# Patient Record
Sex: FEMALE | Race: WHITE | NOT HISPANIC OR LATINO | Employment: UNEMPLOYED | URBAN - NONMETROPOLITAN AREA
[De-identification: names, ages, dates, MRNs, and addresses within clinical notes are randomized per-mention and may not be internally consistent; named-entity substitution may affect disease eponyms.]

---

## 2019-06-03 ENCOUNTER — TRANSCRIBE ORDERS (OUTPATIENT)
Dept: ADMINISTRATIVE | Facility: HOSPITAL | Age: 74
End: 2019-06-03

## 2019-06-03 DIAGNOSIS — C51.9 VULVAR CANCER (HCC): Primary | ICD-10-CM

## 2019-06-05 ENCOUNTER — HOSPITAL ENCOUNTER (OUTPATIENT)
Dept: CT IMAGING | Facility: HOSPITAL | Age: 74
Discharge: HOME | End: 2019-06-05

## 2019-06-05 ENCOUNTER — HOSPITAL ENCOUNTER (OUTPATIENT)
Dept: CT IMAGING | Facility: HOSPITAL | Age: 74
Discharge: HOME | End: 2019-06-05
Admitting: OBSTETRICS & GYNECOLOGY

## 2019-06-05 DIAGNOSIS — C51.9 VULVAR CANCER (HCC): ICD-10-CM

## 2019-06-05 LAB — CREAT BLDA-MCNC: 1.2 MG/DL (ref 0.6–1.3)

## 2019-06-05 PROCEDURE — 74177 CT ABD & PELVIS W/CONTRAST: CPT | Performed by: RADIOLOGY

## 2019-06-05 PROCEDURE — 82565 ASSAY OF CREATININE: CPT

## 2019-06-05 PROCEDURE — 71260 CT THORAX DX C+: CPT | Performed by: RADIOLOGY

## 2019-06-05 PROCEDURE — 71260 CT THORAX DX C+: CPT

## 2019-06-05 PROCEDURE — 74177 CT ABD & PELVIS W/CONTRAST: CPT

## 2019-06-05 PROCEDURE — 0 IOVERSOL 68 % SOLUTION: Performed by: OBSTETRICS & GYNECOLOGY

## 2019-06-05 RX ADMIN — IOVERSOL 60 ML: 678 INJECTION INTRA-ARTERIAL; INTRAVENOUS at 08:19

## 2019-09-27 ENCOUNTER — TRANSCRIBE ORDERS (OUTPATIENT)
Dept: ADMINISTRATIVE | Facility: HOSPITAL | Age: 74
End: 2019-09-27

## 2019-09-27 DIAGNOSIS — Z51.89 ENCOUNTER FOR WOUND CARE: Primary | ICD-10-CM

## 2019-10-04 ENCOUNTER — HOSPITAL ENCOUNTER (OUTPATIENT)
Dept: WOUND CARE | Facility: HOSPITAL | Age: 74
Discharge: HOME | End: 2019-10-04
Admitting: NURSE PRACTITIONER

## 2019-10-04 VITALS
WEIGHT: 195 LBS | HEART RATE: 69 BPM | HEIGHT: 66 IN | BODY MASS INDEX: 31.34 KG/M2 | SYSTOLIC BLOOD PRESSURE: 185 MMHG | TEMPERATURE: 98 F | DIASTOLIC BLOOD PRESSURE: 75 MMHG | RESPIRATION RATE: 20 BRPM

## 2019-10-04 DIAGNOSIS — Z51.89 ENCOUNTER FOR WOUND CARE: ICD-10-CM

## 2019-10-04 PROCEDURE — 97602 WOUND(S) CARE NON-SELECTIVE: CPT

## 2019-10-06 NOTE — PROGRESS NOTES
Wound Clinic Initial Evaluation  Patient Identification:  Name:  Orquidea Rosenberg  Age:  74 y.o.  Sex:  female  :  1945  MRN:  [id number]   Visit Number:  [id number]  Primary Care Physician:  Lux Moreira DO     Subjective     Chief complaint:     Surgical wound dehisced      History of presenting illness:     Patient is a 74 y.o. female with past medical history significant for recent radical vulvectomy, denies any other significant medical history.  Present's today for evaluation of wound dehisced. Daughter present during visit. Patient reports having radical vulvectomy on  of this year, they did a reconstructive surgery at that same time. She reports that they started to remove every other stitch when her wound opened on the left side of her vagina/ inner thigh. She reports having moderate amount of drainage, no odor present. She reports having to have area debrided every two weeks for the past 2 weeks, with most recent being wednesday. She states they have cultured the drainage several times and has recently finished a course of Levaquin. She denies any fever, chills, nausea or vomiting. Have been packing area with wet to dry dressings.   ---------------------------------------------------------------------------------------------------------------------   Review of Systems   Constitutional: Negative for chills and fever.   Cardiovascular: Negative for leg swelling.   Gastrointestinal: Negative for diarrhea, nausea and vomiting.   Musculoskeletal: Positive for gait problem.        Decreased mobility due to location of wound, states she does continue to do ADLs but has increased pain.   Skin: Positive for color change and wound.   Neurological: Negative for numbness.   Psychiatric/Behavioral: Negative for confusion.      ---------------------------------------------------------------------------------------------------------------------   Past Medical History:   Diagnosis Date   •  Vulval ca (CMS/HCC)      Past Surgical History:   Procedure Laterality Date   • RADICAL VULVECTOMY  09/06/2019     Family History   Problem Relation Age of Onset   • Cancer Father    • Cancer Brother      Social History     Socioeconomic History   • Marital status:      Spouse name: Not on file   • Number of children: Not on file   • Years of education: Not on file   • Highest education level: Not on file   Tobacco Use   • Smoking status: Never Smoker   • Smokeless tobacco: Never Used   Substance and Sexual Activity   • Alcohol use: No     Frequency: Never   • Drug use: No     ---------------------------------------------------------------------------------------------------------------------   Allergies:  Penicillins  ---------------------------------------------------------------------------------------------------------------------    Objective     ---------------------------------------------------------------------------------------------------------------------   Vital Signs:          Body mass index is 31.47 kg/m².  Wt Readings from Last 3 Encounters:   10/04/19 88.5 kg (195 lb)     ---------------------------------------------------------------------------------------------------------------------     Physical Exam  Constitutional: calm and comfortable appears and obese  Neck: shows normal range of motion without pain, and no evidence of trauma or deformity  and trachea is midline   Respiratory: Normal respiratory effort and symmetrical chest expansion  Cardiovascular:pulses normal, and intact distal pulses dorsal-pedis  palpable and posteria-tib   palpable2+ lower bilateral  Neurologic: Mental Status orientated to person, place, time and situation  Skin: Temperature:warm to left thigh Color: Moisture: moist,Nails: pink nail beds, Hair:normal distribution .    Wound 1:present, Lesions: surgical wound dehisce, Rash: absent, Mobility Normal Gait, Location of wound: left thigh/reconstuctive vagina ,  Changes since last exam: this is initial exam, Wound bed structures/characteristics: area is open with yellow slough and black eschar, Drainage characteristics: moderate, Edges moist, Periwound characteristics: some mositure-realted inflammation, area is warm to touch, erythematous with erythremia extending down leg, Bioburden characteristics:slough, soft eschar is present  and > 75% of wound bed .  Measurements: 3.7cm X 0.5cm X 2cm    Wound  2: Small open are at incision line located right groin/. Area has yellow slough covering wound bed. Area appears moist. No erythremia.     Wound3: Wound present to left proximal greater trochantar. Patient was unaware of wound. Area is small, 2cm deep dehisced area. Area is moist with pink wound bed, with small area of slough present. No erythremia noted in periwound.   ---------------------------------------------------------------------------------------------------------------------    Assessment & Plan      Assessment     1. Wound dehisced to left vagina/thigh  2. Wound dehisced to left proximal greater trochanter  3. Obesity    Plan:     Wound dehisced to left vagina/thigh- concerns for abscess or infection as erythremia is extending down left leg. Recommended CT for evaluation, patient and daughter wants to wait until they follow up with plastic surgeon on Monday. Advised with erythremia worsens or other signs of infection are present to return to clinic or ED. Wound vac also recommended, due to location and increase in moisture to site felt patient may be at increased risk for further break down, will re-evaluate at next visit. Will pack wound with hydrogel moistened gauze and cover with gauze.     Wound dehisced to left proximal greater trochanter- will pack wound with hydrogel and cover with gauze.     Obesity- advised to consume a high protein low carb diet, which will help with weight management and wound healing.     BERTHA Diaz   [facility]    10/06/19  4:56 PM

## 2019-10-11 ENCOUNTER — HOSPITAL ENCOUNTER (OUTPATIENT)
Dept: WOUND CARE | Facility: HOSPITAL | Age: 74
Discharge: HOME | End: 2019-10-11
Admitting: NURSE PRACTITIONER

## 2019-10-11 VITALS
WEIGHT: 190 LBS | BODY MASS INDEX: 30.53 KG/M2 | RESPIRATION RATE: 20 BRPM | HEIGHT: 66 IN | HEART RATE: 80 BPM | TEMPERATURE: 98.1 F | SYSTOLIC BLOOD PRESSURE: 157 MMHG | DIASTOLIC BLOOD PRESSURE: 59 MMHG

## 2019-10-11 DIAGNOSIS — Z51.89 ENCOUNTER FOR WOUND CARE: Primary | ICD-10-CM

## 2019-10-11 PROCEDURE — 87070 CULTURE OTHR SPECIMN AEROBIC: CPT | Performed by: NURSE PRACTITIONER

## 2019-10-11 PROCEDURE — 97602 WOUND(S) CARE NON-SELECTIVE: CPT

## 2019-10-11 PROCEDURE — G0463 HOSPITAL OUTPT CLINIC VISIT: HCPCS

## 2019-10-11 PROCEDURE — 87205 SMEAR GRAM STAIN: CPT | Performed by: NURSE PRACTITIONER

## 2019-10-11 PROCEDURE — 87077 CULTURE AEROBIC IDENTIFY: CPT | Performed by: NURSE PRACTITIONER

## 2019-10-11 PROCEDURE — 87186 SC STD MICRODIL/AGAR DIL: CPT | Performed by: NURSE PRACTITIONER

## 2019-10-11 RX ORDER — LEVOFLOXACIN 750 MG/1
750 TABLET ORAL DAILY
Qty: 7 TABLET | Refills: 0 | Status: SHIPPED | OUTPATIENT
Start: 2019-10-11 | End: 2019-10-14

## 2019-10-11 RX ORDER — IBUPROFEN 600 MG/1
600 TABLET ORAL EVERY 6 HOURS PRN
COMMUNITY
End: 2020-10-07

## 2019-10-11 RX ORDER — NIFEDIPINE 30 MG/1
30 TABLET, EXTENDED RELEASE ORAL DAILY
COMMUNITY
End: 2020-10-07

## 2019-10-11 RX ORDER — OXYCODONE AND ACETAMINOPHEN 7.5; 325 MG/1; MG/1
1 TABLET ORAL EVERY 6 HOURS PRN
COMMUNITY
End: 2020-10-07

## 2019-10-11 NOTE — PROGRESS NOTES
Patient Identification:  Name:  Orquidea Rosenberg  Age:  74 y.o.  Sex:  female  :  1945  MRN:  [id number]   Visit Number:  [id number]  Primary Care Physician:  Lux Moreira DO     Subjective     Chief complaint:     Surgical wound dehisced     History of presenting illness:     Patient is a 74 y.o. female with past medical history significant for recent radical vulvectomy, denies any other significant medical history.  Present's today for evaluation of wound dehisced. Daughter present during visit. Patient reports having radical vulvectomy on  of this year, they did a reconstructive surgery at that same time. She reports that they started to remove every other stitch when her wound opened on the left side of her vagina/ inner thigh. She reports having moderate amount of drainage, no odor present. She reports having to have area debrided every two weeks for the past 2 weeks, with most recent being wednesday. She states they have cultured the drainage several times and has recently finished a course of Levaquin. She denies any fever, chills, nausea or vomiting. Have been packing area with wet to dry dressings.     Interval history: Patient seen in clinic today for evaluation of surgical wound. She reports seeing her plastic surgeon on Monday of this week and was reported no issues related to wound, and overall felt wound was stable. She denies any issues to her wounds since last visit. States she has been cleaning area with Dakins.   ---------------------------------------------------------------------------------------------------------------------   Review of Systems   Constitutional: Negative for diaphoresis and fever.   Cardiovascular: Negative for leg swelling.   Gastrointestinal: Negative for diarrhea, nausea and vomiting.   Musculoskeletal: Positive for gait problem.        Due to pain and wound location    Skin: Positive for wound.        Surgical wound          ---------------------------------------------------------------------------------------------------------------------   Past Medical History:   Diagnosis Date   • Vulval ca (CMS/HCC)      Past Surgical History:   Procedure Laterality Date   • RADICAL VULVECTOMY  09/06/2019     Family History   Problem Relation Age of Onset   • Cancer Father    • Cancer Brother      Social History     Socioeconomic History   • Marital status:      Spouse name: Not on file   • Number of children: Not on file   • Years of education: Not on file   • Highest education level: Not on file   Tobacco Use   • Smoking status: Never Smoker   • Smokeless tobacco: Never Used   Substance and Sexual Activity   • Alcohol use: No     Frequency: Never   • Drug use: No     ---------------------------------------------------------------------------------------------------------------------   Allergies:  Penicillins  ---------------------------------------------------------------------------------------------------------------------  Objective     ---------------------------------------------------------------------------------------------------------------------   Vital Signs:  Temp:  [98.1 °F (36.7 °C)] 98.1 °F (36.7 °C)  Heart Rate:  [80] 80  Resp:  [20] 20  BP: (157)/(59) 157/59    Body mass index is 30.67 kg/m².  Wt Readings from Last 3 Encounters:   10/11/19 86.2 kg (190 lb)   10/04/19 88.5 kg (195 lb)     ---------------------------------------------------------------------------------------------------------------------     Physical Exam  Skin: Temperature:normal turgor and temperatureColor: normal, no cyanosis, jaundice, pallor or bruising, Moisture: moist,Nails: pink nail beds, Hair:thinning to lower extremities .  Wound:present, Lesions: Surgical would dehiscense, Rash: absent, Mobility Normal Gait, Location of wound: left vagina into left thigh, following surgical incision, Changes since last exam: bioburden has increased  and  wound is worsening , Etiology and classification: surgical wound dehiscence (primary closure has failed in one or more areas which are now open), Wound bed structures/characteristics: full-thickness (subcutaneous tissue is exposed in at least a portion of the wound), Drainage characteristics: moderate, Edges moist, Periwound characteristics: some mositure-realted inflammation, Bioburden characteristics:slough and > 75% of wound bed green, yellow slough present in wound bed. Measurement: 5.5cm X 2cm X 3cm and is now tunneling 3cm.     Wound: to left proximal greater trochanter- appears to be showing signs of healing and has improved in size.   ---------------------------------------------------------------------------------------------------------------------    Assessment & Plan      Assessment     1. Wound dehisced to left vagina/thigh  2. Wound dehisced to left proximal greater trochanter  3. Obesity     Plan:     Wound dehisced to left vagina/thigh- wound appears worse. Case was discussed with Dr. Alfaro who recommends that patient report to ED. Made recommendations to patient and she reports that she may go to ED tomorrow. I called Patrizia STONE who works with her oncologist. I reported my findings to her and recommendations, she reported to her colleague patient may report to ED and they would evaluate her then. Would culture ordered. Will prescribe Levaquin until culture is finalized.  Wound packed with opticell and covered with gauze.      Wound dehisced to left proximal greater trochanter- Will pack with opticell.     Obesity- advised to consume a high protein low carb diet, which will help with weight management and wound healing.     BERTHA Diaz   [facility]    10/11/19  5:12 PM

## 2019-10-13 LAB
BACTERIA SPEC AEROBE CULT: ABNORMAL
BACTERIA SPEC AEROBE CULT: ABNORMAL
GRAM STN SPEC: ABNORMAL

## 2019-10-14 RX ORDER — LEVOFLOXACIN 750 MG/1
750 TABLET ORAL DAILY
Qty: 7 TABLET | Refills: 0 | Status: SHIPPED | OUTPATIENT
Start: 2019-10-14 | End: 2019-10-21

## 2019-10-14 NOTE — ADDENDUM NOTE
Encounter addended by: Jossie Loya APRN on: 10/14/2019 5:28 PM   Actions taken: Pharmacy for encounter modified, Order list changed

## 2019-10-18 ENCOUNTER — HOSPITAL ENCOUNTER (OUTPATIENT)
Dept: WOUND CARE | Facility: HOSPITAL | Age: 74
Discharge: HOME | End: 2019-10-18
Admitting: NURSE PRACTITIONER

## 2019-10-18 VITALS
SYSTOLIC BLOOD PRESSURE: 154 MMHG | DIASTOLIC BLOOD PRESSURE: 68 MMHG | TEMPERATURE: 98.3 F | RESPIRATION RATE: 18 BRPM | HEART RATE: 71 BPM

## 2019-10-18 DIAGNOSIS — T81.31XD DEHISCENCE OF OPERATIVE WOUND, SUBSEQUENT ENCOUNTER: Primary | ICD-10-CM

## 2019-10-18 PROCEDURE — G0463 HOSPITAL OUTPT CLINIC VISIT: HCPCS

## 2019-10-18 PROCEDURE — 97602 WOUND(S) CARE NON-SELECTIVE: CPT

## 2019-10-18 NOTE — PROGRESS NOTES
Wound Clinic Progress Note  Patient Identification:  Name:  Orquidea Rosenberg  Age:  74 y.o.  Sex:  female  :  1945  MRN:  [id number]   Visit Number:  [id number]  Primary Care Physician:  Lux Moreira DO     Subjective     Chief complaint:     Surgical wound dehisced    History of presenting illness:     Patient is a 74 y.o. female with past medical history significant for recent radical vulvectomy, denies any other significant medical history.  Present's today for evaluation of wound dehisced. Daughter present during visit. Patient reports having radical vulvectomy on  of this year, they did a reconstructive surgery at that same time. She reports that they started to remove every other stitch when her wound opened on the left side of her vagina/ inner thigh. She reports having moderate amount of drainage, no odor present. She reports having to have area debrided every two weeks for the past 2 weeks, with most recent being wednesday. She states they have cultured the drainage several times and has recently finished a course of Levaquin. She denies any fever, chills, nausea or vomiting. Have been packing area with wet to dry dressings.     Interval history: Patient is seen today for follow up for surgical wound dehisced. She reports going to see her oncologist this past week for evaluation. She reports they didn't have any new concerns at that time and felt her wound was doing good. She reports that pain has improved. She is having difficulty keeping dressings in place due to location. Reports continued drainage but denies odor. Denies any fever or chills.  ---------------------------------------------------------------------------------------------------------------------   Review of Systems   Constitutional: Negative for chills and fever.   Musculoskeletal: Negative for gait problem.   Skin: Positive for wound.        Moderate amount of drainage reported. No odor. States she can't keep  dressing in place due to location.      ---------------------------------------------------------------------------------------------------------------------   Past Medical History:   Diagnosis Date   • Vulval ca (CMS/HCC)      Past Surgical History:   Procedure Laterality Date   • RADICAL VULVECTOMY  09/06/2019     Family History   Problem Relation Age of Onset   • Cancer Father    • Cancer Brother      Social History     Socioeconomic History   • Marital status:      Spouse name: Not on file   • Number of children: Not on file   • Years of education: Not on file   • Highest education level: Not on file   Tobacco Use   • Smoking status: Never Smoker   • Smokeless tobacco: Never Used   Substance and Sexual Activity   • Alcohol use: No     Frequency: Never   • Drug use: No     ---------------------------------------------------------------------------------------------------------------------   Allergies:  Penicillins  ---------------------------------------------------------------------------------------------------------------------    Objective     ---------------------------------------------------------------------------------------------------------------------   Vital Signs:  Temp:  [98.3 °F (36.8 °C)] 98.3 °F (36.8 °C)  Heart Rate:  [71] 71  Resp:  [18] 18  BP: (154)/(68) 154/68  There is no height or weight on file to calculate BMI.  Wt Readings from Last 3 Encounters:   10/11/19 86.2 kg (190 lb)   10/04/19 88.5 kg (195 lb)     ---------------------------------------------------------------------------------------------------------------------     Physical Exam  Constitutional: Vital sign were reviewed (temperature, pulse, respiration, and blood pressure) and found to be within expected limits and calm and comfortable appears  Skin: Temperature:normal turgor and temperatureColor: normal, no cyanosis, jaundice, pallor or bruising, Moisture: moist to periarea,Nails: pink nail beds, Hair:thinning to  lower extremities .  Wound to left vagina/thigh- less slough is present at today's exam. No odor present. Does appear to be draining serous fluid. Some wound bed is visible and is pink, moist. Erythema has significantly improved and is now just surrounding the wound.   ---------------------------------------------------------------------------------------------------------------------     Wound Culture   Date Value Ref Range Status   10/11/2019 Rare Pseudomonas aeruginosa (A)  Final   10/11/2019 Light growth (2+) Corynebacterium species (A)  Final     Comment:       No definitive guidelines. All are susceptible to vancomycin. Resistance to penicillins & cephalosporins does occur.      I have personally reviewed the above laboratory results.     ---------------------------------------------------------------------------------------------------------------------    Assessment & Plan      Assessment     1. Wound dehisced to left vagina/thigh  2. Wound dehisced to left proximal greater trochanter  3. Obesity     Plan:     Wound dehisced to left vagina/thigh-  Will order a wound vac. Will have patient return to have placed this coming Monday.  Wound packed with opticell and covered with gauze.      Wound dehisced to left proximal greater trochanter- Will pack with opticell.     Obesity- advised to consume a high protein low carb diet, which will help with weight management and wound healing.     BERTHA Diaz   [facility]    10/18/19  5:02 PM

## 2019-10-21 ENCOUNTER — HOSPITAL ENCOUNTER (OUTPATIENT)
Dept: WOUND CARE | Facility: HOSPITAL | Age: 74
Discharge: HOME | End: 2019-10-21
Admitting: NURSE PRACTITIONER

## 2019-10-21 VITALS
HEART RATE: 96 BPM | TEMPERATURE: 98.4 F | HEIGHT: 66 IN | SYSTOLIC BLOOD PRESSURE: 180 MMHG | BODY MASS INDEX: 30.53 KG/M2 | RESPIRATION RATE: 20 BRPM | WEIGHT: 190 LBS | DIASTOLIC BLOOD PRESSURE: 85 MMHG

## 2019-10-21 DIAGNOSIS — T81.31XD DEHISCENCE OF OPERATIVE WOUND, SUBSEQUENT ENCOUNTER: Primary | ICD-10-CM

## 2019-10-21 PROCEDURE — G0463 HOSPITAL OUTPT CLINIC VISIT: HCPCS

## 2019-10-21 PROCEDURE — 63710000001 NYSTATIN 100000 UNIT/GM OINTMENT 15 G TUBE: Performed by: NURSE PRACTITIONER

## 2019-10-21 PROCEDURE — A9270 NON-COVERED ITEM OR SERVICE: HCPCS | Performed by: NURSE PRACTITIONER

## 2019-10-21 RX ORDER — NYSTATIN 100000 U/G
OINTMENT TOPICAL ONCE
Status: COMPLETED | OUTPATIENT
Start: 2019-10-21 | End: 2019-10-21

## 2019-10-21 RX ORDER — LIDOCAINE HYDROCHLORIDE 20 MG/ML
JELLY TOPICAL AS NEEDED
Status: DISCONTINUED | OUTPATIENT
Start: 2019-10-21 | End: 2019-10-22 | Stop reason: HOSPADM

## 2019-10-21 RX ADMIN — NYSTATIN: 100000 OINTMENT TOPICAL at 16:03

## 2019-10-21 RX ADMIN — LIDOCAINE HYDROCHLORIDE: 20 JELLY TOPICAL at 15:24

## 2019-10-21 NOTE — PROGRESS NOTES
Wound Clinic Progress Note  Patient Identification:  Name:  Orquidea Rosenberg  Age:  74 y.o.  Sex:  female  :  1945  MRN:  [id number]   Visit Number:  [id number]  Primary Care Physician:  Lux Moreira DO     Subjective     Chief complaint:     Surgical wound dehisce     History of presenting illness:     Patient is a 74 y.o. female with past medical history significant for recent radical vulvectomy, denies any other significant medical history.  Present's today for evaluation of wound dehisced. Daughter present during visit. Patient reports having radical vulvectomy on  of this year, they did a reconstructive surgery at that same time. She reports that they started to remove every other stitch when her wound opened on the left side of her vagina/ inner thigh. She reports having moderate amount of drainage, no odor present. She reports having to have area debrided every two weeks for the past 2 weeks, with most recent being wednesday. She states they have cultured the drainage several times and has recently finished a course of Levaquin. She denies any fever, chills, nausea or vomiting. Have been packing area with wet to dry dressings.     Interval history: Patient seen in clinic today for wound vac placement. She reports seeing her oncologist since previous exam and denies any new issues or concerns. Continues to report mild pain and difficulty with dressings remaining in place. Drainage continues without odor. Wound vac supplies ordered and were delivered to clinic today. Patient reports that she has an appointment with her oncologist tomorrow and her plastic surgeon on Friday.     ---------------------------------------------------------------------------------------------------------------------   Review of Systems   Constitutional: Negative for chills and fever.   Cardiovascular: Negative for leg swelling.   Musculoskeletal: Negative for gait problem.   Skin: Positive for wound.         Left groin, reports drainage without odor. Reports that dressing continue to fall out, states there is normally not a dressing in place.      ---------------------------------------------------------------------------------------------------------------------   Past Medical History:   Diagnosis Date   • Vulval ca (CMS/HCC)      Past Surgical History:   Procedure Laterality Date   • RADICAL VULVECTOMY  09/06/2019     Family History   Problem Relation Age of Onset   • Cancer Father    • Cancer Brother      Social History     Socioeconomic History   • Marital status:      Spouse name: Not on file   • Number of children: Not on file   • Years of education: Not on file   • Highest education level: Not on file   Tobacco Use   • Smoking status: Never Smoker   • Smokeless tobacco: Never Used   Substance and Sexual Activity   • Alcohol use: No     Frequency: Never   • Drug use: No     ---------------------------------------------------------------------------------------------------------------------   Allergies:  Penicillins  ---------------------------------------------------------------------------------------------------------------------    Objective     ---------------------------------------------------------------------------------------------------------------------   Vital Signs:  Temp:  [98.4 °F (36.9 °C)] 98.4 °F (36.9 °C)  Heart Rate:  [96] 96  Resp:  [20] 20  BP: (180)/(85) 180/85    Body mass index is 30.67 kg/m².  Wt Readings from Last 3 Encounters:   10/21/19 86.2 kg (190 lb)   10/11/19 86.2 kg (190 lb)   10/04/19 88.5 kg (195 lb)     ---------------------------------------------------------------------------------------------------------------------   Physical Exam  Constitutional: calm and comfortable appears and obese  Wound is stable from previous exam. Yellow slough present. Moderate amount of drainage. No odoor. Periwound is red, blanchable and  intact.    ---------------------------------------------------------------------------------------------------------------------    Assessment & Plan      Assessment     1. Wound dehisced to left vagina/thigh  2. Wound dehisced to left proximal greater trochanter  3. Obesity    Plan:     Wound dehisced to left vagina/thigh-  Wound vac was to be placed today, will wait as patient has follow up with oncology tomorrow. I called and spoke with Zee regarding plan for wound vac, patient is to take supplies with her to her appointment an wound vac will be placed in office.   Wound packed with opticell and covered with gauze.      Wound dehisced to left proximal greater trochanter- Will continue to pack with opticell as wound continues to heal.     Obesity- advised to consume a high protein low carb diet, which will help with weight management and wound healing.     Follow up in 1 week.    BERTHA Diaz   [facility]    10/21/19  3:51 PM

## 2019-10-21 NOTE — PATIENT INSTRUCTIONS
Negative Pressure Wound Therapy Dressing Care  Negative pressure wound therapy (NPWT) is a device that helps wounds heal. NPWT helps the wound stay clean and healthy while it heals from the inside.  NPWT uses a bandage (dressing) that is made of a sponge or gauze-like material. The dressing is placed in or inside the wound. The wound is then covered and sealed with a cover dressing that sticks to your skin (adhesive). This keeps air out. A tube connects the cover dressing to a small pump. The pump sucks fluid and germs from the wound. The pump also controls any odor coming from the wound.  What are the risks?  NPWT is usually safe to use. The most common problem is skin irritation from the dressing adhesive, but there are many ways to prevent this from happening. However, more serious problems can develop, such as:  · Bleeding.  · Infection.  · Dehydration.  · Pain.  How to change your dressing  How often you change your dressing depends on your wound. If the pump is off for more than two hours, the dressing will need to be changed. Follow your health care provider’s instructions on how often to change it. Your health care provider may change your dressing, or a family member, friend, or caregiver may be shown how to change the dressing. It is important to:  · Wear gloves and protective clothing while changing a dressing. This may include eye protection.  · Never let anyone change your dressing if he or she has an infection, skin condition, or skin wound or cut of any size.  Preparing to change your dressing  · If needed, take pain medicine 30 minutes before the dressing change as prescribed by your health care provider.  · Set up a clean station for wound care. You will need:  ? A disposable garbage bag that is open and ready to use.  ? Hand .  ? Wound cleanser or saltwater solution (saline) as told by your health care provider.  ? New dressing material or bandages. Make sure to open the dressing package  so that the dressing remains on the inside of the package.  You may also need the following in your clean station:  · A box of vinyl gloves.  · Tape.  · Skin protectant. This may be a wipe, film, or spray.  · Clean or germ-free (sterile) scissors.  · Wound liner.  · Cotton tip applicators.  Removing your old dressing  · Wash your hands with soap and water. Dry your hands with a clean towel. If soap and water are not available, use hand .  · Put on gloves.  · Turn off the pump and disconnect the tubing from the dressing.  · Carefully remove the adhesive cover dressing in the direction of your hair growth. Only touch the outside edges of the dressing.  · Remove the dressing that is inside the wound. If the dressing sticks, use a wound cleanser or saline solution to wet the dressing. This helps it come off more easily.  · Throw the old dressing supplies into the ready garbage bag.  · Remove your gloves by grabbing the cuff and turning the glove inside out. Place the gloves in the trash immediately.  · Wash your hands with soap and water. Dry your hands with a clean towel. If soap and water are not available, use hand .  Cleaning your wound  · Follow your health care provider's instructions on how to clean your wound. This may include using a saline or recommended wound cleanser.  · Do not use over-the-counter medicated or antiseptic creams, sprays, liquids, or dressings unless told to do so by your health care provider.  · Clean the area thoroughly with the recommended saline solution or wound cleanser and a clean gauze pad.  · Throw the gauze pad into the garbage bag.  · Wash your hands with soap and water. Dry your hands with a clean towel. If soap and water are not available, use hand .  Applying the dressing  · Apply a skin protectant to any skin that will be exposed to adhesive. Let the skin protectant dry.  · Put a new dressing into the wound.  · Apply a new cover dressing and  tube.  · Take off your gloves. Put them in the plastic bag with the old dressing. Tie the bag shut and throw it away.  · Wash your hands with soap and water. Dry your hands with a clean towel. If soap and water are not available, use hand .  · Attach the suction and turn the pump back on. Do not change the settings on the machine without talking to a health care provider.  · Replace the container in the pump that collects fluid if it is full. Do this at least once a week.  Contact a health care provider if:  · You have new pain.  · You develop irritation, a rash, or itching around the wound or dressing.  · You see new black or yellow tissue in your wound.  · The dressing changes are painful or cause bleeding.  · The pump has been off for more than two hours and you do not know how to change the dressing.  · The alarm for the pump goes off and you do not know what to do.  Get help right away if:  · You have a lot of bleeding.  · You see a sudden change in the color or texture of the drainage.  · The wound breaks open.  · You have severe pain.  · You have signs of infection, such as:  ? More redness, swelling, or pain.  ? More fluid or blood.  ? Warmth.  ? Pus or a bad smell.  ? Red streaks leading from wound.  ? A fever.  This information is not intended to replace advice given to you by your health care provider. Make sure you discuss any questions you have with your health care provider.  Document Released: 03/11/2013 Document Revised: 01/12/2017 Document Reviewed: 09/22/2016  Elsevier Interactive Patient Education © 2019 [employer]

## 2019-10-25 ENCOUNTER — APPOINTMENT (OUTPATIENT)
Dept: WOUND CARE | Facility: HOSPITAL | Age: 74
End: 2019-10-25

## 2019-10-28 ENCOUNTER — HOSPITAL ENCOUNTER (OUTPATIENT)
Dept: WOUND CARE | Facility: HOSPITAL | Age: 74
Discharge: HOME | End: 2019-10-28
Admitting: NURSE PRACTITIONER

## 2019-10-28 VITALS
OXYGEN SATURATION: 95 % | TEMPERATURE: 98.3 F | HEART RATE: 83 BPM | SYSTOLIC BLOOD PRESSURE: 168 MMHG | RESPIRATION RATE: 17 BRPM | DIASTOLIC BLOOD PRESSURE: 73 MMHG

## 2019-10-28 PROCEDURE — 97602 WOUND(S) CARE NON-SELECTIVE: CPT

## 2019-10-28 NOTE — PROGRESS NOTES
Wound Clinic Progress Note  Patient Identification:  Name:  Orquidea Rosenberg  Age:  74 y.o.  Sex:  female  :  1945  MRN:  [id number]   Visit Number:  [id number]  Primary Care Physician:  Lux Moreira DO     Subjective     Chief complaint:     Surgical Wound dehisced     History of presenting illness:     Patient is a 74 y.o. female with past medical history significant for recent radical vulvectomy, denies any other significant medical history.  Present's today for evaluation of wound dehisced. Daughter present during visit. Patient reports having radical vulvectomy on  of this year, they did a reconstructive surgery at that same time. She reports that they started to remove every other stitch when her wound opened on the left side of her vagina/ inner thigh. She reports having moderate amount of drainage, no odor present. She reports having to have area debrided every two weeks for the past 2 weeks, with most recent being wednesday. She states they have cultured the drainage several times and has recently finished a course of Levaquin. She denies any fever, chills, nausea or vomiting. Have been packing area with wet to dry dressings.     Interval history: Patient seen in clinic today for follow up to her surgical wound dehisced. She denies any new concerns related to her wound. She does state that she has a difficult time keeping a dressing in place. She reports drainage but has not increased from previous visit. Reports mild pain at site, usually with movement. Denies any fever or chills.   ---------------------------------------------------------------------------------------------------------------------   Review of Systems   Constitutional: Negative for chills and fever.   Cardiovascular: Negative for leg swelling.   Skin: Positive for wound.       ---------------------------------------------------------------------------------------------------------------------   Past Medical  History:   Diagnosis Date   • Vulval ca (CMS/HCC)      Past Surgical History:   Procedure Laterality Date   • RADICAL VULVECTOMY  09/06/2019     Family History   Problem Relation Age of Onset   • Cancer Father    • Cancer Brother      Social History     Socioeconomic History   • Marital status:      Spouse name: Not on file   • Number of children: Not on file   • Years of education: Not on file   • Highest education level: Not on file   Tobacco Use   • Smoking status: Never Smoker   • Smokeless tobacco: Never Used   Substance and Sexual Activity   • Alcohol use: No     Frequency: Never   • Drug use: No     ---------------------------------------------------------------------------------------------------------------------   Allergies:  Penicillins  ---------------------------------------------------------------------------------------------------------------------    Objective     ---------------------------------------------------------------------------------------------------------------------   Vital Signs:  Temp:  [98.3 °F (36.8 °C)] 98.3 °F (36.8 °C)  Heart Rate:  [83] 83  Resp:  [17] 17  BP: (168)/(73) 168/73  No data found.  SpO2 Percentage    10/28/19 1617   SpO2: 95%     SpO2:  [95 %] 95 %  on   ;        There is no height or weight on file to calculate BMI.  Wt Readings from Last 3 Encounters:   10/21/19 86.2 kg (190 lb)   10/11/19 86.2 kg (190 lb)   10/04/19 88.5 kg (195 lb)     ---------------------------------------------------------------------------------------------------------------------     Physical Exam    Skin: Temperature:normal turgor and temperatureColor: normal, no cyanosis, jaundice, pallor or bruising, Moisture: dry,Nails: pink nail beds, Hair:normal distribution .  Wound:present, , Rash: absent, Mobility Normal Gait, Location of wound: left , Changes since last exam: bioburden coverage has diminished , Etiology and classification: surgical wound dehiscence (primary closure has  failed in one or more areas which are now open), Wound bed structures/characteristics: full-thickness (subcutaneous tissue is exposed in at least a portion of the wound), Drainage characteristics: moderate and serous drainage, Edges moist, Periwound characteristics: rubor extends less than 2cm from wound edges , Bioburden characteristics:slough and 50-75% of wound bed .  Measurements: 7cm X 2.2cm X 1cm with tunneling 1.5cm  ---------------------------------------------------------------------------------------------------------------------    Assessment & Plan      Assessment     1. Wound dehisced to left vagina/thigh  2. Wound dehisced to left proximal greater trochanter  3. Obesity    Plan:     Wound dehisced to left vagina/thigh-  Wound vac not in place , patient reports she went to oncology appointment where they told her it would not work. Will pack wound  with opticell and covered with gauze and secured in place with mepilex.      Wound dehisced to left proximal greater trochanter- Will continue to pack with opticell as wound continues to heal.     Obesity- advised to consume a high protein low carb diet, which will help with weight management and wound healing.      Follow up in 1 week.    BERTHA Diaz   [facility]    10/28/19  4:51 PM

## 2019-10-31 ENCOUNTER — HOSPITAL ENCOUNTER (EMERGENCY)
Facility: HOSPITAL | Age: 74
Discharge: HOME | End: 2019-10-31
Attending: FAMILY MEDICINE | Admitting: FAMILY MEDICINE

## 2019-10-31 VITALS
HEART RATE: 66 BPM | DIASTOLIC BLOOD PRESSURE: 49 MMHG | WEIGHT: 190 LBS | BODY MASS INDEX: 30.53 KG/M2 | HEIGHT: 66 IN | OXYGEN SATURATION: 92 % | SYSTOLIC BLOOD PRESSURE: 112 MMHG | TEMPERATURE: 99.6 F | RESPIRATION RATE: 20 BRPM

## 2019-10-31 DIAGNOSIS — E87.6 HYPOKALEMIA: ICD-10-CM

## 2019-10-31 DIAGNOSIS — R11.2 NON-INTRACTABLE VOMITING WITH NAUSEA, UNSPECIFIED VOMITING TYPE: Primary | ICD-10-CM

## 2019-10-31 LAB
ALBUMIN SERPL-MCNC: 3.43 G/DL (ref 3.5–5.2)
ALBUMIN/GLOB SERPL: 1.3 G/DL
ALP SERPL-CCNC: 61 U/L (ref 39–117)
ALT SERPL W P-5'-P-CCNC: 6 U/L (ref 1–33)
AMYLASE SERPL-CCNC: 38 U/L (ref 28–100)
ANION GAP SERPL CALCULATED.3IONS-SCNC: 12.9 MMOL/L (ref 5–15)
AST SERPL-CCNC: 10 U/L (ref 1–32)
BACTERIA UR QL AUTO: NORMAL /HPF
BILIRUB SERPL-MCNC: 0.4 MG/DL (ref 0.2–1.2)
BILIRUB UR QL STRIP: NEGATIVE
BUN BLD-MCNC: 25 MG/DL (ref 8–23)
BUN/CREAT SERPL: 30.1 (ref 7–25)
CALCIUM SPEC-SCNC: 8.3 MG/DL (ref 8.6–10.5)
CHLORIDE SERPL-SCNC: 103 MMOL/L (ref 98–107)
CLARITY UR: CLEAR
CO2 SERPL-SCNC: 23.1 MMOL/L (ref 22–29)
COLOR UR: YELLOW
CREAT BLD-MCNC: 0.83 MG/DL (ref 0.57–1)
CRP SERPL-MCNC: 16.09 MG/DL (ref 0–0.5)
D-LACTATE SERPL-SCNC: 0.8 MMOL/L (ref 0.5–2)
DEPRECATED RDW RBC AUTO: 48.7 FL (ref 37–54)
EOSINOPHIL # BLD MANUAL: 0.06 10*3/MM3 (ref 0–0.4)
EOSINOPHIL NFR BLD MANUAL: 1 % (ref 0.3–6.2)
ERYTHROCYTE [DISTWIDTH] IN BLOOD BY AUTOMATED COUNT: 14.4 % (ref 12.3–15.4)
ERYTHROCYTE [SEDIMENTATION RATE] IN BLOOD: 51 MM/HR (ref 0–30)
GFR SERPL CREATININE-BSD FRML MDRD: 67 ML/MIN/1.73
GLOBULIN UR ELPH-MCNC: 2.7 GM/DL
GLUCOSE BLD-MCNC: 144 MG/DL (ref 65–99)
GLUCOSE UR STRIP-MCNC: NEGATIVE MG/DL
HCT VFR BLD AUTO: 28.8 % (ref 34–46.6)
HGB BLD-MCNC: 9.3 G/DL (ref 12–15.9)
HGB UR QL STRIP.AUTO: NEGATIVE
HYALINE CASTS UR QL AUTO: NORMAL /LPF
HYPOCHROMIA BLD QL: ABNORMAL
KETONES UR QL STRIP: NEGATIVE
LEUKOCYTE ESTERASE UR QL STRIP.AUTO: NEGATIVE
LIPASE SERPL-CCNC: 14 U/L (ref 13–60)
LYMPHOCYTES # BLD MANUAL: 0.36 10*3/MM3 (ref 0.7–3.1)
LYMPHOCYTES NFR BLD MANUAL: 6 % (ref 19.6–45.3)
MCH RBC QN AUTO: 29.9 PG (ref 26.6–33)
MCHC RBC AUTO-ENTMCNC: 32.3 G/DL (ref 31.5–35.7)
MCV RBC AUTO: 92.6 FL (ref 79–97)
NEUTROPHILS # BLD AUTO: 5.57 10*3/MM3 (ref 1.7–7)
NEUTROPHILS NFR BLD MANUAL: 91 % (ref 42.7–76)
NEUTS BAND NFR BLD MANUAL: 2 % (ref 0–5)
NITRITE UR QL STRIP: NEGATIVE
PH UR STRIP.AUTO: 6.5 [PH] (ref 5–8)
PLAT MORPH BLD: NORMAL
PLATELET # BLD AUTO: 127 10*3/MM3 (ref 140–450)
PMV BLD AUTO: 9.3 FL (ref 6–12)
POTASSIUM BLD-SCNC: 3 MMOL/L (ref 3.5–5.2)
PROT SERPL-MCNC: 6.1 G/DL (ref 6–8.5)
PROT UR QL STRIP: ABNORMAL
RBC # BLD AUTO: 3.11 10*6/MM3 (ref 3.77–5.28)
RBC # UR: NORMAL /HPF
REF LAB TEST METHOD: NORMAL
SODIUM BLD-SCNC: 139 MMOL/L (ref 136–145)
SP GR UR STRIP: 1.02 (ref 1–1.03)
SQUAMOUS #/AREA URNS HPF: NORMAL /HPF
UROBILINOGEN UR QL STRIP: ABNORMAL
WBC NRBC COR # BLD: 5.99 10*3/MM3 (ref 3.4–10.8)
WBC UR QL AUTO: NORMAL /HPF

## 2019-10-31 PROCEDURE — 83690 ASSAY OF LIPASE: CPT | Performed by: NURSE PRACTITIONER

## 2019-10-31 PROCEDURE — 83605 ASSAY OF LACTIC ACID: CPT | Performed by: NURSE PRACTITIONER

## 2019-10-31 PROCEDURE — 87076 CULTURE ANAEROBE IDENT EACH: CPT | Performed by: NURSE PRACTITIONER

## 2019-10-31 PROCEDURE — 81001 URINALYSIS AUTO W/SCOPE: CPT | Performed by: NURSE PRACTITIONER

## 2019-10-31 PROCEDURE — 87040 BLOOD CULTURE FOR BACTERIA: CPT | Performed by: NURSE PRACTITIONER

## 2019-10-31 PROCEDURE — 96374 THER/PROPH/DIAG INJ IV PUSH: CPT

## 2019-10-31 PROCEDURE — 99284 EMERGENCY DEPT VISIT MOD MDM: CPT

## 2019-10-31 PROCEDURE — 86140 C-REACTIVE PROTEIN: CPT | Performed by: NURSE PRACTITIONER

## 2019-10-31 PROCEDURE — 85652 RBC SED RATE AUTOMATED: CPT | Performed by: NURSE PRACTITIONER

## 2019-10-31 PROCEDURE — 25010000002 ONDANSETRON PER 1 MG: Performed by: NURSE PRACTITIONER

## 2019-10-31 PROCEDURE — 85007 BL SMEAR W/DIFF WBC COUNT: CPT | Performed by: NURSE PRACTITIONER

## 2019-10-31 PROCEDURE — 80053 COMPREHEN METABOLIC PANEL: CPT | Performed by: NURSE PRACTITIONER

## 2019-10-31 PROCEDURE — 85025 COMPLETE CBC W/AUTO DIFF WBC: CPT | Performed by: NURSE PRACTITIONER

## 2019-10-31 PROCEDURE — 82150 ASSAY OF AMYLASE: CPT | Performed by: NURSE PRACTITIONER

## 2019-10-31 RX ORDER — ONDANSETRON 2 MG/ML
4 INJECTION INTRAMUSCULAR; INTRAVENOUS ONCE
Status: COMPLETED | OUTPATIENT
Start: 2019-10-31 | End: 2019-10-31

## 2019-10-31 RX ORDER — SODIUM CHLORIDE 0.9 % (FLUSH) 0.9 %
10 SYRINGE (ML) INJECTION AS NEEDED
Status: DISCONTINUED | OUTPATIENT
Start: 2019-10-31 | End: 2019-10-31 | Stop reason: HOSPADM

## 2019-10-31 RX ORDER — POTASSIUM CHLORIDE 750 MG/1
40 TABLET, FILM COATED, EXTENDED RELEASE ORAL ONCE
Qty: 4 TABLET | Refills: 0 | Status: SHIPPED | OUTPATIENT
Start: 2019-10-31 | End: 2019-10-31

## 2019-10-31 RX ORDER — ONDANSETRON 4 MG/1
4 TABLET, ORALLY DISINTEGRATING ORAL EVERY 6 HOURS PRN
Qty: 15 TABLET | Refills: 0 | Status: SHIPPED | OUTPATIENT
Start: 2019-10-31 | End: 2020-10-07

## 2019-10-31 RX ADMIN — SODIUM CHLORIDE 1000 ML: 9 INJECTION, SOLUTION INTRAVENOUS at 17:48

## 2019-10-31 RX ADMIN — ONDANSETRON 4 MG: 2 INJECTION, SOLUTION INTRAMUSCULAR; INTRAVENOUS at 17:48

## 2019-11-03 LAB
BACTERIA SPEC AEROBE CULT: ABNORMAL
GRAM STN SPEC: ABNORMAL

## 2019-11-04 ENCOUNTER — HOSPITAL ENCOUNTER (OUTPATIENT)
Dept: WOUND CARE | Facility: HOSPITAL | Age: 74
Discharge: HOME | End: 2019-11-04
Admitting: NURSE PRACTITIONER

## 2019-11-04 VITALS
HEART RATE: 79 BPM | RESPIRATION RATE: 20 BRPM | DIASTOLIC BLOOD PRESSURE: 71 MMHG | TEMPERATURE: 98 F | SYSTOLIC BLOOD PRESSURE: 181 MMHG

## 2019-11-04 DIAGNOSIS — T81.30XA WOUND DEHISCENCE: Primary | ICD-10-CM

## 2019-11-04 PROCEDURE — 97602 WOUND(S) CARE NON-SELECTIVE: CPT

## 2019-11-05 LAB — BACTERIA SPEC AEROBE CULT: NORMAL

## 2019-11-05 NOTE — PROGRESS NOTES
Wound Clinic Progress Note  Patient Identification:  Name:  Orquidea Rosenberg  Age:  74 y.o.  Sex:  female  :  1945  MRN:  [id number]   Visit Number:  [id number]  Primary Care Physician:  Provider, No Known     Subjective     Chief complaint:     Surgical wound dehisced     History of presenting illness:     Patient is a 74 y.o. female with past medical history significant for recent radical vulvectomy, denies any other significant medical history.  Present's today for evaluation of wound dehisced. Daughter present during visit. Patient reports having radical vulvectomy on  of this year, they did a reconstructive surgery at that same time. She reports that they started to remove every other stitch when her wound opened on the left side of her vagina/ inner thigh. She reports having moderate amount of drainage, no odor present. She reports having to have area debrided every two weeks for the past 2 weeks, with most recent being wednesday. She states they have cultured the drainage several times and has recently finished a course of Levaquin. She denies any fever, chills, nausea or vomiting. Have been packing area with wet to dry dressings.    Interval history: Patient is here for follow up to surgical wound dehisced. She denies any new complications since previous exam. Continues to report that dressing is not staying in place. She reports having moderate amount of drainage, denies any odor. No fever or chills reported.   ---------------------------------------------------------------------------------------------------------------------   Review of Systems   Constitutional: Negative for chills and fever.   Cardiovascular: Negative for leg swelling.   Musculoskeletal: Negative for gait problem.   Skin: Positive for wound.       ---------------------------------------------------------------------------------------------------------------------   Past Medical History:   Diagnosis Date   • Vulval ca  (CMS/[facility])      Past Surgical History:   Procedure Laterality Date   • RADICAL VULVECTOMY  09/06/2019     Family History   Problem Relation Age of Onset   • Cancer Father    • Cancer Brother      Social History     Socioeconomic History   • Marital status:      Spouse name: Not on file   • Number of children: Not on file   • Years of education: Not on file   • Highest education level: Not on file   Tobacco Use   • Smoking status: Former Smoker   • Smokeless tobacco: Never Used   Substance and Sexual Activity   • Alcohol use: No     Frequency: Never   • Drug use: No   • Sexual activity: Defer     ---------------------------------------------------------------------------------------------------------------------   Allergies:  Penicillins  ---------------------------------------------------------------------------------------------------------------------    Objective     ---------------------------------------------------------------------------------------------------------------------   Vital Signs:  Temp:  [98 °F (36.7 °C)] 98 °F (36.7 °C)  Heart Rate:  [79] 79  Resp:  [20] 20  BP: (181)/(71) 181/71  No data found.  There were no vitals filed for this visit.     on   ;        There is no height or weight on file to calculate BMI.  Wt Readings from Last 3 Encounters:   10/31/19 86.2 kg (190 lb)   10/21/19 86.2 kg (190 lb)   10/11/19 86.2 kg (190 lb)     ---------------------------------------------------------------------------------------------------------------------   Physical Exam  Skin: Temperature:normal turgor and temperatureColor: normal, no cyanosis, jaundice, pallor or bruising, Moisture: dry,Nails: pink nail beds, Hair:normal distribution .  Wound:present, , Rash: absent, Mobility Normal Gait, Location of wound: left , Changes since last exam: bioburden coverage has diminished , Etiology and classification: surgical wound dehiscence (primary closure has failed in one or more areas which are now  open), Wound bed structures/characteristics: full-thickness (subcutaneous tissue is exposed in at least a portion of the wound), Drainage characteristics: moderate and serous drainage, Edges moist, Periwound characteristics: rubor extends less than 2cm from wound edges , Bioburden characteristics:slough and 50-75% of wound bed .  Measurements: 6cm X 1.6cm X 1cm with tunneling 2cm at 3o'clock to 5.   ---------------------------------------------------------------------------------------------------------------------             Results from last 7 days   Lab Units 10/31/19  1748   CRP mg/dL 16.09*   LACTATE mmol/L 0.8   WBC 10*3/mm3 5.99   HEMOGLOBIN g/dL 9.3*   HEMATOCRIT % 28.8*   MCV fL 92.6   MCHC g/dL 32.3   PLATELETS 10*3/mm3 127*     Results from last 7 days   Lab Units 10/31/19  1748   SODIUM mmol/L 139   POTASSIUM mmol/L 3.0*   CHLORIDE mmol/L 103   CO2 mmol/L 23.1   BUN mg/dL 25*   CREATININE mg/dL 0.83   EGFR IF NONAFRICN AM mL/min/1.73 67   CALCIUM mg/dL 8.3*   GLUCOSE mg/dL 144*   ALBUMIN g/dL 3.43*   BILIRUBIN mg/dL 0.4   ALK PHOS U/L 61   AST (SGOT) U/L 10   ALT (SGPT) U/L 6   Estimated Creatinine Clearance: 65.8 mL/min (by C-G formula based on SCr of 0.83 mg/dL).  No results found for: AMMONIA    No results found for: HGBA1C, POCGLU  No results found for: HGBA1C  No results found for: TSH, FREET4    Blood Culture   Date Value Ref Range Status   10/31/2019 No growth at 4 days  Preliminary   10/31/2019 Corynebacterium species (A)  Final     Comment:     Probable contaminant requires clinical correlation, susceptibility not performed unless requested by physician.    No definitive guidelines. All are susceptible to vancomycin. Resistance to penicillins & cephalosporins does occur.     Pain Management Panel     There is no flowsheet data to display.        I have personally reviewed the above laboratory results.    ---------------------------------------------------------------------------------------------------------------------    Assessment & Plan      Assessment     1. Wound dehisced to left vagina/thigh  2. Wound dehisced to left proximal greater trochanter  3. Obesity    Plan:     Wound dehisced to left vagina/thigh-  Will continue to pack with opticell and covered with gauze and secured in place with mepilex. Advised on high protein low carb diet.      Wound dehisced to left proximal greater trochanter- Will continue to pack with opticell as wound continues to heal.     Obesity- advised to consume a high protein low carb diet, which will help with weight management and wound healing.      Follow up in 1 week.  BERTHA Diaz   [facility]    11/04/19  8:02 PM

## 2019-11-13 ENCOUNTER — APPOINTMENT (OUTPATIENT)
Dept: WOUND CARE | Facility: HOSPITAL | Age: 74
End: 2019-11-13

## 2019-11-15 ENCOUNTER — APPOINTMENT (OUTPATIENT)
Dept: WOUND CARE | Facility: HOSPITAL | Age: 74
End: 2019-11-15

## 2020-03-27 ENCOUNTER — APPOINTMENT (OUTPATIENT)
Dept: MAMMOGRAPHY | Facility: HOSPITAL | Age: 75
End: 2020-03-27

## 2020-03-27 ENCOUNTER — APPOINTMENT (OUTPATIENT)
Dept: BONE DENSITY | Facility: HOSPITAL | Age: 75
End: 2020-03-27

## 2020-07-21 ENCOUNTER — TRANSCRIBE ORDERS (OUTPATIENT)
Dept: ADMINISTRATIVE | Facility: HOSPITAL | Age: 75
End: 2020-07-21

## 2020-07-21 DIAGNOSIS — I89.0 LYMPHEDEMA: Primary | ICD-10-CM

## 2020-07-27 ENCOUNTER — APPOINTMENT (OUTPATIENT)
Dept: OCCUPATIONAL THERAPY | Facility: HOSPITAL | Age: 75
End: 2020-07-27

## 2020-07-28 ENCOUNTER — HOSPITAL ENCOUNTER (OUTPATIENT)
Dept: OCCUPATIONAL THERAPY | Facility: HOSPITAL | Age: 75
Setting detail: THERAPIES SERIES
Discharge: HOME | End: 2020-07-28

## 2020-07-28 DIAGNOSIS — I89.0 LYMPHEDEMA OF BOTH LOWER EXTREMITIES: Primary | ICD-10-CM

## 2020-07-28 DIAGNOSIS — C51.9 CANCER OF VULVA (HCC): ICD-10-CM

## 2020-07-28 PROCEDURE — 97016 VASOPNEUMATIC DEVICE THERAPY: CPT

## 2020-07-28 PROCEDURE — 97167 OT EVAL HIGH COMPLEX 60 MIN: CPT

## 2020-07-28 PROCEDURE — 97140 MANUAL THERAPY 1/> REGIONS: CPT

## 2020-07-28 PROCEDURE — 97535 SELF CARE MNGMENT TRAINING: CPT

## 2020-07-28 NOTE — THERAPY EVALUATION
Outpatient Occupational Therapy Lymphedema Initial Evaluation   Jorge     Patient Name: Orquidea Rosenberg  : 1945  MRN: [id number]  Today's Date: 2020      Visit Date: 2020    There is no problem list on file for this patient.       Past Medical History:   Diagnosis Date   • Arthritis    • COPD (chronic obstructive pulmonary disease) (CMS/HCC)    • Elevated cholesterol    • History of transfusion    • Hypertension    • Vulval ca (CMS/HCC)         Past Surgical History:   Procedure Laterality Date   • RADICAL VULVECTOMY  2019   • RADICAL VULVECTOMY Bilateral 2019         Visit Dx:     ICD-10-CM ICD-9-CM   1. Lymphedema of both lower extremities I89.0 457.1   2. Cancer of vulva (CMS/HCC) C51.9 184.4       Patient History     Row Name 20 1000             History    Chief Complaint  Difficulty with daily activities;Fatigue/poor endurance;Swelling  -BC      Date Current Problem(s) Began  19  -BC      Brief Description of Current Complaint  Wearing shoes  -BC      Patient/Caregiver Goals  Know what to do to help the symptoms;Decrease swelling  -BC      Current Tobacco Use  N/A  -BC      Patient's Rating of General Health  Excellent  -BC      Hand Dominance  right-handed  -BC      Patient seeing anyone else for problem(s)?  No  -BC      How has patient tried to help current problem?  massage  -BC      Related/Recent Hospitalizations  Yes  -BC      Date of Hospitalization  19  -BC      Are you or can you be pregnant  No  -BC         Pain     Pain Frequency  Rarely  -BC      Tolerance Time- Standing  WFL  -BC      Tolerance Time- Sitting  WFL  -BC      Tolerance Time- Walking  WFL  -BC      Tolerance Time- Lying  WFL  -BC      Is your sleep disturbed?  No  -BC      Is medication used to assist with sleep?  No  -BC      Total hours of sleep per night  6  -BC      What position do you sleep in?  Supine  -BC      Difficulties with ADL's?  yes  -BC         Fall Risk Assessment    Any  falls in the past year:  No  -BC      Does patient have a fear of falling  No  -BC         Services    Prior Rehab/Home Health Experiences  Yes  -BC      When was the prior experience with Rehab/Home Health  2019  -BC      Where was the prior experience with Rehab/Home Health  [facility]  -BC      Are you currently receiving Home Health services  No  -BC      Do you plan to receive Home Health services in the near future  No  -BC         Daily Activities    Primary Language  English  -BC      Are you able to read  Yes  -BC      Are you able to write  Yes  -BC      How does patient learn best?  Demonstration;Reading  -BC      Does patient have problems with the following?  None  -BC      Functional Status  mobility issues preventing performance of daily activities  -BC      Recommended Referrals  Occupational Therapy  -BC      Pt Participated in POC and Goals  Yes  -BC         Safety    Are you being hurt, hit, or frightened by anyone at home or in your life?  No  -BC      Are you being neglected by a caregiver  No  -BC        User Key  (r) = Recorded By, (t) = Taken By, (c) = Cosigned By    Initials Name Provider Type    BC Charlotte García OT Occupational Therapist          Lymphedema     Row Name 07/28/20 1000             Subjective Pain    Able to rate subjective pain?  yes  -BC      Subjective Pain Comment  Denies pain  -BC         Subjective Comments    Subjective Comments  Pt. presents this date s/p radical vulvectomy sept/2019.  Pt. has extensive scaring with blocking of lymphatic pathways noted.  Lymph fluid apparent in BLE, vulva/pubis area.    -BC         Lymphedema Assessment    Lymphedema Classification  Other:;RLE:;LLE:;secondary;stage 2 (Spontaneously Irreversible)  -BC      Lymphedema Cancer Related Sx  bilateral Radical vulvectomy  -BC      Lymph Nodes Removed #  0  -BC      Positive Lymph Nodes #  0  -BC      Stage of Cancer  Stage IV  -BC      Chemo Received  yes  -BC      Adverse Chemo  Reactions/Complication  Numbness in B hands  -BC      Radiation Therapy Received  yes  -BC      Radiation Treatments #/Timeframe  very ill  -BC         Posture/Observations    Posture- WNL  Posture is WNL  -BC         General ROM    GENERAL ROM COMMENTS  WFL  -BC         MMT (Manual Muscle Testing)    General MMT Comments  WFL  -BC         Lymphedema Edema Assessment    Ptting Edema Category  By grade out of 4  -BC      Pitting Edema  + 4/4  -BC      Stemmer Sign  left:;positive  -BC      Edema Assessment Comment  Pt. with pitting edema through out LLE.  -BC         Skin Changes/Observations    Location/Assessment  Lower Extremity;Other Location  -BC      Lower Extremity Conditions  bilateral:;clean;dry;shiny  -BC      Lower Extremity Color/Pigment  bilateral:;blanchable;radiation fibrosis  -BC      Lower Extremity Skin Details  LLE with hardened thickened areas around ankle and lateral of shin  -BC      Skin Observations Comment  Pt. with large scars on inner thigh B'ly at inguinals from surgical removal of tumor.  Radiation in same area.  -BC         Lymphedema Sensation    Lymphedema Sensation Tests  light touch;deep touch  -BC      Lymphedema Light Touch  RLE:;mild impairment  -BC      Lymphedema Deep Touch  WNL  -BC      Lymphedema Sensation Comments  Pt. states she feels more on the R side than on the L  -BC         Lymphedema Measurements    Measurement Type(s)  Quick Girth;Circumferential  -BC      Quick Girth Areas  Lower extremities  -BC      Circumferential Areas  Trunk  -BC         LLE Quick Girth (cm)    Met-heads  24.5 cm  -BC      Mid foot  26.3 cm  -BC      Smallest ankle  29 cm  -BC      Largest calf  43.6 cm  -BC      Tib tuberosity  39.3 cm  -BC      Mid patella  47 cm  -BC      Distal thigh  48.6 cm  -BC      Proximal thigh  55 cm  -BC      Other 1  30.3 cm  -BC      Other 2  39.5 cm  -BC         RLE Quick Girth (cm)    Met-heads  24.2 cm  -BC      Mid foot  26 cm  -BC      Smallest ankle  24.5  cm  -BC      Largest calf  39.6 cm  -BC      Tib tuberosity  39 cm  -BC      Mid patella  46 cm  -BC      Distal thigh  48 cm  -BC      Proximal thigh  57 cm  -BC      Other 1  25 cm  -BC      Other 2  35 cm  -BC      RLE Quick Girth Total  364.3  -BC         Trunk Circumferential (cm)    Measurement Location 1  Navel  -BC      Trunk 1  110.5 cm  -BC      Trunk Circumferential Total  110.5 cm  -BC         Manual Lymphatic Drainage    Manual Lymphatic Drainage  extremity treatment  -BC      Extremity Treatment  MLD to full limb  -BC      MLD to Full Limb  BLE  -BC      Manual Lymphatic Drainage Comments  Mapping of BLE's to find best pathway for movement of fluid.  -BC         Compression/Skin Care    Compression/Skin Care Comments  Compression pump x Abd. only.  -BC        User Key  (r) = Recorded By, (t) = Taken By, (c) = Cosigned By    Initials Name Provider Type    Charlotte Cook OT Occupational Therapist                  Therapy Education  Given: Symptoms/condition management  Program: New  How Provided: Verbal  Provided to: Patient  Level of Understanding: Verbalized        OT Goals     Row Name 20 1300          OT Short Term Goals    STG Date to Achieve  20  -BC     STG 1  Pt. familiar w/precautions, skin  and self management of lymphdema.  -BC     STG 2  Decrease pitting edema to 3/4 for decreased risk of infection.  -BC     STG 3  HEP to assist with improved lymphatic flow.  -BC     STG 4  Self care instructions for home MLD for volume reduction.  -BC        Long Term Goals    LTG Date to Achieve  10/28/20  -BC     LTG 1  Pt. and/or caregiver independent w/short stretch compression bandaging for volume reduction.  -BC     LTG 2  Decrease pitting edema to 2/4 for decreased risk of infection.  -BC     LTG 3  Independent with donning/doffing compression garment.  -BC     LTG 4  Independent with lymphedema management and HEP.  -BC        Time Calculation    OT Goal Re-Cert Due Date  10/28/20   -BC       User Key  (r) = Recorded By, (t) = Taken By, (c) = Cosigned By    Initials Name Provider Type    BC Charlotte García OT Occupational Therapist          OT Assessment/Plan     Row Name 07/28/20 1314          OT Assessment    Functional Limitations  Performance in self-care ADL  -BC     Impairments  Impaired flexibility;Sensation;Edema;Endurance;Impaired lymphatic circulation  -BC     Assessment Comments  Pt. demo lymphedema in pelvic area, BLE, lower abdomen.  -BC     Please refer to paper survey for additional self-reported information  Yes  -BC     OT Diagnosis  Lymphedema  -BC     OT Rehab Potential  Good  -BC     Patient/caregiver participated in establishment of treatment plan and goals  Yes  -BC     Patient would benefit from skilled therapy intervention  Yes  -BC        OT Plan    OT Frequency  2x/week  -BC     Predicted Duration of Therapy Intervention (Therapy Eval)  90 days  -BC     Planned CPT's?  OT EVAL HIGH COMPLEXITY: 81687;OT THER ACT EA 15 MIN: 15775IN;OT MANUAL THERAPY EA 15 MIN: 81474;OT SELF CARE/MGMT/TRAIN 15 MIN: 87288;OT VASOPNEUMATIC DEVICE: 15385 Lymphedema management  -BC     Planned Therapy Interventions (Optional Details)  home exercise program;manual therapy techniques;patient/family education;stretching  -BC     OT Plan Comments  Pt. would benefit from skilled OT for manual lymph drainage, compression pump, skin care, multi layered bandaging, education and home exercise program.    -BC       User Key  (r) = Recorded By, (t) = Taken By, (c) = Cosigned By    Initials Name Provider Type    BC Charlotte García OT Occupational Therapist                    Time Calculation:   OT Start Time: 0955  OT Stop Time: 1205  OT Time Calculation (min): 130 min  OT Non-Billable Time (min): 45 min     Therapy Charges for Today     Code Description Service Date Service Provider Modifiers Qty    28709200104  OT MANUAL THERAPY EA 15 MIN 7/28/2020 Charlotte García OT GO 2    [id number]  OT EVAL  HIGH COMPLEXITY 3 2020 Charlotte García, OT GO 1    12779065458 HC OT VASOPNEUMAT DEVIC 1 OR MORE AREAS 2020 Charlotte García OT GO 1    39601268122 HC OT SELF CARE/MGMT/TRAIN EA 15 MIN 2020 Charlotte García OT GO 1                    Charlotte García, OT  2020 and Outpatient Occupational Therapy Lymphedema Treatment Note  JOSE ROBERTO Jorge     Patient Name: Orquidea Rosenberg  : 1945  MRN: [id number]  Today's Date: 2020      Visit Date: 2020    There is no problem list on file for this patient.       Past Medical History:   Diagnosis Date   • Arthritis    • COPD (chronic obstructive pulmonary disease) (CMS/HCC)    • Elevated cholesterol    • History of transfusion    • Hypertension    • Vulval ca (CMS/HCC)         Past Surgical History:   Procedure Laterality Date   • RADICAL VULVECTOMY  2019   • RADICAL VULVECTOMY Bilateral 2019         Visit Dx:      ICD-10-CM ICD-9-CM   1. Lymphedema of both lower extremities I89.0 457.1   2. Cancer of vulva (CMS/HCC) C51.9 184.4       Lymphedema     Row Name 20 1000             Subjective Pain    Able to rate subjective pain?  yes  -BC      Subjective Pain Comment  Denies pain  -BC         Subjective Comments    Subjective Comments  Pt. presents this date s/p radical vulvectomy .  Pt. has extensive scaring with blocking of lymphatic pathways noted.  Lymph fluid apparent in BLE, vulva/pubis area.    -BC         Lymphedema Assessment    Lymphedema Classification  Other:;RLE:;LLE:;secondary;stage 2 (Spontaneously Irreversible)  -BC      Lymphedema Cancer Related Sx  bilateral Radical vulvectomy  -BC      Lymph Nodes Removed #  0  -BC      Positive Lymph Nodes #  0  -BC      Stage of Cancer  Stage IV  -BC      Chemo Received  yes  -BC      Adverse Chemo Reactions/Complication  Numbness in B hands  -BC      Radiation Therapy Received  yes  -BC      Radiation Treatments #/Timeframe  very ill  -BC         Posture/Observations    Posture-  WNL  Posture is WNL  -BC         General ROM    GENERAL ROM COMMENTS  WFL  -BC         MMT (Manual Muscle Testing)    General MMT Comments  WFL  -BC         Lymphedema Edema Assessment    Ptting Edema Category  By grade out of 4  -BC      Pitting Edema  + 4/4  -BC      Stemmer Sign  left:;positive  -BC      Edema Assessment Comment  Pt. with pitting edema through out LLE.  -BC         Skin Changes/Observations    Location/Assessment  Lower Extremity;Other Location  -BC      Lower Extremity Conditions  bilateral:;clean;dry;shiny  -BC      Lower Extremity Color/Pigment  bilateral:;blanchable;radiation fibrosis  -BC      Lower Extremity Skin Details  LLE with hardened thickened areas around ankle and lateral of shin  -BC      Skin Observations Comment  Pt. with large scars on inner thigh B'ly at inguinals from surgical removal of tumor.  Radiation in same area.  -BC         Lymphedema Sensation    Lymphedema Sensation Tests  light touch;deep touch  -BC      Lymphedema Light Touch  RLE:;mild impairment  -BC      Lymphedema Deep Touch  WNL  -BC      Lymphedema Sensation Comments  Pt. states she feels more on the R side than on the L  -BC         Lymphedema Measurements    Measurement Type(s)  Quick Girth;Circumferential  -BC      Quick Girth Areas  Lower extremities  -BC      Circumferential Areas  Trunk  -BC         LLE Quick Girth (cm)    Met-heads  24.5 cm  -BC      Mid foot  26.3 cm  -BC      Smallest ankle  29 cm  -BC      Largest calf  43.6 cm  -BC      Tib tuberosity  39.3 cm  -BC      Mid patella  47 cm  -BC      Distal thigh  48.6 cm  -BC      Proximal thigh  55 cm  -BC      Other 1  30.3 cm  -BC      Other 2  39.5 cm  -BC         RLE Quick Girth (cm)    Met-heads  24.2 cm  -BC      Mid foot  26 cm  -BC      Smallest ankle  24.5 cm  -BC      Largest calf  39.6 cm  -BC      Tib tuberosity  39 cm  -BC      Mid patella  46 cm  -BC      Distal thigh  48 cm  -BC      Proximal thigh  57 cm  -BC      Other 1  25 cm   -BC      Other 2  35 cm  -BC      RLE Quick Girth Total  364.3  -BC         Trunk Circumferential (cm)    Measurement Location 1  Navel  -BC      Trunk 1  110.5 cm  -BC      Trunk Circumferential Total  110.5 cm  -BC         Manual Lymphatic Drainage    Manual Lymphatic Drainage  extremity treatment  -BC      Extremity Treatment  MLD to full limb  -BC      MLD to Full Limb  BLE  -BC      Manual Lymphatic Drainage Comments  Mapping of BLE's to find best pathway for movement of fluid.  -BC         Compression/Skin Care    Compression/Skin Care Comments  Compression pump x Abd. only.  -BC        User Key  (r) = Recorded By, (t) = Taken By, (c) = Cosigned By    Initials Name Provider Type    BC Charlotte García, OT Occupational Therapist                  OT Assessment/Plan     Row Name 07/28/20 1314          OT Assessment    Functional Limitations  Performance in self-care ADL  -BC     Impairments  Impaired flexibility;Sensation;Edema;Endurance;Impaired lymphatic circulation  -BC     Assessment Comments  Pt. demo lymphedema in pelvic area, BLE, lower abdomen.  -BC     Please refer to paper survey for additional self-reported information  Yes  -BC     OT Diagnosis  Lymphedema  -BC     OT Rehab Potential  Good  -BC     Patient/caregiver participated in establishment of treatment plan and goals  Yes  -BC     Patient would benefit from skilled therapy intervention  Yes  -BC        OT Plan    OT Frequency  2x/week  -BC     Predicted Duration of Therapy Intervention (Therapy Eval)  90 days  -BC     Planned CPT's?  OT EVAL HIGH COMPLEXITY: 70221;OT THER ACT EA 15 MIN: 50474KR;OT MANUAL THERAPY EA 15 MIN: 98342;OT SELF CARE/MGMT/TRAIN 15 MIN: 47696;OT VASOPNEUMATIC DEVICE: 00822 Lymphedema management  -BC     Planned Therapy Interventions (Optional Details)  home exercise program;manual therapy techniques;patient/family education;stretching  -BC     OT Plan Comments  Pt. would benefit from skilled OT for manual lymph drainage,  compression pump, skin care, multi layered bandaging, education and home exercise program.    -BC       User Key  (r) = Recorded By, (t) = Taken By, (c) = Cosigned By    Initials Name Provider Type    Charlotte Cook OT Occupational Therapist                OT Goals     Row Name 20 1300          OT Short Term Goals    STG Date to Achieve  20  -BC     STG 1  Pt. familiar w/precautions, skin  and self management of lymphdema.  -BC     STG 2  Decrease pitting edema to 3/4 for decreased risk of infection.  -BC     STG 3  HEP to assist with improved lymphatic flow.  -BC     STG 4  Self care instructions for home MLD for volume reduction.  -BC        Long Term Goals    LTG Date to Achieve  10/28/20  -BC     LTG 1  Pt. and/or caregiver independent w/short stretch compression bandaging for volume reduction.  -BC     LTG 2  Decrease pitting edema to 2/4 for decreased risk of infection.  -BC     LTG 3  Independent with donning/doffing compression garment.  -BC     LTG 4  Independent with lymphedema management and HEP.  -BC        Time Calculation    OT Goal Re-Cert Due Date  10/28/20  -BC       User Key  (r) = Recorded By, (t) = Taken By, (c) = Cosigned By    Initials Name Provider Type    Charlotte Cook OT Occupational Therapist          Therapy Education  Given: Symptoms/condition management  Program: New  How Provided: Verbal  Provided to: Patient  Level of Understanding: Verbalized                Time Calculation:   OT Start Time: 0955  OT Stop Time: 1205  OT Time Calculation (min): 130 min  OT Non-Billable Time (min): 45 min     Therapy Charges for Today     Code Description Service Date Service Provider Modifiers Qty    73914188104 HC OT MANUAL THERAPY EA 15 MIN 2020 Charlotte García OT GO 2    13399967885 HC OT EVAL HIGH COMPLEXITY 3 2020 Charlotte García OT GO 1    [id number]  OT VASOPNEUMAT DEVIC 1 OR MORE AREAS 2020 Charlotte García OT GO 1    50772334312 HC OT SELF  CARE/MGMT/TRAIN EA 15 MIN 7/28/2020 Ricky, Charlotte, OT GO 1                      Charlotte García, OT  7/28/2020

## 2020-07-30 ENCOUNTER — HOSPITAL ENCOUNTER (OUTPATIENT)
Dept: OCCUPATIONAL THERAPY | Facility: HOSPITAL | Age: 75
Setting detail: THERAPIES SERIES
Discharge: HOME | End: 2020-07-30

## 2020-07-30 DIAGNOSIS — I89.0 LYMPHEDEMA OF BOTH LOWER EXTREMITIES: Primary | ICD-10-CM

## 2020-07-30 DIAGNOSIS — C51.9 CANCER OF VULVA (HCC): ICD-10-CM

## 2020-07-30 PROCEDURE — 97139 UNLISTED THERAPEUTIC PX: CPT

## 2020-07-30 PROCEDURE — 97535 SELF CARE MNGMENT TRAINING: CPT

## 2020-07-30 PROCEDURE — 97140 MANUAL THERAPY 1/> REGIONS: CPT

## 2020-07-30 PROCEDURE — 97016 VASOPNEUMATIC DEVICE THERAPY: CPT

## 2020-07-30 NOTE — THERAPY TREATMENT NOTE
Outpatient Occupational Therapy Lymphedema Treatment Note   Jorge     Patient Name: Orquidea Rosenberg  : 1945  MRN: [id number]  Today's Date: 2020      Visit Date: 2020    There is no problem list on file for this patient.       Past Medical History:   Diagnosis Date   • Arthritis    • COPD (chronic obstructive pulmonary disease) (CMS/[facility])    • Elevated cholesterol    • History of transfusion    • Hypertension    • Vulval ca (CMS/HCC)         Past Surgical History:   Procedure Laterality Date   • RADICAL VULVECTOMY  2019   • RADICAL VULVECTOMY Bilateral 2019         Visit Dx:      ICD-10-CM ICD-9-CM   1. Lymphedema of both lower extremities I89.0 457.1   2. Cancer of vulva (CMS/[facility]) C51.9 184.4       Lymphedema     Row Name 20 1000             Subjective Pain    Able to rate subjective pain?  yes  -AB      Pre-Treatment Pain Level  0  -AB      Subjective Pain Comment  Denies pain  -AB         Subjective Comments    Subjective Comments  Pt. reports that she feels good today.   -AB         Lymphedema Assessment    Lymphedema Classification  Other:;RLE:;LLE:;secondary;stage 2 (Spontaneously Irreversible)  -AB      Lymphedema Cancer Related Sx  bilateral Radical vulvectomy  -AB      Lymph Nodes Removed #  0  -AB      Positive Lymph Nodes #  0  -AB      Chemo Received  yes  -AB      Radiation Therapy Received  yes  -AB         Posture/Observations    Posture- WNL  Posture is WNL  -AB         Lymphedema Edema Assessment    Ptting Edema Category  By grade out of 4  -AB      Pitting Edema  + 4/4  -AB      Stemmer Sign  left:;positive  -AB         Skin Changes/Observations    Location/Assessment  Lower Extremity;Other Location  -AB      Lower Extremity Conditions  bilateral:;clean;dry;shiny  -AB      Lower Extremity Color/Pigment  bilateral:;blanchable;radiation fibrosis;hyperpigmented  -AB         Lymphedema Sensation    Lymphedema Sensation Tests  light touch;deep touch  -AB      Lymphedema  Light Touch  RLE:;mild impairment  -AB      Lymphedema Deep Touch  WNL  -AB         Lymphedema Measurements    Measurement Type(s)  Quick Girth;Circumferential  -AB      Quick Girth Areas  Lower extremities  -AB      Circumferential Areas  Trunk  -AB         LLE Quick Girth (cm)    Met-heads  24.3 cm  -AB      Mid foot  25.4 cm  -AB      Smallest ankle  30.5 cm  -AB      Largest calf  45.3 cm  -AB      Tib tuberosity  41 cm  -AB      Mid patella  45.5 cm  -AB      Distal thigh  50.2 cm  -AB      Other 1  30.5 cm  -AB      Other 2  41.5 cm  -AB         RLE Quick Girth (cm)    Met-heads  24.6 cm  -AB      Mid foot  26 cm  -AB      Smallest ankle  26.9 cm  -AB      Largest calf  41 cm  -AB      Tib tuberosity  40.1 cm  -AB      Mid patella  45.7 cm  -AB      Distal thigh  49.3 cm  -AB      Other 1  25.8 cm  -AB      Other 2  36.3 cm  -AB      RLE Quick Girth Total  315.7  -AB         Trunk Circumferential (cm)    Measurement Location 1  Navel  -AB         Manual Lymphatic Drainage    Manual Lymphatic Drainage  extremity treatment;opened regional lymph nodes  -AB      Opened Regional Lymph Nodes  axillary;inguinal  -AB      Extremity Treatment  MLD to full limb  -AB      MLD to Full Limb  BLE  -AB      Manual Therapy  MLD to BLE's, abdomen, axillary, inguinals  -AB         Compression/Skin Care    Compression/Skin Care  skin care;wrapping location;bandaging  -AB      Skin Care  moisturizing lotion applied  -AB      Wrapping Location  lower extremity  -AB      Wrapping Location LE  bilateral: base of toes to mid thigh  -AB      Bandage Layers  short-stretch bandages (comment size/quantity);cotton elastic stocking- single layer (comment size);soft foam- 1/4 inch  -AB      Bandaging Comments  Rosidal soft 10cmX2.5mX0.3cm X3 per LE, Comprilan 8cmX5m X2 per LE, Comprilan 58pvP9h X1 per LE  -AB      Bandaging Technique  circumferential/spiral;light compression;moderate compression  -AB      Compression/Skin Care Comments   compression pump to BLEs and abdomen  -AB        User Key  (r) = Recorded By, (t) = Taken By, (c) = Cosigned By    Initials Name Provider Type    Anca Pond OT Occupational Therapist                                Therapy Education  Given: HEP, Symptoms/condition management, Edema management, Bandaging/dressing change  Program: New  How Provided: Verbal, Demonstration  Provided to: Patient  Level of Understanding: Verbalized                Time Calculation:   OT Start Time: 0925  OT Stop Time: 1110  OT Time Calculation (min): 105 min  OT Non-Billable Time (min): 25 min  Total Timed Code Minutes- OT: 105 minute(s)     Therapy Charges for Today     Code Description Service Date Service Provider Modifiers Qty    81545973860 HC OT VASOPNEUMAT DEVIC 1 OR MORE AREAS 7/30/2020 Anca Pickett OT GO 1    [id number]  OT MANUAL THERAPY EA 15 MIN 7/30/2020 Anca Pickett OT GO 4    57687796003 HC OT LYMPHEDEMA MANAGEMENT-15 MIN 7/30/2020 Anca Pickett OT  1    97772435588 HC OT SELF CARE/MGMT/TRAIN EA 15 MIN 7/30/2020 Anca Pickett OT GO 1                      Anca Pickett OT  7/30/2020

## 2020-08-04 ENCOUNTER — HOSPITAL ENCOUNTER (OUTPATIENT)
Dept: OCCUPATIONAL THERAPY | Facility: HOSPITAL | Age: 75
Setting detail: THERAPIES SERIES
Discharge: HOME | End: 2020-08-04

## 2020-08-04 DIAGNOSIS — I89.0 LYMPHEDEMA OF BOTH LOWER EXTREMITIES: Primary | ICD-10-CM

## 2020-08-04 DIAGNOSIS — C51.9 CANCER OF VULVA (HCC): ICD-10-CM

## 2020-08-04 PROCEDURE — 97016 VASOPNEUMATIC DEVICE THERAPY: CPT

## 2020-08-04 PROCEDURE — 97139 UNLISTED THERAPEUTIC PX: CPT

## 2020-08-04 PROCEDURE — 97140 MANUAL THERAPY 1/> REGIONS: CPT

## 2020-08-04 PROCEDURE — 97535 SELF CARE MNGMENT TRAINING: CPT

## 2020-08-04 NOTE — THERAPY TREATMENT NOTE
Outpatient Occupational Therapy Lymphedema Treatment Note   Jorge     Patient Name: Orquidea Rosenberg  : 1945  MRN: [id number]  Today's Date: 2020      Visit Date: 2020    There is no problem list on file for this patient.       Past Medical History:   Diagnosis Date   • Arthritis    • COPD (chronic obstructive pulmonary disease) (CMS/HCC)    • Elevated cholesterol    • History of transfusion    • Hypertension    • Vulval ca (CMS/HCC)         Past Surgical History:   Procedure Laterality Date   • RADICAL VULVECTOMY  2019   • RADICAL VULVECTOMY Bilateral 2019         Visit Dx:      ICD-10-CM ICD-9-CM   1. Lymphedema of both lower extremities I89.0 457.1   2. Cancer of vulva (CMS/HCC) C51.9 184.4       Lymphedema     Row Name 20 1000             Subjective Pain    Able to rate subjective pain?  yes  -AB      Pre-Treatment Pain Level  0  -AB      Subjective Pain Comment  Denies pain, but reports tenderness in L thigh with MLD  -AB         Subjective Comments    Subjective Comments  Pt. reports that she was up most of the night with her  in the ER, and is tired today. She also reports that she tolerated the wraps well from last visit, and her LLE doesn't feel as tight.   -AB         Lymphedema Assessment    Lymphedema Classification  Other:;RLE:;LLE:;secondary;stage 2 (Spontaneously Irreversible)  -AB      Lymphedema Cancer Related Sx  bilateral Radical vulvectomy  -AB      Lymph Nodes Removed #  0  -AB      Positive Lymph Nodes #  0  -AB      Chemo Received  yes  -AB      Radiation Therapy Received  yes  -AB         Posture/Observations    Posture- WNL  Posture is WNL  -AB         Lymphedema Edema Assessment    Ptting Edema Category  By grade out of 4  -AB      Pitting Edema  + 4/4  -AB      Stemmer Sign  left:;positive  -AB      Edema Assessment Comment  decreased pliability noted in both thigh areas  -AB         Skin Changes/Observations    Location/Assessment  Lower  Extremity;Other Location  -AB      Lower Extremity Conditions  bilateral:;clean;dry;shiny  -AB      Lower Extremity Color/Pigment  bilateral:;blanchable;radiation fibrosis;hyperpigmented  -AB         Lymphedema Sensation    Lymphedema Sensation Tests  light touch;deep touch  -AB      Lymphedema Light Touch  RLE:;mild impairment  -AB      Lymphedema Deep Touch  WNL  -AB         Lymphedema Measurements    Measurement Type(s)  Quick Girth;Circumferential  -AB      Quick Girth Areas  Lower extremities  -AB      Circumferential Areas  Trunk  -AB         LLE Quick Girth (cm)    Met-heads  24.6 cm  -AB      Mid foot  25.6 cm  -AB      Smallest ankle  29.3 cm  -AB      Largest calf  43.2 cm  -AB      Tib tuberosity  41.7 cm  -AB      Mid patella  46.9 cm  -AB      Distal thigh  49.5 cm  -AB      Proximal thigh  55.6 cm  -AB      Other 1  30.2 cm  -AB      Other 2  39.5 cm  -AB         RLE Quick Girth (cm)    Met-heads  23.4 cm  -AB      Mid foot  25.4 cm  -AB      Smallest ankle  25.5 cm  -AB      Largest calf  40.3 cm  -AB      Tib tuberosity  40.5 cm  -AB      Mid patella  46 cm  -AB      Distal thigh  50 cm  -AB      Proximal thigh  57 cm  -AB      Other 1  25.5 cm  -AB      Other 2  36.8 cm  -AB      RLE Quick Girth Total  370.4  -AB         Trunk Circumferential (cm)    Measurement Location 1  Navel  -AB         Manual Lymphatic Drainage    Manual Lymphatic Drainage  extremity treatment;opened regional lymph nodes  -AB      Opened Regional Lymph Nodes  axillary;inguinal  -AB      Axillary  right;left  -AB      Inguinal  right;left  -AB      Extremity Treatment  MLD to full limb  -AB      MLD to Full Limb  BLE's  -AB      Manual Lymphatic Drainage Comments  pt. tolerated vibration to scars on bilateral thigh areas to increase lymphatic flow.   -AB      Manual Therapy  MLD to BLE's, abdomen, axillary, inguinals  -AB         Compression/Skin Care    Compression/Skin Care  skin care;wrapping location;bandaging  -AB       Skin Care  moisturizing lotion applied  -AB      Wrapping Location  lower extremity  -AB      Wrapping Location LE  bilateral: base of toes to mid thigh  -AB      Wrapping Comments  Pt. left wraps at home this date; issued new.   -AB      Bandage Layers  short-stretch bandages (comment size/quantity);cotton elastic stocking- single layer (comment size);soft foam- 1/4 inch  -AB      Bandaging Comments  Comprifoam 10cmX2.5mX0.5cm X3 per LE, Comprilan 8cmX5m X2 to LLE, X1 to RLE, Comprilan 22ezM6k X2 per LE  -AB      Bandaging Technique  circumferential/spiral;moderate compression  -AB      Compression/Skin Care Comments  compression pump to abdomen and BLE's  -AB        User Key  (r) = Recorded By, (t) = Taken By, (c) = Cosigned By    Initials Name Provider Type    Anca Pond OT Occupational Therapist                                Therapy Education  Given: HEP, Symptoms/condition management, Edema management, Bandaging/dressing change  Program: Reinforced  How Provided: Verbal, Demonstration  Provided to: Patient  Level of Understanding: Verbalized                Time Calculation:   OT Start Time: 0935  OT Stop Time: 1115  OT Time Calculation (min): 100 min  OT Non-Billable Time (min): 15 min  Total Timed Code Minutes- OT: 100 minute(s)     Therapy Charges for Today     Code Description Service Date Service Provider Modifiers Qty    56508745645 HC OT VASOPNEUMAT DEVIC 1 OR MORE AREAS 8/4/2020 Anca Pickett OT GO 1    90463566798 HC OT MANUAL THERAPY EA 15 MIN 8/4/2020 Anca Pickett OT GO 4    31700447677 HC OT LYMPHEDEMA MANAGEMENT-15 MIN 8/4/2020 Anca Pickett OT  1    66941485380 HC OT SELF CARE/MGMT/TRAIN EA 15 MIN 8/4/2020 Anca Pickett OT GO 1                      Anca Pickett OT  8/4/2020

## 2020-08-06 ENCOUNTER — HOSPITAL ENCOUNTER (OUTPATIENT)
Dept: OCCUPATIONAL THERAPY | Facility: HOSPITAL | Age: 75
Setting detail: THERAPIES SERIES
Discharge: HOME | End: 2020-08-06

## 2020-08-06 DIAGNOSIS — I89.0 LYMPHEDEMA OF BOTH LOWER EXTREMITIES: Primary | ICD-10-CM

## 2020-08-06 DIAGNOSIS — C51.9 CANCER OF VULVA (HCC): ICD-10-CM

## 2020-08-06 PROCEDURE — 97016 VASOPNEUMATIC DEVICE THERAPY: CPT

## 2020-08-06 PROCEDURE — 97140 MANUAL THERAPY 1/> REGIONS: CPT

## 2020-08-06 PROCEDURE — 97139 UNLISTED THERAPEUTIC PX: CPT

## 2020-08-06 PROCEDURE — 97535 SELF CARE MNGMENT TRAINING: CPT

## 2020-08-06 NOTE — THERAPY TREATMENT NOTE
Outpatient Occupational Therapy Lymphedema Treatment Note   Jorge     Patient Name: Orquidea Rosenberg  : 1945  MRN: [id number]  Today's Date: 2020      Visit Date: 2020    There is no problem list on file for this patient.       Past Medical History:   Diagnosis Date   • Arthritis    • COPD (chronic obstructive pulmonary disease) (CMS/[facility])    • Elevated cholesterol    • History of transfusion    • Hypertension    • Vulval ca (CMS/[facility])         Past Surgical History:   Procedure Laterality Date   • RADICAL VULVECTOMY  2019   • RADICAL VULVECTOMY Bilateral 2019         Visit Dx:      ICD-10-CM ICD-9-CM   1. Lymphedema of both lower extremities I89.0 457.1   2. Cancer of vulva (CMS/[facility]) C51.9 184.4       Lymphedema     Row Name 20 1000             Subjective Pain    Able to rate subjective pain?  yes  -AB      Pre-Treatment Pain Level  0  -AB         Subjective Comments    Subjective Comments  Pt. reports that her legs feel better.   -AB         Lymphedema Assessment    Lymphedema Classification  Other:;RLE:;LLE:;secondary;stage 2 (Spontaneously Irreversible)  -AB      Lymphedema Cancer Related Sx  bilateral Radical vulvectomy  -AB      Lymph Nodes Removed #  0  -AB      Positive Lymph Nodes #  0  -AB      Chemo Received  yes  -AB      Radiation Therapy Received  yes  -AB         Posture/Observations    Posture- WNL  Posture is WNL  -AB         Lymphedema Edema Assessment    Ptting Edema Category  By grade out of 4  -AB      Pitting Edema  + 4/4  -AB      Stemmer Sign  left:;positive  -AB      Edema Assessment Comment  pliability of thighs noted to be improved this date.   -AB         Skin Changes/Observations    Location/Assessment  Lower Extremity;Other Location  -AB      Lower Extremity Conditions  bilateral:;clean;dry;shiny  -AB      Lower Extremity Color/Pigment  bilateral:;blanchable;radiation fibrosis;hyperpigmented  -AB      Skin Observations Comment  slight rash on RLE noted this  "date; small red non-raised and raised areas; potential \"lymphedema rash\"  -AB         Lymphedema Sensation    Lymphedema Sensation Tests  light touch;deep touch  -AB      Lymphedema Light Touch  RLE:;mild impairment  -AB      Lymphedema Deep Touch  WNL  -AB         Lymphedema Measurements    Measurement Type(s)  Quick Girth;Circumferential  -AB      Quick Girth Areas  Lower extremities  -AB      Circumferential Areas  Trunk  -AB         LLE Quick Girth (cm)    Met-heads  23.9 cm  -AB      Mid foot  25.6 cm  -AB      Smallest ankle  28.8 cm  -AB      Largest calf  42.1 cm  -AB      Tib tuberosity  41.1 cm  -AB      Mid patella  45.7 cm  -AB      Distal thigh  50.1 cm  -AB      Proximal thigh  57.8 cm  -AB      Other 1  28.7 cm  -AB      Other 2  36.7 cm  -AB         RLE Quick Girth (cm)    Met-heads  23.6 cm  -AB      Mid foot  23.5 cm  -AB      Smallest ankle  24.2 cm  -AB      Largest calf  40.6 cm  -AB      Tib tuberosity  40.5 cm  -AB      Mid patella  45.6 cm  -AB      Distal thigh  48.2 cm  -AB      Proximal thigh  58.3 cm  -AB      Other 1  25.1 cm  -AB      Other 2  35.9 cm  -AB      RLE Quick Girth Total  365.5  -AB         Trunk Circumferential (cm)    Measurement Location 1  Navel  -AB         Manual Lymphatic Drainage    Manual Lymphatic Drainage  extremity treatment;opened regional lymph nodes  -AB      Opened Regional Lymph Nodes  axillary;inguinal  -AB      Axillary  right;left  -AB      Inguinal  right;left  -AB      Extremity Treatment  MLD to full limb  -AB      MLD to Full Limb  BLE's  -AB      Manual Lymphatic Drainage Comments  vibration to bilateral thigh scars to increase lymphatic flow  -AB      Manual Therapy  MLD to BLE's, abdomen, axillary, inguinals  -AB         Compression/Skin Care    Compression/Skin Care  skin care;wrapping location;bandaging  -AB      Skin Care  moisturizing lotion applied  -AB      Wrapping Location  lower extremity  -AB      Wrapping Location LE  right:;left: base " of toes to mid thigh (R); toes to mid thigh (L)  -AB      Bandage Layers  short-stretch bandages (comment size/quantity);cotton elastic stocking- single layer (comment size);soft foam- 1/4 inch  -AB      Bandaging Comments  kinesiotape to toes and ankle on LLE  -AB      Bandaging Technique  circumferential/spiral;moderate compression  -AB      Compression/Skin Care Comments  compression pump to abdomen and BLE's  -AB        User Key  (r) = Recorded By, (t) = Taken By, (c) = Cosigned By    Initials Name Provider Type    Anca Pond OT Occupational Therapist                                Therapy Education  Given: HEP, Symptoms/condition management, Edema management, Bandaging/dressing change  Program: Reinforced  How Provided: Verbal, Demonstration  Provided to: Patient  Level of Understanding: Verbalized                Time Calculation:   OT Start Time: 0940  OT Stop Time: 1125  OT Time Calculation (min): 105 min  OT Non-Billable Time (min): 25 min  Total Timed Code Minutes- OT: 105 minute(s)     Therapy Charges for Today     Code Description Service Date Service Provider Modifiers Qty    70136079426 HC OT VASOPNEUMAT DEVIC 1 OR MORE AREAS 8/6/2020 Anca Pickett OT GO 1    06023861940 HC OT MANUAL THERAPY EA 15 MIN 8/6/2020 Anca Pickett OT GO 4    54939800300 HC OT LYMPHEDEMA MANAGEMENT-15 MIN 8/6/2020 Anca Pickett OT  1    08689563958 HC OT SELF CARE/MGMT/TRAIN EA 15 MIN 8/6/2020 Anca Pickett OT GO 1                      Anca Pickett OT  8/6/2020

## 2020-08-11 ENCOUNTER — HOSPITAL ENCOUNTER (OUTPATIENT)
Dept: OCCUPATIONAL THERAPY | Facility: HOSPITAL | Age: 75
Setting detail: THERAPIES SERIES
Discharge: HOME | End: 2020-08-11

## 2020-08-11 DIAGNOSIS — I89.0 LYMPHEDEMA OF BOTH LOWER EXTREMITIES: Primary | ICD-10-CM

## 2020-08-11 DIAGNOSIS — C51.9 CANCER OF VULVA (HCC): ICD-10-CM

## 2020-08-11 PROCEDURE — 97016 VASOPNEUMATIC DEVICE THERAPY: CPT

## 2020-08-11 PROCEDURE — 97139 UNLISTED THERAPEUTIC PX: CPT

## 2020-08-11 PROCEDURE — 97140 MANUAL THERAPY 1/> REGIONS: CPT

## 2020-08-11 NOTE — THERAPY TREATMENT NOTE
Outpatient Occupational Therapy Lymphedema Treatment Note   Jorge     Patient Name: Orquidea Rosenberg  : 1945  MRN: [id number]  Today's Date: 2020      Visit Date: 2020    There is no problem list on file for this patient.       Past Medical History:   Diagnosis Date   • Arthritis    • COPD (chronic obstructive pulmonary disease) (CMS/HCC)    • Elevated cholesterol    • History of transfusion    • Hypertension    • Vulval ca (CMS/HCC)         Past Surgical History:   Procedure Laterality Date   • RADICAL VULVECTOMY  2019   • RADICAL VULVECTOMY Bilateral 2019         Visit Dx:      ICD-10-CM ICD-9-CM   1. Lymphedema of both lower extremities I89.0 457.1   2. Cancer of vulva (CMS/HCC) C51.9 184.4       Lymphedema     Row Name 20 0900             Subjective Pain    Able to rate subjective pain?  yes  -AB      Pre-Treatment Pain Level  0  -AB      Subjective Pain Comment  denies pain  -AB         Subjective Comments    Subjective Comments  Pt. reports that she tolerated wraps and kinesiotape well, and was excited how her L ankle/foot looked after removing bandages.   -AB         Lymphedema Assessment    Lymphedema Classification  Other:;RLE:;LLE:;secondary;stage 2 (Spontaneously Irreversible)  -AB      Lymphedema Cancer Related Sx  bilateral Radical vulvectomy  -AB      Lymph Nodes Removed #  0  -AB      Positive Lymph Nodes #  0  -AB      Chemo Received  yes  -AB      Radiation Therapy Received  yes  -AB         Posture/Observations    Posture- WNL  Posture is WNL  -AB         Lymphedema Edema Assessment    Ptting Edema Category  By grade out of 4  -AB      Pitting Edema  + 4/4  -AB      Stemmer Sign  left:;positive  -AB      Edema Assessment Comment  increased swelling noted to BLE's; pt. reports that she has been sitting at the hospital with her  every day for 4-5 hours each day w/ her legs not elevated.   -AB         Skin Changes/Observations    Location/Assessment  Lower  Extremity;Other Location  -AB      Lower Extremity Conditions  bilateral:;clean;dry;shiny  -AB      Lower Extremity Color/Pigment  bilateral:;blanchable;radiation fibrosis;hyperpigmented  -AB      Skin Observations Comment  slight rash continues  -AB         Lymphedema Sensation    Lymphedema Sensation Tests  light touch;deep touch  -AB      Lymphedema Light Touch  RLE:;mild impairment  -AB      Lymphedema Deep Touch  WNL  -AB         Lymphedema Measurements    Measurement Type(s)  Quick Girth;Circumferential  -AB      Quick Girth Areas  Lower extremities  -AB      Circumferential Areas  Trunk  -AB         LLE Quick Girth (cm)    Met-heads  23.8 cm  -AB      Mid foot  26.5 cm  -AB      Smallest ankle  29.3 cm  -AB      Largest calf  42.7 cm  -AB      Tib tuberosity  41.9 cm  -AB      Mid patella  47.7 cm  -AB      Distal thigh  51.4 cm  -AB      Proximal thigh  57.3 cm  -AB      Other 1  30.4 cm  -AB      Other 2  37.6 cm  -AB         RLE Quick Girth (cm)    Met-heads  24 cm  -AB      Mid foot  24.5 cm  -AB      Smallest ankle  25.6 cm  -AB      Largest calf  40 cm  -AB      Tib tuberosity  41.1 cm  -AB      Mid patella  45.7 cm  -AB      Other 1  25.1 cm  -AB      Other 2  37.3 cm  -AB      RLE Quick Girth Total  263.3  -AB         Trunk Circumferential (cm)    Measurement Location 1  Navel  -AB         Manual Lymphatic Drainage    Manual Lymphatic Drainage  extremity treatment;opened regional lymph nodes;opened anastamoses  -AB      Opened Regional Lymph Nodes  axillary;inguinal  -AB      Axillary  right;left  -AB      Inguinal  right;left  -AB      Opened Anastamoses  inguino-axillary  -AB      Extremity Treatment  MLD to full limb  -AB      MLD to Full Limb  BLE's  -AB      Manual Therapy  MLD to BLE's, abdomen, axillary, inguinals  -AB         Compression/Skin Care    Compression/Skin Care  skin care;wrapping location;bandaging  -AB      Skin Care  moisturizing lotion applied  -AB      Wrapping Location  lower  extremity  -AB      Wrapping Location LE  right:;left: base of toes to mid thigh (R); toes to mid thigh (L)  -AB      Bandage Layers  short-stretch bandages (comment size/quantity);cotton elastic stocking- single layer (comment size);soft foam- 1/4 inch  -AB      Bandaging Comments  kinesiotape to L toes, foot, ankle  -AB      Bandaging Technique  circumferential/spiral;moderate compression  -AB      Compression/Skin Care Comments  compression pump to abdomen and BLE's  -AB        User Key  (r) = Recorded By, (t) = Taken By, (c) = Cosigned By    Initials Name Provider Type    Anca Pond, OT Occupational Therapist                                                 Time Calculation:   OT Start Time: 0935  OT Stop Time: 1105  OT Time Calculation (min): 90 min  OT Non-Billable Time (min): 25 min  Total Timed Code Minutes- OT: 90 minute(s)     Therapy Charges for Today     Code Description Service Date Service Provider Modifiers Qty    03687552710 HC OT VASOPNEUMAT DEVIC 1 OR MORE AREAS 8/11/2020 Anca Pickett OT GO, KX 1    07848497330 HC OT MANUAL THERAPY EA 15 MIN 8/11/2020 Anca Pickett OT GO, KX 4    64446679661 HC OT LYMPHEDEMA MANAGEMENT-15 MIN 8/11/2020 Anca Pickett OT KX 1                      Anca Pickett OT  8/11/2020

## 2020-08-13 ENCOUNTER — HOSPITAL ENCOUNTER (OUTPATIENT)
Dept: OCCUPATIONAL THERAPY | Facility: HOSPITAL | Age: 75
Setting detail: THERAPIES SERIES
Discharge: HOME | End: 2020-08-13

## 2020-08-13 DIAGNOSIS — I89.0 LYMPHEDEMA OF BOTH LOWER EXTREMITIES: Primary | ICD-10-CM

## 2020-08-13 DIAGNOSIS — C51.9 CANCER OF VULVA (HCC): ICD-10-CM

## 2020-08-13 PROCEDURE — 97016 VASOPNEUMATIC DEVICE THERAPY: CPT

## 2020-08-13 PROCEDURE — 97140 MANUAL THERAPY 1/> REGIONS: CPT

## 2020-08-13 PROCEDURE — 97139 UNLISTED THERAPEUTIC PX: CPT

## 2020-08-13 NOTE — THERAPY TREATMENT NOTE
Outpatient Occupational Therapy Lymphedema Treatment Note   Jorge     Patient Name: Orquidea Rosenberg  : 1945  MRN: [id number]  Today's Date: 2020      Visit Date: 2020    There is no problem list on file for this patient.       Past Medical History:   Diagnosis Date   • Arthritis    • COPD (chronic obstructive pulmonary disease) (CMS/HCC)    • Elevated cholesterol    • History of transfusion    • Hypertension    • Vulval ca (CMS/HCC)         Past Surgical History:   Procedure Laterality Date   • RADICAL VULVECTOMY  2019   • RADICAL VULVECTOMY Bilateral 2019         Visit Dx:      ICD-10-CM ICD-9-CM   1. Lymphedema of both lower extremities I89.0 457.1   2. Cancer of vulva (CMS/[facility]) C51.9 184.4       Lymphedema     Row Name 20 0900             Subjective Pain    Able to rate subjective pain?  yes  -AB      Pre-Treatment Pain Level  0  -AB         Subjective Comments    Subjective Comments  Pt. presents to therapy w/ kinesiotape still donned to LLE. She reports that she kept multi-layer bandages on until this AM.   -AB         Lymphedema Assessment    Lymphedema Classification  Other:;RLE:;LLE:;secondary;stage 2 (Spontaneously Irreversible)  -AB      Lymphedema Cancer Related Sx  bilateral Radical vulvectomy  -AB      Lymph Nodes Removed #  0  -AB      Positive Lymph Nodes #  0  -AB      Chemo Received  yes  -AB      Radiation Therapy Received  yes  -AB         Posture/Observations    Posture- WNL  Posture is WNL  -AB         Lymphedema Edema Assessment    Ptting Edema Category  By grade out of 4  -AB      Pitting Edema  + 4/4;Other (comment) +3-4/4 on RLE  -AB      Stemmer Sign  left:;positive  -AB      Edema Assessment Comment  decreased swelling noted to BLE's  -AB         Skin Changes/Observations    Location/Assessment  Lower Extremity;Other Location  -AB      Lower Extremity Conditions  bilateral:;clean;dry;shiny  -AB      Lower Extremity Color/Pigment   bilateral:;blanchable;radiation fibrosis;hyperpigmented  -AB      Skin Observations Comment  no rash noted this date  -AB         Lymphedema Sensation    Lymphedema Sensation Tests  light touch;deep touch  -AB      Lymphedema Light Touch  RLE:;mild impairment  -AB      Lymphedema Deep Touch  WNL  -AB         Lymphedema Measurements    Measurement Type(s)  Quick Girth;Circumferential  -AB      Quick Girth Areas  Lower extremities  -AB      Circumferential Areas  Trunk  -AB         LLE Quick Girth (cm)    Met-heads  23.1 cm  -AB      Mid foot  26.1 cm  -AB      Smallest ankle  29.2 cm  -AB      Largest calf  41.3 cm  -AB      Tib tuberosity  40.3 cm  -AB      Mid patella  45.6 cm  -AB      Distal thigh  50.1 cm  -AB      Proximal thigh  58.2 cm  -AB      Other 1  29 cm  -AB      Other 2  36.5 cm  -AB         RLE Quick Girth (cm)    Met-heads  23.8 cm  -AB      Mid foot  24.9 cm  -AB      Smallest ankle  23.1 cm  -AB      Largest calf  40.6 cm  -AB      Tib tuberosity  40.2 cm  -AB      Mid patella  45.6 cm  -AB      Distal thigh  48.9 cm  -AB      Proximal thigh  58.7 cm  -AB      Other 1  24 cm  -AB      Other 2  34.3 cm  -AB      RLE Quick Girth Total  364.1  -AB         Trunk Circumferential (cm)    Measurement Location 1  Navel  -AB         Manual Lymphatic Drainage    Manual Lymphatic Drainage  extremity treatment;opened regional lymph nodes;opened anastamoses  -AB      Opened Regional Lymph Nodes  axillary;inguinal  -AB      Axillary  right;left  -AB      Inguinal  right;left  -AB      Opened Anastamoses  inguino-axillary  -AB      Extremity Treatment  MLD to full limb  -AB      MLD to Full Limb  BLE's  -AB      Manual Lymphatic Drainage Comments  vibration to bilateral thigh scars to increase lymphatic flow  -AB      Manual Therapy  MLD to BLE's, abdomen, axillary, inguinals  -AB         Compression/Skin Care    Compression/Skin Care  skin care;wrapping location;bandaging  -AB      Skin Care  moisturizing  lotion applied  -AB      Wrapping Location  lower extremity  -AB      Wrapping Location LE  right:;left: base of toes to mid thigh (R); toes to mid thigh (L)  -AB      Bandage Layers  short-stretch bandages (comment size/quantity);cotton elastic stocking- single layer (comment size);soft foam- 1/4 inch  -AB      Bandaging Comments  kinesiotape to L toes, foot, ankle, calf  -AB      Bandaging Technique  circumferential/spiral;moderate compression  -AB      Compression/Skin Care Comments  compression pump to abdomen and BLE's  -AB        User Key  (r) = Recorded By, (t) = Taken By, (c) = Cosigned By    Initials Name Provider Type    Anca Pond OT Occupational Therapist                                Therapy Education  Given: HEP, Symptoms/condition management, Edema management, Bandaging/dressing change  Program: Reinforced  How Provided: Verbal, Demonstration  Provided to: Patient  Level of Understanding: Verbalized                Time Calculation:   OT Start Time: 0940  OT Stop Time: 1110  OT Time Calculation (min): 90 min  OT Non-Billable Time (min): 25 min  Total Timed Code Minutes- OT: 90 minute(s)     Therapy Charges for Today     Code Description Service Date Service Provider Modifiers Qty    06901150837 HC OT VASOPNEUMAT DEVIC 1 OR MORE AREAS 8/13/2020 Anca Pickett OT GO 1    68282586934 HC OT MANUAL THERAPY EA 15 MIN 8/13/2020 Anca Pickett OT GO 4    58906618411 HC OT LYMPHEDEMA MANAGEMENT-15 MIN 8/13/2020 Anca Pickett OT  1                      Anca Pickett OT  8/13/2020

## 2020-08-18 ENCOUNTER — HOSPITAL ENCOUNTER (OUTPATIENT)
Dept: OCCUPATIONAL THERAPY | Facility: HOSPITAL | Age: 75
Setting detail: THERAPIES SERIES
Discharge: HOME | End: 2020-08-18

## 2020-08-18 DIAGNOSIS — C51.9 CANCER OF VULVA (HCC): ICD-10-CM

## 2020-08-18 DIAGNOSIS — I89.0 LYMPHEDEMA OF BOTH LOWER EXTREMITIES: Primary | ICD-10-CM

## 2020-08-18 PROCEDURE — 97139 UNLISTED THERAPEUTIC PX: CPT

## 2020-08-18 PROCEDURE — 97535 SELF CARE MNGMENT TRAINING: CPT

## 2020-08-18 PROCEDURE — 97140 MANUAL THERAPY 1/> REGIONS: CPT

## 2020-08-18 PROCEDURE — 97016 VASOPNEUMATIC DEVICE THERAPY: CPT

## 2020-08-18 NOTE — THERAPY TREATMENT NOTE
Outpatient Occupational Therapy Lymphedema Treatment Note   Jorge     Patient Name: Orquidea Rosenberg  : 1945  MRN: [id number]  Today's Date: 2020      Visit Date: 2020    There is no problem list on file for this patient.       Past Medical History:   Diagnosis Date   • Arthritis    • COPD (chronic obstructive pulmonary disease) (CMS/HCC)    • Elevated cholesterol    • History of transfusion    • Hypertension    • Vulval ca (CMS/HCC)         Past Surgical History:   Procedure Laterality Date   • RADICAL VULVECTOMY  2019   • RADICAL VULVECTOMY Bilateral 2019         Visit Dx:      ICD-10-CM ICD-9-CM   1. Lymphedema of both lower extremities I89.0 457.1   2. Cancer of vulva (CMS/HCC) C51.9 184.4       Lymphedema     Row Name 20 0900             Subjective Pain    Able to rate subjective pain?  yes  -AB      Pre-Treatment Pain Level  0  -AB         Subjective Comments    Subjective Comments  Pt. reports that her  is now at home, and she is his primary caregiver because he is bedridden.   -AB         Lymphedema Assessment    Lymphedema Classification  Other:;RLE:;LLE:;secondary;stage 2 (Spontaneously Irreversible)  -AB      Lymphedema Cancer Related Sx  bilateral Radical vulvectomy  -AB      Lymph Nodes Removed #  0  -AB      Positive Lymph Nodes #  0  -AB      Chemo Received  yes  -AB      Radiation Therapy Received  yes  -AB         Posture/Observations    Posture- WNL  Posture is WNL  -AB         Lymphedema Edema Assessment    Ptting Edema Category  By grade out of 4  -AB      Pitting Edema  + 4/4;Other (comment) +3-4/4 on RLE  -AB      Stemmer Sign  left:;positive  -AB         Skin Changes/Observations    Location/Assessment  Lower Extremity;Other Location  -AB      Lower Extremity Conditions  bilateral:;clean;dry;shiny  -AB      Lower Extremity Color/Pigment  bilateral:;blanchable;radiation fibrosis;hyperpigmented  -AB         Lymphedema Sensation    Lymphedema Sensation Tests   light touch;deep touch  -AB      Lymphedema Light Touch  RLE:;mild impairment  -AB      Lymphedema Deep Touch  WNL  -AB         Lymphedema Measurements    Measurement Type(s)  Quick Girth;Circumferential  -AB      Quick Girth Areas  Lower extremities  -AB      Circumferential Areas  Trunk  -AB         LLE Quick Girth (cm)    Met-heads  24.1 cm  -AB      Mid foot  26.5 cm  -AB      Smallest ankle  28.7 cm  -AB      Largest calf  42 cm  -AB      Tib tuberosity  39.5 cm  -AB      Mid patella  44.4 cm  -AB      Distal thigh  49.3 cm  -AB      Proximal thigh  56.2 cm  -AB      Other 1  30.1 cm  -AB      Other 2  39 cm  -AB         RLE Quick Girth (cm)    Met-heads  22.8 cm  -AB      Mid foot  23.9 cm  -AB      Smallest ankle  24.3 cm  -AB      Largest calf  41.2 cm  -AB      Tib tuberosity  39.5 cm  -AB      Mid patella  45.1 cm  -AB      Distal thigh  49 cm  -AB      Proximal thigh  56.5 cm  -AB      Other 1  24.6 cm  -AB      Other 2  34.5 cm  -AB      RLE Quick Girth Total  361.4  -AB         Trunk Circumferential (cm)    Measurement Location 1  Navel  -AB         Manual Lymphatic Drainage    Manual Lymphatic Drainage  extremity treatment;opened regional lymph nodes;opened anastamoses  -AB      Opened Regional Lymph Nodes  axillary;inguinal  -AB      Axillary  right;left  -AB      Inguinal  right;left  -AB      Opened Anastamoses  inguino-axillary  -AB      Extremity Treatment  MLD to full limb  -AB      MLD to Full Limb  BLE's  -AB      Manual Lymphatic Drainage Comments  vibration to bilateral scar areas to increase lymphatic flow  -AB      Manual Therapy  MLD to BLE's, abdomen, axillary, inguinals  -AB         Compression/Skin Care    Compression/Skin Care  skin care;wrapping location;bandaging  -AB      Skin Care  moisturizing lotion applied  -AB      Wrapping Location  lower extremity  -AB      Wrapping Location LE  left: toes to mid thigh  -AB      Bandage Layers  short-stretch bandages (comment  size/quantity);cotton elastic stocking- single layer (comment size);soft foam- 1/4 inch  -AB      Bandaging Comments  kinesiotape to L toes, foot, ankle, calf, popliteal lnn's, R ankle to popliteal lnn's  -AB      Bandaging Technique  circumferential/spiral;moderate compression  -AB      Compression/Skin Care Comments  compression pump to abdomen and BLE's  -AB        User Key  (r) = Recorded By, (t) = Taken By, (c) = Cosigned By    Initials Name Provider Type    Anca Pond OT Occupational Therapist                                Therapy Education  Given: HEP, Symptoms/condition management, Edema management, Bandaging/dressing change  Program: Reinforced  How Provided: Verbal, Demonstration  Provided to: Patient  Level of Understanding: Verbalized                Time Calculation:   OT Start Time: 0930  OT Stop Time: 1115  OT Time Calculation (min): 105 min  OT Non-Billable Time (min): 25 min  Total Timed Code Minutes- OT: 105 minute(s)     Therapy Charges for Today     Code Description Service Date Service Provider Modifiers Qty    21240976020 HC OT VASOPNEUMAT DEVIC 1 OR MORE AREAS 8/18/2020 Anca Pickett OT ALON, KX 1    74266433224 HC OT MANUAL THERAPY EA 15 MIN 8/18/2020 Anca Pickett OT GO, KX 4    11159282987 HC OT LYMPHEDEMA MANAGEMENT-15 MIN 8/18/2020 Anca Pickett OT KX 1    04776696351 HC OT SELF CARE/MGMT/TRAIN EA 15 MIN 8/18/2020 Anca Pickett OT GO, KX 1                      Anca Pickett OT  8/18/2020

## 2020-08-20 ENCOUNTER — HOSPITAL ENCOUNTER (OUTPATIENT)
Dept: OCCUPATIONAL THERAPY | Facility: HOSPITAL | Age: 75
Setting detail: THERAPIES SERIES
Discharge: HOME | End: 2020-08-20

## 2020-08-20 DIAGNOSIS — I89.0 LYMPHEDEMA OF BOTH LOWER EXTREMITIES: Primary | ICD-10-CM

## 2020-08-20 DIAGNOSIS — C51.9 CANCER OF VULVA (HCC): ICD-10-CM

## 2020-08-20 PROCEDURE — 97535 SELF CARE MNGMENT TRAINING: CPT

## 2020-08-20 PROCEDURE — 97140 MANUAL THERAPY 1/> REGIONS: CPT

## 2020-08-20 PROCEDURE — 97016 VASOPNEUMATIC DEVICE THERAPY: CPT

## 2020-08-20 PROCEDURE — 97139 UNLISTED THERAPEUTIC PX: CPT

## 2020-08-20 NOTE — THERAPY TREATMENT NOTE
Outpatient Occupational Therapy Lymphedema Treatment Note   Jorge     Patient Name: Orquidea Rosenberg  : 1945  MRN: [id number]  Today's Date: 2020      Visit Date: 2020    There is no problem list on file for this patient.       Past Medical History:   Diagnosis Date   • Arthritis    • COPD (chronic obstructive pulmonary disease) (CMS/[facility])    • Elevated cholesterol    • History of transfusion    • Hypertension    • Vulval ca (CMS/HCC)         Past Surgical History:   Procedure Laterality Date   • RADICAL VULVECTOMY  2019   • RADICAL VULVECTOMY Bilateral 2019         Visit Dx:      ICD-10-CM ICD-9-CM   1. Lymphedema of both lower extremities I89.0 457.1   2. Cancer of vulva (CMS/[facility]) C51.9 184.4       Lymphedema     Row Name 20 1000             Subjective Pain    Able to rate subjective pain?  yes  -AB      Pre-Treatment Pain Level  0  -AB      Subjective Pain Comment  denies pain  -AB         Lymphedema Assessment    Lymphedema Classification  Other:;RLE:;LLE:;secondary;stage 2 (Spontaneously Irreversible)  -AB      Lymphedema Cancer Related Sx  bilateral Radical vulvectomy  -AB      Lymph Nodes Removed #  0  -AB      Positive Lymph Nodes #  0  -AB      Chemo Received  yes  -AB      Radiation Therapy Received  yes  -AB         Posture/Observations    Posture- WNL  Posture is WNL  -AB         Lymphedema Edema Assessment    Ptting Edema Category  By grade out of 4  -AB      Pitting Edema  + 4/4;Other (comment) +3-4/4 on RLE  -AB      Stemmer Sign  left:;positive  -AB         Skin Changes/Observations    Location/Assessment  Lower Extremity;Other Location  -AB      Lower Extremity Conditions  bilateral:;clean;dry;shiny  -AB      Lower Extremity Color/Pigment  bilateral:;blanchable;radiation fibrosis;hyperpigmented  -AB         Lymphedema Sensation    Lymphedema Sensation Tests  light touch;deep touch  -AB      Lymphedema Light Touch  RLE:;mild impairment  -AB      Lymphedema Deep Touch   WNL  -AB         Lymphedema Measurements    Measurement Type(s)  Quick Girth;Circumferential  -AB      Quick Girth Areas  Lower extremities  -AB      Circumferential Areas  Trunk  -AB         LLE Quick Girth (cm)    Met-heads  23.4 cm  -AB      Mid foot  26.4 cm  -AB      Smallest ankle  27.5 cm  -AB      Largest calf  41 cm  -AB      Tib tuberosity  40.5 cm  -AB      Mid patella  43.9 cm  -AB      Distal thigh  49.3 cm  -AB      Other 1  28.2 cm  -AB      Other 2  36.2 cm  -AB         RLE Quick Girth (cm)    Met-heads  23.1 cm  -AB      Mid foot  23.6 cm  -AB      Smallest ankle  26.2 cm  -AB      Largest calf  40.1 cm  -AB      Tib tuberosity  39.5 cm  -AB      Mid patella  45.6 cm  -AB      Distal thigh  47.8 cm  -AB      Other 1  24.8 cm  -AB      Other 2  35.4 cm  -AB      RLE Quick Girth Total  306.1  -AB         Trunk Circumferential (cm)    Measurement Location 1  Navel  -AB         Manual Lymphatic Drainage    Manual Lymphatic Drainage  extremity treatment;opened regional lymph nodes;opened anastamoses  -AB      Opened Regional Lymph Nodes  axillary;inguinal  -AB      Axillary  right;left  -AB      Inguinal  right;left  -AB      Opened Anastamoses  inguino-axillary  -AB      Extremity Treatment  MLD to full limb  -AB      MLD to Full Limb  BLE's  -AB      Manual Lymphatic Drainage Comments  vibration to bilateral scar areas to increase lymphatic flow  -AB      Manual Therapy  MLD to BLE's, abdomen, axillary, inguinals  -AB         Compression/Skin Care    Compression/Skin Care  skin care;wrapping location;bandaging  -AB      Skin Care  moisturizing lotion applied  -AB      Wrapping Location  lower extremity  -AB      Wrapping Location LE  left:;right: toes to mid thigh (L); base of toes to mid thigh (R)  -AB      Bandage Layers  short-stretch bandages (comment size/quantity);cotton elastic stocking- single layer (comment size);soft foam- 1/4 inch  -AB      Bandaging Comments  kinesiotape to L toes, ankle,  calf  -AB      Bandaging Technique  circumferential/spiral;moderate compression  -AB      Compression/Skin Care Comments  compression pump to abdomen and BLE's  -AB        User Key  (r) = Recorded By, (t) = Taken By, (c) = Cosigned By    Initials Name Provider Type    Anca Pond OT Occupational Therapist                                Therapy Education  Given: HEP, Symptoms/condition management, Edema management, Bandaging/dressing change  Program: Reinforced  How Provided: Verbal, Demonstration  Provided to: Patient  Level of Understanding: Verbalized                Time Calculation:   OT Start Time: 0940  OT Stop Time: 1130  OT Time Calculation (min): 110 min  OT Non-Billable Time (min): 25 min  Total Timed Code Minutes- OT: 110 minute(s)     Therapy Charges for Today     Code Description Service Date Service Provider Modifiers Qty    67478090872 HC OT VASOPNEUMAT DEVIC 1 OR MORE AREAS 8/20/2020 Anca Pickett OT OSIRIS CHIX 1    66677329407 HC OT MANUAL THERAPY EA 15 MIN 8/20/2020 Acna Pickett OT OSIRIS CHIX 4    76090443696 HC OT LYMPHEDEMA MANAGEMENT-15 MIN 8/20/2020 Anca Pickett OT KX 1    17940835625 HC OT SELF CARE/MGMT/TRAIN EA 15 MIN 8/20/2020 Anca Pickett OT GO, KX 1                      Anca Pickett OT  8/20/2020

## 2020-08-25 ENCOUNTER — HOSPITAL ENCOUNTER (OUTPATIENT)
Dept: OCCUPATIONAL THERAPY | Facility: HOSPITAL | Age: 75
Setting detail: THERAPIES SERIES
Discharge: HOME | End: 2020-08-25

## 2020-08-25 DIAGNOSIS — I89.0 LYMPHEDEMA OF BOTH LOWER EXTREMITIES: Primary | ICD-10-CM

## 2020-08-25 DIAGNOSIS — C51.9 CANCER OF VULVA (HCC): ICD-10-CM

## 2020-08-25 PROCEDURE — 97140 MANUAL THERAPY 1/> REGIONS: CPT

## 2020-08-25 PROCEDURE — 97139 UNLISTED THERAPEUTIC PX: CPT

## 2020-08-25 PROCEDURE — 97016 VASOPNEUMATIC DEVICE THERAPY: CPT

## 2020-08-25 NOTE — THERAPY TREATMENT NOTE
Outpatient Occupational Therapy Lymphedema Treatment Note   Jorge     Patient Name: Orquidea Rosenberg  : 1945  MRN: [id number]  Today's Date: 2020      Visit Date: 2020    There is no problem list on file for this patient.       Past Medical History:   Diagnosis Date   • Arthritis    • COPD (chronic obstructive pulmonary disease) (CMS/[facility])    • Elevated cholesterol    • History of transfusion    • Hypertension    • Vulval ca (CMS/HCC)         Past Surgical History:   Procedure Laterality Date   • RADICAL VULVECTOMY  2019   • RADICAL VULVECTOMY Bilateral 2019         Visit Dx:      ICD-10-CM ICD-9-CM   1. Lymphedema of both lower extremities I89.0 457.1   2. Cancer of vulva (CMS/[facility]) C51.9 184.4       Lymphedema     Row Name 20 1000             Subjective Pain    Able to rate subjective pain?  yes  -AB      Pre-Treatment Pain Level  0  -AB      Subjective Pain Comment  denies pain  -AB         Subjective Comments    Subjective Comments  Pt. reports that she kept bandages donned until yesterday.   -AB         Lymphedema Assessment    Lymphedema Classification  Other:;RLE:;LLE:;secondary;stage 2 (Spontaneously Irreversible)  -AB      Lymphedema Cancer Related Sx  bilateral Radical vulvectomy  -AB      Lymph Nodes Removed #  0  -AB      Positive Lymph Nodes #  0  -AB      Chemo Received  yes  -AB      Radiation Therapy Received  yes  -AB         Posture/Observations    Posture- WNL  Posture is WNL  -AB         Lymphedema Edema Assessment    Ptting Edema Category  By grade out of 4  -AB      Pitting Edema  + 4/4;Other (comment) +3-4/4 on RLE  -AB      Stemmer Sign  left:;positive  -AB      Edema Assessment Comment  increased swelling to BLE's  -AB         Skin Changes/Observations    Location/Assessment  Lower Extremity;Other Location  -AB      Lower Extremity Conditions  bilateral:;clean;dry;shiny  -AB      Lower Extremity Color/Pigment  bilateral:;blanchable;radiation  fibrosis;hyperpigmented  -AB         Lymphedema Sensation    Lymphedema Sensation Tests  light touch;deep touch  -AB      Lymphedema Light Touch  RLE:;mild impairment  -AB      Lymphedema Deep Touch  WNL  -AB         Lymphedema Measurements    Measurement Type(s)  Quick Girth;Circumferential  -AB      Quick Girth Areas  Lower extremities  -AB      Circumferential Areas  Trunk  -AB         LLE Quick Girth (cm)    Met-heads  24.6 cm  -AB      Mid foot  24.3 cm  -AB      Smallest ankle  28.5 cm  -AB      Largest calf  42.5 cm  -AB      Tib tuberosity  41.6 cm  -AB      Mid patella  48.4 cm  -AB      Distal thigh  51.8 cm  -AB      Proximal thigh  56.6 cm  -AB      Other 1  29.4 cm  -AB      Other 2  38.2 cm  -AB         RLE Quick Girth (cm)    Met-heads  23.9 cm  -AB      Mid foot  24.1 cm  -AB      Smallest ankle  24.8 cm  -AB      Largest calf  40.7 cm  -AB      Tib tuberosity  41.3 cm  -AB      Mid patella  45.8 cm  -AB      Distal thigh  50.7 cm  -AB      Other 1  25.1 cm  -AB      Other 2  35.7 cm  -AB      RLE Quick Girth Total  312.1  -AB         Trunk Circumferential (cm)    Measurement Location 1  Navel  -AB         Manual Lymphatic Drainage    Manual Lymphatic Drainage  extremity treatment;opened regional lymph nodes;opened anastamoses  -AB      Opened Regional Lymph Nodes  axillary;inguinal  -AB      Axillary  right;left  -AB      Inguinal  right;left  -AB      Opened Anastamoses  inguino-axillary  -AB      Extremity Treatment  MLD to full limb  -AB      MLD to Full Limb  BLE's  -AB      Manual Lymphatic Drainage Comments  vibration to bilateral scar areas  -AB      Manual Therapy  MLD to BLE's, abdomen, axillary, inguinals  -AB         Compression/Skin Care    Compression/Skin Care  skin care;wrapping location;bandaging  -AB      Skin Care  moisturizing lotion applied  -AB      Wrapping Location  lower extremity  -AB      Wrapping Location LE  bilateral: base of toes to mid thigh  -AB      Bandage Layers   short-stretch bandages (comment size/quantity);cotton elastic stocking- single layer (comment size);soft foam- 1/4 inch  -AB      Bandaging Comments  kinesiotape to LLE  -AB      Bandaging Technique  circumferential/spiral;moderate compression  -AB      Compression/Skin Care Comments  compression pump to abdomen and BLE's  -AB        User Key  (r) = Recorded By, (t) = Taken By, (c) = Cosigned By    Initials Name Provider Type    AB Anca Pickett, OT Occupational Therapist                                Therapy Education  Given: HEP, Symptoms/condition management, Edema management, Bandaging/dressing change  Program: Reinforced  How Provided: Verbal, Demonstration  Provided to: Patient  Level of Understanding: Verbalized                Time Calculation:   OT Start Time: 0940  OT Stop Time: 1115  OT Time Calculation (min): 95 min  OT Non-Billable Time (min): 25 min  Total Timed Code Minutes- OT: 95 minute(s)     Therapy Charges for Today     Code Description Service Date Service Provider Modifiers Qty    10614472782 HC OT VASOPNEUMAT DEVIC 1 OR MORE AREAS 8/25/2020 Anca Pickett OT GO, KX 1    [id number]  OT MANUAL THERAPY EA 15 MIN 8/25/2020 Anca Pickett OT GO, KX 4    [id number]  OT LYMPHEDEMA MANAGEMENT-15 MIN 8/25/2020 Anca Pickett OT KX 1                      Anca Pickett OT  8/25/2020

## 2020-08-27 ENCOUNTER — HOSPITAL ENCOUNTER (OUTPATIENT)
Dept: OCCUPATIONAL THERAPY | Facility: HOSPITAL | Age: 75
Setting detail: THERAPIES SERIES
Discharge: HOME | End: 2020-08-27

## 2020-08-27 DIAGNOSIS — I89.0 LYMPHEDEMA OF BOTH LOWER EXTREMITIES: Primary | ICD-10-CM

## 2020-08-27 DIAGNOSIS — C51.9 CANCER OF VULVA (HCC): ICD-10-CM

## 2020-08-27 PROCEDURE — 97016 VASOPNEUMATIC DEVICE THERAPY: CPT

## 2020-08-27 PROCEDURE — 97140 MANUAL THERAPY 1/> REGIONS: CPT

## 2020-08-27 PROCEDURE — 97535 SELF CARE MNGMENT TRAINING: CPT

## 2020-08-27 PROCEDURE — 97139 UNLISTED THERAPEUTIC PX: CPT

## 2020-08-27 NOTE — THERAPY PROGRESS REPORT/RE-CERT
Outpatient Occupational Therapy Lymphedema Progress Note   Jorge     Patient Name: Orquidea Rosenberg  : 1945  MRN: [id number]  Today's Date: 2020      Visit Date: 2020    There is no problem list on file for this patient.       Past Medical History:   Diagnosis Date   • Arthritis    • COPD (chronic obstructive pulmonary disease) (CMS/HCC)    • Elevated cholesterol    • History of transfusion    • Hypertension    • Vulval ca (CMS/HCC)         Past Surgical History:   Procedure Laterality Date   • RADICAL VULVECTOMY  2019   • RADICAL VULVECTOMY Bilateral 2019         Visit Dx:      ICD-10-CM ICD-9-CM   1. Lymphedema of both lower extremities I89.0 457.1   2. Cancer of vulva (CMS/HCC) C51.9 184.4       Lymphedema     Row Name 20 1000             Subjective Pain    Able to rate subjective pain?  yes  -AB      Pre-Treatment Pain Level  0  -AB         Subjective Comments    Subjective Comments  Pt. reports that she did not sleep well last night d/t being up trying to fix husbands PEG feeding.   -AB         Lymphedema Assessment    Lymphedema Classification  Other:;RLE:;LLE:;secondary;stage 2 (Spontaneously Irreversible)  -AB      Lymphedema Cancer Related Sx  bilateral Radical vulvectomy  -AB      Lymph Nodes Removed #  0  -AB      Positive Lymph Nodes #  0  -AB      Chemo Received  yes  -AB      Radiation Therapy Received  yes  -AB         Posture/Observations    Posture- WNL  Posture is WNL  -AB         Lymphedema Edema Assessment    Ptting Edema Category  By grade out of 4  -AB      Pitting Edema  + 4/4;Other (comment) +3-4/4 on RLE  -AB      Stemmer Sign  left:;positive  -AB         Skin Changes/Observations    Location/Assessment  Lower Extremity;Other Location  -AB      Lower Extremity Conditions  bilateral:;clean;dry;shiny  -AB      Lower Extremity Color/Pigment  bilateral:;blanchable;radiation fibrosis;hyperpigmented  -AB         Lymphedema Sensation    Lymphedema Sensation Tests   light touch;deep touch  -AB      Lymphedema Light Touch  RLE:;mild impairment  -AB      Lymphedema Deep Touch  WNL  -AB         Lymphedema Measurements    Measurement Type(s)  Quick Girth;Circumferential  -AB      Quick Girth Areas  Lower extremities  -AB      Circumferential Areas  Trunk  -AB         LLE Quick Girth (cm)    Met-heads  24 cm  -AB      Mid foot  24.8 cm  -AB      Smallest ankle  28 cm  -AB      Largest calf  42.2 cm  -AB      Tib tuberosity  40.6 cm  -AB      Mid patella  46.7 cm  -AB      Distal thigh  51.4 cm  -AB      Other 1  28.9 cm  -AB      Other 2  38.2 cm  -AB         RLE Quick Girth (cm)    Met-heads  23.2 cm  -AB      Mid foot  24.2 cm  -AB      Smallest ankle  24.2 cm  -AB      Largest calf  40.9 cm  -AB      Tib tuberosity  41 cm  -AB      Mid patella  44.9 cm  -AB      Distal thigh  49.5 cm  -AB      Other 1  24.1 cm  -AB      Other 2  35.9 cm  -AB      RLE Quick Girth Total  307.9  -AB         Trunk Circumferential (cm)    Measurement Location 1  Navel  -AB         Manual Lymphatic Drainage    Manual Lymphatic Drainage  extremity treatment;opened regional lymph nodes;opened anastamoses  -AB      Opened Regional Lymph Nodes  axillary;inguinal  -AB      Axillary  right;left  -AB      Inguinal  right;left  -AB      Opened Anastamoses  inguino-axillary  -AB      Extremity Treatment  MLD to full limb  -AB      MLD to Full Limb  BLE's  -AB      Manual Lymphatic Drainage Comments  vibration to bilateral scar areas to increase lymphatic flow  -AB      Manual Therapy  MLD to BLE's, abdomen, axillary, inguinals  -AB         Compression/Skin Care    Compression/Skin Care  skin care;wrapping location;bandaging  -AB      Skin Care  moisturizing lotion applied  -AB      Wrapping Location  lower extremity  -AB      Wrapping Location LE  bilateral: base of toes to mid thigh  -AB      Wrapping Comments  discussed w/ pt. about circaid reduction kit, and pt. is hesitant  -AB      Bandage Layers   short-stretch bandages (comment size/quantity);cotton elastic stocking- single layer (comment size);soft foam- 1/4 inch  -AB      Bandaging Comments  kinesiotape to LLE  -AB      Bandaging Technique  circumferential/spiral;moderate compression  -AB      Compression/Skin Care Comments  compression pump to abdomen and BLE's  -AB        User Key  (r) = Recorded By, (t) = Taken By, (c) = Cosigned By    Initials Name Provider Type    Anca Pond, OT Occupational Therapist                  OT Assessment/Plan     Row Name 20 1627          OT Assessment    OT Rehab Potential  Good  -AB     Patient/caregiver participated in establishment of treatment plan and goals  Yes  -AB     Patient would benefit from skilled therapy intervention  Yes  -AB        OT Plan    OT Frequency  2x/week  -AB     OT Plan Comments  Pt. will benefit from continued skilled OT services to address ongoing lymphedema needs.   -AB       User Key  (r) = Recorded By, (t) = Taken By, (c) = Cosigned By    Initials Name Provider Type    Anca Pond, OT Occupational Therapist                OT Goals     Row Name 20 1600          OT Short Term Goals    STG Date to Achieve  20  -AB     STG 1  Pt. familiar w/precautions, skin  and self management of lymphdema.  -AB     STG 1 Progress  Ongoing  -AB     STG 2  Decrease pitting edema to 3/4 for decreased risk of infection.  -AB     STG 2 Progress  Ongoing  -AB     STG 3  HEP to assist with improved lymphatic flow.  -AB     STG 3 Progress  Progressing  -AB     STG 4  Self care instructions for home MLD for volume reduction.  -AB     STG 4 Progress  Partially Met  -AB        Long Term Goals    LTG Date to Achieve  10/28/20  -AB     LTG 1  Pt. and/or caregiver independent w/short stretch compression bandaging for volume reduction.  -AB     LTG 1 Progress  Ongoing  -AB     LTG 2  Decrease pitting edema to 2/4 for decreased risk of infection.  -AB     LTG 2 Progress   Ongoing  -AB     LTG 3  Independent with donning/doffing compression garment.  -AB     LTG 3 Progress  Ongoing  -AB     LTG 4  Independent with lymphedema management and HEP.  -AB     LTG 4 Progress  Ongoing  -AB        Time Calculation    OT Goal Re-Cert Due Date  10/28/20  -AB       User Key  (r) = Recorded By, (t) = Taken By, (c) = Cosigned By    Initials Name Provider Type    AB Anca Pickett, OT Occupational Therapist          Therapy Education  Given: HEP, Symptoms/condition management, Edema management, Bandaging/dressing change  Program: Reinforced  How Provided: Verbal, Demonstration  Provided to: Patient  Level of Understanding: Verbalized                Time Calculation:   OT Start Time: 0940  OT Stop Time: 1120  OT Time Calculation (min): 100 min  OT Non-Billable Time (min): 35 min  Total Timed Code Minutes- OT: 100 minute(s)     Therapy Charges for Today     Code Description Service Date Service Provider Modifiers Qty    84173643256 HC OT VASOPNEUMAT DEVIC 1 OR MORE AREAS 8/27/2020 Anca Pickett OT GO, KX 1    30867083626 HC OT MANUAL THERAPY EA 15 MIN 8/27/2020 Anca Pickett OT GO, KX 4    97142337062 HC OT LYMPHEDEMA MANAGEMENT-15 MIN 8/27/2020 Anca Pickett OT KX 1    91485326780 HC OT SELF CARE/MGMT/TRAIN EA 15 MIN 8/27/2020 Anca Pickett OT GO, KX 1                      Anca Pickett OT  8/27/2020

## 2020-09-01 ENCOUNTER — HOSPITAL ENCOUNTER (OUTPATIENT)
Dept: OCCUPATIONAL THERAPY | Facility: HOSPITAL | Age: 75
Setting detail: THERAPIES SERIES
Discharge: HOME | End: 2020-09-01

## 2020-09-01 DIAGNOSIS — I89.0 LYMPHEDEMA OF BOTH LOWER EXTREMITIES: Primary | ICD-10-CM

## 2020-09-01 DIAGNOSIS — C51.9 CANCER OF VULVA (HCC): ICD-10-CM

## 2020-09-01 PROCEDURE — 97140 MANUAL THERAPY 1/> REGIONS: CPT

## 2020-09-01 PROCEDURE — 97535 SELF CARE MNGMENT TRAINING: CPT

## 2020-09-01 PROCEDURE — 97139 UNLISTED THERAPEUTIC PX: CPT

## 2020-09-01 PROCEDURE — 97016 VASOPNEUMATIC DEVICE THERAPY: CPT

## 2020-09-01 NOTE — THERAPY TREATMENT NOTE
Outpatient Occupational Therapy Lymphedema Treatment Note   Jorge     Patient Name: Orquidea Rosenberg  : 1945  MRN: [id number]  Today's Date: 2020      Visit Date: 2020    There is no problem list on file for this patient.       Past Medical History:   Diagnosis Date   • Arthritis    • COPD (chronic obstructive pulmonary disease) (CMS/[facility])    • Elevated cholesterol    • History of transfusion    • Hypertension    • Vulval ca (CMS/[facility])         Past Surgical History:   Procedure Laterality Date   • RADICAL VULVECTOMY  2019   • RADICAL VULVECTOMY Bilateral 2019         Visit Dx:      ICD-10-CM ICD-9-CM   1. Lymphedema of both lower extremities I89.0 457.1   2. Cancer of vulva (CMS/[facility]) C51.9 184.4       Lymphedema     Row Name 20 1100             Subjective Pain    Able to rate subjective pain?  yes  -AB      Pre-Treatment Pain Level  0  -AB      Subjective Pain Comment  no c/o pain this date  -AB         Subjective Comments    Subjective Comments  Pt. reports that she has a lot on her mind today. She states her  is still non-ambulatory and bed bound. We also discussed the use of bike shorts/spanx to assist w/ compression to bilateral thighs, abdomen, and genital swelling   -AB         Lymphedema Assessment    Lymphedema Classification  Other:;RLE:;LLE:;secondary;stage 2 (Spontaneously Irreversible)  -AB      Lymphedema Cancer Related Sx  bilateral Radical vulvectomy  -AB      Lymph Nodes Removed #  0  -AB      Positive Lymph Nodes #  0  -AB      Chemo Received  yes  -AB      Radiation Therapy Received  yes  -AB         Posture/Observations    Posture- WNL  Posture is WNL  -AB         Lymphedema Edema Assessment    Ptting Edema Category  By grade out of 4  -AB      Pitting Edema  + 4/4;Other (comment) +3/4 on RLE  -AB      Stemmer Sign  left:;positive  -AB         Skin Changes/Observations    Location/Assessment  Lower Extremity;Other Location  -AB      Lower Extremity Conditions   bilateral:;clean;dry;shiny  -AB      Lower Extremity Color/Pigment  bilateral:;blanchable;radiation fibrosis;hyperpigmented  -AB         Lymphedema Sensation    Lymphedema Sensation Tests  light touch;deep touch  -AB      Lymphedema Light Touch  RLE:;mild impairment  -AB      Lymphedema Deep Touch  WNL  -AB         Lymphedema Measurements    Measurement Type(s)  Quick Girth;Circumferential  -AB      Quick Girth Areas  Lower extremities  -AB      Circumferential Areas  Trunk  -AB         LLE Quick Girth (cm)    Met-heads  25 cm  -AB      Mid foot  26.1 cm  -AB      Smallest ankle  30.2 cm  -AB      Largest calf  43.3 cm  -AB      Tib tuberosity  43.2 cm  -AB      Mid patella  48.9 cm  -AB      Distal thigh  52.5 cm  -AB      Proximal thigh  57 cm  -AB      Other 1  30.5 cm  -AB      Other 2  40.6 cm  -AB         Trunk Circumferential (cm)    Measurement Location 1  Navel  -AB         Manual Lymphatic Drainage    Manual Lymphatic Drainage  extremity treatment;opened regional lymph nodes;opened anastamoses  -AB      Opened Regional Lymph Nodes  axillary;inguinal  -AB      Axillary  right;left  -AB      Inguinal  right;left  -AB      Opened Anastamoses  inguino-axillary  -AB      Extremity Treatment  MLD to full limb  -AB      MLD to Full Limb  BLEs  -AB      Manual Lymphatic Drainage Comments  vibration to bilateral thigh scars to increase lymphatic flow  -AB      Manual Therapy  MLD to BLE's, abdomen, axillary, inguinals  -AB         Compression/Skin Care    Compression/Skin Care  skin care;wrapping location;bandaging  -AB      Skin Care  -- no lotion d/t kinesiotape  -AB      Wrapping Location  lower extremity  -AB      Wrapping Location LE  bilateral: base of toes to mid thigh(R); toes to mid thigh (L)  -AB      Bandage Layers  short-stretch bandages (comment size/quantity);cotton elastic stocking- single layer (comment size);soft foam- 1/4 inch  -AB      Bandaging Comments  kinesiotape to L toes, L ankle, bilateral  thighs  -AB      Bandaging Technique  circumferential/spiral;moderate compression;figure-eight  -AB      Compression/Skin Care Comments  compression pump to abdomen and BLE's  -AB        User Key  (r) = Recorded By, (t) = Taken By, (c) = Cosigned By    Initials Name Provider Type    Anca Pond OT Occupational Therapist                                Therapy Education  Given: HEP, Symptoms/condition management, Edema management, Bandaging/dressing change  Program: Reinforced  How Provided: Verbal, Demonstration  Provided to: Patient  Level of Understanding: Verbalized                Time Calculation:   OT Start Time: 1000  OT Stop Time: 1155  OT Time Calculation (min): 115 min  OT Non-Billable Time (min): 25 min  Total Timed Code Minutes- OT: 115 minute(s)     Therapy Charges for Today     Code Description Service Date Service Provider Modifiers Qty    80673495012 HC OT VASOPNEUMAT DEVIC 1 OR MORE AREAS 9/1/2020 Anca Pickett OT ALON, KX 1    02003281496 HC OT MANUAL THERAPY EA 15 MIN 9/1/2020 Anca Pickett OT ALON, KX 4    39036245674 HC OT LYMPHEDEMA MANAGEMENT-15 MIN 9/1/2020 Anca Pickett OT KX 1    81197482695 HC OT SELF CARE/MGMT/TRAIN EA 15 MIN 9/1/2020 Anca Pickett OT OSIRIS CHIX 2                      Anca Pickett OT  9/1/2020

## 2020-09-04 ENCOUNTER — HOSPITAL ENCOUNTER (OUTPATIENT)
Dept: OCCUPATIONAL THERAPY | Facility: HOSPITAL | Age: 75
Setting detail: THERAPIES SERIES
Discharge: HOME | End: 2020-09-04

## 2020-09-04 DIAGNOSIS — I89.0 LYMPHEDEMA OF BOTH LOWER EXTREMITIES: Primary | ICD-10-CM

## 2020-09-04 DIAGNOSIS — C51.9 CANCER OF VULVA (HCC): ICD-10-CM

## 2020-09-04 PROCEDURE — 97139 UNLISTED THERAPEUTIC PX: CPT

## 2020-09-04 PROCEDURE — 97016 VASOPNEUMATIC DEVICE THERAPY: CPT

## 2020-09-04 PROCEDURE — 97140 MANUAL THERAPY 1/> REGIONS: CPT

## 2020-09-04 PROCEDURE — 97535 SELF CARE MNGMENT TRAINING: CPT

## 2020-09-04 NOTE — THERAPY TREATMENT NOTE
Outpatient Occupational Therapy Lymphedema Treatment Note   Jorge     Patient Name: Orquidea Rosenberg  : 1945  MRN: [id number]  Today's Date: 2020      Visit Date: 2020    There is no problem list on file for this patient.       Past Medical History:   Diagnosis Date   • Arthritis    • COPD (chronic obstructive pulmonary disease) (CMS/HCC)    • Elevated cholesterol    • History of transfusion    • Hypertension    • Vulval ca (CMS/HCC)         Past Surgical History:   Procedure Laterality Date   • RADICAL VULVECTOMY  2019   • RADICAL VULVECTOMY Bilateral 2019         Visit Dx:      ICD-10-CM ICD-9-CM   1. Lymphedema of both lower extremities I89.0 457.1   2. Cancer of vulva (CMS/HCC) C51.9 184.4       Lymphedema     Row Name 20 0900             Subjective Pain    Able to rate subjective pain?  yes  -BC      Pre-Treatment Pain Level  2  -BC      Subjective Pain Comment  Sensitive in groin area  -BC         Subjective Comments    Subjective Comments  Pt. requests no scar massage this date due to sensitivity in these areas, reporting she will tiarra compression shorts  when she returns home.   -BC         Lymphedema Assessment    Lymphedema Classification  Other:;RLE:;LLE:;secondary;stage 2 (Spontaneously Irreversible)  -BC      Lymphedema Cancer Related Sx  bilateral Radical vulvectomy  -BC      Lymph Nodes Removed #  0  -BC      Positive Lymph Nodes #  0  -BC      Chemo Received  yes  -BC      Radiation Therapy Received  yes  -BC         Posture/Observations    Posture- WNL  Posture is WNL  -BC         Lymphedema Edema Assessment    Ptting Edema Category  By grade out of 4  -BC      Pitting Edema  + 4/4;Other (comment) +3/4 on RLE  -BC      Stemmer Sign  left:;positive  -BC         Skin Changes/Observations    Location/Assessment  Lower Extremity;Other Location  -BC      Lower Extremity Conditions  bilateral:;clean;dry;shiny  -BC      Lower Extremity Color/Pigment   bilateral:;blanchable;radiation fibrosis;hyperpigmented  -BC         Lymphedema Sensation    Lymphedema Sensation Tests  light touch;deep touch  -BC      Lymphedema Light Touch  RLE:;mild impairment  -BC      Lymphedema Deep Touch  WNL  -BC         Lymphedema Measurements    Measurement Type(s)  Quick Girth;Circumferential  -BC      Quick Girth Areas  Lower extremities  -BC      Circumferential Areas  Trunk  -BC         LLE Quick Girth (cm)    Met-heads  24 cm  -BC      Mid foot  26.5 cm  -BC      Smallest ankle  27 cm  -BC      Largest calf  41.2 cm  -BC      Tib tuberosity  40.5 cm  -BC      Mid patella  44.7 cm  -BC      Distal thigh  52.4 cm  -BC      Proximal thigh  58 cm  -BC      Other 1  29.4 cm  -BC      Other 2  37.3 cm  -BC         RLE Quick Girth (cm)    Met-heads  23.6 cm  -BC      Mid foot  23.2 cm  -BC      Smallest ankle  23.2 cm  -BC      Largest calf  40 cm  -BC      Tib tuberosity  39.9 cm  -BC      Mid patella  45 cm  -BC      Distal thigh  49 cm  -BC      Proximal thigh  57.3 cm  -BC      Other 1  23.7 cm  -BC      Other 2  34.8 cm  -BC      RLE Quick Girth Total  359.7  -BC         Trunk Circumferential (cm)    Measurement Location 1  Navel  -BC         Manual Lymphatic Drainage    Manual Lymphatic Drainage  extremity treatment;opened regional lymph nodes;opened anastamoses  -BC      Opened Regional Lymph Nodes  axillary;inguinal  -BC      Axillary  right;left  -BC      Inguinal  right;left  -BC      Opened Anastamoses  inguino-axillary  -BC      Extremity Treatment  MLD to full limb  -BC      MLD to Full Limb  BLE's  -BC      Manual Therapy  MLD to BLE's, Abd., inguinals.  -BC         Compression/Skin Care    Compression/Skin Care  skin care;wrapping location;bandaging  -BC      Skin Care  -- no lotion d/t kinesiotape  -BC      Wrapping Location  lower extremity  -BC      Wrapping Location LE  bilateral: base of toes to mid thigh(R); toes to mid thigh (L)  -BC      Bandage Layers   short-stretch bandages (comment size/quantity);cotton elastic stocking- single layer (comment size);soft foam- 1/4 inch  -BC      Bandaging Technique  circumferential/spiral;moderate compression;figure-eight  -BC      Compression/Skin Care Comments  Compression pump x Abd., BLE's  -BC        User Key  (r) = Recorded By, (t) = Taken By, (c) = Cosigned By    Initials Name Provider Type    Charlotte Cook OT Occupational Therapist                  OT Assessment/Plan     Row Name 09/04/20 1531          OT Assessment    Assessment Comments  Pt. positioned in supported recline for manual lymph drainage, compression pump, skin care and multi layered bandaging of BLE base of toes to below knee per pt. request.   -BC       User Key  (r) = Recorded By, (t) = Taken By, (c) = Cosigned By    Initials Name Provider Type    Charlotte Cook OT Occupational Therapist                                     Time Calculation:   OT Start Time: 0930  OT Stop Time: 1115  OT Time Calculation (min): 105 min  OT Non-Billable Time (min): 25 min     Therapy Charges for Today     Code Description Service Date Service Provider Modifiers Qty    91606565391 HC OT MANUAL THERAPY EA 15 MIN 9/4/2020 Charlotet García OT GO, KX 4    56712438119 HC OT LYMPHEDEMA MANAGEMENT-15 MIN 9/4/2020 Charlotte García OT KX 1    91829614867 HC OT SELF CARE/MGMT/TRAIN EA 15 MIN 9/4/2020 Charlotte García OT GO, KX 1    17594658830 HC OT VASOPNEUMAT DEVIC 1 OR MORE AREAS 9/4/2020 Charlotte García OT GO, KX 1                      Charlotte García OT  9/4/2020

## 2020-09-08 ENCOUNTER — HOSPITAL ENCOUNTER (OUTPATIENT)
Dept: OCCUPATIONAL THERAPY | Facility: HOSPITAL | Age: 75
Setting detail: THERAPIES SERIES
Discharge: HOME | End: 2020-09-08

## 2020-09-08 DIAGNOSIS — I89.0 LYMPHEDEMA OF BOTH LOWER EXTREMITIES: Primary | ICD-10-CM

## 2020-09-08 DIAGNOSIS — C51.9 CANCER OF VULVA (HCC): ICD-10-CM

## 2020-09-08 PROCEDURE — 97139 UNLISTED THERAPEUTIC PX: CPT

## 2020-09-08 PROCEDURE — 97016 VASOPNEUMATIC DEVICE THERAPY: CPT

## 2020-09-08 PROCEDURE — 97140 MANUAL THERAPY 1/> REGIONS: CPT

## 2020-09-08 PROCEDURE — 97535 SELF CARE MNGMENT TRAINING: CPT

## 2020-09-08 NOTE — THERAPY TREATMENT NOTE
"Outpatient Occupational Therapy Lymphedema Treatment Note   Jorge     Patient Name: Orquidea Rosenberg  : 1945  MRN: [id number]  Today's Date: 2020      Visit Date: 2020    There is no problem list on file for this patient.       Past Medical History:   Diagnosis Date   • Arthritis    • COPD (chronic obstructive pulmonary disease) (CMS/HCC)    • Elevated cholesterol    • History of transfusion    • Hypertension    • Vulval ca (CMS/HCC)         Past Surgical History:   Procedure Laterality Date   • RADICAL VULVECTOMY  2019   • RADICAL VULVECTOMY Bilateral 2019         Visit Dx:      ICD-10-CM ICD-9-CM   1. Lymphedema of both lower extremities I89.0 457.1   2. Cancer of vulva (CMS/HCC) C51.9 184.4       Lymphedema     Row Name 20 0900             Subjective Pain    Able to rate subjective pain?  yes  -BC      Pre-Treatment Pain Level  0  -BC      Subjective Pain Comment  Denies pain  -BC         Subjective Comments    Subjective Comments  Pt. reports that she first started swelling back when she was doing her radiation therapy, and that she swelled in her \"private' area with pockets of fluid noted.  Pt. has incorported wearing compression shorts to address pelvic swelling, as well as deep breathing and exercise and elevation to help assist with fluid movement.  It is NOT recommended for patient to trial a basic pump due to significant swelling in perineal./pelvic region.     -BC         Lymphedema Assessment    Lymphedema Classification  Other:;RLE:;LLE:;secondary;stage 2 (Spontaneously Irreversible)  -BC      Lymphedema Cancer Related Sx  bilateral Radical vulvectomy  -BC      Lymph Nodes Removed #  0  -BC      Positive Lymph Nodes #  0  -BC      Chemo Received  yes  -BC      Radiation Therapy Received  yes  -BC         Posture/Observations    Posture- WNL  Posture is WNL  -BC         Lymphedema Edema Assessment    Ptting Edema Category  By grade out of 4  -BC      Pitting Edema  + " 4/4;Other (comment) +3/4 on RLE  -BC      Stemmer Sign  left:;positive  -BC      Edema Assessment Comment  Pt. with significant swelling on dorsum of L foot 4/4. , Pt. with continued fluid gathering in perineal areas.   -BC         Skin Changes/Observations    Location/Assessment  Lower Extremity;Other Location  -BC      Lower Extremity Conditions  bilateral:;clean;dry;shiny  -BC      Lower Extremity Color/Pigment  bilateral:;blanchable;radiation fibrosis;hyperpigmented  -BC         Lymphedema Sensation    Lymphedema Sensation Tests  light touch;deep touch  -BC      Lymphedema Light Touch  RLE:;mild impairment  -BC      Lymphedema Deep Touch  WNL  -BC         Lymphedema Measurements    Measurement Type(s)  Quick Girth;Circumferential  -BC      Quick Girth Areas  Lower extremities  -BC      Circumferential Areas  Trunk  -BC         LLE Quick Girth (cm)    Met-heads  24.5 cm  -BC      Mid foot  25.3 cm  -BC      Smallest ankle  28.3 cm  -BC      Largest calf  42.6 cm  -BC      Tib tuberosity  41.7 cm  -BC      Mid patella  47 cm  -BC      Distal thigh  51 cm  -BC      Proximal thigh  58 cm  -BC      Other 1  29 cm  -BC      Other 2  37 cm  -BC         RLE Quick Girth (cm)    Met-heads  23.5 cm  -BC      Mid foot  24 cm  -BC      Smallest ankle  23.4 cm  -BC      Largest calf  40.3 cm  -BC      Tib tuberosity  40 cm  -BC      Mid patella  46.4 cm  -BC      Distal thigh  50.2 cm  -BC      Proximal thigh  57.5 cm  -BC      Other 1  23.3 cm  -BC      Other 2  33 cm  -BC      RLE Quick Girth Total  361.6  -BC         Trunk Circumferential (cm)    Measurement Location 1  Navel  -BC         Manual Lymphatic Drainage    Manual Lymphatic Drainage  extremity treatment;opened regional lymph nodes;opened anastamoses  -BC      Opened Regional Lymph Nodes  axillary;inguinal  -BC      Axillary  right;left  -BC      Inguinal  right;left  -BC      Opened Anastamoses  inguino-axillary  -BC      Extremity Treatment  MLD to full limb   -BC      MLD to Full Limb  BLE's  -BC      Manual Therapy  MLD to BLE's, Abd., inguinals.   -BC         Compression/Skin Care    Compression/Skin Care  skin care;wrapping location;bandaging  -BC      Skin Care  -- no lotion d/t kinesiotape  -BC      Wrapping Location  lower extremity  -BC      Wrapping Location LE  bilateral: base of toes to mid thigh(R); toes to mid thigh (L)  -BC      Bandage Layers  short-stretch bandages (comment size/quantity);cotton elastic stocking- single layer (comment size);soft foam- 1/4 inch  -BC      Bandaging Technique  circumferential/spiral;moderate compression;figure-eight  -BC      Compression/Skin Care Comments  Compression pump x Abd., BLE's  -BC        User Key  (r) = Recorded By, (t) = Taken By, (c) = Cosigned By    Initials Name Provider Type    Charlotte Cook OT Occupational Therapist                  OT Assessment/Plan     Row Name 09/08/20 0429          OT Assessment    Assessment Comments  Pt. positioned in supported recline for manual lymph drainage, compression pump, skin care and multi layered bandaging of BLE base of toes to below knee on RLE, base of toes to inguinal on LLE. Pt. reports that she will tiarra compression garment when she gets home to address fluid build up in groin area and scar massage of inner thighs.    -BC       User Key  (r) = Recorded By, (t) = Taken By, (c) = Cosigned By    Initials Name Provider Type    Charlotte Cook OT Occupational Therapist                                     Time Calculation:   OT Start Time: 0940  OT Stop Time: 1115  OT Time Calculation (min): 95 min  OT Non-Billable Time (min): 30 min     Therapy Charges for Today     Code Description Service Date Service Provider Modifiers Qty    39002899863 HC OT MANUAL THERAPY EA 15 MIN 9/8/2020 Charlotte García OT GO, KX 3    34604835547 HC OT LYMPHEDEMA MANAGEMENT-15 MIN 9/8/2020 Charlotte García OT KX 1    10638544925 HC OT SELF CARE/MGMT/TRAIN EA 15 MIN 9/8/2020 Ricky  VERONICA Porter KX 1    [id number]  OT VASOPNEUMAT DEVIC 1 OR MORE AREAS 9/8/2020 Ricky, VERONICA Porter KX 1                      Charlotte García, VERONICA  9/8/2020

## 2020-09-08 NOTE — THERAPY TREATMENT NOTE
"Outpatient Occupational Therapy Lymphedema Treatment Note   Jorge     Patient Name: Orquidea Rosenberg  : 1945  MRN: [id number]  Today's Date: 2020      Visit Date: 2020    There is no problem list on file for this patient.       Past Medical History:   Diagnosis Date   • Arthritis    • COPD (chronic obstructive pulmonary disease) (CMS/HCC)    • Elevated cholesterol    • History of transfusion    • Hypertension    • Vulval ca (CMS/HCC)         Past Surgical History:   Procedure Laterality Date   • RADICAL VULVECTOMY  2019   • RADICAL VULVECTOMY Bilateral 2019         Visit Dx:      ICD-10-CM ICD-9-CM   1. Lymphedema of both lower extremities I89.0 457.1   2. Cancer of vulva (CMS/HCC) C51.9 184.4       Lymphedema     Row Name 20 0900             Subjective Pain    Able to rate subjective pain?  yes  -BC      Pre-Treatment Pain Level  0  -BC      Subjective Pain Comment  Denies pain  -BC         Subjective Comments    Subjective Comments  Pt. reports that she first started swelling back when she was doing her radiation therapy, and that she swelled in her \"private' area with pockets of fluid noted.  Pt. has incorported wearing compression shorts to address pelvic swelling, as well as deep breathing and exercise and elevation to help assist with fluid movement.  Pt. also utilizing a basic pump which seems to be pushing fluid from legs up into her pelvic region.    -BC         Lymphedema Assessment    Lymphedema Classification  Other:;RLE:;LLE:;secondary;stage 2 (Spontaneously Irreversible)  -BC      Lymphedema Cancer Related Sx  bilateral Radical vulvectomy  -BC      Lymph Nodes Removed #  0  -BC      Positive Lymph Nodes #  0  -BC      Chemo Received  yes  -BC      Radiation Therapy Received  yes  -BC         Posture/Observations    Posture- WNL  Posture is WNL  -BC         Lymphedema Edema Assessment    Ptting Edema Category  By grade out of 4  -BC      Pitting Edema  + 4/4;Other " (comment) +3/4 on RLE  -BC      Stemmer Sign  left:;positive  -BC      Edema Assessment Comment  Pt. with significant swelling on dorsum of L foot 4/4. , Pt. with continued fluid gathering in perineal areas.   -BC         Skin Changes/Observations    Location/Assessment  Lower Extremity;Other Location  -BC      Lower Extremity Conditions  bilateral:;clean;dry;shiny  -BC      Lower Extremity Color/Pigment  bilateral:;blanchable;radiation fibrosis;hyperpigmented  -BC         Lymphedema Sensation    Lymphedema Sensation Tests  light touch;deep touch  -BC      Lymphedema Light Touch  RLE:;mild impairment  -BC      Lymphedema Deep Touch  WNL  -BC         Lymphedema Measurements    Measurement Type(s)  Quick Girth;Circumferential  -BC      Quick Girth Areas  Lower extremities  -BC      Circumferential Areas  Trunk  -BC         LLE Quick Girth (cm)    Met-heads  24.5 cm  -BC      Mid foot  25.3 cm  -BC      Smallest ankle  28.3 cm  -BC      Largest calf  42.6 cm  -BC      Tib tuberosity  41.7 cm  -BC      Mid patella  47 cm  -BC      Distal thigh  51 cm  -BC      Proximal thigh  58 cm  -BC      Other 1  29 cm  -BC      Other 2  37 cm  -BC         RLE Quick Girth (cm)    Met-heads  23.5 cm  -BC      Mid foot  24 cm  -BC      Smallest ankle  23.4 cm  -BC      Largest calf  40.3 cm  -BC      Tib tuberosity  40 cm  -BC      Mid patella  46.4 cm  -BC      Distal thigh  50.2 cm  -BC      Proximal thigh  57.5 cm  -BC      Other 1  23.3 cm  -BC      Other 2  33 cm  -BC      RLE Quick Girth Total  361.6  -BC         Trunk Circumferential (cm)    Measurement Location 1  Navel  -BC         Manual Lymphatic Drainage    Manual Lymphatic Drainage  extremity treatment;opened regional lymph nodes;opened anastamoses  -BC      Opened Regional Lymph Nodes  axillary;inguinal  -BC      Axillary  right;left  -BC      Inguinal  right;left  -BC      Opened Anastamoses  inguino-axillary  -BC      Extremity Treatment  MLD to full limb  -BC      MLD  to Full Limb  BLE's  -BC      Manual Therapy  MLD to BLE's, Abd., inguinals.   -BC         Compression/Skin Care    Compression/Skin Care  skin care;wrapping location;bandaging  -BC      Skin Care  -- no lotion d/t kinesiotape  -BC      Wrapping Location  lower extremity  -BC      Wrapping Location LE  bilateral: base of toes to mid thigh(R); toes to mid thigh (L)  -BC      Bandage Layers  short-stretch bandages (comment size/quantity);cotton elastic stocking- single layer (comment size);soft foam- 1/4 inch  -BC      Bandaging Technique  circumferential/spiral;moderate compression;figure-eight  -BC      Compression/Skin Care Comments  Compression pump x Abd., BLE's  -BC        User Key  (r) = Recorded By, (t) = Taken By, (c) = Cosigned By    Initials Name Provider Type    Charlotte Cook OT Occupational Therapist                  OT Assessment/Plan     Row Name 09/08/20 1632          OT Assessment    Assessment Comments  Pt. positioned in supported recline for manual lymph drainage, compression pump, skin care and multi layered bandaging of BLE base of toes to below knee on RLE, base of toes to inguinal on LLE. Pt. reports that she will tiarra compression garment when she gets home to address fluid build up in groin area and scar massage of inner thighs.    -BC       User Key  (r) = Recorded By, (t) = Taken By, (c) = Cosigned By    Initials Name Provider Type    Charlotte Cook OT Occupational Therapist                                     Time Calculation:   OT Start Time: 0940  OT Stop Time: 1115  OT Time Calculation (min): 95 min  OT Non-Billable Time (min): 30 min     Therapy Charges for Today     Code Description Service Date Service Provider Modifiers Qty    48597158998 HC OT MANUAL THERAPY EA 15 MIN 9/8/2020 Charlotte García OT GO, KX 3    74555784505 HC OT LYMPHEDEMA MANAGEMENT-15 MIN 9/8/2020 Charlotte García OT KX 1    92147959713 HC OT SELF CARE/MGMT/TRAIN EA 15 MIN 9/8/2020 Charlotte García OT GO  KX 1    [id number]  OT VASOPNEUMAT DEVIC 1 OR MORE AREAS 9/8/2020 Ricky, VERONICA Porter KX 1                      Charlotte García, VERONICA  9/8/2020

## 2020-09-10 ENCOUNTER — HOSPITAL ENCOUNTER (OUTPATIENT)
Dept: OCCUPATIONAL THERAPY | Facility: HOSPITAL | Age: 75
Setting detail: THERAPIES SERIES
Discharge: HOME | End: 2020-09-10

## 2020-09-10 DIAGNOSIS — C51.9 CANCER OF VULVA (HCC): ICD-10-CM

## 2020-09-10 DIAGNOSIS — I89.0 LYMPHEDEMA OF BOTH LOWER EXTREMITIES: Primary | ICD-10-CM

## 2020-09-10 PROCEDURE — 97139 UNLISTED THERAPEUTIC PX: CPT

## 2020-09-10 PROCEDURE — 97140 MANUAL THERAPY 1/> REGIONS: CPT

## 2020-09-10 PROCEDURE — 97016 VASOPNEUMATIC DEVICE THERAPY: CPT

## 2020-09-10 PROCEDURE — 97535 SELF CARE MNGMENT TRAINING: CPT

## 2020-09-10 NOTE — THERAPY TREATMENT NOTE
Outpatient Occupational Therapy Lymphedema Treatment Note   Jorge     Patient Name: Orquidea Rosenberg  : 1945  MRN: [id number]  Today's Date: 9/10/2020      Visit Date: 09/10/2020    There is no problem list on file for this patient.       Past Medical History:   Diagnosis Date   • Arthritis    • COPD (chronic obstructive pulmonary disease) (CMS/HCC)    • Elevated cholesterol    • History of transfusion    • Hypertension    • Vulval ca (CMS/HCC)         Past Surgical History:   Procedure Laterality Date   • RADICAL VULVECTOMY  2019   • RADICAL VULVECTOMY Bilateral 2019         Visit Dx:      ICD-10-CM ICD-9-CM   1. Lymphedema of both lower extremities I89.0 457.1   2. Cancer of vulva (CMS/HCC) C51.9 184.4       Lymphedema     Row Name 09/10/20 1000             Subjective Pain    Able to rate subjective pain?  yes  -BC      Pre-Treatment Pain Level  0  -BC      Subjective Pain Comment  Denies pain  -BC         Subjective Comments    Subjective Comments  Pt. with significant density noted aound scaring on inside of B thigh's , with fluid retention noted on outside of L thigh. Pt. reports that these areas are always much tighter than her calf and knees.  Pt. would benefit from a flexitouch system to address fluid  in pelvic region and scaring on inside of thighs and perineal area due to vulvectomy.  -BC         Lymphedema Assessment    Lymphedema Classification  Other:;RLE:;LLE:;secondary;stage 2 (Spontaneously Irreversible)  -BC      Lymphedema Cancer Related Sx  bilateral Radical vulvectomy  -BC      Lymph Nodes Removed #  0  -BC      Positive Lymph Nodes #  0  -BC      Chemo Received  yes  -BC      Radiation Therapy Received  yes  -BC         Posture/Observations    Posture- WNL  Posture is WNL  -BC         Lymphedema Edema Assessment    Ptting Edema Category  By grade out of 4  -BC      Pitting Edema  + 4/4;Other (comment) +3/4 on RLE  -BC      Stemmer Sign  left:;positive  -BC         Skin  Changes/Observations    Location/Assessment  Lower Extremity;Other Location  -BC      Lower Extremity Conditions  bilateral:;clean;dry;shiny  -BC      Lower Extremity Color/Pigment  bilateral:;blanchable;radiation fibrosis;hyperpigmented  -BC         Lymphedema Sensation    Lymphedema Sensation Tests  light touch;deep touch  -BC      Lymphedema Light Touch  RLE:;mild impairment  -BC      Lymphedema Deep Touch  WNL  -BC         Lymphedema Measurements    Measurement Type(s)  Quick Girth;Circumferential  -BC      Quick Girth Areas  Lower extremities  -BC      Circumferential Areas  Trunk  -BC         LLE Quick Girth (cm)    Met-heads  23.9 cm  -BC      Mid foot  24.7 cm  -BC      Smallest ankle  26.8 cm  -BC      Largest calf  41 cm  -BC      Tib tuberosity  41.2 cm  -BC      Mid patella  44.8 cm  -BC      Distal thigh  49.3 cm  -BC      Proximal thigh  58 cm  -BC      Other 1  27.9 cm  -BC      Other 2  36.5 cm  -BC         RLE Quick Girth (cm)    Met-heads  23.5 cm  -BC      Mid foot  23.6 cm  -BC      Smallest ankle  22.7 cm  -BC      Largest calf  39 cm  -BC      Tib tuberosity  39.7 cm  -BC      Mid patella  46 cm  -BC      Distal thigh  48.7 cm  -BC      Proximal thigh  55.5 cm  -BC      Other 1  23.3 cm  -BC      Other 2  34 cm  -BC      RLE Quick Girth Total  356  -BC         Trunk Circumferential (cm)    Measurement Location 1  Navel  -BC         Manual Lymphatic Drainage    Manual Lymphatic Drainage  extremity treatment;opened regional lymph nodes;opened anastamoses  -BC      Opened Regional Lymph Nodes  axillary;inguinal  -BC      Axillary  right;left  -BC      Inguinal  right;left  -BC      Opened Anastamoses  inguino-axillary  -BC      Extremity Treatment  MLD to full limb  -BC      MLD to Full Limb  BLE's  -BC      Manual Therapy  MLD to BLE's, Abd.,  inguinals.  -BC         Compression/Skin Care    Compression/Skin Care  skin care;wrapping location;bandaging  -BC      Skin Care  -- no lotion d/t  kinesiotape  -BC      Wrapping Location  lower extremity  -BC      Wrapping Location LE  bilateral: base of toes to mid thigh(R); toes to mid thigh (L)  -BC      Bandage Layers  short-stretch bandages (comment size/quantity);cotton elastic stocking- single layer (comment size);soft foam- 1/4 inch  -BC      Bandaging Comments  Kinesiotape to BLE, multi layered bandaging to RLE, compression stockinette to LLE.   -BC      Bandaging Technique  circumferential/spiral;moderate compression;figure-eight  -BC      Compression/Skin Care Comments  Compression pump x Abd., BLE's  -BC        User Key  (r) = Recorded By, (t) = Taken By, (c) = Cosigned By    Initials Name Provider Type    Charlotte Cook OT Occupational Therapist                  OT Assessment/Plan     Row Name 09/10/20 1466          OT Assessment    Assessment Comments  Pt. positioned in supine w head elevated for manual lymph drainage, compression pump, skin care and multi layered bandaging of BLE, base of toes to below knee RLE, base of toes to inguinal on LLE. kinesiotape to B thighs.   -BC       User Key  (r) = Recorded By, (t) = Taken By, (c) = Cosigned By    Initials Name Provider Type    Charlotte Cook OT Occupational Therapist                                     Time Calculation:   OT Start Time: 0930  OT Stop Time: 1130  OT Time Calculation (min): 120 min  OT Non-Billable Time (min): 30 min     Therapy Charges for Today     Code Description Service Date Service Provider Modifiers Qty    06284206606 HC OT MANUAL THERAPY EA 15 MIN 9/10/2020 Charlotte García OT GO, KX 4    36064391967 HC OT LYMPHEDEMA MANAGEMENT-15 MIN 9/10/2020 Charlotte García OT KX 1    65448101139 HC OT SELF CARE/MGMT/TRAIN EA 15 MIN 9/10/2020 Charlotte García OT GO, KX 2    96909344945 HC OT VASOPNEUMAT DEVIC 1 OR MORE AREAS 9/10/2020 Charlotte García OT GO KX 1                      Charlotte García OT  9/10/2020

## 2020-09-15 ENCOUNTER — APPOINTMENT (OUTPATIENT)
Dept: OCCUPATIONAL THERAPY | Facility: HOSPITAL | Age: 75
End: 2020-09-15

## 2020-09-17 ENCOUNTER — APPOINTMENT (OUTPATIENT)
Dept: OCCUPATIONAL THERAPY | Facility: HOSPITAL | Age: 75
End: 2020-09-17

## 2020-09-22 ENCOUNTER — HOSPITAL ENCOUNTER (OUTPATIENT)
Dept: OCCUPATIONAL THERAPY | Facility: HOSPITAL | Age: 75
Setting detail: RECURRING SERIES
Discharge: HOME | End: 2020-09-22

## 2020-09-22 DIAGNOSIS — C51.9 CANCER OF VULVA (HCC): ICD-10-CM

## 2020-09-22 DIAGNOSIS — I89.0 LYMPHEDEMA OF BOTH LOWER EXTREMITIES: Primary | ICD-10-CM

## 2020-09-22 PROCEDURE — 97140 MANUAL THERAPY 1/> REGIONS: CPT

## 2020-09-22 PROCEDURE — 97150 GROUP THERAPEUTIC PROCEDURES: CPT

## 2020-09-22 PROCEDURE — 97139 UNLISTED THERAPEUTIC PX: CPT

## 2020-09-22 PROCEDURE — 97016 VASOPNEUMATIC DEVICE THERAPY: CPT

## 2020-09-22 NOTE — THERAPY TREATMENT NOTE
"Outpatient Occupational Therapy Lymphedema Treatment Note   Jorge     Patient Name: Orquidea Rosenberg  : 1945  MRN: [id number]  Today's Date: 2020      Visit Date: 2020    There is no problem list on file for this patient.       Past Medical History:   Diagnosis Date   • Arthritis    • COPD (chronic obstructive pulmonary disease) (CMS/HCC)    • Elevated cholesterol    • History of transfusion    • Hypertension    • Vulval ca (CMS/HCC)         Past Surgical History:   Procedure Laterality Date   • RADICAL VULVECTOMY  2019   • RADICAL VULVECTOMY Bilateral 2019         Visit Dx:      ICD-10-CM ICD-9-CM   1. Lymphedema of both lower extremities  I89.0 457.1   2. Cancer of vulva (CMS/HCC)  C51.9 184.4       Lymphedema     Row Name 20 0900             Subjective Pain    Able to rate subjective pain?  yes  -AB      Pre-Treatment Pain Level  0  -AB      Subjective Pain Comment  denies pain  -AB         Subjective Comments    Subjective Comments  Pt. is back to therapy today after missing last week d/t husbands death. She c/o a \"crampy stomach and numbness/tingling in both hands\" which she belives is from radiation.   -AB         Lymphedema Assessment    Lymphedema Classification  RLE:;LLE:;secondary;stage 2 (Spontaneously Irreversible)  -AB      Lymphedema Cancer Related Sx  bilateral Radical vulvectomy  -AB      Lymph Nodes Removed #  0  -AB      Positive Lymph Nodes #  0  -AB      Chemo Received  yes  -AB      Radiation Therapy Received  yes  -AB         Posture/Observations    Posture- WNL  Posture is WNL  -AB         Lymphedema Edema Assessment    Ptting Edema Category  By grade out of 4  -AB      Pitting Edema  + 4/4;Other (comment) +2/4 on RLE  -AB      Stemmer Sign  left:;positive  -AB      Edema Assessment Comment  bilateral thighs are more pliable today w/ less fibrosis noted.   -AB         Skin Changes/Observations    Location/Assessment  Lower Extremity;Other Location  -AB      " Lower Extremity Conditions  bilateral:;clean;dry;shiny  -AB      Lower Extremity Color/Pigment  bilateral:;blanchable;radiation fibrosis;hyperpigmented  -AB      Other Location Color/Pigment  bilateral:;red;radiation fibrosis groin/vaginal areas  -AB         Lymphedema Sensation    Lymphedema Sensation Tests  light touch;deep touch  -AB      Lymphedema Light Touch  RLE:;mild impairment  -AB      Lymphedema Deep Touch  WNL  -AB         Lymphedema Measurements    Measurement Type(s)  Quick Girth;Circumferential  -AB      Quick Girth Areas  Lower extremities  -AB      Circumferential Areas  Trunk  -AB         LLE Quick Girth (cm)    Met-heads  22.6 cm  -AB      Mid foot  24 cm  -AB      Smallest ankle  28.3 cm  -AB      Largest calf  42.5 cm  -AB      Tib tuberosity  41.3 cm  -AB      Mid patella  45.7 cm  -AB      Distal thigh  50.5 cm  -AB      Other 1  29.7 cm  -AB      Other 2  38.1 cm  -AB         RLE Quick Girth (cm)    Met-heads  22.6 cm  -AB      Mid foot  23.5 cm  -AB      Smallest ankle  24.1 cm  -AB      Largest calf  39.7 cm  -AB      Tib tuberosity  39.9 cm  -AB      Mid patella  45.4 cm  -AB      Distal thigh  48.6 cm  -AB      Other 1  24 cm  -AB      Other 2  34.2 cm  -AB      RLE Quick Girth Total  302  -AB         Trunk Circumferential (cm)    Measurement Location 1  Navel  -AB         Manual Lymphatic Drainage    Manual Lymphatic Drainage  extremity treatment;opened regional lymph nodes;opened anastamoses  -AB      Opened Regional Lymph Nodes  axillary;inguinal  -AB      Axillary  right;left  -AB      Inguinal  right;left  -AB      Opened Anastamoses  inguino-axillary  -AB      Extremity Treatment  MLD to full limb  -AB      MLD to Full Limb  BLE's  -AB      Manual Lymphatic Drainage Comments  vibration to bilateral thigh scars  -AB      Manual Therapy  MLD to BLE's, abdomen, axillary, inguinals  -AB         Compression/Skin Care    Compression/Skin Care  skin care;wrapping location;bandaging  -AB       Skin Care  -- no lotion d/t kinesiotape  -AB      Wrapping Location  lower extremity  -AB      Wrapping Location LE  bilateral: base of toes to mid thigh(R); toes to mid thigh (L)  -AB      Bandage Layers  short-stretch bandages (comment size/quantity);cotton elastic stocking- single layer (comment size);soft foam- 1/4 inch  -AB      Bandaging Comments  kinesiotape to LLE  -AB      Bandaging Technique  circumferential/spiral;moderate compression;figure-eight  -AB      Compression/Skin Care Comments  compression pump to abdomen and BLE's  -AB        User Key  (r) = Recorded By, (t) = Taken By, (c) = Cosigned By    Initials Name Provider Type    AB Anca Pickett OT Occupational Therapist                                Therapy Education  Given: HEP, Edema management, Symptoms/condition management, Bandaging/dressing change  Program: Reinforced  How Provided: Verbal, Demonstration  Provided to: Patient  Level of Understanding: Verbalized                Time Calculation:   OT Start Time: 0935  OT Stop Time: 1115  OT Time Calculation (min): 100 min  OT Non-Billable Time (min): 25 min  Total Timed Code Minutes- OT: 100 minute(s)     Therapy Charges for Today     Code Description Service Date Service Provider Modifiers Qty    73996981304 HC OT LYMPHEDEMA MANAGEMENT-15 MIN 9/22/2020 Anca Pickett OT KX 1    [id number]  OT MANUAL THERAPY EA 15 MIN 9/22/2020 Anca Pickett OT GO, KX 4    [id number]  OT VASOPNEUMAT DEVIC 1 OR MORE AREAS 9/22/2020 Anca Pickett OT GO, KX 1    [id number]  OT THER PROC GROUP 9/22/2020 Anca Pickett OT GO, KX 1                      Anca Pickett OT  9/22/2020

## 2020-09-24 ENCOUNTER — HOSPITAL ENCOUNTER (OUTPATIENT)
Dept: OCCUPATIONAL THERAPY | Facility: HOSPITAL | Age: 75
Setting detail: RECURRING SERIES
Discharge: HOME | End: 2020-09-24

## 2020-09-24 DIAGNOSIS — C51.9 CANCER OF VULVA (HCC): ICD-10-CM

## 2020-09-24 DIAGNOSIS — I89.0 LYMPHEDEMA OF BOTH LOWER EXTREMITIES: Primary | ICD-10-CM

## 2020-09-24 PROCEDURE — 97140 MANUAL THERAPY 1/> REGIONS: CPT

## 2020-09-24 PROCEDURE — 97150 GROUP THERAPEUTIC PROCEDURES: CPT

## 2020-09-24 PROCEDURE — 97016 VASOPNEUMATIC DEVICE THERAPY: CPT

## 2020-09-24 PROCEDURE — 97139 UNLISTED THERAPEUTIC PX: CPT

## 2020-09-24 NOTE — THERAPY PROGRESS REPORT/RE-CERT
Outpatient Occupational Therapy Lymphedema Progress Note   Jorge     Patient Name: Orquidea Rosenberg  : 1945  MRN: [id number]  Today's Date: 2020      Visit Date: 2020    There is no problem list on file for this patient.       Past Medical History:   Diagnosis Date   • Arthritis    • COPD (chronic obstructive pulmonary disease) (CMS/HCC)    • Elevated cholesterol    • History of transfusion    • Hypertension    • Vulval ca (CMS/HCC)         Past Surgical History:   Procedure Laterality Date   • RADICAL VULVECTOMY  2019   • RADICAL VULVECTOMY Bilateral 2019         Visit Dx:      ICD-10-CM ICD-9-CM   1. Lymphedema of both lower extremities  I89.0 457.1   2. Cancer of vulva (CMS/HCC)  C51.9 184.4       Lymphedema     Row Name 20 1000             Subjective Pain    Able to rate subjective pain?  yes  -AB      Pre-Treatment Pain Level  0  -AB      Subjective Pain Comment  denies pain  -AB         Subjective Comments    Subjective Comments  Pt. presents to therapy w/ no new c/o. We discussed her need of a reduction kit for her LLE.   -AB         Lymphedema Assessment    Lymphedema Classification  RLE:;LLE:;secondary;stage 2 (Spontaneously Irreversible)  -AB      Lymphedema Cancer Related Sx  bilateral Radical vulvectomy  -AB      Lymph Nodes Removed #  0  -AB      Positive Lymph Nodes #  0  -AB      Chemo Received  yes  -AB      Radiation Therapy Received  yes  -AB         Posture/Observations    Posture- WNL  Posture is WNL  -AB         Lymphedema Edema Assessment    Ptting Edema Category  By grade out of 4  -AB      Pitting Edema  + 4/4;Other (comment) +2/4 on RLE  -AB      Stemmer Sign  left:;positive  -AB         Skin Changes/Observations    Location/Assessment  Lower Extremity;Other Location  -AB      Lower Extremity Conditions  bilateral:;clean;dry;shiny  -AB      Lower Extremity Color/Pigment  bilateral:;blanchable;radiation fibrosis;hyperpigmented  -AB      Other Location  Color/Pigment  bilateral:;red;radiation fibrosis groin/vaginal areas  -AB         Lymphedema Sensation    Lymphedema Sensation Tests  light touch;deep touch  -AB      Lymphedema Light Touch  RLE:;mild impairment  -AB      Lymphedema Deep Touch  WNL  -AB         Lymphedema Measurements    Measurement Type(s)  Quick Girth;Circumferential  -AB      Quick Girth Areas  Lower extremities  -AB      Circumferential Areas  Trunk  -AB         LLE Quick Girth (cm)    Met-heads  23.4 cm  -AB      Mid foot  25.6 cm  -AB      Smallest ankle  27 cm  -AB      Largest calf  40.4 cm  -AB      Tib tuberosity  40.4 cm  -AB      Mid patella  43.1 cm  -AB      Distal thigh  51.2 cm  -AB      Other 1  29 cm  -AB      Other 2  36.7 cm  -AB         RLE Quick Girth (cm)    Met-heads  21.6 cm  -AB      Mid foot  24.3 cm  -AB      Smallest ankle  24.3 cm  -AB      Largest calf  39.8 cm  -AB      Tib tuberosity  40.2 cm  -AB      Mid patella  44.4 cm  -AB      Distal thigh  49.5 cm  -AB      Other 1  23.8 cm  -AB      Other 2  35.1 cm  -AB      RLE Quick Girth Total  303  -AB         Trunk Circumferential (cm)    Measurement Location 1  Navel  -AB         Manual Lymphatic Drainage    Manual Lymphatic Drainage  extremity treatment;opened regional lymph nodes;opened anastamoses  -AB      Opened Regional Lymph Nodes  axillary;inguinal  -AB      Axillary  right;left  -AB      Inguinal  right;left  -AB      Opened Anastamoses  inguino-axillary  -AB      Extremity Treatment  MLD to full limb  -AB      MLD to Full Limb  BLE's  -AB      Manual Lymphatic Drainage Comments  vibration to bilateral thighs  -AB      Manual Therapy  MLD to BLE's, abdomen, axillary, inguinals  -AB         Compression/Skin Care    Compression/Skin Care  skin care;wrapping location;bandaging  -AB      Skin Care  -- no lotion d/t kinesiotape  -AB      Wrapping Location  lower extremity  -AB      Wrapping Location LE  left: toes to mid thigh  -AB      Bandage Layers   short-stretch bandages (comment size/quantity);cotton elastic stocking- single layer (comment size);soft foam- 1/4 inch  -AB      Bandaging Comments  kinesiotape to bilateral thighs, to L dorsum of foot and ankle to popliteals  -AB      Bandaging Technique  circumferential/spiral;moderate compression;figure-eight  -AB      Compression/Skin Care Comments  compression pump to abdomen and BLE's  -AB        User Key  (r) = Recorded By, (t) = Taken By, (c) = Cosigned By    Initials Name Provider Type    Anca Pond, OT Occupational Therapist                  OT Assessment/Plan     Row Name 09/24/20 1404          OT Plan    OT Plan Comments  Pt. continues to make progress towards goals. She will require further skilled OT services to address ongoing lymphedema needs. Continue tx per POC.  -AB       User Key  (r) = Recorded By, (t) = Taken By, (c) = Cosigned By    Initials Name Provider Type    Anca Pond, OT Occupational Therapist                OT Goals     Row Name 09/24/20 1400          OT Short Term Goals    STG Date to Achieve  09/28/20  -AB     STG 1  Pt. familiar w/precautions, skin care and self management of lymphdema.  -AB     STG 1 Progress  Ongoing  -AB     STG 2  Decrease pitting edema to 3/4 for decreased risk of infection.  -AB     STG 2 Progress  Ongoing;Progressing  -AB     STG 3  HEP to assist with improved lymphatic flow.  -AB     STG 3 Progress  Progressing  -AB     STG 4  Self care instructions for home MLD for volume reduction.  -AB     STG 4 Progress  Met  -AB        Long Term Goals    LTG Date to Achieve  10/28/20  -AB     LTG 1  Pt. and/or caregiver independent w/short stretch compression bandaging for volume reduction.  -AB     LTG 1 Progress  Ongoing  -AB     LTG 2  Decrease pitting edema to 2/4 for decreased risk of infection.  -AB     LTG 2 Progress  Ongoing  -AB     LTG 3  Independent with donning/doffing compression garment.  -AB     LTG 3 Progress  Ongoing   -AB     LTG 4  Independent with lymphedema management and HEP.  -AB     LTG 4 Progress  Ongoing  -AB       User Key  (r) = Recorded By, (t) = Taken By, (c) = Cosigned By    Initials Name Provider Type    Anca Pond OT Occupational Therapist          Therapy Education  Given: HEP, Edema management, Symptoms/condition management, Bandaging/dressing change  Program: Reinforced  How Provided: Verbal, Demonstration  Provided to: Patient  Level of Understanding: Verbalized                Time Calculation:   OT Start Time: 0930  OT Stop Time: 1110  OT Time Calculation (min): 100 min  OT Non-Billable Time (min): 35 min  Total Timed Code Minutes- OT: 100 minute(s)     Therapy Charges for Today     Code Description Service Date Service Provider Modifiers Qty    70348497715  OT LYMPHEDEMA MANAGEMENT-15 MIN 9/24/2020 Anca Pickett, OT KX 1    [id number]  OT MANUAL THERAPY EA 15 MIN 9/24/2020 Anca Pickett OT ALON KX 4    [id number]  OT VASOPNEUMAT DEVIC 1 OR MORE AREAS 9/24/2020 Anca Pickett OT GO, KX 1    [id number]  OT THER PROC GROUP 9/24/2020 Anca Pickett OT GO, KX 1                      Anca Pickett OT  9/24/2020

## 2020-09-29 ENCOUNTER — HOSPITAL ENCOUNTER (OUTPATIENT)
Dept: OCCUPATIONAL THERAPY | Facility: HOSPITAL | Age: 75
Setting detail: THERAPIES SERIES
Discharge: HOME | End: 2020-09-29

## 2020-09-29 DIAGNOSIS — I89.0 LYMPHEDEMA OF BOTH LOWER EXTREMITIES: Primary | ICD-10-CM

## 2020-09-29 DIAGNOSIS — C51.9 CANCER OF VULVA (HCC): ICD-10-CM

## 2020-09-29 PROCEDURE — 97535 SELF CARE MNGMENT TRAINING: CPT

## 2020-09-29 PROCEDURE — 97016 VASOPNEUMATIC DEVICE THERAPY: CPT

## 2020-09-29 PROCEDURE — 97150 GROUP THERAPEUTIC PROCEDURES: CPT

## 2020-09-29 PROCEDURE — 97140 MANUAL THERAPY 1/> REGIONS: CPT

## 2020-09-29 PROCEDURE — 97139 UNLISTED THERAPEUTIC PX: CPT

## 2020-09-29 NOTE — THERAPY TREATMENT NOTE
Outpatient Occupational Therapy Lymphedema Treatment Note   Jorge     Patient Name: Oruqidea Rosenberg  : 1945  MRN: [id number]  Today's Date: 2020      Visit Date: 2020    There is no problem list on file for this patient.       Past Medical History:   Diagnosis Date   • Arthritis    • COPD (chronic obstructive pulmonary disease) (CMS/HCC)    • Elevated cholesterol    • History of transfusion    • Hypertension    • Vulval ca (CMS/HCC)         Past Surgical History:   Procedure Laterality Date   • RADICAL VULVECTOMY  2019   • RADICAL VULVECTOMY Bilateral 2019         Visit Dx:      ICD-10-CM ICD-9-CM   1. Lymphedema of both lower extremities  I89.0 457.1   2. Cancer of vulva (CMS/HCC)  C51.9 184.4       Lymphedema     Row Name 20 1000             Subjective Pain    Able to rate subjective pain?  yes  -AB      Pre-Treatment Pain Level  0  -AB      Subjective Pain Comment  denies pain this date  -AB         Subjective Comments    Subjective Comments  Pt. denies any pain this date, however, she does report previous pain in R knee area.   -AB         Lymphedema Assessment    Lymphedema Classification  RLE:;LLE:;secondary;stage 2 (Spontaneously Irreversible)  -AB      Lymphedema Cancer Related Sx  bilateral Radical vulvectomy  -AB      Lymph Nodes Removed #  0  -AB      Positive Lymph Nodes #  0  -AB      Chemo Received  yes  -AB      Radiation Therapy Received  yes  -AB         Posture/Observations    Posture- WNL  Posture is WNL  -AB         Lymphedema Edema Assessment    Ptting Edema Category  By grade out of 4  -AB      Pitting Edema  + 3/4;Other (comment) +2/4 on RLE  -AB      Stemmer Sign  left:;positive  -AB         Skin Changes/Observations    Location/Assessment  Lower Extremity;Other Location  -AB      Lower Extremity Conditions  bilateral:;clean;dry;shiny  -AB      Lower Extremity Color/Pigment  bilateral:;blanchable;radiation fibrosis;hyperpigmented  -AB      Other Location  Color/Pigment  bilateral:;red;radiation fibrosis groin/vaginal areas  -AB      Skin Observations Comment  slight red rash on BLE's from ankle to thighs  -AB         Lymphedema Sensation    Lymphedema Sensation Tests  light touch;deep touch  -AB      Lymphedema Light Touch  RLE:;mild impairment  -AB      Lymphedema Deep Touch  WNL  -AB         Lymphedema Measurements    Measurement Type(s)  Quick Girth;Circumferential  -AB      Quick Girth Areas  Lower extremities  -AB      Circumferential Areas  Trunk  -AB         LLE Quick Girth (cm)    Met-heads  22.7 cm  -AB      Mid foot  24.5 cm  -AB      Smallest ankle  26.7 cm  -AB      Largest calf  40.6 cm  -AB      Tib tuberosity  40.3 cm  -AB      Mid patella  44.6 cm  -AB      Distal thigh  51 cm  -AB      Other 1  27.1 cm  -AB      Other 2  35.9 cm  -AB         RLE Quick Girth (cm)    Met-heads  22.1 cm  -AB      Mid foot  23.2 cm  -AB      Smallest ankle  23.9 cm  -AB      Largest calf  40 cm  -AB      Tib tuberosity  39.7 cm  -AB      Mid patella  44.1 cm  -AB      Distal thigh  48.5 cm  -AB      Other 1  24.2 cm  -AB      Other 2  34.5 cm  -AB      RLE Quick Girth Total  300.2  -AB         Trunk Circumferential (cm)    Measurement Location 1  Navel  -AB         Manual Lymphatic Drainage    Manual Lymphatic Drainage  extremity treatment;opened regional lymph nodes;opened anastamoses  -AB      Opened Regional Lymph Nodes  axillary;inguinal  -AB      Axillary  right;left  -AB      Inguinal  right;left  -AB      Opened Anastamoses  inguino-axillary  -AB      Extremity Treatment  MLD to full limb  -AB      MLD to Full Limb  BLE's  -AB      Manual Lymphatic Drainage Comments  vibration to bilateral thigh areas  -AB      Manual Therapy  MLD to BLE's, abdomen, axillary, inguinals  -AB         Compression/Skin Care    Compression/Skin Care  skin care;wrapping location;bandaging  -AB      Skin Care  -- no lotion d/t kinesiotape  -AB      Wrapping Location  lower extremity  -AB       Wrapping Location LE  left: toes to mid thigh  -AB      Bandage Layers  short-stretch bandages (comment size/quantity);cotton elastic stocking- single layer (comment size);soft foam- 1/4 inch  -AB      Bandaging Comments  kinesiotape to LLE ankles, toes, dorsum of foot and thigh area; kinesiotape to R thigh area  -AB      Bandaging Technique  circumferential/spiral;moderate compression  -AB      Compression/Skin Care Comments  compression pump to abdomen and BLE's  -AB        User Key  (r) = Recorded By, (t) = Taken By, (c) = Cosigned By    Initials Name Provider Type    AB Anca Pickett OT Occupational Therapist                                Therapy Education  Given: HEP, Edema management, Symptoms/condition management, Bandaging/dressing change  Program: Reinforced  How Provided: Verbal, Demonstration  Provided to: Patient  Level of Understanding: Verbalized                Time Calculation:   OT Start Time: 0920  OT Stop Time: 1115  OT Time Calculation (min): 115 min  OT Non-Billable Time (min): 25 min  Total Timed Code Minutes- OT: 115 minute(s)     Therapy Charges for Today     Code Description Service Date Service Provider Modifiers Qty    67303880812 HC OT MANUAL THERAPY EA 15 MIN 9/29/2020 Anca Pickett OT GO, KX 4    93330244188 HC OT LYMPHEDEMA MANAGEMENT-15 MIN 9/29/2020 Anca Pickett OT KX 1    06565768026 HC OT THER PROC GROUP 9/29/2020 Anca Pickett OT GO, KX 1    08020444424 HC OT VASOPNEUMAT DEVIC 1 OR MORE AREAS 9/29/2020 Anca Pickett OT GO, KX 1    46290791365 HC OT SELF CARE/MGMT/TRAIN EA 15 MIN 9/29/2020 Anca Pickett OT ALON, KX 1                      Anca Pickett OT  9/29/2020

## 2020-10-01 ENCOUNTER — HOSPITAL ENCOUNTER (OUTPATIENT)
Dept: OCCUPATIONAL THERAPY | Facility: HOSPITAL | Age: 75
Setting detail: THERAPIES SERIES
Discharge: HOME | End: 2020-10-01

## 2020-10-01 DIAGNOSIS — I89.0 LYMPHEDEMA OF BOTH LOWER EXTREMITIES: Primary | ICD-10-CM

## 2020-10-01 DIAGNOSIS — C51.9 CANCER OF VULVA (HCC): ICD-10-CM

## 2020-10-01 PROCEDURE — 97140 MANUAL THERAPY 1/> REGIONS: CPT

## 2020-10-01 PROCEDURE — 97139 UNLISTED THERAPEUTIC PX: CPT

## 2020-10-01 PROCEDURE — 97535 SELF CARE MNGMENT TRAINING: CPT

## 2020-10-01 PROCEDURE — 97016 VASOPNEUMATIC DEVICE THERAPY: CPT

## 2020-10-01 NOTE — THERAPY TREATMENT NOTE
Outpatient Occupational Therapy Lymphedema Treatment Note   [place]     Patient Name: Orquidea Rosenberg  : 1945  MRN: [id number]  Today's Date: 10/1/2020      Visit Date: 10/01/2020    There is no problem list on file for this patient.       Past Medical History:   Diagnosis Date   • Arthritis    • COPD (chronic obstructive pulmonary disease) (CMS/HCC)    • Elevated cholesterol    • History of transfusion    • Hypertension    • Vulval ca (CMS/HCC)         Past Surgical History:   Procedure Laterality Date   • RADICAL VULVECTOMY  2019   • RADICAL VULVECTOMY Bilateral 2019         Visit Dx:      ICD-10-CM ICD-9-CM   1. Lymphedema of both lower extremities  I89.0 457.1   2. Cancer of vulva (CMS/HCC)  C51.9 184.4       Lymphedema     Row Name 10/01/20 0900             Subjective Pain    Able to rate subjective pain?  yes  -BC      Pre-Treatment Pain Level  0  -BC      Subjective Pain Comment  Denies pain  -BC         Subjective Comments    Subjective Comments  Pt. reports that she did not sleep at all night.   -BC         Lymphedema Assessment    Lymphedema Classification  RLE:;LLE:;secondary;stage 2 (Spontaneously Irreversible)  -BC      Lymphedema Cancer Related Sx  bilateral Radical vulvectomy  -BC      Lymph Nodes Removed #  0  -BC      Positive Lymph Nodes #  0  -BC      Chemo Received  yes  -BC      Radiation Therapy Received  yes  -BC         Posture/Observations    Posture- WNL  Posture is WNL  -BC         Lymphedema Edema Assessment    Ptting Edema Category  By grade out of 4  -BC      Pitting Edema  + 3/4;Other (comment) +2/4 on RLE  -BC      Stemmer Sign  left:;positive  -BC         Skin Changes/Observations    Location/Assessment  Lower Extremity;Other Location  -BC      Lower Extremity Conditions  bilateral:;clean;dry;shiny  -BC      Lower Extremity Color/Pigment  bilateral:;blanchable;radiation fibrosis;hyperpigmented  -BC      Other Location Color/Pigment  bilateral:;red;radiation fibrosis  groin/vaginal areas  -BC         Lymphedema Sensation    Lymphedema Sensation Tests  light touch;deep touch  -BC      Lymphedema Light Touch  RLE:;mild impairment  -BC      Lymphedema Deep Touch  WNL  -BC         Lymphedema Measurements    Measurement Type(s)  Quick Girth;Circumferential  -BC      Quick Girth Areas  Lower extremities  -BC      Circumferential Areas  Trunk  -BC         LLE Quick Girth (cm)    Met-heads  23.9 cm  -BC      Mid foot  24.4 cm  -BC      Smallest ankle  25.2 cm  -BC      Largest calf  39 cm  -BC      Tib tuberosity  38.5 cm  -BC      Mid patella  44.4 cm  -BC      Distal thigh  50 cm  -BC      Other 1  25.5 cm  -BC      Other 2  34.2 cm  -BC         RLE Quick Girth (cm)    Met-heads  23.3 cm  -BC      Mid foot  22.8 cm  -BC      Smallest ankle  22.4 cm  -BC      Largest calf  39.7 cm  -BC      Tib tuberosity  37 cm  -BC      Mid patella  42.8 cm  -BC      Distal thigh  46.5 cm  -BC      Other 1  23.4 cm  -BC      Other 2  33.5 cm  -BC      RLE Quick Girth Total  291.4  -BC         Trunk Circumferential (cm)    Measurement Location 1  Navel  -BC         Manual Lymphatic Drainage    Manual Lymphatic Drainage  extremity treatment;opened regional lymph nodes;opened anastamoses  -BC      Opened Regional Lymph Nodes  axillary;inguinal  -BC      Axillary  right;left  -BC      Inguinal  right;left  -BC      Opened Anastamoses  inguino-axillary  -BC      Extremity Treatment  MLD to full limb  -BC      MLD to Full Limb  BLE's  -BC      Manual Lymphatic Drainage Comments  Vibration to B thigh.  -BC      Manual Therapy  MLD to BLE's, Abd., inguinals.   -BC         Compression/Skin Care    Compression/Skin Care  skin care;wrapping location;bandaging  -BC      Skin Care  -- no lotion d/t kinesiotape  -BC      Wrapping Location  lower extremity  -BC      Wrapping Location LE  left: toes to mid thigh  -BC      Bandage Layers  short-stretch bandages (comment size/quantity);cotton elastic stocking- single  layer (comment size);soft foam- 1/4 inch  -BC      Bandaging Comments  No kinesiotape  -BC      Bandaging Technique  circumferential/spiral;moderate compression  -BC      Compression/Skin Care Comments  Compression pump x Abd., BLE's.  -BC        User Key  (r) = Recorded By, (t) = Taken By, (c) = Cosigned By    Initials Name Provider Type    Charlotte Cook OT Occupational Therapist                  OT Assessment/Plan     Row Name 10/01/20 1237          OT Assessment    Assessment Comments  Pt. positioned in supine with HOB elevated to ~60* for manual lymph drainage, compression pump, skin care and multi layered bandaging of LLE.  -BC       User Key  (r) = Recorded By, (t) = Taken By, (c) = Cosigned By    Initials Name Provider Type    Charlotte Cook OT Occupational Therapist                                     Time Calculation:   OT Start Time: 0930  OT Stop Time: 1115  OT Time Calculation (min): 105 min  OT Non-Billable Time (min): 30 min     Therapy Charges for Today     Code Description Service Date Service Provider Modifiers Qty    03728661965 HC OT LYMPHEDEMA MANAGEMENT-15 MIN 10/1/2020 Charlotte García OT KX 1    17199051753 HC OT MANUAL THERAPY EA 15 MIN 10/1/2020 Charlotte García OT GO, KX 3    25812996729 HC OT SELF CARE/MGMT/TRAIN EA 15 MIN 10/1/2020 Charlotte García OT GO, KX 1    99769604997 HC OT VASOPNEUMAT DEVIC 1 OR MORE AREAS 10/1/2020 Charlotte García OT GO, KX 1                      Charlotte García OT  10/1/2020

## 2020-10-06 ENCOUNTER — APPOINTMENT (OUTPATIENT)
Dept: CT IMAGING | Facility: HOSPITAL | Age: 75
End: 2020-10-06

## 2020-10-06 ENCOUNTER — HOSPITAL ENCOUNTER (INPATIENT)
Facility: HOSPITAL | Age: 75
LOS: 2 days | Discharge: HOME | End: 2020-10-09
Attending: FAMILY MEDICINE | Admitting: INTERNAL MEDICINE

## 2020-10-06 ENCOUNTER — HOSPITAL ENCOUNTER (OUTPATIENT)
Dept: OCCUPATIONAL THERAPY | Facility: HOSPITAL | Age: 75
Setting detail: THERAPIES SERIES
Discharge: HOME | End: 2020-10-06

## 2020-10-06 DIAGNOSIS — R77.8 ELEVATED TROPONIN: ICD-10-CM

## 2020-10-06 DIAGNOSIS — U07.1 COVID-19: Primary | ICD-10-CM

## 2020-10-06 DIAGNOSIS — N17.9 ACUTE KIDNEY INJURY (HCC): ICD-10-CM

## 2020-10-06 DIAGNOSIS — N30.00 ACUTE CYSTITIS WITHOUT HEMATURIA: ICD-10-CM

## 2020-10-06 DIAGNOSIS — J18.9 PNEUMONIA OF BOTH LOWER LOBES DUE TO INFECTIOUS ORGANISM: ICD-10-CM

## 2020-10-06 DIAGNOSIS — M79.10 MYALGIA: ICD-10-CM

## 2020-10-06 LAB
ALBUMIN SERPL-MCNC: 3.69 G/DL (ref 3.5–5.2)
ALBUMIN/GLOB SERPL: 1.4 G/DL
ALP SERPL-CCNC: 62 U/L (ref 39–117)
ALT SERPL W P-5'-P-CCNC: 8 U/L (ref 1–33)
ANION GAP SERPL CALCULATED.3IONS-SCNC: 11.2 MMOL/L (ref 5–15)
AST SERPL-CCNC: 18 U/L (ref 1–32)
BACTERIA UR QL AUTO: ABNORMAL /HPF
BASOPHILS # BLD AUTO: 0.02 10*3/MM3 (ref 0–0.2)
BASOPHILS NFR BLD AUTO: 0.5 % (ref 0–1.5)
BILIRUB SERPL-MCNC: 0.2 MG/DL (ref 0–1.2)
BILIRUB UR QL STRIP: NEGATIVE
BUN SERPL-MCNC: 37 MG/DL (ref 8–23)
BUN/CREAT SERPL: 30.3 (ref 7–25)
CALCIUM SPEC-SCNC: 8.9 MG/DL (ref 8.6–10.5)
CHLORIDE SERPL-SCNC: 104 MMOL/L (ref 98–107)
CLARITY UR: ABNORMAL
CO2 SERPL-SCNC: 22.8 MMOL/L (ref 22–29)
COLOR UR: YELLOW
CREAT SERPL-MCNC: 1.22 MG/DL (ref 0.57–1)
CRP SERPL-MCNC: 11.4 MG/DL (ref 0–0.5)
DEPRECATED RDW RBC AUTO: 51.1 FL (ref 37–54)
EOSINOPHIL # BLD AUTO: 0.04 10*3/MM3 (ref 0–0.4)
EOSINOPHIL NFR BLD AUTO: 0.9 % (ref 0.3–6.2)
ERYTHROCYTE [DISTWIDTH] IN BLOOD BY AUTOMATED COUNT: 14.7 % (ref 12.3–15.4)
GFR SERPL CREATININE-BSD FRML MDRD: 43 ML/MIN/1.73
GLOBULIN UR ELPH-MCNC: 2.6 GM/DL
GLUCOSE SERPL-MCNC: 115 MG/DL (ref 65–99)
GLUCOSE UR STRIP-MCNC: NEGATIVE MG/DL
HCT VFR BLD AUTO: 36.3 % (ref 34–46.6)
HGB BLD-MCNC: 11.3 G/DL (ref 12–15.9)
HGB UR QL STRIP.AUTO: ABNORMAL
HYALINE CASTS UR QL AUTO: ABNORMAL /LPF
IMM GRANULOCYTES # BLD AUTO: 0.04 10*3/MM3 (ref 0–0.05)
IMM GRANULOCYTES NFR BLD AUTO: 0.9 % (ref 0–0.5)
KETONES UR QL STRIP: NEGATIVE
LEUKOCYTE ESTERASE UR QL STRIP.AUTO: ABNORMAL
LYMPHOCYTES # BLD AUTO: 0.66 10*3/MM3 (ref 0.7–3.1)
LYMPHOCYTES NFR BLD AUTO: 15.1 % (ref 19.6–45.3)
MAGNESIUM SERPL-MCNC: 2.5 MG/DL (ref 1.6–2.4)
MCH RBC QN AUTO: 29 PG (ref 26.6–33)
MCHC RBC AUTO-ENTMCNC: 31.1 G/DL (ref 31.5–35.7)
MCV RBC AUTO: 93.1 FL (ref 79–97)
MONOCYTES # BLD AUTO: 0.32 10*3/MM3 (ref 0.1–0.9)
MONOCYTES NFR BLD AUTO: 7.3 % (ref 5–12)
NEUTROPHILS NFR BLD AUTO: 3.28 10*3/MM3 (ref 1.7–7)
NEUTROPHILS NFR BLD AUTO: 75.3 % (ref 42.7–76)
NITRITE UR QL STRIP: NEGATIVE
NRBC BLD AUTO-RTO: 0 /100 WBC (ref 0–0.2)
NT-PROBNP SERPL-MCNC: 394.3 PG/ML (ref 0–1800)
PH UR STRIP.AUTO: 5.5 [PH] (ref 5–8)
PLATELET # BLD AUTO: 136 10*3/MM3 (ref 140–450)
PMV BLD AUTO: 9.6 FL (ref 6–12)
POTASSIUM SERPL-SCNC: 4.4 MMOL/L (ref 3.5–5.2)
PROT SERPL-MCNC: 6.3 G/DL (ref 6–8.5)
PROT UR QL STRIP: ABNORMAL
RBC # BLD AUTO: 3.9 10*6/MM3 (ref 3.77–5.28)
RBC # UR: ABNORMAL /HPF
REF LAB TEST METHOD: ABNORMAL
SARS-COV-2 RDRP RESP QL NAA+PROBE: DETECTED
SODIUM SERPL-SCNC: 138 MMOL/L (ref 136–145)
SP GR UR STRIP: 1.02 (ref 1–1.03)
SQUAMOUS #/AREA URNS HPF: ABNORMAL /HPF
TROPONIN T SERPL-MCNC: 0.04 NG/ML (ref 0–0.03)
TSH SERPL DL<=0.05 MIU/L-ACNC: 4.35 UIU/ML (ref 0.27–4.2)
UROBILINOGEN UR QL STRIP: ABNORMAL
WBC # BLD AUTO: 4.36 10*3/MM3 (ref 3.4–10.8)
WBC UR QL AUTO: ABNORMAL /HPF

## 2020-10-06 PROCEDURE — 87040 BLOOD CULTURE FOR BACTERIA: CPT | Performed by: FAMILY MEDICINE

## 2020-10-06 PROCEDURE — 93005 ELECTROCARDIOGRAM TRACING: CPT | Performed by: FAMILY MEDICINE

## 2020-10-06 PROCEDURE — 80053 COMPREHEN METABOLIC PANEL: CPT | Performed by: FAMILY MEDICINE

## 2020-10-06 PROCEDURE — 83880 ASSAY OF NATRIURETIC PEPTIDE: CPT | Performed by: FAMILY MEDICINE

## 2020-10-06 PROCEDURE — 87086 URINE CULTURE/COLONY COUNT: CPT | Performed by: FAMILY MEDICINE

## 2020-10-06 PROCEDURE — 81001 URINALYSIS AUTO W/SCOPE: CPT | Performed by: FAMILY MEDICINE

## 2020-10-06 PROCEDURE — 83735 ASSAY OF MAGNESIUM: CPT | Performed by: FAMILY MEDICINE

## 2020-10-06 PROCEDURE — 87635 SARS-COV-2 COVID-19 AMP PRB: CPT | Performed by: FAMILY MEDICINE

## 2020-10-06 PROCEDURE — 72128 CT CHEST SPINE W/O DYE: CPT

## 2020-10-06 PROCEDURE — 72125 CT NECK SPINE W/O DYE: CPT

## 2020-10-06 PROCEDURE — 99285 EMERGENCY DEPT VISIT HI MDM: CPT

## 2020-10-06 PROCEDURE — 71250 CT THORAX DX C-: CPT

## 2020-10-06 PROCEDURE — 70450 CT HEAD/BRAIN W/O DYE: CPT

## 2020-10-06 PROCEDURE — 74176 CT ABD & PELVIS W/O CONTRAST: CPT

## 2020-10-06 PROCEDURE — 36415 COLL VENOUS BLD VENIPUNCTURE: CPT

## 2020-10-06 PROCEDURE — 83605 ASSAY OF LACTIC ACID: CPT | Performed by: FAMILY MEDICINE

## 2020-10-06 PROCEDURE — 84484 ASSAY OF TROPONIN QUANT: CPT | Performed by: FAMILY MEDICINE

## 2020-10-06 PROCEDURE — 85025 COMPLETE CBC W/AUTO DIFF WBC: CPT | Performed by: FAMILY MEDICINE

## 2020-10-06 PROCEDURE — 93010 ELECTROCARDIOGRAM REPORT: CPT | Performed by: INTERNAL MEDICINE

## 2020-10-06 PROCEDURE — 87077 CULTURE AEROBIC IDENTIFY: CPT | Performed by: FAMILY MEDICINE

## 2020-10-06 PROCEDURE — 84443 ASSAY THYROID STIM HORMONE: CPT | Performed by: FAMILY MEDICINE

## 2020-10-06 PROCEDURE — 87186 SC STD MICRODIL/AGAR DIL: CPT | Performed by: FAMILY MEDICINE

## 2020-10-06 PROCEDURE — 86140 C-REACTIVE PROTEIN: CPT | Performed by: FAMILY MEDICINE

## 2020-10-06 RX ORDER — SODIUM CHLORIDE 0.9 % (FLUSH) 0.9 %
10 SYRINGE (ML) INJECTION AS NEEDED
Status: DISCONTINUED | OUTPATIENT
Start: 2020-10-06 | End: 2020-10-09 | Stop reason: HOSPADM

## 2020-10-06 RX ADMIN — SODIUM CHLORIDE 1000 ML: 9 INJECTION, SOLUTION INTRAVENOUS at 23:30

## 2020-10-06 NOTE — THERAPY TREATMENT NOTE
"Outpatient Occupational Therapy Lymphedema Treatment Note   [place]     Patient Name: Orquidea Rosenberg  : 1945  MRN: [id number]  Today's Date: 10/6/2020      Visit Date: 10/06/2020    There is no problem list on file for this patient.       Past Medical History:   Diagnosis Date   • Arthritis    • COPD (chronic obstructive pulmonary disease) (CMS/HCC)    • Elevated cholesterol    • History of transfusion    • Hypertension    • Vulval ca (CMS/HCC)         Past Surgical History:   Procedure Laterality Date   • RADICAL VULVECTOMY  2019   • RADICAL VULVECTOMY Bilateral 2019         Visit Dx:    No diagnosis found.    Lymphedema     Row Name 10/06/20 1000             Subjective Comments    Subjective Comments  Pt. reports for treatment this date not at all like her normal self. She appears fatigued, drowsy, gait is less steady. She reports that she has been having shooting pains throughout her body, but especially her legs when they are bandaged. She also reports that she has been experiencing bleeding (brown blood) from old incision sites that are not completely healed. She reports that her daughter called 911 over the weekend d/t dizziness. She visited PCP yesterday, where she states they gave her \"a B-12 shot for pain\". OT requested notes from yesterdays visit, and they are contributing dizziness and fatigue to low BP, as she hasn't been taking BP medication as prescribed. OT contacted Dr. Eason's office (pts. oncologist) to discuss treatment. She is in the OR this date per her staff. They are going to see patient this week, as soon as pts. noa Reeder contacts office. Therapy on hold until further guidance from oncologist.   -AB        User Key  (r) = Recorded By, (t) = Taken By, (c) = Cosigned By    Initials Name Provider Type    Anca Pond OT Occupational Therapist                                                 Time Calculation:                         Anca Pickett, " OT  10/6/2020

## 2020-10-07 PROBLEM — U07.1 COVID-19: Status: ACTIVE | Noted: 2020-10-07

## 2020-10-07 LAB
ABO GROUP BLD: NORMAL
ABO GROUP BLD: NORMAL
ALBUMIN SERPL-MCNC: 3.12 G/DL (ref 3.5–5.2)
ALBUMIN/GLOB SERPL: 1.3 G/DL
ALP SERPL-CCNC: 55 U/L (ref 39–117)
ALT SERPL W P-5'-P-CCNC: 8 U/L (ref 1–33)
ANION GAP SERPL CALCULATED.3IONS-SCNC: 10.6 MMOL/L (ref 5–15)
AST SERPL-CCNC: 16 U/L (ref 1–32)
B PARAPERT DNA SPEC QL NAA+PROBE: NOT DETECTED
B PERT DNA SPEC QL NAA+PROBE: NOT DETECTED
BASOPHILS # BLD AUTO: 0.02 10*3/MM3 (ref 0–0.2)
BASOPHILS NFR BLD AUTO: 0.5 % (ref 0–1.5)
BILIRUB SERPL-MCNC: 0.2 MG/DL (ref 0–1.2)
BLD GP AB SCN SERPL QL: NEGATIVE
BUN SERPL-MCNC: 32 MG/DL (ref 8–23)
BUN/CREAT SERPL: 33.7 (ref 7–25)
C PNEUM DNA NPH QL NAA+NON-PROBE: NOT DETECTED
CALCIUM SPEC-SCNC: 8.2 MG/DL (ref 8.6–10.5)
CHLORIDE SERPL-SCNC: 110 MMOL/L (ref 98–107)
CO2 SERPL-SCNC: 18.4 MMOL/L (ref 22–29)
CREAT SERPL-MCNC: 0.95 MG/DL (ref 0.57–1)
D DIMER PPP FEU-MCNC: 0.66 MCGFEU/ML (ref 0–0.5)
D-LACTATE SERPL-SCNC: 0.6 MMOL/L (ref 0.5–2)
DEPRECATED RDW RBC AUTO: 52.6 FL (ref 37–54)
EOSINOPHIL # BLD AUTO: 0.03 10*3/MM3 (ref 0–0.4)
EOSINOPHIL NFR BLD AUTO: 0.8 % (ref 0.3–6.2)
ERYTHROCYTE [DISTWIDTH] IN BLOOD BY AUTOMATED COUNT: 14.7 % (ref 12.3–15.4)
FERRITIN SERPL-MCNC: 322.9 NG/ML (ref 13–150)
FLUAV H1 2009 PAND RNA NPH QL NAA+PROBE: NOT DETECTED
FLUAV H1 HA GENE NPH QL NAA+PROBE: NOT DETECTED
FLUAV H3 RNA NPH QL NAA+PROBE: NOT DETECTED
FLUAV SUBTYP SPEC NAA+PROBE: NOT DETECTED
FLUBV RNA ISLT QL NAA+PROBE: NOT DETECTED
FOLATE SERPL-MCNC: 13.7 NG/ML (ref 4.78–24.2)
GFR SERPL CREATININE-BSD FRML MDRD: 57 ML/MIN/1.73
GLOBULIN UR ELPH-MCNC: 2.5 GM/DL
GLUCOSE SERPL-MCNC: 103 MG/DL (ref 65–99)
HADV DNA SPEC NAA+PROBE: NOT DETECTED
HCOV 229E RNA SPEC QL NAA+PROBE: NOT DETECTED
HCOV HKU1 RNA SPEC QL NAA+PROBE: NOT DETECTED
HCOV NL63 RNA SPEC QL NAA+PROBE: NOT DETECTED
HCOV OC43 RNA SPEC QL NAA+PROBE: NOT DETECTED
HCT VFR BLD AUTO: 33.8 % (ref 34–46.6)
HGB BLD-MCNC: 10.5 G/DL (ref 12–15.9)
HMPV RNA NPH QL NAA+NON-PROBE: NOT DETECTED
HPIV1 RNA SPEC QL NAA+PROBE: NOT DETECTED
HPIV2 RNA SPEC QL NAA+PROBE: NOT DETECTED
HPIV3 RNA NPH QL NAA+PROBE: NOT DETECTED
HPIV4 P GENE NPH QL NAA+PROBE: NOT DETECTED
IMM GRANULOCYTES # BLD AUTO: 0.04 10*3/MM3 (ref 0–0.05)
IMM GRANULOCYTES NFR BLD AUTO: 1 % (ref 0–0.5)
IRON 24H UR-MRATE: 17 MCG/DL (ref 37–145)
IRON SATN MFR SERPL: 8 % (ref 20–50)
LDH SERPL-CCNC: 167 U/L (ref 135–214)
LYMPHOCYTES # BLD AUTO: 0.66 10*3/MM3 (ref 0.7–3.1)
LYMPHOCYTES NFR BLD AUTO: 16.9 % (ref 19.6–45.3)
M PNEUMO IGG SER IA-ACNC: NOT DETECTED
M PNEUMO IGM SER QL: NEGATIVE
MCH RBC QN AUTO: 29.6 PG (ref 26.6–33)
MCHC RBC AUTO-ENTMCNC: 31.1 G/DL (ref 31.5–35.7)
MCV RBC AUTO: 95.2 FL (ref 79–97)
MONOCYTES # BLD AUTO: 0.28 10*3/MM3 (ref 0.1–0.9)
MONOCYTES NFR BLD AUTO: 7.2 % (ref 5–12)
NEUTROPHILS NFR BLD AUTO: 2.87 10*3/MM3 (ref 1.7–7)
NEUTROPHILS NFR BLD AUTO: 73.6 % (ref 42.7–76)
NRBC BLD AUTO-RTO: 0 /100 WBC (ref 0–0.2)
PLATELET # BLD AUTO: 130 10*3/MM3 (ref 140–450)
PMV BLD AUTO: 9.7 FL (ref 6–12)
POTASSIUM SERPL-SCNC: 4.1 MMOL/L (ref 3.5–5.2)
PROT SERPL-MCNC: 5.6 G/DL (ref 6–8.5)
RBC # BLD AUTO: 3.55 10*6/MM3 (ref 3.77–5.28)
RH BLD: POSITIVE
RH BLD: POSITIVE
RHINOVIRUS RNA SPEC NAA+PROBE: NOT DETECTED
RSV RNA NPH QL NAA+NON-PROBE: NOT DETECTED
SODIUM SERPL-SCNC: 139 MMOL/L (ref 136–145)
T&S EXPIRATION DATE: NORMAL
TIBC SERPL-MCNC: 212 MCG/DL (ref 298–536)
TRANSFERRIN SERPL-MCNC: 142 MG/DL (ref 200–360)
TROPONIN T SERPL-MCNC: 0.02 NG/ML (ref 0–0.03)
TROPONIN T SERPL-MCNC: 0.03 NG/ML (ref 0–0.03)
TROPONIN T SERPL-MCNC: 0.03 NG/ML (ref 0–0.03)
TROPONIN T SERPL-MCNC: 0.04 NG/ML (ref 0–0.03)
VIT B12 BLD-MCNC: >2000 PG/ML (ref 211–946)
WBC # BLD AUTO: 3.9 10*3/MM3 (ref 3.4–10.8)

## 2020-10-07 PROCEDURE — 25010000002 DEXAMETHASONE PER 1 MG: Performed by: STUDENT IN AN ORGANIZED HEALTH CARE EDUCATION/TRAINING PROGRAM

## 2020-10-07 PROCEDURE — 82728 ASSAY OF FERRITIN: CPT | Performed by: INTERNAL MEDICINE

## 2020-10-07 PROCEDURE — 86901 BLOOD TYPING SEROLOGIC RH(D): CPT | Performed by: INTERNAL MEDICINE

## 2020-10-07 PROCEDURE — 80053 COMPREHEN METABOLIC PANEL: CPT | Performed by: INTERNAL MEDICINE

## 2020-10-07 PROCEDURE — 99232 SBSQ HOSP IP/OBS MODERATE 35: CPT | Performed by: STUDENT IN AN ORGANIZED HEALTH CARE EDUCATION/TRAINING PROGRAM

## 2020-10-07 PROCEDURE — 86850 RBC ANTIBODY SCREEN: CPT | Performed by: INTERNAL MEDICINE

## 2020-10-07 PROCEDURE — 82607 VITAMIN B-12: CPT | Performed by: INTERNAL MEDICINE

## 2020-10-07 PROCEDURE — 83540 ASSAY OF IRON: CPT | Performed by: INTERNAL MEDICINE

## 2020-10-07 PROCEDURE — 85379 FIBRIN DEGRADATION QUANT: CPT | Performed by: INTERNAL MEDICINE

## 2020-10-07 PROCEDURE — 94799 UNLISTED PULMONARY SVC/PX: CPT

## 2020-10-07 PROCEDURE — 82746 ASSAY OF FOLIC ACID SERUM: CPT | Performed by: INTERNAL MEDICINE

## 2020-10-07 PROCEDURE — 86900 BLOOD TYPING SEROLOGIC ABO: CPT | Performed by: INTERNAL MEDICINE

## 2020-10-07 PROCEDURE — 99223 1ST HOSP IP/OBS HIGH 75: CPT | Performed by: INTERNAL MEDICINE

## 2020-10-07 PROCEDURE — 83615 LACTATE (LD) (LDH) ENZYME: CPT | Performed by: INTERNAL MEDICINE

## 2020-10-07 PROCEDURE — 84466 ASSAY OF TRANSFERRIN: CPT | Performed by: INTERNAL MEDICINE

## 2020-10-07 PROCEDURE — 86901 BLOOD TYPING SEROLOGIC RH(D): CPT

## 2020-10-07 PROCEDURE — 25010000002 ENOXAPARIN PER 10 MG: Performed by: INTERNAL MEDICINE

## 2020-10-07 PROCEDURE — 85025 COMPLETE CBC W/AUTO DIFF WBC: CPT | Performed by: INTERNAL MEDICINE

## 2020-10-07 PROCEDURE — 87040 BLOOD CULTURE FOR BACTERIA: CPT | Performed by: FAMILY MEDICINE

## 2020-10-07 PROCEDURE — 84484 ASSAY OF TROPONIN QUANT: CPT | Performed by: FAMILY MEDICINE

## 2020-10-07 PROCEDURE — 0100U HC BIOFIRE FILMARRAY RESP PANEL 2: CPT | Performed by: STUDENT IN AN ORGANIZED HEALTH CARE EDUCATION/TRAINING PROGRAM

## 2020-10-07 PROCEDURE — 86738 MYCOPLASMA ANTIBODY: CPT | Performed by: INTERNAL MEDICINE

## 2020-10-07 PROCEDURE — 86900 BLOOD TYPING SEROLOGIC ABO: CPT

## 2020-10-07 PROCEDURE — 84484 ASSAY OF TROPONIN QUANT: CPT | Performed by: INTERNAL MEDICINE

## 2020-10-07 RX ORDER — ASPIRIN 81 MG/1
81 TABLET, CHEWABLE ORAL DAILY
Status: DISCONTINUED | OUTPATIENT
Start: 2020-10-07 | End: 2020-10-09 | Stop reason: HOSPADM

## 2020-10-07 RX ORDER — DEXAMETHASONE SODIUM PHOSPHATE 4 MG/ML
6 INJECTION, SOLUTION INTRA-ARTICULAR; INTRALESIONAL; INTRAMUSCULAR; INTRAVENOUS; SOFT TISSUE DAILY
Status: DISCONTINUED | OUTPATIENT
Start: 2020-10-07 | End: 2020-10-09 | Stop reason: HOSPADM

## 2020-10-07 RX ORDER — SODIUM CHLORIDE 0.9 % (FLUSH) 0.9 %
10 SYRINGE (ML) INJECTION EVERY 12 HOURS SCHEDULED
Status: DISCONTINUED | OUTPATIENT
Start: 2020-10-07 | End: 2020-10-09 | Stop reason: HOSPADM

## 2020-10-07 RX ORDER — SODIUM CHLORIDE 0.9 % (FLUSH) 0.9 %
10 SYRINGE (ML) INJECTION AS NEEDED
Status: DISCONTINUED | OUTPATIENT
Start: 2020-10-07 | End: 2020-10-09 | Stop reason: HOSPADM

## 2020-10-07 RX ORDER — SODIUM CHLORIDE 9 MG/ML
100 INJECTION, SOLUTION INTRAVENOUS CONTINUOUS
Status: DISCONTINUED | OUTPATIENT
Start: 2020-10-07 | End: 2020-10-08

## 2020-10-07 RX ORDER — ACETAMINOPHEN 325 MG/1
650 TABLET ORAL EVERY 6 HOURS PRN
Status: DISCONTINUED | OUTPATIENT
Start: 2020-10-07 | End: 2020-10-09 | Stop reason: HOSPADM

## 2020-10-07 RX ORDER — LOSARTAN POTASSIUM 50 MG/1
50 TABLET ORAL DAILY
Status: ON HOLD | COMMUNITY
End: 2020-10-09 | Stop reason: SDUPTHER

## 2020-10-07 RX ORDER — LOSARTAN POTASSIUM 50 MG/1
50 TABLET ORAL DAILY
Status: CANCELLED | OUTPATIENT
Start: 2020-10-07

## 2020-10-07 RX ADMIN — SODIUM CHLORIDE, PRESERVATIVE FREE 10 ML: 5 INJECTION INTRAVENOUS at 22:44

## 2020-10-07 RX ADMIN — METRONIDAZOLE 500 MG: 500 INJECTION, SOLUTION INTRAVENOUS at 08:09

## 2020-10-07 RX ADMIN — ENOXAPARIN SODIUM 50 MG: 60 INJECTION SUBCUTANEOUS at 08:09

## 2020-10-07 RX ADMIN — ASPIRIN 81 MG: 81 TABLET, CHEWABLE ORAL at 08:06

## 2020-10-07 RX ADMIN — AZTREONAM 2 G: 2 INJECTION, POWDER, FOR SOLUTION INTRAMUSCULAR; INTRAVENOUS at 00:02

## 2020-10-07 RX ADMIN — DOXYCYCLINE 100 MG: 100 INJECTION, POWDER, LYOPHILIZED, FOR SOLUTION INTRAVENOUS at 22:44

## 2020-10-07 RX ADMIN — AZTREONAM 1 G: 1 INJECTION, POWDER, LYOPHILIZED, FOR SOLUTION INTRAMUSCULAR; INTRAVENOUS at 17:02

## 2020-10-07 RX ADMIN — METRONIDAZOLE 500 MG: 500 INJECTION, SOLUTION INTRAVENOUS at 15:24

## 2020-10-07 RX ADMIN — METRONIDAZOLE 500 MG: 500 INJECTION, SOLUTION INTRAVENOUS at 00:30

## 2020-10-07 RX ADMIN — SODIUM CHLORIDE, PRESERVATIVE FREE 10 ML: 5 INJECTION INTRAVENOUS at 08:09

## 2020-10-07 RX ADMIN — AZTREONAM 1 G: 1 INJECTION, POWDER, LYOPHILIZED, FOR SOLUTION INTRAMUSCULAR; INTRAVENOUS at 08:10

## 2020-10-07 RX ADMIN — SODIUM CHLORIDE 100 ML/HR: 9 INJECTION, SOLUTION INTRAVENOUS at 06:11

## 2020-10-07 RX ADMIN — DEXAMETHASONE SODIUM PHOSPHATE 6 MG: 4 INJECTION, SOLUTION INTRAMUSCULAR; INTRAVENOUS at 13:49

## 2020-10-07 NOTE — PROGRESS NOTES
[facility] HOSPITALIST PROGRESS NOTE     Patient Identification:  Name:  Orquidea Rosenberg  Age:  75 y.o.  Sex:  female  :  1945  MRN:  [id number]  Visit Number:  [id number]  ROOM: [address]     Primary Care Provider:  Adriana Loya APRN    Length of stay in inpatient status:  0    Subjective     Chief Compliant:    Chief Complaint   Patient presents with   • Neck Pain       History of Presenting Illness:  Patient seen in follow up for covid 19, bilateral pneumonia, and possible rectosigmoid colitis. Patient this morning states that she feels some better but not back to [facility].     Objective     Current Hospital Meds:aspirin, 81 mg, Oral, Daily  aztreonam, 1 g, Intravenous, Q8H  dexamethasone, 6 mg, Intravenous, Daily  enoxaparin, 50 mg, Subcutaneous, Q24H  metroNIDAZOLE, 500 mg, Intravenous, Q8H  sodium chloride, 10 mL, Intravenous, Q12H    Pharmacy Consult,   sodium chloride, 100 mL/hr, Last Rate: 100 mL/hr (10/07/20 0611)        Current Antimicrobial Therapy:  Anti-Infectives (From admission, onward)    Ordered     Dose/Rate Route Frequency Start Stop    10/07/20 0741  aztreonam (AZACTAM) 1 g/100 mL 0.9% NS IVPB (mbp)     Ordering Provider: Mariposa Hauser PharmD    1 g  over 30 Minutes Intravenous Every 8 Hours 10/07/20 0900 10/14/20 0059    10/07/20 0534  metroNIDAZOLE (FLAGYL) 500 mg/100mL IVPB     Ordering Provider: Taisha Crowell DO    500 mg  over 30 Minutes Intravenous Every 8 Hours 10/07/20 0800 10/14/20 0759    10/06/20 2329  metroNIDAZOLE (FLAGYL) 500 mg/100mL IVPB     Ordering Provider: Dipti Howell DO    500 mg  over 30 Minutes Intravenous Once 10/06/20 2331 10/07/20 0226    10/06/20 2230  aztreonam (AZACTAM) 2 g/100 mL 0.9% NS (mbp)     Ordering Provider: Dipti Howell DO    2 g  over 30 Minutes Intravenous Once 10/06/20 2232 10/07/20 0050        Current Diuretic Therapy:  Diuretics (From admission, onward)    None         ----------------------------------------------------------------------------------------------------------------------  Vital Signs:  Temp:  [97.9 °F (36.6 °C)-99.6 °F (37.6 °C)] 99.4 °F (37.4 °C)  Heart Rate:  [52-81] 60  Resp:  [15-21] 18  BP: ()/(34-86) 128/55  SpO2:  [79 %-100 %] 96 %  on  Flow (L/min):  [2-3] 2;   Device (Oxygen Therapy): nasal cannula  Body mass index is 30.64 kg/m².    Wt Readings from Last 3 Encounters:   10/07/20 88.7 kg (195 lb 9.6 oz)   10/31/19 86.2 kg (190 lb)   10/21/19 86.2 kg (190 lb)     Intake & Output (last 3 days)       10/04 0701 - 10/05 0700 10/05 0701 - 10/06 0700 10/06 0701 - 10/07 0700 10/07 0701 - 10/08 0700    P.O.    720    I.V. (mL/kg)    1105 (12.5)    IV Piggyback   1200     Total Intake(mL/kg)   1200 (13.5) 1825 (20.6)    Urine (mL/kg/hr)   300     Total Output   300     Net   +900 +1825                Diet Regular  ----------------------------------------------------------------------------------------------------------------------  Physical exam:  This physical exam has been personally performed remotely in the unit aided by real-time audio/visual communication tools. Nursing was present at bedside during this exam and assisted during exam. The use of a video visit has been reviewed with the patient and verbal informed consent has been obtained.     Physical Exam:  General: Patient appears awake, alert, and in no acute distress.  Head: Normocephalic, atraumatic  Eyes: EOMI. Conjunctivae and sclerae normal.  Ears: Ears appear intact with no abnormalities noted.   Neck: Trachea midline. No obvious JVD.  Heart: Tele reveals normal sinus rhythm.  Lungs: Respirations appear to be regular, even and unlabored with no signs of respiratory distress. No audible wheezing.  Abdomen: No obvious abdominal distension.  MS: Muscle tone appears normal. No gross deformities.  Extremities: No clubbing, cyanosis or edema noted.  Skin: No visible bleeding, bruising, or  rash.  Neurologic: Alert and oriented x3. No gross focal deficits.   ----------------------------------------------------------------------------------------------------------------------  Tele:    ----------------------------------------------------------------------------------------------------------------------  Results from last 7 days   Lab Units 10/07/20  0227 10/06/20  2347 10/06/20  2207   CRP mg/dL  --   --  11.40*   LACTATE mmol/L  --  0.6  --    WBC 10*3/mm3 3.90  --  4.36   HEMOGLOBIN g/dL 10.5*  --  11.3*   HEMATOCRIT % 33.8*  --  36.3   MCV fL 95.2  --  93.1   MCHC g/dL 31.1*  --  31.1*   PLATELETS 10*3/mm3 130*  --  136*         Results from last 7 days   Lab Units 10/07/20  0227 10/06/20  2207   SODIUM mmol/L 139 138   POTASSIUM mmol/L 4.1 4.4   MAGNESIUM mg/dL  --  2.5*   CHLORIDE mmol/L 110* 104   CO2 mmol/L 18.4* 22.8   BUN mg/dL 32* 37*   CREATININE mg/dL 0.95 1.22*   EGFR IF NONAFRICN AM mL/min/1.73 57* 43*   CALCIUM mg/dL 8.2* 8.9   GLUCOSE mg/dL 103* 115*   ALBUMIN g/dL 3.12* 3.69   BILIRUBIN mg/dL 0.2 0.2   ALK PHOS U/L 55 62   AST (SGOT) U/L 16 18   ALT (SGPT) U/L 8 8   Estimated Creatinine Clearance: 58.5 mL/min (by C-G formula based on SCr of 0.95 mg/dL).  No results found for: AMMONIA  Results from last 7 days   Lab Units 10/07/20  1144 10/07/20  0546 10/07/20  0049   TROPONIN T ng/mL 0.022 0.045* 0.031*     Results from last 7 days   Lab Units 10/06/20  2207   PROBNP pg/mL 394.3         No results found for: HGBA1C, POCGLU  Lab Results   Component Value Date    TSH 4.350 (H) 10/06/2020     No results found for: PREGTESTUR, PREGSERUM, HCG, HCGQUANT  Pain Management Panel     There is no flowsheet data to display.        Brief Urine Lab Results  (Last result in the past 365 days)      Color   Clarity   Blood   Leuk Est   Nitrite   Protein   CREAT   Urine HCG        10/06/20 2210 Yellow Turbid Trace Moderate (2+) Negative 30 mg/dL (1+)             No results found for: BLOODCX  Urine  Culture   Date Value Ref Range Status   10/06/2020 >100,000 CFU/mL Gram Negative Bacilli (A)  Preliminary     No results found for: WOUNDCX  No results found for: STOOLCX  No results found for: RESPCX  No results found for: AFBCX  Results from last 7 days   Lab Units 10/06/20  2347 10/06/20  2207   LACTATE mmol/L 0.6  --    CRP mg/dL  --  11.40*       I have personally looked at the labs and they are summarized above.  ----------------------------------------------------------------------------------------------------------------------  Detailed radiology reports for the last 24 hours:    Imaging Results (Last 24 Hours)     Procedure Component Value Units Date/Time    CT Abdomen Pelvis Without Contrast [[id number]] Collected: 10/06/20 2318     Updated: 10/06/20 2320    Narrative:      CT Abdomen Pelvis WO    INDICATION:   Generalized pain with weakness. History of vulvar cancer.    TECHNIQUE:   CT of the abdomen and pelvis without IV contrast. Coronal and sagittal reconstructions were obtained.  Radiation dose reduction techniques included automated exposure control or exposure modulation based on body size. Count of known CT and cardiac nuc  med studies performed in previous 12 months: 0.     COMPARISON:   6/5/2019    FINDINGS:  Please see chest CT report dictated separately. There is diffuse atherosclerotic disease. There is focal ectasia of the infrarenal abdominal aorta at the level of the MELVA takeoff measuring 2.7 cm. This is not significantly changed. Gallbladder is within  normal limits. Small cysts in each hepatic lobe are unchanged. No renal or ureteral stones are identified, and there is no hydronephrosis. Unenhanced solid abdominal organs are otherwise within normal limits. There is subtle stranding adjacent to the  urinary bladder which could reflect mild cystitis. There is abnormal wall thickening with adjacent fat stranding involving the rectosigmoid colon compatible with lower colitis. Remainder of the  colon is within normal limits, as is the appendix. No small  bowel obstruction is seen. Small hiatal hernia is present. Multiple calcified uterine fibroids are noted. No adenopathy is seen. There is degenerative disease in the lumbar spine. No suspicious osseous lesions are identified.      Impression:        1. Acute rectosigmoid colitis. Remainder of the GI tract is normal except for small hiatal hernia.  2. Mild stranding adjacent to the urinary bladder may indicate mild cystitis.  3. Atherosclerotic disease with ectasia of the infrarenal aorta that appears stable.  4. No renal or ureteral stones. No hydronephrosis.  5. No evidence of metastatic disease.          Signer Name: Graeme Reeves MD   Signed: 10/6/2020 11:18 PM   Workstation Name: [email or web address]    [facility]    CT Thoracic Spine Without Contrast [[id number]] Collected: 10/06/20 2312     Updated: 10/06/20 2314    Narrative:      CT Spine Thoracic WO    INDICATION:    Generalized pain and weakness. History of vulvar cancer.    TECHNIQUE:   CT of the thoracic spine without IV contrast. Coronal and sagittal reconstructions were obtained.  Radiation dose reduction techniques included automated exposure control or exposure modulation based on body size. Count of known CT and cardiac nuc med  studies performed in previous 12 months: 0.     COMPARISON:    None available.    FINDINGS:  Please see chest CT report dictated separately. No acute fracture or subluxation is identified. There are osteophytes noted at multiple levels throughout the thoracic spine. No suspicious osseous lesions are identified. Paraspinal soft tissues are within  normal limits. There is a small focal disc osteophyte complex centrally at T12-L1 with mild focal narrowing of the central canal. No large disc herniations are seen.      Impression:      Degenerative changes. No acute or suspicious findings in the thoracic spine.    Signer Name: Graeme Reeves MD    Signed: 10/6/2020 11:12 PM   Workstation Name: [facility]    [facility]    CT Cervical Spine Without Contrast [[id number]] Collected: 10/06/20 2309     Updated: 10/06/20 2311    Narrative:      CT Spine Cervical WO    INDICATION:   Dizziness and weakness with generalized pain. History of vulvar cancer.    TECHNIQUE:   CT of the cervical spine without IV contrast. Coronal and sagittal reconstructions were obtained.  Radiation dose reduction techniques included automated exposure control or exposure modulation based on body size. Count of known CT and cardiac nuc med  studies performed in previous 12 months: 0.     COMPARISON:  None available.    FINDINGS:  There is some motion degradation. No fracture or malalignment is identified. There are no suspicious osseous lesions. There is multilevel hypertrophic facet arthropathy that does cause mild to moderate foraminal stenosis at multiple levels. No large disc  herniations are seen. Paraspinal soft tissues are within normal limits. Please see the chest CT report dictated separately.      Impression:        1. No acute cervical spine findings.  2. Degenerative disease, predominantly due to facet arthropathy    Signer Name: Graeme Reeves MD   Signed: 10/6/2020 11:09 PM   Workstation Name: [facility]    CT Chest Without Contrast [[id number]] Collected: 10/06/20 2305     Updated: 10/06/20 2307    Narrative:      CT Chest WO    INDICATION:   Generalized body pain. History of vulvar cancer.    TECHNIQUE:   CT of the thorax without IV contrast. Coronal and sagittal reconstructions were obtained.  Radiation dose reduction techniques included automated exposure control or exposure modulation based on body size. Count of known CT and cardiac nuc med studies  performed in previous 12 months: 0.     COMPARISON:   6/5/2019    FINDINGS:  There is atherosclerotic disease and coronary artery disease. No pleural or  pericardial effusion is seen. There is no adenopathy. Small hiatal hernia is noted. There is a small cyst in the left hepatic lobe. Lung windows show pulmonary emphysema. There  are infiltrates in the right upper, middle, and lower lobes concerning for a mild pneumonia. There is also some dependent atelectasis in both lower lobes. Subtle infiltrates are present in the lingula. Imaging features are atypical or uncommonly reported  for COVID-19 pneumonia. Alternative diagnoses should be considered. No suspicious osseous lesions are seen.      Impression:        1. COPD with mild bilateral pneumonia, right greater than left.  2. No evidence of metastatic disease.  3. Atherosclerotic disease and coronary artery disease.    Signer Name: Graeme Reeves MD   Signed: 10/6/2020 11:05 PM   Workstation Name: [email or web address]    [facility]    CT Head Without Contrast [[id number]] Collected: 10/06/20 2301     Updated: 10/06/20 2303    Narrative:      CT Head WO    HISTORY:   Generalized pain with dizziness and weakness. History of vulvar cancer.    TECHNIQUE:   Axial unenhanced head CT with multiplanar reformats. Radiation dose reduction techniques included automated exposure control or exposure modulation based on body size. Count of known CT and cardiac nuc med studies performed in previous 12 months: 0.     COMPARISON:   None.    FINDINGS:   Ventricular size and configuration are normal. No acute infarct or hemorrhage is identified. There are no masses. There is no skull fracture.  There is generalized atrophy with chronic small vessel ischemic disease in the white matter. Atherosclerotic  calcifications are noted in the carotid siphons. Visualized paranasal sinuses and mastoid air cells are clear. There are no suspicious osseous lesions.      Impression:      Senescent changes without acute abnormality.    Signer Name: Graeme Reeves MD   Signed: 10/6/2020 11:01 PM   Workstation Name: [email or web address]     [facility]        Assessment & Plan      COVID 19  Possible Rectosigmoid Colitis  Bilateral CAP   - currently only requiring 2L NC, feeling some better   - continue Azactam and Flagul for now given any real lack of pneumonia like symptoms other than hypoxic insufficiency, will add doxy for atypical coverage   - continue dexamethasone 6mg, hold on remdesivir and plasma   - patient respiratory panel negative, without productive cough, and negative for flu    Asymptomatic bacteruria  Possible Cystitis   - monitor for now, antibiotics as above    RODOLFO, resolved   - suspect prerenal azotemia as corrected with fluid administration   - repeat bmp in AM     NSTEMI, type 2   - mild troponin elevation but downward trendings   - no chest pain    Iron Deficiency AnemiaI   - labs consistent with iron deficiency, b12 normal   - will start iron supplementation at discharge, hold for now given colitis    Essential Hypertension   - has been normotensive and at time borderline hypotensive, will hold home losartan for now.       VTE Prophylaxis:   Mechanical Order History:      Ordered        10/07/20 0155  Place Sequential Compression Device  Once         10/07/20 0155  Maintain Sequential Compression Device  Continuous                 Pharmalogical Order History:      Ordered     Dose Route Frequency Stop    10/07/20 0737  enoxaparin (LOVENOX) syringe 50 mg      50 mg SC Every 24 Hours --                London Mota DO  [facility] Hospitalist  10/07/20  18:08 EDT

## 2020-10-07 NOTE — PLAN OF CARE
Goal Outcome Evaluation:  Plan of Care Reviewed With: patient  Progress: no change  Outcome Summary: Patient is resting. VSS. 2L on NS. Will continue to monitor.

## 2020-10-07 NOTE — H&P
Hospitalist History and Physical    Patient Identification:  Name: Orquidea Rosenberg  Age/Sex: 75 y.o. female  :  1945  MRN: [id number]        Admit Date: 10/6/2020   Primary Care Physician: Adriana Loya APRN    Chief Complaint   Patient presents with   • Neck Pain       History of Present Illness  Patient is a 75 y.o. female presents with the following: Myalgias, nausea and vomiting    The use of a video visit has been reviewed with the patient and verbal informed consent has been obtained.    The patient is a 75-year-old female with past medical history significant for vulvar cancer status post radical vulvectomy, COPD, hypertension and arthritis who presents to the emergency department complaining of a 3-day history of myalgias, weakness, nausea and vomiting.    The patient reports diffuse myalgias.  She denies fever but reports occasional chills.  Patient reports some nausea and vomiting with a poor appetite over the past several days.  Patient denies any cough, shortness of air, change in taste and/or smell, abdominal pain, diarrhea and/or rash.  Patient denies any dysuria and/or urinary frequency.    According to the emergency department records, patient's  passed away approximately 2 weeks ago.  The patient states that she has been living with her daughter and that they have had a few family members and friends in and out of the home recently that she denies any known contact/exposure to COVID-19.    The emergency department, patient was afebrile with temperature 97.9 °F, pulse 77 and her initial oxygen saturation was 94% via room air.  Initial troponin T was 0.039.  CMP was remarkable for a BUN of 37 and a creatinine of 1.22, BUN/creatinine ratio 30.3, TSH 4.350, C-RP 11.40 and a lactate of 0.6.  CBC revealed a white blood cell count 4.36, hemoglobin 11.3, hematocrit 36.3 with 15.1% lymphocytes.    Urinalysis revealed moderate leukocytes and negative nitrites, 31-50 white blood cells, 2+ bacteria  and 36- epithelial cells.  CT scan of the chest without contrast revealed right upper, middle and bilateral lower lobe infiltrates concerning for mild pneumonia.  Patient also with dependent atelectasis in both lower lobes.  These imaging features were felt to be atypical or uncommonly reported for COVID-19.  CT scan of the abdomen and pelvis without contrast revealed acute rectosigmoid colitis.  Small hiatal hernia.  No renal stones or hydronephrosis was noted.    Patient's COVID-19 swab ([employer]) was found to be positive.    Patient has been admitted to the progressive care unit with enhanced isolation precautions.    Past History:  Past Medical History:   Diagnosis Date   • Arthritis    • COPD (chronic obstructive pulmonary disease) (CMS/HCC)    • Elevated cholesterol    • History of transfusion    • Hypertension    • Vulval ca (CMS/HCC)      Past Surgical History:   Procedure Laterality Date   • RADICAL VULVECTOMY  09/06/2019   • RADICAL VULVECTOMY Bilateral 06/2019     Family History   Problem Relation Age of Onset   • Alzheimer's disease Mother    • Cancer Father    • Cancer Brother    • Diabetes Maternal Grandmother      Social History     Tobacco Use   • Smoking status: Former Smoker   • Smokeless tobacco: Never Used   Substance Use Topics   • Alcohol use: No     Frequency: Never   • Drug use: No     Medications Prior to Admission   Medication Sig Dispense Refill Last Dose   • losartan (COZAAR) 50 MG tablet Take 50 mg by mouth Daily.   10/6/2020 at am     Allergies: Penicillins    Review of Systems:  Review of Systems   Constitutional: Positive for appetite change and chills. Negative for diaphoresis and fever.   HENT: Negative for hearing loss, tinnitus and trouble swallowing.    Eyes: Negative for photophobia, discharge and visual disturbance.   Respiratory: Negative for cough, shortness of breath and wheezing.    Cardiovascular: Negative for chest pain, palpitations and leg swelling.   Gastrointestinal:  Positive for nausea and vomiting. Negative for abdominal pain, constipation and diarrhea.   Endocrine: Negative for polydipsia, polyphagia and polyuria.   Genitourinary: Negative for dysuria, frequency and hematuria.   Musculoskeletal: Positive for back pain, myalgias and neck pain. Negative for arthralgias and gait problem.   Skin: Negative for rash and wound.   Neurological: Positive for weakness. Negative for dizziness, tremors, seizures, syncope and light-headedness.   Hematological: Does not bruise/bleed easily.   Psychiatric/Behavioral: Negative for confusion, hallucinations and suicidal ideas.      Vital Signs  Temp:  [97.9 °F (36.6 °C)-99.6 °F (37.6 °C)] 99.6 °F (37.6 °C)  Heart Rate:  [52-81] 52  Resp:  [15-21] 21  BP: ()/(41-71) 92/48  Body mass index is 30.64 kg/m².    Physical Exam:  Physical Exam  Constitutional:       General: She is not in acute distress.     Appearance: She is well-developed.      Interventions: Nasal cannula in place.   HENT:      Head: Normocephalic and atraumatic.   Eyes:      Conjunctiva/sclera: Conjunctivae normal.      Pupils: Pupils are equal, round, and reactive to light.   Neck:      Musculoskeletal: Neck supple.      Trachea: No tracheal deviation.   Cardiovascular:      Rate and Rhythm: Normal rate and regular rhythm.      Heart sounds: No murmur. No friction rub. No gallop.    Pulmonary:      Effort: No respiratory distress.      Breath sounds: Normal breath sounds. No wheezing or rales.   Abdominal:      General: Bowel sounds are normal. There is no distension.      Palpations: Abdomen is soft.      Tenderness: There is abdominal tenderness in the suprapubic area. There is no guarding.   Musculoskeletal: Normal range of motion.         General: No tenderness.   Skin:     General: Skin is warm and dry.      Findings: No erythema or rash.   Neurological:      Mental Status: She is alert and oriented to person, place, and time.      Cranial Nerves: No cranial nerve  deficit.   Psychiatric:         Mood and Affect: Affect is blunt.         Behavior: Behavior is cooperative.          Results Review:    Results from last 7 days   Lab Units 10/07/20  0227 10/06/20  2207   WBC 10*3/mm3 3.90 4.36   HEMOGLOBIN g/dL 10.5* 11.3*   HEMATOCRIT % 33.8* 36.3   PLATELETS 10*3/mm3 130* 136*     Results from last 7 days   Lab Units 10/07/20  0227 10/06/20  2207   SODIUM mmol/L 139 138   POTASSIUM mmol/L 4.1 4.4   CHLORIDE mmol/L 110* 104   CO2 mmol/L 18.4* 22.8   BUN mg/dL 32* 37*   CREATININE mg/dL 0.95 1.22*   CALCIUM mg/dL 8.2* 8.9   GLUCOSE mg/dL 103* 115*     Results from last 7 days   Lab Units 10/07/20  0227 10/06/20  2207   BILIRUBIN mg/dL 0.2 0.2   ALK PHOS U/L 55 62   AST (SGOT) U/L 16 18   ALT (SGPT) U/L 8 8     Results from last 7 days   Lab Units 10/06/20  2207   CRP mg/dL 11.40*     Results from last 7 days   Lab Units 10/06/20  2207   MAGNESIUM mg/dL 2.5*     Results from last 7 days   Lab Units 10/07/20  0049 10/06/20  2207   TROPONIN T ng/mL 0.031* 0.039*     Imaging:    I have personally reviewed the EKG. pending official cardiology interpretation, however, per my view the patient has been normal sinus rhythm without significant acute ST or T wave changes.  QTc within normal limits at 414 ms.    CT scan of the abdomen/pelvis and chest were reviewed.  Per my view, patient does have mild infiltrate noted in the right upper, right middle, right lower and left lower lobes.  Patient with inflammation in the rectosigmoid colon and mild stranding around the bladder.    Imaging Results (Most Recent)     Procedure Component Value Units Date/Time    CT Abdomen Pelvis Without Contrast [[id number]] Collected: 10/06/20 2318     Updated: 10/06/20 2320    Narrative:      CT Abdomen Pelvis WO    INDICATION:   Generalized pain with weakness. History of vulvar cancer.    TECHNIQUE:   CT of the abdomen and pelvis without IV contrast. Coronal and sagittal reconstructions were obtained.  Radiation  dose reduction techniques included automated exposure control or exposure modulation based on body size. Count of known CT and cardiac nuc  med studies performed in previous 12 months: 0.     COMPARISON:   6/5/2019    FINDINGS:  Please see chest CT report dictated separately. There is diffuse atherosclerotic disease. There is focal ectasia of the infrarenal abdominal aorta at the level of the MELVA takeoff measuring 2.7 cm. This is not significantly changed. Gallbladder is within  normal limits. Small cysts in each hepatic lobe are unchanged. No renal or ureteral stones are identified, and there is no hydronephrosis. Unenhanced solid abdominal organs are otherwise within normal limits. There is subtle stranding adjacent to the  urinary bladder which could reflect mild cystitis. There is abnormal wall thickening with adjacent fat stranding involving the rectosigmoid colon compatible with lower colitis. Remainder of the colon is within normal limits, as is the appendix. No small  bowel obstruction is seen. Small hiatal hernia is present. Multiple calcified uterine fibroids are noted. No adenopathy is seen. There is degenerative disease in the lumbar spine. No suspicious osseous lesions are identified.      Impression:        1. Acute rectosigmoid colitis. Remainder of the GI tract is normal except for small hiatal hernia.  2. Mild stranding adjacent to the urinary bladder may indicate mild cystitis.  3. Atherosclerotic disease with ectasia of the infrarenal aorta that appears stable.  4. No renal or ureteral stones. No hydronephrosis.  5. No evidence of metastatic disease.          Signer Name: Graeme Reeves MD   Signed: 10/6/2020 11:18 PM   Workstation Name: [email or web address]    [facility]    CT Thoracic Spine Without Contrast [[id number]] Collected: 10/06/20 2312     Updated: 10/06/20 2314    Narrative:      CT Spine Thoracic WO    INDICATION:    Generalized pain and weakness. History of vulvar  cancer.    TECHNIQUE:   CT of the thoracic spine without IV contrast. Coronal and sagittal reconstructions were obtained.  Radiation dose reduction techniques included automated exposure control or exposure modulation based on body size. Count of known CT and cardiac nuc med  studies performed in previous 12 months: 0.     COMPARISON:    None available.    FINDINGS:  Please see chest CT report dictated separately. No acute fracture or subluxation is identified. There are osteophytes noted at multiple levels throughout the thoracic spine. No suspicious osseous lesions are identified. Paraspinal soft tissues are within  normal limits. There is a small focal disc osteophyte complex centrally at T12-L1 with mild focal narrowing of the central canal. No large disc herniations are seen.      Impression:      Degenerative changes. No acute or suspicious findings in the thoracic spine.    Signer Name: Graeme Reeves MD   Signed: 10/6/2020 11:12 PM   Workstation Name: [email or web address]    [facility]    CT Cervical Spine Without Contrast [[id number]] Collected: 10/06/20 2309     Updated: 10/06/20 2311    Narrative:      CT Spine Cervical WO    INDICATION:   Dizziness and weakness with generalized pain. History of vulvar cancer.    TECHNIQUE:   CT of the cervical spine without IV contrast. Coronal and sagittal reconstructions were obtained.  Radiation dose reduction techniques included automated exposure control or exposure modulation based on body size. Count of known CT and cardiac nuc med  studies performed in previous 12 months: 0.     COMPARISON:  None available.    FINDINGS:  There is some motion degradation. No fracture or malalignment is identified. There are no suspicious osseous lesions. There is multilevel hypertrophic facet arthropathy that does cause mild to moderate foraminal stenosis at multiple levels. No large disc  herniations are seen. Paraspinal soft tissues are within normal limits.  Please see the chest CT report dictated separately.      Impression:        1. No acute cervical spine findings.  2. Degenerative disease, predominantly due to facet arthropathy    Signer Name: Graeme Reeves MD   Signed: 10/6/2020 11:09 PM   Workstation Name: [facility]    [facility]    CT Chest Without Contrast [[id number]] Collected: 10/06/20 2305     Updated: 10/06/20 2307    Narrative:      CT Chest WO    INDICATION:   Generalized body pain. History of vulvar cancer.    TECHNIQUE:   CT of the thorax without IV contrast. Coronal and sagittal reconstructions were obtained.  Radiation dose reduction techniques included automated exposure control or exposure modulation based on body size. Count of known CT and cardiac nuc med studies  performed in previous 12 months: 0.     COMPARISON:   6/5/2019    FINDINGS:  There is atherosclerotic disease and coronary artery disease. No pleural or pericardial effusion is seen. There is no adenopathy. Small hiatal hernia is noted. There is a small cyst in the left hepatic lobe. Lung windows show pulmonary emphysema. There  are infiltrates in the right upper, middle, and lower lobes concerning for a mild pneumonia. There is also some dependent atelectasis in both lower lobes. Subtle infiltrates are present in the lingula. Imaging features are atypical or uncommonly reported  for COVID-19 pneumonia. Alternative diagnoses should be considered. No suspicious osseous lesions are seen.      Impression:        1. COPD with mild bilateral pneumonia, right greater than left.  2. No evidence of metastatic disease.  3. Atherosclerotic disease and coronary artery disease.    Signer Name: Graeme Reeves MD   Signed: 10/6/2020 11:05 PM   Workstation Name: [facility]    [facility]    CT Head Without Contrast [[id number]] Collected: 10/06/20 2301     Updated: 10/06/20 2303    Narrative:      CT Head WO    HISTORY:   Generalized pain with  dizziness and weakness. History of vulvar cancer.    TECHNIQUE:   Axial unenhanced head CT with multiplanar reformats. Radiation dose reduction techniques included automated exposure control or exposure modulation based on body size. Count of known CT and cardiac nuc med studies performed in previous 12 months: 0.     COMPARISON:   None.    FINDINGS:   Ventricular size and configuration are normal. No acute infarct or hemorrhage is identified. There are no masses. There is no skull fracture.  There is generalized atrophy with chronic small vessel ischemic disease in the white matter. Atherosclerotic  calcifications are noted in the carotid siphons. Visualized paranasal sinuses and mastoid air cells are clear. There are no suspicious osseous lesions.      Impression:      Senescent changes without acute abnormality.    Signer Name: Graeme Reeves MD   Signed: 10/6/2020 11:01 PM   Workstation Name: [email or web address]    [facility]          Assessment/Plan       COVID-19    -COVID-19 +  -Nausea and vomiting  -Myalgias  -Bilateral pneumonia, community acquired; clinical presentation not suggestive   -Asymptomatic bacteruria with CT findings to suggestive possible cystitis  -Mild troponin T elevation without complaints of chest pain  -Mild renal insufficiency  -Chronic normocytic anemia  -Mild thrombocytosis, platelets 136,000  -COPD, currently not in an acute exacerbation  -Essential hypertension, currently with borderline hypotension    Patient has been admitted to the progressive care unit with enhanced/contact isolation precautions.  I have ordered the COVID disease progression serial laboratory studies.  She has been started on gentle intravenous fluids.  Patient will be started on Tylenol as needed for pain and/or fever.  Patient will be started on intravenous antibiotics with Azactam and Flagyl for now to cover both for her possible urinary tract infection and rectosigmoid colitis.  Patient with  suprapubic/left lower quadrant pain upon palpation on exam.    I will hold on room to severe and/or convalescent plasma therapy at this time.  Monitor patient closely.  Continue to trend the patient's cardiac enzymes.  Patient will be started on aspirin.  Continue to monitor on telemetry. I have ordered an iron panel, vitamin B12 and folate testing.    DVT prophylaxis: Per pharmacy/Level 1 VTE per [facility].    Estimated Length of Stay: > 2 MNs    I discussed the patients findings and my recommendations with patient and nursing staff (NUPUR Rader)      Taisha Crowell DO  10/07/20  05:06 EDT

## 2020-10-07 NOTE — ED NOTES
Pt transported to PCU at this time by primary RN and ED tech on [employer]'s monitor. [employer] block off hallways for transport. NADN. VSS. All personal belongings with patient upon transport. Alert and Oriented x4.      Neo Marinelli, RN  10/07/20 [id number]

## 2020-10-07 NOTE — ED PROVIDER NOTES
Subjective   Patient is a 75-year-old female presents emergency department with multiple complaints.  The patient states that she is hurting all over.  Her biggest complaint is neck pain rating down her left shoulder.  The patient states that she has had pain all over her body including her legs.  She states that she went to her lymphedema clinic earlier today they were unable to do her wraps because of pain.  The patient states that she has not had any fever or chills.  She went to her PCPs office with the symptoms and was told to go to her oncologist for further work-up.  The patient has a history of vulvar cancer and has a history of a complete vulvectomy.  The patient has not had any pain in this area.  She states the pain is coming and going all over her body without any injury.  She denies any recent falls.  She denies any shortness of breath or chest pain.  At this time the patient has no additional complaints.  The patient's daughter states that 2 weeks ago the patient lost her  and has been very depressed since this started.  She has been eating and drinking less.          Review of Systems   Constitutional: Positive for appetite change. Negative for activity change, chills, diaphoresis, fatigue and fever.   HENT: Negative for congestion, postnasal drip, rhinorrhea, sinus pressure, sinus pain, sneezing and sore throat.    Eyes: Negative for discharge and itching.   Respiratory: Negative for apnea, cough, choking, chest tightness, shortness of breath and wheezing.    Cardiovascular: Negative for chest pain, palpitations and leg swelling.   Gastrointestinal: Negative for abdominal distention, abdominal pain, constipation, diarrhea, nausea and vomiting.   Genitourinary: Negative for difficulty urinating and flank pain.   Musculoskeletal: Positive for arthralgias, back pain and myalgias.   Neurological: Negative for dizziness and light-headedness.   Psychiatric/Behavioral: Negative for agitation and  confusion.       Past Medical History:   Diagnosis Date   • Arthritis    • COPD (chronic obstructive pulmonary disease) (CMS/HCC)    • Elevated cholesterol    • History of transfusion    • Hypertension    • Vulval ca (CMS/HCC)        Allergies   Allergen Reactions   • Penicillins Swelling       Past Surgical History:   Procedure Laterality Date   • RADICAL VULVECTOMY  09/06/2019   • RADICAL VULVECTOMY Bilateral 06/2019       Family History   Problem Relation Age of Onset   • Alzheimer's disease Mother    • Cancer Father    • Cancer Brother    • Diabetes Maternal Grandmother        Social History     Socioeconomic History   • Marital status:      Spouse name: Not on file   • Number of children: Not on file   • Years of education: Not on file   • Highest education level: Not on file   Tobacco Use   • Smoking status: Former Smoker   • Smokeless tobacco: Never Used   Substance and Sexual Activity   • Alcohol use: No     Frequency: Never   • Drug use: No   • Sexual activity: Defer           Objective   Physical Exam  Vitals signs and nursing note reviewed.   Constitutional:       General: She is not in acute distress.     Appearance: Normal appearance. She is normal weight. She is not ill-appearing, toxic-appearing or diaphoretic.   HENT:      Head: Normocephalic.      Right Ear: External ear normal. There is no impacted cerumen.      Left Ear: External ear normal. There is no impacted cerumen.      Nose: Nose normal. No congestion or rhinorrhea.      Mouth/Throat:      Mouth: Mucous membranes are moist.      Pharynx: No oropharyngeal exudate or posterior oropharyngeal erythema.   Eyes:      General: No scleral icterus.        Right eye: No discharge.         Left eye: No discharge.      Pupils: Pupils are equal, round, and reactive to light.   Neck:      Musculoskeletal: Normal range of motion and neck supple. No neck rigidity or muscular tenderness.      Vascular: No carotid bruit.   Cardiovascular:      Rate  and Rhythm: Normal rate and regular rhythm.      Pulses: Normal pulses.      Heart sounds: Normal heart sounds. No murmur. No friction rub. No gallop.    Pulmonary:      Effort: Pulmonary effort is normal. No respiratory distress.      Breath sounds: Normal breath sounds. No stridor. No wheezing, rhonchi or rales.   Chest:      Chest wall: No tenderness.   Abdominal:      General: Abdomen is flat. There is no distension.      Palpations: There is no mass.      Tenderness: There is no abdominal tenderness. There is no guarding or rebound.      Hernia: No hernia is present.   Lymphadenopathy:      Cervical: No cervical adenopathy.   Skin:     General: Skin is warm and dry.      Capillary Refill: Capillary refill takes less than 2 seconds.      Coloration: Skin is not jaundiced or pale.      Findings: No bruising, erythema, lesion or rash.   Neurological:      General: No focal deficit present.      Mental Status: She is alert and oriented to person, place, and time.      Cranial Nerves: No cranial nerve deficit.      Sensory: No sensory deficit.      Motor: No weakness.      Coordination: Coordination normal.      Gait: Gait normal.      Deep Tendon Reflexes: Reflexes normal.   Psychiatric:         Mood and Affect: Mood normal.         Behavior: Behavior normal.         Procedures           ED Course  ED Course as of Oct 07 0052   Tue Oct 06, 2020   [id number] Patient's COVID positive.  She has been moved to a negative pressure room    [EG]      ED Course User Index  [EG] Dipti Howell,                                            MDM  Number of Diagnoses or Management Options  Acute cystitis without hematuria: new and requires workup  Acute kidney injury (CMS/HCC): new and requires workup  COVID-19: new and requires workup  Elevated troponin: new and requires workup  Myalgia: new and requires workup  Pneumonia of both lower lobes due to infectious organism: new and requires workup     Amount and/or Complexity of Data  Reviewed  Clinical lab tests: ordered and reviewed  Tests in the radiology section of CPT®: ordered and reviewed  Tests in the medicine section of CPT®: ordered and reviewed  Discuss the patient with other providers: yes  Independent visualization of images, tracings, or specimens: yes    Risk of Complications, Morbidity, and/or Mortality  Presenting problems: high  Diagnostic procedures: high  Management options: high    Patient Progress  Patient progress: stable      Final diagnoses:   COVID-19   Pneumonia of both lower lobes due to infectious organism   Acute kidney injury (CMS/HCC)   Elevated troponin   Acute cystitis without hematuria   Myalgia            Dipti Howell DO  10/07/20 0052

## 2020-10-07 NOTE — PLAN OF CARE
Goal Outcome Evaluation:  Plan of Care Reviewed With: patient  Progress: no change  Outcome Summary: Pt recieved from ER. VSS. Pt on 2 L NC. NS started at 100 cc/hr. No complaints at this time. Will continue to monitor.

## 2020-10-07 NOTE — PROGRESS NOTES
Discharge Planning Assessment   [place]     Patient Name: Orquidea Rosenberg  MRN: [id number]  Today's Date: 10/7/2020    Admit Date: 10/6/2020    Discharge Needs Assessment     Row Name 10/07/20 1038       Living Environment    Current Living Arrangements  home/apartment/condo    Primary Care Provided by  self    Provides Primary Care For  no one    Family Caregiver if Needed  child(jolly), adult    Family Caregiver Names  Dtmaite, Lilli.    Able to Return to Prior Arrangements  yes       Resource/Environmental Concerns    Resource/Environmental Concerns  none    Transportation Concerns  car, none       Transition Planning    Patient/Family Anticipates Transition to  home Lilli Puckett will be staying with her for awhile.    Patient/Family Anticipated Services at Transition  none    Transportation Anticipated  family or friend will provide       Discharge Needs Assessment    Readmission Within the Last 30 Days  no previous admission in last 30 days    Equipment Currently Used at Home  none    Concerns to be Addressed  no discharge needs identified;denies needs/concerns at this time    Equipment Needed After Discharge  none        Discharge Plan     Row Name 10/07/20 1043       Plan    Plan  Patient lives at home [address], [place]. Her  recently passed away. She says her daughterLilli will be staying with her post discharge for awhile. Her daughter will provide dc transportation.She does not utilize DME and denies need at this time. Her PCP is Adriaan Loya @ [facility]. She follows with Lymphedema clinic . Dr. Mendoza in [place] due to h/o [place]. Spiritual consult per pt request due to recent passing of her . She has Medicare A/B coverage and does not have copay issues. She utilizes [employer] to obtain her medications. Patient plans to return home at [place]. CM will follow and assist as needed.    Patient/Family in Agreement with Plan  yes        Continued Care and Services - Admitted Since  10/6/2020    Coordination has not been started for this encounter.         Demographic Summary     Row Name 10/07/20 1032       General Information    Arrived From  emergency department    Referral Source  emergency department    Preferred Language  English     Used During This Interaction  no        Functional Status     Row Name 10/07/20 1035       Mental Status Summary    Recent Changes in Mental Status/Cognitive Functioning  no changes    Mental Status Comments  Alert and oriented. Answers questions appropriately.        Psychosocial    No documentation.       Abuse/Neglect    No documentation.       Legal    No documentation.       Substance Abuse    No documentation.       Patient Forms    No documentation.           Jayna Bailey RN

## 2020-10-08 ENCOUNTER — APPOINTMENT (OUTPATIENT)
Dept: OCCUPATIONAL THERAPY | Facility: HOSPITAL | Age: 75
End: 2020-10-08

## 2020-10-08 LAB
ALBUMIN SERPL-MCNC: 2.76 G/DL (ref 3.5–5.2)
ALBUMIN/GLOB SERPL: 1 G/DL
ALP SERPL-CCNC: 54 U/L (ref 39–117)
ALT SERPL W P-5'-P-CCNC: 7 U/L (ref 1–33)
ANION GAP SERPL CALCULATED.3IONS-SCNC: 10.5 MMOL/L (ref 5–15)
AST SERPL-CCNC: 12 U/L (ref 1–32)
BACTERIA SPEC AEROBE CULT: ABNORMAL
BASOPHILS # BLD AUTO: 0.02 10*3/MM3 (ref 0–0.2)
BASOPHILS NFR BLD AUTO: 0.6 % (ref 0–1.5)
BILIRUB SERPL-MCNC: 0.2 MG/DL (ref 0–1.2)
BUN SERPL-MCNC: 30 MG/DL (ref 8–23)
BUN/CREAT SERPL: 39.5 (ref 7–25)
CALCIUM SPEC-SCNC: 8.5 MG/DL (ref 8.6–10.5)
CHLORIDE SERPL-SCNC: 115 MMOL/L (ref 98–107)
CK SERPL-CCNC: 30 U/L (ref 20–180)
CO2 SERPL-SCNC: 15.5 MMOL/L (ref 22–29)
CREAT SERPL-MCNC: 0.76 MG/DL (ref 0.57–1)
CRP SERPL-MCNC: 10.39 MG/DL (ref 0–0.5)
D DIMER PPP FEU-MCNC: 0.62 MCGFEU/ML (ref 0–0.5)
DEPRECATED RDW RBC AUTO: 55.5 FL (ref 37–54)
EOSINOPHIL # BLD AUTO: 0.01 10*3/MM3 (ref 0–0.4)
EOSINOPHIL NFR BLD AUTO: 0.3 % (ref 0.3–6.2)
ERYTHROCYTE [DISTWIDTH] IN BLOOD BY AUTOMATED COUNT: 14.7 % (ref 12.3–15.4)
FERRITIN SERPL-MCNC: 336.5 NG/ML (ref 13–150)
FIBRINOGEN PPP-MCNC: 702 MG/DL (ref 173–524)
GFR SERPL CREATININE-BSD FRML MDRD: 74 ML/MIN/1.73
GLOBULIN UR ELPH-MCNC: 2.8 GM/DL
GLUCOSE SERPL-MCNC: 150 MG/DL (ref 65–99)
HCT VFR BLD AUTO: 37.3 % (ref 34–46.6)
HGB BLD-MCNC: 11 G/DL (ref 12–15.9)
IMM GRANULOCYTES # BLD AUTO: 0.11 10*3/MM3 (ref 0–0.05)
IMM GRANULOCYTES NFR BLD AUTO: 3 % (ref 0–0.5)
LDH SERPL-CCNC: 192 U/L (ref 135–214)
LYMPHOCYTES # BLD AUTO: 0.46 10*3/MM3 (ref 0.7–3.1)
LYMPHOCYTES NFR BLD AUTO: 12.7 % (ref 19.6–45.3)
MCH RBC QN AUTO: 29.6 PG (ref 26.6–33)
MCHC RBC AUTO-ENTMCNC: 29.5 G/DL (ref 31.5–35.7)
MCV RBC AUTO: 100.3 FL (ref 79–97)
MONOCYTES # BLD AUTO: 0.18 10*3/MM3 (ref 0.1–0.9)
MONOCYTES NFR BLD AUTO: 5 % (ref 5–12)
NEUTROPHILS NFR BLD AUTO: 2.84 10*3/MM3 (ref 1.7–7)
NEUTROPHILS NFR BLD AUTO: 78.4 % (ref 42.7–76)
NRBC BLD AUTO-RTO: 0 /100 WBC (ref 0–0.2)
PLATELET # BLD AUTO: 162 10*3/MM3 (ref 140–450)
PMV BLD AUTO: 10.1 FL (ref 6–12)
POTASSIUM SERPL-SCNC: 4.8 MMOL/L (ref 3.5–5.2)
PROT SERPL-MCNC: 5.6 G/DL (ref 6–8.5)
RBC # BLD AUTO: 3.72 10*6/MM3 (ref 3.77–5.28)
SODIUM SERPL-SCNC: 141 MMOL/L (ref 136–145)
WBC # BLD AUTO: 3.62 10*3/MM3 (ref 3.4–10.8)

## 2020-10-08 PROCEDURE — 83615 LACTATE (LD) (LDH) ENZYME: CPT | Performed by: INTERNAL MEDICINE

## 2020-10-08 PROCEDURE — 80053 COMPREHEN METABOLIC PANEL: CPT | Performed by: INTERNAL MEDICINE

## 2020-10-08 PROCEDURE — 85025 COMPLETE CBC W/AUTO DIFF WBC: CPT | Performed by: INTERNAL MEDICINE

## 2020-10-08 PROCEDURE — 85384 FIBRINOGEN ACTIVITY: CPT | Performed by: INTERNAL MEDICINE

## 2020-10-08 PROCEDURE — 85379 FIBRIN DEGRADATION QUANT: CPT | Performed by: INTERNAL MEDICINE

## 2020-10-08 PROCEDURE — 25010000002 DEXAMETHASONE PER 1 MG: Performed by: STUDENT IN AN ORGANIZED HEALTH CARE EDUCATION/TRAINING PROGRAM

## 2020-10-08 PROCEDURE — 82550 ASSAY OF CK (CPK): CPT | Performed by: INTERNAL MEDICINE

## 2020-10-08 PROCEDURE — 94799 UNLISTED PULMONARY SVC/PX: CPT

## 2020-10-08 PROCEDURE — 25010000002 ENOXAPARIN PER 10 MG: Performed by: INTERNAL MEDICINE

## 2020-10-08 PROCEDURE — 86140 C-REACTIVE PROTEIN: CPT | Performed by: INTERNAL MEDICINE

## 2020-10-08 PROCEDURE — 99232 SBSQ HOSP IP/OBS MODERATE 35: CPT | Performed by: STUDENT IN AN ORGANIZED HEALTH CARE EDUCATION/TRAINING PROGRAM

## 2020-10-08 PROCEDURE — 82728 ASSAY OF FERRITIN: CPT | Performed by: INTERNAL MEDICINE

## 2020-10-08 RX ORDER — BACITRACIN ZINC 500 [USP'U]/G
OINTMENT TOPICAL EVERY 12 HOURS SCHEDULED
Status: DISCONTINUED | OUTPATIENT
Start: 2020-10-08 | End: 2020-10-09 | Stop reason: HOSPADM

## 2020-10-08 RX ORDER — LOSARTAN POTASSIUM 50 MG/1
50 TABLET ORAL DAILY
Status: DISCONTINUED | OUTPATIENT
Start: 2020-10-08 | End: 2020-10-09 | Stop reason: HOSPADM

## 2020-10-08 RX ADMIN — BACITRACIN ZINC: 500 OINTMENT TOPICAL at 17:26

## 2020-10-08 RX ADMIN — DOXYCYCLINE 100 MG: 100 INJECTION, POWDER, LYOPHILIZED, FOR SOLUTION INTRAVENOUS at 18:35

## 2020-10-08 RX ADMIN — ENOXAPARIN SODIUM 50 MG: 60 INJECTION SUBCUTANEOUS at 08:25

## 2020-10-08 RX ADMIN — DOXYCYCLINE 100 MG: 100 INJECTION, POWDER, LYOPHILIZED, FOR SOLUTION INTRAVENOUS at 08:26

## 2020-10-08 RX ADMIN — AZTREONAM 1 G: 1 INJECTION, POWDER, LYOPHILIZED, FOR SOLUTION INTRAMUSCULAR; INTRAVENOUS at 17:21

## 2020-10-08 RX ADMIN — METRONIDAZOLE 500 MG: 500 INJECTION, SOLUTION INTRAVENOUS at 08:25

## 2020-10-08 RX ADMIN — AZTREONAM 1 G: 1 INJECTION, POWDER, LYOPHILIZED, FOR SOLUTION INTRAMUSCULAR; INTRAVENOUS at 02:35

## 2020-10-08 RX ADMIN — SODIUM CHLORIDE, PRESERVATIVE FREE 10 ML: 5 INJECTION INTRAVENOUS at 08:27

## 2020-10-08 RX ADMIN — METRONIDAZOLE 500 MG: 500 INJECTION, SOLUTION INTRAVENOUS at 15:37

## 2020-10-08 RX ADMIN — AZTREONAM 1 G: 1 INJECTION, POWDER, LYOPHILIZED, FOR SOLUTION INTRAMUSCULAR; INTRAVENOUS at 08:26

## 2020-10-08 RX ADMIN — DEXAMETHASONE SODIUM PHOSPHATE 6 MG: 4 INJECTION, SOLUTION INTRAMUSCULAR; INTRAVENOUS at 08:27

## 2020-10-08 RX ADMIN — LOSARTAN POTASSIUM 50 MG: 50 TABLET, FILM COATED ORAL at 21:43

## 2020-10-08 RX ADMIN — ASPIRIN 81 MG: 81 TABLET, CHEWABLE ORAL at 08:25

## 2020-10-08 RX ADMIN — SODIUM CHLORIDE, PRESERVATIVE FREE 10 ML: 5 INJECTION INTRAVENOUS at 21:43

## 2020-10-08 RX ADMIN — METRONIDAZOLE 500 MG: 500 INJECTION, SOLUTION INTRAVENOUS at 01:49

## 2020-10-08 NOTE — PLAN OF CARE
Goal Outcome Evaluation:  Plan of Care Reviewed With: patient  Progress: no change  Outcome Summary: Patient rested well throughout shift. Patient running sinus bradycardic and asymptomatic. Patient denies soa, chest pain, or any other discomforts. Will continue to monitor.

## 2020-10-08 NOTE — PLAN OF CARE
Problem: Adult Inpatient Plan of Care  Goal: Patient-Specific Goal (Individualized)  Outcome: Ongoing, Progressing   Goal Outcome Evaluation:  Plan of Care Reviewed With: patient  Progress: improving  Outcome Summary: Patient is resting. VSS. 2L on NS. Will continue to monitor.

## 2020-10-08 NOTE — PROGRESS NOTES
[facility] HOSPITALIST PROGRESS NOTE     Patient Identification:  Name:  Orquidea Rosenberg  Age:  75 y.o.  Sex:  female  :  1945  MRN:  [id number]  Visit Number:  [id number]  ROOM: [address]     Primary Care Provider:  Adriana Loya APRN    Length of stay in inpatient status:  1    Subjective     Chief Compliant:    Chief Complaint   Patient presents with   • Neck Pain       History of Presenting Illness:  Patient seen in follow up for covid 19, bilateral pneumonia, and possible rectosigmoid colitis. Patient this today feeling much better and happy today.    Objective     Current Hospital Meds:aspirin, 81 mg, Oral, Daily  aztreonam, 1 g, Intravenous, Q8H  bacitracin, , Topical, Q12H  dexamethasone, 6 mg, Intravenous, Daily  doxycycline, 100 mg, Intravenous, Q12H  enoxaparin, 50 mg, Subcutaneous, Q24H  metroNIDAZOLE, 500 mg, Intravenous, Q8H  sodium chloride, 10 mL, Intravenous, Q12H    Pharmacy Consult,         Current Antimicrobial Therapy:  Anti-Infectives (From admission, onward)    Ordered     Dose/Rate Route Frequency Start Stop    10/07/20 1826  doxycycline (VIBRAMYCIN) 100 mg/100 mL 0.9% NS MBP     Ordering Provider: London Mota DO    100 mg  over 60 Minutes Intravenous Every 12 Hours 10/07/20 1930 10/12/20 1929    10/07/20 0741  aztreonam (AZACTAM) 1 g/100 mL 0.9% NS IVPB (mbp)     Ordering Provider: Mariposa Hauser PharmD    1 g  over 30 Minutes Intravenous Every 8 Hours 10/07/20 0900 10/14/20 0059    10/07/20 0534  metroNIDAZOLE (FLAGYL) 500 mg/100mL IVPB     Ordering Provider: Taisha Crowell DO    500 mg  over 30 Minutes Intravenous Every 8 Hours 10/07/20 0800 10/14/20 0759    10/06/20 2329  metroNIDAZOLE (FLAGYL) 500 mg/100mL IVPB     Ordering Provider: Dipti Howell DO    500 mg  over 30 Minutes Intravenous Once 10/06/20 2331 10/07/20 0226    10/06/20 2230  aztreonam (AZACTAM) 2 g/100 mL 0.9% NS (mbp)     Ordering Provider: Dipti Howell DO    2 g  over 30  Minutes Intravenous Once 10/06/20 2232 10/07/20 0050        Current Diuretic Therapy:  Diuretics (From admission, onward)    None        ----------------------------------------------------------------------------------------------------------------------  Vital Signs:  Temp:  [97.5 °F (36.4 °C)-99.1 °F (37.3 °C)] 97.6 °F (36.4 °C)  Heart Rate:  [37-60] 48  Resp:  [14-18] 15  BP: (115-163)/() 155/61  SpO2:  [92 %-99 %] 93 %  on  Flow (L/min):  [2] 2;   Device (Oxygen Therapy): room air  Body mass index is 30.64 kg/m².    Wt Readings from Last 3 Encounters:   10/07/20 88.7 kg (195 lb 9.6 oz)   10/31/19 86.2 kg (190 lb)   10/21/19 86.2 kg (190 lb)     Intake & Output (last 3 days)       10/05 0701 - 10/06 0700 10/06 0701 - 10/07 0700 10/07 0701 - 10/08 0700 10/08 0701 - 10/09 0700    P.O.   720 1064    I.V. (mL/kg)   1105 (12.5) 914.7 (10.3)    IV Piggyback  1200      Total Intake(mL/kg)  1200 (13.5) 1825 (20.6) 1978.7 (22.3)    Urine (mL/kg/hr)  300 300 (0.1) 550 (0.6)    Total Output  300 300 550    Net  +900 +1525 +1428.7                Diet Regular  ----------------------------------------------------------------------------------------------------------------------  Physical exam:  This physical exam has been personally performed remotely in the unit aided by real-time audio/visual communication tools. Nursing was present at bedside during this exam and assisted during exam. The use of a video visit has been reviewed with the patient and verbal informed consent has been obtained.     Physical Exam:  General: Patient appears awake, alert, and in no acute distress.  Head: Normocephalic, atraumatic  Eyes: EOMI. Conjunctivae and sclerae normal.  Ears: Ears appear intact with no abnormalities noted.   Neck: Trachea midline. No obvious JVD.  Heart: Tele reveals normal sinus rhythm.  Lungs: Respirations appear to be regular, even and unlabored with no signs of respiratory distress. No audible wheezing.  Abdomen:  No obvious abdominal distension.  MS: Muscle tone appears normal. No gross deformities.  Extremities: No clubbing, cyanosis or edema noted.  Skin: No visible bleeding, bruising, or rash.  Neurologic: Alert and oriented x3. No gross focal deficits.   ----------------------------------------------------------------------------------------------------------------------  Tele:    ----------------------------------------------------------------------------------------------------------------------  Results from last 7 days   Lab Units 10/08/20  0408 10/07/20  0227 10/06/20  2347 10/06/20  2207   CRP mg/dL 10.39*  --   --  11.40*   LACTATE mmol/L  --   --  0.6  --    WBC 10*3/mm3 3.62 3.90  --  4.36   HEMOGLOBIN g/dL 11.0* 10.5*  --  11.3*   HEMATOCRIT % 37.3 33.8*  --  36.3   MCV fL 100.3* 95.2  --  93.1   MCHC g/dL 29.5* 31.1*  --  31.1*   PLATELETS 10*3/mm3 162 130*  --  136*         Results from last 7 days   Lab Units 10/08/20  0408 10/07/20  0227 10/06/20  2207   SODIUM mmol/L 141 139 138   POTASSIUM mmol/L 4.8 4.1 4.4   MAGNESIUM mg/dL  --   --  2.5*   CHLORIDE mmol/L 115* 110* 104   CO2 mmol/L 15.5* 18.4* 22.8   BUN mg/dL 30* 32* 37*   CREATININE mg/dL 0.76 0.95 1.22*   EGFR IF NONAFRICN AM mL/min/1.73 74 57* 43*   CALCIUM mg/dL 8.5* 8.2* 8.9   GLUCOSE mg/dL 150* 103* 115*   ALBUMIN g/dL 2.76* 3.12* 3.69   BILIRUBIN mg/dL 0.2 0.2 0.2   ALK PHOS U/L 54 55 62   AST (SGOT) U/L 12 16 18   ALT (SGPT) U/L 7 8 8   Estimated Creatinine Clearance: 69.4 mL/min (by C-G formula based on SCr of 0.76 mg/dL).  No results found for: AMMONIA  Results from last 7 days   Lab Units 10/08/20  0408 10/07/20  1659 10/07/20  1144 10/07/20  0546   CK TOTAL U/L 30  --   --   --    TROPONIN T ng/mL  --  0.025 0.022 0.045*     Results from last 7 days   Lab Units 10/06/20  2207   PROBNP pg/mL 394.3         No results found for: HGBA1C, POCGLU  Lab Results   Component Value Date    TSH 4.350 (H) 10/06/2020     No results found for:  PREGTESTUR, PREGSERUM, HCG, HCGQUANT  Pain Management Panel     There is no flowsheet data to display.        Brief Urine Lab Results  (Last result in the past 365 days)      Color   Clarity   Blood   Leuk Est   Nitrite   Protein   CREAT   Urine HCG        10/06/20 2210 Yellow Turbid Trace Moderate (2+) Negative 30 mg/dL (1+)             No results found for: BLOODCX  Urine Culture   Date Value Ref Range Status   10/06/2020 >100,000 CFU/mL Gram Negative Bacilli (A)  Preliminary     No results found for: WOUNDCX  No results found for: STOOLCX  No results found for: RESPCX  No results found for: AFBCX  Results from last 7 days   Lab Units 10/08/20  0408 10/06/20  2347 10/06/20  2207   LACTATE mmol/L  --  0.6  --    CRP mg/dL 10.39*  --  11.40*       I have personally looked at the labs and they are summarized above.  ----------------------------------------------------------------------------------------------------------------------  Detailed radiology reports for the last 24 hours:    Imaging Results (Last 24 Hours)     ** No results found for the last 24 hours. **        Assessment & Plan      COVID 19  Rectosigmoid Colitis  Bilateral CAP   - titrated down to room air today   - continue Azactam and Flagyl for now given any real lack of pneumonia like symptoms other than hypoxic insufficiency, will add doxy for atypical coverage   - continue dexamethasone 6mg, hold on remdesivir and plasma   - patient respiratory panel negative, without productive cough, and negative for flu   - will plan for test dose of Augmentin tomorrow as patient had arm swelling after penicillin over 40 years ago.  This would provide both CAP and colitis coverage with the addition of Doxy.      Asymptomatic bacteruria  Possible Cystitis   - monitor for now, antibiotics as above    RODOLFO, resolved     NSTEMI, type 2   - mild troponin elevation but downward trendings   - no chest pain    Iron Deficiency AnemiaI   - labs consistent with iron  deficiency, b12 normal   - will start iron supplementation at discharge, hold for now given colitis    Essential Hypertension   - restart home losartan as bp now elevated.       VTE Prophylaxis:   Mechanical Order History:      Ordered        10/07/20 0155  Place Sequential Compression Device  Once         10/07/20 0155  Maintain Sequential Compression Device  Continuous                 Pharmalogical Order History:      Ordered     Dose Route Frequency Stop    10/07/20 0737  enoxaparin (LOVENOX) syringe 50 mg      50 mg SC Every 24 Hours --                London Mota DO  [facility] Hospitalist  10/08/20  17:58 EDT

## 2020-10-09 ENCOUNTER — READMISSION MANAGEMENT (OUTPATIENT)
Dept: CALL CENTER | Facility: HOSPITAL | Age: 75
End: 2020-10-09

## 2020-10-09 VITALS
HEART RATE: 61 BPM | HEIGHT: 67 IN | RESPIRATION RATE: 14 BRPM | TEMPERATURE: 98 F | OXYGEN SATURATION: 96 % | DIASTOLIC BLOOD PRESSURE: 97 MMHG | BODY MASS INDEX: 30.76 KG/M2 | SYSTOLIC BLOOD PRESSURE: 160 MMHG | WEIGHT: 196 LBS

## 2020-10-09 LAB
ALBUMIN SERPL-MCNC: 2.97 G/DL (ref 3.5–5.2)
ALBUMIN/GLOB SERPL: 1.2 G/DL
ALP SERPL-CCNC: 55 U/L (ref 39–117)
ALT SERPL W P-5'-P-CCNC: 6 U/L (ref 1–33)
ANION GAP SERPL CALCULATED.3IONS-SCNC: 10.5 MMOL/L (ref 5–15)
AST SERPL-CCNC: 10 U/L (ref 1–32)
BILIRUB SERPL-MCNC: <0.2 MG/DL (ref 0–1.2)
BUN SERPL-MCNC: 29 MG/DL (ref 8–23)
BUN/CREAT SERPL: 41.4 (ref 7–25)
CALCIUM SPEC-SCNC: 8.8 MG/DL (ref 8.6–10.5)
CHLORIDE SERPL-SCNC: 116 MMOL/L (ref 98–107)
CK SERPL-CCNC: 25 U/L (ref 20–180)
CO2 SERPL-SCNC: 18.5 MMOL/L (ref 22–29)
CREAT SERPL-MCNC: 0.7 MG/DL (ref 0.57–1)
CRP SERPL-MCNC: 4.11 MG/DL (ref 0–0.5)
DEPRECATED RDW RBC AUTO: 52.1 FL (ref 37–54)
ERYTHROCYTE [DISTWIDTH] IN BLOOD BY AUTOMATED COUNT: 14.6 % (ref 12.3–15.4)
FERRITIN SERPL-MCNC: 368.4 NG/ML (ref 13–150)
GFR SERPL CREATININE-BSD FRML MDRD: 82 ML/MIN/1.73
GLOBULIN UR ELPH-MCNC: 2.5 GM/DL
GLUCOSE SERPL-MCNC: 150 MG/DL (ref 65–99)
HCT VFR BLD AUTO: 35.4 % (ref 34–46.6)
HGB BLD-MCNC: 10.6 G/DL (ref 12–15.9)
HYPOCHROMIA BLD QL: ABNORMAL
LYMPHOCYTES # BLD MANUAL: 0.5 10*3/MM3 (ref 0.7–3.1)
LYMPHOCYTES NFR BLD MANUAL: 1 % (ref 5–12)
LYMPHOCYTES NFR BLD MANUAL: 8 % (ref 19.6–45.3)
MACROCYTES BLD QL SMEAR: ABNORMAL
MCH RBC QN AUTO: 29.3 PG (ref 26.6–33)
MCHC RBC AUTO-ENTMCNC: 29.9 G/DL (ref 31.5–35.7)
MCV RBC AUTO: 97.8 FL (ref 79–97)
METAMYELOCYTES NFR BLD MANUAL: 2 % (ref 0–0)
MONOCYTES # BLD AUTO: 0.06 10*3/MM3 (ref 0.1–0.9)
MYELOCYTES NFR BLD MANUAL: 1 % (ref 0–0)
NEUTROPHILS # BLD AUTO: 5.55 10*3/MM3 (ref 1.7–7)
NEUTROPHILS NFR BLD MANUAL: 85 % (ref 42.7–76)
NEUTS BAND NFR BLD MANUAL: 3 % (ref 0–5)
PLAT MORPH BLD: NORMAL
PLATELET # BLD AUTO: 176 10*3/MM3 (ref 140–450)
PMV BLD AUTO: 10.5 FL (ref 6–12)
POTASSIUM SERPL-SCNC: 4.5 MMOL/L (ref 3.5–5.2)
PROT SERPL-MCNC: 5.5 G/DL (ref 6–8.5)
RBC # BLD AUTO: 3.62 10*6/MM3 (ref 3.77–5.28)
SCAN SLIDE: NORMAL
SODIUM SERPL-SCNC: 145 MMOL/L (ref 136–145)
WBC # BLD AUTO: 6.31 10*3/MM3 (ref 3.4–10.8)

## 2020-10-09 PROCEDURE — 99239 HOSP IP/OBS DSCHRG MGMT >30: CPT | Performed by: STUDENT IN AN ORGANIZED HEALTH CARE EDUCATION/TRAINING PROGRAM

## 2020-10-09 PROCEDURE — 86140 C-REACTIVE PROTEIN: CPT | Performed by: INTERNAL MEDICINE

## 2020-10-09 PROCEDURE — 94799 UNLISTED PULMONARY SVC/PX: CPT

## 2020-10-09 PROCEDURE — 80053 COMPREHEN METABOLIC PANEL: CPT | Performed by: INTERNAL MEDICINE

## 2020-10-09 PROCEDURE — 85025 COMPLETE CBC W/AUTO DIFF WBC: CPT | Performed by: INTERNAL MEDICINE

## 2020-10-09 PROCEDURE — 25010000002 ENOXAPARIN PER 10 MG: Performed by: INTERNAL MEDICINE

## 2020-10-09 PROCEDURE — 85007 BL SMEAR W/DIFF WBC COUNT: CPT | Performed by: INTERNAL MEDICINE

## 2020-10-09 PROCEDURE — 25010000002 DEXAMETHASONE PER 1 MG: Performed by: STUDENT IN AN ORGANIZED HEALTH CARE EDUCATION/TRAINING PROGRAM

## 2020-10-09 PROCEDURE — 82550 ASSAY OF CK (CPK): CPT | Performed by: INTERNAL MEDICINE

## 2020-10-09 PROCEDURE — 82728 ASSAY OF FERRITIN: CPT | Performed by: INTERNAL MEDICINE

## 2020-10-09 RX ORDER — DOXYCYCLINE 100 MG/1
100 CAPSULE ORAL 2 TIMES DAILY
Qty: 7 CAPSULE | Refills: 0 | Status: SHIPPED | OUTPATIENT
Start: 2020-10-09 | End: 2020-10-09

## 2020-10-09 RX ORDER — AMOXICILLIN AND CLAVULANATE POTASSIUM 875; 125 MG/1; MG/1
1 TABLET, FILM COATED ORAL EVERY 12 HOURS SCHEDULED
Qty: 7 TABLET | Refills: 0 | Status: SHIPPED | OUTPATIENT
Start: 2020-10-09 | End: 2020-10-13

## 2020-10-09 RX ORDER — LOSARTAN POTASSIUM 50 MG/1
100 TABLET ORAL DAILY
Qty: 30 TABLET | Refills: 0 | Status: SHIPPED | OUTPATIENT
Start: 2020-10-09

## 2020-10-09 RX ORDER — PREDNISONE 20 MG/1
40 TABLET ORAL DAILY
Qty: 10 TABLET | Refills: 0 | Status: SHIPPED | OUTPATIENT
Start: 2020-10-09 | End: 2020-10-14

## 2020-10-09 RX ORDER — LOSARTAN POTASSIUM 50 MG/1
50 TABLET ORAL ONCE
Status: COMPLETED | OUTPATIENT
Start: 2020-10-09 | End: 2020-10-09

## 2020-10-09 RX ORDER — AMOXICILLIN AND CLAVULANATE POTASSIUM 875; 125 MG/1; MG/1
1 TABLET, FILM COATED ORAL EVERY 12 HOURS SCHEDULED
Status: DISCONTINUED | OUTPATIENT
Start: 2020-10-09 | End: 2020-10-09 | Stop reason: HOSPADM

## 2020-10-09 RX ADMIN — AZTREONAM 1 G: 1 INJECTION, POWDER, LYOPHILIZED, FOR SOLUTION INTRAMUSCULAR; INTRAVENOUS at 02:50

## 2020-10-09 RX ADMIN — ENOXAPARIN SODIUM 50 MG: 60 INJECTION SUBCUTANEOUS at 08:25

## 2020-10-09 RX ADMIN — LOSARTAN POTASSIUM 50 MG: 50 TABLET, FILM COATED ORAL at 15:01

## 2020-10-09 RX ADMIN — BACITRACIN ZINC: 500 OINTMENT TOPICAL at 08:26

## 2020-10-09 RX ADMIN — ASPIRIN 81 MG: 81 TABLET, CHEWABLE ORAL at 08:24

## 2020-10-09 RX ADMIN — METRONIDAZOLE 500 MG: 500 INJECTION, SOLUTION INTRAVENOUS at 01:16

## 2020-10-09 RX ADMIN — AMOXICILLIN AND CLAVULANATE POTASSIUM 1 TABLET: 875; 125 TABLET, FILM COATED ORAL at 08:24

## 2020-10-09 RX ADMIN — DOXYCYCLINE 100 MG: 100 INJECTION, POWDER, LYOPHILIZED, FOR SOLUTION INTRAVENOUS at 05:48

## 2020-10-09 RX ADMIN — DEXAMETHASONE SODIUM PHOSPHATE 6 MG: 4 INJECTION, SOLUTION INTRAMUSCULAR; INTRAVENOUS at 08:25

## 2020-10-09 RX ADMIN — LOSARTAN POTASSIUM 50 MG: 50 TABLET, FILM COATED ORAL at 08:24

## 2020-10-09 RX ADMIN — SODIUM CHLORIDE, PRESERVATIVE FREE 10 ML: 5 INJECTION INTRAVENOUS at 08:25

## 2020-10-09 NOTE — DISCHARGE INSTRUCTIONS
You are being discharged from the hospital after being admitted for positive COVID 19 status requiring oxygen, community acquired pnuemonia, and being have to have colitis on imaging.  You have improved to no longer requiring oxygen and are on room air.  You will take three and a half more days of antibiotics to complete treatment.  The first dose, augmentin, will be this evening.  You will also have 5 more days of prednisone of 40mg daily. Your blood pressure was noticed to be increased while here and we have increased your losartan from 50mg daily to 100mg.  You will need to follow up with your PCP in one week.  You will also need to self quarantined at home for another 14 days.

## 2020-10-09 NOTE — PLAN OF CARE
Goal Outcome Evaluation:  Plan of Care Reviewed With: patient  Progress: improving   Patient being discharged home today

## 2020-10-09 NOTE — PLAN OF CARE
Goal Outcome Evaluation:  Plan of Care Reviewed With: patient  Progress: improving  Outcome Summary: Patient rested well throughout shift. COntinues to run sinus bradycardic to sinus rhythm. Patient denies any soa, weakness, dizziness, or pain. VSS. Patient on room air. Will continue to monitor.

## 2020-10-09 NOTE — CONSULTS
"I spoke briefly this afternoon with Mrs. Rosenberg via telephone, addressing a concern raised by the  regarding the loss of her  a few weeks ago.  Mrs. Rosenberg said she was \"okay\" and had appreciated the support from her adult children.  I had prayer with her over the phone, which she appreciated.  "

## 2020-10-09 NOTE — PROGRESS NOTES
Continued Stay Note   [facility]     Patient Name: Orquidea Rosenberg  MRN: [id number]  Today's Date: 10/9/2020    Admit Date: 10/6/2020    Discharge Plan     Row Name 10/09/20 1018       Plan    Plan  CM spoke with pt via telephone today and she expects to be d/c home today. She plans to return home and her daughter Lilli will be staying with her. Lilli will transport her home. She is enrolled with meds to bed for Rx. She deneis any needs for HH/DME at this time. She has been on ra with sats mid 90's. [employer] her of 24 hr RN call line for any questions after d/c.  CM will follow.    Plan Comments  10/9: on ra today sats good mid 90's,  she says she feels better. bradycardia at times. WBC 6.31,CRP 4.11, Ferritin 368, resp panel neg. De-esc antbx Augmentin, Doxy IV, Decadron IV, Reg diet. She has been OOB to BR with assist. No BM doc message to RN. Pt stated she had a BM since admission.        Discharge Codes    No documentation.             Eda Membreno RN

## 2020-10-10 ENCOUNTER — READMISSION MANAGEMENT (OUTPATIENT)
Dept: CALL CENTER | Facility: HOSPITAL | Age: 75
End: 2020-10-10

## 2020-10-10 NOTE — OUTREACH NOTE
Prep Survey      Responses   [facility]   Is LACE score < 7 ?  Yes   Eligibility  Readm Mgmt   Discharge diagnosis   Myalgias, nausea and vomiting COVID    Does the patient have one of the following disease processes/diagnoses(primary or secondary)?  COVID-19   Does the patient have Home health ordered?  No   Is there a DME ordered?  No   Prep survey completed?  Yes          Jaylyn Chawla RN

## 2020-10-10 NOTE — OUTREACH NOTE
COVID-19 Week 1 Survey      Responses   [facility] patient discharged from?  [facility]   Does the patient have one of the following disease processes/diagnoses(primary or secondary)?  COVID-19   COVID-19 underlying condition?  None   Call Number  Call 1   Week 1 Call successful?  Yes   Call start time  1129   Call end time  1140   Discharge diagnosis   Myalgias, nausea and vomiting COVID    Meds reviewed with patient/caregiver?  Yes   Is the patient having any side effects they believe may be caused by any medication additions or changes?  No   Does the patient have all medications ordered at discharge?  Yes   Is the patient taking all medications as directed (includes completed medication regime)?  Yes   Does the patient have a primary care provider?   Yes   Does the patient have an appointment with their PCP or specialist within 7 days of discharge?  No   What is preventing the patient from scheduling follow up appointments within 7 days of discharge?  Haven't had time   Nursing Interventions  Educated patient on importance of making appointment   Has the patient kept scheduled appointments due by today?  N/A   Has home health visited the patient within 72 hours of discharge?  N/A   Psychosocial issues?  No   Did the patient receive a copy of their discharge instructions?  Yes   Did the patient receive a copy of COVID-19 specific instructions?  Yes   Nursing interventions  Reviewed instructions with patient   What is the patient's perception of their health status since discharge?  Improving   Does the patient have any of the following symptoms?  Shortness of breath [with exertion]   Nursing Interventions  Nurse provided patient education   Pulse Ox monitoring  Intermittent   Pulse Ox device source  Patient   O2 Sat: education provided  Sat levels, Monitoring frequency, When to seek care   O2 Sat education comments  Encouraged to monitor.  88% last night with good rebound, 92% today after deep breath.   Is the  patient/caregiver able to teach back steps to recovery at home?  Set small, achievable goals for return to baseline health, Eat a well-balance diet, Make a list of questions for provider's appointment   If the patient is a current smoker, are they able to teach back resources for cessation?  Not a smoker   Is the patient/caregiver able to teach back the hierarchy of who to call/visit for symptoms/problems? PCP, Specialist, Home health nurse, Urgent Care, ED, 911  Yes   COVID-19 call completed?  Yes   Wrap up additional comments  Encouraged to use My Chart to report Sats.  She changed her toothbrush.          Anjali Wolf RN

## 2020-10-11 ENCOUNTER — READMISSION MANAGEMENT (OUTPATIENT)
Dept: CALL CENTER | Facility: HOSPITAL | Age: 75
End: 2020-10-11

## 2020-10-11 NOTE — DISCHARGE SUMMARY
[facility] HOSPITALISTS DISCHARGE SUMMARY    Patient Identification:  Name:  Orquidea Rosenberg  Age:  75 y.o.  Sex:  female  :  1945  MRN:  [id number]  Visit Number:  [id number]    Date of Admission: 10/6/2020  Date of Discharge:  10/9/2020     PCP: Adriana Loya APRN    DISCHARGE DIAGNOSIS    COVID 19  Rectosigmoid Colitis  Bilateral CAP  Asymptomatic bacteruria  Possible Cystitis  RODOLFO, resolved  NSTEMI, type 2  Iron Deficiency Anemia  Essential Hypertension  CONSULTS       PROCEDURES PERFORMED    CT abdomen/pelvis  CT cervical spine  CT chest w/o contrast  CT head w/o contrast  CT thoracic spine  HOSPITAL COURSE  Patient is a 75 y.o. female presented to [facility] complaining of myalgias, nausea and vomiting.  Please see the admitting history and physical for further details.      Patient admitted for COVID 19 bilateral pneumonia, nausea and vomiting with acute rectosigmoid vasculitis, mild cystitis.  Patient initially treated with Azactam and flagyl given possible penicillin allergy.  Doxycycline added by daytime hospitalist for atypical pneumonia coverage.  Respiratory panels all returned negative and patient steadly improved was not requiring oxygen and her illness was felt to be due to COVID and rectosigmoid colitis.  However since the patient was not requiring oxygen, Remdesivir and convalescent plasma were not initiated but dexamethasone was. Patient also found to have iron deficiency anemia but supplementation not started in the setting of concurrent colitis.  Patient was feeling well and wanting to return home at time of discharge. She was given a test dose of augmentin given 42 year old allergy related to possible arm swelling.  Patient tolerated the dose well and was discharged home with 4 more days of augmentin and prednisone to supplement dexamethasone.  Her home losartan was also reduced to 50mg daily.  She will follow up with her PCP in one week.     VITAL  SIGNS:        on   ;        Body mass index is 30.7 kg/m².  Wt Readings from Last 3 Encounters:   10/09/20 88.9 kg (196 lb)   10/31/19 86.2 kg (190 lb)   10/21/19 86.2 kg (190 lb)       PHYSICAL EXAM:  This physical exam has been personally performed remotely in the unit aided by real-time audio/visual communication tools. Nursing was present at bedside during this exam and assisted during exam. The use of a video visit has been reviewed with the patient and verbal informed consent has been obtained.      Physical Exam:  General: Patient appears awake, alert, and in no acute distress.  Head: Normocephalic, atraumatic  Eyes: EOMI. Conjunctivae and sclerae normal.  Ears: Ears appear intact with no abnormalities noted.   Neck: Trachea midline. No obvious JVD.  Heart: Tele reveals normal sinus rhythm.  Lungs: Respirations appear to be regular, even and unlabored with no signs of respiratory distress. No audible wheezing.  Abdomen: No obvious abdominal distension.  MS: Muscle tone appears normal. No gross deformities.  Extremities: No clubbing, cyanosis or edema noted.  Skin: No visible bleeding, bruising, or rash.  Neurologic: Alert and oriented x3. No gross focal deficits.     DISCHARGE DISPOSITION   Stable    DISCHARGE MEDICATIONS:     Discharge Medications      New Medications      Instructions Start Date   amoxicillin-clavulanate 875-125 MG per tablet  Commonly known as: AUGMENTIN   1 tablet, Oral, Every 12 Hours Scheduled      predniSONE 20 MG tablet  Commonly known as: DELTASONE   40 mg, Oral, Daily         Changes to Medications      Instructions Start Date   losartan 50 MG tablet  Commonly known as: COZAAR  What changed: how much to take   100 mg, Oral, Daily               Follow-up Information     Adriana Loya APRN Follow up in 1 week(s).    Specialty: Nurse Practitioner  Contact information:  [address]  [address]  [facility] [address] [phone]                    TEST  RESULTS PENDING AT  DISCHARGE  Pending Labs     Order Current Status    Blood Culture - Blood, Arm, Left Preliminary result    Blood Culture - Blood, Arm, Left Preliminary result           CODE STATUS  Code Status and Medical Interventions:   Ordered at: 10/07/20 0049     Code Status:    CPR     Medical Interventions (Level of Support Prior to Arrest):    Full       London Mota DO  [facility] ERist  10/11/20  16:15 EDT    Please note that this discharge summary required more than 30 minutes to complete.

## 2020-10-11 NOTE — OUTREACH NOTE
COVID-19 Week 1 Survey      Responses   [facility] [place] patient discharged from?  [facility]   Does the patient have one of the following disease processes/diagnoses(primary or secondary)?  COVID-19   COVID-19 underlying condition?  None   Call Number  Call 2   Week 1 Call successful?  No   Discharge diagnosis   Myalgias, nausea and vomiting JUANITA Wolf RN

## 2020-10-12 ENCOUNTER — READMISSION MANAGEMENT (OUTPATIENT)
Dept: CALL CENTER | Facility: HOSPITAL | Age: 75
End: 2020-10-12

## 2020-10-12 ENCOUNTER — NURSE TRIAGE (OUTPATIENT)
Dept: CALL CENTER | Facility: HOSPITAL | Age: 75
End: 2020-10-12

## 2020-10-12 LAB
BACTERIA SPEC AEROBE CULT: NORMAL
BACTERIA SPEC AEROBE CULT: NORMAL

## 2020-10-12 NOTE — TELEPHONE ENCOUNTER
"Her O2  Sat right now has reached 90%. All day has hovered between 82 to 89%. She gets SOA when ambulating but feels fine when resting.  Color is pink.  HR 72 bpm.  She is on no supplemental oxygen at home.  Call placed to her PCP about this, waiting for call back.  Advised if sat is 90 or above and is not acutely SOA or in distress then okay to wait for provider to call.  If level drops below 90% then go to ER.     Reason for Disposition  • MILD difficulty breathing (e.g., minimal/no SOB at rest, SOB with walking, pulse <100)    Additional Information  • Negative: SEVERE difficulty breathing (e.g., struggling for each breath, speaks in single words)  • Negative: Difficult to awaken or acting confused (e.g., disoriented, slurred speech)  • Negative: Bluish (or gray) lips or face now  • Negative: Shock suspected (e.g., cold/pale/clammy skin, too weak to stand, low BP, rapid pulse)  • Negative: Sounds like a life-threatening emergency to the triager  • Negative: [1] COVID-19 exposure AND [2] no symptoms  • Negative: COVID-19 and Breastfeeding, questions about  • Negative: [1] Adult with possible COVID-19 symptoms AND [2] triager concerned about severity of symptoms or other causes  • Negative: SEVERE or constant chest pain or pressure (Exception: mild central chest pain, present only when coughing)  • Negative: MODERATE difficulty breathing (e.g., speaks in phrases, SOB even at rest, pulse 100-120)  • Negative: Patient sounds very sick or weak to the triager    Answer Assessment - Initial Assessment Questions  1. COVID-19 DIAGNOSIS: \"Who made your Coronavirus (COVID-19) diagnosis?\" \"Was it confirmed by a positive lab test?\" If not diagnosed by a HCP, ask \"Are there lots of cases (community spread) where you live?\" (See public health department website, if unsure)      Diagnosed 10/6  2. ONSET: \"When did the COVID-19 symptoms start?\"       Symptoms began 9/29  3. WORST SYMPTOM: \"What is your worst symptom?\" (e.g., " "cough, fever, shortness of breath, muscle aches)      Feels better  4. COUGH: \"Do you have a cough?\" If so, ask: \"How bad is the cough?\"        No cough  5. FEVER: \"Do you have a fever?\" If so, ask: \"What is your temperature, how was it measured, and when did it start?\"      Afebrile  6. RESPIRATORY STATUS: \"Describe your breathing?\" (e.g., shortness of breath, wheezing, unable to speak)       SOA when walking but is good at rest. O2 level today ranged 82-89% just now is up to 90% first time today.    7. BETTER-SAME-WORSE: \"Are you getting better, staying the same or getting worse compared to yesterday?\"  If getting worse, ask, \"In what way?\"      Same  8. HIGH RISK DISEASE: \"Do you have any chronic medical problems?\" (e.g., asthma, heart or lung disease, weak immune system, etc.)     Nne  9. PREGNANCY: \"Is there any chance you are pregnant?\" \"When was your last menstrual period?\"      NA  10. OTHER SYMPTOMS: \"Do you have any other symptoms?\"  (e.g., chills, fatigue, headache, loss of smell or taste, muscle pain, sore throat)        SOA with ambulating, low O2 level, no other complaints    Protocols used: CORONAVIRUS (COVID-19) DIAGNOSED OR SUSPECTED-ADULT-AH      "

## 2020-10-13 ENCOUNTER — APPOINTMENT (OUTPATIENT)
Dept: OCCUPATIONAL THERAPY | Facility: HOSPITAL | Age: 75
End: 2020-10-13

## 2020-10-15 ENCOUNTER — APPOINTMENT (OUTPATIENT)
Dept: OCCUPATIONAL THERAPY | Facility: HOSPITAL | Age: 75
End: 2020-10-15

## 2020-10-15 ENCOUNTER — READMISSION MANAGEMENT (OUTPATIENT)
Dept: CALL CENTER | Facility: HOSPITAL | Age: 75
End: 2020-10-15

## 2020-10-15 NOTE — OUTREACH NOTE
COVID-19 Week 1 Survey      Responses   [facility] patient discharged from?  [facility]   Does the patient have one of the following disease processes/diagnoses(primary or secondary)?  COVID-19   COVID-19 underlying condition?  None   Call Number  Call 3   Week 1 Call successful?  Yes   Call start time  1040   Call end time  1052   Discharge diagnosis   Myalgias, nausea and vomiting COVID    Is patient permission given to speak with other caregiver?  Yes   Person spoke with today (if not patient) and relationship  Lilli/ daughter    Meds reviewed with patient/caregiver?  Yes   Is the patient having any side effects they believe may be caused by any medication additions or changes?  No   Does the patient have all medications ordered at discharge?  Yes   Is the patient taking all medications as directed (includes completed medication regime)?  Yes   Does the patient have a primary care provider?   Yes   Comments regarding PCP  Has an appt in person on 10/21/2020 after she is out of quarantine. I suggested daughter make a followup appt via telehealth at this time due to high blood pressure. She verbalized understanding. She reports it was 180/ 82 last night.    Does the patient have an appointment with their PCP or specialist within 7 days of discharge?  Greater than 7 days   What is preventing the patient from scheduling follow up appointments within 7 days of discharge?  -- [In quarantine]   Nursing Interventions  Educated patient on importance of making appointment, Verified appointment date/time/provider, Advised patient to make appointment   Has the patient kept scheduled appointments due by today?  N/A   Comments  Daughter will make telehealth appt as well.    Has home health visited the patient within 72 hours of discharge?  N/A   Psychosocial issues?  No   Comments  Daughter reports patient is only SOB if walking around alot. She is afebrile. She is improving except for her blood pressure remains elevated.  She will call and discuss with MD.    Did the patient receive a copy of their discharge instructions?  Yes   Did the patient receive a copy of COVID-19 specific instructions?  Yes   Nursing interventions  Reviewed instructions with patient   What is the patient's perception of their health status since discharge?  Improving   Does the patient have any of the following symptoms?  Shortness of breath   Nursing Interventions  Nurse provided patient education   Pulse Ox monitoring  Intermittent   Pulse Ox device source  Patient   O2 Sat comments  93%RA Sat n room air.    O2 Sat: education provided  Sat levels, Monitoring frequency, When to seek care   Is the patient/caregiver able to teach back steps to recovery at home?  Set small, achievable goals for return to baseline health, Eat a well-balance diet, Make a list of questions for provider's appointment   If the patient is a current smoker, are they able to teach back resources for cessation?  Not a smoker   Is the patient/caregiver able to teach back the hierarchy of who to call/visit for symptoms/problems? PCP, Specialist, Home health nurse, Urgent Care, ED, 911  Yes   COVID-19 call completed?  Yes   Wrap up additional comments  Encouraged to use My Chart to report Sats.  She changed her toothbrush. She will set up telhealth visit to discuss B/P with PCP. Encouraged her to keep a log.           Mark Bhandari RN

## 2020-10-18 ENCOUNTER — READMISSION MANAGEMENT (OUTPATIENT)
Dept: CALL CENTER | Facility: HOSPITAL | Age: 75
End: 2020-10-18

## 2020-10-20 ENCOUNTER — APPOINTMENT (OUTPATIENT)
Dept: OCCUPATIONAL THERAPY | Facility: HOSPITAL | Age: 75
End: 2020-10-20

## 2020-10-21 ENCOUNTER — READMISSION MANAGEMENT (OUTPATIENT)
Dept: CALL CENTER | Facility: HOSPITAL | Age: 75
End: 2020-10-21

## 2020-10-21 NOTE — OUTREACH NOTE
COVID-19 Week 2 Survey      Responses   [facility] patient discharged from?  [facility]   Does the patient have one of the following disease processes/diagnoses(primary or secondary)?  COVID-19   COVID-19 underlying condition?  None   Call Number  Call 1   COVID-19 Week 2: Call 1 attempt successful?  No   Discharge diagnosis   Myalgias, nausea and vomiting JUANITA Sequeira RN

## 2020-10-22 ENCOUNTER — HOSPITAL ENCOUNTER (OUTPATIENT)
Dept: OCCUPATIONAL THERAPY | Facility: HOSPITAL | Age: 75
Setting detail: THERAPIES SERIES
Discharge: HOME | End: 2020-10-22

## 2020-10-22 DIAGNOSIS — C51.9 CANCER OF VULVA (HCC): ICD-10-CM

## 2020-10-22 DIAGNOSIS — I89.0 LYMPHEDEMA OF BOTH LOWER EXTREMITIES: Primary | ICD-10-CM

## 2020-10-22 PROCEDURE — 97139 UNLISTED THERAPEUTIC PX: CPT

## 2020-10-22 PROCEDURE — 97016 VASOPNEUMATIC DEVICE THERAPY: CPT

## 2020-10-22 PROCEDURE — 97168 OT RE-EVAL EST PLAN CARE: CPT

## 2020-10-22 PROCEDURE — 97140 MANUAL THERAPY 1/> REGIONS: CPT

## 2020-10-22 NOTE — THERAPY RE-EVALUATION
Outpatient Occupational Therapy Lymphedema Re-Evaluation   [facility]     Patient Name: Orquidea Rosenberg  : 1945  MRN: [id number]  Today's Date: 10/22/2020      Visit Date: 10/22/2020    Patient Active Problem List   Diagnosis   • COVID-19        Past Medical History:   Diagnosis Date   • Arthritis    • COPD (chronic obstructive pulmonary disease) (CMS/HCC)    • Elevated cholesterol    • History of transfusion    • Hypertension    • Vulval ca (CMS/HCC)         Past Surgical History:   Procedure Laterality Date   • RADICAL VULVECTOMY  2019   • RADICAL VULVECTOMY Bilateral 2019         Visit Dx:     ICD-10-CM ICD-9-CM   1. Lymphedema of both lower extremities  I89.0 457.1   2. Cancer of vulva (CMS/HCC)  C51.9 184.4           Lymphedema     Row Name 10/22/20 1000             Subjective Pain    Able to rate subjective pain?  yes  -AB      Pre-Treatment Pain Level  0  -AB      Subjective Pain Comment  Denies pain this date  -AB         Subjective Comments    Subjective Comments  Pt. presents to therapy after being quarantined for two weeks because she contracted Covid-19. Pt. reports that she is feeling much better.     -AB         Lymphedema Assessment    Lymphedema Classification  RLE:;LLE:;secondary;stage 2 (Spontaneously Irreversible)  -AB      Lymphedema Cancer Related Sx  bilateral radical vulvectomy  -AB      Lymph Nodes Removed #  0  -AB      Positive Lymph Nodes #  0  -AB      Chemo Received  yes  -AB      Radiation Therapy Received  yes  -AB         Posture/Observations    Posture- WNL  Posture is WNL  -AB         Lymphedema Edema Assessment    Ptting Edema Category  By grade out of 4  -AB      Pitting Edema  + 3/4  -AB      Stemmer Sign  left:;positive  -AB         Skin Changes/Observations    Location/Assessment  Lower Extremity;Other Location  -AB      Lower Extremity Conditions  bilateral:;clean;dry;shiny  -AB      Lower Extremity Color/Pigment  bilateral:;blanchable;radiation  fibrosis;hyperpigmented  -AB      Other Location Color/Pigment  bilateral:;red;radiation fibrosis;other (comment) groin/vaginal areas  -AB         Lymphedema Sensation    Lymphedema Sensation Tests  light touch;deep touch  -AB      Lymphedema Light Touch  RLE:;mild impairment  -AB      Lymphedema Deep Touch  WNL  -AB         Lymphedema Measurements    Measurement Type(s)  Quick Girth  -AB      Quick Girth Areas  Lower extremities  -AB      Circumferential Areas  Trunk  -AB         LLE Quick Girth (cm)    Met-heads  24.4 cm  -AB      Mid foot  24.7 cm  -AB      Smallest ankle  28 cm  -AB      Largest calf  40.6 cm  -AB      Tib tuberosity  39.4 cm  -AB      Mid patella  44 cm  -AB      Distal thigh  49.7 cm  -AB      Other 1  29.6 cm  -AB      Other 2  38.2 cm  -AB         RLE Quick Girth (cm)    Met-heads  22 cm  -AB      Mid foot  24.2 cm  -AB      Smallest ankle  24.3 cm  -AB      Largest calf  40.2 cm  -AB      Tib tuberosity  39.7 cm  -AB      Mid patella  45.3 cm  -AB      Distal thigh  47.9 cm  -AB      Other 1  23.7 cm  -AB      Other 2  33.9 cm  -AB      RLE Quick Girth Total  301.2  -AB         Trunk Circumferential (cm)    Measurement Location 1  Navel  -AB         Manual Lymphatic Drainage    Manual Lymphatic Drainage  extremity treatment;opened regional lymph nodes;initial sequence  -AB      Initial Sequence  abdomen  -AB      Abdomen  superficial  -AB      Opened Regional Lymph Nodes  axillary;inguinal  -AB      Axillary  right;left  -AB      Inguinal  right;left  -AB      Extremity Treatment  MLD to full limb  -AB      MLD to Full Limb  BLE's  -AB      Manual Therapy  MLD to BLE's, abdomen, axillary, inguinals  -AB         Compression/Skin Care    Compression/Skin Care  skin care;wrapping location;bandaging  -AB      Skin Care  moisturizing lotion applied  -AB      Wrapping Location  lower extremity  -AB      Wrapping Location LE  bilateral: L-toes to mid thigh; R-base fo toes to mid thigh  -AB       Bandage Layers  short-stretch bandages (comment size/quantity);cotton elastic stocking- single layer (comment size);soft foam- 1/4 inch  -AB      Bandaging Comments  kinesiotape to L toes  -AB      Bandaging Technique  circumferential/spiral;light compression;moderate compression  -AB      Compression/Skin Care Comments  compression pump to abdomen and BLE's  -AB        User Key  (r) = Recorded By, (t) = Taken By, (c) = Cosigned By    Initials Name Provider Type    Anca Pond, OT Occupational Therapist                  Therapy Education  Given: HEP, Edema management, Symptoms/condition management, Bandaging/dressing change  Program: Reinforced  How Provided: Verbal, Demonstration  Provided to: Patient  Level of Understanding: Verbalized        OT Goals     Row Name 10/22/20 1100          OT Short Term Goals    STG Date to Achieve  10/28/20  -AB     STG 1  Pt. familiar w/precautions, skin care and self management of lymphdema.  -AB     STG 1 Progress  Partially Met  -AB     STG 2  Decrease pitting edema to 3/4 for decreased risk of infection.  -AB     STG 2 Progress  Met  -AB     STG 3  HEP to assist with improved lymphatic flow.  -AB     STG 3 Progress  Partially Met  -AB     STG 4  Self care instructions for home MLD for volume reduction.  -AB     STG 4 Progress  Met  -AB        Long Term Goals    LTG Date to Achieve  11/22/20  -AB     LTG 1  Pt. and/or caregiver independent w/short stretch compression bandaging for volume reduction.  -AB     LTG 1 Progress  Ongoing  -AB     LTG 2  Decrease pitting edema to 2/4 for decreased risk of infection.  -AB     LTG 2 Progress  Ongoing;Progressing  -AB     LTG 3  Independent with donning/doffing compression garment.  -AB     LTG 3 Progress  Ongoing  -AB     LTG 4  Independent with lymphedema management and HEP.  -AB     LTG 4 Progress  Ongoing;Progressing  -AB        Time Calculation    OT Goal Re-Cert Due Date  01/22/21  -AB       User Key  (r) = Recorded  By, (t) = Taken By, (c) = Cosigned By    Initials Name Provider Type    Anca Pond OT Occupational Therapist          OT Assessment/Plan     Row Name 10/22/20 1136          OT Assessment    Functional Limitations  Decreased safety during functional activities;Limitations in functional capacity and performance;Impaired gait  -AB     Impairments  Balance;Edema;Gait;Impaired lymphatic circulation;Pain;Sensation  -AB     OT Diagnosis  Lymphedema  -AB     OT Rehab Potential  Good  -AB     Patient/caregiver participated in establishment of treatment plan and goals  Yes  -AB     Patient would benefit from skilled therapy intervention  Yes  -AB        OT Plan    OT Frequency  2x/week  -AB     Predicted Duration of Therapy Intervention (OT)  90 days  -AB     Planned CPT's?  OT RE-EVAL: 05393;OT THER ACT EA 15 MIN: 14451IX;OT THER PROC EA 15 MIN: 63049OC;OT SELF CARE/MGMT/TRAIN 15 MIN: 47268;OT MANUAL THERAPY EA 15 MIN: 99483;OT BIS XTRACELL FLUID ANALYSIS: 45233;OT VASOPNEUMATIC DEVICE: 46211 lymphedema management  -AB     Planned Therapy Interventions (Optional Details)  home exercise program;manual therapy techniques;patient/family education  -AB     OT Plan Comments  Pt. continues to progress with therapy. She was on hold from therapy for 2 weeks which caused a minor set back. However, she will benefit from continued skilled OT services to address ongoing lymphedema needs.  -AB       User Key  (r) = Recorded By, (t) = Taken By, (c) = Cosigned By    Initials Name Provider Type    AB Anca Pickett OT Occupational Therapist                    Time Calculation:   OT Start Time: 0945  OT Stop Time: 1115  OT Time Calculation (min): 90 min  OT Non-Billable Time (min): 25 min  Total Timed Code Minutes- OT: 90 minute(s)     Therapy Charges for Today     Code Description Service Date Service Provider Modifiers Qty    77641558346 HC OT MANUAL THERAPY EA 15 MIN 10/22/2020 Anca Pickett OT GO, KX  4    64469626561 HC OT LYMPHEDEMA MANAGEMENT-15 MIN 10/22/2020 Anca Pickett, OT KX 1    40459648346 HC OT VASOPNEUMAT DEVIC 1 OR MORE AREAS 10/22/2020 Anca Pickett OT ALON, KX 1    38120566098 HC OT RE-EVAL 2 10/22/2020 Anca Pickett OT ALON, KX 1                    Anca Pickett OT  10/22/2020

## 2020-10-24 ENCOUNTER — READMISSION MANAGEMENT (OUTPATIENT)
Dept: CALL CENTER | Facility: HOSPITAL | Age: 75
End: 2020-10-24

## 2020-10-24 NOTE — OUTREACH NOTE
COVID-19 Week 2 Survey      Responses   [facility] patient discharged from?  [facility]   Does the patient have one of the following disease processes/diagnoses(primary or secondary)?  COVID-19   COVID-19 underlying condition?  None   Call Number  Call 2   COVID-19 Week 2: Call 1 attempt successful?  No   Discharge diagnosis   Myalgias, nausea and vomiting JUANITA Wolf RN

## 2020-10-27 ENCOUNTER — HOSPITAL ENCOUNTER (OUTPATIENT)
Dept: OCCUPATIONAL THERAPY | Facility: HOSPITAL | Age: 75
Setting detail: THERAPIES SERIES
Discharge: HOME | End: 2020-10-27

## 2020-10-27 DIAGNOSIS — C51.9 CANCER OF VULVA (HCC): ICD-10-CM

## 2020-10-27 DIAGNOSIS — I89.0 LYMPHEDEMA OF BOTH LOWER EXTREMITIES: Primary | ICD-10-CM

## 2020-10-27 PROCEDURE — 97140 MANUAL THERAPY 1/> REGIONS: CPT

## 2020-10-27 PROCEDURE — 97016 VASOPNEUMATIC DEVICE THERAPY: CPT

## 2020-10-27 PROCEDURE — 97139 UNLISTED THERAPEUTIC PX: CPT

## 2020-10-27 NOTE — THERAPY TREATMENT NOTE
Outpatient Occupational Therapy Lymphedema Treatment Note  [facility]ERT[facility] Patel     Patient Name: Orquidea Rosenberg  : 1945  MRN: [id number]  Today's Date: 10/27/2020      Visit Date: 10/27/2020    Patient Active Problem List   Diagnosis   • COVID-19        Past Medical History:   Diagnosis Date   • Arthritis    • COPD (chronic obstructive pulmonary disease) (CMS/HCC)    • Elevated cholesterol    • History of transfusion    • Hypertension    • Vulval ca (CMS/HCC)         Past Surgical History:   Procedure Laterality Date   • RADICAL VULVECTOMY  2019   • RADICAL VULVECTOMY Bilateral 2019         Visit Dx:      ICD-10-CM ICD-9-CM   1. Lymphedema of both lower extremities  I89.0 457.1   2. Cancer of vulva (CMS/HCC)  C51.9 184.4       Lymphedema     Row Name 10/27/20 1000             Subjective Pain    Able to rate subjective pain?  yes  -AB      Pre-Treatment Pain Level  0  -AB      Subjective Pain Comment  denies pain  -AB         Subjective Comments    Subjective Comments  Pt. presents to treatment w/ no new c/o this date.   -AB         Lymphedema Assessment    Lymphedema Classification  RLE:;LLE:;secondary;stage 2 (Spontaneously Irreversible)  -AB      Lymphedema Cancer Related Sx  bilateral radical vulvectomy  -AB      Lymph Nodes Removed #  0  -AB      Positive Lymph Nodes #  0  -AB      Chemo Received  yes  -AB      Radiation Therapy Received  yes  -AB         Posture/Observations    Posture- WNL  Posture is WNL  -AB         Lymphedema Edema Assessment    Ptting Edema Category  By grade out of 4  -AB      Pitting Edema  + 3/4  -AB      Stemmer Sign  left:;positive  -AB      Edema Assessment Comment  more pliability noted in LLE  -AB         Skin Changes/Observations    Location/Assessment  Lower Extremity;Other Location  -AB      Lower Extremity Conditions  bilateral:;clean;dry;shiny  -AB      Lower Extremity Color/Pigment  bilateral:;blanchable;radiation fibrosis;hyperpigmented  -AB      Other Location  Color/Pigment  bilateral:;red;radiation fibrosis;other (comment) groin/vaginal areas  -AB         Lymphedema Sensation    Lymphedema Sensation Tests  light touch;deep touch  -AB      Lymphedema Light Touch  RLE:;mild impairment  -AB      Lymphedema Deep Touch  WNL  -AB         Lymphedema Measurements    Measurement Type(s)  Quick Girth  -AB      Quick Girth Areas  Lower extremities  -AB      Circumferential Areas  Trunk  -AB         LLE Quick Girth (cm)    Met-heads  24 cm  -AB      Mid foot  25.2 cm  -AB      Smallest ankle  27.2 cm  -AB      Largest calf  40.7 cm  -AB      Tib tuberosity  40.2 cm  -AB      Mid patella  46.9 cm  -AB      Distal thigh  50.4 cm  -AB      Other 1  27 cm  -AB      Other 2  35.8 cm  -AB         RLE Quick Girth (cm)    Met-heads  22.5 cm  -AB      Mid foot  22.5 cm  -AB      Smallest ankle  23.3 cm  -AB      Largest calf  38.7 cm  -AB      Tib tuberosity  39.4 cm  -AB      Mid patella  43.4 cm  -AB      Distal thigh  49.1 cm  -AB      Other 1  24.1 cm  -AB      Other 2  34.7 cm  -AB      RLE Quick Girth Total  297.7  -AB         Trunk Circumferential (cm)    Measurement Location 1  Navel  -AB         Manual Lymphatic Drainage    Manual Lymphatic Drainage  extremity treatment;opened regional lymph nodes;initial sequence  -AB      Initial Sequence  abdomen  -AB      Abdomen  superficial  -AB      Opened Regional Lymph Nodes  axillary;inguinal  -AB      Axillary  right;left  -AB      Inguinal  right;left  -AB      Extremity Treatment  MLD to full limb  -AB      MLD to Full Limb  BLE's  -AB      Manual Therapy  MLD to BLE's, abdomen, axillary, inguinals  -AB         Compression/Skin Care    Compression/Skin Care  skin care;wrapping location;bandaging  -AB      Skin Care  moisturizing lotion applied  -AB      Wrapping Location  lower extremity  -AB      Wrapping Location LE  bilateral: base of toes to mid thigh  -AB      Bandage Layers  short-stretch bandages (comment size/quantity);cotton  elastic stocking- single layer (comment size);soft foam- 1/4 inch  -AB      Bandaging Technique  circumferential/spiral;moderate compression  -AB      Compression/Skin Care Comments  compression pump to abdomen and BLE's  -AB        User Key  (r) = Recorded By, (t) = Taken By, (c) = Cosigned By    Initials Name Provider Type    Anca Pond OT Occupational Therapist                                Therapy Education  Given: HEP, Edema management, Symptoms/condition management, Bandaging/dressing change  Program: Reinforced  How Provided: Verbal, Demonstration  Provided to: Patient  Level of Understanding: Verbalized                Time Calculation:   OT Start Time: 0930  OT Stop Time: 1100  OT Time Calculation (min): 90 min  OT Non-Billable Time (min): 25 min  Total Timed Code Minutes- OT: 90 minute(s)     Therapy Charges for Today     Code Description Service Date Service Provider Modifiers Qty    95786522764  OT MANUAL THERAPY EA 15 MIN 10/27/2020 Anca Pickett OT GO, KX 4    [id number]  OT LYMPHEDEMA MANAGEMENT-15 MIN 10/27/2020 Anca Pickett OT KX 1    [id number]  OT VASOPNEUMAT DEVIC 1 OR MORE AREAS 10/27/2020 Anca Pickett OT GO, KX 1                      Anca Pickett OT  10/27/2020

## 2020-10-29 ENCOUNTER — HOSPITAL ENCOUNTER (OUTPATIENT)
Dept: OCCUPATIONAL THERAPY | Facility: HOSPITAL | Age: 75
Setting detail: THERAPIES SERIES
Discharge: HOME | End: 2020-10-29

## 2020-10-29 DIAGNOSIS — I89.0 LYMPHEDEMA OF BOTH LOWER EXTREMITIES: Primary | ICD-10-CM

## 2020-10-29 DIAGNOSIS — C51.9 CANCER OF VULVA (HCC): ICD-10-CM

## 2020-10-29 PROCEDURE — 97139 UNLISTED THERAPEUTIC PX: CPT

## 2020-10-29 PROCEDURE — 97140 MANUAL THERAPY 1/> REGIONS: CPT

## 2020-10-29 PROCEDURE — 97016 VASOPNEUMATIC DEVICE THERAPY: CPT

## 2020-10-29 NOTE — THERAPY TREATMENT NOTE
Outpatient Occupational Therapy Lymphedema Treatment Note  JOSE ROBERTO Patel     Patient Name: Orquidea Rosenberg  : 1945  MRN: [id number]  Today's Date: 10/29/2020      Visit Date: 10/29/2020    Patient Active Problem List   Diagnosis   • COVID-19        Past Medical History:   Diagnosis Date   • Arthritis    • COPD (chronic obstructive pulmonary disease) (CMS/HCC)    • Elevated cholesterol    • History of transfusion    • Hypertension    • Vulval ca (CMS/HCC)         Past Surgical History:   Procedure Laterality Date   • RADICAL VULVECTOMY  2019   • RADICAL VULVECTOMY Bilateral 2019         Visit Dx:      ICD-10-CM ICD-9-CM   1. Lymphedema of both lower extremities  I89.0 457.1   2. Cancer of vulva (CMS/HCC)  C51.9 184.4       Lymphedema     Row Name 10/29/20 0900             Subjective Pain    Able to rate subjective pain?  yes  -AB      Pre-Treatment Pain Level  0  -AB      Subjective Pain Comment  no pain voiced this date  -AB         Subjective Comments    Subjective Comments  Pt. presents to therapy w/ no new c/o.   -AB         Lymphedema Assessment    Lymphedema Classification  RLE:;LLE:;secondary;stage 2 (Spontaneously Irreversible)  -AB      Lymphedema Cancer Related Sx  bilateral radical vulvectomy  -AB      Lymph Nodes Removed #  0  -AB      Positive Lymph Nodes #  0  -AB      Chemo Received  yes  -AB      Radiation Therapy Received  yes  -AB         Posture/Observations    Posture- WNL  Posture is WNL  -AB         Lymphedema Edema Assessment    Ptting Edema Category  By grade out of 4  -AB      Pitting Edema  + 3/4  -AB      Stemmer Sign  left:;positive  -AB      Edema Assessment Comment  increased fibrosis noted in L thigh area  -AB         Skin Changes/Observations    Location/Assessment  Lower Extremity;Other Location  -AB      Lower Extremity Conditions  bilateral:;clean;dry;shiny  -AB      Lower Extremity Color/Pigment  bilateral:;blanchable;radiation fibrosis;hyperpigmented  -AB      Other  Location Color/Pigment  bilateral:;red;radiation fibrosis;other (comment) groin/vaginal areas  -AB         Lymphedema Sensation    Lymphedema Sensation Tests  light touch;deep touch  -AB      Lymphedema Light Touch  RLE:;mild impairment  -AB      Lymphedema Deep Touch  WNL  -AB         Lymphedema Measurements    Measurement Type(s)  Quick Girth  -AB      Quick Girth Areas  Lower extremities  -AB      Circumferential Areas  Trunk  -AB         LLE Quick Girth (cm)    Met-heads  23.4 cm  -AB      Mid foot  23.7 cm  -AB      Smallest ankle  25.4 cm  -AB      Largest calf  39.3 cm  -AB      Tib tuberosity  39.2 cm  -AB      Mid patella  43.6 cm  -AB      Distal thigh  50 cm  -AB      Other 1  25.6 cm  -AB      Other 2  35 cm  -AB         RLE Quick Girth (cm)    Met-heads  22 cm  -AB      Mid foot  23 cm  -AB      Smallest ankle  23.1 cm  -AB      Largest calf  40.4 cm  -AB      Tib tuberosity  39.3 cm  -AB      Mid patella  44.6 cm  -AB      Distal thigh  48.8 cm  -AB      Other 1  23.4 cm  -AB      Other 2  34.6 cm  -AB      RLE Quick Girth Total  299.2  -AB         Trunk Circumferential (cm)    Measurement Location 1  Navel  -AB         Manual Lymphatic Drainage    Manual Lymphatic Drainage  extremity treatment;opened regional lymph nodes;initial sequence  -AB      Initial Sequence  abdomen  -AB      Abdomen  superficial  -AB      Opened Regional Lymph Nodes  axillary;inguinal  -AB      Axillary  right;left  -AB      Inguinal  right;left  -AB      Extremity Treatment  MLD to full limb  -AB      MLD to Full Limb  BLE's  -AB      Manual Therapy  MLD to BLE's, abdomen, axillary, inguinals  -AB         Compression/Skin Care    Compression/Skin Care  skin care;wrapping location;bandaging  -AB      Skin Care  moisturizing lotion applied  -AB      Wrapping Location  lower extremity  -AB      Wrapping Location LE  left: base of toes to high thigh  -AB      Bandage Layers  short-stretch bandages (comment size/quantity);cotton  elastic stocking- single layer (comment size);soft foam- 1/4 inch  -AB      Bandaging Technique  circumferential/spiral;moderate compression  -AB      Compression/Skin Care Comments  compression pump to abdomen and BLE's  -AB        User Key  (r) = Recorded By, (t) = Taken By, (c) = Cosigned By    Initials Name Provider Type    Anca Pond OT Occupational Therapist                                Therapy Education  Given: HEP, Edema management, Symptoms/condition management, Bandaging/dressing change  Program: Reinforced  How Provided: Verbal, Demonstration  Provided to: Patient  Level of Understanding: Verbalized                Time Calculation:   OT Start Time: 0930  OT Stop Time: 1100  OT Time Calculation (min): 90 min  OT Non-Billable Time (min): 25 min  Total Timed Code Minutes- OT: 90 minute(s)     Therapy Charges for Today     Code Description Service Date Service Provider Modifiers Qty    95257678011  OT MANUAL THERAPY EA 15 MIN 10/29/2020 Anca Pickett OT GO, KX 4    83879002920  OT LYMPHEDEMA MANAGEMENT-15 MIN 10/29/2020 Anca Pickett OT KX 1    [id number]  OT VASOPNEUMAT DEVIC 1 OR MORE AREAS 10/29/2020 Anca Pickett OT GO, KX 1                      Anca Pickett OT  10/29/2020

## 2020-10-31 ENCOUNTER — READMISSION MANAGEMENT (OUTPATIENT)
Dept: CALL CENTER | Facility: HOSPITAL | Age: 75
End: 2020-10-31

## 2020-10-31 NOTE — OUTREACH NOTE
COVID-19 Week 3 Survey      Responses   [facility], [place] patient discharged from?  [facility]   Does the patient have one of the following disease processes/diagnoses(primary or secondary)?  COVID-19   COVID-19 underlying condition?  None   Call Number  Call 1   COVID-19 Week 3: Call 1 attempt successful?  No   Revoke  Decline to participate [No answer x 4 consecutive attempts.]          Petra Wiggins RN

## 2020-11-02 ENCOUNTER — HOSPITAL ENCOUNTER (OUTPATIENT)
Dept: OCCUPATIONAL THERAPY | Facility: HOSPITAL | Age: 75
Setting detail: THERAPIES SERIES
Discharge: HOME | End: 2020-11-02

## 2020-11-02 DIAGNOSIS — I89.0 LYMPHEDEMA OF BOTH LOWER EXTREMITIES: Primary | ICD-10-CM

## 2020-11-02 DIAGNOSIS — C51.9 CANCER OF VULVA (HCC): ICD-10-CM

## 2020-11-02 PROCEDURE — 97139 UNLISTED THERAPEUTIC PX: CPT

## 2020-11-02 PROCEDURE — 97016 VASOPNEUMATIC DEVICE THERAPY: CPT

## 2020-11-02 PROCEDURE — 97140 MANUAL THERAPY 1/> REGIONS: CPT

## 2020-11-02 NOTE — THERAPY TREATMENT NOTE
Outpatient Occupational Therapy Lymphedema Treatment Note  JOSE ROBERTO Patel     Patient Name: Orquidea Rosenberg  : 1945  MRN: [id number]  Today's Date: 2020      Visit Date: 2020    Patient Active Problem List   Diagnosis   • COVID-19        Past Medical History:   Diagnosis Date   • Arthritis    • COPD (chronic obstructive pulmonary disease) (CMS/HCC)    • Elevated cholesterol    • History of transfusion    • Hypertension    • Vulval ca (CMS/HCC)         Past Surgical History:   Procedure Laterality Date   • RADICAL VULVECTOMY  2019   • RADICAL VULVECTOMY Bilateral 2019         Visit Dx:      ICD-10-CM ICD-9-CM   1. Lymphedema of both lower extremities  I89.0 457.1   2. Cancer of vulva (CMS/HCC)  C51.9 184.4       Lymphedema     Row Name 20 1400             Subjective Pain    Able to rate subjective pain?  yes  -AB      Pre-Treatment Pain Level  0  -AB      Subjective Pain Comment  denies pain this date  -AB         Subjective Comments    Subjective Comments  Pt. presents to therapy w/ no new c/o. She states that she feels like her L thigh is not as hard.   -AB         Lymphedema Assessment    Lymphedema Classification  RLE:;LLE:;secondary;stage 2 (Spontaneously Irreversible)  -AB      Lymphedema Cancer Related Sx  bilateral radical vulvectomy  -AB      Lymph Nodes Removed #  0  -AB      Positive Lymph Nodes #  0  -AB      Chemo Received  yes  -AB      Radiation Therapy Received  yes  -AB         Posture/Observations    Posture- WNL  Posture is WNL  -AB         Lymphedema Edema Assessment    Ptting Edema Category  By grade out of 4  -AB      Pitting Edema  + 3/4  -AB      Stemmer Sign  left:;positive  -AB         Skin Changes/Observations    Location/Assessment  Lower Extremity;Other Location  -AB      Lower Extremity Conditions  bilateral:;clean;dry;shiny  -AB      Lower Extremity Color/Pigment  bilateral:;blanchable;radiation fibrosis;hyperpigmented  -AB      Other Location Color/Pigment   bilateral:;red;radiation fibrosis;other (comment) groin/vaginal areas  -AB         Lymphedema Sensation    Lymphedema Sensation Tests  light touch;deep touch  -AB      Lymphedema Light Touch  RLE:;mild impairment  -AB      Lymphedema Deep Touch  WNL  -AB         Lymphedema Measurements    Measurement Type(s)  Quick Girth  -AB      Quick Girth Areas  Lower extremities  -AB      Circumferential Areas  Trunk  -AB         LLE Quick Girth (cm)    Met-heads  23.4 cm  -AB      Mid foot  24 cm  -AB      Smallest ankle  25.5 cm  -AB      Largest calf  40.1 cm  -AB      Tib tuberosity  39 cm  -AB      Mid patella  44.3 cm  -AB      Distal thigh  49.8 cm  -AB      Proximal thigh  57 cm  -AB      Other 1  25.4 cm  -AB      Other 2  35.3 cm  -AB         RLE Quick Girth (cm)    Met-heads  22.7 cm  -AB      Mid foot  21.8 cm  -AB      Smallest ankle  23.9 cm  -AB      Largest calf  39.9 cm  -AB      Tib tuberosity  39.8 cm  -AB      Mid patella  43.5 cm  -AB      Distal thigh  47.8 cm  -AB      Proximal thigh  56.3 cm  -AB      Other 1  23.8 cm  -AB      Other 2  34.3 cm  -AB      RLE Quick Girth Total  353.8  -AB         Trunk Circumferential (cm)    Measurement Location 1  Navel  -AB         Manual Lymphatic Drainage    Manual Lymphatic Drainage  extremity treatment;opened regional lymph nodes;initial sequence  -AB      Initial Sequence  abdomen  -AB      Abdomen  superficial  -AB      Opened Regional Lymph Nodes  axillary;inguinal  -AB      Axillary  right;left  -AB      Inguinal  right;left  -AB      Extremity Treatment  MLD to full limb  -AB      MLD to Full Limb  BLE's  -AB      Manual Therapy  MLD to BLE's, abdomen, axillary, inguinals  -AB         Compression/Skin Care    Compression/Skin Care  skin care;wrapping location;bandaging  -AB      Skin Care  moisturizing lotion applied  -AB      Wrapping Location  lower extremity  -AB      Wrapping Location LE  left: base of toes to high thigh  -AB      Bandage Layers   short-stretch bandages (comment size/quantity);cotton elastic stocking- single layer (comment size);soft foam- 1/4 inch  -AB      Bandaging Technique  circumferential/spiral;moderate compression  -AB      Compression/Skin Care Comments  compression pump to abdomen and BLE's  -AB        User Key  (r) = Recorded By, (t) = Taken By, (c) = Cosigned By    Initials Name Provider Type    Anca Pond OT Occupational Therapist                                Therapy Education  Given: HEP, Edema management, Symptoms/condition management, Bandaging/dressing change  Program: Reinforced  How Provided: Verbal, Demonstration  Provided to: Patient  Level of Understanding: Verbalized                Time Calculation:   OT Start Time: 1410  OT Stop Time: 1535  OT Time Calculation (min): 85 min  OT Non-Billable Time (min): 25 min  Total Timed Code Minutes- OT: 85 minute(s)     Therapy Charges for Today     Code Description Service Date Service Provider Modifiers Qty    68470866131 HC OT MANUAL THERAPY EA 15 MIN 11/2/2020 Anca Pickett OT GO, KX 4    81691617622 HC OT LYMPHEDEMA MANAGEMENT-15 MIN 11/2/2020 Anca Pickett OT KX 1    76470220343 HC OT VASOPNEUMAT DEVIC 1 OR MORE AREAS 11/2/2020 Anca Pickett OT GO, KX 1                      Anca Pickett OT  11/2/2020

## 2020-11-05 ENCOUNTER — HOSPITAL ENCOUNTER (OUTPATIENT)
Dept: OCCUPATIONAL THERAPY | Facility: HOSPITAL | Age: 75
Setting detail: THERAPIES SERIES
Discharge: HOME | End: 2020-11-05

## 2020-11-05 DIAGNOSIS — C51.9 CANCER OF VULVA (HCC): ICD-10-CM

## 2020-11-05 DIAGNOSIS — I89.0 LYMPHEDEMA OF BOTH LOWER EXTREMITIES: Primary | ICD-10-CM

## 2020-11-05 PROCEDURE — 97140 MANUAL THERAPY 1/> REGIONS: CPT

## 2020-11-05 PROCEDURE — 97016 VASOPNEUMATIC DEVICE THERAPY: CPT

## 2020-11-05 PROCEDURE — 97535 SELF CARE MNGMENT TRAINING: CPT

## 2020-11-05 PROCEDURE — 97139 UNLISTED THERAPEUTIC PX: CPT

## 2020-11-05 NOTE — THERAPY TREATMENT NOTE
Outpatient Occupational Therapy Lymphedema Treatment Note  JOSE ROBERTO Patel     Patient Name: Orquidea Rosenberg  : 1945  MRN: [id number]  Today's Date: 2020      Visit Date: 2020    Patient Active Problem List   Diagnosis   • COVID-19        Past Medical History:   Diagnosis Date   • Arthritis    • COPD (chronic obstructive pulmonary disease) (CMS/HCC)    • Elevated cholesterol    • History of transfusion    • Hypertension    • Vulval ca (CMS/HCC)         Past Surgical History:   Procedure Laterality Date   • RADICAL VULVECTOMY  2019   • RADICAL VULVECTOMY Bilateral 2019         Visit Dx:      ICD-10-CM ICD-9-CM   1. Lymphedema of both lower extremities  I89.0 457.1   2. Cancer of vulva (CMS/HCC)  C51.9 184.4       Lymphedema     Row Name 20 1300             Subjective Pain    Able to rate subjective pain?  yes  -BC      Pre-Treatment Pain Level  0  -BC      Subjective Pain Comment  Denies pain  -BC         Subjective Comments    Subjective Comments  Pt. reports that she is getting sick every time she eats something.  Pt. advised to seek MD consult.   -BC         Lymphedema Assessment    Lymphedema Classification  RLE:;LLE:;secondary;stage 2 (Spontaneously Irreversible)  -BC      Lymphedema Cancer Related Sx  bilateral radical vulvectomy  -BC      Lymph Nodes Removed #  0  -BC      Positive Lymph Nodes #  0  -BC      Chemo Received  yes  -BC      Radiation Therapy Received  yes  -BC         Posture/Observations    Posture- WNL  Posture is WNL  -BC         Lymphedema Edema Assessment    Ptting Edema Category  By grade out of 4  -BC      Pitting Edema  + 3/4  -BC      Stemmer Sign  left:;positive  -BC         Skin Changes/Observations    Location/Assessment  Lower Extremity;Other Location  -BC      Lower Extremity Conditions  bilateral:;clean;dry;shiny  -BC      Lower Extremity Color/Pigment  bilateral:;blanchable;radiation fibrosis;hyperpigmented  -BC      Other Location Color/Pigment   bilateral:;red;radiation fibrosis;other (comment) groin/vaginal areas  -BC         Lymphedema Sensation    Lymphedema Sensation Tests  light touch;deep touch  -BC      Lymphedema Light Touch  RLE:;mild impairment  -BC      Lymphedema Deep Touch  WNL  -BC         Lymphedema Measurements    Measurement Type(s)  Quick Girth  -BC      Quick Girth Areas  Lower extremities  -BC      Circumferential Areas  Trunk  -BC         LLE Quick Girth (cm)    Met-heads  24.5 cm  -BC      Mid foot  23.5 cm  -BC      Smallest ankle  23.5 cm  -BC      Largest calf  39.1 cm  -BC      Tib tuberosity  37.8 cm  -BC      Mid patella  42.5 cm  -BC      Distal thigh  47 cm  -BC      Proximal thigh  53.3 cm  -BC      Other 1  23.5 cm  -BC      Other 2  34.5 cm  -BC         RLE Quick Girth (cm)    Met-heads  23 cm  -BC      Mid foot  23.8 cm  -BC      Smallest ankle  22.6 cm  -BC      Largest calf  39.4 cm  -BC      Tib tuberosity  38.1 cm  -BC      Mid patella  42 cm  -BC      Distal thigh  46.4 cm  -BC      Proximal thigh  54 cm  -BC      Other 1  22.3 cm  -BC      Other 2  32.3 cm  -BC      RLE Quick Girth Total  343.9  -BC         Trunk Circumferential (cm)    Measurement Location 1  Navel  -BC         Manual Lymphatic Drainage    Manual Lymphatic Drainage  extremity treatment;opened regional lymph nodes;initial sequence  -BC      Initial Sequence  abdomen  -BC      Abdomen  superficial  -BC      Opened Regional Lymph Nodes  axillary;inguinal  -BC      Axillary  right;left  -BC      Inguinal  right;left  -BC      Extremity Treatment  MLD to full limb  -BC      MLD to Full Limb  BLE's  -BC      Manual Therapy  MLD to BLE's, abdomen, inguinals.  -BC         Compression/Skin Care    Compression/Skin Care  skin care;wrapping location;bandaging  -BC      Skin Care  moisturizing lotion applied  -BC      Wrapping Location  lower extremity  -BC      Wrapping Location LE  left: base of toes to high thigh  -BC      Bandage Layers  short-stretch  bandages (comment size/quantity);cotton elastic stocking- single layer (comment size);soft foam- 1/4 inch  -BC      Bandaging Technique  circumferential/spiral;moderate compression  -BC      Compression/Skin Care Comments  Compression pump x Abd., BLE's  -BC        User Key  (r) = Recorded By, (t) = Taken By, (c) = Cosigned By    Initials Name Provider Type    Charlotte Cook OT Occupational Therapist                  OT Assessment/Plan     Row Name 11/05/20 4903          OT Assessment    Assessment Comments  Pt. positioned in supported recline for manual lymph drainage, compression pump, skin care, kinesiotaping of L foot/ankle, and multi layered bandaging of BLE's base of toes to above knee.  -BC       User Key  (r) = Recorded By, (t) = Taken By, (c) = Cosigned By    Initials Name Provider Type    Charlotte Cook OT Occupational Therapist                    Therapy Education  Education Details: Leg lifts, ankle pumps, squats w/holding onto support.  Given: HEP  Program: Reinforced  How Provided: Verbal  Provided to: Patient  Level of Understanding: Verbalized                Time Calculation:   OT Start Time: 1300  OT Stop Time: 1445  OT Time Calculation (min): 105 min  OT Non-Billable Time (min): 25 min     Therapy Charges for Today     Code Description Service Date Service Provider Modifiers Qty    43205094522 HC OT LYMPHEDEMA MANAGEMENT-15 MIN 11/5/2020 Charlotte García OT KX 1    20202094242 HC OT MANUAL THERAPY EA 15 MIN 11/5/2020 Charlotte García OT GO KX 3    82616457420 HC OT SELF CARE/MGMT/TRAIN EA 15 MIN 11/5/2020 Charlotte García OT GO KX 2    62225930761 HC OT VASOPNEUMAT DEVIC 1 OR MORE AREAS 11/5/2020 Charlotte García OT GO KX 1                      Charlotte García OT  11/5/2020

## 2020-11-11 ENCOUNTER — HOSPITAL ENCOUNTER (OUTPATIENT)
Dept: OCCUPATIONAL THERAPY | Facility: HOSPITAL | Age: 75
Setting detail: THERAPIES SERIES
Discharge: HOME | End: 2020-11-11

## 2020-11-11 DIAGNOSIS — I89.0 LYMPHEDEMA OF BOTH LOWER EXTREMITIES: Primary | ICD-10-CM

## 2020-11-11 DIAGNOSIS — C51.9 CANCER OF VULVA (HCC): ICD-10-CM

## 2020-11-11 PROCEDURE — 97139 UNLISTED THERAPEUTIC PX: CPT

## 2020-11-11 PROCEDURE — 97140 MANUAL THERAPY 1/> REGIONS: CPT

## 2020-11-11 PROCEDURE — 97535 SELF CARE MNGMENT TRAINING: CPT

## 2020-11-11 PROCEDURE — 97016 VASOPNEUMATIC DEVICE THERAPY: CPT

## 2020-11-11 NOTE — THERAPY TREATMENT NOTE
Outpatient Occupational Therapy Lymphedema Treatment Note  JOSE ROBERTO Patel     Patient Name: Orquidea Rosenberg  : 1945  MRN: [id number]  Today's Date: 2020      Visit Date: 2020    Patient Active Problem List   Diagnosis   • COVID-19        Past Medical History:   Diagnosis Date   • Arthritis    • COPD (chronic obstructive pulmonary disease) (CMS/HCC)    • Elevated cholesterol    • History of transfusion    • Hypertension    • Vulval ca (CMS/HCC)         Past Surgical History:   Procedure Laterality Date   • RADICAL VULVECTOMY  2019   • RADICAL VULVECTOMY Bilateral 2019         Visit Dx:      ICD-10-CM ICD-9-CM   1. Lymphedema of both lower extremities  I89.0 457.1   2. Cancer of vulva (CMS/HCC)  C51.9 184.4       Lymphedema     Row Name 20 0800             Subjective Pain    Able to rate subjective pain?  yes  -BC      Pre-Treatment Pain Level  0  -BC      Subjective Pain Comment  Denies pain  -BC         Subjective Comments    Subjective Comments  Pt. states she is feeling somewhat better, and that when she removed her previous wraps her leg looked great.  Pt. took pictures of her legs and brought them in to share.    -BC         Lymphedema Assessment    Lymphedema Classification  RLE:;LLE:;secondary;stage 2 (Spontaneously Irreversible)  -BC      Lymphedema Cancer Related Sx  bilateral radical vulvectomy  -BC      Lymph Nodes Removed #  0  -BC      Positive Lymph Nodes #  0  -BC      Chemo Received  yes  -BC      Radiation Therapy Received  yes  -BC         Posture/Observations    Posture- WNL  Posture is WNL  -BC         Lymphedema Edema Assessment    Ptting Edema Category  By grade out of 4  -BC      Pitting Edema  + 3/4  -BC      Stemmer Sign  left:;positive  -BC         Skin Changes/Observations    Location/Assessment  Lower Extremity;Other Location  -BC      Lower Extremity Conditions  bilateral:;clean;dry;shiny  -BC      Lower Extremity Color/Pigment  bilateral:;blanchable;radiation  fibrosis;hyperpigmented  -BC      Other Location Color/Pigment  bilateral:;red;radiation fibrosis;other (comment) groin/vaginal areas  -BC         Lymphedema Sensation    Lymphedema Sensation Tests  light touch;deep touch  -BC      Lymphedema Light Touch  RLE:;mild impairment  -BC      Lymphedema Deep Touch  WNL  -BC         Lymphedema Measurements    Measurement Type(s)  Quick Girth  -BC      Quick Girth Areas  Lower extremities  -BC      Circumferential Areas  Trunk  -BC         LLE Quick Girth (cm)    Met-heads  24 cm  -BC      Mid foot  24.3 cm  -BC      Smallest ankle  23.7 cm  -BC      Largest calf  38.6 cm  -BC      Tib tuberosity  37.8 cm  -BC      Mid patella  43 cm  -BC      Distal thigh  49.5 cm  -BC      Proximal thigh  55.2 cm  -BC      Other 1  24.5 cm  -BC      Other 2  34 cm  -BC         RLE Quick Girth (cm)    Met-heads  23.3 cm  -BC      Mid foot  24 cm  -BC      Smallest ankle  22.8 cm  -BC      Largest calf  39 cm  -BC      Tib tuberosity  38.2 cm  -BC      Mid patella  43 cm  -BC      Distal thigh  46 cm  -BC      Proximal thigh  53 cm  -BC      Other 1  22.6 cm  -BC      Other 2  32.8 cm  -BC      RLE Quick Girth Total  344.7  -BC         Trunk Circumferential (cm)    Measurement Location 1  Navel  -BC         Manual Lymphatic Drainage    Manual Lymphatic Drainage  extremity treatment;opened regional lymph nodes;initial sequence  -BC      Initial Sequence  abdomen  -BC      Abdomen  superficial  -BC      Opened Regional Lymph Nodes  axillary;inguinal  -BC      Axillary  right;left  -BC      Inguinal  right;left  -BC      Extremity Treatment  MLD to full limb  -BC      MLD to Full Limb  BLE's  -BC      Manual Therapy  MLD to BLE's  -BC         Compression/Skin Care    Compression/Skin Care  skin care;wrapping location;bandaging  -BC      Skin Care  moisturizing lotion applied  -BC      Wrapping Location  lower extremity  -BC      Wrapping Location LE  left: base of toes to high thigh  -BC       Bandage Layers  short-stretch bandages (comment size/quantity);cotton elastic stocking- single layer (comment size);soft foam- 1/4 inch  -BC      Bandaging Technique  circumferential/spiral;moderate compression  -BC      Compression/Skin Care Comments  Compression pump x Abd., BLE  -BC        User Key  (r) = Recorded By, (t) = Taken By, (c) = Cosigned By    Initials Name Provider Type    BC Charlotte García OT Occupational Therapist                  OT Assessment/Plan     Row Name 11/11/20 0965          OT Assessment    Assessment Comments  Pt. positioned in supported recline for manual lymph drainage, compression pump, skin care, kinesiotaping of L foot/ankle, and multi layered bandaging of LLE, base of toes to mid thigh, RLE w/sm tgSoft stockinette.  -BC       User Key  (r) = Recorded By, (t) = Taken By, (c) = Cosigned By    Initials Name Provider Type    BC Charlotte García OT Occupational Therapist                    Therapy Education  Given: Symptoms/condition management  Program: Reinforced  How Provided: Verbal, Demonstration  Provided to: Patient  Level of Understanding: Verbalized                Time Calculation:   OT Start Time: 0800  OT Stop Time: 0930  OT Time Calculation (min): 90 min  OT Non-Billable Time (min): 20 min     Therapy Charges for Today     Code Description Service Date Service Provider Modifiers Qty    37925859236 HC OT LYMPHEDEMA MANAGEMENT-15 MIN 11/11/2020 Charlotte García OT KX 1    46804081910 HC OT MANUAL THERAPY EA 15 MIN 11/11/2020 Charlotte García OT GO, KX 3    97917537443 HC OT SELF CARE/MGMT/TRAIN EA 15 MIN 11/11/2020 Charlotte García OT GO, KX 1    25751175440 HC OT VASOPNEUMAT DEVIC 1 OR MORE AREAS 11/11/2020 Charlotte García OT GO, KX 1                      Charlotte García OT  11/11/2020

## 2020-11-13 ENCOUNTER — HOSPITAL ENCOUNTER (OUTPATIENT)
Dept: OCCUPATIONAL THERAPY | Facility: HOSPITAL | Age: 75
Setting detail: THERAPIES SERIES
Discharge: HOME | End: 2020-11-13

## 2020-11-13 DIAGNOSIS — I89.0 LYMPHEDEMA OF BOTH LOWER EXTREMITIES: Primary | ICD-10-CM

## 2020-11-13 DIAGNOSIS — C51.9 CANCER OF VULVA (HCC): ICD-10-CM

## 2020-11-13 PROCEDURE — 97535 SELF CARE MNGMENT TRAINING: CPT

## 2020-11-13 PROCEDURE — 97140 MANUAL THERAPY 1/> REGIONS: CPT

## 2020-11-13 PROCEDURE — 97139 UNLISTED THERAPEUTIC PX: CPT

## 2020-11-13 PROCEDURE — 97016 VASOPNEUMATIC DEVICE THERAPY: CPT

## 2020-11-13 NOTE — THERAPY TREATMENT NOTE
Outpatient Occupational Therapy Lymphedema Treatment Note  JOSE ROBERTO Patel     Patient Name: Orquidea Rosenberg  : 1945  MRN: [id number]  Today's Date: 2020      Visit Date: 2020    Patient Active Problem List   Diagnosis   • COVID-19        Past Medical History:   Diagnosis Date   • Arthritis    • COPD (chronic obstructive pulmonary disease) (CMS/HCC)    • Elevated cholesterol    • History of transfusion    • Hypertension    • Vulval ca (CMS/HCC)         Past Surgical History:   Procedure Laterality Date   • RADICAL VULVECTOMY  2019   • RADICAL VULVECTOMY Bilateral 2019         Visit Dx:      ICD-10-CM ICD-9-CM   1. Lymphedema of both lower extremities  I89.0 457.1   2. Cancer of vulva (CMS/HCC)  C51.9 184.4       Lymphedema     Row Name 20 1300             Subjective Pain    Able to rate subjective pain?  yes  -BC      Pre-Treatment Pain Level  0  -BC      Subjective Pain Comment  Denies pain  -BC         Subjective Comments    Subjective Comments  Pt. ask that we complete Dec. schedule so that her daughter  will have it before she returns to [place]  -BC         Lymphedema Assessment    Lymphedema Classification  RLE:;LLE:;secondary;stage 2 (Spontaneously Irreversible)  -BC      Lymphedema Cancer Related Sx  bilateral radical vulvectomy  -BC      Lymph Nodes Removed #  0  -BC      Positive Lymph Nodes #  0  -BC      Chemo Received  yes  -BC      Radiation Therapy Received  yes  -BC         Posture/Observations    Posture- WNL  Posture is WNL  -BC         Lymphedema Edema Assessment    Ptting Edema Category  By grade out of 4  -BC      Pitting Edema  + 3/4  -BC      Stemmer Sign  left:;positive  -BC         Skin Changes/Observations    Location/Assessment  Lower Extremity;Other Location  -BC      Lower Extremity Conditions  bilateral:;clean;dry;shiny  -BC      Lower Extremity Color/Pigment  bilateral:;blanchable;radiation fibrosis;hyperpigmented  -BC      Other Location Color/Pigment   bilateral:;red;radiation fibrosis;other (comment) groin/vaginal areas  -BC         Lymphedema Sensation    Lymphedema Sensation Tests  light touch;deep touch  -BC      Lymphedema Light Touch  RLE:;mild impairment  -BC      Lymphedema Deep Touch  WNL  -BC         Lymphedema Measurements    Measurement Type(s)  Quick Girth  -BC      Quick Girth Areas  Lower extremities  -BC      Circumferential Areas  Trunk  -BC         LLE Quick Girth (cm)    Met-heads  23.7 cm  -BC      Mid foot  23.5 cm  -BC      Smallest ankle  23 cm  -BC      Largest calf  39 cm  -BC      Tib tuberosity  38.4 cm  -BC      Mid patella  42.7 cm  -BC      Distal thigh  47.1 cm  -BC      Proximal thigh  55 cm  -BC      Other 1  23.6 cm  -BC      Other 2  34.5 cm  -BC         RLE Quick Girth (cm)    Met-heads  22.4 cm  -BC      Mid foot  22.6 cm  -BC      Smallest ankle  22.3 cm  -BC      Largest calf  39 cm  -BC      Tib tuberosity  38.6 cm  -BC      Mid patella  42.5 cm  -BC      Distal thigh  47 cm  -BC      Proximal thigh  54 cm  -BC      Other 1  22.8 cm  -BC      Other 2  32.3 cm  -BC      RLE Quick Girth Total  343.5  -BC         Trunk Circumferential (cm)    Measurement Location 1  Navel  -BC         Manual Lymphatic Drainage    Manual Lymphatic Drainage  extremity treatment;opened regional lymph nodes;initial sequence  -BC      Initial Sequence  abdomen  -BC      Abdomen  superficial  -BC      Opened Regional Lymph Nodes  axillary;inguinal  -BC      Axillary  right;left  -BC      Inguinal  right;left  -BC      Extremity Treatment  MLD to full limb  -BC      MLD to Full Limb  BLE's  -BC      Manual Therapy  MLD to BLE's  -BC         Compression/Skin Care    Compression/Skin Care  skin care;wrapping location;bandaging  -BC      Skin Care  moisturizing lotion applied  -BC      Wrapping Location  lower extremity  -BC      Wrapping Location LE  left: base of toes to high thigh  -BC      Bandage Layers  short-stretch bandages (comment  size/quantity);cotton elastic stocking- single layer (comment size);soft foam- 1/4 inch  -BC      Bandaging Technique  circumferential/spiral;moderate compression  -BC      Compression/Skin Care Comments  Compression pump x Abd., BLE's  -BC        User Key  (r) = Recorded By, (t) = Taken By, (c) = Cosigned By    Initials Name Provider Type    Charlotte Cook OT Occupational Therapist                  OT Assessment/Plan     Row Name 11/13/20 4670          OT Assessment    Assessment Comments  Pt. positioned in supported recline for manual lymph drainage, compression pump, skin care and multi layered bandaging of BLE, base of toes to mid thigh.  -BC       User Key  (r) = Recorded By, (t) = Taken By, (c) = Cosigned By    Initials Name Provider Type    Charlotte Cook OT Occupational Therapist                    Therapy Education  Given: Edema management, Symptoms/condition management  Program: Reinforced, Progressed(Air travel precautions)  How Provided: Verbal  Provided to: Patient  Level of Understanding: Verbalized                Time Calculation:   OT Start Time: 1300  OT Stop Time: 1445  OT Time Calculation (min): 105 min  OT Non-Billable Time (min): 20 min     Therapy Charges for Today     Code Description Service Date Service Provider Modifiers Qty    69104653766 HC OT LYMPHEDEMA MANAGEMENT-15 MIN 11/13/2020 Charlotte García OT KX 1    56062294826 HC OT MANUAL THERAPY EA 15 MIN 11/13/2020 Charlotte García OT GO, KX 3    98081700428 HC OT SELF CARE/MGMT/TRAIN EA 15 MIN 11/13/2020 Charlotte García OT GO KX 2    16616102374 HC OT VASOPNEUMAT DEVIC 1 OR MORE AREAS 11/13/2020 Charlotte García OT GO, KX 1                      Charlotte García OT  11/13/2020

## 2020-11-19 ENCOUNTER — HOSPITAL ENCOUNTER (OUTPATIENT)
Dept: OCCUPATIONAL THERAPY | Facility: HOSPITAL | Age: 75
Setting detail: THERAPIES SERIES
Discharge: HOME | End: 2020-11-19

## 2020-11-19 DIAGNOSIS — I89.0 LYMPHEDEMA OF BOTH LOWER EXTREMITIES: Primary | ICD-10-CM

## 2020-11-19 DIAGNOSIS — C51.9 CANCER OF VULVA (HCC): ICD-10-CM

## 2020-11-19 PROCEDURE — 97535 SELF CARE MNGMENT TRAINING: CPT

## 2020-11-19 PROCEDURE — 97016 VASOPNEUMATIC DEVICE THERAPY: CPT

## 2020-11-19 PROCEDURE — 97139 UNLISTED THERAPEUTIC PX: CPT

## 2020-11-19 PROCEDURE — 97140 MANUAL THERAPY 1/> REGIONS: CPT

## 2020-11-19 NOTE — THERAPY PROGRESS REPORT/RE-CERT
Outpatient Occupational Therapy Lymphedema Progress Note   Jorge     Patient Name: Orquidea Rosenberg  : 1945  MRN: [id number]  Today's Date: 2020      Visit Date: 2020    Patient Active Problem List   Diagnosis   • COVID-19        Past Medical History:   Diagnosis Date   • Arthritis    • COPD (chronic obstructive pulmonary disease) (CMS/HCC)    • Elevated cholesterol    • History of transfusion    • Hypertension    • Vulval ca (CMS/HCC)         Past Surgical History:   Procedure Laterality Date   • RADICAL VULVECTOMY  2019   • RADICAL VULVECTOMY Bilateral 2019         Visit Dx:      ICD-10-CM ICD-9-CM   1. Lymphedema of both lower extremities  I89.0 457.1   2. Cancer of vulva (CMS/HCC)  C51.9 184.4       Lymphedema     Row Name 20 1300             Subjective Pain    Able to rate subjective pain?  yes  -BC      Pre-Treatment Pain Level  0  -BC      Subjective Pain Comment  Denies pain  -BC         Subjective Comments    Subjective Comments  Pt. presents this date with new ankle sleeve and BLE compression socks.   -BC         Lymphedema Assessment    Lymphedema Classification  RLE:;LLE:;secondary;stage 2 (Spontaneously Irreversible)  -BC      Lymphedema Cancer Related Sx  bilateral radical vulvectomy  -BC      Lymph Nodes Removed #  0  -BC      Positive Lymph Nodes #  0  -BC      Chemo Received  yes  -BC      Radiation Therapy Received  yes  -BC         Posture/Observations    Posture- WNL  Posture is WNL  -BC         Lymphedema Edema Assessment    Ptting Edema Category  By grade out of 4  -BC      Pitting Edema  + 3/4  -BC      Stemmer Sign  left:;positive  -BC         Skin Changes/Observations    Location/Assessment  Lower Extremity;Other Location  -BC      Lower Extremity Conditions  bilateral:;clean;dry;shiny  -BC      Lower Extremity Color/Pigment  bilateral:;blanchable;radiation fibrosis;hyperpigmented  -BC      Other Location Color/Pigment  bilateral:;red;radiation fibrosis;other  (comment) groin/vaginal areas  -BC         Lymphedema Sensation    Lymphedema Sensation Tests  light touch;deep touch  -BC      Lymphedema Light Touch  RLE:;mild impairment  -BC      Lymphedema Deep Touch  WNL  -BC         Lymphedema Measurements    Measurement Type(s)  Quick Girth  -BC      Quick Girth Areas  Lower extremities  -BC      Circumferential Areas  Trunk  -BC         LLE Quick Girth (cm)    Met-heads  24 cm  -BC      Mid foot  23.4 cm  -BC      Smallest ankle  23.8 cm  -BC      Largest calf  39.5 cm  -BC      Tib tuberosity  38.5 cm  -BC      Mid patella  46.5 cm  -BC      Distal thigh  47 cm  -BC      Other 1  25.3 cm  -BC      Other 2  34.5 cm  -BC         RLE Quick Girth (cm)    Met-heads  22.8 cm  -BC      Mid foot  23 cm  -BC      Smallest ankle  22.6 cm  -BC      Largest calf  39.5 cm  -BC      Tib tuberosity  38.5 cm  -BC      Mid patella  42.5 cm  -BC      Distal thigh  47.5 cm  -BC      Proximal thigh  56 cm  -BC      Other 1  23 cm  -BC      Other 2  33.1 cm  -BC      RLE Quick Girth Total  348.5  -BC         Trunk Circumferential (cm)    Measurement Location 1  Navel  -BC         Manual Lymphatic Drainage    Manual Lymphatic Drainage  extremity treatment;opened regional lymph nodes;initial sequence  -BC      Initial Sequence  abdomen  -BC      Abdomen  superficial  -BC      Opened Regional Lymph Nodes  axillary;inguinal  -BC      Axillary  right;left  -BC      Inguinal  right;left  -BC      Extremity Treatment  MLD to full limb  -BC      MLD to Full Limb  BLE's  -BC      Manual Therapy  MLD to BLE's  -BC         Compression/Skin Care    Compression/Skin Care  bandaging  -BC      Skin Care  --  -BC      Wrapping Location  lower extremity  -BC      Wrapping Location LE  bilateral: tiarra/doff stockings (compression 20-30)  -BC      Bandage Layers  cotton elastic stocking- single layer (comment size)  -BC      Bandaging Technique  light compression  -BC      Compression/Skin Care Comments   Compression pump x Abd., BLE's  -BC        User Key  (r) = Recorded By, (t) = Taken By, (c) = Cosigned By    Initials Name Provider Type    BC Charlotte García OT Occupational Therapist                  OT Assessment/Plan     Row Name 11/19/20 1654          OT Assessment    Functional Limitations  Decreased safety during functional activities;Limitations in functional capacity and performance;Impaired gait  -BC     Impairments  Balance;Edema;Gait;Impaired lymphatic circulation;Pain;Sensation  -BC     Assessment Comments  Pt. positioned in supported recline for manual lymph drainage, compression pump, skin care , education, donning/doffing compression stockings, reduction kit for ankle (L)  -BC     Please refer to paper survey for additional self-reported information  No  -BC     OT Diagnosis  Lymphedema  -BC     OT Rehab Potential  Good  -BC     Patient/caregiver participated in establishment of treatment plan and goals  Yes  -BC     Patient would benefit from skilled therapy intervention  Yes  -BC        OT Plan    OT Frequency  1x/week;2x/week  -BC     Predicted Duration of Therapy Intervention (OT)  90 days  -BC     Planned CPT's?  OT RE-EVAL: 36570;OT THER ACT EA 15 MIN: [id number];OT THER PROC EA 15 MIN: 38054PZ;OT SELF CARE/MGMT/TRAIN 15 MIN: 33564;OT MANUAL THERAPY EA 15 MIN: 21876;OT BIS XTRACELL FLUID ANALYSIS: 55155;OT VASOPNEUMATIC DEVICE: 49406  -BC     Planned Therapy Interventions (Optional Details)  home exercise program;manual therapy techniques;patient/family education;ROM (Range of Motion)  -BC     OT Plan Comments  Pt. progressing well with lymphedema tx.  Pt. to benefit from cont. OT to maximize functional gains.  -BC       User Key  (r) = Recorded By, (t) = Taken By, (c) = Cosigned By    Initials Name Provider Type    BC Charlotte García OT Occupational Therapist                OT Goals     Row Name 11/19/20 1600          OT Short Term Goals    STG Date to Achieve  12/28/20  -BC     STG 1  Pt.  familiar w/precautions, skin care and self management of lymphdema.  -BC     STG 1 Progress  Met  -BC     STG 2  Decrease pitting edema to 3/4 for decreased risk of infection.  -BC     STG 2 Progress  Met  -BC     STG 3  HEP to assist with improved lymphatic flow.  -BC     STG 3 Progress  Met  -BC     STG 4  Self care instructions for home MLD for volume reduction.  -BC     STG 4 Progress  Met  -BC        Long Term Goals    LTG Date to Achieve  12/28/20  -BC     LTG 1  Pt. and/or caregiver independent w/short stretch compression bandaging for volume reduction.  -BC     LTG 1 Progress  Ongoing  -BC     LTG 2  Decrease pitting edema to 2/4 for decreased risk of infection.  -BC     LTG 2 Progress  Ongoing;Progressing  -BC     LTG 3  Independent with donning/doffing compression garment.  -BC     LTG 3 Progress  Partially Met;Met  -BC     LTG 4  Independent with lymphedema management and HEP.  -BC     LTG 4 Progress  Ongoing;Partially Met  -BC        Time Calculation    OT Goal Re-Cert Due Date  01/22/21  -BC       User Key  (r) = Recorded By, (t) = Taken By, (c) = Cosigned By    Initials Name Provider Type    BC Charlotte García OT Occupational Therapist          Therapy Education  Given: Edema management, Symptoms/condition management  Program: Reinforced  How Provided: Verbal  Provided to: Patient  Level of Understanding: Verbalized                Time Calculation:   OT Start Time: 1300  OT Stop Time: 1430  OT Time Calculation (min): 90 min  OT Non-Billable Time (min): 20 min     Therapy Charges for Today     Code Description Service Date Service Provider Modifiers Qty    81616275433 HC OT LYMPHEDEMA MANAGEMENT-15 MIN 11/19/2020 Charlotte García OT KX 1    86080404138 HC OT MANUAL THERAPY EA 15 MIN 11/19/2020 Charlotte García OT GO, KX 3    08732827949 HC OT SELF CARE/MGMT/TRAIN EA 15 MIN 11/19/2020 Charlotte García OT GO KX 1    29135252741 HC OT VASOPNEUMAT DEVIC 1 OR MORE AREAS 11/19/2020 Charlotte García OT GO  KX 1                      Charlotte [place], OT  11/19/2020

## 2020-11-25 ENCOUNTER — HOSPITAL ENCOUNTER (OUTPATIENT)
Dept: OCCUPATIONAL THERAPY | Facility: HOSPITAL | Age: 75
Setting detail: THERAPIES SERIES
Discharge: HOME | End: 2020-11-25

## 2020-11-25 DIAGNOSIS — C51.9 CANCER OF VULVA (HCC): ICD-10-CM

## 2020-11-25 DIAGNOSIS — I89.0 LYMPHEDEMA OF BOTH LOWER EXTREMITIES: Primary | ICD-10-CM

## 2020-11-25 PROCEDURE — 97140 MANUAL THERAPY 1/> REGIONS: CPT

## 2020-11-25 PROCEDURE — 97535 SELF CARE MNGMENT TRAINING: CPT

## 2020-11-25 PROCEDURE — 97016 VASOPNEUMATIC DEVICE THERAPY: CPT

## 2020-11-25 PROCEDURE — 97139 UNLISTED THERAPEUTIC PX: CPT

## 2020-11-25 NOTE — THERAPY TREATMENT NOTE
Outpatient Occupational Therapy Lymphedema Treatment Note   Jorge     Patient Name: Orquidea Rosenberg  : 1945  MRN: [id number]  Today's Date: 2020      Visit Date: 2020    Patient Active Problem List   Diagnosis   • COVID-19        Past Medical History:   Diagnosis Date   • Arthritis    • COPD (chronic obstructive pulmonary disease) (CMS/HCC)    • Elevated cholesterol    • History of transfusion    • Hypertension    • Vulval ca (CMS/HCC)         Past Surgical History:   Procedure Laterality Date   • RADICAL VULVECTOMY  2019   • RADICAL VULVECTOMY Bilateral 2019         Visit Dx:      ICD-10-CM ICD-9-CM   1. Lymphedema of both lower extremities  I89.0 457.1   2. Cancer of vulva (CMS/HCC)  C51.9 184.4       Lymphedema     Row Name 20 1400             Subjective Pain    Able to rate subjective pain?  yes  -BC      Pre-Treatment Pain Level  0  -BC      Subjective Pain Comment  Denies pain  -BC         Subjective Comments    Subjective Comments  Pt. presents with increased girth noted, with most increase noted in L ankle.  -BC         Lymphedema Assessment    Lymphedema Classification  RLE:;LLE:;secondary;stage 2 (Spontaneously Irreversible)  -BC      Lymphedema Cancer Related Sx  bilateral radical vulvectomy  -BC      Lymph Nodes Removed #  0  -BC      Positive Lymph Nodes #  0  -BC      Chemo Received  yes  -BC      Radiation Therapy Received  yes  -BC         Posture/Observations    Posture- WNL  Posture is WNL  -BC         Lymphedema Edema Assessment    Ptting Edema Category  By grade out of 4  -BC      Pitting Edema  + 3/4  -BC      Stemmer Sign  left:;positive  -BC         Skin Changes/Observations    Location/Assessment  Lower Extremity;Other Location  -BC      Lower Extremity Conditions  bilateral:;clean;dry;shiny  -BC      Lower Extremity Color/Pigment  bilateral:;blanchable;radiation fibrosis;hyperpigmented  -BC      Other Location Color/Pigment  bilateral:;red;radiation  fibrosis;other (comment) groin/vaginal areas  -BC         Lymphedema Sensation    Lymphedema Sensation Tests  light touch;deep touch  -BC      Lymphedema Light Touch  RLE:;mild impairment  -BC      Lymphedema Deep Touch  WNL  -BC         Lymphedema Measurements    Measurement Type(s)  Quick Girth  -BC      Quick Girth Areas  Lower extremities  -BC      Circumferential Areas  Trunk  -BC         LLE Quick Girth (cm)    Met-heads  24 cm  -BC      Mid foot  24.8 cm  -BC      Smallest ankle  25.5 cm  -BC      Largest calf  39.8 cm  -BC      Tib tuberosity  39.2 cm  -BC      Mid patella  46 cm  -BC      Distal thigh  50 cm  -BC      Other 1  25.5 cm  -BC      Other 2  34.5 cm  -BC         RLE Quick Girth (cm)    Met-heads  23.3 cm  -BC      Mid foot  22.7 cm  -BC      Smallest ankle  23 cm  -BC      Largest calf  40 cm  -BC      Tib tuberosity  39.5 cm  -BC      Mid patella  45.5 cm  -BC      Distal thigh  47 cm  -BC      Proximal thigh  54.6 cm  -BC      Other 1  23 cm  -BC      Other 2  32.3 cm  -BC      RLE Quick Girth Total  350.9  -BC         Trunk Circumferential (cm)    Measurement Location 1  Navel  -BC         Manual Lymphatic Drainage    Manual Lymphatic Drainage  extremity treatment;opened regional lymph nodes;initial sequence  -BC      Initial Sequence  abdomen  -BC      Abdomen  superficial  -BC      Opened Regional Lymph Nodes  axillary;inguinal  -BC      Axillary  right;left  -BC      Inguinal  right;left  -BC      Extremity Treatment  MLD to full limb  -BC      MLD to Full Limb  BLE's  -BC      Manual Lymphatic Drainage Comments  Vibration as tolerated.  -BC      Manual Therapy  MLD to BLE's  -BC         Compression/Skin Care    Compression/Skin Care  bandaging  -BC      Wrapping Location  lower extremity  -BC      Wrapping Location LE  bilateral: tiarra/doff stockings (compression 20-30)  -BC      Bandage Layers  cotton elastic stocking- single layer (comment size)  -BC      Bandaging Technique  light  compression  -BC      Compression/Skin Care Comments  Compression pump x Abd., BLE's  -BC        User Key  (r) = Recorded By, (t) = Taken By, (c) = Cosigned By    Initials Name Provider Type    Charlotte Cook OT Occupational Therapist                  OT Assessment/Plan     Row Name 11/25/20 1549          OT Assessment    Assessment Comments  Pt. positioned in supported recline for manual lymph drainage, compression pump, skin care and multi layered bandaging of BLE's.  -BC       User Key  (r) = Recorded By, (t) = Taken By, (c) = Cosigned By    Initials Name Provider Type    Charlotte Cook OT Occupational Therapist                    Therapy Education  Given: Symptoms/condition management  Program: Reinforced  How Provided: Verbal  Provided to: Patient  Level of Understanding: Verbalized                Time Calculation:   OT Start Time: 1400  OT Stop Time: 1530  OT Time Calculation (min): 90 min  OT Non-Billable Time (min): 20 min     Therapy Charges for Today     Code Description Service Date Service Provider Modifiers Qty    29538904753 HC OT LYMPHEDEMA MANAGEMENT-15 MIN 11/25/2020 Charlotte García OT KX 1    18171567686 HC OT MANUAL THERAPY EA 15 MIN 11/25/2020 Charlotte García OT GO, KX 3    88400433940 HC OT SELF CARE/MGMT/TRAIN EA 15 MIN 11/25/2020 Charlotte García OT GO, KX 1    83244175342 HC OT VASOPNEUMAT DEVIC 1 OR MORE AREAS 11/25/2020 Charlotte García OT GO, KX 1                      Charlotte García OT  11/25/2020   [No Acute Distress] : no acute distress [Alert] : alert

## 2020-11-27 ENCOUNTER — HOSPITAL ENCOUNTER (OUTPATIENT)
Dept: OCCUPATIONAL THERAPY | Facility: HOSPITAL | Age: 75
Setting detail: THERAPIES SERIES
Discharge: HOME | End: 2020-11-27

## 2020-11-27 DIAGNOSIS — C51.9 CANCER OF VULVA (HCC): ICD-10-CM

## 2020-11-27 DIAGNOSIS — I89.0 LYMPHEDEMA OF BOTH LOWER EXTREMITIES: Primary | ICD-10-CM

## 2020-11-27 PROCEDURE — 97016 VASOPNEUMATIC DEVICE THERAPY: CPT

## 2020-11-27 PROCEDURE — 97139 UNLISTED THERAPEUTIC PX: CPT

## 2020-11-27 PROCEDURE — 97535 SELF CARE MNGMENT TRAINING: CPT

## 2020-11-27 PROCEDURE — 97140 MANUAL THERAPY 1/> REGIONS: CPT

## 2020-11-27 NOTE — THERAPY TREATMENT NOTE
Outpatient Occupational Therapy Lymphedema Treatment Note  JOSE ROBERTO Patel     Patient Name: Orquidea Rosenberg  : 1945  MRN: [id number]  Today's Date: 2020      Visit Date: 2020    Patient Active Problem List   Diagnosis   • COVID-19        Past Medical History:   Diagnosis Date   • Arthritis    • COPD (chronic obstructive pulmonary disease) (CMS/HCC)    • Elevated cholesterol    • History of transfusion    • Hypertension    • Vulval ca (CMS/HCC)         Past Surgical History:   Procedure Laterality Date   • RADICAL VULVECTOMY  2019   • RADICAL VULVECTOMY Bilateral 2019         Visit Dx:      ICD-10-CM ICD-9-CM   1. Lymphedema of both lower extremities  I89.0 457.1   2. Cancer of vulva (CMS/HCC)  C51.9 184.4       Lymphedema     Row Name 20 0900             Subjective Pain    Able to rate subjective pain?  yes  -BC      Pre-Treatment Pain Level  0  -BC      Subjective Pain Comment  Denies pain  -BC         Subjective Comments    Subjective Comments  Pt. with no new reports this AM.  -BC         Lymphedema Assessment    Lymphedema Classification  RLE:;LLE:;secondary;stage 2 (Spontaneously Irreversible)  -BC      Lymphedema Cancer Related Sx  bilateral radical vulvectomy  -BC      Lymph Nodes Removed #  0  -BC      Positive Lymph Nodes #  0  -BC      Chemo Received  yes  -BC      Radiation Therapy Received  yes  -BC         Posture/Observations    Posture- WNL  Posture is WNL  -BC         Lymphedema Edema Assessment    Ptting Edema Category  By grade out of 4  -BC      Pitting Edema  + 3/4  -BC      Stemmer Sign  left:;positive  -BC         Skin Changes/Observations    Location/Assessment  Lower Extremity;Other Location  -BC      Lower Extremity Conditions  bilateral:;clean;dry;shiny  -BC      Lower Extremity Color/Pigment  bilateral:;blanchable;radiation fibrosis;hyperpigmented  -BC      Other Location Color/Pigment  bilateral:;red;radiation fibrosis;other (comment) groin/vaginal areas  -BC       Skin Observations Comment  Increased thickness palpated at scarring on L inner thigh.   -BC         Lymphedema Sensation    Lymphedema Sensation Tests  light touch;deep touch  -BC      Lymphedema Light Touch  RLE:;mild impairment  -BC      Lymphedema Deep Touch  WNL  -BC         Lymphedema Measurements    Measurement Type(s)  Quick Girth  -BC      Quick Girth Areas  Lower extremities  -BC      Circumferential Areas  Trunk  -BC         LLE Quick Girth (cm)    Met-heads  23.5 cm  -BC      Mid foot  24 cm  -BC      Smallest ankle  24.5 cm  -BC      Largest calf  39 cm  -BC      Tib tuberosity  37.5 cm  -BC      Mid patella  45.5 cm  -BC      Distal thigh  51.2 cm  -BC      Other 1  25.3 cm  -BC      Other 2  34 cm  -BC         RLE Quick Girth (cm)    Met-heads  23 cm  -BC      Mid foot  23.4 cm  -BC      Smallest ankle  22.3 cm  -BC      Largest calf  39 cm  -BC      Tib tuberosity  38 cm  -BC      Mid patella  44.3 cm  -BC      Distal thigh  47.3 cm  -BC      Proximal thigh  54.9 cm  -BC      Other 1  23.2 cm  -BC      Other 2  33.6 cm  -BC      RLE Quick Girth Total  349  -BC         Trunk Circumferential (cm)    Measurement Location 1  Navel  -BC         Manual Lymphatic Drainage    Manual Lymphatic Drainage  extremity treatment;opened regional lymph nodes;initial sequence  -BC      Initial Sequence  abdomen  -BC      Abdomen  superficial  -BC      Opened Regional Lymph Nodes  axillary;inguinal  -BC      Axillary  right;left  -BC      Inguinal  right;left  -BC      Extremity Treatment  MLD to full limb  -BC      MLD to Full Limb  BLE's  -BC      Manual Lymphatic Drainage Comments  Vibration as tolerated  -BC      Manual Therapy  MLD to BLE's  -BC         Compression/Skin Care    Compression/Skin Care  bandaging  -BC      Wrapping Location  lower extremity  -BC      Wrapping Location LE  bilateral: tiarra/doff stockings (compression 20-30)  -BC      Bandage Layers  cotton elastic stocking- single layer (comment  size)  -BC      Bandaging Technique  light compression  -BC        User Key  (r) = Recorded By, (t) = Taken By, (c) = Cosigned By    Initials Name Provider Type    Charlotte Cook OT Occupational Therapist                  OT Assessment/Plan     Row Name 11/27/20 1106          OT Assessment    Assessment Comments  Pt. positioned in supported recline for manual lymph drainage, compression pump, skin care and multi layered bandaging of LLE base of toes to mid thigh.  -BC       User Key  (r) = Recorded By, (t) = Taken By, (c) = Cosigned By    Initials Name Provider Type    Charlotte Cook OT Occupational Therapist                    Therapy Education  Given: Symptoms/condition management  Program: Reinforced  How Provided: Verbal  Provided to: Patient  Level of Understanding: Verbalized                Time Calculation:   OT Start Time: 0930  OT Stop Time: 1100  OT Time Calculation (min): 90 min  OT Non-Billable Time (min): 15 min     Therapy Charges for Today     Code Description Service Date Service Provider Modifiers Qty    30474819723 HC OT LYMPHEDEMA MANAGEMENT-15 MIN 11/27/2020 Charlotte García OT KX 1    02341978121 HC OT MANUAL THERAPY EA 15 MIN 11/27/2020 Charlotte García OT GO, KX 3    74007944911 HC OT SELF CARE/MGMT/TRAIN EA 15 MIN 11/27/2020 Charlotte García OT GO, KX 1    30378476146 HC OT VASOPNEUMAT DEVIC 1 OR MORE AREAS 11/27/2020 Charlotte García OT GO, KX 1                      Charlotte García OT  11/27/2020

## 2020-12-03 ENCOUNTER — HOSPITAL ENCOUNTER (OUTPATIENT)
Dept: OCCUPATIONAL THERAPY | Facility: HOSPITAL | Age: 75
Setting detail: THERAPIES SERIES
Discharge: HOME | End: 2020-12-03

## 2020-12-03 DIAGNOSIS — C51.9 CANCER OF VULVA (HCC): ICD-10-CM

## 2020-12-03 DIAGNOSIS — I89.0 LYMPHEDEMA OF BOTH LOWER EXTREMITIES: Primary | ICD-10-CM

## 2020-12-03 PROCEDURE — 97140 MANUAL THERAPY 1/> REGIONS: CPT

## 2020-12-03 PROCEDURE — 97535 SELF CARE MNGMENT TRAINING: CPT

## 2020-12-03 PROCEDURE — 97016 VASOPNEUMATIC DEVICE THERAPY: CPT

## 2020-12-03 PROCEDURE — 97139 UNLISTED THERAPEUTIC PX: CPT

## 2020-12-03 NOTE — THERAPY TREATMENT NOTE
Outpatient Occupational Therapy Lymphedema Treatment Note  JOSE ROBERTO Patel     Patient Name: Orquidea Rosenberg  : 1945  MRN: [id number]  Today's Date: 12/3/2020      Visit Date: 2020    Patient Active Problem List   Diagnosis   • COVID-19        Past Medical History:   Diagnosis Date   • Arthritis    • COPD (chronic obstructive pulmonary disease) (CMS/HCC)    • Elevated cholesterol    • History of transfusion    • Hypertension    • Vulval ca (CMS/HCC)         Past Surgical History:   Procedure Laterality Date   • RADICAL VULVECTOMY  2019   • RADICAL VULVECTOMY Bilateral 2019         Visit Dx:      ICD-10-CM ICD-9-CM   1. Lymphedema of both lower extremities  I89.0 457.1   2. Cancer of vulva (CMS/HCC)  C51.9 184.4       Lymphedema     Row Name 20 1000             Subjective Pain    Able to rate subjective pain?  yes  -AB      Pre-Treatment Pain Level  0  -AB         Subjective Comments    Subjective Comments  Pt. denies pain this date.   -AB         Lymphedema Assessment    Lymphedema Classification  RLE:;LLE:;secondary;stage 2 (Spontaneously Irreversible)  -AB      Lymphedema Cancer Related Sx  bilateral radical vulvectomy  -AB      Lymph Nodes Removed #  0  -AB      Positive Lymph Nodes #  0  -AB      Chemo Received  yes  -AB      Radiation Therapy Received  yes  -AB         Posture/Observations    Posture- WNL  Posture is WNL  -AB         Lymphedema Edema Assessment    Ptting Edema Category  By grade out of 4  -AB      Pitting Edema  + 2/4;+ 3/4  -AB      Stemmer Sign  left:;positive  -AB         Skin Changes/Observations    Location/Assessment  Lower Extremity;Other Location  -AB      Lower Extremity Conditions  bilateral:;clean;dry;shiny  -AB      Lower Extremity Color/Pigment  bilateral:;blanchable;radiation fibrosis;hyperpigmented  -AB      Other Location Color/Pigment  bilateral:;red;radiation fibrosis;other (comment) groin/vaginal areas  -AB         Lymphedema Sensation    Lymphedema  Sensation Tests  light touch;deep touch  -AB      Lymphedema Light Touch  RLE:;mild impairment  -AB      Lymphedema Deep Touch  WNL  -AB         Lymphedema Measurements    Measurement Type(s)  Quick Girth  -AB      Quick Girth Areas  Lower extremities  -AB      Circumferential Areas  Trunk  -AB         LLE Quick Girth (cm)    Met-heads  23 cm  -AB      Mid foot  22.9 cm  -AB      Smallest ankle  25.2 cm  -AB      Largest calf  39.2 cm  -AB      Tib tuberosity  39.6 cm  -AB      Mid patella  44.2 cm  -AB      Distal thigh  48.2 cm  -AB      Proximal thigh  53.5 cm  -AB      Other 1  25.8 cm  -AB      Other 2  35.2 cm  -AB         RLE Quick Girth (cm)    Met-heads  22.7 cm  -AB      Mid foot  22.3 cm  -AB      Smallest ankle  23.5 cm  -AB      Largest calf  38.8 cm  -AB      Tib tuberosity  38.8 cm  -AB      Mid patella  43.3 cm  -AB      Distal thigh  47.8 cm  -AB      Proximal thigh  55.4 cm  -AB      Other 1  23.3 cm  -AB      Other 2  35.1 cm  -AB      RLE Quick Girth Total  351  -AB         Trunk Circumferential (cm)    Measurement Location 1  Navel  -AB         Manual Lymphatic Drainage    Manual Lymphatic Drainage  extremity treatment;opened regional lymph nodes;initial sequence  -AB      Initial Sequence  abdomen  -AB      Abdomen  superficial  -AB      Opened Regional Lymph Nodes  axillary;inguinal  -AB      Axillary  right;left  -AB      Inguinal  right;left  -AB      Extremity Treatment  MLD to full limb  -AB      MLD to Full Limb  BLE's  -AB      Manual Therapy  MLD To BLE's, abdomen, axillary, inguinals  -AB         Compression/Skin Care    Compression/Skin Care  bandaging  -AB      Wrapping Location  lower extremity  -AB      Wrapping Location LE  left: base of toes to mid thigh  -AB      Bandage Layers  cotton elastic stocking- single layer (comment size)  -AB      Bandaging Technique  moderate compression  -AB      Compression/Skin Care Comments  compression pump to abdomen and BLE's  -AB        User  Key  (r) = Recorded By, (t) = Taken By, (c) = Cosigned By    Initials Name Provider Type    AB Anca Pickett, OT Occupational Therapist                                Therapy Education  Given: HEP, Edema management, Symptoms/condition management, Bandaging/dressing change  Program: Reinforced  How Provided: Verbal, Demonstration  Provided to: Patient  Level of Understanding: Verbalized                Time Calculation:   OT Start Time: 0940  OT Stop Time: 1120  OT Time Calculation (min): 100 min  OT Non-Billable Time (min): 25 min  Total Timed Code Minutes- OT: 100 minute(s)     Therapy Charges for Today     Code Description Service Date Service Provider Modifiers Qty    96298437007  OT MANUAL THERAPY EA 15 MIN 12/3/2020 Anca Pickett OT GO, KX 4    [id number]  OT LYMPHEDEMA MANAGEMENT-15 MIN 12/3/2020 Anca Pickett, OT KX 1    [id number]  OT VASOPNEUMAT DEVIC 1 OR MORE AREAS 12/3/2020 Anca Pickett OT GO, KX 1    [id number]  OT SELF CARE/MGMT/TRAIN EA 15 MIN 12/3/2020 Anca Pickett OT GO, KX 1                      Anca Pickett OT  12/3/2020

## 2020-12-08 ENCOUNTER — HOSPITAL ENCOUNTER (OUTPATIENT)
Dept: OCCUPATIONAL THERAPY | Facility: HOSPITAL | Age: 75
Setting detail: THERAPIES SERIES
Discharge: HOME | End: 2020-12-08

## 2020-12-08 DIAGNOSIS — I89.0 LYMPHEDEMA OF BOTH LOWER EXTREMITIES: Primary | ICD-10-CM

## 2020-12-08 DIAGNOSIS — C51.9 CANCER OF VULVA (HCC): ICD-10-CM

## 2020-12-08 PROCEDURE — 97140 MANUAL THERAPY 1/> REGIONS: CPT

## 2020-12-08 PROCEDURE — 97139 UNLISTED THERAPEUTIC PX: CPT

## 2020-12-08 PROCEDURE — 97016 VASOPNEUMATIC DEVICE THERAPY: CPT

## 2020-12-08 PROCEDURE — 97535 SELF CARE MNGMENT TRAINING: CPT

## 2020-12-08 NOTE — THERAPY TREATMENT NOTE
Outpatient Occupational Therapy Lymphedema Treatment Note  JOSE ROBERTO Patel     Patient Name: Orquidea Rosenberg  : 1945  MRN: [id number]  Today's Date: 2020      Visit Date: 2020    Patient Active Problem List   Diagnosis   • COVID-19        Past Medical History:   Diagnosis Date   • Arthritis    • COPD (chronic obstructive pulmonary disease) (CMS/HCC)    • Elevated cholesterol    • History of transfusion    • Hypertension    • Vulval ca (CMS/HCC)         Past Surgical History:   Procedure Laterality Date   • RADICAL VULVECTOMY  2019   • RADICAL VULVECTOMY Bilateral 2019         Visit Dx:      ICD-10-CM ICD-9-CM   1. Lymphedema of both lower extremities  I89.0 457.1   2. Cancer of vulva (CMS/HCC)  C51.9 184.4       Lymphedema     Row Name 20 0900             Subjective Pain    Able to rate subjective pain?  yes  -BC      Pre-Treatment Pain Level  0  -BC      Subjective Pain Comment  Denies pain  -BC         Subjective Comments    Subjective Comments  Pt. requesting more info on garments for air travel.   -BC         Lymphedema Assessment    Lymphedema Classification  RLE:;LLE:;secondary;stage 2 (Spontaneously Irreversible)  -BC      Lymphedema Cancer Related Sx  bilateral radical vulvectomy  -BC      Lymph Nodes Removed #  0  -BC      Positive Lymph Nodes #  0  -BC      Chemo Received  yes  -BC      Radiation Therapy Received  yes  -BC         Posture/Observations    Posture- WNL  Posture is WNL  -BC         Lymphedema Edema Assessment    Ptting Edema Category  By grade out of 4  -BC      Pitting Edema  + 2/4;+ 3/4  -BC      Stemmer Sign  left:;positive  -BC         Skin Changes/Observations    Location/Assessment  Lower Extremity;Other Location  -BC      Lower Extremity Conditions  bilateral:;clean;dry;shiny  -BC      Lower Extremity Color/Pigment  bilateral:;blanchable;radiation fibrosis;hyperpigmented  -BC      Other Location Color/Pigment  bilateral:;red;radiation fibrosis;other (comment)  groin/vaginal areas  -BC         Lymphedema Sensation    Lymphedema Sensation Tests  light touch;deep touch  -BC      Lymphedema Light Touch  RLE:;mild impairment  -BC      Lymphedema Deep Touch  WNL  -BC         Lymphedema Measurements    Measurement Type(s)  Quick Girth  -BC      Quick Girth Areas  Lower extremities  -BC      Circumferential Areas  Trunk  -BC         LLE Quick Girth (cm)    Met-heads  23.5 cm  -BC      Mid foot  23.1 cm  -BC      Smallest ankle  23.5 cm  -BC      Largest calf  39.5 cm  -BC      Tib tuberosity  37.9 cm  -BC      Mid patella  43.5 cm  -BC      Distal thigh  49 cm  -BC      Proximal thigh  54 cm  -BC      Other 1  23.8 cm  -BC      Other 2  32.8 cm  -BC         RLE Quick Girth (cm)    Met-heads  22.9 cm  -BC      Mid foot  23.5 cm  -BC      Smallest ankle  22.5 cm  -BC      Largest calf  39 cm  -BC      Tib tuberosity  38.5 cm  -BC      Mid patella  43.8 cm  -BC      Distal thigh  47.1 cm  -BC      Proximal thigh  56.4 cm  -BC      Other 1  22.9 cm  -BC      Other 2  34.2 cm  -BC      RLE Quick Girth Total  350.8  -BC         Trunk Circumferential (cm)    Measurement Location 1  Navel  -BC         Manual Lymphatic Drainage    Manual Lymphatic Drainage  extremity treatment;opened regional lymph nodes;initial sequence  -BC      Initial Sequence  abdomen  -BC      Abdomen  superficial  -BC      Opened Regional Lymph Nodes  axillary;inguinal  -BC      Axillary  right;left  -BC      Inguinal  right;left  -BC      Extremity Treatment  MLD to full limb  -BC      MLD to Full Limb  BLE's  -BC      Manual Therapy  MLD to BLE's, Abd., inguinals, axillary.  -BC         Compression/Skin Care    Compression/Skin Care  bandaging  -BC      Wrapping Location  lower extremity  -BC      Wrapping Location LE  left: base of toes to mid thigh  -BC      Bandage Layers  cotton elastic stocking- single layer (comment size)  -BC      Bandaging Technique  moderate compression  -BC      Compression/Skin Care  Comments  Compression pump x Abd., BLE's.  -BC        User Key  (r) = Recorded By, (t) = Taken By, (c) = Cosigned By    Initials Name Provider Type    Charlotte Cook OT Occupational Therapist                  OT Assessment/Plan     Row Name 12/08/20 1746          OT Assessment    Assessment Comments  Pt. positioned in supported recline for manual lymph drainage, compression pump, skin care and multi layered bandaging of BLE's base of toes to mid thigh.  -BC       User Key  (r) = Recorded By, (t) = Taken By, (c) = Cosigned By    Initials Name Provider Type    Charlotte Cook OT Occupational Therapist                    Therapy Education  Given: HEP  Program: Reinforced  How Provided: Verbal  Provided to: Patient  Level of Understanding: Verbalized                Time Calculation:   OT Start Time: 0920  OT Stop Time: 1055  OT Time Calculation (min): 95 min  OT Non-Billable Time (min): 20 min     Therapy Charges for Today     Code Description Service Date Service Provider Modifiers Qty    67346009917 HC OT LYMPHEDEMA MANAGEMENT-15 MIN 12/8/2020 Charlotte García OT KX 1    40342571886 HC OT MANUAL THERAPY EA 15 MIN 12/8/2020 Charlotte García OT GO, KX 3    41480071480 HC OT SELF CARE/MGMT/TRAIN EA 15 MIN 12/8/2020 Charlotte García OT GO, KX 1    68501985444 HC OT VASOPNEUMAT DEVIC 1 OR MORE AREAS 12/8/2020 Charlotte García OT GO, KX 1                      Charlotte García OT  12/8/2020

## 2020-12-10 ENCOUNTER — HOSPITAL ENCOUNTER (OUTPATIENT)
Dept: OCCUPATIONAL THERAPY | Facility: HOSPITAL | Age: 75
Setting detail: THERAPIES SERIES
Discharge: HOME | End: 2020-12-10

## 2020-12-10 DIAGNOSIS — C51.9 CANCER OF VULVA (HCC): ICD-10-CM

## 2020-12-10 DIAGNOSIS — I89.0 LYMPHEDEMA OF BOTH LOWER EXTREMITIES: Primary | ICD-10-CM

## 2020-12-10 PROCEDURE — 97016 VASOPNEUMATIC DEVICE THERAPY: CPT

## 2020-12-10 PROCEDURE — 97139 UNLISTED THERAPEUTIC PX: CPT

## 2020-12-10 PROCEDURE — 97535 SELF CARE MNGMENT TRAINING: CPT

## 2020-12-10 PROCEDURE — 97140 MANUAL THERAPY 1/> REGIONS: CPT

## 2020-12-10 NOTE — THERAPY TREATMENT NOTE
Outpatient Occupational Therapy Lymphedema Treatment Note  JOSE ROBERTO Patel     Patient Name: Orquidea Rosenberg  : 1945  MRN: [id number]  Today's Date: 12/10/2020      Visit Date: 12/10/2020    Patient Active Problem List   Diagnosis   • COVID-19        Past Medical History:   Diagnosis Date   • Arthritis    • COPD (chronic obstructive pulmonary disease) (CMS/HCC)    • Elevated cholesterol    • History of transfusion    • Hypertension    • Vulval ca (CMS/HCC)         Past Surgical History:   Procedure Laterality Date   • RADICAL VULVECTOMY  2019   • RADICAL VULVECTOMY Bilateral 2019         Visit Dx:      ICD-10-CM ICD-9-CM   1. Lymphedema of both lower extremities  I89.0 457.1   2. Cancer of vulva (CMS/HCC)  C51.9 184.4       Lymphedema     Row Name 12/10/20 1000             Subjective Pain    Able to rate subjective pain?  yes  -BC      Pre-Treatment Pain Level  0  -BC      Subjective Pain Comment  Denies pain  -BC         Subjective Comments    Subjective Comments  Pt. reports some irritation with donning/doffing undergarments due to wearing pad.  -BC         Lymphedema Assessment    Lymphedema Classification  RLE:;LLE:;secondary;stage 2 (Spontaneously Irreversible)  -BC      Lymphedema Cancer Related Sx  bilateral radical vulvectomy  -BC      Lymph Nodes Removed #  0  -BC      Positive Lymph Nodes #  0  -BC      Chemo Received  yes  -BC      Radiation Therapy Received  yes  -BC         Posture/Observations    Posture- WNL  Posture is WNL  -BC         Lymphedema Edema Assessment    Ptting Edema Category  By grade out of 4  -BC      Pitting Edema  + 2/4;+ 3/4  -BC      Stemmer Sign  left:;positive  -BC         Skin Changes/Observations    Location/Assessment  Lower Extremity;Other Location  -BC      Lower Extremity Conditions  bilateral:;clean;dry;shiny  -BC      Lower Extremity Color/Pigment  bilateral:;blanchable;radiation fibrosis;hyperpigmented  -BC      Other Location Color/Pigment   bilateral:;red;radiation fibrosis;other (comment) groin/vaginal areas  -BC         Lymphedema Sensation    Lymphedema Sensation Tests  light touch;deep touch  -BC      Lymphedema Light Touch  RLE:;mild impairment  -BC      Lymphedema Deep Touch  WNL  -BC         Lymphedema Measurements    Measurement Type(s)  Quick Girth  -BC      Quick Girth Areas  Lower extremities  -BC      Circumferential Areas  Trunk  -BC         LLE Quick Girth (cm)    Met-heads  24 cm  -BC      Mid foot  23 cm  -BC      Smallest ankle  23.1 cm  -BC      Largest calf  39.5 cm  -BC      Tib tuberosity  37.6 cm  -BC      Mid patella  43.5 cm  -BC      Distal thigh  48 cm  -BC      Other 1  23.7 cm  -BC      Other 2  34.3 cm  -BC         RLE Quick Girth (cm)    Met-heads  22.1 cm  -BC      Mid foot  22.8 cm  -BC      Smallest ankle  22 cm  -BC      Largest calf  38.8 cm  -BC      Tib tuberosity  38.1 cm  -BC      Mid patella  43 cm  -BC      Distal thigh  48 cm  -BC      Proximal thigh  55.6 cm  -BC      Other 1  22.5 cm  -BC      Other 2  32.8 cm  -BC      RLE Quick Girth Total  345.7  -BC         Trunk Circumferential (cm)    Measurement Location 1  Navel  -BC         Manual Lymphatic Drainage    Manual Lymphatic Drainage  extremity treatment;opened regional lymph nodes;initial sequence  -BC      Initial Sequence  abdomen  -BC      Abdomen  superficial  -BC      Opened Regional Lymph Nodes  axillary;inguinal  -BC      Axillary  right;left  -BC      Inguinal  right;left  -BC      Extremity Treatment  MLD to full limb  -BC      MLD to Full Limb  BLE's  -BC      Manual Therapy  MLD's to BLE's, Abd., inguinals.  -BC         Compression/Skin Care    Compression/Skin Care  bandaging  -BC      Wrapping Location  lower extremity  -BC      Wrapping Location LE  --  -BC      Wrapping Comments  Donned compression long shorts, compression socks, reduction kit on L ankle, kinesiotape on L toes.   -BC      Bandage Layers  --  -BC      Bandaging Technique   moderate compression  -BC      Compression/Skin Care Comments  Compression pump x Abd., BLE's  -BC        User Key  (r) = Recorded By, (t) = Taken By, (c) = Cosigned By    Initials Name Provider Type    Charlotte Cook OT Occupational Therapist                  OT Assessment/Plan     Row Name 12/10/20 9101          OT Assessment    Assessment Comments  Pt. positioned in supported recline for manual lymph drainage, compression pump, skin care and donning of compression garments, shorts, socks, ankle reduction kit.  -BC       User Key  (r) = Recorded By, (t) = Taken By, (c) = Cosigned By    Initials Name Provider Type    Charlotte Cook OT Occupational Therapist                    Therapy Education  Given: Edema management  Program: New  How Provided: Verbal  Provided to: Patient  Level of Understanding: Verbalized                Time Calculation:   OT Start Time: 0930  OT Stop Time: 1120  OT Time Calculation (min): 110 min  OT Non-Billable Time (min): 35 min     Therapy Charges for Today     Code Description Service Date Service Provider Modifiers Qty    95047201686 HC OT LYMPHEDEMA MANAGEMENT-15 MIN 12/10/2020 Charlotte García OT KX 1    33121225377 HC OT MANUAL THERAPY EA 15 MIN 12/10/2020 Charlotte García OT GO, KX 2    55953448102 HC OT SELF CARE/MGMT/TRAIN EA 15 MIN 12/10/2020 Charlotte García OT GO, KX 3    78453998341 HC OT VASOPNEUMAT DEVIC 1 OR MORE AREAS 12/10/2020 Charlotte García OT GO, KX 1                      Charlotte García OT  12/10/2020

## 2020-12-15 ENCOUNTER — HOSPITAL ENCOUNTER (OUTPATIENT)
Dept: OCCUPATIONAL THERAPY | Facility: HOSPITAL | Age: 75
Setting detail: THERAPIES SERIES
Discharge: HOME | End: 2020-12-15

## 2020-12-15 DIAGNOSIS — C51.9 CANCER OF VULVA (HCC): ICD-10-CM

## 2020-12-15 DIAGNOSIS — I89.0 LYMPHEDEMA OF BOTH LOWER EXTREMITIES: Primary | ICD-10-CM

## 2020-12-15 PROCEDURE — 97139 UNLISTED THERAPEUTIC PX: CPT

## 2020-12-15 PROCEDURE — 97140 MANUAL THERAPY 1/> REGIONS: CPT

## 2020-12-15 PROCEDURE — 97016 VASOPNEUMATIC DEVICE THERAPY: CPT

## 2020-12-15 PROCEDURE — 97535 SELF CARE MNGMENT TRAINING: CPT

## 2020-12-15 NOTE — THERAPY TREATMENT NOTE
Outpatient Occupational Therapy Lymphedema Treatment Note   Jorge     Patient Name: Orquidea Rosenberg  : 1945  MRN: [id number]  Today's Date: 12/15/2020      Visit Date: 12/15/2020    Patient Active Problem List   Diagnosis   • COVID-19        Past Medical History:   Diagnosis Date   • Arthritis    • COPD (chronic obstructive pulmonary disease) (CMS/HCC)    • Elevated cholesterol    • History of transfusion    • Hypertension    • Vulval ca (CMS/HCC)         Past Surgical History:   Procedure Laterality Date   • RADICAL VULVECTOMY  2019   • RADICAL VULVECTOMY Bilateral 2019         Visit Dx:      ICD-10-CM ICD-9-CM   1. Lymphedema of both lower extremities  I89.0 457.1   2. Cancer of vulva (CMS/HCC)  C51.9 184.4       Lymphedema     Row Name 12/15/20 0900             Subjective Pain    Able to rate subjective pain?  yes  -BC      Pre-Treatment Pain Level  0  -BC      Subjective Pain Comment  Denies pain  -BC         Subjective Comments    Subjective Comments  Pt. reports that she will be leaving today to have a [employer] vacation with her daughter in another state.  They will be flying out this afternoon.   -BC         Lymphedema Assessment    Lymphedema Classification  RLE:;LLE:;secondary;stage 2 (Spontaneously Irreversible)  -BC      Lymphedema Cancer Related Sx  bilateral radical vulvectomy  -BC      Lymph Nodes Removed #  0  -BC      Positive Lymph Nodes #  0  -BC      Chemo Received  yes  -BC      Radiation Therapy Received  yes  -BC         Posture/Observations    Posture- WNL  Posture is WNL  -BC         Lymphedema Edema Assessment    Ptting Edema Category  By grade out of 4  -BC      Pitting Edema  + 2/4;+ 3/4  -BC      Stemmer Sign  left:;positive  -BC         Skin Changes/Observations    Location/Assessment  Lower Extremity;Other Location  -BC      Lower Extremity Conditions  bilateral:;clean;dry;shiny  -BC      Lower Extremity Color/Pigment  bilateral:;blanchable;radiation  fibrosis;hyperpigmented  -BC      Other Location Color/Pigment  bilateral:;red;radiation fibrosis;other (comment) groin/vaginal areas  -BC         Lymphedema Sensation    Lymphedema Sensation Tests  light touch;deep touch  -BC      Lymphedema Light Touch  RLE:;mild impairment  -BC      Lymphedema Deep Touch  WNL  -BC         Lymphedema Measurements    Measurement Type(s)  Quick Girth  -BC      Quick Girth Areas  Lower extremities  -BC      Circumferential Areas  Trunk  -BC         Trunk Circumferential (cm)    Measurement Location 1  Navel  -BC         Manual Lymphatic Drainage    Manual Lymphatic Drainage  extremity treatment;opened regional lymph nodes;initial sequence  -BC      Initial Sequence  abdomen  -BC      Abdomen  superficial  -BC      Opened Regional Lymph Nodes  axillary;inguinal  -BC      Axillary  right;left  -BC      Inguinal  right;left  -BC      Extremity Treatment  MLD to full limb  -BC      MLD to Full Limb  BLE's  -BC      Manual Therapy  MLD to BLE, inguinals, axillary.   -BC         Compression/Skin Care    Compression/Skin Care  compression garment  -BC      Wrapping Location  lower extremity  -BC      Wrapping Comments  Compression long shorts, compresstion stockings, compression pants.  -BC      Bandaging Technique  strong compression  -BC      Compression/Skin Care Comments  Compression pump x Abd., BLE's   -BC        User Key  (r) = Recorded By, (t) = Taken By, (c) = Cosigned By    Initials Name Provider Type    Charlotte Cook OT Occupational Therapist                  OT Assessment/Plan     Row Name 12/15/20 2207          OT Assessment    Assessment Comments  Pt. positioned in supported recline for manual lymph drainage, compression pump, donning of compression garments, (shorts, knee stockings, long pants.) ankle reduction kit.  -BC       User Key  (r) = Recorded By, (t) = Taken By, (c) = Cosigned By    Initials Name Provider Type    Charlotte Cook, OT Occupational Therapist                     Therapy Education  Given: Symptoms/condition management  Program: Reinforced  How Provided: Verbal  Provided to: Patient  Level of Understanding: Verbalized                Time Calculation:   OT Start Time: 0925  OT Stop Time: 1045  OT Time Calculation (min): 80 min  OT Non-Billable Time (min): 25 min     Therapy Charges for Today     Code Description Service Date Service Provider Modifiers Qty    62013014158 HC OT LYMPHEDEMA MANAGEMENT-15 MIN 12/15/2020 Charlotte García OT KX 1    19146894858 HC OT MANUAL THERAPY EA 15 MIN 12/15/2020 Charlotte García OT GO, KX 1    36039889832 HC OT SELF CARE/MGMT/TRAIN EA 15 MIN 12/15/2020 Charlotte García OT GO, KX 2    [id number]  OT VASOPNEUMAT DEVIC 1 OR MORE AREAS 12/15/2020 Charlotte García OT GO, KX 1                      Charlotte García OT  12/15/2020

## 2021-01-13 ENCOUNTER — HOSPITAL ENCOUNTER (OUTPATIENT)
Dept: OCCUPATIONAL THERAPY | Facility: HOSPITAL | Age: 76
Setting detail: THERAPIES SERIES
Discharge: HOME | End: 2021-01-13

## 2021-01-13 DIAGNOSIS — I89.0 LYMPHEDEMA OF BOTH LOWER EXTREMITIES: Primary | ICD-10-CM

## 2021-01-13 DIAGNOSIS — C51.9 CANCER OF VULVA (HCC): ICD-10-CM

## 2021-01-13 PROCEDURE — 97139 UNLISTED THERAPEUTIC PX: CPT

## 2021-01-13 PROCEDURE — 97535 SELF CARE MNGMENT TRAINING: CPT

## 2021-01-13 PROCEDURE — 97016 VASOPNEUMATIC DEVICE THERAPY: CPT

## 2021-01-13 PROCEDURE — 97168 OT RE-EVAL EST PLAN CARE: CPT

## 2021-01-13 PROCEDURE — 97140 MANUAL THERAPY 1/> REGIONS: CPT

## 2021-01-13 NOTE — THERAPY RE-EVALUATION
Outpatient Occupational Therapy Lymphedema Re-Evaluation   Jorge     Patient Name: Orquidea Rosenberg  : 1945  MRN: [id number]  Today's Date: 2021      Visit Date: 2021    Patient Active Problem List   Diagnosis   • COVID-19        Past Medical History:   Diagnosis Date   • Arthritis    • COPD (chronic obstructive pulmonary disease) (CMS/HCC)    • Elevated cholesterol    • History of transfusion    • Hypertension    • Vulval ca (CMS/HCC)         Past Surgical History:   Procedure Laterality Date   • RADICAL VULVECTOMY  2019   • RADICAL VULVECTOMY Bilateral 2019         Visit Dx:     ICD-10-CM ICD-9-CM   1. Lymphedema of both lower extremities  I89.0 457.1   2. Cancer of vulva (CMS/HCC)  C51.9 184.4           Lymphedema     Row Name 21 1500             Subjective Pain    Able to rate subjective pain?  yes  -BC      Pre-Treatment Pain Level  0  -BC      Subjective Pain Comment  Denies pain  -BC         Subjective Comments    Subjective Comments  Pt. returns from vacation with her daughter present for assistance with ordering reduction kits.  -BC         Lymphedema Assessment    Lymphedema Classification  RLE:;LLE:;secondary;stage 2 (Spontaneously Irreversible)  -BC      Lymphedema Cancer Related Sx  bilateral radical vulvectomy  -BC      Lymph Nodes Removed #  0  -BC      Positive Lymph Nodes #  0  -BC      Chemo Received  yes  -BC      Radiation Therapy Received  yes  -BC         Posture/Observations    Posture- WNL  Posture is WNL  -BC         Lymphedema Edema Assessment    Ptting Edema Category  By grade out of 4  -BC      Pitting Edema  + 2/4;+ 3/4  -BC      Stemmer Sign  left:;positive  -BC         Skin Changes/Observations    Location/Assessment  Lower Extremity;Other Location  -BC      Lower Extremity Conditions  bilateral:;clean;dry;shiny  -BC      Lower Extremity Color/Pigment  bilateral:;blanchable;radiation fibrosis;hyperpigmented  -BC      Other Location Color/Pigment   bilateral:;red;radiation fibrosis;other (comment) groin/vaginal areas  -BC         Lymphedema Sensation    Lymphedema Sensation Tests  light touch;deep touch  -BC      Lymphedema Light Touch  RLE:;mild impairment  -BC      Lymphedema Deep Touch  WNL  -BC         Lymphedema Measurements    Measurement Type(s)  Quick Girth  -BC      Quick Girth Areas  Lower extremities  -BC      Circumferential Areas  Trunk  -BC         LLE Quick Girth (cm)    Met-heads  24.3 cm  -BC      Mid foot  24.4 cm  -BC      Smallest ankle  23.8 cm  -BC      Largest calf  39.7 cm  -BC      Tib tuberosity  39 cm  -BC      Mid patella  43 cm  -BC      Distal thigh  48.4 cm  -BC      Proximal thigh  53.9 cm  -BC      Other 1  23.2 cm  -BC      Other 2  34 cm  -BC         RLE Quick Girth (cm)    Met-heads  22.8 cm  -BC      Mid foot  23.8 cm  -BC      Smallest ankle  23.1 cm  -BC      Largest calf  38.8 cm  -BC      Tib tuberosity  39 cm  -BC      Mid patella  44 cm  -BC      Distal thigh  46.3 cm  -BC      Proximal thigh  55 cm  -BC      Other 1  22.4 cm  -BC      Other 2  32.3 cm  -BC      RLE Quick Girth Total  347.5  -BC         Trunk Circumferential (cm)    Measurement Location 1  Navel  -BC         Manual Lymphatic Drainage    Manual Lymphatic Drainage  extremity treatment;opened regional lymph nodes;initial sequence  -BC      Initial Sequence  abdomen  -BC      Abdomen  superficial  -BC      Opened Regional Lymph Nodes  axillary;inguinal  -BC      Axillary  right;left  -BC      Inguinal  right;left  -BC      Extremity Treatment  MLD to full limb  -BC      MLD to Full Limb  BLE's  -BC      Manual Therapy  MLD to BLE's, axillary, inguinals.  -BC         Compression/Skin Care    Compression/Skin Care  skin care  -BC      Wrapping Location  lower extremity  -BC      Bandaging Technique  --  -BC      Compression/Skin Care Comments  Compression pump x Abd., inguinals.   -BC        User Key  (r) = Recorded By, (t) = Taken By, (c) = Cosigned By     Initials Name Provider Type    Charlotte Cook OT Occupational Therapist                  Therapy Education  Given: Symptoms/condition management, Other (comment)(wear schedule)  Program: Reinforced  How Provided: Verbal  Provided to: Patient, Caregiver  Level of Understanding: Verbalized        OT Goals     Row Name 01/13/21 1700 01/13/21 1600       OT Short Term Goals    STG Date to Achieve  02/13/20  -BC  12/28/20  -BC    STG 1  Pt. familiar w/precautions, skin care and self management of lymphdema.  -BC  Pt. familiar w/precautions, skin care and self management of lymphdema.  -BC    STG 1 Progress  Partially Met  -BC  Met  -BC    STG 2  Decrease pitting edema to 3/4 for decreased risk of infection.  -BC  Decrease pitting edema to 3/4 for decreased risk of infection.  -BC    STG 2 Progress  Partially Met  -BC  Met  -BC    STG 3  HEP to assist with improved lymphatic flow.  -BC  HEP to assist with improved lymphatic flow.  -BC    STG 3 Progress  Partially Met  -BC  Met  -BC    STG 4  Self care instructions for home MLD for volume reduction.  -BC  Self care instructions for home MLD for volume reduction.  -BC    STG 4 Progress  Partially Met  -BC  Met  -BC       Long Term Goals    LTG Date to Achieve  12/28/20  -BC  12/28/20  -BC    LTG 1  Pt. and/or caregiver independent w/short stretch compression bandaging for volume reduction.  -BC  Pt. and/or caregiver independent w/short stretch compression bandaging for volume reduction.  -BC    LTG 1 Progress  --  Ongoing  -BC    LTG 2  Decrease pitting edema to 2/4 for decreased risk of infection.  -BC  Decrease pitting edema to 2/4 for decreased risk of infection.  -BC    LTG 2 Progress  --  Ongoing;Progressing  -BC    LTG 3  Independent with donning/doffing compression garment.  -BC  Independent with donning/doffing compression garment.  -BC    LTG 3 Progress  --  Partially Met;Met  -BC    LTG 4  Independent with lymphedema management and HEP.  -BC  Independent with  lymphedema management and HEP.  -BC    LTG 4 Progress  --  Ongoing;Partially Met  -BC       Time Calculation    OT Goal Re-Cert Due Date  04/13/21  -BC  --      User Key  (r) = Recorded By, (t) = Taken By, (c) = Cosigned By    Initials Name Provider Type    BC Charlotte García OT Occupational Therapist          OT Assessment/Plan     Row Name 01/13/21 1712          OT Assessment    Functional Limitations  Decreased safety during functional activities;Limitations in functional capacity and performance;Impaired gait  -BC     Impairments  Balance;Edema;Gait;Impaired lymphatic circulation;Pain;Sensation  -BC     Assessment Comments  Pt. returned for follow up visit after extended visit with daughter, daughter present for re eval this date.  Pt. has maintained limbs with slight gains in girth.  Pt. positioned in supine for manual lymph drainage, compression pump, re-eval and measurements for reduction kits at family request.  -BC     Please refer to paper survey for additional self-reported information  No  -BC     OT Diagnosis  Lymphedema  -BC     OT Rehab Potential  Good  -BC     Patient/caregiver participated in establishment of treatment plan and goals  Yes  -BC     Patient would benefit from skilled therapy intervention  Yes  -BC        OT Plan    OT Frequency  1x/week;2x/week  -BC     Predicted Duration of Therapy Intervention (OT)  90 days  -BC     Planned CPT's?  OT RE-EVAL: 08444;OT THER ACT EA 15 MIN: 50799FO;OT THER PROC EA 15 MIN: 65035TC;OT SELF CARE/MGMT/TRAIN 15 MIN: 04895;OT MANUAL THERAPY EA 15 MIN: 16827;OT BIS XTRACELL FLUID ANALYSIS: 92299;OT VASOPNEUMATIC DEVICE: 32829  -BC     Planned Therapy Interventions (Optional Details)  home exercise program;manual therapy techniques;patient/family education;ROM (Range of Motion)  -BC     OT Plan Comments  Pt. demo no progress due to absence, re-eval completed this date to maximize functional gains.  -BC       User Key  (r) = Recorded By, (t) = Taken By, (c) =  Cosigned By    Initials Name Provider Type    BC Charlotte García OT Occupational Therapist                    Time Calculation:   OT Start Time: 1100  OT Stop Time: 1245  OT Time Calculation (min): 105 min  OT Non-Billable Time (min): 30 min     Therapy Charges for Today     Code Description Service Date Service Provider Modifiers Qty    63518626154 HC OT LYMPHEDEMA MANAGEMENT-15 MIN 1/13/2021 Charlotte García OT KX 1    28932774549 HC OT MANUAL THERAPY EA 15 MIN 1/13/2021 Charlotte García OT GO, KX 3    [id number] HC OT VASOPNEUMAT DEVIC 1 OR MORE AREAS 1/13/2021 Charlotte García OT GO, KX 1    79499126011 HC OT RE-EVAL 2 1/13/2021 Charlotte García OT GO, KX 1    05044923892 HC OT SELF CARE/MGMT/TRAIN EA 15 MIN 1/13/2021 Charlotte García OT GO, KX 1                    Charlotte García OT  1/13/2021

## 2021-01-25 ENCOUNTER — HOSPITAL ENCOUNTER (OUTPATIENT)
Dept: OCCUPATIONAL THERAPY | Facility: HOSPITAL | Age: 76
Setting detail: THERAPIES SERIES
Discharge: HOME | End: 2021-01-25

## 2021-01-25 DIAGNOSIS — C51.9 CANCER OF VULVA (HCC): ICD-10-CM

## 2021-01-25 DIAGNOSIS — I89.0 LYMPHEDEMA OF BOTH LOWER EXTREMITIES: Primary | ICD-10-CM

## 2021-01-25 PROCEDURE — 97140 MANUAL THERAPY 1/> REGIONS: CPT

## 2021-01-25 PROCEDURE — 97016 VASOPNEUMATIC DEVICE THERAPY: CPT

## 2021-01-25 PROCEDURE — 97139 UNLISTED THERAPEUTIC PX: CPT

## 2021-01-25 PROCEDURE — 97535 SELF CARE MNGMENT TRAINING: CPT

## 2021-01-25 NOTE — THERAPY TREATMENT NOTE
Outpatient Occupational Therapy Lymphedema Treatment Note  JOSE ROBERTO Patel     Patient Name: Orquidea Rosenberg  : 1945  MRN: [id number]  Today's Date: 2021      Visit Date: 2021    Patient Active Problem List   Diagnosis   • COVID-19        Past Medical History:   Diagnosis Date   • Arthritis    • COPD (chronic obstructive pulmonary disease) (CMS/HCC)    • Elevated cholesterol    • History of transfusion    • Hypertension    • Vulval ca (CMS/HCC)         Past Surgical History:   Procedure Laterality Date   • RADICAL VULVECTOMY  2019   • RADICAL VULVECTOMY Bilateral 2019         Visit Dx:      ICD-10-CM ICD-9-CM   1. Lymphedema of both lower extremities  I89.0 457.1   2. Cancer of vulva (CMS/HCC)  C51.9 184.4       Lymphedema     Row Name 21 1400             Subjective Pain    Able to rate subjective pain?  yes  -AB      Pre-Treatment Pain Level  0  -AB         Subjective Comments    Subjective Comments  Pt. presents to tx this date w/ no new c/o.   -AB         Lymphedema Assessment    Lymphedema Classification  RLE:;LLE:;secondary;stage 2 (Spontaneously Irreversible)  -AB      Lymphedema Cancer Related Sx  bilateral radical vulvectomy  -AB      Lymph Nodes Removed #  0  -AB      Positive Lymph Nodes #  0  -AB      Chemo Received  yes  -AB      Radiation Therapy Received  yes  -AB         Posture/Observations    Posture- WNL  Posture is WNL  -AB         Lymphedema Edema Assessment    Ptting Edema Category  By grade out of 4  -AB      Pitting Edema  + 3/4  -AB      Stemmer Sign  left:;positive  -AB         Skin Changes/Observations    Location/Assessment  Lower Extremity;Other Location  -AB      Lower Extremity Conditions  bilateral:;clean;dry;shiny  -AB      Lower Extremity Color/Pigment  bilateral:;blanchable;radiation fibrosis;hyperpigmented  -AB      Other Location Color/Pigment  bilateral:;red;radiation fibrosis;other (comment) groin/vaginal areas  -AB         Lymphedema Sensation     Lymphedema Sensation Tests  light touch;deep touch  -AB      Lymphedema Light Touch  RLE:;mild impairment  -AB      Lymphedema Deep Touch  WNL  -AB         Lymphedema Measurements    Measurement Type(s)  Quick Girth  -AB      Quick Girth Areas  Lower extremities  -AB      Circumferential Areas  Trunk  -AB         LLE Quick Girth (cm)    Met-heads  24 cm  -AB      Mid foot  25.9 cm  -AB      Smallest ankle  25.1 cm  -AB      Largest calf  40 cm  -AB      Tib tuberosity  38.9 cm  -AB      Mid patella  44.2 cm  -AB      Distal thigh  50.4 cm  -AB      Other 1  28.4 cm  -AB      Other 2  37.4 cm  -AB         RLE Quick Girth (cm)    Met-heads  23.2 cm  -AB      Mid foot  23.7 cm  -AB      Smallest ankle  22.4 cm  -AB      Largest calf  40 cm  -AB      Tib tuberosity  39.3 cm  -AB      Mid patella  43.2 cm  -AB      Distal thigh  48.1 cm  -AB      Other 1  23.9 cm  -AB      Other 2  33.6 cm  -AB      RLE Quick Girth Total  297.4  -AB         Trunk Circumferential (cm)    Measurement Location 1  Navel  -AB         Manual Lymphatic Drainage    Manual Lymphatic Drainage  extremity treatment;opened regional lymph nodes;initial sequence  -AB      Initial Sequence  abdomen  -AB      Abdomen  superficial  -AB      Opened Regional Lymph Nodes  axillary;inguinal  -AB      Axillary  right;left  -AB      Inguinal  right;left  -AB      Extremity Treatment  MLD to full limb  -AB      MLD to Full Limb  BLE's  -AB      Manual Lymphatic Drainage Comments  vibration as tolerated  -AB      Manual Therapy  MLD to BLE's, abdomen, inguinals  -AB         Compression/Skin Care    Compression/Skin Care  skin care;wrapping location;bandaging  -AB      Wrapping Location  lower extremity  -AB      Wrapping Location LE  left: toes to below knee  -AB      Bandage Layers  short-stretch bandages (comment size/quantity);cotton elastic stocking- single layer (comment size);soft foam- 1/4 inch  -AB      Bandaging Technique  moderate  compression;circumferential/spiral  -AB      Compression/Skin Care Comments  compression pump to abdomen and BLE's  -AB        User Key  (r) = Recorded By, (t) = Taken By, (c) = Cosigned By    Initials Name Provider Type    Anca Pond OT Occupational Therapist                                Therapy Education  Given: HEP, Edema management, Symptoms/condition management, Bandaging/dressing change  Program: Reinforced  How Provided: Verbal, Demonstration  Provided to: Patient  Level of Understanding: Verbalized                Time Calculation:   OT Start Time: 1300  OT Stop Time: 1445  OT Time Calculation (min): 105 min  OT Non-Billable Time (min): 25 min  Total Timed Code Minutes- OT: 105 minute(s)     Therapy Charges for Today     Code Description Service Date Service Provider Modifiers Qty    [id number]  OT MANUAL THERAPY EA 15 MIN 1/25/2021 Anca Pickett OT OSIRIS CHIX 4    [id number]  OT LYMPHEDEMA MANAGEMENT-15 MIN 1/25/2021 Anca Pickett OT KX 1    [id number]  OT VASOPNEUMAT DEVIC 1 OR MORE AREAS 1/25/2021 Anca Pickett OT ALON, OSIRISX 1    [id number]  OT SELF CARE/MGMT/TRAIN EA 15 MIN 1/25/2021 Anca Pickett OT ALON KX 1                      Anca Pickett OT  1/25/2021

## 2021-01-29 ENCOUNTER — HOSPITAL ENCOUNTER (OUTPATIENT)
Dept: OCCUPATIONAL THERAPY | Facility: HOSPITAL | Age: 76
Setting detail: THERAPIES SERIES
Discharge: HOME | End: 2021-01-29

## 2021-01-29 DIAGNOSIS — C51.9 CANCER OF VULVA (HCC): ICD-10-CM

## 2021-01-29 DIAGNOSIS — I89.0 LYMPHEDEMA OF BOTH LOWER EXTREMITIES: Primary | ICD-10-CM

## 2021-01-29 DIAGNOSIS — U07.1 COVID-19: ICD-10-CM

## 2021-01-29 PROCEDURE — 97535 SELF CARE MNGMENT TRAINING: CPT

## 2021-01-29 PROCEDURE — 97139 UNLISTED THERAPEUTIC PX: CPT

## 2021-01-29 PROCEDURE — 97140 MANUAL THERAPY 1/> REGIONS: CPT

## 2021-01-29 PROCEDURE — 97016 VASOPNEUMATIC DEVICE THERAPY: CPT

## 2021-01-29 NOTE — THERAPY TREATMENT NOTE
Outpatient Occupational Therapy Lymphedema Treatment Note   Jorge     Patient Name: Orquidea Rosenberg  : 1945  MRN: [id number]  Today's Date: 2021      Visit Date: 2021    Patient Active Problem List   Diagnosis   • COVID-19        Past Medical History:   Diagnosis Date   • Arthritis    • COPD (chronic obstructive pulmonary disease) (CMS/HCC)    • Elevated cholesterol    • History of transfusion    • Hypertension    • Vulval ca (CMS/HCC)         Past Surgical History:   Procedure Laterality Date   • RADICAL VULVECTOMY  2019   • RADICAL VULVECTOMY Bilateral 2019         Visit Dx:      ICD-10-CM ICD-9-CM   1. Lymphedema of both lower extremities  I89.0 457.1   2. Cancer of vulva (CMS/HCC)  C51.9 184.4   3. COVID-19  U07.1 079.89       Lymphedema     Row Name 21 0900             Subjective Pain    Able to rate subjective pain?  yes  -BC      Pre-Treatment Pain Level  0  -BC      Subjective Pain Comment  Denies pain  -BC         Subjective Comments    Subjective Comments  Pt. has iniated home pumping with a BigTip model 5150 sequential compression pump for her lower extremities.  Pt. reports pump is set at 40 mmHg.  Pt. wears daily compression garments on lower extremities including reduction kit on ankle.  Pt. reports modifying her diet to low sodium and has quit drinking soft drinks.  Pt. reports elevating her feet in the evenings.      -BC         Lymphedema Assessment    Lymphedema Classification  RLE:;LLE:;secondary;stage 2 (Spontaneously Irreversible)  -BC      Lymphedema Cancer Related Sx  bilateral radical vulvectomy  -BC      Lymph Nodes Removed #  0  -BC      Positive Lymph Nodes #  0  -BC      Chemo Received  yes  -BC      Radiation Therapy Received  yes  -BC         Posture/Observations    Posture- WNL  Posture is WNL  -BC         Lymphedema Edema Assessment    Ptting Edema Category  By grade out of 4  -BC      Pitting Edema  + 3/4  -BC      Stemmer Sign  left:;positive  -BC          Skin Changes/Observations    Location/Assessment  Lower Extremity;Other Location  -BC      Lower Extremity Conditions  bilateral:;clean;dry;shiny  -BC      Lower Extremity Color/Pigment  bilateral:;blanchable;radiation fibrosis;hyperpigmented  -BC      Other Location Color/Pigment  bilateral:;red;radiation fibrosis;other (comment) groin/vaginal areas  -BC         Lymphedema Sensation    Lymphedema Sensation Tests  light touch;deep touch  -BC      Lymphedema Light Touch  RLE:;mild impairment  -BC      Lymphedema Deep Touch  WNL  -BC         Lymphedema Measurements    Measurement Type(s)  Quick Girth  -BC      Quick Girth Areas  Lower extremities  -BC      Circumferential Areas  Trunk  -BC         LLE Quick Girth (cm)    Met-heads  24 cm  -BC      Mid foot  24.7 cm  -BC      Smallest ankle  26.8 cm  -BC      Largest calf  40.2 cm  -BC      Tib tuberosity  39 cm  -BC      Mid patella  45.5 cm  -BC      Distal thigh  50.8 cm  -BC      Other 1  27.1 cm  -BC      Other 2  35.5 cm  -BC         RLE Quick Girth (cm)    Met-heads  23.8 cm  -BC      Mid foot  23.2 cm  -BC      Smallest ankle  23 cm  -BC      Largest calf  39.4 cm  -BC      Tib tuberosity  38 cm  -BC      Mid patella  44 cm  -BC      Distal thigh  47 cm  -BC      Other 1  24.4 cm  -BC      Other 2  34.5 cm  -BC      RLE Quick Girth Total  297.3  -BC         Trunk Circumferential (cm)    Measurement Location 1  Navel  -BC      Trunk 1  101 cm  -BC      Trunk Circumferential Total  101 cm  -BC         Manual Lymphatic Drainage    Manual Lymphatic Drainage  extremity treatment;opened regional lymph nodes;initial sequence  -BC      Initial Sequence  abdomen  -BC      Abdomen  superficial  -BC      Opened Regional Lymph Nodes  axillary;inguinal  -BC      Axillary  right;left  -BC      Inguinal  right;left  -BC      Extremity Treatment  MLD to full limb  -BC      MLD to Full Limb  BLE's  -BC      Manual Lymphatic Drainage Comments  Vibration as tolerated.   -BC      Manual Therapy  MLD to BLE's, abdomen, axillary.   -BC         Compression/Skin Care    Compression/Skin Care  skin care;wrapping location;bandaging  -BC      Wrapping Location  lower extremity  -BC      Wrapping Location LE  left: toes to below knee  -BC      Bandage Layers  short-stretch bandages (comment size/quantity);cotton elastic stocking- single layer (comment size);soft foam- 1/4 inch  -BC      Bandaging Technique  moderate compression;circumferential/spiral  -BC      Compression/Skin Care Comments  Compression pump x Abd., BLE's  -BC        User Key  (r) = Recorded By, (t) = Taken By, (c) = Cosigned By    Initials Name Provider Type    Charlotte Cook OT Occupational Therapist                  OT Assessment/Plan     Row Name 01/29/21 1600          OT Assessment    Assessment Comments  Pt. presents with compression stockings on for manual lymph drainage, compression pump, skin care and multi layered bandaging of RLE.  LLE with reduction kit at ankle and stockinette. to mid thigh.  -BC       User Key  (r) = Recorded By, (t) = Taken By, (c) = Cosigned By    Initials Name Provider Type    Charlotte Cook OT Occupational Therapist                    Therapy Education  Given: Edema management  Program: Reinforced  How Provided: Verbal  Provided to: Patient  Level of Understanding: Verbalized                Time Calculation:   OT Start Time: 0930  OT Stop Time: 1100  OT Time Calculation (min): 90 min  OT Non-Billable Time (min): 20 min     Therapy Charges for Today     Code Description Service Date Service Provider Modifiers Qty    28967821997 HC OT LYMPHEDEMA MANAGEMENT-15 MIN 1/29/2021 Charlotte García OT KX 1    20025582023 HC OT MANUAL THERAPY EA 15 MIN 1/29/2021 Charlotte García OT GO, KX 3    23461629243 HC OT SELF CARE/MGMT/TRAIN EA 15 MIN 1/29/2021 Charlotte García OT GO, KX 1    79793290743 HC OT VASOPNEUMAT DEVIC 1 OR MORE AREAS 1/29/2021 Charlotte García OT GO, KX 1                       Charlotte García, OT  1/29/2021

## 2021-02-02 ENCOUNTER — HOSPITAL ENCOUNTER (OUTPATIENT)
Dept: OCCUPATIONAL THERAPY | Facility: HOSPITAL | Age: 76
Setting detail: THERAPIES SERIES
Discharge: HOME | End: 2021-02-02

## 2021-02-02 DIAGNOSIS — I89.0 LYMPHEDEMA OF BOTH LOWER EXTREMITIES: Primary | ICD-10-CM

## 2021-02-02 DIAGNOSIS — C51.9 CANCER OF VULVA (HCC): ICD-10-CM

## 2021-02-02 PROCEDURE — 97016 VASOPNEUMATIC DEVICE THERAPY: CPT

## 2021-02-02 PROCEDURE — 97139 UNLISTED THERAPEUTIC PX: CPT

## 2021-02-02 PROCEDURE — 97140 MANUAL THERAPY 1/> REGIONS: CPT

## 2021-02-02 NOTE — THERAPY TREATMENT NOTE
Outpatient Occupational Therapy Lymphedema Treatment Note  JOSE ROBERTO Patel     Patient Name: Orquidea Rosenberg  : 1945  MRN: [id number]  Today's Date: 2021      Visit Date: 2021    Patient Active Problem List   Diagnosis   • COVID-19        Past Medical History:   Diagnosis Date   • Arthritis    • COPD (chronic obstructive pulmonary disease) (CMS/HCC)    • Elevated cholesterol    • History of transfusion    • Hypertension    • Vulval ca (CMS/HCC)         Past Surgical History:   Procedure Laterality Date   • RADICAL VULVECTOMY  2019   • RADICAL VULVECTOMY Bilateral 2019         Visit Dx:      ICD-10-CM ICD-9-CM   1. Lymphedema of both lower extremities  I89.0 457.1   2. Cancer of vulva (CMS/HCC)  C51.9 184.4       Lymphedema     Row Name 21 1300             Subjective Pain    Able to rate subjective pain?  yes  -AB      Pre-Treatment Pain Level  0  -AB      Subjective Pain Comment  Denies pain.   -AB         Subjective Comments    Subjective Comments  Pt. presents to tx this date w/ compression shorts (covers entire abdomen to knees) and compression socks (from toes to knees - 20-30mmhg) donned this date. She reports having used basic compression pump daily for 1 hour at 40mmhg. She has noticed that her stomach is more distended since using.   -AB         Lymphedema Assessment    Lymphedema Classification  RLE:;LLE:;secondary;stage 2 (Spontaneously Irreversible)  -AB      Lymphedema Cancer Related Sx  bilateral radical vulvectomy  -AB      Lymph Nodes Removed #  0  -AB      Positive Lymph Nodes #  0  -AB      Chemo Received  yes  -AB      Radiation Therapy Received  yes  -AB         Posture/Observations    Posture- WNL  Posture is WNL  -AB         Lymphedema Edema Assessment    Ptting Edema Category  By grade out of 4  -AB      Pitting Edema  + 3/4  -AB      Stemmer Sign  left:;positive  -AB         Skin Changes/Observations    Location/Assessment  Lower Extremity;Other Location  -AB      Lower  Extremity Conditions  bilateral:;clean;dry;shiny  -AB      Lower Extremity Color/Pigment  bilateral:;blanchable;radiation fibrosis;hyperpigmented  -AB      Other Location Color/Pigment  bilateral:;red;radiation fibrosis;other (comment) groin/vaginal areas  -AB         Lymphedema Sensation    Lymphedema Sensation Tests  light touch;deep touch  -AB      Lymphedema Light Touch  RLE:;mild impairment  -AB      Lymphedema Deep Touch  WNL  -AB         Lymphedema Measurements    Measurement Type(s)  Quick Girth  -AB      Quick Girth Areas  Lower extremities  -AB      Circumferential Areas  Trunk  -AB         LLE Quick Girth (cm)    Met-heads  24.3 cm  -AB      Mid foot  25.1 cm  -AB      Smallest ankle  24.4 cm  -AB      Largest calf  41 cm  -AB      Tib tuberosity  40.3 cm  -AB      Mid patella  44.3 cm  -AB      Distal thigh  52.2 cm  -AB      Other 1  25 cm  -AB      Other 2  35.3 cm  -AB         RLE Quick Girth (cm)    Met-heads  23 cm  -AB      Mid foot  22.5 cm  -AB      Smallest ankle  23.5 cm  -AB      Largest calf  41.1 cm  -AB      Tib tuberosity  40 cm  -AB      Mid patella  45.1 cm  -AB      Distal thigh  49.5 cm  -AB      Other 1  24.4 cm  -AB      Other 2  35.8 cm  -AB      RLE Quick Girth Total  304.9  -AB         Trunk Circumferential (cm)    Measurement Location 1  Navel  -AB         Manual Lymphatic Drainage    Manual Lymphatic Drainage  extremity treatment;opened regional lymph nodes;initial sequence  -AB      Initial Sequence  abdomen  -AB      Abdomen  superficial  -AB      Opened Regional Lymph Nodes  axillary;inguinal  -AB      Axillary  right;left  -AB      Inguinal  right;left  -AB      Extremity Treatment  MLD to full limb  -AB      MLD to Full Limb  BLE's  -AB      Manual Therapy  MLD to BLE's, abdomen, inguinals  -AB         Compression/Skin Care    Compression/Skin Care  skin care;wrapping location;bandaging;compression garment  -AB      Wrapping Location  lower extremity  -AB      Wrapping  Location LE  bilateral: base of toes to knees  -AB      Bandage Layers  short-stretch bandages (comment size/quantity);cotton elastic stocking- single layer (comment size);soft foam- 1/4 inch  -AB      Bandaging Technique  moderate compression;circumferential/spiral  -AB      Compression Garment Comments  compression shorts from knees to above abdomen  -AB      Compression/Skin Care Comments  compression pump to abdomen and BLE's  -AB        User Key  (r) = Recorded By, (t) = Taken By, (c) = Cosigned By    Initials Name Provider Type    Anac Pond OT Occupational Therapist                                Therapy Education  Given: HEP, Edema management, Symptoms/condition management, Bandaging/dressing change  Program: Reinforced  How Provided: Verbal, Demonstration  Provided to: Patient  Level of Understanding: Verbalized                Time Calculation:   OT Start Time: 1225  OT Stop Time: 1400  OT Time Calculation (min): 95 min  OT Non-Billable Time (min): 25 min  Total Timed Code Minutes- OT: 95 minute(s)     Therapy Charges for Today     Code Description Service Date Service Provider Modifiers Qty    88003778989 HC OT MANUAL THERAPY EA 15 MIN 2/2/2021 Anca Pickett OT ALON, KX 4    80444228759 HC OT LYMPHEDEMA MANAGEMENT-15 MIN 2/2/2021 Anca Pickett OT KX 1    [id number]  OT VASOPNEUMAT DEVIC 1 OR MORE AREAS 2/2/2021 Anca Pickett OT GO KX 1                      Anca Pickett OT  2/2/2021

## 2021-02-05 ENCOUNTER — HOSPITAL ENCOUNTER (OUTPATIENT)
Dept: OCCUPATIONAL THERAPY | Facility: HOSPITAL | Age: 76
Setting detail: THERAPIES SERIES
Discharge: HOME | End: 2021-02-05

## 2021-02-05 DIAGNOSIS — I89.0 LYMPHEDEMA OF BOTH LOWER EXTREMITIES: Primary | ICD-10-CM

## 2021-02-05 DIAGNOSIS — C51.9 CANCER OF VULVA (HCC): ICD-10-CM

## 2021-02-05 PROCEDURE — 97140 MANUAL THERAPY 1/> REGIONS: CPT

## 2021-02-05 PROCEDURE — 97016 VASOPNEUMATIC DEVICE THERAPY: CPT

## 2021-02-05 PROCEDURE — 97139 UNLISTED THERAPEUTIC PX: CPT

## 2021-02-05 NOTE — THERAPY TREATMENT NOTE
Outpatient Occupational Therapy Lymphedema Treatment Note  JOSE ORBERTO Patel     Patient Name: Orquidea Rosenberg  : 1945  MRN: [id number]  Today's Date: 2021      Visit Date: 2021    Patient Active Problem List   Diagnosis   • COVID-19        Past Medical History:   Diagnosis Date   • Arthritis    • COPD (chronic obstructive pulmonary disease) (CMS/HCC)    • Elevated cholesterol    • History of transfusion    • Hypertension    • Vulval ca (CMS/HCC)         Past Surgical History:   Procedure Laterality Date   • RADICAL VULVECTOMY  2019   • RADICAL VULVECTOMY Bilateral 2019         Visit Dx:      ICD-10-CM ICD-9-CM   1. Lymphedema of both lower extremities  I89.0 457.1   2. Cancer of vulva (CMS/HCC)  C51.9 184.4       Lymphedema     Row Name 21 1100             Subjective Pain    Able to rate subjective pain?  yes  -AB      Pre-Treatment Pain Level  0  -AB      Subjective Pain Comment  Denies pain  -AB         Subjective Comments    Subjective Comments  Pt. reports that she wore comression stockings to bed last night and they caused her R foot to hurt. She had to doff stockings in the middle of the night. No reddened areas noted.   -AB         Lymphedema Assessment    Lymphedema Classification  RLE:;LLE:;secondary;stage 2 (Spontaneously Irreversible)  -AB      Lymphedema Cancer Related Sx  bilateral radical vulvectomy  -AB      Lymph Nodes Removed #  0  -AB      Positive Lymph Nodes #  0  -AB      Chemo Received  yes  -AB      Radiation Therapy Received  yes  -AB         Posture/Observations    Posture- WNL  Posture is WNL  -AB         Lymphedema Edema Assessment    Ptting Edema Category  By grade out of 4  -AB      Pitting Edema  + 3/4  -AB      Stemmer Sign  left:;positive  -AB         Skin Changes/Observations    Location/Assessment  Lower Extremity;Other Location  -AB      Lower Extremity Conditions  bilateral:;clean;dry;shiny  -AB      Lower Extremity Color/Pigment   bilateral:;blanchable;radiation fibrosis;hyperpigmented  -AB      Other Location Color/Pigment  bilateral:;red;radiation fibrosis;other (comment) groin/vaginal areas  -AB         Lymphedema Sensation    Lymphedema Sensation Tests  light touch;deep touch  -AB      Lymphedema Light Touch  RLE:;mild impairment  -AB      Lymphedema Deep Touch  WNL  -AB         Lymphedema Measurements    Measurement Type(s)  Quick Girth  -AB      Quick Girth Areas  Lower extremities  -AB      Circumferential Areas  Trunk  -AB         LLE Quick Girth (cm)    Met-heads  24.1 cm  -AB      Mid foot  24.9 cm  -AB      Smallest ankle  24.8 cm  -AB      Largest calf  40.7 cm  -AB      Tib tuberosity  39.3 cm  -AB      Mid patella  46.5 cm  -AB      Distal thigh  51 cm  -AB      Other 1  24.4 cm  -AB      Other 2  35 cm  -AB         RLE Quick Girth (cm)    Met-heads  22.5 cm  -AB      Mid foot  24.4 cm  -AB      Smallest ankle  23.4 cm  -AB      Largest calf  40.3 cm  -AB      Tib tuberosity  39.8 cm  -AB      Mid patella  44.6 cm  -AB      Distal thigh  49.3 cm  -AB      Other 1  22.3 cm  -AB      Other 2  32.6 cm  -AB      RLE Quick Girth Total  299.2  -AB         Trunk Circumferential (cm)    Measurement Location 1  Navel  -AB         Manual Lymphatic Drainage    Manual Lymphatic Drainage  extremity treatment;opened regional lymph nodes;initial sequence  -AB      Initial Sequence  abdomen  -AB      Abdomen  superficial  -AB      Opened Regional Lymph Nodes  axillary;inguinal  -AB      Axillary  right;left  -AB      Inguinal  right;left  -AB      Extremity Treatment  MLD to full limb  -AB      MLD to Full Limb  BLE's  -AB      Manual Therapy  MLD to BLE's, abdomen, inguinals  -AB         Compression/Skin Care    Compression/Skin Care  skin care;wrapping location;bandaging;compression garment  -AB      Wrapping Location  lower extremity  -AB      Wrapping Location LE  bilateral: base of toes to knees  -AB      Bandage Layers  short-stretch  bandages (comment size/quantity);cotton elastic stocking- single layer (comment size);soft foam- 1/4 inch  -AB      Bandaging Technique  moderate compression;circumferential/spiral  -AB      Compression Garment Comments  compression shorts  -AB      Compression/Skin Care Comments  compression pump to abdomen and BLE's  -AB        User Key  (r) = Recorded By, (t) = Taken By, (c) = Cosigned By    Initials Name Provider Type    AB Anca Pickett OT Occupational Therapist                                Therapy Education  Given: HEP, Edema management, Symptoms/condition management, Bandaging/dressing change  Program: Reinforced  How Provided: Verbal, Demonstration  Provided to: Patient  Level of Understanding: Verbalized                Time Calculation:   OT Start Time: 1100  OT Stop Time: 1230  OT Time Calculation (min): 90 min  OT Non-Billable Time (min): 25 min  Total Timed Code Minutes- OT: 90 minute(s)     Therapy Charges for Today     Code Description Service Date Service Provider Modifiers Qty    17901549142  OT MANUAL THERAPY EA 15 MIN 2/5/2021 Anca Pickett OT GO, KX 4    [id number]  OT LYMPHEDEMA MANAGEMENT-15 MIN 2/5/2021 Anca Pickett OT OSIRISX 1    [id number]  OT VASOPNEUMAT DEVIC 1 OR MORE AREAS 2/5/2021 Anca Pickett OT GO, KX 1                      Anca Pickett OT  2/5/2021

## 2021-02-09 ENCOUNTER — HOSPITAL ENCOUNTER (OUTPATIENT)
Dept: OCCUPATIONAL THERAPY | Facility: HOSPITAL | Age: 76
Setting detail: THERAPIES SERIES
Discharge: HOME | End: 2021-02-09

## 2021-02-09 DIAGNOSIS — C51.9 CANCER OF VULVA (HCC): ICD-10-CM

## 2021-02-09 DIAGNOSIS — I89.0 LYMPHEDEMA OF BOTH LOWER EXTREMITIES: Primary | ICD-10-CM

## 2021-02-09 PROCEDURE — 97139 UNLISTED THERAPEUTIC PX: CPT

## 2021-02-09 PROCEDURE — 97140 MANUAL THERAPY 1/> REGIONS: CPT

## 2021-02-09 PROCEDURE — 97016 VASOPNEUMATIC DEVICE THERAPY: CPT

## 2021-02-09 NOTE — THERAPY TREATMENT NOTE
Outpatient Occupational Therapy Lymphedema Treatment Note  JOSE ROBERTO Patel     Patient Name: Orquidea Rosenberg  : 1945  MRN: [id number]  Today's Date: 2021      Visit Date: 2021    Patient Active Problem List   Diagnosis   • COVID-19        Past Medical History:   Diagnosis Date   • Arthritis    • COPD (chronic obstructive pulmonary disease) (CMS/HCC)    • Elevated cholesterol    • History of transfusion    • Hypertension    • Vulval ca (CMS/HCC)         Past Surgical History:   Procedure Laterality Date   • RADICAL VULVECTOMY  2019   • RADICAL VULVECTOMY Bilateral 2019         Visit Dx:      ICD-10-CM ICD-9-CM   1. Lymphedema of both lower extremities  I89.0 457.1   2. Cancer of vulva (CMS/HCC)  C51.9 184.4       Lymphedema     Row Name 21 1000             Subjective Pain    Able to rate subjective pain?  yes  -AB      Pre-Treatment Pain Level  0  -AB         Subjective Comments    Subjective Comments  Pt. reports that she had a visit with her oncologist yesterday,  where a biopsy was performed. She/they are awaiting results.   -AB         Lymphedema Assessment    Lymphedema Classification  RLE:;LLE:;secondary;stage 2 (Spontaneously Irreversible)  -AB      Lymphedema Cancer Related Sx  bilateral radical vulvectomy  -AB      Lymph Nodes Removed #  0  -AB      Positive Lymph Nodes #  0  -AB      Chemo Received  yes  -AB      Radiation Therapy Received  yes  -AB         Posture/Observations    Posture- WNL  Posture is WNL  -AB         Lymphedema Edema Assessment    Ptting Edema Category  By grade out of 4  -AB      Pitting Edema  + 3/4  -AB      Stemmer Sign  left:;positive  -AB         Skin Changes/Observations    Location/Assessment  Lower Extremity;Other Location  -AB      Lower Extremity Conditions  bilateral:;clean;dry;shiny  -AB      Lower Extremity Color/Pigment  bilateral:;blanchable;radiation fibrosis;hyperpigmented  -AB      Other Location Color/Pigment  bilateral:;red;radiation  fibrosis;other (comment) groin/vaginal areas  -AB         Lymphedema Sensation    Lymphedema Sensation Tests  light touch;deep touch  -AB      Lymphedema Light Touch  RLE:;mild impairment  -AB      Lymphedema Deep Touch  WNL  -AB         Lymphedema Measurements    Measurement Type(s)  Quick Girth  -AB      Quick Girth Areas  Lower extremities  -AB      Circumferential Areas  Trunk  -AB         LLE Quick Girth (cm)    Met-heads  24.1 cm  -AB      Mid foot  22.4 cm  -AB      Smallest ankle  22.8 cm  -AB      Largest calf  38.6 cm  -AB      Tib tuberosity  39 cm  -AB      Mid patella  45.4 cm  -AB      Distal thigh  50.8 cm  -AB      Other 1  23 cm  -AB      Other 2  34.8 cm  -AB         RLE Quick Girth (cm)    Met-heads  21.8 cm  -AB      Mid foot  23.4 cm  -AB      Smallest ankle  22.2 cm  -AB      Largest calf  38.3 cm  -AB      Tib tuberosity  39.7 cm  -AB      Mid patella  45.4 cm  -AB      Distal thigh  47.9 cm  -AB      Other 1  21.4 cm  -AB      Other 2  30.5 cm  -AB      RLE Quick Girth Total  290.6  -AB         Trunk Circumferential (cm)    Measurement Location 1  Navel  -AB         Manual Lymphatic Drainage    Manual Lymphatic Drainage  extremity treatment;opened regional lymph nodes;initial sequence  -AB      Initial Sequence  abdomen  -AB      Abdomen  superficial  -AB      Opened Regional Lymph Nodes  axillary;inguinal  -AB      Axillary  right;left  -AB      Inguinal  right;left  -AB      Extremity Treatment  MLD to full limb  -AB      MLD to Full Limb  BLE's  -AB      Manual Lymphatic Drainage Comments  vibration as tolerated  -AB      Manual Therapy  MLD to BLE's, inguinals  -AB         Compression/Skin Care    Compression/Skin Care  skin care;wrapping location;bandaging;compression garment  -AB      Wrapping Location  lower extremity  -AB      Wrapping Location LE  bilateral: L-toes to high thigh; R-base of toes to mid thigh  -AB      Bandage Layers  short-stretch bandages (comment  size/quantity);cotton elastic stocking- single layer (comment size);soft foam- 1/4 inch  -AB      Bandaging Technique  moderate compression;circumferential/spiral  -AB      Compression Garment Comments  compression socks doffed prior to tx  -AB      Compression/Skin Care Comments  compression pump to BLE's only this date d/t pt. c/o diarrhea.   -AB        User Key  (r) = Recorded By, (t) = Taken By, (c) = Cosigned By    Initials Name Provider Type    Anca Pond OT Occupational Therapist                                Therapy Education  Given: HEP, Symptoms/condition management, Edema management, Bandaging/dressing change  Program: Reinforced  How Provided: Verbal  Provided to: Patient  Level of Understanding: Verbalized                Time Calculation:   OT Start Time: 0945  OT Stop Time: 1110  OT Time Calculation (min): 85 min  OT Non-Billable Time (min): 25 min  Total Timed Code Minutes- OT: 85 minute(s)     Therapy Charges for Today     Code Description Service Date Service Provider Modifiers Qty    60465532622  OT MANUAL THERAPY EA 15 MIN 2/9/2021 Anca Pickett OT GO, KX 4    28494283762 HC OT LYMPHEDEMA MANAGEMENT-15 MIN 2/9/2021 Anca Pickett OT KX 1    [id number]  OT VASOPNEUMAT DEVIC 1 OR MORE AREAS 2/9/2021 Anca Pickett OT GO KX 1                      Anca Pickett OT  2/9/2021

## 2021-02-12 ENCOUNTER — HOSPITAL ENCOUNTER (OUTPATIENT)
Dept: OCCUPATIONAL THERAPY | Facility: HOSPITAL | Age: 76
Setting detail: THERAPIES SERIES
Discharge: HOME | End: 2021-02-12

## 2021-02-12 DIAGNOSIS — C51.9 CANCER OF VULVA (HCC): ICD-10-CM

## 2021-02-12 DIAGNOSIS — Z23 IMMUNIZATION DUE: ICD-10-CM

## 2021-02-12 DIAGNOSIS — I89.0 LYMPHEDEMA OF BOTH LOWER EXTREMITIES: Primary | ICD-10-CM

## 2021-02-12 PROCEDURE — 97140 MANUAL THERAPY 1/> REGIONS: CPT

## 2021-02-12 PROCEDURE — 97139 UNLISTED THERAPEUTIC PX: CPT

## 2021-02-12 PROCEDURE — 97016 VASOPNEUMATIC DEVICE THERAPY: CPT

## 2021-02-12 NOTE — THERAPY PROGRESS REPORT/RE-CERT
Outpatient Occupational Therapy Lymphedema Progress Note   Jorge     Patient Name: Orquidea Rosenberg  : 1945  MRN: [id number]  Today's Date: 2021      Visit Date: 2021    Patient Active Problem List   Diagnosis   • COVID-19        Past Medical History:   Diagnosis Date   • Arthritis    • COPD (chronic obstructive pulmonary disease) (CMS/HCC)    • Elevated cholesterol    • History of transfusion    • Hypertension    • Vulval ca (CMS/HCC)         Past Surgical History:   Procedure Laterality Date   • RADICAL VULVECTOMY  2019   • RADICAL VULVECTOMY Bilateral 2019         Visit Dx:      ICD-10-CM ICD-9-CM   1. Lymphedema of both lower extremities  I89.0 457.1   2. Cancer of vulva (CMS/HCC)  C51.9 184.4       Lymphedema     Row Name 21 1300             Subjective Pain    Able to rate subjective pain?  yes  -AB      Pre-Treatment Pain Level  0  -AB      Subjective Pain Comment  Denies pain  -AB         Subjective Comments    Subjective Comments  Pt. reports that she did not receive good news from biopsy. PET scan to follow.   -AB         Lymphedema Assessment    Lymphedema Classification  RLE:;LLE:;secondary;stage 2 (Spontaneously Irreversible)  -AB      Lymphedema Cancer Related Sx  bilateral radical vulvectomy  -AB      Lymph Nodes Removed #  0  -AB      Positive Lymph Nodes #  0  -AB      Chemo Received  yes  -AB      Radiation Therapy Received  yes  -AB         Posture/Observations    Posture- WNL  Posture is WNL  -AB         Lymphedema Edema Assessment    Ptting Edema Category  By grade out of 4  -AB      Pitting Edema  + 3/4  -AB      Stemmer Sign  left:;positive  -AB         Skin Changes/Observations    Location/Assessment  Lower Extremity;Other Location  -AB      Lower Extremity Conditions  bilateral:;clean;dry;shiny  -AB      Lower Extremity Color/Pigment  bilateral:;blanchable;radiation fibrosis;hyperpigmented  -AB      Other Location Color/Pigment  bilateral:;red;radiation  fibrosis;other (comment) groin/vaginal areas  -AB         Lymphedema Sensation    Lymphedema Sensation Tests  light touch;deep touch  -AB      Lymphedema Light Touch  RLE:;mild impairment  -AB      Lymphedema Deep Touch  WNL  -AB         Lymphedema Measurements    Measurement Type(s)  Quick Girth  -AB      Quick Girth Areas  Lower extremities  -AB      Circumferential Areas  Trunk  -AB         LLE Quick Girth (cm)    Met-heads  24.4 cm  -AB      Mid foot  23.5 cm  -AB      Smallest ankle  24.4 cm  -AB      Largest calf  40.1 cm  -AB      Tib tuberosity  38.5 cm  -AB      Mid patella  44.8 cm  -AB      Distal thigh  51 cm  -AB      Other 1  24.4 cm  -AB      Other 2  35 cm  -AB         RLE Quick Girth (cm)    Met-heads  23.3 cm  -AB      Mid foot  23.6 cm  -AB      Smallest ankle  23.1 cm  -AB      Largest calf  40 cm  -AB      Tib tuberosity  39.4 cm  -AB      Mid patella  45 cm  -AB      Distal thigh  48.3 cm  -AB      Other 1  22.7 cm  -AB      Other 2  31 cm  -AB      RLE Quick Girth Total  296.4  -AB         Trunk Circumferential (cm)    Measurement Location 1  Navel  -AB         Manual Lymphatic Drainage    Manual Lymphatic Drainage  extremity treatment;opened regional lymph nodes;initial sequence  -AB      Initial Sequence  abdomen  -AB      Abdomen  superficial  -AB      Opened Regional Lymph Nodes  axillary;inguinal  -AB      Axillary  right;left  -AB      Inguinal  right;left  -AB      Extremity Treatment  MLD to full limb  -AB      MLD to Full Limb  BLE's  -AB      Manual Therapy  MLD to BLE's, abdomen, inguinals, axillary  -AB         Compression/Skin Care    Compression/Skin Care  skin care;wrapping location;bandaging;compression garment  -AB      Wrapping Location  lower extremity  -AB      Wrapping Location LE  bilateral: base of toes to high thigh  -AB      Bandage Layers  short-stretch bandages (comment size/quantity);cotton elastic stocking- single layer (comment size);soft foam- 1/4 inch  -AB       Bandaging Technique  moderate compression;circumferential/spiral  -AB      Compression/Skin Care Comments  compression pump to BLE's  -AB        User Key  (r) = Recorded By, (t) = Taken By, (c) = Cosigned By    Initials Name Provider Type    Anca Pond OT Occupational Therapist                  OT Assessment/Plan     Row Name 02/12/21 1638          OT Assessment    Functional Limitations  Decreased safety during functional activities;Limitations in functional capacity and performance  -AB     OT Rehab Potential  Good  -AB     Patient/caregiver participated in establishment of treatment plan and goals  Yes  -AB     Patient would benefit from skilled therapy intervention  Yes  -AB        OT Plan    OT Frequency  2x/week  -AB     Predicted Duration of Therapy Intervention (OT)  ~90 days  -AB     Planned CPT's?  OT RE-EVAL: 17080;OT THER ACT EA 15 MIN: 20832DE;OT THER PROC EA 15 MIN: 13029RP;OT SELF CARE/MGMT/TRAIN 15 MIN: 01451;OT MANUAL THERAPY EA 15 MIN: 69360;OT BIS XTRACELL FLUID ANALYSIS: 44448;OT VASOPNEUMATIC DEVICE: 72034 lymphedema management  -AB     Planned Therapy Interventions (Optional Details)  home exercise program;patient/family education;manual therapy techniques  -AB     OT Plan Comments  Pt. will benefit from continued skilled OT services to address ongoing s/s of lymphedema.  -AB       User Key  (r) = Recorded By, (t) = Taken By, (c) = Cosigned By    Initials Name Provider Type    Anca Pond OT Occupational Therapist                OT Goals     Row Name 02/12/21 1600          OT Short Term Goals    STG Date to Achieve  03/13/20  -AB     STG 1  Pt. familiar w/precautions, skin care and self management of lymphdema.  -AB     STG 1 Progress  Met  -AB     STG 2  Decrease pitting edema to 3/4 for decreased risk of infection.  -AB     STG 2 Progress  Partially Met  -AB     STG 3  HEP to assist with improved lymphatic flow.  -AB     STG 3 Progress  Met  -AB     STG 4   Self care instructions for home MLD for volume reduction.  -AB     STG 4 Progress  Met  -AB        Long Term Goals    LTG Date to Achieve  12/28/20  -AB     LTG 1  Pt. and/or caregiver independent w/short stretch compression bandaging for volume reduction.  -AB     LTG 1 Progress  Ongoing  -AB     LTG 2  Decrease pitting edema to 2/4 for decreased risk of infection.  -AB     LTG 2 Progress  Ongoing  -AB     LTG 3  Independent with donning/doffing compression garment.  -AB     LTG 3 Progress  Partially Met  -AB     LTG 4  Independent with lymphedema management and HEP.  -AB     LTG 4 Progress  Ongoing  -AB        Time Calculation    OT Goal Re-Cert Due Date  04/14/21  -AB       User Key  (r) = Recorded By, (t) = Taken By, (c) = Cosigned By    Initials Name Provider Type    Anca Pond OT Occupational Therapist                           Time Calculation:   OT Start Time: 1300  OT Stop Time: 1430  OT Time Calculation (min): 90 min  OT Non-Billable Time (min): 35 min  Total Timed Code Minutes- OT: 90 minute(s)     Therapy Charges for Today     Code Description Service Date Service Provider Modifiers Qty    08677944015 HC OT MANUAL THERAPY EA 15 MIN 2/12/2021 Anca Pickett OT GO, KX 4    63713048675 HC OT LYMPHEDEMA MANAGEMENT-15 MIN 2/12/2021 Anca Pickett OT KX 1    [id number] HC OT VASOPNEUMAT DEVIC 1 OR MORE AREAS 2/12/2021 Anca Pickett OT GO KX 1                      Anca Pickett OT  2/12/2021

## 2021-02-16 ENCOUNTER — TRANSCRIBE ORDERS (OUTPATIENT)
Dept: ADMINISTRATIVE | Facility: HOSPITAL | Age: 76
End: 2021-02-16

## 2021-02-16 ENCOUNTER — HOSPITAL ENCOUNTER (OUTPATIENT)
Dept: OCCUPATIONAL THERAPY | Facility: HOSPITAL | Age: 76
Discharge: HOME | End: 2021-02-16

## 2021-02-16 DIAGNOSIS — C51.9 VULVAR CANCER (HCC): Primary | ICD-10-CM

## 2021-02-18 ENCOUNTER — HOSPITAL ENCOUNTER (OUTPATIENT)
Dept: CT IMAGING | Facility: HOSPITAL | Age: 76
Discharge: HOME | End: 2021-02-18

## 2021-02-18 DIAGNOSIS — C51.9 VULVAR CANCER (HCC): ICD-10-CM

## 2021-02-18 LAB — CREAT BLDA-MCNC: 1 MG/DL (ref 0.6–1.3)

## 2021-02-18 PROCEDURE — 82565 ASSAY OF CREATININE: CPT

## 2021-02-18 PROCEDURE — 71260 CT THORAX DX C+: CPT

## 2021-02-18 PROCEDURE — 74177 CT ABD & PELVIS W/CONTRAST: CPT

## 2021-02-18 PROCEDURE — 74177 CT ABD & PELVIS W/CONTRAST: CPT | Performed by: RADIOLOGY

## 2021-02-18 PROCEDURE — 25010000002 IOPAMIDOL 61 % SOLUTION: Performed by: OBSTETRICS & GYNECOLOGY

## 2021-02-18 PROCEDURE — 71260 CT THORAX DX C+: CPT | Performed by: RADIOLOGY

## 2021-02-18 RX ADMIN — IOPAMIDOL 70 ML: 612 INJECTION, SOLUTION INTRAVENOUS at 13:03

## 2021-02-19 ENCOUNTER — HOSPITAL ENCOUNTER (OUTPATIENT)
Dept: OCCUPATIONAL THERAPY | Facility: HOSPITAL | Age: 76
Discharge: HOME | End: 2021-02-19
Admitting: OBSTETRICS & GYNECOLOGY

## 2021-02-19 DIAGNOSIS — I89.0 LYMPHEDEMA OF BOTH LOWER EXTREMITIES: Primary | ICD-10-CM

## 2021-02-19 DIAGNOSIS — C51.9 CANCER OF VULVA (HCC): ICD-10-CM

## 2021-02-19 PROCEDURE — 97140 MANUAL THERAPY 1/> REGIONS: CPT

## 2021-02-19 PROCEDURE — 97168 OT RE-EVAL EST PLAN CARE: CPT

## 2021-02-19 PROCEDURE — 97139 UNLISTED THERAPEUTIC PX: CPT

## 2021-02-19 PROCEDURE — 97016 VASOPNEUMATIC DEVICE THERAPY: CPT

## 2021-02-19 PROCEDURE — 97535 SELF CARE MNGMENT TRAINING: CPT

## 2021-02-19 NOTE — THERAPY RE-EVALUATION
Outpatient Occupational Therapy Lymphedema Re-Evaluation   Jorge     Patient Name: Orquidea Rosenberg  : 1945  MRN: [id number]  Today's Date: 2021      Visit Date: 2021    Patient Active Problem List   Diagnosis   • COVID-19        Past Medical History:   Diagnosis Date   • Arthritis    • COPD (chronic obstructive pulmonary disease) (CMS/HCC)    • Elevated cholesterol    • History of transfusion    • Hypertension    • Vulval ca (CMS/HCC)         Past Surgical History:   Procedure Laterality Date   • RADICAL VULVECTOMY  2019   • RADICAL VULVECTOMY Bilateral 2019         Visit Dx:     ICD-10-CM ICD-9-CM   1. Lymphedema of both lower extremities  I89.0 457.1   2. Cancer of vulva (CMS/HCC)  C51.9 184.4           Lymphedema     Row Name 21 1300             Subjective Pain    Able to rate subjective pain?  yes  -BC      Pre-Treatment Pain Level  0  -BC         Subjective Comments    Subjective Comments  Pt. reports that she has been following a low sodium diet and completing exercises daily.   Pt. elevating legs daily.   -BC         Lymphedema Assessment    Lymphedema Classification  RLE:;LLE:;secondary;stage 2 (Spontaneously Irreversible)  -BC      Lymphedema Cancer Related Sx  bilateral radical vulvectomy  -BC      Lymph Nodes Removed #  0  -BC      Positive Lymph Nodes #  0  -BC      Chemo Received  yes  -BC      Radiation Therapy Received  yes  -BC         Posture/Observations    Posture- WNL  Posture is WNL  -BC         Lymphedema Edema Assessment    Ptting Edema Category  By grade out of 4  -BC      Pitting Edema  + 3/4  -BC      Stemmer Sign  left:;positive  -BC         Skin Changes/Observations    Location/Assessment  Lower Extremity;Other Location  -BC      Lower Extremity Conditions  bilateral:;clean;dry;shiny  -BC      Lower Extremity Color/Pigment  bilateral:;blanchable;radiation fibrosis;hyperpigmented  -BC      Other Location Color/Pigment  bilateral:;red;radiation  fibrosis;other (comment) groin/vaginal areas  -BC         Lymphedema Sensation    Lymphedema Sensation Tests  light touch;deep touch  -BC      Lymphedema Light Touch  RLE:;mild impairment  -BC      Lymphedema Deep Touch  WNL  -BC         Lymphedema Measurements    Measurement Type(s)  Quick Girth  -BC      Quick Girth Areas  Lower extremities  -BC      Circumferential Areas  Trunk  -BC         LLE Quick Girth (cm)    Met-heads  22.8 cm  -BC      Mid foot  23.9 cm  -BC      Smallest ankle  24.7 cm  -BC      Largest calf  40.5 cm  -BC      Tib tuberosity  39 cm  -BC      Mid patella  44.5 cm  -BC      Distal thigh  49.7 cm  -BC      Other 1  26 cm  -BC      Other 2  37 cm  -BC         RLE Quick Girth (cm)    Met-heads  22.6 cm  -BC      Mid foot  23.2 cm  -BC      Smallest ankle  23.2 cm  -BC      Largest calf  40.4 cm  -BC      Tib tuberosity  39 cm  -BC      Mid patella  44.5 cm  -BC      Distal thigh  46.7 cm  -BC      Other 1  22.7 cm  -BC      Other 2  31.5 cm  -BC      RLE Quick Girth Total  293.8  -BC         Trunk Circumferential (cm)    Measurement Location 1  Navel  -BC      Trunk 1  111.5 cm  -BC      Measurement Location 2         -BC      Trunk Circumferential Total  111.5 cm  -BC         Manual Lymphatic Drainage    Manual Lymphatic Drainage  extremity treatment;opened regional lymph nodes;initial sequence  -BC      Initial Sequence  abdomen  -BC      Abdomen  superficial  -BC      Opened Regional Lymph Nodes  axillary;inguinal  -BC      Axillary  right;left  -BC      Inguinal  right;left  -BC      Extremity Treatment  MLD to full limb  -BC      MLD to Full Limb  BLE's  -BC      Manual Lymphatic Drainage Comments  Vibration as tolerated  -BC      Manual Therapy  MLD to BLE's, axillary, shoulder collectors.   -BC         Compression/Skin Care    Compression/Skin Care  skin care;wrapping location;bandaging;compression garment  -BC      Wrapping Location  lower extremity  -BC      Wrapping Location LE   left:;foot to knee  -BC      Bandage Layers  short-stretch bandages (comment size/quantity);cotton elastic stocking- single layer (comment size);soft foam- 1/4 inch  -BC      Bandaging Technique  moderate compression;circumferential/spiral  -BC      Compression Garment Comments  light compression on RLE  -BC      Compression/Skin Care Comments  Compression pump x Abd., BLE's  -BC        User Key  (r) = Recorded By, (t) = Taken By, (c) = Cosigned By    Initials Name Provider Type    Charlotte Cook OT Occupational Therapist                  Therapy Education  Given: HEP, Symptoms/condition management, Edema management, Bandaging/dressing change  Program: Reinforced  How Provided: Verbal  Provided to: Patient  Level of Understanding: Verbalized        OT Goals     Row Name 02/19/21 1400          OT Short Term Goals    STG Date to Achieve  03/19/21  -BC     STG 1  Pt. familiar w/precautions, skin care and self management of lymphdema.  -BC     STG 1 Progress  Met  -BC     STG 2  Decrease pitting edema to 3/4 for decreased risk of infection.  -BC     STG 2 Progress  Partially Met  -BC     STG 3  HEP to assist with improved lymphatic flow.  -BC     STG 3 Progress  Met  -BC     STG 4  Self care instructions for home MLD for volume reduction.  -BC     STG 4 Progress  Met  -BC        Long Term Goals    LTG Date to Achieve  05/19/21  -BC     LTG 1  Pt. and/or caregiver independent w/short stretch compression bandaging for volume reduction.  -BC     LTG 1 Progress  Ongoing;Partially Met  -BC     LTG 2  Decrease pitting edema to 2/4 for decreased risk of infection.  -BC     LTG 2 Progress  Ongoing;Progressing  -BC     LTG 3  Independent with donning/doffing compression garment.  -BC     LTG 3 Progress  Partially Met  -BC     LTG 4  Independent with lymphedema management and HEP.  -BC     LTG 4 Progress  Ongoing;Partially Met  -BC        Time Calculation    OT Goal Re-Cert Due Date  05/19/21  -BC       User Key  (r) =  Recorded By, (t) = Taken By, (c) = Cosigned By    Initials Name Provider Type    BC Ricky, Charlotte, OT Occupational Therapist          OT Assessment/Plan     Row Name 02/19/21 1451          OT Assessment    Functional Limitations  Decreased safety during functional activities;Limitations in functional capacity and performance  -BC     Impairments  Balance;Edema;Gait;Impaired lymphatic circulation;Pain;Sensation  -BC     Assessment Comments  Pt. presents with compression stockings on BLE's. Pt. positioned in supine with HOB elevated ~80* for manual lymph drainage, compression pump, skin care and multi layered bandaging of LLE, RLE donned stockinette.  -BC     Please refer to paper survey for additional self-reported information  No  -BC     OT Diagnosis  Lymphedema  -BC     OT Rehab Potential  Good  -BC     Patient/caregiver participated in establishment of treatment plan and goals  Yes  -BC     Patient would benefit from skilled therapy intervention  Yes  -BC        OT Plan    OT Frequency  2x/week  -BC     Predicted Duration of Therapy Intervention (OT)  90 days  -BC     Planned CPT's?  OT RE-EVAL: 14524;OT THER ACT EA 15 MIN: 55917RP;OT THER PROC EA 15 MIN: 83179JH;OT SELF CARE/MGMT/TRAIN 15 MIN: 96437;OT MANUAL THERAPY EA 15 MIN: 91750;OT BIS XTRACELL FLUID ANALYSIS: 91405;OT VASOPNEUMATIC DEVICE: 45579 lymphedema management  -BC     Planned Therapy Interventions (Optional Details)  home exercise program;patient/family education;manual therapy techniques  -BC     OT Plan Comments  Pt. with change in medical status requiring many extra visits to MD. Oncologist, and other testing areas that has been very time consuming.  Pt. reports daily pumping with a basic home pump that does not have an abdominal feature and feels that she has increased fluid in her abdomen.  Pt. measurements today at [facility] were ~10cm higher than last measurement (dated 1/29/21).  Pt. would benefit from cont. skilled intervention to address  ongoing s/s of lymphedema.  -BC       User Key  (r) = Recorded By, (t) = Taken By, (c) = Cosigned By    Initials Name Provider Type    Charlotte Cook OT Occupational Therapist                    Time Calculation:   OT Start Time: 1255  OT Stop Time: 1445  OT Time Calculation (min): 110 min  OT Non-Billable Time (min): 25 min     Therapy Charges for Today     Code Description Service Date Service Provider Modifiers Qty    95561846917 HC OT LYMPHEDEMA MANAGEMENT-15 MIN 2021 Charlotte García, OT KX 1    34635519124 HC OT MANUAL THERAPY EA 15 MIN 2021 Charlotte García OT GO, KX 3    35256411180 HC OT SELF CARE/MGMT/TRAIN EA 15 MIN 2021 Charlotte García OT GO, KX 1    59591621091 HC OT VASOPNEUMAT DEVIC 1 OR MORE AREAS 2021 Charlotte García OT GO, KX 1    94182694018 HC OT RE-EVAL 2 2021 Charlotte García OT GO, KX 1                    Charlotte García, OT  2021 and Outpatient Occupational Therapy Lymphedema Treatment Note  JOSE ROBERTO [facility]     Patient Name: Orquidea Rosenberg  : 1945  MRN: [id number]  Today's Date: 2021      Visit Date: 2021    Patient Active Problem List   Diagnosis   • COVID-19        Past Medical History:   Diagnosis Date   • Arthritis    • COPD (chronic obstructive pulmonary disease) (CMS/HCC)    • Elevated cholesterol    • History of transfusion    • Hypertension    • Vulval ca (CMS/HCC)         Past Surgical History:   Procedure Laterality Date   • RADICAL VULVECTOMY  2019   • RADICAL VULVECTOMY Bilateral 2019         Visit Dx:      ICD-10-CM ICD-9-CM   1. Lymphedema of both lower extremities  I89.0 457.1   2. Cancer of vulva (CMS/HCC)  C51.9 184.4       Lymphedema     Row Name 21 1300             Subjective Pain    Able to rate subjective pain?  yes  -BC      Pre-Treatment Pain Level  0  -BC         Subjective Comments    Subjective Comments  Pt. reports that she has been following a low sodium diet and completing exercises daily.   Pt. elevating  legs daily.   -BC         Lymphedema Assessment    Lymphedema Classification  RLE:;LLE:;secondary;stage 2 (Spontaneously Irreversible)  -BC      Lymphedema Cancer Related Sx  bilateral radical vulvectomy  -BC      Lymph Nodes Removed #  0  -BC      Positive Lymph Nodes #  0  -BC      Chemo Received  yes  -BC      Radiation Therapy Received  yes  -BC         Posture/Observations    Posture- WNL  Posture is WNL  -BC         Lymphedema Edema Assessment    Ptting Edema Category  By grade out of 4  -BC      Pitting Edema  + 3/4  -BC      Stemmer Sign  left:;positive  -BC         Skin Changes/Observations    Location/Assessment  Lower Extremity;Other Location  -BC      Lower Extremity Conditions  bilateral:;clean;dry;shiny  -BC      Lower Extremity Color/Pigment  bilateral:;blanchable;radiation fibrosis;hyperpigmented  -BC      Other Location Color/Pigment  bilateral:;red;radiation fibrosis;other (comment) groin/vaginal areas  -BC         Lymphedema Sensation    Lymphedema Sensation Tests  light touch;deep touch  -BC      Lymphedema Light Touch  RLE:;mild impairment  -BC      Lymphedema Deep Touch  WNL  -BC         Lymphedema Measurements    Measurement Type(s)  Quick Girth  -BC      Quick Girth Areas  Lower extremities  -BC      Circumferential Areas  Trunk  -BC         LLE Quick Girth (cm)    Met-heads  22.8 cm  -BC      Mid foot  23.9 cm  -BC      Smallest ankle  24.7 cm  -BC      Largest calf  40.5 cm  -BC      Tib tuberosity  39 cm  -BC      Mid patella  44.5 cm  -BC      Distal thigh  49.7 cm  -BC      Other 1  26 cm  -BC      Other 2  37 cm  -BC         RLE Quick Girth (cm)    Met-heads  22.6 cm  -BC      Mid foot  23.2 cm  -BC      Smallest ankle  23.2 cm  -BC      Largest calf  40.4 cm  -BC      Tib tuberosity  39 cm  -BC      Mid patella  44.5 cm  -BC      Distal thigh  46.7 cm  -BC      Other 1  22.7 cm  -BC      Other 2  31.5 cm  -BC      RLE Quick Girth Total  293.8  -BC         Trunk Circumferential (cm)     Measurement Location 1  Navel  -BC      Trunk 1  111.5 cm  -BC      Measurement Location 2         -BC      Trunk Circumferential Total  111.5 cm  -BC         Manual Lymphatic Drainage    Manual Lymphatic Drainage  extremity treatment;opened regional lymph nodes;initial sequence  -BC      Initial Sequence  abdomen  -BC      Abdomen  superficial  -BC      Opened Regional Lymph Nodes  axillary;inguinal  -BC      Axillary  right;left  -BC      Inguinal  right;left  -BC      Extremity Treatment  MLD to full limb  -BC      MLD to Full Limb  BLE's  -BC      Manual Lymphatic Drainage Comments  Vibration as tolerated  -BC      Manual Therapy  MLD to BLE's, axillary, shoulder collectors.   -BC         Compression/Skin Care    Compression/Skin Care  skin care;wrapping location;bandaging;compression garment  -BC      Wrapping Location  lower extremity  -BC      Wrapping Location LE  left:;foot to knee  -BC      Bandage Layers  short-stretch bandages (comment size/quantity);cotton elastic stocking- single layer (comment size);soft foam- 1/4 inch  -BC      Bandaging Technique  moderate compression;circumferential/spiral  -BC      Compression Garment Comments  light compression on RLE  -BC      Compression/Skin Care Comments  Compression pump x Abd., BLE's  -BC        User Key  (r) = Recorded By, (t) = Taken By, (c) = Cosigned By    Initials Name Provider Type    BC Charlotte García OT Occupational Therapist                  OT Assessment/Plan     Row Name 02/19/21 0795          OT Assessment    Functional Limitations  Decreased safety during functional activities;Limitations in functional capacity and performance  -BC     Impairments  Balance;Edema;Gait;Impaired lymphatic circulation;Pain;Sensation  -BC     Assessment Comments  Pt. presents with compression stockings on BLE's. Pt. positioned in supine with HOB elevated ~80* for manual lymph drainage, compression pump, skin care and multi layered bandaging of LLE, RLE donned  ricky.  -BC     Please refer to paper survey for additional self-reported information  No  -BC     OT Diagnosis  Lymphedema  -BC     OT Rehab Potential  Good  -BC     Patient/caregiver participated in establishment of treatment plan and goals  Yes  -BC     Patient would benefit from skilled therapy intervention  Yes  -BC        OT Plan    OT Frequency  2x/week  -BC     Predicted Duration of Therapy Intervention (OT)  90 days  -BC     Planned CPT's?  OT RE-EVAL: 90856;OT THER ACT EA 15 MIN: 63381PN;OT THER PROC EA 15 MIN: 07176DQ;OT SELF CARE/MGMT/TRAIN 15 MIN: 27435;OT MANUAL THERAPY EA 15 MIN: 88559;OT BIS XTRACELL FLUID ANALYSIS: 32798;OT VASOPNEUMATIC DEVICE: 22510 lymphedema management  -BC     Planned Therapy Interventions (Optional Details)  home exercise program;patient/family education;manual therapy techniques  -BC     OT Plan Comments  Pt. with change in medical status requiring many extra visits to MD. Oncologist, and other testing areas that has been very time consuming.  Pt. reports daily pumping with a basic home pump that does not have an abdominal feature and feels that she has increased fluid in her abdomen.  Pt. measurements today at [facility] were ~10cm higher than last measurement (dated 1/29/21).  Pt. would benefit from cont. skilled intervention to address ongoing s/s of lymphedema.  -BC       User Key  (r) = Recorded By, (t) = Taken By, (c) = Cosigned By    Initials Name Provider Type    BC Charlotte García, OT Occupational Therapist                OT Goals     Row Name 02/19/21 1400          OT Short Term Goals    STG Date to Achieve  03/19/21  -BC     STG 1  Pt. familiar w/precautions, skin care and self management of lymphdema.  -BC     STG 1 Progress  Met  -BC     STG 2  Decrease pitting edema to 3/4 for decreased risk of infection.  -BC     STG 2 Progress  Partially Met  -BC     STG 3  HEP to assist with improved lymphatic flow.  -BC     STG 3 Progress  Met  -BC     STG 4  Self care  instructions for home MLD for volume reduction.  -BC     STG 4 Progress  Met  -BC        Long Term Goals    LTG Date to Achieve  05/19/21  -BC     LTG 1  Pt. and/or caregiver independent w/short stretch compression bandaging for volume reduction.  -BC     LTG 1 Progress  Ongoing;Partially Met  -BC     LTG 2  Decrease pitting edema to 2/4 for decreased risk of infection.  -BC     LTG 2 Progress  Ongoing;Progressing  -BC     LTG 3  Independent with donning/doffing compression garment.  -BC     LTG 3 Progress  Partially Met  -BC     LTG 4  Independent with lymphedema management and HEP.  -BC     LTG 4 Progress  Ongoing;Partially Met  -BC        Time Calculation    OT Goal Re-Cert Due Date  05/19/21  -BC       User Key  (r) = Recorded By, (t) = Taken By, (c) = Cosigned By    Initials Name Provider Type    BC Charlotte García OT Occupational Therapist          Therapy Education  Given: HEP, Symptoms/condition management, Edema management, Bandaging/dressing change  Program: Reinforced  How Provided: Verbal  Provided to: Patient  Level of Understanding: Verbalized                Time Calculation:   OT Start Time: 1255  OT Stop Time: 1445  OT Time Calculation (min): 110 min  OT Non-Billable Time (min): 25 min     Therapy Charges for Today     Code Description Service Date Service Provider Modifiers Qty    13416982549 HC OT LYMPHEDEMA MANAGEMENT-15 MIN 2/19/2021 Charlotte García OT KX 1    26154406546 HC OT MANUAL THERAPY EA 15 MIN 2/19/2021 Charlotte García OT GO, KX 3    70076640820 HC OT SELF CARE/MGMT/TRAIN EA 15 MIN 2/19/2021 Charlotte García OT GO, KX 1    81159901811 HC OT VASOPNEUMAT DEVIC 1 OR MORE AREAS 2/19/2021 Charlotte García OT GO, KX 1    [id number] HC OT RE-EVAL 2 2/19/2021 Charlotte García OT GO, KX 1                      Charlotte García OT  2/19/2021

## 2021-02-23 ENCOUNTER — HOSPITAL ENCOUNTER (OUTPATIENT)
Dept: OCCUPATIONAL THERAPY | Facility: HOSPITAL | Age: 76
Setting detail: THERAPIES SERIES
Discharge: HOME | End: 2021-02-23

## 2021-02-23 DIAGNOSIS — C51.9 CANCER OF VULVA (HCC): ICD-10-CM

## 2021-02-23 DIAGNOSIS — U07.1 COVID-19: ICD-10-CM

## 2021-02-23 DIAGNOSIS — I89.0 LYMPHEDEMA OF BOTH LOWER EXTREMITIES: Primary | ICD-10-CM

## 2021-02-23 PROCEDURE — 97016 VASOPNEUMATIC DEVICE THERAPY: CPT

## 2021-02-23 PROCEDURE — 97139 UNLISTED THERAPEUTIC PX: CPT

## 2021-02-23 PROCEDURE — 97140 MANUAL THERAPY 1/> REGIONS: CPT

## 2021-02-23 PROCEDURE — 97535 SELF CARE MNGMENT TRAINING: CPT

## 2021-02-23 NOTE — THERAPY TREATMENT NOTE
Outpatient Occupational Therapy Lymphedema Treatment Note   Jorge     Patient Name: Orquidea Rosenberg  : 1945  MRN: [id number]  Today's Date: 2021      Visit Date: 2021    Patient Active Problem List   Diagnosis   • COVID-19        Past Medical History:   Diagnosis Date   • Arthritis    • COPD (chronic obstructive pulmonary disease) (CMS/HCC)    • Elevated cholesterol    • History of transfusion    • Hypertension    • Vulval ca (CMS/HCC)         Past Surgical History:   Procedure Laterality Date   • RADICAL VULVECTOMY  2019   • RADICAL VULVECTOMY Bilateral 2019         Visit Dx:      ICD-10-CM ICD-9-CM   1. Lymphedema of both lower extremities  I89.0 457.1   2. Cancer of vulva (CMS/HCC)  C51.9 184.4   3. COVID-19  U07.1 079.89       Lymphedema     Row Name 21 1300             Subjective Pain    Able to rate subjective pain?  yes  -BC      Pre-Treatment Pain Level  0  -BC      Subjective Pain Comment  Denies pain  -BC         Subjective Comments    Subjective Comments  Pt. continues to follow her low sodium diet and has now stopped all drinks except water.  Pt. elevating her legs every evening.  Pt. is using her home pump daily and feels like she needs to use the abdominable piece to address truncal congestion.   -BC         Lymphedema Assessment    Lymphedema Classification  RLE:;LLE:;secondary;stage 2 (Spontaneously Irreversible)  -BC      Lymphedema Cancer Related Sx  bilateral radical vulvectomy  -BC      Lymph Nodes Removed #  0  -BC      Positive Lymph Nodes #  0  -BC      Chemo Received  yes  -BC      Radiation Therapy Received  yes  -BC         Posture/Observations    Posture- WNL  Posture is WNL  -BC         Lymphedema Edema Assessment    Ptting Edema Category  By grade out of 4  -BC      Pitting Edema  + 3/4  -BC      Stemmer Sign  left:;positive  -BC         Skin Changes/Observations    Location/Assessment  Lower Extremity;Other Location  -BC      Lower Extremity Conditions   bilateral:;clean;dry;shiny  -BC      Lower Extremity Color/Pigment  bilateral:;blanchable;radiation fibrosis;hyperpigmented  -BC      Other Location Color/Pigment  bilateral:;red;radiation fibrosis;other (comment) groin/vaginal areas  -BC         Lymphedema Sensation    Lymphedema Sensation Tests  light touch;deep touch  -BC      Lymphedema Light Touch  RLE:;mild impairment  -BC      Lymphedema Deep Touch  WNL  -BC         Lymphedema Measurements    Measurement Type(s)  Quick Girth  -BC      Quick Girth Areas  Lower extremities  -BC      Circumferential Areas  Trunk  -BC         LLE Quick Girth (cm)    Met-heads  23.5 cm  -BC      Mid foot  24.4 cm  -BC      Smallest ankle  25 cm  -BC      Largest calf  39.8 cm  -BC      Tib tuberosity  39 cm  -BC      Mid patella  45.3 cm  -BC      Distal thigh  48.8 cm  -BC      Other 1  25.8 cm  -BC      Other 2  35.3 cm  -BC         Trunk Circumferential (cm)    Measurement Location 1  Navel  -BC      Trunk 1  112 cm  -BC      Measurement Location 2         -BC      Trunk Circumferential Total  112 cm  -BC         Manual Lymphatic Drainage    Manual Lymphatic Drainage  extremity treatment;opened regional lymph nodes;initial sequence  -BC      Initial Sequence  abdomen  -BC      Abdomen  superficial  -BC      Opened Regional Lymph Nodes  axillary;inguinal  -BC      Axillary  right;left  -BC      Inguinal  right;left  -BC      Extremity Treatment  MLD to full limb  -BC      MLD to Full Limb  BLE's  -BC      Manual Lymphatic Drainage Comments  Vibration as tolerated.  -BC      Manual Therapy  MLD to BLE's  -BC         Compression/Skin Care    Compression/Skin Care  skin care  -BC      Wrapping Location  lower extremity  -BC      Wrapping Location LE  --  -BC      Bandage Layers  --  -BC      Bandaging Technique  --  -BC      Compression Garment Comments  Light compression stockings  -BC      Compression/Skin Care Comments  Compression pump x Abd., BLE's.  -BC        User Key  (r) =  Recorded By, (t) = Taken By, (c) = Cosigned By    Initials Name Provider Type    Charlotte Cook OT Occupational Therapist                  OT Assessment/Plan     Row Name 02/23/21 1720          OT Assessment    Assessment Comments  Pt. presents with compression stockings on BLE's, pt. positioned in supine w/HOB elevated ~80* for manual lymph drainage, compression pump, skin care and  donning of stockings.  -BC       User Key  (r) = Recorded By, (t) = Taken By, (c) = Cosigned By    Initials Name Provider Type    BC Charlotte García OT Occupational Therapist                    Therapy Education  Given: HEP, Symptoms/condition management, Edema management, Bandaging/dressing change  Program: Reinforced  How Provided: Verbal  Provided to: Patient  Level of Understanding: Verbalized                Time Calculation:   OT Start Time: 1300  OT Stop Time: 1420  OT Time Calculation (min): 80 min  OT Non-Billable Time (min): 20 min     Therapy Charges for Today     Code Description Service Date Service Provider Modifiers Qty    94620661767 HC OT LYMPHEDEMA MANAGEMENT-15 MIN 2/23/2021 Charlotte García OT  1    62477642432 HC OT MANUAL THERAPY EA 15 MIN 2/23/2021 Charlotte García OT GO 2    94694904669 HC OT SELF CARE/MGMT/TRAIN EA 15 MIN 2/23/2021 Charlotte García OT GO 1    12921302190 HC OT VASOPNEUMAT DEVIC 1 OR MORE AREAS 2/23/2021 Charlotte García OT GO 1                      Charlotte García OT  2/23/2021

## 2021-02-26 ENCOUNTER — HOSPITAL ENCOUNTER (OUTPATIENT)
Dept: OCCUPATIONAL THERAPY | Facility: HOSPITAL | Age: 76
Setting detail: THERAPIES SERIES
Discharge: HOME | End: 2021-02-26

## 2021-02-26 DIAGNOSIS — I89.0 LYMPHEDEMA OF BOTH LOWER EXTREMITIES: Primary | ICD-10-CM

## 2021-02-26 DIAGNOSIS — C51.9 CANCER OF VULVA (HCC): ICD-10-CM

## 2021-02-26 PROCEDURE — 97139 UNLISTED THERAPEUTIC PX: CPT

## 2021-02-26 PROCEDURE — 97016 VASOPNEUMATIC DEVICE THERAPY: CPT

## 2021-02-26 PROCEDURE — 97140 MANUAL THERAPY 1/> REGIONS: CPT

## 2021-02-26 NOTE — THERAPY TREATMENT NOTE
Outpatient Occupational Therapy Lymphedema Treatment Note  JOSE ROBERTO Patel     Patient Name: Orquidea Rosenberg  : 1945  MRN: [id number]  Today's Date: 2021      Visit Date: 2021    Patient Active Problem List   Diagnosis   • COVID-19        Past Medical History:   Diagnosis Date   • Arthritis    • COPD (chronic obstructive pulmonary disease) (CMS/HCC)    • Elevated cholesterol    • History of transfusion    • Hypertension    • Vulval ca (CMS/HCC)         Past Surgical History:   Procedure Laterality Date   • RADICAL VULVECTOMY  2019   • RADICAL VULVECTOMY Bilateral 2019         Visit Dx:      ICD-10-CM ICD-9-CM   1. Lymphedema of both lower extremities  I89.0 457.1   2. Cancer of vulva (CMS/HCC)  C51.9 184.4       Lymphedema     Row Name 21 1300             Subjective Pain    Able to rate subjective pain?  yes  -AB      Pre-Treatment Pain Level  0  -AB      Subjective Pain Comment  Denies pain this date.  -AB         Subjective Comments    Subjective Comments  Pt. reports this date that she is to begin chemotherapy treatments next Wednesday, 3/3  -AB         Lymphedema Assessment    Lymphedema Classification  RLE:;LLE:;secondary;stage 2 (Spontaneously Irreversible)  -AB      Lymphedema Cancer Related Sx  bilateral radical vulvectomy  -AB      Lymph Nodes Removed #  0  -AB      Positive Lymph Nodes #  0  -AB      Chemo Received  yes  -AB      Radiation Therapy Received  yes  -AB         Posture/Observations    Posture- WNL  Posture is WNL  -AB         Lymphedema Edema Assessment    Ptting Edema Category  By grade out of 4  -AB      Pitting Edema  + 3/4  -AB      Stemmer Sign  left:;positive  -AB         Skin Changes/Observations    Location/Assessment  Lower Extremity;Other Location  -AB      Lower Extremity Conditions  bilateral:;clean;dry;shiny  -AB      Lower Extremity Color/Pigment  bilateral:;blanchable;radiation fibrosis;hyperpigmented  -AB      Other Location Color/Pigment   bilateral:;red;radiation fibrosis;other (comment) groin/vaginal areas  -AB         Lymphedema Sensation    Lymphedema Sensation Tests  light touch;deep touch  -AB      Lymphedema Light Touch  RLE:;mild impairment  -AB      Lymphedema Deep Touch  WNL  -AB         Lymphedema Measurements    Measurement Type(s)  Quick Girth  -AB      Quick Girth Areas  Lower extremities  -AB      Circumferential Areas  Trunk  -AB         LLE Quick Girth (cm)    Met-heads  22.9 cm  -AB      Mid foot  23.5 cm  -AB      Smallest ankle  24 cm  -AB      Largest calf  39.7 cm  -AB      Tib tuberosity  38.3 cm  -AB      Mid patella  44.1 cm  -AB      Distal thigh  49.3 cm  -AB      Other 1  25.3 cm  -AB      Other 2  35.6 cm  -AB         RLE Quick Girth (cm)    Met-heads  22.9 cm  -AB      Mid foot  23.6 cm  -AB      Smallest ankle  22.2 cm  -AB      Largest calf  39.9 cm  -AB      Tib tuberosity  38.6 cm  -AB      Mid patella  44.1 cm  -AB      Distal thigh  48.3 cm  -AB      Other 1  22.3 cm  -AB      Other 2  31.2 cm  -AB      RLE Quick Girth Total  293.1  -AB         Trunk Circumferential (cm)    Measurement Location 1  Navel  -AB      Measurement Location 2         -AB         Manual Lymphatic Drainage    Manual Lymphatic Drainage  extremity treatment;opened regional lymph nodes;initial sequence  -AB      Initial Sequence  abdomen  -AB      Abdomen  superficial  -AB      Opened Regional Lymph Nodes  axillary;inguinal  -AB      Axillary  right;left  -AB      Inguinal  right;left  -AB      Extremity Treatment  MLD to full limb  -AB      MLD to Full Limb  BLE's  -AB      Manual Therapy  MLD to BLE's, abdomen, inguinals  -AB         Compression/Skin Care    Compression/Skin Care  skin care  -AB      Skin Care  moisturizing lotion applied  -AB      Wrapping Location  --  -AB      Compression/Skin Care Comments  compression pump to abdomen and BLE's  -AB        User Key  (r) = Recorded By, (t) = Taken By, (c) = Cosigned By    Initials Name  Provider Type    AB Anca Pickett OT Occupational Therapist                                                 Time Calculation:   OT Start Time: 1300  OT Stop Time: 1430  OT Time Calculation (min): 90 min  OT Non-Billable Time (min): 25 min  Total Timed Code Minutes- OT: 90 minute(s)     Therapy Charges for Today     Code Description Service Date Service Provider Modifiers Qty    78246107053 HC OT MANUAL THERAPY EA 15 MIN 2/26/2021 Anca Pickett OT GO, KX 4    [id number]  OT LYMPHEDEMA MANAGEMENT-15 MIN 2/26/2021 Anca Pickett OT KX 1    [id number]  OT VASOPNEUMAT DEVIC 1 OR MORE AREAS 2/26/2021 Anca Pickett OT GO, KX 1                      Anca Pickett OT  2/26/2021

## 2021-03-01 ENCOUNTER — HOSPITAL ENCOUNTER (OUTPATIENT)
Dept: OCCUPATIONAL THERAPY | Facility: HOSPITAL | Age: 76
Setting detail: THERAPIES SERIES
Discharge: HOME | End: 2021-03-01

## 2021-03-01 DIAGNOSIS — C51.9 CANCER OF VULVA (HCC): ICD-10-CM

## 2021-03-01 DIAGNOSIS — I89.0 LYMPHEDEMA OF BOTH LOWER EXTREMITIES: Primary | ICD-10-CM

## 2021-03-01 PROCEDURE — 97016 VASOPNEUMATIC DEVICE THERAPY: CPT

## 2021-03-01 PROCEDURE — 97139 UNLISTED THERAPEUTIC PX: CPT

## 2021-03-01 PROCEDURE — 97140 MANUAL THERAPY 1/> REGIONS: CPT

## 2021-03-01 NOTE — THERAPY TREATMENT NOTE
Outpatient Occupational Therapy Lymphedema Treatment Note  JOSE ROBERTO Patel     Patient Name: Orquidea Rosenberg  : 1945  MRN: [id number]  Today's Date: 3/1/2021      Visit Date: 2021    Patient Active Problem List   Diagnosis   • COVID-19        Past Medical History:   Diagnosis Date   • Arthritis    • COPD (chronic obstructive pulmonary disease) (CMS/HCC)    • Elevated cholesterol    • History of transfusion    • Hypertension    • Vulval ca (CMS/HCC)         Past Surgical History:   Procedure Laterality Date   • RADICAL VULVECTOMY  2019   • RADICAL VULVECTOMY Bilateral 2019         Visit Dx:      ICD-10-CM ICD-9-CM   1. Lymphedema of both lower extremities  I89.0 457.1   2. Cancer of vulva (CMS/HCC)  C51.9 184.4       Lymphedema     Row Name 21 1400             Subjective Pain    Able to rate subjective pain?  yes  -AB      Pre-Treatment Pain Level  0  -AB      Subjective Pain Comment  Denies pain  -AB         Subjective Comments    Subjective Comments  Pt. was mistaken on when chemotherapy txs begin. Instead of 3/3 they begin on 3/10 per pt. report.   -AB         Lymphedema Assessment    Lymphedema Classification  RLE:;LLE:;secondary;stage 2 (Spontaneously Irreversible)  -AB      Lymphedema Cancer Related Sx  bilateral radical vulvectomy  -AB      Lymph Nodes Removed #  0  -AB      Positive Lymph Nodes #  0  -AB      Chemo Received  yes  -AB      Radiation Therapy Received  yes  -AB         Posture/Observations    Posture- WNL  Posture is WNL  -AB         Lymphedema Edema Assessment    Ptting Edema Category  By grade out of 4  -AB      Pitting Edema  + 3/4  -AB      Stemmer Sign  left:;positive  -AB         Skin Changes/Observations    Location/Assessment  Lower Extremity;Other Location  -AB      Lower Extremity Conditions  bilateral:;clean;dry;shiny  -AB      Lower Extremity Color/Pigment  bilateral:;blanchable;radiation fibrosis;hyperpigmented  -AB      Other Location Color/Pigment   bilateral:;red;radiation fibrosis;other (comment) groin/vaginal areas  -AB         Lymphedema Sensation    Lymphedema Sensation Tests  light touch;deep touch  -AB      Lymphedema Light Touch  RLE:;mild impairment  -AB      Lymphedema Deep Touch  WNL  -AB         Lymphedema Measurements    Measurement Type(s)  Quick Girth  -AB      Quick Girth Areas  Lower extremities  -AB      Circumferential Areas  Trunk  -AB         LLE Quick Girth (cm)    Met-heads  23.5 cm  -AB      Mid foot  24.6 cm  -AB      Smallest ankle  24.4 cm  -AB      Largest calf  40 cm  -AB      Tib tuberosity  38.4 cm  -AB      Mid patella  43 cm  -AB      Distal thigh  49.2 cm  -AB      Other 1  25 cm  -AB      Other 2  37.8 cm  -AB         RLE Quick Girth (cm)    Met-heads  22.9 cm  -AB      Mid foot  22.9 cm  -AB      Smallest ankle  23.6 cm  -AB      Largest calf  39.9 cm  -AB      Tib tuberosity  38.7 cm  -AB      Mid patella  44.6 cm  -AB      Distal thigh  46.9 cm  -AB      Other 1  22.4 cm  -AB      Other 2  32.8 cm  -AB      RLE Quick Girth Total  294.7  -AB         Trunk Circumferential (cm)    Measurement Location 1  Navel  -AB      Trunk 1  104.8 cm  -AB      Measurement Location 2         -AB      Trunk Circumferential Total  104.8 cm  -AB         Manual Lymphatic Drainage    Manual Lymphatic Drainage  extremity treatment;opened regional lymph nodes;initial sequence  -AB      Initial Sequence  abdomen  -AB      Abdomen  superficial  -AB      Opened Regional Lymph Nodes  axillary;inguinal  -AB      Axillary  right;left  -AB      Inguinal  right;left  -AB      Extremity Treatment  MLD to full limb  -AB      MLD to Full Limb  BLE's  -AB      Manual Therapy  MLD to BLE's, abdomen, inguinals  -AB         Compression/Skin Care    Compression/Skin Care  skin care  -AB      Skin Care  moisturizing lotion applied  -AB      Compression/Skin Care Comments  compression pump to abdomen and BLE's  -AB        User Key  (r) = Recorded By, (t) = Taken  By, (c) = Cosigned By    Initials Name Provider Type    Anca Pond OT Occupational Therapist                  OT Assessment/Plan     Row Name 03/01/21 1451          OT Plan    OT Plan Comments  Pt. has completed greater than 4 weeks of CDT including daily exercise, elevation, compression (socks(20-30mmhg) and reduction kit to ankle), and following a low sodium diet. Pt. began using a CircuFlow 5150 basic sequential compression pump on 1/29/21. She reports pumping daily for approximately 1 hour w/ pump setting at 40mmhg. She has noticed an increase of fluid build-up in abdomen area.  -AB       User Key  (r) = Recorded By, (t) = Taken By, (c) = Cosigned By    Initials Name Provider Type    Anca Pond OT Occupational Therapist                    Therapy Education  Given: HEP, Edema management, Symptoms/condition management, Bandaging/dressing change  Program: Reinforced  How Provided: Verbal, Demonstration  Provided to: Patient  Level of Understanding: Verbalized                Time Calculation:   OT Start Time: 1430  OT Stop Time: 1545  OT Time Calculation (min): 75 min  OT Non-Billable Time (min): 25 min  Total Timed Code Minutes- OT: 75 minute(s)     Therapy Charges for Today     Code Description Service Date Service Provider Modifiers Qty    73977028665 HC OT MANUAL THERAPY EA 15 MIN 3/1/2021 Anca Pickett OT GO, KX 3    64239866532 HC OT LYMPHEDEMA MANAGEMENT-15 MIN 3/1/2021 Anca Pickett OT KX 1    [id number]  OT VASOPNEUMAT DEVIC 1 OR MORE AREAS 3/1/2021 Anca Pickett OT GO, KX 1                      Anca Pickett OT  3/1/2021

## 2021-03-03 ENCOUNTER — TRANSCRIBE ORDERS (OUTPATIENT)
Dept: ADMINISTRATIVE | Facility: HOSPITAL | Age: 76
End: 2021-03-03

## 2021-03-03 DIAGNOSIS — C51.0 MALIGNANT NEOPLASM OF LABIA MAJORA (HCC): Primary | ICD-10-CM

## 2021-03-05 ENCOUNTER — HOSPITAL ENCOUNTER (OUTPATIENT)
Dept: PET IMAGING | Facility: HOSPITAL | Age: 76
Discharge: HOME | End: 2021-03-05

## 2021-03-05 DIAGNOSIS — C51.0 MALIGNANT NEOPLASM OF LABIA MAJORA (HCC): ICD-10-CM

## 2021-03-05 PROCEDURE — A9552 F18 FDG: HCPCS | Performed by: OBSTETRICS & GYNECOLOGY

## 2021-03-05 PROCEDURE — 0 FLUDEOXYGLUCOSE F18 SOLUTION: Performed by: OBSTETRICS & GYNECOLOGY

## 2021-03-05 PROCEDURE — 78815 PET IMAGE W/CT SKULL-THIGH: CPT

## 2021-03-05 PROCEDURE — 78815 PET IMAGE W/CT SKULL-THIGH: CPT | Performed by: RADIOLOGY

## 2021-03-05 RX ADMIN — FLUDEOXYGLUCOSE F18 1 DOSE: 300 INJECTION INTRAVENOUS at 09:35

## 2021-05-04 ENCOUNTER — TRANSCRIBE ORDERS (OUTPATIENT)
Dept: ADMINISTRATIVE | Facility: HOSPITAL | Age: 76
End: 2021-05-04

## 2021-05-04 DIAGNOSIS — C51.9 CARCINOMA OF VULVA (HCC): Primary | ICD-10-CM

## 2021-05-11 ENCOUNTER — HOSPITAL ENCOUNTER (OUTPATIENT)
Dept: CT IMAGING | Facility: HOSPITAL | Age: 76
Discharge: HOME | End: 2021-05-11
Admitting: OBSTETRICS & GYNECOLOGY

## 2021-05-11 DIAGNOSIS — C51.9 CARCINOMA OF VULVA (HCC): ICD-10-CM

## 2021-05-11 PROCEDURE — 71260 CT THORAX DX C+: CPT

## 2021-05-11 PROCEDURE — 71260 CT THORAX DX C+: CPT | Performed by: RADIOLOGY

## 2021-05-11 PROCEDURE — 82565 ASSAY OF CREATININE: CPT

## 2021-05-11 PROCEDURE — 25010000002 IOPAMIDOL 61 % SOLUTION: Performed by: OBSTETRICS & GYNECOLOGY

## 2021-05-11 RX ADMIN — IOPAMIDOL 100 ML: 612 INJECTION, SOLUTION INTRAVENOUS at 09:54

## 2021-05-25 LAB — CREAT BLDA-MCNC: 1.1 MG/DL (ref 0.6–1.3)

## 2021-05-28 ENCOUNTER — TRANSCRIBE ORDERS (OUTPATIENT)
Dept: ADMINISTRATIVE | Facility: HOSPITAL | Age: 76
End: 2021-05-28

## 2021-05-28 DIAGNOSIS — Z01.818 PRE-OPERATIVE CLEARANCE: Primary | ICD-10-CM

## 2021-05-31 ENCOUNTER — LAB (OUTPATIENT)
Dept: LAB | Facility: HOSPITAL | Age: 76
End: 2021-05-31

## 2021-05-31 DIAGNOSIS — Z01.818 PRE-OPERATIVE CLEARANCE: ICD-10-CM

## 2021-05-31 LAB — SARS-COV-2 RNA RESP QL NAA+PROBE: NOT DETECTED

## 2021-05-31 PROCEDURE — C9803 HOPD COVID-19 SPEC COLLECT: HCPCS

## 2021-05-31 PROCEDURE — U0003 INFECTIOUS AGENT DETECTION BY NUCLEIC ACID (DNA OR RNA); SEVERE ACUTE RESPIRATORY SYNDROME CORONAVIRUS 2 (SARS-COV-2) (CORONAVIRUS DISEASE [COVID-19]), AMPLIFIED PROBE TECHNIQUE, MAKING USE OF HIGH THROUGHPUT TECHNOLOGIES AS DESCRIBED BY CMS-2020-01-R: HCPCS

## 2021-08-02 ENCOUNTER — TRANSCRIBE ORDERS (OUTPATIENT)
Dept: ADMINISTRATIVE | Facility: HOSPITAL | Age: 76
End: 2021-08-02

## 2021-08-02 DIAGNOSIS — Z01.818 OTHER SPECIFIED PRE-OPERATIVE EXAMINATION: Primary | ICD-10-CM

## 2021-08-03 ENCOUNTER — LAB (OUTPATIENT)
Dept: LAB | Facility: HOSPITAL | Age: 76
End: 2021-08-03

## 2021-08-03 DIAGNOSIS — Z01.818 OTHER SPECIFIED PRE-OPERATIVE EXAMINATION: ICD-10-CM

## 2021-08-03 LAB — SARS-COV-2 RNA PNL SPEC NAA+PROBE: NOT DETECTED

## 2021-08-03 PROCEDURE — U0004 COV-19 TEST NON-CDC HGH THRU: HCPCS | Performed by: OBSTETRICS & GYNECOLOGY

## 2021-08-03 PROCEDURE — C9803 HOPD COVID-19 SPEC COLLECT: HCPCS

## 2021-10-12 ENCOUNTER — LAB (OUTPATIENT)
Dept: LAB | Facility: HOSPITAL | Age: 76
End: 2021-10-12

## 2021-10-12 ENCOUNTER — TRANSCRIBE ORDERS (OUTPATIENT)
Dept: ADMINISTRATIVE | Facility: HOSPITAL | Age: 76
End: 2021-10-12

## 2021-10-12 DIAGNOSIS — Z01.818 OTHER SPECIFIED PRE-OPERATIVE EXAMINATION: Primary | ICD-10-CM

## 2021-10-12 DIAGNOSIS — Z01.818 OTHER SPECIFIED PRE-OPERATIVE EXAMINATION: ICD-10-CM

## 2021-10-12 LAB — SARS-COV-2 RNA PNL SPEC NAA+PROBE: NOT DETECTED

## 2021-10-12 PROCEDURE — U0004 COV-19 TEST NON-CDC HGH THRU: HCPCS | Performed by: SURGERY

## 2021-10-12 PROCEDURE — C9803 HOPD COVID-19 SPEC COLLECT: HCPCS

## 2021-11-05 DIAGNOSIS — S31.40XD VAGINAL WOUND, SUBSEQUENT ENCOUNTER: Primary | ICD-10-CM

## 2021-11-08 ENCOUNTER — APPOINTMENT (OUTPATIENT)
Dept: WOUND CARE | Facility: HOSPITAL | Age: 76
End: 2021-11-08

## 2021-11-11 ENCOUNTER — HOSPITAL ENCOUNTER (OUTPATIENT)
Dept: WOUND CARE | Facility: HOSPITAL | Age: 76
Discharge: HOME | End: 2021-11-11
Admitting: NURSE PRACTITIONER

## 2021-11-11 VITALS
BODY MASS INDEX: 28.93 KG/M2 | SYSTOLIC BLOOD PRESSURE: 109 MMHG | HEIGHT: 66 IN | TEMPERATURE: 97.8 F | DIASTOLIC BLOOD PRESSURE: 63 MMHG | WEIGHT: 180 LBS | HEART RATE: 95 BPM | RESPIRATION RATE: 17 BRPM

## 2021-11-11 DIAGNOSIS — S31.40XD VAGINAL WOUND, SUBSEQUENT ENCOUNTER: Primary | ICD-10-CM

## 2021-11-11 PROCEDURE — 97602 WOUND(S) CARE NON-SELECTIVE: CPT

## 2021-11-11 RX ORDER — LEVOFLOXACIN 750 MG/1
750 TABLET ORAL
COMMUNITY
Start: 2021-11-10 | End: 2021-11-17

## 2021-11-11 RX ORDER — LORATADINE 10 MG/1
10 TABLET, ORALLY DISINTEGRATING ORAL DAILY
COMMUNITY

## 2021-11-11 RX ORDER — DIPHENOXYLATE HYDROCHLORIDE AND ATROPINE SULFATE 2.5; .025 MG/1; MG/1
1 TABLET ORAL DAILY
COMMUNITY

## 2021-11-11 RX ORDER — ONDANSETRON 4 MG/1
4 TABLET, ORALLY DISINTEGRATING ORAL EVERY 6 HOURS PRN
COMMUNITY
Start: 2021-11-08 | End: 2021-11-15

## 2021-11-11 RX ORDER — RIBOFLAVIN (VITAMIN B2) 100 MG
TABLET ORAL DAILY
COMMUNITY

## 2021-11-11 NOTE — PROGRESS NOTES
Wound Clinic Progress Note  Patient Identification:  Name:  Orquidea Rosenberg  Age:  76 y.o.  Sex:  female  :  1945  MRN:  [id number]   Visit Number:  [id number]  Primary Care Physician:  Adriana Loya APRN     Subjective     Chief complaint:     Surgical wound dehisced     History of presenting illness:     Patient is a 74 y.o. female with past medical history significant for recent radical vulvectomy, denies any other significant medical history.  Present's today for evaluation of wound dehisced. Daughter present during visit. Patient reports having radical vulvectomy on  of this year, they did a reconstructive surgery at that same time. She reports that they started to remove every other stitch when her wound opened on the left side of her vagina/ inner thigh. She reports having moderate amount of drainage, no odor present. She reports having to have area debrided every two weeks for the past 2 weeks, with most recent being wednesday. She states they have cultured the drainage several times and has recently finished a course of Levaquin. She denies any fever, chills, nausea or vomiting. Have been packing area with wet to dry dressings.    Interval history: Patient is here for follow up to surgical wound dehisced. She denies any new complications since previous exam. Continues to report that dressing is not staying in place. She reports having moderate amount of drainage, denies any odor. No fever or chills reported.     2021: Patient is her for new  recurrent vulvar cancer s/p neoadjuvant radiation and vulvectomy and revision x4 w/ plastics. She was recently admitted to [facility] where she had extensive debridement to the area and received IV antibiotics with vancomycin, cefepime, and flagyl until culture results showed pseudomonas aeruginosa that was pansensitive and she was transitioned to oral Levaquin. Dakins wet to dry twice daily was recommend. She reports mild pain to  the site. Denies any fever or chills. States she has bee completing her own wound care.   ---------------------------------------------------------------------------------------------------------------------   Review of Systems   Constitutional: Negative for chills and fever.   Respiratory: Negative for shortness of breath.    Cardiovascular: Negative for leg swelling.   Musculoskeletal: Negative for gait problem.   Skin: Positive for wound.   Neurological: Negative for dizziness and weakness.       ---------------------------------------------------------------------------------------------------------------------   Past Medical History:   Diagnosis Date   • Arthritis    • COPD (chronic obstructive pulmonary disease) (HCC)    • Elevated cholesterol    • History of transfusion    • Hypertension    • Vulval ca (HCC)      Past Surgical History:   Procedure Laterality Date   • RADICAL VULVECTOMY  09/06/2019   • RADICAL VULVECTOMY Bilateral 06/2019     Family History   Problem Relation Age of Onset   • Alzheimer's disease Mother    • Cancer Father    • Cancer Brother    • Diabetes Maternal Grandmother      Social History     Socioeconomic History   • Marital status:    Tobacco Use   • Smoking status: Former Smoker   • Smokeless tobacco: Never Used   Vaping Use   • Vaping Use: Never used   Substance and Sexual Activity   • Alcohol use: No   • Drug use: No   • Sexual activity: Defer     ---------------------------------------------------------------------------------------------------------------------   Allergies:  Penicillins  ---------------------------------------------------------------------------------------------------------------------    Objective     ---------------------------------------------------------------------------------------------------------------------   Vital Signs:  Temp:  [97.8 °F (36.6 °C)] 97.8 °F (36.6 °C)  Heart Rate:  [95] 95  Resp:  [17] 17  BP: (109)/(63) 109/63  No data  found.  There were no vitals filed for this visit.     on   ;        Body mass index is 29.05 kg/m².  Wt Readings from Last 3 Encounters:   11/11/21 81.6 kg (180 lb)   10/09/20 88.9 kg (196 lb)   10/31/19 86.2 kg (190 lb)     ---------------------------------------------------------------------------------------------------------------------   Physical Exam  Skin: Temperature:normal turgor and temperatureColor: normal, no cyanosis, jaundice, pallor or bruising, Moisture: dry,Nails: pink nail beds, Hair:normal distribution .  Vulvar surgical wound- wound bed red and moist, scattered yellow slough. Few areas of dehisced incision, areas are covered with yellow slough. Moderate amount of drainage without odor. Tender to touch. Peirowund intact. Scarring from previous surgeries.   ---------------------------------------------------------------------------------------------------------------------                       Invalid input(s): PROTCrCl cannot be calculated (Patient's most recent lab result is older than the maximum 30 days allowed.).  No results found for: AMMONIA    No results found for: HGBA1C, POCGLU  No results found for: HGBA1C  Lab Results   Component Value Date    TSH 4.350 (H) 10/06/2020       Blood Culture   Date Value Ref Range Status   10/31/2019 No growth at 4 days  Preliminary   10/31/2019 Corynebacterium species (A)  Final     Comment:     Probable contaminant requires clinical correlation, susceptibility not performed unless requested by physician.    No definitive guidelines. All are susceptible to vancomycin. Resistance to penicillins & cephalosporins does occur.     Pain Management Panel    There is no flowsheet data to display.       I have personally reviewed the above laboratory results.   ---------------------------------------------------------------------------------------------------------------------    Assessment & Plan      Assessment     1. Wound dehisced to left vagina/thigh  2.  Wound dehisced to left proximal greater trochanter  3. Obesity    Plan:     Surgical would vulvar-   Will pack with hydrogel moistened gauze and cover with PAD. Advised that if odor, or concerns of worsening to return back to dakin's moistened gauze.      Obesity- advised to consume a high protein low carb diet, which will help with weight management and wound healing.      Follow up in 2 week    BERTHA Diaz   [facility]  11/11/21  09:41 EST

## 2021-11-11 NOTE — PROGRESS NOTES
Wound history:  Where is wound:  vulvar    Number of wounds: 1    Clusters: no    When did it start: diagnosed with female genital cancer recently    Cause of Wound: cancer    Prior treatments: Dakins wet to dry.    Any previous debridements or surgeries: Had surgery 2 weeks ago    Prior antibiotics: Levofloxacin 750mg    For DFU, surgical shoes or offloading boots: n/a    Patients mobility: W/C or bedbound: n/a    Speicalized diet:Eats a lot of fruit and veggies    Protein intake: states she has been lately to help build her body back up    Prior labs: Has some recent labs in epic    Prior vascular workup or seeing vascular surgeon: n/a    Smoker: former    DM average glucose: n/a    Pharmacy:"[facility]    HH: Inge WRIGHT    DME: NO

## 2021-11-23 ENCOUNTER — HOSPITAL ENCOUNTER (OUTPATIENT)
Dept: WOUND CARE | Facility: HOSPITAL | Age: 76
Discharge: HOME | End: 2021-11-23
Admitting: NURSE PRACTITIONER

## 2021-11-23 DIAGNOSIS — S31.40XD VAGINAL WOUND, SUBSEQUENT ENCOUNTER: Primary | ICD-10-CM

## 2021-11-23 PROCEDURE — 97602 WOUND(S) CARE NON-SELECTIVE: CPT

## 2021-11-24 ENCOUNTER — TRANSCRIBE ORDERS (OUTPATIENT)
Dept: ADMINISTRATIVE | Facility: HOSPITAL | Age: 76
End: 2021-11-24

## 2021-11-24 DIAGNOSIS — C51.9 CARCINOMA OF VULVA (HCC): Primary | ICD-10-CM

## 2021-11-24 DIAGNOSIS — C51.9 RECURRENT VULVAR CANCER (HCC): ICD-10-CM

## 2021-11-24 DIAGNOSIS — C51.9 CANCER OF VULVA (HCC): ICD-10-CM

## 2021-12-03 ENCOUNTER — HOSPITAL ENCOUNTER (OUTPATIENT)
Dept: PET IMAGING | Facility: HOSPITAL | Age: 76
Discharge: HOME | End: 2021-12-03
Admitting: OBSTETRICS & GYNECOLOGY

## 2021-12-03 ENCOUNTER — APPOINTMENT (OUTPATIENT)
Dept: PET IMAGING | Facility: HOSPITAL | Age: 76
End: 2021-12-03

## 2021-12-03 DIAGNOSIS — C51.9 RECURRENT VULVAR CANCER (HCC): ICD-10-CM

## 2021-12-03 PROCEDURE — A9552 F18 FDG: HCPCS | Performed by: OBSTETRICS & GYNECOLOGY

## 2021-12-03 PROCEDURE — 78815 PET IMAGE W/CT SKULL-THIGH: CPT

## 2021-12-03 PROCEDURE — 78815 PET IMAGE W/CT SKULL-THIGH: CPT | Performed by: RADIOLOGY

## 2021-12-03 PROCEDURE — 0 FLUDEOXYGLUCOSE F18 SOLUTION: Performed by: OBSTETRICS & GYNECOLOGY

## 2021-12-03 RX ADMIN — FLUDEOXYGLUCOSE F18 1 DOSE: 300 INJECTION INTRAVENOUS at 15:00

## 2021-12-05 NOTE — PROGRESS NOTES
Wound Clinic Progress Note  Patient Identification:  Name:  Orquidea Rosenberg  Age:  76 y.o.  Sex:  female  :  1945  MRN:  [id number]   Visit Number:  [id number]  Primary Care Physician:  Adriana Loya APRN     Subjective     Chief complaint:     Surgical wound dehisced     History of presenting illness:     Patient is a 74 y.o. female with past medical history significant for recent radical vulvectomy, denies any other significant medical history.  Present's today for evaluation of wound dehisced. Daughter present during visit. Patient reports having radical vulvectomy on  of this year, they did a reconstructive surgery at that same time. She reports that they started to remove every other stitch when her wound opened on the left side of her vagina/ inner thigh. She reports having moderate amount of drainage, no odor present. She reports having to have area debrided every two weeks for the past 2 weeks, with most recent being wednesday. She states they have cultured the drainage several times and has recently finished a course of Levaquin. She denies any fever, chills, nausea or vomiting. Have been packing area with wet to dry dressings.    Interval history: Patient is here for follow up to surgical wound dehisced. She denies any new complications since previous exam. Continues to report that dressing is not staying in place. She reports having moderate amount of drainage, denies any odor. No fever or chills reported.     2021: Patient is her for new  recurrent vulvar cancer s/p neoadjuvant radiation and vulvectomy and revision x4 w/ plastics. She was recently admitted to [facility] where she had extensive debridement to the area and received IV antibiotics with vancomycin, cefepime, and flagyl until culture results showed pseudomonas aeruginosa that was pansensitive and she was transitioned to oral Levaquin. Dakins wet to dry twice daily was recommend. She reports mild pain to  the site. Denies any fever or chills. States she has bee completing her own wound care.     11/23/2021: Seen in clinic today for follow up to recurrent vulvar cancer s/p neoadjuvant radiation and vulvectomy and revision x4 w/ plastics. Wound overall with slight improvement, otherwise stable. Continues with catheter in place. Reports compliance with hydrogel treatment. Denies any fever or chills. Continues to report mild pain. Denies any new issues or concerns.   ---------------------------------------------------------------------------------------------------------------------   Review of Systems   Constitutional: Negative for chills and fever.   Respiratory: Negative for shortness of breath.    Cardiovascular: Negative for leg swelling.   Musculoskeletal: Negative for gait problem.   Skin: Positive for wound.   Neurological: Negative for dizziness and weakness.       ---------------------------------------------------------------------------------------------------------------------   Past Medical History:   Diagnosis Date   • Arthritis    • COPD (chronic obstructive pulmonary disease) (HCC)    • Elevated cholesterol    • History of transfusion    • Hypertension    • Vulval ca (HCC)      Past Surgical History:   Procedure Laterality Date   • RADICAL VULVECTOMY  09/06/2019   • RADICAL VULVECTOMY Bilateral 06/2019     Family History   Problem Relation Age of Onset   • Alzheimer's disease Mother    • Cancer Father    • Cancer Brother    • Diabetes Maternal Grandmother      Social History     Socioeconomic History   • Marital status:    Tobacco Use   • Smoking status: Former Smoker   • Smokeless tobacco: Never Used   Vaping Use   • Vaping Use: Never used   Substance and Sexual Activity   • Alcohol use: No   • Drug use: No   • Sexual activity: Defer     ---------------------------------------------------------------------------------------------------------------------   Allergies:   Penicillins  ---------------------------------------------------------------------------------------------------------------------    Objective     ---------------------------------------------------------------------------------------------------------------------   Vital Signs:     No data found.  There were no vitals filed for this visit.     on   ;        There is no height or weight on file to calculate BMI.  Wt Readings from Last 3 Encounters:   11/11/21 81.6 kg (180 lb)   10/09/20 88.9 kg (196 lb)   10/31/19 86.2 kg (190 lb)     ---------------------------------------------------------------------------------------------------------------------   Physical Exam  Skin: Temperature:normal turgor and temperatureColor: normal, no cyanosis, jaundice, pallor or bruising, Moisture: dry,Nails: pink nail beds, Hair:normal distribution .  Vulvar surgical wound- wound bed red and moist, scattered yellow slough. Few areas of dehisced incision, areas are covered with yellow slough. Moderate amount of drainage without odor. Tender to touch. Peirowund intact. Scarring from previous surgeries. Wound is stable, no new issues or concerns.   ---------------------------------------------------------------------------------------------------------------------                       Invalid input(s): PROTCrCl cannot be calculated (Patient's most recent lab result is older than the maximum 30 days allowed.).  No results found for: AMMONIA    No results found for: HGBA1C, POCGLU  No results found for: HGBA1C  Lab Results   Component Value Date    TSH 4.350 (H) 10/06/2020       Blood Culture   Date Value Ref Range Status   10/31/2019 No growth at 4 days  Preliminary   10/31/2019 Corynebacterium species (A)  Final     Comment:     Probable contaminant requires clinical correlation, susceptibility not performed unless requested by physician.    No definitive guidelines. All are susceptible to vancomycin. Resistance to penicillins &  cephalosporins does occur.     Pain Management Panel    There is no flowsheet data to display.       I have personally reviewed the above laboratory results.   ---------------------------------------------------------------------------------------------------------------------    Assessment & Plan      Assessment     1. Wound dehisced to left vagina/thigh  2. Wound dehisced to left proximal greater trochanter  3. Obesity    Plan:     Surgical would vulvar-   Will pack with hydrogel moistened gauze and cover with PAD. Advised that if odor, or concerns of worsening to return back to dakin's moistened gauze.      Obesity- advised to consume a high protein low carb diet, which will help with weight management and wound healing.      Follow up in 2 week    BERTHA Diaz   [facility]  11/23/2021  1300

## 2021-12-14 ENCOUNTER — HOSPITAL ENCOUNTER (EMERGENCY)
Facility: HOSPITAL | Age: 76
Discharge: HOME | End: 2021-12-14
Attending: EMERGENCY MEDICINE | Admitting: EMERGENCY MEDICINE

## 2021-12-14 ENCOUNTER — APPOINTMENT (OUTPATIENT)
Dept: CT IMAGING | Facility: HOSPITAL | Age: 76
End: 2021-12-14

## 2021-12-14 VITALS
RESPIRATION RATE: 18 BRPM | HEIGHT: 66 IN | WEIGHT: 179 LBS | BODY MASS INDEX: 28.77 KG/M2 | OXYGEN SATURATION: 98 % | HEART RATE: 74 BPM | DIASTOLIC BLOOD PRESSURE: 81 MMHG | TEMPERATURE: 97.9 F | SYSTOLIC BLOOD PRESSURE: 154 MMHG

## 2021-12-14 DIAGNOSIS — N39.0 URINARY TRACT INFECTION IN FEMALE: ICD-10-CM

## 2021-12-14 DIAGNOSIS — N17.9 ACUTE KIDNEY INJURY (HCC): Primary | ICD-10-CM

## 2021-12-14 LAB
ALBUMIN SERPL-MCNC: 3.85 G/DL (ref 3.5–5.2)
ALBUMIN/GLOB SERPL: 1.1 G/DL
ALP SERPL-CCNC: 75 U/L (ref 39–117)
ALT SERPL W P-5'-P-CCNC: 6 U/L (ref 1–33)
ANION GAP SERPL CALCULATED.3IONS-SCNC: 13.2 MMOL/L (ref 5–15)
AST SERPL-CCNC: 11 U/L (ref 1–32)
BACTERIA UR QL AUTO: ABNORMAL /HPF
BASOPHILS # BLD AUTO: 0.06 10*3/MM3 (ref 0–0.2)
BASOPHILS NFR BLD AUTO: 0.6 % (ref 0–1.5)
BILIRUB SERPL-MCNC: 0.5 MG/DL (ref 0–1.2)
BILIRUB UR QL STRIP: NEGATIVE
BUN SERPL-MCNC: 28 MG/DL (ref 8–23)
BUN/CREAT SERPL: 22.4 (ref 7–25)
CALCIUM SPEC-SCNC: 9.5 MG/DL (ref 8.6–10.5)
CHLORIDE SERPL-SCNC: 100 MMOL/L (ref 98–107)
CLARITY UR: ABNORMAL
CO2 SERPL-SCNC: 20.8 MMOL/L (ref 22–29)
COLOR UR: YELLOW
CREAT SERPL-MCNC: 1.25 MG/DL (ref 0.57–1)
CRP SERPL-MCNC: 29.3 MG/DL (ref 0–0.5)
D-LACTATE SERPL-SCNC: 1.1 MMOL/L (ref 0.5–2)
DEPRECATED RDW RBC AUTO: 53.4 FL (ref 37–54)
EOSINOPHIL # BLD AUTO: 0.15 10*3/MM3 (ref 0–0.4)
EOSINOPHIL NFR BLD AUTO: 1.6 % (ref 0.3–6.2)
ERYTHROCYTE [DISTWIDTH] IN BLOOD BY AUTOMATED COUNT: 15.5 % (ref 12.3–15.4)
GFR SERPL CREATININE-BSD FRML MDRD: 42 ML/MIN/1.73
GLOBULIN UR ELPH-MCNC: 3.6 GM/DL
GLUCOSE SERPL-MCNC: 112 MG/DL (ref 65–99)
GLUCOSE UR STRIP-MCNC: NEGATIVE MG/DL
HCT VFR BLD AUTO: 33 % (ref 34–46.6)
HGB BLD-MCNC: 10.1 G/DL (ref 12–15.9)
HGB UR QL STRIP.AUTO: ABNORMAL
HOLD SPECIMEN: NORMAL
HOLD SPECIMEN: NORMAL
HYALINE CASTS UR QL AUTO: ABNORMAL /LPF
IMM GRANULOCYTES # BLD AUTO: 0.06 10*3/MM3 (ref 0–0.05)
IMM GRANULOCYTES NFR BLD AUTO: 0.6 % (ref 0–0.5)
KETONES UR QL STRIP: NEGATIVE
LEUKOCYTE ESTERASE UR QL STRIP.AUTO: ABNORMAL
LYMPHOCYTES # BLD AUTO: 0.86 10*3/MM3 (ref 0.7–3.1)
LYMPHOCYTES NFR BLD AUTO: 9.2 % (ref 19.6–45.3)
MCH RBC QN AUTO: 28.6 PG (ref 26.6–33)
MCHC RBC AUTO-ENTMCNC: 30.6 G/DL (ref 31.5–35.7)
MCV RBC AUTO: 93.5 FL (ref 79–97)
MONOCYTES # BLD AUTO: 0.71 10*3/MM3 (ref 0.1–0.9)
MONOCYTES NFR BLD AUTO: 7.6 % (ref 5–12)
NEUTROPHILS NFR BLD AUTO: 7.54 10*3/MM3 (ref 1.7–7)
NEUTROPHILS NFR BLD AUTO: 80.4 % (ref 42.7–76)
NITRITE UR QL STRIP: POSITIVE
NRBC BLD AUTO-RTO: 0 /100 WBC (ref 0–0.2)
PH UR STRIP.AUTO: 5.5 [PH] (ref 5–8)
PLATELET # BLD AUTO: 190 10*3/MM3 (ref 140–450)
PMV BLD AUTO: 9.2 FL (ref 6–12)
POTASSIUM SERPL-SCNC: 3.7 MMOL/L (ref 3.5–5.2)
PROT SERPL-MCNC: 7.4 G/DL (ref 6–8.5)
PROT UR QL STRIP: ABNORMAL
RBC # BLD AUTO: 3.53 10*6/MM3 (ref 3.77–5.28)
RBC # UR STRIP: ABNORMAL /HPF
REF LAB TEST METHOD: ABNORMAL
SODIUM SERPL-SCNC: 134 MMOL/L (ref 136–145)
SP GR UR STRIP: 1.02 (ref 1–1.03)
SQUAMOUS #/AREA URNS HPF: ABNORMAL /HPF
UROBILINOGEN UR QL STRIP: ABNORMAL
WBC # UR STRIP: ABNORMAL /HPF
WBC NRBC COR # BLD: 9.38 10*3/MM3 (ref 3.4–10.8)
WHOLE BLOOD HOLD SPECIMEN: NORMAL
WHOLE BLOOD HOLD SPECIMEN: NORMAL

## 2021-12-14 PROCEDURE — 87040 BLOOD CULTURE FOR BACTERIA: CPT | Performed by: NURSE PRACTITIONER

## 2021-12-14 PROCEDURE — 87186 SC STD MICRODIL/AGAR DIL: CPT | Performed by: NURSE PRACTITIONER

## 2021-12-14 PROCEDURE — 87086 URINE CULTURE/COLONY COUNT: CPT | Performed by: NURSE PRACTITIONER

## 2021-12-14 PROCEDURE — 81001 URINALYSIS AUTO W/SCOPE: CPT | Performed by: NURSE PRACTITIONER

## 2021-12-14 PROCEDURE — 80053 COMPREHEN METABOLIC PANEL: CPT | Performed by: NURSE PRACTITIONER

## 2021-12-14 PROCEDURE — 83605 ASSAY OF LACTIC ACID: CPT | Performed by: NURSE PRACTITIONER

## 2021-12-14 PROCEDURE — 99283 EMERGENCY DEPT VISIT LOW MDM: CPT

## 2021-12-14 PROCEDURE — 85025 COMPLETE CBC W/AUTO DIFF WBC: CPT | Performed by: NURSE PRACTITIONER

## 2021-12-14 PROCEDURE — 96365 THER/PROPH/DIAG IV INF INIT: CPT

## 2021-12-14 PROCEDURE — 86140 C-REACTIVE PROTEIN: CPT | Performed by: NURSE PRACTITIONER

## 2021-12-14 PROCEDURE — P9612 CATHETERIZE FOR URINE SPEC: HCPCS

## 2021-12-14 PROCEDURE — 87077 CULTURE AEROBIC IDENTIFY: CPT | Performed by: NURSE PRACTITIONER

## 2021-12-14 PROCEDURE — 51798 US URINE CAPACITY MEASURE: CPT

## 2021-12-14 RX ORDER — NITROFURANTOIN 25; 75 MG/1; MG/1
100 CAPSULE ORAL 2 TIMES DAILY
Qty: 14 CAPSULE | Refills: 0 | Status: SHIPPED | OUTPATIENT
Start: 2021-12-14 | End: 2021-12-21

## 2021-12-14 RX ADMIN — SODIUM CHLORIDE 500 ML: 9 INJECTION, SOLUTION INTRAVENOUS at 17:10

## 2021-12-14 RX ADMIN — AZTREONAM 2 G: 2 INJECTION, POWDER, LYOPHILIZED, FOR SOLUTION INTRAMUSCULAR; INTRAVENOUS at 15:33

## 2021-12-14 NOTE — ED NOTES
Attempt to obtain IV access x3  Unsuccessful. Provider aware.      Geronimo Fernandez, RN  12/14/21 7339

## 2021-12-14 NOTE — ED NOTES
Pt attempting to ambulate to bathroom at this time to void.      Geronimo Fernandez, RN  12/14/21 7123

## 2021-12-14 NOTE — ED PROVIDER NOTES
Subjective   Orquidea Rosenberg is a 75 yo female that presents to ER c/o lucas cath leaking around insertion. Patient is postoperative vulvar cancer and has it for wound infection control. Patient reports that she is still going to [facility] and is currently doing saline wet to dry dressing changes. Patient is s/p on 11/4/2021 debridement of perineum wound, 10/15/2021 adjacent tissue transfer for right vulvar wound closure. Patient sees Dr. Gayle Eason for Gyn/Onc and Dr. Renetta Cameron for plastic surgery.       History provided by:  Patient and relative   used: No        Review of Systems   Constitutional: Negative.  Negative for fever.   HENT: Negative.  Negative for congestion.    Eyes: Negative.    Respiratory: Negative.  Negative for cough, shortness of breath and wheezing.    Cardiovascular: Negative.  Negative for chest pain, palpitations and leg swelling.   Gastrointestinal: Negative.  Negative for abdominal distention, constipation, nausea and vomiting.   Endocrine: Negative.  Negative for polyuria.   Genitourinary: Negative for difficulty urinating, dysuria, flank pain and frequency.   Musculoskeletal: Negative.  Negative for neck stiffness.   Skin: Negative.  Negative for wound.   Allergic/Immunologic: Negative.    Neurological: Negative.    Hematological: Negative.    Psychiatric/Behavioral: Negative.    All other systems reviewed and are negative.      Past Medical History:   Diagnosis Date   • Arthritis    • COPD (chronic obstructive pulmonary disease) (HCC)    • Elevated cholesterol    • History of transfusion    • Hypertension    • Vulval ca (HCC)        Allergies   Allergen Reactions   • Penicillins Swelling       Past Surgical History:   Procedure Laterality Date   • RADICAL VULVECTOMY  09/06/2019   • RADICAL VULVECTOMY Bilateral 06/2019       Family History   Problem Relation Age of Onset   • Alzheimer's disease Mother    • Cancer Father    • Cancer Brother    • Diabetes  Maternal Grandmother        Social History     Socioeconomic History   • Marital status:    Tobacco Use   • Smoking status: Former Smoker   • Smokeless tobacco: Never Used   Vaping Use   • Vaping Use: Never used   Substance and Sexual Activity   • Alcohol use: No   • Drug use: No   • Sexual activity: Defer           Objective   Physical Exam  Vitals and nursing note reviewed.   Constitutional:       Appearance: She is obese.   HENT:      Head: Normocephalic and atraumatic.      Nose: Nose normal. No congestion or rhinorrhea.      Mouth/Throat:      Mouth: Mucous membranes are moist.      Pharynx: Oropharynx is clear.   Eyes:      Extraocular Movements: Extraocular movements intact.      Conjunctiva/sclera: Conjunctivae normal.      Pupils: Pupils are equal, round, and reactive to light.   Cardiovascular:      Rate and Rhythm: Normal rate and regular rhythm.      Pulses: Normal pulses.      Heart sounds: Normal heart sounds.   Pulmonary:      Effort: Pulmonary effort is normal. No respiratory distress.      Breath sounds: Normal breath sounds. No stridor.   Abdominal:      General: Bowel sounds are normal.      Palpations: Abdomen is soft.   Musculoskeletal:         General: No swelling or tenderness. Normal range of motion.      Cervical back: Normal range of motion and neck supple. No rigidity.   Skin:     General: Skin is warm and dry.      Capillary Refill: Capillary refill takes less than 2 seconds.   Neurological:      General: No focal deficit present.      Mental Status: She is alert and oriented to person, place, and time. Mental status is at baseline.   Psychiatric:         Mood and Affect: Mood normal.         Behavior: Behavior normal.         Thought Content: Thought content normal.         Judgment: Judgment normal.         Procedures       Results for orders placed or performed during the hospital encounter of 12/14/21   Urine Culture - Urine, Urine, Catheter    Specimen: Urine, Catheter    Result Value Ref Range    Urine Culture >100,000 CFU/mL Pseudomonas species (A)    Blood Culture - Blood, Arm, Left    Specimen: Arm, Left; Blood   Result Value Ref Range    Blood Culture No growth at 24 hours    Blood Culture - Blood, Arm, Right    Specimen: Arm, Right; Blood   Result Value Ref Range    Blood Culture No growth at 24 hours    Comprehensive Metabolic Panel    Specimen: Arm, Right; Blood   Result Value Ref Range    Glucose 112 (H) 65 - 99 mg/dL    BUN 28 (H) 8 - 23 mg/dL    Creatinine 1.25 (H) 0.57 - 1.00 mg/dL    Sodium 134 (L) 136 - 145 mmol/L    Potassium 3.7 3.5 - 5.2 mmol/L    Chloride 100 98 - 107 mmol/L    CO2 20.8 (L) 22.0 - 29.0 mmol/L    Calcium 9.5 8.6 - 10.5 mg/dL    Total Protein 7.4 6.0 - 8.5 g/dL    Albumin 3.85 3.50 - 5.20 g/dL    ALT (SGPT) 6 1 - 33 U/L    AST (SGOT) 11 1 - 32 U/L    Alkaline Phosphatase 75 39 - 117 U/L    Total Bilirubin 0.5 0.0 - 1.2 mg/dL    eGFR Non African Amer 42 (L) >60 mL/min/1.73    Globulin 3.6 gm/dL    A/G Ratio 1.1 g/dL    BUN/Creatinine Ratio 22.4 7.0 - 25.0    Anion Gap 13.2 5.0 - 15.0 mmol/L   C-reactive Protein    Specimen: Arm, Right; Blood   Result Value Ref Range    C-Reactive Protein 29.30 (H) 0.00 - 0.50 mg/dL   Urinalysis With Culture If Indicated - Urine, Catheter    Specimen: Urine, Catheter   Result Value Ref Range    Color, UA Yellow Yellow, Straw    Appearance, UA Turbid (A) Clear    pH, UA 5.5 5.0 - 8.0    Specific Gravity, UA 1.019 1.005 - 1.030    Glucose, UA Negative Negative    Ketones, UA Negative Negative    Bilirubin, UA Negative Negative    Blood, UA Moderate (2+) (A) Negative    Protein,  mg/dL (2+) (A) Negative    Leuk Esterase, UA Large (3+) (A) Negative    Nitrite, UA Positive (A) Negative    Urobilinogen, UA 0.2 E.U./dL 0.2 - 1.0 E.U./dL   CBC Auto Differential    Specimen: Arm, Right; Blood   Result Value Ref Range    WBC 9.38 3.40 - 10.80 10*3/mm3    RBC 3.53 (L) 3.77 - 5.28 10*6/mm3    Hemoglobin 10.1 (L) 12.0 - 15.9  g/dL    Hematocrit 33.0 (L) 34.0 - 46.6 %    MCV 93.5 79.0 - 97.0 fL    MCH 28.6 26.6 - 33.0 pg    MCHC 30.6 (L) 31.5 - 35.7 g/dL    RDW 15.5 (H) 12.3 - 15.4 %    RDW-SD 53.4 37.0 - 54.0 fl    MPV 9.2 6.0 - 12.0 fL    Platelets 190 140 - 450 10*3/mm3    Neutrophil % 80.4 (H) 42.7 - 76.0 %    Lymphocyte % 9.2 (L) 19.6 - 45.3 %    Monocyte % 7.6 5.0 - 12.0 %    Eosinophil % 1.6 0.3 - 6.2 %    Basophil % 0.6 0.0 - 1.5 %    Immature Grans % 0.6 (H) 0.0 - 0.5 %    Neutrophils, Absolute 7.54 (H) 1.70 - 7.00 10*3/mm3    Lymphocytes, Absolute 0.86 0.70 - 3.10 10*3/mm3    Monocytes, Absolute 0.71 0.10 - 0.90 10*3/mm3    Eosinophils, Absolute 0.15 0.00 - 0.40 10*3/mm3    Basophils, Absolute 0.06 0.00 - 0.20 10*3/mm3    Immature Grans, Absolute 0.06 (H) 0.00 - 0.05 10*3/mm3    nRBC 0.0 0.0 - 0.2 /100 WBC   Urinalysis, Microscopic Only - Urine, Catheter    Specimen: Urine, Catheter   Result Value Ref Range    RBC, UA 6-12 (A) None Seen, 0-2 /HPF    WBC, UA Too Numerous to Count (A) None Seen, 0-2 /HPF    Bacteria, UA 2+ (A) None Seen /HPF    Squamous Epithelial Cells, UA 3-6 (A) None Seen, 0-2 /HPF    Hyaline Casts, UA 3-6 None Seen /LPF    Methodology Automated Microscopy    Lactic Acid, Plasma    Specimen: Arm, Left; Blood   Result Value Ref Range    Lactate 1.1 0.5 - 2.0 mmol/L   Green Top (Gel)   Result Value Ref Range    Extra Tube Hold for add-ons.    Lavender Top   Result Value Ref Range    Extra Tube hold for add-on    Gold Top - SST   Result Value Ref Range    Extra Tube Hold for add-ons.    Light Blue Top   Result Value Ref Range    Extra Tube hold for add-on      No orders to display       ED Course  ED Course as of 12/16/21 0215   Tue Dec 14, 2021   1631 Discussed case with Dr. Hickey at  plastics on call for Dr. Cameron about lucas cath. Patient has had lucas since 10/2021 advised it does need to come out and she have a voiding trial if she can void then she's ok to follow up without FC. If she can't void then  she needs a new FC and to follow up outpatient.  [SM]   1700 Tried to do CT scan of abdomen and patient refuses. Patient doesn't want the radiation as she's had several recent scans.  [SM]   1825 Patients FC removed and patient voided successfully on her own. Offered admission for RODOLFO and UTI. Patient declines at this time. VSS. Advised to return to the ER with new or worsening symptoms.  [SM]   1830 Bladder scan at discharge showed 0 urine.  [SM]      ED Course User Index  [SM] Libby Watkins, APRN                                                 MDM    Final diagnoses:   Acute kidney injury (HCC)   Urinary tract infection in female       ED Disposition  ED Disposition     ED Disposition Condition Comment    Discharge Stable           LoyaAdriana, APRN  [address]  [address]  [facility] [id number] [phone]    Schedule an appointment as soon as possible for a visit in 2 days      Gayle Eason MD  [address] [facility] Floor  Room [address] [id number] [phone]    Schedule an appointment as soon as possible for a visit in 2 days           Medication List      New Prescriptions    nitrofurantoin (macrocrystal-monohydrate) 100 MG capsule  Commonly known as: MACROBID  Take 1 capsule by mouth 2 (Two) Times a Day for 7 days.           Where to Get Your Medications      These medications were sent to [facility] [address] [place] [phone] [address] AT [facility] [address]  - [phone] [facility] [phone]   [id number] [address] [facility] [address]    Phone: [phone]   · nitrofurantoin (macrocrystal-monohydrate) 100 MG capsule          Libby Watkins, APRN  12/16/21 2539

## 2021-12-14 NOTE — ED NOTES
Unsuccessful IV access attempt x2, primary RN made aware.      Jamila Richmond RN  12/14/21 1519       Jamila Richmond RN  12/14/21 5348

## 2021-12-15 ENCOUNTER — HOSPITAL ENCOUNTER (OUTPATIENT)
Dept: WOUND CARE | Facility: HOSPITAL | Age: 76
Discharge: HOME | End: 2021-12-15
Admitting: NURSE PRACTITIONER

## 2021-12-15 VITALS
RESPIRATION RATE: 20 BRPM | SYSTOLIC BLOOD PRESSURE: 137 MMHG | DIASTOLIC BLOOD PRESSURE: 60 MMHG | HEART RATE: 74 BPM | TEMPERATURE: 97.7 F

## 2021-12-15 DIAGNOSIS — S31.40XD VAGINAL WOUND, SUBSEQUENT ENCOUNTER: Primary | ICD-10-CM

## 2021-12-15 PROCEDURE — 97602 WOUND(S) CARE NON-SELECTIVE: CPT

## 2021-12-16 LAB — BACTERIA SPEC AEROBE CULT: ABNORMAL

## 2021-12-19 LAB
BACTERIA SPEC AEROBE CULT: NORMAL
BACTERIA SPEC AEROBE CULT: NORMAL

## 2021-12-26 NOTE — PROGRESS NOTES
Wound Clinic Progress Note  Patient Identification:  Name:  Orquidea Rosenberg  Age:  76 y.o.  Sex:  female  :  1945  MRN:  [id number]   Visit Number:  [id number]  Primary Care Physician:  Adriana Loya APRN     Subjective     Chief complaint:     Surgical wound dehisced     History of presenting illness:     Patient is a 74 y.o. female with past medical history significant for recent radical vulvectomy, denies any other significant medical history.  Present's today for evaluation of wound dehisced. Daughter present during visit. Patient reports having radical vulvectomy on  of this year, they did a reconstructive surgery at that same time. She reports that they started to remove every other stitch when her wound opened on the left side of her vagina/ inner thigh. She reports having moderate amount of drainage, no odor present. She reports having to have area debrided every two weeks for the past 2 weeks, with most recent being wednesday. She states they have cultured the drainage several times and has recently finished a course of Levaquin. She denies any fever, chills, nausea or vomiting. Have been packing area with wet to dry dressings.    Interval history: Patient is here for follow up to surgical wound dehisced. She denies any new complications since previous exam. Continues to report that dressing is not staying in place. She reports having moderate amount of drainage, denies any odor. No fever or chills reported.     2021: Patient is her for new  recurrent vulvar cancer s/p neoadjuvant radiation and vulvectomy and revision x4 w/ plastics. She was recently admitted to [facility] where she had extensive debridement to the area and received IV antibiotics with vancomycin, cefepime, and flagyl until culture results showed pseudomonas aeruginosa that was pansensitive and she was transitioned to oral Levaquin. Dakins wet to dry twice daily was recommend. She reports mild pain to  the site. Denies any fever or chills. States she has bee completing her own wound care.     11/23/2021: Seen in clinic today for follow up to recurrent vulvar cancer s/p neoadjuvant radiation and vulvectomy and revision x4 w/ plastics. Wound overall with slight improvement, otherwise stable. Continues with catheter in place. Reports compliance with hydrogel treatment. Denies any fever or chills. Continues to report mild pain. Denies any new issues or concerns.     12/15/2021: Seen in clinic today for follow up to recurrent vulvar cancer s/p neoadjuvant radiation and vulvectomy and revision x4 w/ plastics. Wound overall stable without significant changes. She was seen in ED and her lucas catheter was removed due to UTI. She reports that lucas had been in place for over 3 months.  She is having incontinence in clinic today.  Reports compliance with hydrogel treatment. Denies any fever or chills. Continues to report mild pain. Denies any new issues or concerns.   ---------------------------------------------------------------------------------------------------------------------   Review of Systems   Constitutional: Negative for chills and fever.   Respiratory: Negative for shortness of breath.    Cardiovascular: Negative for leg swelling.   Musculoskeletal: Negative for gait problem.   Skin: Positive for wound.   Neurological: Negative for dizziness and weakness.       ---------------------------------------------------------------------------------------------------------------------   Past Medical History:   Diagnosis Date   • Arthritis    • COPD (chronic obstructive pulmonary disease) (HCC)    • Elevated cholesterol    • History of transfusion    • Hypertension    • Vulval ca (HCC)      Past Surgical History:   Procedure Laterality Date   • RADICAL VULVECTOMY  09/06/2019   • RADICAL VULVECTOMY Bilateral 06/2019     Family History   Problem Relation Age of Onset   • Alzheimer's disease Mother    • Cancer Father    •  Cancer Brother    • Diabetes Maternal Grandmother      Social History     Socioeconomic History   • Marital status:    Tobacco Use   • Smoking status: Former Smoker   • Smokeless tobacco: Never Used   Vaping Use   • Vaping Use: Never used   Substance and Sexual Activity   • Alcohol use: No   • Drug use: No   • Sexual activity: Defer     ---------------------------------------------------------------------------------------------------------------------   Allergies:  Penicillins  ---------------------------------------------------------------------------------------------------------------------    Objective     ---------------------------------------------------------------------------------------------------------------------   Vital Signs:     No data found.  There were no vitals filed for this visit.     on   ;        There is no height or weight on file to calculate BMI.  Wt Readings from Last 3 Encounters:   12/14/21 81.2 kg (179 lb)   11/11/21 81.6 kg (180 lb)   10/09/20 88.9 kg (196 lb)     ---------------------------------------------------------------------------------------------------------------------   Physical Exam  Skin: Temperature:normal turgor and temperatureColor: normal, no cyanosis, jaundice, pallor or bruising, Moisture: dry,Nails: pink nail beds, Hair:normal distribution .  Vulvar surgical wound- wound bed red and moist, scattered yellow slough. Few areas of dehisced incision, areas are covered with yellow slough. Moderate amount of drainage without odor. Tender to touch. Peirowund intact. Scarring from previous surgeries. Wound is stable, no new issues or concerns.   ---------------------------------------------------------------------------------------------------------------------                       Invalid input(s): PROTEstimated Creatinine Clearance: 41.2 mL/min (A) (by C-G formula based on SCr of 1.25 mg/dL (H)).  No results found for: AMMONIA    No results found for: HGBA1C,  POCGLU  No results found for: HGBA1C  Lab Results   Component Value Date    TSH 4.350 (H) 10/06/2020       Blood Culture   Date Value Ref Range Status   10/31/2019 No growth at 4 days  Preliminary   10/31/2019 Corynebacterium species (A)  Final     Comment:     Probable contaminant requires clinical correlation, susceptibility not performed unless requested by physician.    No definitive guidelines. All are susceptible to vancomycin. Resistance to penicillins & cephalosporins does occur.     Pain Management Panel    There is no flowsheet data to display.       I have personally reviewed the above laboratory results.   ---------------------------------------------------------------------------------------------------------------------    Assessment & Plan      Assessment     1. Wound dehisced to left vagina/thigh  2. Wound dehisced to left proximal greater trochanter  3. Obesity    Plan:     Surgical would vulvar-   Will pack with hydrogel moistened gauze and cover with PAD. Advised that if odor, or concerns of worsening to return back to dakin's moistened gauze.      Germain catheter replaced in clinic today as incontinence places her increased risk for infections. Will manage catheter in clinic once a month.     Obesity- advised to consume a high protein low carb diet, which will help with weight management and wound healing.      Follow up in 2 week    BERTHA Diaz   [facility]  12/15/2021  [id number]

## 2022-01-26 ENCOUNTER — TRANSCRIBE ORDERS (OUTPATIENT)
Dept: ADMINISTRATIVE | Facility: HOSPITAL | Age: 77
End: 2022-01-26

## 2022-01-26 DIAGNOSIS — E51.9 THIAMIN DEFICIENCY: Primary | ICD-10-CM

## 2022-01-27 ENCOUNTER — HOSPITAL ENCOUNTER (OUTPATIENT)
Dept: WOUND CARE | Facility: HOSPITAL | Age: 77
Discharge: HOME | End: 2022-01-27
Admitting: NURSE PRACTITIONER

## 2022-01-27 VITALS
HEART RATE: 68 BPM | RESPIRATION RATE: 18 BRPM | SYSTOLIC BLOOD PRESSURE: 117 MMHG | DIASTOLIC BLOOD PRESSURE: 64 MMHG | TEMPERATURE: 98.4 F

## 2022-01-27 PROCEDURE — 97602 WOUND(S) CARE NON-SELECTIVE: CPT

## 2022-01-27 NOTE — PROGRESS NOTES
Wound Clinic Progress Note  Patient Identification:  Name:  Orquidea Rosenberg  Age:  76 y.o.  Sex:  female  :  1945  MRN:  [id number]   Visit Number:  [id number]  Primary Care Physician:  Adriana Loya APRN     Subjective     Chief complaint:     Surgical wound dehisced     History of presenting illness:     Patient is a 74 y.o. female with past medical history significant for recent radical vulvectomy, denies any other significant medical history.  Present's today for evaluation of wound dehisced. Daughter present during visit. Patient reports having radical vulvectomy on  of this year, they did a reconstructive surgery at that same time. She reports that they started to remove every other stitch when her wound opened on the left side of her vagina/ inner thigh. She reports having moderate amount of drainage, no odor present. She reports having to have area debrided every two weeks for the past 2 weeks, with most recent being wednesday. She states they have cultured the drainage several times and has recently finished a course of Levaquin. She denies any fever, chills, nausea or vomiting. Have been packing area with wet to dry dressings.    Interval history: Patient is here for follow up to surgical wound dehisced. She denies any new complications since previous exam. Continues to report that dressing is not staying in place. She reports having moderate amount of drainage, denies any odor. No fever or chills reported.     2021: Patient is her for new  recurrent vulvar cancer s/p neoadjuvant radiation and vulvectomy and revision x4 w/ plastics. She was recently admitted to [facility] where she had extensive debridement to the area and received IV antibiotics with vancomycin, cefepime, and flagyl until culture results showed pseudomonas aeruginosa that was pansensitive and she was transitioned to oral Levaquin. Dakins wet to dry twice daily was recommend. She reports mild pain to  the site. Denies any fever or chills. States she has bee completing her own wound care.     11/23/2021: Seen in clinic today for follow up to recurrent vulvar cancer s/p neoadjuvant radiation and vulvectomy and revision x4 w/ plastics. Wound overall with slight improvement, otherwise stable. Continues with catheter in place. Reports compliance with hydrogel treatment. Denies any fever or chills. Continues to report mild pain. Denies any new issues or concerns.     12/15/2021: Seen in clinic today for follow up to recurrent vulvar cancer s/p neoadjuvant radiation and vulvectomy and revision x4 w/ plastics. Wound overall stable without significant changes. She was seen in ED and her lucas catheter was removed due to UTI. She reports that lucas had been in place for over 3 months.  She is having incontinence in clinic today.  Reports compliance with hydrogel treatment. Denies any fever or chills. Continues to report mild pain. Denies any new issues or concerns.     01/27/2022: Seen in clinic today for follow up to recurrent vulvar cancer s/p neoadjuvant radiation and vulvectomy and revision x4 w/ plastics. Wound overall with improvements. Continues with catheter in place, she is wishing to have removed today. Catheter was removed as it was due to be changed anyway's. She was observed for approximately 20 minutes and noted to have large area of urine noted to dry flow pad. I discussed risks of urinary incontinence and wound infections. She states she would like to attempt to leave the catheter out, she verbalized understanding. She was instructed to return to clinic if she felt she would like to have the catheter replaced or any new issues.  Reports compliance with hydrogel treatment. Denies any fever or chills. Continues to report mild pain. Denies any new issues or concerns.   ---------------------------------------------------------------------------------------------------------------------   Review of Systems    Constitutional: Negative for chills and fever.   Respiratory: Negative for shortness of breath.    Cardiovascular: Negative for leg swelling.   Musculoskeletal: Negative for gait problem.   Skin: Positive for wound.   Neurological: Negative for dizziness and weakness.       ---------------------------------------------------------------------------------------------------------------------   Past Medical History:   Diagnosis Date   • Arthritis    • COPD (chronic obstructive pulmonary disease) (HCC)    • Elevated cholesterol    • History of transfusion    • Hypertension    • Vulval ca (HCC)      Past Surgical History:   Procedure Laterality Date   • RADICAL VULVECTOMY  09/06/2019   • RADICAL VULVECTOMY Bilateral 06/2019     Family History   Problem Relation Age of Onset   • Alzheimer's disease Mother    • Cancer Father    • Cancer Brother    • Diabetes Maternal Grandmother      Social History     Socioeconomic History   • Marital status:    Tobacco Use   • Smoking status: Former Smoker   • Smokeless tobacco: Never Used   Vaping Use   • Vaping Use: Never used   Substance and Sexual Activity   • Alcohol use: No   • Drug use: No   • Sexual activity: Defer     ---------------------------------------------------------------------------------------------------------------------   Allergies:  Penicillins  ---------------------------------------------------------------------------------------------------------------------    Objective     ---------------------------------------------------------------------------------------------------------------------   Vital Signs:  Temp:  [98.4 °F (36.9 °C)] 98.4 °F (36.9 °C)  Heart Rate:  [68] 68  Resp:  [18] 18  BP: (117)/(64) 117/64  No data found.  There were no vitals filed for this visit.     on   ;        There is no height or weight on file to calculate BMI.  Wt Readings from Last 3 Encounters:   12/14/21 81.2 kg (179 lb)   11/11/21 81.6 kg (180 lb)   10/09/20 88.9 kg  (196 lb)     ---------------------------------------------------------------------------------------------------------------------   Physical Exam  Skin: Temperature:normal turgor and temperatureColor: normal, no cyanosis, jaundice, pallor or bruising, Moisture: dry,Nails: pink nail beds, Hair:normal distribution .  Vulvar surgical wound- wound bed red and moist, scattered yellow slough. Few areas of dehisced incision, areas are covered with yellow slough. Small amount of drainage without odor. Tender to touch. Peirowund intact. Scarring from previous surgeries. Decrease in overall size.   Picture of wound in media   --------------------------------------------------------------------------------------------------------------------   Lab Results   Component Value Date    TSH 4.350 (H) 10/06/2020       Blood Culture   Date Value Ref Range Status   10/31/2019 No growth at 4 days  Preliminary   10/31/2019 Corynebacterium species (A)  Final     Comment:     Probable contaminant requires clinical correlation, susceptibility not performed unless requested by physician.    No definitive guidelines. All are susceptible to vancomycin. Resistance to penicillins & cephalosporins does occur.     Pain Management Panel    There is no flowsheet data to display.       I have personally reviewed the above laboratory results.   ---------------------------------------------------------------------------------------------------------------------    Assessment & Plan      Assessment     1. Wound dehisced to left vagina/thigh  2. Wound dehisced to left proximal greater trochanter  3. Obesity    Plan:     Surgical would vulvar-   Will pack with hydrogel moistened gauze and cover with PAD. Advised that if odor, or concerns of worsening to return back to dakin's moistened gauze.      Germain catheter replaced in clinic today as incontinence places her increased risk for infections. Will manage catheter in clinic once a month.     Obesity-  advised to consume a high protein low carb diet, which will help with weight management and wound healing.      Follow up in 2 week    BERTHA Diaz   [facility]  01/27/2022  [id number]

## 2022-01-27 NOTE — ADDENDUM NOTE
Encounter addended by: Nii Fu RN on: 1/27/2022 12:52 PM   Actions taken: Charge Capture section accepted, LDA properties accepted, Flowsheet accepted

## 2022-02-11 ENCOUNTER — HOSPITAL ENCOUNTER (OUTPATIENT)
Dept: CT IMAGING | Facility: HOSPITAL | Age: 77
Discharge: HOME | End: 2022-02-11
Admitting: OBSTETRICS & GYNECOLOGY

## 2022-02-11 DIAGNOSIS — E51.9 THIAMIN DEFICIENCY: ICD-10-CM

## 2022-02-11 PROCEDURE — 71260 CT THORAX DX C+: CPT | Performed by: RADIOLOGY

## 2022-02-11 PROCEDURE — 71260 CT THORAX DX C+: CPT

## 2022-02-11 PROCEDURE — 25010000002 IOPAMIDOL 61 % SOLUTION: Performed by: OBSTETRICS & GYNECOLOGY

## 2022-02-11 RX ADMIN — IOPAMIDOL 60 ML: 612 INJECTION, SOLUTION INTRAVENOUS at 09:23

## 2022-02-17 ENCOUNTER — HOSPITAL ENCOUNTER (OUTPATIENT)
Dept: WOUND CARE | Facility: HOSPITAL | Age: 77
Discharge: HOME | End: 2022-02-17
Admitting: NURSE PRACTITIONER

## 2022-02-17 VITALS
SYSTOLIC BLOOD PRESSURE: 160 MMHG | HEART RATE: 81 BPM | TEMPERATURE: 97.9 F | DIASTOLIC BLOOD PRESSURE: 72 MMHG | RESPIRATION RATE: 20 BRPM

## 2022-02-17 DIAGNOSIS — S31.40XD VAGINAL WOUND, SUBSEQUENT ENCOUNTER: Primary | ICD-10-CM

## 2022-02-17 PROCEDURE — 97602 WOUND(S) CARE NON-SELECTIVE: CPT

## 2022-02-17 NOTE — PROGRESS NOTES
Wound Clinic Progress Note  Patient Identification:  Name:  Orquidea Rosenberg  Age:  76 y.o.  Sex:  female  :  1945  MRN:  [id number]   Visit Number:  [id number]  Primary Care Physician:  Adriana Loya APRN     Subjective     Chief complaint:     Surgical wound dehisced     History of presenting illness:     Patient is a 74 y.o. female with past medical history significant for recent radical vulvectomy, denies any other significant medical history.  Present's today for evaluation of wound dehisced. Daughter present during visit. Patient reports having radical vulvectomy on  of this year, they did a reconstructive surgery at that same time. She reports that they started to remove every other stitch when her wound opened on the left side of her vagina/ inner thigh. She reports having moderate amount of drainage, no odor present. She reports having to have area debrided every two weeks for the past 2 weeks, with most recent being wednesday. She states they have cultured the drainage several times and has recently finished a course of Levaquin. She denies any fever, chills, nausea or vomiting. Have been packing area with wet to dry dressings.    Interval history: Patient is here for follow up to surgical wound dehisced. She denies any new complications since previous exam. Continues to report that dressing is not staying in place. She reports having moderate amount of drainage, denies any odor. No fever or chills reported.     2021: Patient is her for new  recurrent vulvar cancer s/p neoadjuvant radiation and vulvectomy and revision x4 w/ plastics. She was recently admitted to [facility] where she had extensive debridement to the area and received IV antibiotics with vancomycin, cefepime, and flagyl until culture results showed pseudomonas aeruginosa that was pansensitive and she was transitioned to oral Levaquin. Dakins wet to dry twice daily was recommend. She reports mild pain to  the site. Denies any fever or chills. States she has bee completing her own wound care.     11/23/2021: Seen in clinic today for follow up to recurrent vulvar cancer s/p neoadjuvant radiation and vulvectomy and revision x4 w/ plastics. Wound overall with slight improvement, otherwise stable. Continues with catheter in place. Reports compliance with hydrogel treatment. Denies any fever or chills. Continues to report mild pain. Denies any new issues or concerns.     12/15/2021: Seen in clinic today for follow up to recurrent vulvar cancer s/p neoadjuvant radiation and vulvectomy and revision x4 w/ plastics. Wound overall stable without significant changes. She was seen in ED and her lucas catheter was removed due to UTI. She reports that lucas had been in place for over 3 months.  She is having incontinence in clinic today.  Reports compliance with hydrogel treatment. Denies any fever or chills. Continues to report mild pain. Denies any new issues or concerns.     01/27/2022: Seen in clinic today for follow up to recurrent vulvar cancer s/p neoadjuvant radiation and vulvectomy and revision x4 w/ plastics. Wound overall with improvements. Continues with catheter in place, she is wishing to have removed today. Catheter was removed as it was due to be changed anyway's. She was observed for approximately 20 minutes and noted to have large area of urine noted to dry flow pad. I discussed risks of urinary incontinence and wound infections. She states she would like to attempt to leave the catheter out, she verbalized understanding. She was instructed to return to clinic if she felt she would like to have the catheter replaced or any new issues.  Reports compliance with hydrogel treatment. Denies any fever or chills. Continues to report mild pain. Denies any new issues or concerns.     02/17/2022: Seen in clinic today for follow up to recurrent vulvar cancer s/p neoadjuvant radiation and vulvectomy and revision x4 w/  plastics. Wound continues with improvement, small area now open to right labia. Catheter was removed at last visit and she states she has been doing well without. Reports compliance with hydrogel treatment. Denies any fever or chills. Continues to report mild pain. Denies any new issues or concerns.   ---------------------------------------------------------------------------------------------------------------------   Review of Systems   Constitutional: Negative for chills and fever.   Respiratory: Negative for shortness of breath.    Cardiovascular: Negative for leg swelling.   Musculoskeletal: Negative for gait problem.   Skin: Positive for wound.   Neurological: Negative for dizziness and weakness.       ---------------------------------------------------------------------------------------------------------------------   Past Medical History:   Diagnosis Date   • Arthritis    • COPD (chronic obstructive pulmonary disease) (HCC)    • Elevated cholesterol    • History of transfusion    • Hypertension    • Vulval ca (HCC)      Past Surgical History:   Procedure Laterality Date   • RADICAL VULVECTOMY  09/06/2019   • RADICAL VULVECTOMY Bilateral 06/2019     Family History   Problem Relation Age of Onset   • Alzheimer's disease Mother    • Cancer Father    • Cancer Brother    • Diabetes Maternal Grandmother      Social History     Socioeconomic History   • Marital status:    Tobacco Use   • Smoking status: Former Smoker   • Smokeless tobacco: Never Used   Vaping Use   • Vaping Use: Never used   Substance and Sexual Activity   • Alcohol use: No   • Drug use: No   • Sexual activity: Defer     ---------------------------------------------------------------------------------------------------------------------   Allergies:  Penicillins  ---------------------------------------------------------------------------------------------------------------------    Objective      ---------------------------------------------------------------------------------------------------------------------   Vital Signs:  Temp:  [97.9 °F (36.6 °C)] 97.9 °F (36.6 °C)  Heart Rate:  [81] 81  Resp:  [20] 20  BP: (160)/(72) 160/72  No data found.  There were no vitals filed for this visit.     on   ;        There is no height or weight on file to calculate BMI.  Wt Readings from Last 3 Encounters:   12/14/21 81.2 kg (179 lb)   11/11/21 81.6 kg (180 lb)   10/09/20 88.9 kg (196 lb)     ---------------------------------------------------------------------------------------------------------------------   Physical Exam  Skin: Temperature:normal turgor and temperatureColor: normal, no cyanosis, jaundice, pallor or bruising, Moisture: dry,Nails: pink nail beds, Hair:normal distribution .  Vulvar surgical wound- wound bed red and moist, scattered yellow slough. Few areas of dehisced incision, areas are covered with yellow slough. Small amount of drainage without odor. Tender to touch. Peirowund intact. Scarring from previous surgeries. Continues to improve, no evidence of necrosis  Picture of wound in media   --------------------------------------------------------------------------------------------------------------------   Lab Results   Component Value Date    TSH 4.350 (H) 10/06/2020       Blood Culture   Date Value Ref Range Status   10/31/2019 No growth at 4 days  Preliminary   10/31/2019 Corynebacterium species (A)  Final     Comment:     Probable contaminant requires clinical correlation, susceptibility not performed unless requested by physician.    No definitive guidelines. All are susceptible to vancomycin. Resistance to penicillins & cephalosporins does occur.     Pain Management Panel    There is no flowsheet data to display.       I have personally reviewed the above laboratory results.    ---------------------------------------------------------------------------------------------------------------------    Assessment & Plan      Assessment     1. Wound dehisced to left vagina/thigh  2. Wound dehisced to left proximal greater trochanter  3. Obesity    Plan:     Surgical would vulvar-   Will pack with hydrogel moistened gauze and cover with PAD. Advised that if odor, or concerns of worsening to return back to dakin's moistened gauze.      Germain catheter replaced in clinic today as incontinence places her increased risk for infections. Will manage catheter in clinic once a month.     Obesity- advised to consume a high protein low carb diet, which will help with weight management and wound healing.      Follow up in 2 week    BERTHA Diaz   [facility]  02/17/2022  [id number]

## 2022-02-21 ENCOUNTER — APPOINTMENT (OUTPATIENT)
Dept: CT IMAGING | Facility: HOSPITAL | Age: 77
End: 2022-02-21

## 2022-02-21 ENCOUNTER — HOSPITAL ENCOUNTER (EMERGENCY)
Facility: HOSPITAL | Age: 77
Discharge: HOME | End: 2022-02-21
Attending: STUDENT IN AN ORGANIZED HEALTH CARE EDUCATION/TRAINING PROGRAM | Admitting: STUDENT IN AN ORGANIZED HEALTH CARE EDUCATION/TRAINING PROGRAM

## 2022-02-21 VITALS
DIASTOLIC BLOOD PRESSURE: 77 MMHG | HEIGHT: 66 IN | HEART RATE: 81 BPM | BODY MASS INDEX: 28.93 KG/M2 | SYSTOLIC BLOOD PRESSURE: 165 MMHG | WEIGHT: 180 LBS | RESPIRATION RATE: 20 BRPM | TEMPERATURE: 99 F | OXYGEN SATURATION: 95 %

## 2022-02-21 DIAGNOSIS — Q62.11 HYDRONEPHROSIS WITH URETEROPELVIC JUNCTION (UPJ) OBSTRUCTION: ICD-10-CM

## 2022-02-21 DIAGNOSIS — R10.9 ABDOMINAL PAIN, UNSPECIFIED ABDOMINAL LOCATION: Primary | ICD-10-CM

## 2022-02-21 DIAGNOSIS — N39.0 URINARY TRACT INFECTION IN FEMALE: ICD-10-CM

## 2022-02-21 LAB
ALBUMIN SERPL-MCNC: 3.49 G/DL (ref 3.5–5.2)
ALBUMIN/GLOB SERPL: 0.9 G/DL
ALP SERPL-CCNC: 78 U/L (ref 39–117)
ALT SERPL W P-5'-P-CCNC: 6 U/L (ref 1–33)
ANION GAP SERPL CALCULATED.3IONS-SCNC: 16 MMOL/L (ref 5–15)
AST SERPL-CCNC: 10 U/L (ref 1–32)
BASOPHILS # BLD AUTO: 0.06 10*3/MM3 (ref 0–0.2)
BASOPHILS NFR BLD AUTO: 0.6 % (ref 0–1.5)
BILIRUB SERPL-MCNC: 0.4 MG/DL (ref 0–1.2)
BUN SERPL-MCNC: 27 MG/DL (ref 8–23)
BUN/CREAT SERPL: 21.3 (ref 7–25)
CALCIUM SPEC-SCNC: 8.9 MG/DL (ref 8.6–10.5)
CHLORIDE SERPL-SCNC: 102 MMOL/L (ref 98–107)
CO2 SERPL-SCNC: 20 MMOL/L (ref 22–29)
CREAT SERPL-MCNC: 1.27 MG/DL (ref 0.57–1)
DEPRECATED RDW RBC AUTO: 50.5 FL (ref 37–54)
EOSINOPHIL # BLD AUTO: 0.09 10*3/MM3 (ref 0–0.4)
EOSINOPHIL NFR BLD AUTO: 0.9 % (ref 0.3–6.2)
ERYTHROCYTE [DISTWIDTH] IN BLOOD BY AUTOMATED COUNT: 16 % (ref 12.3–15.4)
GFR SERPL CREATININE-BSD FRML MDRD: 41 ML/MIN/1.73
GLOBULIN UR ELPH-MCNC: 3.7 GM/DL
GLUCOSE SERPL-MCNC: 125 MG/DL (ref 65–99)
HCT VFR BLD AUTO: 32.3 % (ref 34–46.6)
HGB BLD-MCNC: 10.2 G/DL (ref 12–15.9)
IMM GRANULOCYTES # BLD AUTO: 0.06 10*3/MM3 (ref 0–0.05)
IMM GRANULOCYTES NFR BLD AUTO: 0.6 % (ref 0–0.5)
LYMPHOCYTES # BLD AUTO: 1.17 10*3/MM3 (ref 0.7–3.1)
LYMPHOCYTES NFR BLD AUTO: 11.6 % (ref 19.6–45.3)
MCH RBC QN AUTO: 27.1 PG (ref 26.6–33)
MCHC RBC AUTO-ENTMCNC: 31.6 G/DL (ref 31.5–35.7)
MCV RBC AUTO: 85.9 FL (ref 79–97)
MONOCYTES # BLD AUTO: 0.82 10*3/MM3 (ref 0.1–0.9)
MONOCYTES NFR BLD AUTO: 8.1 % (ref 5–12)
NEUTROPHILS NFR BLD AUTO: 7.92 10*3/MM3 (ref 1.7–7)
NEUTROPHILS NFR BLD AUTO: 78.2 % (ref 42.7–76)
NRBC BLD AUTO-RTO: 0 /100 WBC (ref 0–0.2)
PLATELET # BLD AUTO: 225 10*3/MM3 (ref 140–450)
PMV BLD AUTO: 9.2 FL (ref 6–12)
POTASSIUM SERPL-SCNC: 3.7 MMOL/L (ref 3.5–5.2)
PROT SERPL-MCNC: 7.2 G/DL (ref 6–8.5)
RBC # BLD AUTO: 3.76 10*6/MM3 (ref 3.77–5.28)
SODIUM SERPL-SCNC: 138 MMOL/L (ref 136–145)
WBC NRBC COR # BLD: 10.12 10*3/MM3 (ref 3.4–10.8)

## 2022-02-21 PROCEDURE — 74176 CT ABD & PELVIS W/O CONTRAST: CPT

## 2022-02-21 PROCEDURE — 36415 COLL VENOUS BLD VENIPUNCTURE: CPT

## 2022-02-21 PROCEDURE — 85025 COMPLETE CBC W/AUTO DIFF WBC: CPT | Performed by: PHYSICIAN ASSISTANT

## 2022-02-21 PROCEDURE — 80053 COMPREHEN METABOLIC PANEL: CPT | Performed by: PHYSICIAN ASSISTANT

## 2022-02-21 PROCEDURE — 99282 EMERGENCY DEPT VISIT SF MDM: CPT

## 2022-02-21 RX ORDER — ONDANSETRON 4 MG/1
4 TABLET, ORALLY DISINTEGRATING ORAL EVERY 8 HOURS PRN
Qty: 20 TABLET | Refills: 0 | Status: SHIPPED | OUTPATIENT
Start: 2022-02-21

## 2022-02-21 RX ORDER — DOXYCYCLINE 100 MG/1
100 CAPSULE ORAL 2 TIMES DAILY
Qty: 20 CAPSULE | Refills: 0 | Status: SHIPPED | OUTPATIENT
Start: 2022-02-21

## 2022-02-22 NOTE — ED NOTES
MEDICAL SCREENING:    Reason for Visit: LLQ pain, pt has hx if vulvar cancer with vulvectomy in 2021     Patient initially seen in triage.  The patient was advised further evaluation and diagnostic testing will be needed, some of the treatment and testing will be initiated in the lobby in order to begin the process.  The patient will be returned to the waiting area for the time being and possibly be re-assessed by a subsequent ED provider.  The patient will be brought back to the treatment area in as timely manner as possible.       Mari Gramajo PA  02/21/22 2007

## 2022-02-22 NOTE — ED PROVIDER NOTES
Subjective   76-year-old female with a past medical history of valvular cancer resents to the ER due to concerns for left lower quadrant abdominal pain.  Patient had symptoms had resolved by the time she arrived to the ER.  However, patient's family member noted that she normally does not call out for concerns for pain.  Patient denies fever or chills.  Denied chest pain or shortness of breath.  Denied nausea vomiting.  Patient is currently being evaluated for a left pelvic mass that is constricting the left ureter.  Patient has a scheduled urology appointment with [facility] within the next week.  Denied obvious aggravating or alleviating factors.  Vitals stable          Review of Systems   Gastrointestinal: Positive for abdominal pain.   All other systems reviewed and are negative.      Past Medical History:   Diagnosis Date   • Arthritis    • COPD (chronic obstructive pulmonary disease) (HCC)    • Elevated cholesterol    • History of transfusion    • Hypertension    • Vulval ca (HCC)        Allergies   Allergen Reactions   • Penicillins Swelling       Past Surgical History:   Procedure Laterality Date   • RADICAL VULVECTOMY  09/06/2019   • RADICAL VULVECTOMY Bilateral 06/2019       Family History   Problem Relation Age of Onset   • Alzheimer's disease Mother    • Cancer Father    • Cancer Brother    • Diabetes Maternal Grandmother        Social History     Socioeconomic History   • Marital status:    Tobacco Use   • Smoking status: Former Smoker   • Smokeless tobacco: Never Used   Vaping Use   • Vaping Use: Never used   Substance and Sexual Activity   • Alcohol use: No   • Drug use: No   • Sexual activity: Defer           Objective   Physical Exam  Constitutional:       General: She is not in acute distress.     Appearance: Normal appearance. She is not ill-appearing.   HENT:      Head: Normocephalic and atraumatic.      Right Ear: External ear normal.      Left Ear: External ear normal.       Nose: Nose normal.      Mouth/Throat:      Mouth: Mucous membranes are moist.   Eyes:      Extraocular Movements: Extraocular movements intact.      Pupils: Pupils are equal, round, and reactive to light.   Cardiovascular:      Rate and Rhythm: Normal rate and regular rhythm.      Heart sounds: No murmur heard.      Pulmonary:      Effort: Pulmonary effort is normal. No respiratory distress.      Breath sounds: Normal breath sounds. No wheezing.   Abdominal:      General: Bowel sounds are normal.      Palpations: Abdomen is soft.      Tenderness: There is no abdominal tenderness.   Musculoskeletal:         General: No deformity or signs of injury. Normal range of motion.      Cervical back: Normal range of motion and neck supple.   Skin:     General: Skin is warm and dry.      Findings: No erythema.   Neurological:      General: No focal deficit present.      Mental Status: She is alert and oriented to person, place, and time. Mental status is at baseline.      Cranial Nerves: No cranial nerve deficit.   Psychiatric:         Mood and Affect: Mood normal.         Behavior: Behavior normal.         Thought Content: Thought content normal.         Procedures           ED Course  ED Course as of 02/21/22 2326   Mon Feb 21, 2022   2323 CBC and CCP unremarkable.  Urinalysis unable be collected due to patient missing the urine sample cup.  CT the abdomen pelvis noted concerns for pelvic mass that has similar findings from multiple previous studies.  There is also concerns for multiple nodules noted within the pelvis.  Hydronephrosis and hydroureter noted within the left kidney and ureter.  Creatinine status remained stable at this point time.  Patient does have a long-term pain medicine regimen by her oncologist but she abstains from this medication and attempt to treat her symptoms with Tylenol.  I discussed the appropriate use of pain medication for long-term cancer patients with this patient thoroughly.  No immediate  concerns for intervention at this time.  I will send the patient home with p.o. antibiotics for concerns for possible UTI due to urinary retention given the CT findings.  Work up and results were discussed throughly with the patient.  The patient will be discharged for further monitoring and management with their PCP.  Red flags, warning signs, worsening symptoms, and when to return to the ER discussed with and understood by the patient.  Patient will follow up with their PCP in a timely manner.  Vitals stable at discharge.  Also, patient confirmed a follow-up appointment with [facility] urology within the next 5 days [SF]      ED Course User Index  [SF] Patrick Robledo DO                                                 MDM    Final diagnoses:   Abdominal pain, unspecified abdominal location   Hydronephrosis with ureteropelvic junction (UPJ) obstruction   Urinary tract infection in female       ED Disposition  ED Disposition     ED Disposition Condition Comment    Discharge Stable           Adriana Loya, BERTHA  [address]  [address]  [place] [address]  [phone]    In 1 week      [facility] Emergency Department  [address] [place] [id number] [phone]    If symptoms worsen         Medication List      New Prescriptions    doxycycline 100 MG capsule  Commonly known as: MONODOX  Take 1 capsule by mouth 2 (Two) Times a Day.           Where to Get Your Medications      These medications were sent to [facility] [address] [place], [place] [address] - [address] [facility] [address] - [phone]  - [phone] FX  [address] [facility] [address]    Phone: [phone]   · doxycycline 100 MG capsule          Patrick Robledo DO  02/21/22 9224

## 2022-03-15 ENCOUNTER — APPOINTMENT (OUTPATIENT)
Dept: MRI IMAGING | Facility: HOSPITAL | Age: 77
End: 2022-03-15

## 2022-03-15 ENCOUNTER — HOSPITAL ENCOUNTER (EMERGENCY)
Facility: HOSPITAL | Age: 77
Discharge: ANOTHER HOSPITAL | End: 2022-03-15
Attending: EMERGENCY MEDICINE | Admitting: EMERGENCY MEDICINE

## 2022-03-15 ENCOUNTER — APPOINTMENT (OUTPATIENT)
Dept: CT IMAGING | Facility: HOSPITAL | Age: 77
End: 2022-03-15

## 2022-03-15 ENCOUNTER — APPOINTMENT (OUTPATIENT)
Dept: GENERAL RADIOLOGY | Facility: HOSPITAL | Age: 77
End: 2022-03-15

## 2022-03-15 VITALS
BODY MASS INDEX: 28.93 KG/M2 | OXYGEN SATURATION: 98 % | SYSTOLIC BLOOD PRESSURE: 126 MMHG | RESPIRATION RATE: 15 BRPM | DIASTOLIC BLOOD PRESSURE: 48 MMHG | HEART RATE: 71 BPM | HEIGHT: 66 IN | TEMPERATURE: 98.6 F | WEIGHT: 180 LBS

## 2022-03-15 DIAGNOSIS — K66.8 PNEUMOPERITONEUM: Primary | ICD-10-CM

## 2022-03-15 LAB
ALBUMIN SERPL-MCNC: 3.25 G/DL (ref 3.5–5.2)
ALBUMIN/GLOB SERPL: 0.9 G/DL
ALP SERPL-CCNC: 81 U/L (ref 39–117)
ALT SERPL W P-5'-P-CCNC: 9 U/L (ref 1–33)
AMMONIA BLD-SCNC: <10 UMOL/L (ref 11–51)
ANION GAP SERPL CALCULATED.3IONS-SCNC: 14.1 MMOL/L (ref 5–15)
ANISOCYTOSIS BLD QL: ABNORMAL
AST SERPL-CCNC: 8 U/L (ref 1–32)
BACTERIA UR QL AUTO: ABNORMAL /HPF
BILIRUB SERPL-MCNC: 0.4 MG/DL (ref 0–1.2)
BILIRUB UR QL STRIP: NEGATIVE
BUN SERPL-MCNC: 48 MG/DL (ref 8–23)
BUN/CREAT SERPL: 26.8 (ref 7–25)
CALCIUM SPEC-SCNC: 9.2 MG/DL (ref 8.6–10.5)
CHLORIDE SERPL-SCNC: 95 MMOL/L (ref 98–107)
CLARITY UR: ABNORMAL
CO2 SERPL-SCNC: 20.9 MMOL/L (ref 22–29)
COLOR UR: ABNORMAL
CREAT SERPL-MCNC: 1.79 MG/DL (ref 0.57–1)
CRP SERPL-MCNC: 44.31 MG/DL (ref 0–0.5)
D-LACTATE SERPL-SCNC: 0.9 MMOL/L (ref 0.5–2)
DEPRECATED RDW RBC AUTO: 50.1 FL (ref 37–54)
DOHLE BODIES: PRESENT
EGFRCR SERPLBLD CKD-EPI 2021: 29.1 ML/MIN/1.73
ERYTHROCYTE [DISTWIDTH] IN BLOOD BY AUTOMATED COUNT: 16 % (ref 12.3–15.4)
FLUAV SUBTYP SPEC NAA+PROBE: NOT DETECTED
FLUBV RNA ISLT QL NAA+PROBE: NOT DETECTED
GLOBULIN UR ELPH-MCNC: 3.6 GM/DL
GLUCOSE BLDC GLUCOMTR-MCNC: 126 MG/DL (ref 70–130)
GLUCOSE SERPL-MCNC: 154 MG/DL (ref 65–99)
GLUCOSE UR STRIP-MCNC: NEGATIVE MG/DL
GRAN CASTS URNS QL MICRO: ABNORMAL /LPF
HCT VFR BLD AUTO: 26.4 % (ref 34–46.6)
HGB BLD-MCNC: 8.4 G/DL (ref 12–15.9)
HGB UR QL STRIP.AUTO: ABNORMAL
HOLD SPECIMEN: NORMAL
HOLD SPECIMEN: NORMAL
HYALINE CASTS UR QL AUTO: ABNORMAL /LPF
HYPOCHROMIA BLD QL: ABNORMAL
KETONES UR QL STRIP: ABNORMAL
LEUKOCYTE ESTERASE UR QL STRIP.AUTO: ABNORMAL
LIPASE SERPL-CCNC: 16 U/L (ref 13–60)
LYMPHOCYTES # BLD MANUAL: 0.3 10*3/MM3 (ref 0.7–3.1)
LYMPHOCYTES NFR BLD MANUAL: 3 % (ref 5–12)
MAGNESIUM SERPL-MCNC: 2.2 MG/DL (ref 1.6–2.4)
MCH RBC QN AUTO: 27 PG (ref 26.6–33)
MCHC RBC AUTO-ENTMCNC: 31.8 G/DL (ref 31.5–35.7)
MCV RBC AUTO: 84.9 FL (ref 79–97)
METAMYELOCYTES NFR BLD MANUAL: 1 % (ref 0–0)
MONOCYTES # BLD: 0.18 10*3/MM3 (ref 0.1–0.9)
NEUTROPHILS # BLD AUTO: 5.41 10*3/MM3 (ref 1.7–7)
NEUTROPHILS NFR BLD MANUAL: 76 % (ref 42.7–76)
NEUTS BAND NFR BLD MANUAL: 15 % (ref 0–5)
NITRITE UR QL STRIP: NEGATIVE
NT-PROBNP SERPL-MCNC: 1900 PG/ML (ref 0–1800)
PH UR STRIP.AUTO: <=5 [PH] (ref 5–8)
PLAT MORPH BLD: NORMAL
PLATELET # BLD AUTO: 119 10*3/MM3 (ref 140–450)
PMV BLD AUTO: 9.5 FL (ref 6–12)
POTASSIUM SERPL-SCNC: 3.5 MMOL/L (ref 3.5–5.2)
PROT SERPL-MCNC: 6.8 G/DL (ref 6–8.5)
PROT UR QL STRIP: ABNORMAL
QT INTERVAL: 418 MS
QTC INTERVAL: 427 MS
RBC # BLD AUTO: 3.11 10*6/MM3 (ref 3.77–5.28)
RBC # UR STRIP: ABNORMAL /HPF
REF LAB TEST METHOD: ABNORMAL
SARS-COV-2 RNA PNL SPEC NAA+PROBE: NOT DETECTED
SMUDGE CELLS BLD QL SMEAR: ABNORMAL
SODIUM SERPL-SCNC: 130 MMOL/L (ref 136–145)
SP GR UR STRIP: 1.02 (ref 1–1.03)
SQUAMOUS #/AREA URNS HPF: ABNORMAL /HPF
TROPONIN T SERPL-MCNC: <0.01 NG/ML (ref 0–0.03)
TSH SERPL DL<=0.05 MIU/L-ACNC: 2.62 UIU/ML (ref 0.27–4.2)
UROBILINOGEN UR QL STRIP: ABNORMAL
VARIANT LYMPHS NFR BLD MANUAL: 5 % (ref 19.6–45.3)
WBC # UR STRIP: ABNORMAL /HPF
WBC NRBC COR # BLD: 5.95 10*3/MM3 (ref 3.4–10.8)
WHOLE BLOOD HOLD SPECIMEN: NORMAL
WHOLE BLOOD HOLD SPECIMEN: NORMAL

## 2022-03-15 PROCEDURE — 71250 CT THORAX DX C-: CPT

## 2022-03-15 PROCEDURE — 71250 CT THORAX DX C-: CPT | Performed by: RADIOLOGY

## 2022-03-15 PROCEDURE — 82140 ASSAY OF AMMONIA: CPT | Performed by: EMERGENCY MEDICINE

## 2022-03-15 PROCEDURE — 83735 ASSAY OF MAGNESIUM: CPT | Performed by: EMERGENCY MEDICINE

## 2022-03-15 PROCEDURE — 83880 ASSAY OF NATRIURETIC PEPTIDE: CPT | Performed by: EMERGENCY MEDICINE

## 2022-03-15 PROCEDURE — 70450 CT HEAD/BRAIN W/O DYE: CPT | Performed by: RADIOLOGY

## 2022-03-15 PROCEDURE — 74176 CT ABD & PELVIS W/O CONTRAST: CPT

## 2022-03-15 PROCEDURE — 96365 THER/PROPH/DIAG IV INF INIT: CPT

## 2022-03-15 PROCEDURE — 99283 EMERGENCY DEPT VISIT LOW MDM: CPT | Performed by: SURGERY

## 2022-03-15 PROCEDURE — 80053 COMPREHEN METABOLIC PANEL: CPT | Performed by: EMERGENCY MEDICINE

## 2022-03-15 PROCEDURE — 87636 SARSCOV2 & INF A&B AMP PRB: CPT | Performed by: EMERGENCY MEDICINE

## 2022-03-15 PROCEDURE — 85025 COMPLETE CBC W/AUTO DIFF WBC: CPT | Performed by: EMERGENCY MEDICINE

## 2022-03-15 PROCEDURE — 70450 CT HEAD/BRAIN W/O DYE: CPT

## 2022-03-15 PROCEDURE — 85007 BL SMEAR W/DIFF WBC COUNT: CPT | Performed by: EMERGENCY MEDICINE

## 2022-03-15 PROCEDURE — 70551 MRI BRAIN STEM W/O DYE: CPT | Performed by: RADIOLOGY

## 2022-03-15 PROCEDURE — 83605 ASSAY OF LACTIC ACID: CPT | Performed by: EMERGENCY MEDICINE

## 2022-03-15 PROCEDURE — 84443 ASSAY THYROID STIM HORMONE: CPT | Performed by: EMERGENCY MEDICINE

## 2022-03-15 PROCEDURE — 71045 X-RAY EXAM CHEST 1 VIEW: CPT

## 2022-03-15 PROCEDURE — 70551 MRI BRAIN STEM W/O DYE: CPT

## 2022-03-15 PROCEDURE — 82962 GLUCOSE BLOOD TEST: CPT

## 2022-03-15 PROCEDURE — 74176 CT ABD & PELVIS W/O CONTRAST: CPT | Performed by: RADIOLOGY

## 2022-03-15 PROCEDURE — 87040 BLOOD CULTURE FOR BACTERIA: CPT | Performed by: EMERGENCY MEDICINE

## 2022-03-15 PROCEDURE — 84484 ASSAY OF TROPONIN QUANT: CPT | Performed by: EMERGENCY MEDICINE

## 2022-03-15 PROCEDURE — 86140 C-REACTIVE PROTEIN: CPT | Performed by: EMERGENCY MEDICINE

## 2022-03-15 PROCEDURE — 99284 EMERGENCY DEPT VISIT MOD MDM: CPT

## 2022-03-15 PROCEDURE — 81001 URINALYSIS AUTO W/SCOPE: CPT | Performed by: EMERGENCY MEDICINE

## 2022-03-15 PROCEDURE — 93005 ELECTROCARDIOGRAM TRACING: CPT | Performed by: EMERGENCY MEDICINE

## 2022-03-15 PROCEDURE — 83690 ASSAY OF LIPASE: CPT | Performed by: EMERGENCY MEDICINE

## 2022-03-15 PROCEDURE — 96367 TX/PROPH/DG ADDL SEQ IV INF: CPT

## 2022-03-15 PROCEDURE — 25010000002 VANCOMYCIN 5 G RECONSTITUTED SOLUTION

## 2022-03-15 PROCEDURE — 87186 SC STD MICRODIL/AGAR DIL: CPT | Performed by: EMERGENCY MEDICINE

## 2022-03-15 PROCEDURE — 87077 CULTURE AEROBIC IDENTIFY: CPT | Performed by: EMERGENCY MEDICINE

## 2022-03-15 PROCEDURE — 87086 URINE CULTURE/COLONY COUNT: CPT | Performed by: EMERGENCY MEDICINE

## 2022-03-15 PROCEDURE — 71045 X-RAY EXAM CHEST 1 VIEW: CPT | Performed by: RADIOLOGY

## 2022-03-15 PROCEDURE — 96361 HYDRATE IV INFUSION ADD-ON: CPT

## 2022-03-15 RX ORDER — SODIUM CHLORIDE 9 MG/ML
125 INJECTION, SOLUTION INTRAVENOUS CONTINUOUS
Status: DISCONTINUED | OUTPATIENT
Start: 2022-03-15 | End: 2022-03-15 | Stop reason: HOSPADM

## 2022-03-15 RX ORDER — SODIUM CHLORIDE 0.9 % (FLUSH) 0.9 %
10 SYRINGE (ML) INJECTION AS NEEDED
Status: DISCONTINUED | OUTPATIENT
Start: 2022-03-15 | End: 2022-03-15 | Stop reason: HOSPADM

## 2022-03-15 RX ADMIN — SODIUM CHLORIDE 125 ML/HR: 900 INJECTION INTRAVENOUS at 13:23

## 2022-03-15 RX ADMIN — AZTREONAM 2 G: 2 INJECTION, POWDER, LYOPHILIZED, FOR SOLUTION INTRAMUSCULAR; INTRAVENOUS at 11:47

## 2022-03-15 RX ADMIN — Medication 1750 MG: at 13:22

## 2022-03-15 RX ADMIN — SODIUM CHLORIDE 2448 ML: 9 INJECTION, SOLUTION INTRAVENOUS at 10:27

## 2022-03-15 NOTE — ED PROVIDER NOTES
Subjective   76-year-old female presents by EMS for chief complaint of altered mental status.  Patient denies visual disturbance, any speech difficulties, any focal numbness or weakness.  She complains of generalized abdominal pain with nausea and vomiting, states that this has been the case intermittently over the past 2 to 3 weeks.  The daughter arrived soon after the patient, advises that the patient had her first chemotherapy treatment for recurrent vulvar cancer last Tuesday, and was scheduled to have her second treatment today.  She states that the patient had been doing well, states that Thursday she was up and ambulating without difficulty, even able to mop some floors.  Then yesterday she developed a decreased appetite after lunch, since that time has not been eating or drinking, and upon awakening this morning was noted to have a decreased mental status and to have some diaphoresis.  Patient reports generalized abdominal pain, nausea, vomiting, generalized weakness.  Patient denies fever, chills, headache, neck pain, chest pain, shortness of breath, hematemesis, hematochezia or melena, focal numbness or weakness, other symptoms or other complaints.  She states that she is feeling better than she was at home this morning.  CT of the abdomen and pelvis from February showed marked left hydronephrosis with a pelvic mass.  Patient has since undergone left ureteral stent placement at the [facility].  CT of the chest from January showed multiple nodules concerning for metastatic disease.          Review of Systems   All other systems reviewed and are negative.      Past Medical History:   Diagnosis Date   • Arthritis    • COPD (chronic obstructive pulmonary disease) (HCC)    • Elevated cholesterol    • History of transfusion    • Hypertension    • Vulval ca (HCC)        Allergies   Allergen Reactions   • Penicillins Swelling       Past Surgical History:   Procedure Laterality Date   • RADICAL  VULVECTOMY  09/06/2019   • RADICAL VULVECTOMY Bilateral 06/2019       Family History   Problem Relation Age of Onset   • Alzheimer's disease Mother    • Cancer Father    • Cancer Brother    • Diabetes Maternal Grandmother        Social History     Socioeconomic History   • Marital status:    Tobacco Use   • Smoking status: Former Smoker   • Smokeless tobacco: Never Used   Vaping Use   • Vaping Use: Never used   Substance and Sexual Activity   • Alcohol use: No   • Drug use: No   • Sexual activity: Defer           Objective   Physical Exam  Vitals and nursing note reviewed.   Constitutional:       General: She is not in acute distress.     Appearance: Normal appearance. She is well-developed. She is diaphoretic.      Comments: Elderly female, well-developed well-nourished.  Initially she is somewhat somnolent but she awakens to voice.  She is oriented to person, place, situation but not to time.  Skin is somewhat cool and mildly diaphoretic.   HENT:      Head: Normocephalic and atraumatic.   Eyes:      General: No scleral icterus.     Pupils: Pupils are equal, round, and reactive to light.   Neck:      Trachea: No tracheal deviation.   Cardiovascular:      Rate and Rhythm: Normal rate and regular rhythm.   Pulmonary:      Effort: Pulmonary effort is normal. No respiratory distress.      Breath sounds: Normal breath sounds.   Chest:      Chest wall: No tenderness.   Abdominal:      Palpations: Abdomen is soft.      Tenderness: There is abdominal tenderness (Moderate generalized tenderness without more focal findings.  No guarding, no rebound.). There is no guarding or rebound.   Musculoskeletal:         General: No tenderness. Normal range of motion.      Cervical back: Normal range of motion and neck supple. No rigidity or tenderness.      Right lower leg: No edema.      Left lower leg: No edema.   Skin:     General: Skin is warm.      Capillary Refill: Capillary refill takes less than 2 seconds.       Coloration: Skin is not pale.   Neurological:      General: No focal deficit present.      Motor: No abnormal muscle tone.      Comments: Initially somnolent but arouses to voice and converses.  No focal neurological deficits are appreciated.   Psychiatric:         Behavior: Behavior normal.         Procedures  EKG at 1040 shows sinus rhythm, rate 63.  VA interval 154, QRS duration 82, QTc 427 ms.  No apparent acute ischemia.  No evidence for STEMI.  CT Abdomen Pelvis Without Contrast   Final Result       1. Pneumoperitoneum       2.Colonic wall thickening in the right colon and sigmoid colon. Suggest   direct visualization.   Small volume ascites   3. Left-sided double-J stent has been placed in the interim.       Results were relayed to the referring physician at 1:34 PM.       3. moderate to large volume stool                   This report was finalized on 3/15/2022 1:38 PM by Dr. Joel Taylor MD.          CT Chest Without Contrast Diagnostic   Final Result       1. Too numerous to count parenchymal nodules in both lungs concerning   for metastatic disease   2. Small pericardial effusion   3. Pneumoperitoneum               This report was finalized on 3/15/2022 1:30 PM by Dr. Joel Taylor MD.          MRI Brain Without Contrast   Final Result       1. No evidence of an acute ischemic event   2. Cerebral atrophy   3. No parenchymal mass hemorrhage or midline shift       This report was finalized on 3/15/2022 12:55 PM by Dr. Joel Taylor MD.          CT Head Without Contrast   Final Result       1. Little overall change   2. No acute parenchymal mass, hemorrhage, or midline shift       This report was finalized on 3/15/2022 10:37 AM by Dr. Joel Taylor MD.          XR Chest 1 View   Final Result   No evidence of active or acute cardiopulmonary disease on today's chest   radiograph.       This report was finalized on 3/15/2022 10:29 AM by Dr. Joel Taylor MD.            Results for orders placed or performed  during the hospital encounter of 03/15/22   COVID-19 and FLU A/B PCR - Swab, Nasopharynx    Specimen: Nasopharynx; Swab   Result Value Ref Range    COVID19 Not Detected Not Detected - Ref. Range    Influenza A PCR Not Detected Not Detected    Influenza B PCR Not Detected Not Detected   Lactic Acid, Plasma    Specimen: Arm, Right; Blood   Result Value Ref Range    Lactate 0.9 0.5 - 2.0 mmol/L   Ammonia    Specimen: Arm, Right; Blood   Result Value Ref Range    Ammonia <10 (L) 11 - 51 umol/L   Urinalysis With Culture If Indicated - Urine, Catheter    Specimen: Urine, Catheter   Result Value Ref Range    Color, UA Dark Yellow (A) Yellow, Straw    Appearance, UA Turbid (A) Clear    pH, UA <=5.0 5.0 - 8.0    Specific Gravity, UA 1.017 1.005 - 1.030    Glucose, UA Negative Negative    Ketones, UA Trace (A) Negative    Bilirubin, UA Negative Negative    Blood, UA Large (3+) (A) Negative    Protein, UA >=300 mg/dL (3+) (A) Negative    Leuk Esterase, UA Moderate (2+) (A) Negative    Nitrite, UA Negative Negative    Urobilinogen, UA 1.0 E.U./dL 0.2 - 1.0 E.U./dL   Comprehensive Metabolic Panel    Specimen: Arm, Right; Blood   Result Value Ref Range    Glucose 154 (H) 65 - 99 mg/dL    BUN 48 (H) 8 - 23 mg/dL    Creatinine 1.79 (H) 0.57 - 1.00 mg/dL    Sodium 130 (L) 136 - 145 mmol/L    Potassium 3.5 3.5 - 5.2 mmol/L    Chloride 95 (L) 98 - 107 mmol/L    CO2 20.9 (L) 22.0 - 29.0 mmol/L    Calcium 9.2 8.6 - 10.5 mg/dL    Total Protein 6.8 6.0 - 8.5 g/dL    Albumin 3.25 (L) 3.50 - 5.20 g/dL    ALT (SGPT) 9 1 - 33 U/L    AST (SGOT) 8 1 - 32 U/L    Alkaline Phosphatase 81 39 - 117 U/L    Total Bilirubin 0.4 0.0 - 1.2 mg/dL    Globulin 3.6 gm/dL    A/G Ratio 0.9 g/dL    BUN/Creatinine Ratio 26.8 (H) 7.0 - 25.0    Anion Gap 14.1 5.0 - 15.0 mmol/L    eGFR 29.1 (L) >60.0 mL/min/1.73   Lipase    Specimen: Arm, Right; Blood   Result Value Ref Range    Lipase 16 13 - 60 U/L   BNP    Specimen: Arm, Right; Blood   Result Value Ref Range     proBNP 1,900.0 (H) 0.0 - 1,800.0 pg/mL   Troponin    Specimen: Arm, Right; Blood   Result Value Ref Range    Troponin T <0.010 0.000 - 0.030 ng/mL   C-reactive Protein    Specimen: Arm, Right; Blood   Result Value Ref Range    C-Reactive Protein 44.31 (H) 0.00 - 0.50 mg/dL   TSH    Specimen: Arm, Right; Blood   Result Value Ref Range    TSH 2.620 0.270 - 4.200 uIU/mL   Magnesium    Specimen: Arm, Right; Blood   Result Value Ref Range    Magnesium 2.2 1.6 - 2.4 mg/dL   CBC Auto Differential    Specimen: Arm, Right; Blood   Result Value Ref Range    WBC 5.95 3.40 - 10.80 10*3/mm3    RBC 3.11 (L) 3.77 - 5.28 10*6/mm3    Hemoglobin 8.4 (L) 12.0 - 15.9 g/dL    Hematocrit 26.4 (L) 34.0 - 46.6 %    MCV 84.9 79.0 - 97.0 fL    MCH 27.0 26.6 - 33.0 pg    MCHC 31.8 31.5 - 35.7 g/dL    RDW 16.0 (H) 12.3 - 15.4 %    RDW-SD 50.1 37.0 - 54.0 fl    MPV 9.5 6.0 - 12.0 fL    Platelets 119 (L) 140 - 450 10*3/mm3   Urinalysis, Microscopic Only - Urine, Catheter    Specimen: Urine, Catheter   Result Value Ref Range    RBC, UA 31-50 (A) None Seen, 0-2 /HPF    WBC, UA Too Numerous to Count (A) None Seen, 0-2 /HPF    Bacteria, UA 4+ (A) None Seen /HPF    Squamous Epithelial Cells, UA 3-6 (A) None Seen, 0-2 /HPF    Hyaline Casts, UA None Seen None Seen /LPF    Granular Casts, UA 0-2 None Seen /LPF    Methodology Manual Light Microscopy    Manual Differential    Specimen: Arm, Right; Blood   Result Value Ref Range    Neutrophil % 76.0 42.7 - 76.0 %    Lymphocyte % 5.0 (L) 19.6 - 45.3 %    Monocyte % 3.0 (L) 5.0 - 12.0 %    Bands %  15.0 (H) 0.0 - 5.0 %    Metamyelocyte % 1.0 (H) 0.0 - 0.0 %    Neutrophils Absolute 5.41 1.70 - 7.00 10*3/mm3    Lymphocytes Absolute 0.30 (L) 0.70 - 3.10 10*3/mm3    Monocytes Absolute 0.18 0.10 - 0.90 10*3/mm3    Anisocytosis Slight/1+ None Seen    Hypochromia Slight/1+ None Seen    Dohle Bodies Present None Seen    Smudge Cells Mod/2+ None Seen    Platelet Morphology Normal Normal   POC Glucose Once     Specimen: Blood   Result Value Ref Range    Glucose 126 70 - 130 mg/dL   Green Top (Gel)   Result Value Ref Range    Extra Tube Hold for add-ons.    Lavender Top   Result Value Ref Range    Extra Tube hold for add-on    Gold Top - SST   Result Value Ref Range    Extra Tube Hold for add-ons.    Light Blue Top   Result Value Ref Range    Extra Tube hold for add-on                 ED Course  ED Course as of 03/15/22 1628   Tue Mar 15, 2022   1155 Chemistries noted, creatinine 1.79, GFR 29, unable to give IV contrast. [CM]   1445 I discussed the case with our general surgeon Dr. Gan, who reviewed the patient's imaging, saw the patient and spoke with her and her daughter.  He recommends a palliative care consult.  He advises that if the patient does want to have an operation for this pneumoperitoneum she will have to go to [facility] as it would be too complicated to do here.  Daughter wished for me to speak with Gyn/Onc [facility].  I discussed the case with Dr. Lima, who agrees with palliative care, states that if the patient really wishes to pursue this surgery they will accept the patient in transfer and operate on her, but he advises that this will not do anything to address her cancer and likely will not extend her life by much.  Dr. Lima spoke with the patient's daughter by phone, daughter seems to be agreeable with palliative care but states that her mother is in denial about this cancer, she and her brother need to speak further to the patient about it before coming to a final decision.  Patient is much more alert now, warm and dry, blood pressure much improved, voicing that she is feeling much better. [CM]   3941 Daughter has been discussing the situation at length with her brother and her mother the patient.  The brother and the patient are adamant that they want her to have the surgery.  Daughter does not feel that this is the right thing to do, but she has been overruled by the brother and the patient.  We are  calling  back. [CM]   1606 Discussed once again with Dr. Pacheco,  transfer physician.  He accepts patient in transfer to the [place] ED.  We are calling for helicopter transport. [CM]   0825 [facility] here for transport. [CM]      ED Course User Index  [CM] Colin Allen MD                                                 [facility]    Final diagnoses:   Pneumoperitoneum       ED Disposition  ED Disposition     ED Disposition   Transfer to Another Facility     Condition   --    Comment   --             Please note that portions of this note were completed with a voice recognition program.            Colin Allen MD  03/15/22 7310

## 2022-03-15 NOTE — CONSULTS
Patient Name:  Orquidea Rosenberg  YOB: 1945  [id number]       Patient Care Team:  Adriana Loya APRN as PCP - General (Nurse Practitioner)      General Surgery Consult Note     Date of Consultation: 03/15/22    Consulting Physician - Colin Allen MD    Reason for Consult -abdominal pain, pneumoperitoneum    Subjective     I have been asked to see  Orquidea Rosenberg , a 76 y.o. female in consultation for pneumoperitoneum.  The patient's had an extensive history of poorly differentiated vulvar cancer, initially stage IIIb that was treated with neoadjuvant radiation followed by radical vulvectomy, followed by adjuvant chemotherapy.  She has had multiple surgeries since then and had been started on chemotherapy early last year.  She was noted to have progressive hydronephrosis and had a recent ureteral stent placed several weeks ago.  She reports development of lower abdominal pain associated with nausea and vomiting over the past several days that has progressively worsened.  She has never had this before.    General surgery was consulted for evaluation.  Upon my evaluation, the patient was in no acute distress, vital signs overall normal.  Abdomen mildly distended and exquisitely tender to palpation in the lower abdomen.      Allergy:   Allergies   Allergen Reactions   • Penicillins Swelling       Medications:  aztreonam, 2 g, Intravenous, Q8H  [START ON 3/16/2022] vancomycin, 750 mg, Intravenous, Q24H      Pharmacy Consult - Pharmacy to dose,   Pharmacy Consult - Pharmacy to dose,   sodium chloride, 125 mL/hr, Last Rate: 125 mL/hr (03/15/22 1323)      No current facility-administered medications on file prior to encounter.     Current Outpatient Medications on File Prior to Encounter   Medication Sig   • ascorbic acid (VITAMIN C) 100 MG tablet Take  by mouth Daily.   • Calcium Carbonate 1500 (600 Ca) MG tablet Take  by mouth Daily.   • doxycycline (MONODOX) 100 MG capsule Take 1 capsule by mouth 2 (Two)  "Times a Day.   • loratadine (CLARITIN REDITABS) 10 MG disintegrating tablet Take 10 mg by mouth Daily.   • losartan (COZAAR) 50 MG tablet Take 2 tablets by mouth Daily.   • multivitamin (THERAGRAN) tablet tablet Take 1 tablet by mouth Daily.   • ondansetron ODT (ZOFRAN-ODT) 4 MG disintegrating tablet Place 1 tablet on the tongue Every 8 (Eight) Hours As Needed for Nausea.   • VITAMIN D PO Take  by mouth Daily.       PMHx:   Past Medical History:   Diagnosis Date   • Arthritis    • COPD (chronic obstructive pulmonary disease) (HCC)    • Elevated cholesterol    • History of transfusion    • Hypertension    • Vulval ca (HCC)          Past Surgical History:  Radical vulvectomy  Multiple debridements and flaps      Family History: Denies    Social History:   Former smoker     Review of Systems           Abdominal/Gastrointestinal: Reports abdominal pain, nausea and vomiting                Objective     Physical Exam:      Vital Signs  /70   Pulse 77   Temp 98.6 °F (37 °C) (Rectal)   Resp 18   Ht 167.6 cm (66\")   Wt 81.6 kg (180 lb)   SpO2 100%   BMI 29.05 kg/m²     Intake/Output Summary (Last 24 hours) at 3/15/2022 1538  Last data filed at 3/15/2022 1536  Gross per 24 hour   Intake 3048 ml   Output --   Net 3048 ml         Physical Exam:      03/15/22  0949   Weight: 81.6 kg (180 lb)    Body mass index is 29.05 kg/m².  Constitution: No acute distress  Head: Normocephalic, atraumatic.   Eyes: Aligned without strabismus. Conjunctiva noninjected   Respiratory: Symmetric chest expansion. No respiratory distress.   Gastrointestinal:  soft, mildly distended, exquisitely tender to palpation lower abdomen  Neurologic: No gross deficits.  Alert and oriented x3  Psychiatric: Normal mood        Results Review: I have personally reviewed all of the recent lab and imaging results available at this time.     Lab Results (last 24 hours)     Procedure Component Value Units Date/Time    Urinalysis With Culture If Indicated - " Urine, Catheter [[id number]]  (Abnormal) Collected: 03/15/22 1008    Specimen: Urine, Catheter Updated: 03/15/22 1056     Color, UA Dark Yellow     Appearance, UA Turbid     pH, UA <=5.0     Specific Gravity, UA 1.017     Glucose, UA Negative     Ketones, UA Trace     Bilirubin, UA Negative     Blood, UA Large (3+)     Protein, UA >=300 mg/dL (3+)     Leuk Esterase, UA Moderate (2+)     Nitrite, UA Negative     Urobilinogen, UA 1.0 E.U./dL    Urinalysis, Microscopic Only - Urine, Catheter [[id number]]  (Abnormal) Collected: 03/15/22 1008    Specimen: Urine, Catheter Updated: 03/15/22 1056     RBC, UA 31-50 /HPF      WBC, UA Too Numerous to Count /HPF      Bacteria, UA 4+ /HPF      Squamous Epithelial Cells, UA 3-6 /HPF      Hyaline Casts, UA None Seen /LPF      Granular Casts, UA 0-2 /LPF      Methodology Manual Light Microscopy    Urine Culture - Urine, Urine, Catheter [[id number]] Collected: 03/15/22 1008    Specimen: Urine, Catheter Updated: 03/15/22 1056    POC Glucose Once [[id number]]  (Normal) Collected: 03/15/22 1009    Specimen: Blood Updated: 03/15/22 1014     Glucose 126 mg/dL      Comment: Meter: [id number] : [id number] GIULIANO KELLY       Blood Culture - Blood, Arm, Right [[id number]] Collected: 03/15/22 1048    Specimen: Blood from Arm, Right Updated: 03/15/22 1059    Blood Culture - Blood, Arm, Right [[id number]] Collected: 03/15/22 1053    Specimen: Blood from Arm, Right Updated: 03/15/22 1059    Lactic Acid, Plasma [[id number]]  (Normal) Collected: 03/15/22 1053    Specimen: Blood from Arm, Right Updated: 03/15/22 1124     Lactate 0.9 mmol/L     Ammonia [[id number]]  (Abnormal) Collected: 03/15/22 1053    Specimen: Blood from Arm, Right Updated: 03/15/22 1127     Ammonia <10 umol/L     CBC & Differential [[id number]]  (Abnormal) Collected: 03/15/22 1053    Specimen: Blood from Arm, Right Updated: 03/15/22 1108    Narrative:      The following orders were created for panel order CBC &  Differential.  Procedure                               Abnormality         Status                     ---------                               -----------         ------                     CBC Auto Differential[[id number]]        Abnormal            Final result               Scan Slide[[id number]]                                                                    Please view results for these tests on the individual orders.    Comprehensive Metabolic Panel [[id number]]  (Abnormal) Collected: 03/15/22 1053    Specimen: Blood from Arm, Right Updated: 03/15/22 1132     Glucose 154 mg/dL      BUN 48 mg/dL      Creatinine 1.79 mg/dL      Sodium 130 mmol/L      Potassium 3.5 mmol/L      Chloride 95 mmol/L      CO2 20.9 mmol/L      Calcium 9.2 mg/dL      Total Protein 6.8 g/dL      Albumin 3.25 g/dL      ALT (SGPT) 9 U/L      AST (SGOT) 8 U/L      Alkaline Phosphatase 81 U/L      Total Bilirubin 0.4 mg/dL      Globulin 3.6 gm/dL      A/G Ratio 0.9 g/dL      BUN/Creatinine Ratio 26.8     Anion Gap 14.1 mmol/L      eGFR 29.1 mL/min/1.73      Comment: National Kidney Foundation and American Society of Nephrology (ASN) Task Force recommended calculation based on the Chronic Kidney Disease Epidemiology Collaboration (CKD-EPI) equation refit without adjustment for race.       Narrative:      GFR Normal >60  Chronic Kidney Disease <60  Kidney Failure <15      Lipase [[id number]]  (Normal) Collected: 03/15/22 1053    Specimen: Blood from Arm, Right Updated: 03/15/22 1132     Lipase 16 U/L     BNP [[id number]]  (Abnormal) Collected: 03/15/22 1053    Specimen: Blood from Arm, Right Updated: 03/15/22 1136     proBNP 1,900.0 pg/mL     Narrative:      Among patients with dyspnea, NT-proBNP is highly sensitive for the detection of acute congestive heart failure. In addition NT-proBNP of <300 pg/ml effectively rules out acute congestive heart failure with 99% negative predictive value.    Results may be falsely decreased if patient taking  Biotin.      Troponin [[id number]]  (Normal) Collected: 03/15/22 1053    Specimen: Blood from Arm, Right Updated: 03/15/22 1136     Troponin T <0.010 ng/mL     Narrative:      Troponin T Reference Range:  <= 0.03 ng/mL-   Negative for AMI  >0.03 ng/mL-     Abnormal for myocardial necrosis.  Clinicians would have to utilize clinical acumen, EKG, Troponin and serial changes to determine if it is an Acute Myocardial Infarction or myocardial injury due to an underlying chronic condition.       Results may be falsely decreased if patient taking Biotin.      C-reactive Protein [[id number]]  (Abnormal) Collected: 03/15/22 1053    Specimen: Blood from Arm, Right Updated: 03/15/22 1144     C-Reactive Protein 44.31 mg/dL     TSH [[id number]]  (Normal) Collected: 03/15/22 1053    Specimen: Blood from Arm, Right Updated: 03/15/22 1136     TSH 2.620 uIU/mL     Magnesium [[id number]]  (Normal) Collected: 03/15/22 1053    Specimen: Blood from Arm, Right Updated: 03/15/22 1132     Magnesium 2.2 mg/dL     CBC Auto Differential [[id number]]  (Abnormal) Collected: 03/15/22 1053    Specimen: Blood from Arm, Right Updated: 03/15/22 1107     WBC 5.95 10*3/mm3      RBC 3.11 10*6/mm3      Hemoglobin 8.4 g/dL      Hematocrit 26.4 %      MCV 84.9 fL      MCH 27.0 pg      MCHC 31.8 g/dL      RDW 16.0 %      RDW-SD 50.1 fl      MPV 9.5 fL      Platelets 119 10*3/mm3     Manual Differential [[id number]]  (Abnormal) Collected: 03/15/22 1053    Specimen: Blood from Arm, Right Updated: 03/15/22 1141     Neutrophil % 76.0 %      Lymphocyte % 5.0 %      Monocyte % 3.0 %      Bands %  15.0 %      Metamyelocyte % 1.0 %      Neutrophils Absolute 5.41 10*3/mm3      Lymphocytes Absolute 0.30 10*3/mm3      Monocytes Absolute 0.18 10*3/mm3      Anisocytosis Slight/1+     Hypochromia Slight/1+     Dohle Bodies Present     Smudge Cells Mod/2+     Platelet Morphology Normal    COVID-19 and FLU A/B PCR - Swab, Nasopharynx [[id number]]  (Normal) Collected: 03/15/22  1054    Specimen: Swab from Nasopharynx Updated: 03/15/22 1121     COVID19 Not Detected     Influenza A PCR Not Detected     Influenza B PCR Not Detected    Narrative:      Fact sheet for providers: https://www.fda.gov/media/840226/download    Fact sheet for patients: https://www.fda.gov/media/178131/download    Test performed by PCR.         I personally reviewed the patient's CT abdomen pelvis noncontrast.  She has a large fluid collection within the pelvis is presumably a necrotic mass.  There is scattered foci of pneumoperitoneum throughout the abdomen.     Assessment/Plan     Assessment and Plan:    76 y.o. female with recurrent and metastatic vulvar cancer status post neoadjuvant chemotherapy/radiation, radical vulvectomy, adjuvant therapy complicated by recurrence and metastatic disease and has been on chemotherapy, now with pneumoperitoneum    -I discussed with the patient and the family her poor prognosis and have recommended against operative exploration, as it would be futile.  This is been echoed by the on-call gynecologic oncologist at the [facility] but they would be willing to accept the patient to their facility.  -I have suggested to the family to allow palliative care to discuss options with the patient  -For now, carin Gan MD  [facility]  03/15/22  15:38 EDT

## 2022-03-15 NOTE — PROGRESS NOTES
Kinetics :  Vancomycin    The patient has been evaluated for vancomycin therapy for sepsis.  We will load with vancomycin 1750mg x 1 and follow with 750mg q 24 hrs and monitor with you.        The aztreonam regimen shall be 2gm q 8 hrs as the extended infusion protocol.   Will follow renal fxn with you.

## 2022-03-17 LAB — BACTERIA SPEC AEROBE CULT: ABNORMAL

## 2022-03-20 LAB
BACTERIA SPEC AEROBE CULT: NORMAL
BACTERIA SPEC AEROBE CULT: NORMAL

## 2022-05-26 ENCOUNTER — LAB (OUTPATIENT)
Dept: LAB | Facility: HOSPITAL | Age: 77
End: 2022-05-26

## 2022-05-26 ENCOUNTER — TRANSCRIBE ORDERS (OUTPATIENT)
Dept: ADMINISTRATIVE | Facility: HOSPITAL | Age: 77
End: 2022-05-26

## 2022-05-26 DIAGNOSIS — Z01.818 PRE-OPERATIVE CLEARANCE: ICD-10-CM

## 2022-05-26 DIAGNOSIS — Z01.818 PRE-OPERATIVE CLEARANCE: Primary | ICD-10-CM

## 2022-05-26 LAB — SARS-COV-2 RNA RESP QL NAA+PROBE: NOT DETECTED

## 2022-05-26 PROCEDURE — U0003 INFECTIOUS AGENT DETECTION BY NUCLEIC ACID (DNA OR RNA); SEVERE ACUTE RESPIRATORY SYNDROME CORONAVIRUS 2 (SARS-COV-2) (CORONAVIRUS DISEASE [COVID-19]), AMPLIFIED PROBE TECHNIQUE, MAKING USE OF HIGH THROUGHPUT TECHNOLOGIES AS DESCRIBED BY CMS-2020-01-R: HCPCS | Performed by: UROLOGY

## 2022-05-26 PROCEDURE — C9803 HOPD COVID-19 SPEC COLLECT: HCPCS

## 2023-03-08 ENCOUNTER — HOSPITAL ENCOUNTER (OUTPATIENT)
Dept: GENERAL RADIOLOGY | Facility: HOSPITAL | Age: 78
Discharge: HOME | End: 2023-03-08
Admitting: NURSE PRACTITIONER
Payer: MEDICARE

## 2023-03-08 ENCOUNTER — TRANSCRIBE ORDERS (OUTPATIENT)
Dept: ADMINISTRATIVE | Facility: HOSPITAL | Age: 78
End: 2023-03-08
Payer: MEDICARE

## 2023-03-08 DIAGNOSIS — R06.09 EXERTIONAL DYSPNEA: ICD-10-CM

## 2023-03-08 DIAGNOSIS — R06.09 EXERTIONAL DYSPNEA: Primary | ICD-10-CM

## 2023-03-08 PROCEDURE — 71046 X-RAY EXAM CHEST 2 VIEWS: CPT

## 2023-03-08 PROCEDURE — 71046 X-RAY EXAM CHEST 2 VIEWS: CPT | Performed by: RADIOLOGY

## 2023-03-09 ENCOUNTER — TRANSCRIBE ORDERS (OUTPATIENT)
Dept: ADMINISTRATIVE | Facility: HOSPITAL | Age: 78
End: 2023-03-09
Payer: MEDICARE

## 2023-03-09 DIAGNOSIS — R06.09 DYSPNEA ON EXERTION: Primary | ICD-10-CM

## 2023-03-10 ENCOUNTER — HOSPITAL ENCOUNTER (OUTPATIENT)
Dept: CT IMAGING | Facility: HOSPITAL | Age: 78
Discharge: HOME | End: 2023-03-10
Admitting: NURSE PRACTITIONER
Payer: MEDICARE

## 2023-03-10 DIAGNOSIS — R06.09 DYSPNEA ON EXERTION: ICD-10-CM

## 2023-03-10 PROCEDURE — 71250 CT THORAX DX C-: CPT

## 2023-03-10 PROCEDURE — 71250 CT THORAX DX C-: CPT | Performed by: RADIOLOGY

## 2023-03-15 ENCOUNTER — TELEPHONE (OUTPATIENT)
Dept: CARDIOLOGY | Facility: CLINIC | Age: 78
End: 2023-03-15
Payer: MEDICARE

## 2023-03-15 NOTE — TELEPHONE ENCOUNTER
Caller: CHELSEA --[facility]    Relationship: PROVIDER    Best call back number: [phone]    What is the best time to reach you:ANYTIME    Who are you requesting to speak with (clinical staff, provider,  specific staff member):KOBE      What was the call regarding: CHELSEA IS CALLING ABOUT AN URGENT REFERRAL FOR ALEX MCCLAIN FAXED OVER DIRECTLY TO THE OFFICE ON Monday MARCH 13TH 2023.  SHE HAS NOT HEARD BACK. PLEASE REACH OUR TO HER     Do you require a callback: YES

## 2023-03-15 NOTE — TELEPHONE ENCOUNTER
Called pt to schedule her apt and she stated she wants to see who her husbands see and that's Dr. Bo. Called Karen and advised her. She expressed understanding.

## 2023-04-25 ENCOUNTER — HOSPITAL ENCOUNTER (INPATIENT)
Facility: HOSPITAL | Age: 78
LOS: 10 days | Discharge: NURSING, REHAB OR LONG-TERM CARE | DRG: 871 | End: 2023-05-05
Attending: EMERGENCY MEDICINE | Admitting: HOSPITALIST
Payer: MEDICARE

## 2023-04-25 ENCOUNTER — APPOINTMENT (OUTPATIENT)
Dept: CT IMAGING | Facility: HOSPITAL | Age: 78
DRG: 871 | End: 2023-04-25
Payer: MEDICARE

## 2023-04-25 DIAGNOSIS — C51.9 VULVAR CANCER: Primary | ICD-10-CM

## 2023-04-25 DIAGNOSIS — N82.4 OTHER FEMALE INTESTINAL-GENITAL TRACT FISTULAE: ICD-10-CM

## 2023-04-25 DIAGNOSIS — R78.81 BACTEREMIA: ICD-10-CM

## 2023-04-25 DIAGNOSIS — N17.9 ACUTE KIDNEY INJURY: ICD-10-CM

## 2023-04-25 DIAGNOSIS — D64.9 CHRONIC ANEMIA: ICD-10-CM

## 2023-04-25 LAB
ABO GROUP BLD: NORMAL
ALBUMIN SERPL-MCNC: 2.9 G/DL (ref 3.5–5.2)
ALBUMIN/GLOB SERPL: 0.7 G/DL
ALP SERPL-CCNC: 104 U/L (ref 39–117)
ALT SERPL W P-5'-P-CCNC: 11 U/L (ref 1–33)
ANION GAP SERPL CALCULATED.3IONS-SCNC: 17.1 MMOL/L (ref 5–15)
AST SERPL-CCNC: 17 U/L (ref 1–32)
BASOPHILS # BLD AUTO: 0.06 10*3/MM3 (ref 0–0.2)
BASOPHILS NFR BLD AUTO: 0.4 % (ref 0–1.5)
BILIRUB SERPL-MCNC: 0.2 MG/DL (ref 0–1.2)
BLD GP AB SCN SERPL QL: POSITIVE
BUN SERPL-MCNC: 94 MG/DL (ref 8–23)
BUN/CREAT SERPL: 25 (ref 7–25)
CALCIUM SPEC-SCNC: 9.5 MG/DL (ref 8.6–10.5)
CHLORIDE SERPL-SCNC: 95 MMOL/L (ref 98–107)
CO2 SERPL-SCNC: 17.9 MMOL/L (ref 22–29)
CREAT SERPL-MCNC: 3.76 MG/DL (ref 0.57–1)
CRP SERPL-MCNC: 28.78 MG/DL (ref 0–0.5)
D-LACTATE SERPL-SCNC: 1.2 MMOL/L (ref 0.5–2)
DEPRECATED RDW RBC AUTO: 57.3 FL (ref 37–54)
EGFRCR SERPLBLD CKD-EPI 2021: 11.9 ML/MIN/1.73
EOSINOPHIL # BLD AUTO: 0.37 10*3/MM3 (ref 0–0.4)
EOSINOPHIL NFR BLD AUTO: 2.2 % (ref 0.3–6.2)
ERYTHROCYTE [DISTWIDTH] IN BLOOD BY AUTOMATED COUNT: 18.6 % (ref 12.3–15.4)
ERYTHROCYTE [SEDIMENTATION RATE] IN BLOOD: 73 MM/HR (ref 0–30)
FLUAV RNA RESP QL NAA+PROBE: NOT DETECTED
FLUBV RNA RESP QL NAA+PROBE: NOT DETECTED
GLOBULIN UR ELPH-MCNC: 3.9 GM/DL
GLUCOSE SERPL-MCNC: 100 MG/DL (ref 65–99)
HCT VFR BLD AUTO: 22.9 % (ref 34–46.6)
HGB BLD-MCNC: 6.7 G/DL (ref 12–15.9)
IMM GRANULOCYTES # BLD AUTO: 0.28 10*3/MM3 (ref 0–0.05)
IMM GRANULOCYTES NFR BLD AUTO: 1.7 % (ref 0–0.5)
LYMPHOCYTES # BLD AUTO: 1.25 10*3/MM3 (ref 0.7–3.1)
LYMPHOCYTES NFR BLD AUTO: 7.5 % (ref 19.6–45.3)
MCH RBC QN AUTO: 24.8 PG (ref 26.6–33)
MCHC RBC AUTO-ENTMCNC: 29.3 G/DL (ref 31.5–35.7)
MCV RBC AUTO: 84.8 FL (ref 79–97)
MONOCYTES # BLD AUTO: 1.23 10*3/MM3 (ref 0.1–0.9)
MONOCYTES NFR BLD AUTO: 7.4 % (ref 5–12)
NEUTROPHILS NFR BLD AUTO: 13.38 10*3/MM3 (ref 1.7–7)
NEUTROPHILS NFR BLD AUTO: 80.8 % (ref 42.7–76)
NRBC BLD AUTO-RTO: 0 /100 WBC (ref 0–0.2)
PLATELET # BLD AUTO: 384 10*3/MM3 (ref 140–450)
PMV BLD AUTO: 8.7 FL (ref 6–12)
POTASSIUM SERPL-SCNC: 5 MMOL/L (ref 3.5–5.2)
PROT SERPL-MCNC: 6.8 G/DL (ref 6–8.5)
RBC # BLD AUTO: 2.7 10*6/MM3 (ref 3.77–5.28)
RH BLD: POSITIVE
SARS-COV-2 RNA RESP QL NAA+PROBE: NOT DETECTED
SODIUM SERPL-SCNC: 130 MMOL/L (ref 136–145)
T&S EXPIRATION DATE: NORMAL
WBC NRBC COR # BLD: 16.57 10*3/MM3 (ref 3.4–10.8)

## 2023-04-25 PROCEDURE — 87076 CULTURE ANAEROBE IDENT EACH: CPT | Performed by: EMERGENCY MEDICINE

## 2023-04-25 PROCEDURE — 86901 BLOOD TYPING SEROLOGIC RH(D): CPT | Performed by: EMERGENCY MEDICINE

## 2023-04-25 PROCEDURE — 25010000002 VANCOMYCIN 5 G RECONSTITUTED SOLUTION: Performed by: EMERGENCY MEDICINE

## 2023-04-25 PROCEDURE — 86922 COMPATIBILITY TEST ANTIGLOB: CPT

## 2023-04-25 PROCEDURE — 86140 C-REACTIVE PROTEIN: CPT | Performed by: EMERGENCY MEDICINE

## 2023-04-25 PROCEDURE — 85025 COMPLETE CBC W/AUTO DIFF WBC: CPT | Performed by: EMERGENCY MEDICINE

## 2023-04-25 PROCEDURE — 85652 RBC SED RATE AUTOMATED: CPT | Performed by: EMERGENCY MEDICINE

## 2023-04-25 PROCEDURE — 74176 CT ABD & PELVIS W/O CONTRAST: CPT

## 2023-04-25 PROCEDURE — 86900 BLOOD TYPING SEROLOGIC ABO: CPT | Performed by: EMERGENCY MEDICINE

## 2023-04-25 PROCEDURE — 86920 COMPATIBILITY TEST SPIN: CPT

## 2023-04-25 PROCEDURE — 87150 DNA/RNA AMPLIFIED PROBE: CPT | Performed by: EMERGENCY MEDICINE

## 2023-04-25 PROCEDURE — 86850 RBC ANTIBODY SCREEN: CPT | Performed by: EMERGENCY MEDICINE

## 2023-04-25 PROCEDURE — 87185 SC STD ENZYME DETCJ PER NZM: CPT | Performed by: EMERGENCY MEDICINE

## 2023-04-25 PROCEDURE — 0 CEFEPIME PER 500 MG: Performed by: HOSPITALIST

## 2023-04-25 PROCEDURE — 99285 EMERGENCY DEPT VISIT HI MDM: CPT

## 2023-04-25 PROCEDURE — 76937 US GUIDE VASCULAR ACCESS: CPT | Performed by: SURGERY

## 2023-04-25 PROCEDURE — 83605 ASSAY OF LACTIC ACID: CPT | Performed by: EMERGENCY MEDICINE

## 2023-04-25 PROCEDURE — 87636 SARSCOV2 & INF A&B AMP PRB: CPT | Performed by: HOSPITALIST

## 2023-04-25 PROCEDURE — 80053 COMPREHEN METABOLIC PANEL: CPT | Performed by: EMERGENCY MEDICINE

## 2023-04-25 PROCEDURE — 99223 1ST HOSP IP/OBS HIGH 75: CPT | Performed by: HOSPITALIST

## 2023-04-25 PROCEDURE — 87040 BLOOD CULTURE FOR BACTERIA: CPT | Performed by: EMERGENCY MEDICINE

## 2023-04-25 PROCEDURE — 36415 COLL VENOUS BLD VENIPUNCTURE: CPT

## 2023-04-25 PROCEDURE — 86870 RBC ANTIBODY IDENTIFICATION: CPT | Performed by: EMERGENCY MEDICINE

## 2023-04-25 RX ORDER — ONDANSETRON 4 MG/1
4 TABLET, FILM COATED ORAL EVERY 8 HOURS PRN
Status: CANCELLED | OUTPATIENT
Start: 2023-04-25

## 2023-04-25 RX ORDER — NITROGLYCERIN 0.4 MG/1
0.4 TABLET SUBLINGUAL
Status: DISCONTINUED | OUTPATIENT
Start: 2023-04-25 | End: 2023-05-05 | Stop reason: HOSPADM

## 2023-04-25 RX ORDER — FUROSEMIDE 20 MG/1
20 TABLET ORAL DAILY
Status: CANCELLED | OUTPATIENT
Start: 2023-04-26

## 2023-04-25 RX ORDER — LEVOTHYROXINE SODIUM 0.1 MG/1
100 TABLET ORAL DAILY
Status: ON HOLD | COMMUNITY

## 2023-04-25 RX ORDER — SODIUM CHLORIDE 9 MG/ML
125 INJECTION, SOLUTION INTRAVENOUS CONTINUOUS
Status: DISCONTINUED | OUTPATIENT
Start: 2023-04-25 | End: 2023-04-26

## 2023-04-25 RX ORDER — SODIUM CHLORIDE 0.9 % (FLUSH) 0.9 %
10 SYRINGE (ML) INJECTION AS NEEDED
Status: DISCONTINUED | OUTPATIENT
Start: 2023-04-25 | End: 2023-05-05 | Stop reason: HOSPADM

## 2023-04-25 RX ORDER — FUROSEMIDE 40 MG/1
20 TABLET ORAL DAILY
Status: CANCELLED | OUTPATIENT
Start: 2023-04-25

## 2023-04-25 RX ORDER — LOSARTAN POTASSIUM 50 MG/1
50 TABLET ORAL DAILY
Status: ON HOLD | COMMUNITY

## 2023-04-25 RX ORDER — SODIUM CHLORIDE 0.9 % (FLUSH) 0.9 %
10 SYRINGE (ML) INJECTION EVERY 12 HOURS SCHEDULED
Status: DISCONTINUED | OUTPATIENT
Start: 2023-04-25 | End: 2023-05-05 | Stop reason: HOSPADM

## 2023-04-25 RX ORDER — LEVOTHYROXINE SODIUM 0.1 MG/1
100 TABLET ORAL DAILY
Status: CANCELLED | OUTPATIENT
Start: 2023-04-25

## 2023-04-25 RX ORDER — ONDANSETRON 4 MG/1
4 TABLET, FILM COATED ORAL EVERY 8 HOURS PRN
Status: ON HOLD | COMMUNITY

## 2023-04-25 RX ORDER — SODIUM CHLORIDE 9 MG/ML
40 INJECTION, SOLUTION INTRAVENOUS AS NEEDED
Status: DISCONTINUED | OUTPATIENT
Start: 2023-04-25 | End: 2023-05-05 | Stop reason: HOSPADM

## 2023-04-25 RX ORDER — METRONIDAZOLE 500 MG/100ML
500 INJECTION, SOLUTION INTRAVENOUS ONCE
Status: COMPLETED | OUTPATIENT
Start: 2023-04-25 | End: 2023-04-25

## 2023-04-25 RX ORDER — LOSARTAN POTASSIUM 25 MG/1
50 TABLET ORAL DAILY
Status: CANCELLED | OUTPATIENT
Start: 2023-04-25

## 2023-04-25 RX ORDER — LOSARTAN POTASSIUM 50 MG/1
50 TABLET ORAL DAILY
Status: CANCELLED | OUTPATIENT
Start: 2023-04-26

## 2023-04-25 RX ORDER — POLYETHYLENE GLYCOL 3350 17 G/17G
17 POWDER, FOR SOLUTION ORAL DAILY
Status: DISCONTINUED | OUTPATIENT
Start: 2023-04-26 | End: 2023-05-05 | Stop reason: HOSPADM

## 2023-04-25 RX ORDER — LEVOTHYROXINE SODIUM 0.1 MG/1
100 TABLET ORAL DAILY
Status: CANCELLED | OUTPATIENT
Start: 2023-04-26

## 2023-04-25 RX ORDER — ASCORBIC ACID 500 MG
500 TABLET ORAL DAILY
Status: DISCONTINUED | OUTPATIENT
Start: 2023-04-26 | End: 2023-05-05 | Stop reason: HOSPADM

## 2023-04-25 RX ORDER — SODIUM CHLORIDE 9 MG/ML
125 INJECTION, SOLUTION INTRAVENOUS CONTINUOUS
Status: DISCONTINUED | OUTPATIENT
Start: 2023-04-25 | End: 2023-04-27

## 2023-04-25 RX ORDER — ENOXAPARIN SODIUM 100 MG/ML
30 INJECTION SUBCUTANEOUS DAILY
Status: DISCONTINUED | OUTPATIENT
Start: 2023-04-26 | End: 2023-04-28

## 2023-04-25 RX ORDER — METRONIDAZOLE 500 MG/100ML
500 INJECTION, SOLUTION INTRAVENOUS EVERY 8 HOURS
Status: DISCONTINUED | OUTPATIENT
Start: 2023-04-25 | End: 2023-05-01

## 2023-04-25 RX ORDER — FUROSEMIDE 20 MG/1
20 TABLET ORAL DAILY
Status: ON HOLD | COMMUNITY

## 2023-04-25 RX ADMIN — SODIUM CHLORIDE 125 ML/HR: 9 INJECTION, SOLUTION INTRAVENOUS at 20:46

## 2023-04-25 RX ADMIN — METRONIDAZOLE 500 MG: 500 INJECTION, SOLUTION INTRAVENOUS at 20:46

## 2023-04-25 RX ADMIN — VANCOMYCIN HYDROCHLORIDE 1750 MG: 5 INJECTION, POWDER, LYOPHILIZED, FOR SOLUTION INTRAVENOUS at 22:51

## 2023-04-25 RX ADMIN — Medication 10 ML: at 22:51

## 2023-04-25 RX ADMIN — SODIUM CHLORIDE 1000 ML: 9 INJECTION, SOLUTION INTRAVENOUS at 19:56

## 2023-04-25 RX ADMIN — SODIUM CHLORIDE 500 ML: 9 INJECTION, SOLUTION INTRAVENOUS at 23:54

## 2023-04-25 RX ADMIN — CEFEPIME 2 G: 2 INJECTION, POWDER, FOR SOLUTION INTRAVENOUS at 22:50

## 2023-04-25 RX ADMIN — SODIUM CHLORIDE 125 ML/HR: 9 INJECTION, SOLUTION INTRAVENOUS at 22:51

## 2023-04-25 RX ADMIN — SODIUM CHLORIDE 2 G: 9 INJECTION, SOLUTION INTRAVENOUS at 19:26

## 2023-04-25 RX ADMIN — SODIUM CHLORIDE 1000 ML: 9 INJECTION, SOLUTION INTRAVENOUS at 13:10

## 2023-04-25 RX ADMIN — METRONIDAZOLE 500 MG: 5 INJECTION, SOLUTION INTRAVENOUS at 22:52

## 2023-04-25 NOTE — H&P (VIEW-ONLY)
[facility]    Patient Identification:  Name:  Orquidea Rosenberg  Age:  77 y.o.  Sex:  female  :  1945  MRN:  [id number]   Visit Number:  [id number]  Admit Date: 2023   Room number:  109/09  Primary Care Physician:  Jackeline Denson APRN     Chief complaint:    Chief Complaint   Patient presents with   • Diarrhea   • Vomiting   • Nausea       History of presenting illness:  77 y.o. female with longstanding history of static vulvar carcinoma diagnosed in 2018.  She has extensive vulvectomy which apparently was very complicated due to the extensive tumor.  She has an open wound that healed by second intention.  She underwent partial chemotherapy and radiation but since then has refused further treatment due to inability to tolerate.  On 2022 she was admitted at [facility] for pneumoperitoneum suspected of bowel perforation, patient refused colostomy and surgery, treatment included IR placement of drain catheter and antibiotic which apparently the patient improved.  She was told then that her prognosis will be very poor.  She was offered hospice and palliative care but she declined.    This time patient reported she has been getting weak for the past few days poor appetite pubic pain associate with nausea and vomiting.  This morning patient and daughter no history of urinating kalia stools were coming out instead of urine.  No fever, there is increased fatigability, there is more increased pelvic pain.    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  She has left-sided hydronephrosis, she has a stent noted on the left ureter.  She was noted to have anemia, ER physician ordered a unit packed RBC.  She was subsequent admitted for  further treatment.  ---------------------------------------------------------------------------------------------------------------------   Review of Systems   Constitutional: Positive for activity change (Easy fatigability), appetite change (Decreased appetite) and fatigue. Negative for chills, diaphoresis, fever and unexpected weight change.   HENT: Negative.    Eyes: Negative.    Respiratory: Negative.    Cardiovascular: Negative.    Gastrointestinal: Positive for abdominal pain (Pubic area), nausea, rectal pain and vomiting. Negative for anal bleeding, blood in stool, constipation and diarrhea.   Endocrine: Negative.    Genitourinary: Positive for pelvic pain.   Musculoskeletal: Negative.    Skin: Positive for pallor.   Allergic/Immunologic: Negative.    Neurological: Negative.    Hematological: Negative.    Psychiatric/Behavioral: Negative.         ---------------------------------------------------------------------------------------------------------------------   Past Medical History:   Diagnosis Date   • Arthritis    • COPD (chronic obstructive pulmonary disease)    • Elevated cholesterol    • History of transfusion    • Hypertension    • Vulval ca      Past Surgical History:   Procedure Laterality Date   • RADICAL VULVECTOMY  09/06/2019   • RADICAL VULVECTOMY Bilateral 06/2019     Family History   Problem Relation Age of Onset   • Alzheimer's disease Mother    • Cancer Father    • Cancer Brother    • Diabetes Maternal Grandmother      Social History     Socioeconomic History   • Marital status:    Tobacco Use   • Smoking status: Former   • Smokeless tobacco: Never   Vaping Use   • Vaping Use: Never used   Substance and Sexual Activity   • Alcohol use: No   • Drug use: No   • Sexual activity: Defer     ---------------------------------------------------------------------------------------------------------------------   Allergies:   Penicillins  ---------------------------------------------------------------------------------------------------------------------   Prior to Admission Medications     Prescriptions Last Dose Informant Patient Reported? Taking?    ascorbic acid (VITAMIN C) 100 MG tablet   Yes No    Take  by mouth Daily.    Calcium Carbonate 1500 (600 Ca) MG tablet   Yes No    Take  by mouth Daily.    doxycycline (MONODOX) 100 MG capsule   No No    Take 1 capsule by mouth 2 (Two) Times a Day.    loratadine (CLARITIN REDITABS) 10 MG disintegrating tablet   Yes No    Take 10 mg by mouth Daily.    losartan (COZAAR) 50 MG tablet   No No    Take 2 tablets by mouth Daily.    multivitamin (THERAGRAN) tablet tablet   Yes No    Take 1 tablet by mouth Daily.    ondansetron ODT (ZOFRAN-ODT) 4 MG disintegrating tablet   No No    Place 1 tablet on the tongue Every 8 (Eight) Hours As Needed for Nausea.    VITAMIN D PO   Yes No    Take  by mouth Daily.        ---------------------------------------------------------------------------------------------------------------------   Vital Signs:  Temp:  [98.5 °F (36.9 °C)] 98.5 °F (36.9 °C)  Heart Rate:  [] 76  Resp:  [18] 18  BP: ()/(42-68) 110/54    Mean Arterial Pressure (Non-Invasive) for the past 24 hrs (Last 3 readings):   Noninvasive MAP (mmHg)   04/25/23 1815 67   04/25/23 1800 66   04/25/23 1745 75     SpO2:  [90 %-100 %] 90 %  on   ;   Device (Oxygen Therapy): room air  Body mass index is 29.05 kg/m².    Wt Readings from Last 3 Encounters:   04/25/23 81.6 kg (179 lb 14.3 oz)   03/15/22 81.6 kg (180 lb)   02/21/22 81.6 kg (180 lb)               ---------------------------------------------------------------------------------------------------------------------   Physical Exam:  Constitutional: Patient is awake and alert she is not confused, chronically ill-appearing very pale, able to answer question appropriately.  No respiratory distress.      HENT:  Head: Normocephalic and  atraumatic.  Mouth:  Moist mucous membranes.    Eyes: Pale palpebral conjunctivae and EOM are normal.  Pupils are equal, round, and reactive to light.  No scleral icterus.  Neck:  Neck supple.  No JVD present.    No lymphadenopathy  Cardiovascular:  Normal rate, regular rhythm and normal heart sounds with no murmur.  Pulmonary/Chest:  No respiratory distress, no wheezes, no crackles, with normal breath sounds and good air movement.  Abdominal:  Soft.  Bowel sounds are normal.  She has subjective tenderness lower abdomen just below the umbilical area, I could not feel any mass.  No rigidity.  Tenderness is on deep palpation only   Musculoskeletal:  No edema, no tenderness, and no deformity.  No red or swollen joints anywhere.    Neurological:  Alert and oriented to person, place, and time.  No cranial nerve deficit.  No tongue deviation.  No facial droop.  No slurred speech.   Skin:  Skin is warm and dry.  No rash noted.  No pallor.   Peripheral vascular:  No edema and strong pulses on all 4 extremities.  Genitourinary: During my exam she has significant deformity of the pudendal area, notable yellowish creamy stools coming out vulvar area.  ---------------------------------------------------------------------------------------------------------------------  EKG:    EKG not done      Telemetry: Sinus rhythm 78 bpm  I have personally looked at both the EKG and the telemetry strips.  --------------------------------------------------------------------------------------------------------------------    Results from last 7 days   Lab Units 04/25/23  1308   CRP mg/dL 28.78*   LACTATE mmol/L 1.2   WBC 10*3/mm3 16.57*   HEMOGLOBIN g/dL 6.7*   HEMATOCRIT % 22.9*   MCV fL 84.8   MCHC g/dL 29.3*   PLATELETS 10*3/mm3 384     Results from last 7 days   Lab Units 04/25/23  1308   SODIUM mmol/L 130*   POTASSIUM mmol/L 5.0   CHLORIDE mmol/L 95*   CO2 mmol/L 17.9*   BUN mg/dL 94*   CREATININE mg/dL 3.76*   CALCIUM mg/dL 9.5    GLUCOSE mg/dL 100*   ALBUMIN g/dL 2.9*   BILIRUBIN mg/dL 0.2   ALK PHOS U/L 104   AST (SGOT) U/L 17   ALT (SGPT) U/L 11   Estimated Creatinine Clearance: 13.5 mL/min (A) (by C-G formula based on SCr of 3.76 mg/dL (H)).    No results found for: HGBA1C, POCGLU  No results found for: AMMONIA        No results found for: BLOODCXNo results found for: RESPCXNo results found for: URINECXNo results found for: WOUNDCXNo results found for: BODYFLDCXNo results found for: STOOLCX  pH No results found for: PHART   pO2 No results found for: PO2ART   pCO2 No results found for: AOS8KES   HCO3 No results found for: DDM8CXA     I have personally looked at the labs and they are summarized above.  ----------------------------------------------------------------------------------------------------------------------  Imaging Results (Last 24 Hours)     Procedure Component Value Units Date/Time    CT Abdomen Pelvis Without Contrast [[id number]] Collected: 04/25/23 1727     Updated: 04/25/23 1730    Narrative:      CT Abdomen Pelvis WO    INDICATION:   Rule out colovaginal fistula.    TECHNIQUE:   CT of the abdomen and pelvis without IV contrast. 60 cc of Gastrografin mixed with 60 cc water instilled per rectum. Pre and post enema scanning performed. Coronal and sagittal reconstructions were obtained.  Radiation dose reduction techniques included  automated exposure control or exposure modulation based on body size. Count of known CT and cardiac nuc med studies performed in previous 12 months: 1.     COMPARISON:   CT chest 3/10/2023 and CT abdomen and pelvis 3/15/2022    FINDINGS:  Abdomen: Widely disseminated pulmonary nodules within both lung bases increased in size and number highly suggestive of metastatic disease. Largest nodule measures about 1.7 cm. Small left pleural effusion. Small pericardial effusion.    Liver, spleen and gallbladder are unremarkable. Pancreas and adrenal glands unremarkable.    Left sided hydronephrosis with  left ureteral stent in place. The distal pigtail may be partially within the bladder. Borderline aneurysmal dilatation of the infrarenal aorta at 2.9 cm. Diffuse aortic atherosclerotic change.    Precontrast imaging demonstrates a large 8.4 x 10.2 x 9.4 cm complex appearing mass with multiple calcifications and a 5.8 x 4.1 cm mixed collection of gas and debris centrally possibly representing an abscess and possibly within the uterus with multiple  calcified fibroids. This structure lies in the expected position of the uterus anterior to the rectum and posterior superior to the bladder.    Following retrograde instillation of contrast contrast is noted within the rectum with abnormal passage of contrast to the above-mentioned complex mass with an apparent fistulous communication near the rectosigmoid junction about 11 or 12 cm from the  anal verge. Contrast intermixed is with the complex air collection centrally which may represent a debris-filled uterine cavity. Contrast also identified within the vagina but no direct fistulous communication between the rectum and the vagina. Contrast  also communicates into the bladder possibly from a fistula track along the left anteroinferior aspect of the mass in the left posterior superior bladder.    Pelvis: No fracture or aggressive bone lesion. Multilevel degenerative disc disease and facet arthropathy lumbar spine with multilevel stenosis. Moderate amount of induration and edema within the right groin possibly related to prior vascular procedure  or vascular access.      Impression:      Large complex pelvic mass measuring 8.4 x 9.4 x 10.2 cm and presumably represents the patient's known pelvic malignancy (vulvar cancer). This could represent secondary involvement of a leiomyomatous uterus or dystrophic calcifications within a  malignancy.    Abnormal fistulous communication between the anterior rectum and the posterior aspect of the above-mentioned pelvic mass with at  least one and possibly 2 fistulous connections as demonstrated on CT. Central debris within the mass may represent abscess or  necrosis.    Apparent fistulous communication between the mass and the bladder.    Left sided hydronephrosis and hydroureter with left ureteral stent in place. The distal pigtail difficult to confirm within the bladder and may be within the distal ureter.    Progressive widely disseminated pulmonary metastases.          Signer Name: MINNIE Cerna MD   Signed: 4/25/2023 5:27 PM   Workstation Name: [facility]    [facility]        I have personally reviewed the radiology images and read the final radiology report.  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  Antibiotic will be started.  I have a very long discussion with the patient herself regarding goals of care.  Currently she is a full code, she has refused hospice in the past, she has instructed her daughter last year that she wants everything done.  She has diffuse metastatic breast cancer, there will be continued contamination of her urinary tract and to complicate matters she has a stent on the left ureter due to compression from the tumor in the past.  Eradicating contamination to clear infection will be impossible with the fistula and tumor.  Please note that when this started a year ago she was offered colostomy and surgery which she refused, she was also given a  choice of palliative chemotherapy which she has refused.  Overall I have explained to the patient and daughter present inside the room including her friend/caregiver her prognosis will be very poor.  Recommended to patient to revisit her advanced directive and discussed with palliative for goals of care.    Patient daughter has acknowledged the difficulty of the situation and overall prognosis.  Patient's son will be coming tomorrow from [place].    Plan outlined to patient together with daughter and caregiver and agreed.      Dalila Mcdonald MD  04/25/23  18:49 EDT

## 2023-04-25 NOTE — H&P
[facility]    Patient Identification:  Name:  Orquidea Rosenberg  Age:  77 y.o.  Sex:  female  :  1945  MRN:  [id number]   Visit Number:  [id number]  Admit Date: 2023   Room number:  109/09  Primary Care Physician:  Jackeline Denson APRN     Chief complaint:    Chief Complaint   Patient presents with   • Diarrhea   • Vomiting   • Nausea       History of presenting illness:  77 y.o. female with longstanding history of static vulvar carcinoma diagnosed in 2018.  She has extensive vulvectomy which apparently was very complicated due to the extensive tumor.  She has an open wound that healed by second intention.  She underwent partial chemotherapy and radiation but since then has refused further treatment due to inability to tolerate.  On 2022 she was admitted at [facility] for pneumoperitoneum suspected of bowel perforation, patient refused colostomy and surgery, treatment included IR placement of drain catheter and antibiotic which apparently the patient improved.  She was told then that her prognosis will be very poor.  She was offered hospice and palliative care but she declined.    This time patient reported she has been getting weak for the past few days poor appetite pubic pain associate with nausea and vomiting.  This morning patient and daughter no history of urinating kalia stools were coming out instead of urine.  No fever, there is increased fatigability, there is more increased pelvic pain.    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  She has left-sided hydronephrosis, she has a stent noted on the left ureter.  She was noted to have anemia, ER physician ordered a unit packed RBC.  She was subsequent admitted for  further treatment.  ---------------------------------------------------------------------------------------------------------------------   Review of Systems   Constitutional: Positive for activity change (Easy fatigability), appetite change (Decreased appetite) and fatigue. Negative for chills, diaphoresis, fever and unexpected weight change.   HENT: Negative.    Eyes: Negative.    Respiratory: Negative.    Cardiovascular: Negative.    Gastrointestinal: Positive for abdominal pain (Pubic area), nausea, rectal pain and vomiting. Negative for anal bleeding, blood in stool, constipation and diarrhea.   Endocrine: Negative.    Genitourinary: Positive for pelvic pain.   Musculoskeletal: Negative.    Skin: Positive for pallor.   Allergic/Immunologic: Negative.    Neurological: Negative.    Hematological: Negative.    Psychiatric/Behavioral: Negative.         ---------------------------------------------------------------------------------------------------------------------   Past Medical History:   Diagnosis Date   • Arthritis    • COPD (chronic obstructive pulmonary disease)    • Elevated cholesterol    • History of transfusion    • Hypertension    • Vulval ca      Past Surgical History:   Procedure Laterality Date   • RADICAL VULVECTOMY  09/06/2019   • RADICAL VULVECTOMY Bilateral 06/2019     Family History   Problem Relation Age of Onset   • Alzheimer's disease Mother    • Cancer Father    • Cancer Brother    • Diabetes Maternal Grandmother      Social History     Socioeconomic History   • Marital status:    Tobacco Use   • Smoking status: Former   • Smokeless tobacco: Never   Vaping Use   • Vaping Use: Never used   Substance and Sexual Activity   • Alcohol use: No   • Drug use: No   • Sexual activity: Defer     ---------------------------------------------------------------------------------------------------------------------   Allergies:   Penicillins  ---------------------------------------------------------------------------------------------------------------------   Prior to Admission Medications     Prescriptions Last Dose Informant Patient Reported? Taking?    ascorbic acid (VITAMIN C) 100 MG tablet   Yes No    Take  by mouth Daily.    Calcium Carbonate 1500 (600 Ca) MG tablet   Yes No    Take  by mouth Daily.    doxycycline (MONODOX) 100 MG capsule   No No    Take 1 capsule by mouth 2 (Two) Times a Day.    loratadine (CLARITIN REDITABS) 10 MG disintegrating tablet   Yes No    Take 10 mg by mouth Daily.    losartan (COZAAR) 50 MG tablet   No No    Take 2 tablets by mouth Daily.    multivitamin (THERAGRAN) tablet tablet   Yes No    Take 1 tablet by mouth Daily.    ondansetron ODT (ZOFRAN-ODT) 4 MG disintegrating tablet   No No    Place 1 tablet on the tongue Every 8 (Eight) Hours As Needed for Nausea.    VITAMIN D PO   Yes No    Take  by mouth Daily.        ---------------------------------------------------------------------------------------------------------------------   Vital Signs:  Temp:  [98.5 °F (36.9 °C)] 98.5 °F (36.9 °C)  Heart Rate:  [] 76  Resp:  [18] 18  BP: ()/(42-68) 110/54    Mean Arterial Pressure (Non-Invasive) for the past 24 hrs (Last 3 readings):   Noninvasive MAP (mmHg)   04/25/23 1815 67   04/25/23 1800 66   04/25/23 1745 75     SpO2:  [90 %-100 %] 90 %  on   ;   Device (Oxygen Therapy): room air  Body mass index is 29.05 kg/m².    Wt Readings from Last 3 Encounters:   04/25/23 81.6 kg (179 lb 14.3 oz)   03/15/22 81.6 kg (180 lb)   02/21/22 81.6 kg (180 lb)               ---------------------------------------------------------------------------------------------------------------------   Physical Exam:  Constitutional: Patient is awake and alert she is not confused, chronically ill-appearing very pale, able to answer question appropriately.  No respiratory distress.      HENT:  Head: Normocephalic and  atraumatic.  Mouth:  Moist mucous membranes.    Eyes: Pale palpebral conjunctivae and EOM are normal.  Pupils are equal, round, and reactive to light.  No scleral icterus.  Neck:  Neck supple.  No JVD present.    No lymphadenopathy  Cardiovascular:  Normal rate, regular rhythm and normal heart sounds with no murmur.  Pulmonary/Chest:  No respiratory distress, no wheezes, no crackles, with normal breath sounds and good air movement.  Abdominal:  Soft.  Bowel sounds are normal.  She has subjective tenderness lower abdomen just below the umbilical area, I could not feel any mass.  No rigidity.  Tenderness is on deep palpation only   Musculoskeletal:  No edema, no tenderness, and no deformity.  No red or swollen joints anywhere.    Neurological:  Alert and oriented to person, place, and time.  No cranial nerve deficit.  No tongue deviation.  No facial droop.  No slurred speech.   Skin:  Skin is warm and dry.  No rash noted.  No pallor.   Peripheral vascular:  No edema and strong pulses on all 4 extremities.  Genitourinary: During my exam she has significant deformity of the pudendal area, notable yellowish creamy stools coming out vulvar area.  ---------------------------------------------------------------------------------------------------------------------  EKG:    EKG not done      Telemetry: Sinus rhythm 78 bpm  I have personally looked at both the EKG and the telemetry strips.  --------------------------------------------------------------------------------------------------------------------    Results from last 7 days   Lab Units 04/25/23  1308   CRP mg/dL 28.78*   LACTATE mmol/L 1.2   WBC 10*3/mm3 16.57*   HEMOGLOBIN g/dL 6.7*   HEMATOCRIT % 22.9*   MCV fL 84.8   MCHC g/dL 29.3*   PLATELETS 10*3/mm3 384     Results from last 7 days   Lab Units 04/25/23  1308   SODIUM mmol/L 130*   POTASSIUM mmol/L 5.0   CHLORIDE mmol/L 95*   CO2 mmol/L 17.9*   BUN mg/dL 94*   CREATININE mg/dL 3.76*   CALCIUM mg/dL 9.5    GLUCOSE mg/dL 100*   ALBUMIN g/dL 2.9*   BILIRUBIN mg/dL 0.2   ALK PHOS U/L 104   AST (SGOT) U/L 17   ALT (SGPT) U/L 11   Estimated Creatinine Clearance: 13.5 mL/min (A) (by C-G formula based on SCr of 3.76 mg/dL (H)).    No results found for: HGBA1C, POCGLU  No results found for: AMMONIA        No results found for: BLOODCXNo results found for: RESPCXNo results found for: URINECXNo results found for: WOUNDCXNo results found for: BODYFLDCXNo results found for: STOOLCX  pH No results found for: PHART   pO2 No results found for: PO2ART   pCO2 No results found for: SMB9LFF   HCO3 No results found for: BLJ0UBU     I have personally looked at the labs and they are summarized above.  ----------------------------------------------------------------------------------------------------------------------  Imaging Results (Last 24 Hours)     Procedure Component Value Units Date/Time    CT Abdomen Pelvis Without Contrast [[id number]] Collected: 04/25/23 1727     Updated: 04/25/23 1730    Narrative:      CT Abdomen Pelvis WO    INDICATION:   Rule out colovaginal fistula.    TECHNIQUE:   CT of the abdomen and pelvis without IV contrast. 60 cc of Gastrografin mixed with 60 cc water instilled per rectum. Pre and post enema scanning performed. Coronal and sagittal reconstructions were obtained.  Radiation dose reduction techniques included  automated exposure control or exposure modulation based on body size. Count of known CT and cardiac nuc med studies performed in previous 12 months: 1.     COMPARISON:   CT chest 3/10/2023 and CT abdomen and pelvis 3/15/2022    FINDINGS:  Abdomen: Widely disseminated pulmonary nodules within both lung bases increased in size and number highly suggestive of metastatic disease. Largest nodule measures about 1.7 cm. Small left pleural effusion. Small pericardial effusion.    Liver, spleen and gallbladder are unremarkable. Pancreas and adrenal glands unremarkable.    Left sided hydronephrosis with  left ureteral stent in place. The distal pigtail may be partially within the bladder. Borderline aneurysmal dilatation of the infrarenal aorta at 2.9 cm. Diffuse aortic atherosclerotic change.    Precontrast imaging demonstrates a large 8.4 x 10.2 x 9.4 cm complex appearing mass with multiple calcifications and a 5.8 x 4.1 cm mixed collection of gas and debris centrally possibly representing an abscess and possibly within the uterus with multiple  calcified fibroids. This structure lies in the expected position of the uterus anterior to the rectum and posterior superior to the bladder.    Following retrograde instillation of contrast contrast is noted within the rectum with abnormal passage of contrast to the above-mentioned complex mass with an apparent fistulous communication near the rectosigmoid junction about 11 or 12 cm from the  anal verge. Contrast intermixed is with the complex air collection centrally which may represent a debris-filled uterine cavity. Contrast also identified within the vagina but no direct fistulous communication between the rectum and the vagina. Contrast  also communicates into the bladder possibly from a fistula track along the left anteroinferior aspect of the mass in the left posterior superior bladder.    Pelvis: No fracture or aggressive bone lesion. Multilevel degenerative disc disease and facet arthropathy lumbar spine with multilevel stenosis. Moderate amount of induration and edema within the right groin possibly related to prior vascular procedure  or vascular access.      Impression:      Large complex pelvic mass measuring 8.4 x 9.4 x 10.2 cm and presumably represents the patient's known pelvic malignancy (vulvar cancer). This could represent secondary involvement of a leiomyomatous uterus or dystrophic calcifications within a  malignancy.    Abnormal fistulous communication between the anterior rectum and the posterior aspect of the above-mentioned pelvic mass with at  least one and possibly 2 fistulous connections as demonstrated on CT. Central debris within the mass may represent abscess or  necrosis.    Apparent fistulous communication between the mass and the bladder.    Left sided hydronephrosis and hydroureter with left ureteral stent in place. The distal pigtail difficult to confirm within the bladder and may be within the distal ureter.    Progressive widely disseminated pulmonary metastases.          Signer Name: MINNIE Cerna MD   Signed: 4/25/2023 5:27 PM   Workstation Name: [facility]    [facility]        I have personally reviewed the radiology images and read the final radiology report.  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  Antibiotic will be started.  I have a very long discussion with the patient herself regarding goals of care.  Currently she is a full code, she has refused hospice in the past, she has instructed her daughter last year that she wants everything done.  She has diffuse metastatic breast cancer, there will be continued contamination of her urinary tract and to complicate matters she has a stent on the left ureter due to compression from the tumor in the past.  Eradicating contamination to clear infection will be impossible with the fistula and tumor.  Please note that when this started a year ago she was offered colostomy and surgery which she refused, she was also given a  choice of palliative chemotherapy which she has refused.  Overall I have explained to the patient and daughter present inside the room including her friend/caregiver her prognosis will be very poor.  Recommended to patient to revisit her advanced directive and discussed with palliative for goals of care.    Patient daughter has acknowledged the difficulty of the situation and overall prognosis.  Patient's son will be coming tomorrow from [place].    Plan outlined to patient together with daughter and caregiver and agreed.      Dalila Mcdonald MD  04/25/23  18:49 EDT

## 2023-04-25 NOTE — ED PROVIDER NOTES
Subjective   History of Present Illness  77-year-old white female here today for diarrhea.  Patient states that over the past week and a half she has had frequent watery stools.  She also has had dysuria and thinks that she is passing stool through her urethra or vagina with urination.  She has gotten increasingly weak and had shortness of breath and dyspnea on exertion.  They have not checked her blood pressures at home.  She has had nausea and loss of appetite with vomiting.  No fever, chills, abdominal pain.  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  She had some metastatic disease noted at that time in the lungs.  She had 1 round of chemotherapy, had some reaction, and did not continue with her treatment because she was told there was no other available treatment options.  She firmly refused hospice at that time.        Review of Systems   All other systems reviewed and are negative.      Past Medical History:   Diagnosis Date   • Arthritis    • COPD (chronic obstructive pulmonary disease)    • Elevated cholesterol    • History of transfusion    • Hypertension    • Vulval ca        Allergies   Allergen Reactions   • Penicillins Swelling       Past Surgical History:   Procedure Laterality Date   • RADICAL VULVECTOMY  09/06/2019   • RADICAL VULVECTOMY Bilateral 06/2019       Family History   Problem Relation Age of Onset   • Alzheimer's disease Mother    • Cancer Father    • Cancer Brother    • Diabetes Maternal Grandmother        Social History     Socioeconomic History   • Marital status:    Tobacco Use   • Smoking status: Former   • Smokeless tobacco: Never   Vaping Use   • Vaping Use: Never used   Substance and Sexual Activity   • Alcohol use: No   • Drug use: No   • Sexual activity: Defer           Objective   Physical Exam  Vitals and nursing note reviewed. Exam conducted with a chaperone present.   Constitutional:       Appearance: Normal  appearance. She is normal weight.   HENT:      Head: Normocephalic and atraumatic.      Mouth/Throat:      Mouth: Mucous membranes are moist.      Pharynx: Oropharynx is clear.   Cardiovascular:      Rate and Rhythm: Normal rate and regular rhythm.      Heart sounds: Normal heart sounds. No murmur heard.    No friction rub. No gallop.   Pulmonary:      Effort: Pulmonary effort is normal. No respiratory distress.      Breath sounds: Normal breath sounds. No wheezing, rhonchi or rales.   Abdominal:      General: Abdomen is flat. Bowel sounds are normal. There is no distension.      Palpations: Abdomen is soft.      Tenderness: There is no abdominal tenderness.   Genitourinary:     Comments: Markedly distorted anatomy.  Some nonhealing ulcerations noted in the left crural crease and right labium.  Nursing unable to successfully identify urethra for catheterization.  Scant amount of stool noted on her Pure Wick.  Musculoskeletal:      Right lower leg: No edema.      Left lower leg: No edema.   Skin:     General: Skin is warm and dry.   Neurological:      General: No focal deficit present.      Mental Status: She is alert and oriented to person, place, and time.   Psychiatric:         Mood and Affect: Mood normal.         Behavior: Behavior normal.         Procedures  Results for orders placed or performed during the hospital encounter of 04/25/23   Comprehensive Metabolic Panel    Specimen: Blood   Result Value Ref Range    Glucose 100 (H) 65 - 99 mg/dL    BUN 94 (H) 8 - 23 mg/dL    Creatinine 3.76 (H) 0.57 - 1.00 mg/dL    Sodium 130 (L) 136 - 145 mmol/L    Potassium 5.0 3.5 - 5.2 mmol/L    Chloride 95 (L) 98 - 107 mmol/L    CO2 17.9 (L) 22.0 - 29.0 mmol/L    Calcium 9.5 8.6 - 10.5 mg/dL    Total Protein 6.8 6.0 - 8.5 g/dL    Albumin 2.9 (L) 3.5 - 5.2 g/dL    ALT (SGPT) 11 1 - 33 U/L    AST (SGOT) 17 1 - 32 U/L    Alkaline Phosphatase 104 39 - 117 U/L    Total Bilirubin 0.2 0.0 - 1.2 mg/dL    Globulin 3.9 gm/dL    A/G  Ratio 0.7 g/dL    BUN/Creatinine Ratio 25.0 7.0 - 25.0    Anion Gap 17.1 (H) 5.0 - 15.0 mmol/L    eGFR 11.9 (L) >60.0 mL/min/1.73   Lactic Acid, Plasma    Specimen: Blood   Result Value Ref Range    Lactate 1.2 0.5 - 2.0 mmol/L   C-reactive Protein    Specimen: Blood   Result Value Ref Range    C-Reactive Protein 28.78 (H) 0.00 - 0.50 mg/dL   Sedimentation Rate    Specimen: Blood   Result Value Ref Range    Sed Rate 73 (H) 0 - 30 mm/hr   CBC Auto Differential    Specimen: Blood   Result Value Ref Range    WBC 16.57 (H) 3.40 - 10.80 10*3/mm3    RBC 2.70 (L) 3.77 - 5.28 10*6/mm3    Hemoglobin 6.7 (C) 12.0 - 15.9 g/dL    Hematocrit 22.9 (L) 34.0 - 46.6 %    MCV 84.8 79.0 - 97.0 fL    MCH 24.8 (L) 26.6 - 33.0 pg    MCHC 29.3 (L) 31.5 - 35.7 g/dL    RDW 18.6 (H) 12.3 - 15.4 %    RDW-SD 57.3 (H) 37.0 - 54.0 fl    MPV 8.7 6.0 - 12.0 fL    Platelets 384 140 - 450 10*3/mm3    Neutrophil % 80.8 (H) 42.7 - 76.0 %    Lymphocyte % 7.5 (L) 19.6 - 45.3 %    Monocyte % 7.4 5.0 - 12.0 %    Eosinophil % 2.2 0.3 - 6.2 %    Basophil % 0.4 0.0 - 1.5 %    Immature Grans % 1.7 (H) 0.0 - 0.5 %    Neutrophils, Absolute 13.38 (H) 1.70 - 7.00 10*3/mm3    Lymphocytes, Absolute 1.25 0.70 - 3.10 10*3/mm3    Monocytes, Absolute 1.23 (H) 0.10 - 0.90 10*3/mm3    Eosinophils, Absolute 0.37 0.00 - 0.40 10*3/mm3    Basophils, Absolute 0.06 0.00 - 0.20 10*3/mm3    Immature Grans, Absolute 0.28 (H) 0.00 - 0.05 10*3/mm3    nRBC 0.0 0.0 - 0.2 /100 WBC     CT Abdomen Pelvis Without Contrast    Result Date: 4/25/2023  Narrative: CT Abdomen Pelvis WO INDICATION: Rule out colovaginal fistula. TECHNIQUE: CT of the abdomen and pelvis without IV contrast. 60 cc of Gastrografin mixed with 60 cc water instilled per rectum. Pre and post enema scanning performed. Coronal and sagittal reconstructions were obtained.  Radiation dose reduction techniques included automated exposure control or exposure modulation based on body size. Count of known CT and cardiac nuc med  studies performed in previous 12 months: 1. COMPARISON: CT chest 3/10/2023 and CT abdomen and pelvis 3/15/2022 FINDINGS: Abdomen: Widely disseminated pulmonary nodules within both lung bases increased in size and number highly suggestive of metastatic disease. Largest nodule measures about 1.7 cm. Small left pleural effusion. Small pericardial effusion. Liver, spleen and gallbladder are unremarkable. Pancreas and adrenal glands unremarkable. Left sided hydronephrosis with left ureteral stent in place. The distal pigtail may be partially within the bladder. Borderline aneurysmal dilatation of the infrarenal aorta at 2.9 cm. Diffuse aortic atherosclerotic change. Precontrast imaging demonstrates a large 8.4 x 10.2 x 9.4 cm complex appearing mass with multiple calcifications and a 5.8 x 4.1 cm mixed collection of gas and debris centrally possibly representing an abscess and possibly within the uterus with multiple calcified fibroids. This structure lies in the expected position of the uterus anterior to the rectum and posterior superior to the bladder. Following retrograde instillation of contrast contrast is noted within the rectum with abnormal passage of contrast to the above-mentioned complex mass with an apparent fistulous communication near the rectosigmoid junction about 11 or 12 cm from the anal verge. Contrast intermixed is with the complex air collection centrally which may represent a debris-filled uterine cavity. Contrast also identified within the vagina but no direct fistulous communication between the rectum and the vagina. Contrast also communicates into the bladder possibly from a fistula track along the left anteroinferior aspect of the mass in the left posterior superior bladder. Pelvis: No fracture or aggressive bone lesion. Multilevel degenerative disc disease and facet arthropathy lumbar spine with multilevel stenosis. Moderate amount of induration and edema within the right groin possibly related  to prior vascular procedure or vascular access.     Impression: Large complex pelvic mass measuring 8.4 x 9.4 x 10.2 cm and presumably represents the patient's known pelvic malignancy (vulvar cancer). This could represent secondary involvement of a leiomyomatous uterus or dystrophic calcifications within a malignancy. Abnormal fistulous communication between the anterior rectum and the posterior aspect of the above-mentioned pelvic mass with at least one and possibly 2 fistulous connections as demonstrated on CT. Central debris within the mass may represent abscess or necrosis. Apparent fistulous communication between the mass and the bladder. Left sided hydronephrosis and hydroureter with left ureteral stent in place. The distal pigtail difficult to confirm within the bladder and may be within the distal ureter. Progressive widely disseminated pulmonary metastases. Signer Name: MINNIE Cerna MD  Signed: 2023 5:27 PM  Workstation Name: [email or web address]  [facility]    ECHO COMPLETE (DOPPLER / COLOR) W OR WO CONTRAST    Result Date: 4/10/2023  Narrative: This result has an attachment that is not available.                                                                                                                                                                                               Echocardiographic report Name  : Orquidea Rosenberg  : 1945 AGE: 77 y.o. GENDER: female PCP  Jackeline STONE Date of service: 2023 Clinical indication: Pericardial effusion on CT exam, CAD and metastatic cancer of valva Interpretation summary: .  Normal left ventricular systolic function.  Estimated EF-60 to 65%. .  Grade 1 LV diastolic dysfunction (impaired relaxation pattern) .  Normal cardiac chamber dimensions .  Moderate aortic valve regurgitation .  Severe pulmonary hypertension .  No evidence of pericardial effusion. The study quality: Good Echocardiographic findings: Left ventricle:  Normal left ventricular cavity size and wall thickness. Normal left ventricular systolic function and wall motion. Estimated EF = 60 to 65%. Grade 1 LV diastolic dysfunction.(Impaired relaxation pattern) Right ventricle: The right ventricular cavity size and wall thickness of normal. Right ventricular systolic function is normal. Left atrium: Left atrial cavity size is normal Right atrium: Right atrial cavity size is normal. Aortic valve: The aortic valve exhibits sclerosis. Severe calcification of the aortic leaflets. No evidence of aortic valve stenosis. Moderate aortic valve regurgitation. Mitral valve: Severe mitral annular calcification. No evidence of mitral valve stenosis. Mild mitral valve regurgitation is present. Tricuspid valve: The tricuspid valve is normal in structure. Mild tricuspid valve regurgitation. Estimated RV systolic pressure is > 55 mmHg. Severe pulmonary hypertension is present. Pulmonic valve: The pulmonic valve structure is normal. No evidence of pulmonic valve stenosis. Mild pulmonic valve regurgitation. Great vessels: The aortic root size size is normal. Pericardium: There is no evidence of pericardial effusion Measurements/dimensions: Left atrial cavity size: 3.2 cm Aortic root size: 3.1 cm Right atrial cavity size: 2.2 cm LV end-diastolic diameter: 4.0 cm LV end-systolic diameter: 2.9 cm LV septal wall thickness: 0.9 cm LV posterior wall thickness: 0.9 cm             ED Course  ED Course as of 04/25/23 1831   Tue Apr 25, 2023 1810 Reviewed the results of the work-up with patient and daughter.  She does have extensive fistulous connections related to her mass with possible necrosis or abscess.  She also has acute renal failure and worsened chronic anemia.  She still does not want to have colostomy, any surgery, or hospice care.  We will plan to admit for antibiotics IV fluids comfort measures and further plans per hospitalist. [BC]   1817 Discussed with Dr. Mcdonald.  Patient admitted,  see orders. 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       Medication List      No changes were made to your prescriptions during this visit.          Balwinder Dixon MD  04/25/23 6860

## 2023-04-26 ENCOUNTER — APPOINTMENT (OUTPATIENT)
Dept: GENERAL RADIOLOGY | Facility: HOSPITAL | Age: 78
DRG: 871 | End: 2023-04-26
Payer: MEDICARE

## 2023-04-26 LAB
ALBUMIN SERPL-MCNC: 2.6 G/DL (ref 3.5–5.2)
ALBUMIN/GLOB SERPL: 1 G/DL
ALP SERPL-CCNC: 84 U/L (ref 39–117)
ALT SERPL W P-5'-P-CCNC: 12 U/L (ref 1–33)
ANION GAP SERPL CALCULATED.3IONS-SCNC: 17.6 MMOL/L (ref 5–15)
ANTI-FYA: NORMAL
AST SERPL-CCNC: 23 U/L (ref 1–32)
BACTERIA BLD CULT: NORMAL
BASOPHILS # BLD AUTO: 0.03 10*3/MM3 (ref 0–0.2)
BASOPHILS NFR BLD AUTO: 0.2 % (ref 0–1.5)
BH BB BLOOD EXPIRATION DATE: NORMAL
BH BB BLOOD TYPE BARCODE: 6200
BH BB DISPENSE STATUS: NORMAL
BH BB PRODUCT CODE: NORMAL
BH BB UNIT NUMBER: NORMAL
BILIRUB SERPL-MCNC: <0.2 MG/DL (ref 0–1.2)
BUN SERPL-MCNC: 85 MG/DL (ref 8–23)
BUN/CREAT SERPL: 28.1 (ref 7–25)
CALCIUM SPEC-SCNC: 8.2 MG/DL (ref 8.6–10.5)
CHLORIDE SERPL-SCNC: 104 MMOL/L (ref 98–107)
CO2 SERPL-SCNC: 14.4 MMOL/L (ref 22–29)
CREAT SERPL-MCNC: 3.03 MG/DL (ref 0.57–1)
CROSSMATCH INTERPRETATION: NORMAL
DEPRECATED RDW RBC AUTO: 56.9 FL (ref 37–54)
EGFRCR SERPLBLD CKD-EPI 2021: 15.4 ML/MIN/1.73
EOSINOPHIL # BLD AUTO: 0.54 10*3/MM3 (ref 0–0.4)
EOSINOPHIL NFR BLD AUTO: 3.7 % (ref 0.3–6.2)
ERYTHROCYTE [DISTWIDTH] IN BLOOD BY AUTOMATED COUNT: 18.7 % (ref 12.3–15.4)
GLOBULIN UR ELPH-MCNC: 2.6 GM/DL
GLUCOSE SERPL-MCNC: 85 MG/DL (ref 65–99)
HCT VFR BLD AUTO: 19.4 % (ref 34–46.6)
HCT VFR BLD AUTO: 25 % (ref 34–46.6)
HGB BLD-MCNC: 5.7 G/DL (ref 12–15.9)
HGB BLD-MCNC: 7.6 G/DL (ref 12–15.9)
IMM GRANULOCYTES # BLD AUTO: 0.28 10*3/MM3 (ref 0–0.05)
IMM GRANULOCYTES NFR BLD AUTO: 1.9 % (ref 0–0.5)
LYMPHOCYTES # BLD AUTO: 0.92 10*3/MM3 (ref 0.7–3.1)
LYMPHOCYTES NFR BLD AUTO: 6.3 % (ref 19.6–45.3)
MCH RBC QN AUTO: 24.2 PG (ref 26.6–33)
MCHC RBC AUTO-ENTMCNC: 29.4 G/DL (ref 31.5–35.7)
MCV RBC AUTO: 82.2 FL (ref 79–97)
MONOCYTES # BLD AUTO: 1.52 10*3/MM3 (ref 0.1–0.9)
MONOCYTES NFR BLD AUTO: 10.4 % (ref 5–12)
MRSA DNA SPEC QL NAA+PROBE: NORMAL
NEUTROPHILS NFR BLD AUTO: 11.34 10*3/MM3 (ref 1.7–7)
NEUTROPHILS NFR BLD AUTO: 77.5 % (ref 42.7–76)
NRBC BLD AUTO-RTO: 0 /100 WBC (ref 0–0.2)
PLATELET # BLD AUTO: 328 10*3/MM3 (ref 140–450)
PMV BLD AUTO: 9.1 FL (ref 6–12)
POTASSIUM SERPL-SCNC: 5.1 MMOL/L (ref 3.5–5.2)
PROT SERPL-MCNC: 5.2 G/DL (ref 6–8.5)
RBC # BLD AUTO: 2.36 10*6/MM3 (ref 3.77–5.28)
SODIUM SERPL-SCNC: 136 MMOL/L (ref 136–145)
UNIT  ABO: NORMAL
UNIT  RH: NORMAL
WBC NRBC COR # BLD: 14.63 10*3/MM3 (ref 3.4–10.8)

## 2023-04-26 PROCEDURE — 80053 COMPREHEN METABOLIC PANEL: CPT | Performed by: HOSPITALIST

## 2023-04-26 PROCEDURE — 94761 N-INVAS EAR/PLS OXIMETRY MLT: CPT

## 2023-04-26 PROCEDURE — 85025 COMPLETE CBC W/AUTO DIFF WBC: CPT | Performed by: HOSPITALIST

## 2023-04-26 PROCEDURE — 86902 BLOOD TYPE ANTIGEN DONOR EA: CPT

## 2023-04-26 PROCEDURE — 71045 X-RAY EXAM CHEST 1 VIEW: CPT

## 2023-04-26 PROCEDURE — 25010000002 ENOXAPARIN PER 10 MG: Performed by: HOSPITALIST

## 2023-04-26 PROCEDURE — 71045 X-RAY EXAM CHEST 1 VIEW: CPT | Performed by: RADIOLOGY

## 2023-04-26 PROCEDURE — 99223 1ST HOSP IP/OBS HIGH 75: CPT | Performed by: INTERNAL MEDICINE

## 2023-04-26 PROCEDURE — 99232 SBSQ HOSP IP/OBS MODERATE 35: CPT | Performed by: HOSPITALIST

## 2023-04-26 PROCEDURE — 86870 RBC ANTIBODY IDENTIFICATION: CPT

## 2023-04-26 PROCEDURE — 0 CEFEPIME PER 500 MG: Performed by: HOSPITALIST

## 2023-04-26 PROCEDURE — 94799 UNLISTED PULMONARY SVC/PX: CPT

## 2023-04-26 PROCEDURE — 02HV33Z INSERTION OF INFUSION DEVICE INTO SUPERIOR VENA CAVA, PERCUTANEOUS APPROACH: ICD-10-PCS | Performed by: SURGERY

## 2023-04-26 PROCEDURE — P9016 RBC LEUKOCYTES REDUCED: HCPCS

## 2023-04-26 PROCEDURE — 36430 TRANSFUSION BLD/BLD COMPNT: CPT

## 2023-04-26 PROCEDURE — 87641 MR-STAPH DNA AMP PROBE: CPT

## 2023-04-26 PROCEDURE — 36556 INSERT NON-TUNNEL CV CATH: CPT | Performed by: SURGERY

## 2023-04-26 PROCEDURE — 86900 BLOOD TYPING SEROLOGIC ABO: CPT

## 2023-04-26 PROCEDURE — 85018 HEMOGLOBIN: CPT | Performed by: HOSPITALIST

## 2023-04-26 PROCEDURE — 85014 HEMATOCRIT: CPT | Performed by: HOSPITALIST

## 2023-04-26 PROCEDURE — 86905 BLOOD TYPING RBC ANTIGENS: CPT

## 2023-04-26 RX ORDER — SODIUM CHLORIDE 0.9 % (FLUSH) 0.9 %
10 SYRINGE (ML) INJECTION EVERY 12 HOURS SCHEDULED
Status: DISCONTINUED | OUTPATIENT
Start: 2023-04-26 | End: 2023-05-05 | Stop reason: HOSPADM

## 2023-04-26 RX ORDER — SODIUM CHLORIDE 0.9 % (FLUSH) 0.9 %
20 SYRINGE (ML) INJECTION AS NEEDED
Status: DISCONTINUED | OUTPATIENT
Start: 2023-04-26 | End: 2023-05-05 | Stop reason: HOSPADM

## 2023-04-26 RX ORDER — SODIUM CHLORIDE 0.9 % (FLUSH) 0.9 %
10 SYRINGE (ML) INJECTION AS NEEDED
Status: DISCONTINUED | OUTPATIENT
Start: 2023-04-26 | End: 2023-05-05 | Stop reason: HOSPADM

## 2023-04-26 RX ORDER — SODIUM CHLORIDE 9 MG/ML
40 INJECTION, SOLUTION INTRAVENOUS AS NEEDED
Status: DISCONTINUED | OUTPATIENT
Start: 2023-04-26 | End: 2023-05-05 | Stop reason: HOSPADM

## 2023-04-26 RX ORDER — NOREPINEPHRINE BITARTRATE 0.03 MG/ML
.02-.3 INJECTION, SOLUTION INTRAVENOUS
Status: DISCONTINUED | OUTPATIENT
Start: 2023-04-26 | End: 2023-04-27

## 2023-04-26 RX ADMIN — POLYETHYLENE GLYCOL 3350 17 G: 17 POWDER, FOR SOLUTION ORAL at 09:14

## 2023-04-26 RX ADMIN — Medication 10 ML: at 09:14

## 2023-04-26 RX ADMIN — ENOXAPARIN SODIUM 30 MG: 30 INJECTION SUBCUTANEOUS at 09:14

## 2023-04-26 RX ADMIN — METRONIDAZOLE 500 MG: 5 INJECTION, SOLUTION INTRAVENOUS at 06:15

## 2023-04-26 RX ADMIN — Medication 10 ML: at 21:12

## 2023-04-26 RX ADMIN — SODIUM CHLORIDE 125 ML/HR: 9 INJECTION, SOLUTION INTRAVENOUS at 04:56

## 2023-04-26 RX ADMIN — METRONIDAZOLE 500 MG: 5 INJECTION, SOLUTION INTRAVENOUS at 14:20

## 2023-04-26 RX ADMIN — OXYCODONE HYDROCHLORIDE AND ACETAMINOPHEN 500 MG: 500 TABLET ORAL at 09:14

## 2023-04-26 RX ADMIN — Medication 0.02 MCG/KG/MIN: at 01:01

## 2023-04-26 RX ADMIN — SODIUM CHLORIDE 125 ML/HR: 9 INJECTION, SOLUTION INTRAVENOUS at 22:41

## 2023-04-26 RX ADMIN — METRONIDAZOLE 500 MG: 5 INJECTION, SOLUTION INTRAVENOUS at 22:39

## 2023-04-26 RX ADMIN — SODIUM CHLORIDE 125 ML/HR: 9 INJECTION, SOLUTION INTRAVENOUS at 14:02

## 2023-04-26 RX ADMIN — Medication 10 ML: at 21:11

## 2023-04-26 RX ADMIN — CEFEPIME 2 G: 2 INJECTION, POWDER, FOR SOLUTION INTRAVENOUS at 22:38

## 2023-04-26 NOTE — PLAN OF CARE
Goal Outcome Evaluation:  Plan of Care Reviewed With: patient        Progress: no change  Outcome Evaluation: Patient A&Ox4. VSS at this time with the help of levophed, see MAR for details. Patient is on 2 L NC and tolerating well. Patient received 1 unit of blood this shift, H&H ordered. NS infusing at 125 mL/hr. Patient denies any pain or discomfort thus far in shift. Patient is resting in bed and denies any further requests at this time. Safety and fall prevention maintained, bed alarm set, call light within reach. Will continue to follow plan of care 7p to 7a.

## 2023-04-26 NOTE — PROGRESS NOTES
Pharmacy to dose Cefepime for sepsis: CrCl = 16.1.  Dosed as follows: Cefepime 2000mg iv ext infusion q24h.  Pharmacy will monitor and adjust dosing as appropriate.     Sandoval Arzola RPH  03:59 EDT  04/26/23

## 2023-04-26 NOTE — CASE MANAGEMENT/SOCIAL WORK
Discharge Planning Assessment   [place]     Patient Name: Orquidea Rosenberg  MRN: [id number]  Today's Date: 4/26/2023    Admit Date: 4/25/2023    Plan: Pt admitted on 4/25/23. SS received nursing consult for d/c needs. SS spoke with pt and pt daughter on this date. Pt lives at home and daughter Lilli has been staying with pt for the last two years. Pt does no utilize HH services at this time. Pt PCP Jackeline Denson. Pt has oxgyen at 2 liters at night via [employer] for DME needs. Pt daughter states pt has POA( not on file), no living will. Per Daughter who states pt has been in bed since saturday and disposition is unsure at this time. SS to follow and assist as needed with discharge planning.      Discharge Needs Assessment     Row Name 04/26/23 1558       Living Environment    People in Home alone;other (see comments)  daughter Lilli has been staying off and on with pt for 2 years.    Current Living Arrangements home    Potentially Unsafe Housing Conditions none    Primary Care Provided by child(jolly);self    Provides Primary Care For no one    Family Caregiver if Needed child(jolly), adult    Family Caregiver Names Dtr., Lilli    Quality of Family Relationships helpful;involved;supportive    Able to Return to Prior Arrangements yes       Transition Planning    Patient/Family Anticipates Transition to home with family    Transportation Anticipated family or friend will provide       Discharge Needs Assessment    Equipment Currently Used at Home oxygen  o2 @ 2 liters at night    Concerns to be Addressed --  d/c needs               Discharge Plan     Row Name 04/26/23 1600       Plan    Plan Pt admitted on 4/25/23. SS received nursing consult for d/c needs. SS spoke with pt and pt daughter on this date. Pt lives at home and daughter Lilli has been staying with pt for the last two years. Pt does no utilize HH services at this time. Pt PCP Jackeline Denson. Pt has oxgyen at 2 liters at night via [employer] for DME needs. Pt daughter  states pt has POA( not on file), no living will. Per Daughter who states pt has been in bed since saturday and disposition is unsure at this time. SS to follow and assist as needed with discharge planning.           Expected Discharge Date and Time     Expected Discharge Date Expected Discharge Time    Apr 29, 2023          Demographic Summary     Row Name 04/26/23 1557       General Information    Admission Type inpatient    Arrived From home    Referral Source nursing    Reason for Consult --  d/c needs    Preferred Language English                ANASTASIA ThurstonW

## 2023-04-26 NOTE — CONSULTS
Palliative Care Initial Consult     Attending Physician: Dalila Mcdonald MD  Referring Provider: Dalila Mcdonald MD    Palliative care reason for consult: GOC/ACP  Code Status:   Code Status and Medical Interventions:   Ordered at: 04/25/23 1937     Level Of Support Discussed With:    Patient     Code Status (Patient has no pulse and is not breathing):    CPR (Attempt to Resuscitate)     Medical Interventions (Patient has pulse or is breathing):    Full Support      Advanced Directives: Advance Directive Status: Patient has advance directive, copy requested   Healthcare surrogate: NOK daughter Lilli Little and son Gavin Rosenberg  Goals of Care: After discussion with Orquidea she says that she would want to be resuscitated and to be intubated on a ventilator however would not want long term life support. I discussed with daughter Lilli and she states her mother does not understand the magnitude of her illness and is going to talk to her about her goals, ie, code status.    HPI:  Orquidea Rosenberg is a 77 y.o. female admitted on 4/25/2023 with diarrhea, nausea, vomiting, and abdominal pain. 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 Orquidea had partial chemotherapy and radiation, however since that time has refused further treatment as she had difficulty tolerating treatments. Orquidea also refused colostomy and surgery at  for pneumoperitoneum with suspect bowel perforation. Orquidea was offered palliative or hospice care but declined at that time. Per pts daughter Lilli, her mother had been getting weaker over the few days before admission to [facility].  Lilli states she was having poor appetite, nausea, vomiting, and diarrhea as well as abdominal and pubic pain,She states she was fine one morning and was weaker in the evening and by the next morning could not get up at all, which led to her being brought to the ER.        ROS:  Negative except as above in HPI.     Past Medical History:   Diagnosis Date   • Arthritis    • COPD (chronic obstructive pulmonary disease)    • Elevated cholesterol    • History of transfusion    • Hypertension    • Vulval ca      Past Surgical History:   Procedure Laterality Date   • RADICAL VULVECTOMY  09/06/2019   • RADICAL VULVECTOMY Bilateral 06/2019     Social History     Socioeconomic History   • Marital status:    Tobacco Use   • Smoking status: Former   • Smokeless tobacco: Never   Vaping Use   • Vaping Use: Never used   Substance and Sexual Activity   • Alcohol use: No   • Drug use: No   • Sexual activity: Defer     Family History   Problem Relation Age of Onset   • Alzheimer's disease Mother    • Cancer Father    • Cancer Brother    • Diabetes Maternal Grandmother        Allergies   Allergen Reactions   • Penicillins Swelling       Current Facility-Administered Medications   Medication Dose Route Frequency Provider Last Rate Last Admin   • ascorbic acid (VITAMIN C) tablet 500 mg  500 mg Oral Daily Dalila Mcdonald MD   500 mg at 04/26/23 0914   • cefepime 2 gm IVPB in 100 ml NS (VTB)  2 g Intravenous Q24H Dalila Mcdonald MD       • Enoxaparin Sodium (LOVENOX) syringe 30 mg  30 mg Subcutaneous Daily Dalila Mcdonald MD   30 mg at 04/26/23 0914   • metroNIDAZOLE (FLAGYL) IVPB 500 mg  500 mg Intravenous Q8H Dalila Mcdonald MD   500 mg at 04/26/23 1420   • nitroglycerin (NITROSTAT) SL tablet 0.4 mg  0.4 mg Sublingual Q5 Min PRN Dalila Mcdonald MD       • norepinephrine (LEVOPHED) 8 mg in 250 mL NS infusion (premix)  0.02-0.3 mcg/kg/min Intravenous Titrated Prudencio Solis MD 11.22 mL/hr at 04/26/23 0406 0.08 mcg/kg/min at 04/26/23 0406   • polyethylene glycol (MIRALAX) packet 17 g  17 g Oral Daily Dalila Mcdonald MD   17 g at 04/26/23 0914   • sodium chloride 0.9 % flush 10 mL  10 mL Intravenous PRN Balwinder Dixon MD       • sodium chloride 0.9 % flush 10 mL  10 mL  "Intravenous Q12H Dalila Mcdonald MD   10 mL at 04/26/23 0914   • sodium chloride 0.9 % flush 10 mL  10 mL Intravenous PRN Dalila Mcdonald MD       • sodium chloride 0.9 % flush 10 mL  10 mL Intravenous Q12H Prudencio Solis MD   10 mL at 04/26/23 0914   • sodium chloride 0.9 % flush 10 mL  10 mL Intravenous PRN Prudencio Solis MD       • sodium chloride 0.9 % infusion 40 mL  40 mL Intravenous PRN Dalila Mcdnoald MD       • sodium chloride 0.9 % infusion 40 mL  40 mL Intravenous PRN Prudencio Solis MD       • sodium chloride 0.9 % infusion  125 mL/hr Intravenous Continuous Balwinder Dixon  mL/hr at 04/26/23 1402 125 mL/hr at 04/26/23 1402     norepinephrine, 0.02-0.3 mcg/kg/min, Last Rate: 0.08 mcg/kg/min (04/26/23 0406)  sodium chloride, 125 mL/hr, Last Rate: 125 mL/hr (04/26/23 1402)      •  nitroglycerin  •  [COMPLETED] Insert Peripheral IV **AND** sodium chloride  •  sodium chloride  •  sodium chloride  •  sodium chloride  •  sodium chloride    Current medication reviewed for route, type, dose and frequency and are current per MAR.    Palliative Performance Scale Score:     /63   Pulse 71   Temp 98.2 °F (36.8 °C) (Oral)   Resp 25   Ht 167.6 cm (66\")   Wt 74.8 kg (164 lb 14.5 oz)   SpO2 91%   BMI 26.62 kg/m²     Intake/Output Summary (Last 24 hours) at 4/26/2023 1448  Last data filed at 4/26/2023 0700  Gross per 24 hour   Intake 4563.09 ml   Output --   Net 4563.09 ml       PE:    General Appearance:    Chronically ill appearing, alert, cooperative, NAD   HEENT:    NC/AT, without obvious abnormality, EOMI, anicteric    Neck:   supple, trachea midline, no JVD   Lungs:     Unlabored respirations, occasional rhochi, no wheezing or rales noted    Heart:    RRR, normal S1 and S2, no M/R/G   Abdomen:     Soft, tenderness @ suprapubic region, ND, NABS    Extremities:   Moves all extremities, no edema   Pulses:   Pulses palpable and equal bilaterally   Skin:   Warm, dry "   Neurologic:   A/Ox3, cooperative   Psych:   Calm, pleasant, almost child like       Labs:   Results from last 7 days   Lab Units 04/26/23  0530 04/26/23  0051   WBC 10*3/mm3  --  14.63*   HEMOGLOBIN g/dL 7.6* 5.7*   HEMATOCRIT % 25.0* 19.4*   PLATELETS 10*3/mm3  --  328     Results from last 7 days   Lab Units 04/26/23  0051   SODIUM mmol/L 136   POTASSIUM mmol/L 5.1   CHLORIDE mmol/L 104   CO2 mmol/L 14.4*   BUN mg/dL 85*   CREATININE mg/dL 3.03*   GLUCOSE mg/dL 85   CALCIUM mg/dL 8.2*     Results from last 7 days   Lab Units 04/26/23  0051   SODIUM mmol/L 136   POTASSIUM mmol/L 5.1   CHLORIDE mmol/L 104   CO2 mmol/L 14.4*   BUN mg/dL 85*   CREATININE mg/dL 3.03*   CALCIUM mg/dL 8.2*   BILIRUBIN mg/dL <0.2   ALK PHOS U/L 84   ALT (SGPT) U/L 12   AST (SGOT) U/L 23   GLUCOSE mg/dL 85     Imaging Results (Last 72 Hours)     Procedure Component Value Units Date/Time    XR Chest 1 View [[id number]] Collected: 04/26/23 1130     Updated: 04/26/23 1133    Narrative:      EXAM:    XR Chest, 1 View     EXAM DATE:    4/26/2023 10:42 AM     CLINICAL HISTORY:    central line placement; C51.9-Malignant neoplasm of vulva,  unspecified; 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     COMPARISON:    03/08/2023     FINDINGS:    Lungs:  No change in distribution of bilateral lung opacities which  may represent changes of fibrosis and diffuse pulmonary nodules.    Pleural space:  See below.      Heart:  Unremarkable.  No cardiomegaly.    Mediastinum:  Unremarkable.    Bones/joints:  Stable bony structures.    Tubes, lines and devices:  Right IJ deep line noted with tip at the  cavoatrial junction. No pneumothorax.       Impression:      1.  No change in distribution of bilateral lung opacities which may  represent changes of fibrosis and diffuse pulmonary nodules.  2.  Right IJ deep line noted with tip at the  cavoatrial junction. No  pneumothorax.     This report was finalized on 4/26/2023 11:31 AM by Dr. Justen Matias MD.       CT Abdomen Pelvis Without Contrast [[id number]] Collected: 04/25/23 1727     Updated: 04/25/23 1730    Narrative:      CT Abdomen Pelvis WO    INDICATION:   Rule out colovaginal fistula.    TECHNIQUE:   CT of the abdomen and pelvis without IV contrast. 60 cc of Gastrografin mixed with 60 cc water instilled per rectum. Pre and post enema scanning performed. Coronal and sagittal reconstructions were obtained.  Radiation dose reduction techniques included  automated exposure control or exposure modulation based on body size. Count of known CT and cardiac nuc med studies performed in previous 12 months: 1.     COMPARISON:   CT chest 3/10/2023 and CT abdomen and pelvis 3/15/2022    FINDINGS:  Abdomen: Widely disseminated pulmonary nodules within both lung bases increased in size and number highly suggestive of metastatic disease. Largest nodule measures about 1.7 cm. Small left pleural effusion. Small pericardial effusion.    Liver, spleen and gallbladder are unremarkable. Pancreas and adrenal glands unremarkable.    Left sided hydronephrosis with left ureteral stent in place. The distal pigtail may be partially within the bladder. Borderline aneurysmal dilatation of the infrarenal aorta at 2.9 cm. Diffuse aortic atherosclerotic change.    Precontrast imaging demonstrates a large 8.4 x 10.2 x 9.4 cm complex appearing mass with multiple calcifications and a 5.8 x 4.1 cm mixed collection of gas and debris centrally possibly representing an abscess and possibly within the uterus with multiple  calcified fibroids. This structure lies in the expected position of the uterus anterior to the rectum and posterior superior to the bladder.    Following retrograde instillation of contrast contrast is noted within the rectum with abnormal passage of contrast to the above-mentioned complex mass with an apparent  fistulous communication near the rectosigmoid junction about 11 or 12 cm from the  anal verge. Contrast intermixed is with the complex air collection centrally which may represent a debris-filled uterine cavity. Contrast also identified within the vagina but no direct fistulous communication between the rectum and the vagina. Contrast  also communicates into the bladder possibly from a fistula track along the left anteroinferior aspect of the mass in the left posterior superior bladder.    Pelvis: No fracture or aggressive bone lesion. Multilevel degenerative disc disease and facet arthropathy lumbar spine with multilevel stenosis. Moderate amount of induration and edema within the right groin possibly related to prior vascular procedure  or vascular access.      Impression:      Large complex pelvic mass measuring 8.4 x 9.4 x 10.2 cm and presumably represents the patient's known pelvic malignancy (vulvar cancer). This could represent secondary involvement of a leiomyomatous uterus or dystrophic calcifications within a  malignancy.    Abnormal fistulous communication between the anterior rectum and the posterior aspect of the above-mentioned pelvic mass with at least one and possibly 2 fistulous connections as demonstrated on CT. Central debris within the mass may represent abscess or  necrosis.    Apparent fistulous communication between the mass and the bladder.    Left sided hydronephrosis and hydroureter with left ureteral stent in place. The distal pigtail difficult to confirm within the bladder and may be within the distal ureter.    Progressive widely disseminated pulmonary metastases.          Signer Name: MINNIE Cerna MD   Signed: 4/25/2023 5:27 PM   Workstation Name: [id number]    [facility]          Diagnostics: Reviewed    A: Orquidea Rosenberg is a 77 y.o. female  admitted on 4/25/2023 with diarrhea, nausea, vomiting, and abdominal pain. Orquidea has a long history of static vulvar  carcinoma diagnosed in 2018.Orquidea has had a vulvectomy which was complicated due to her extensive tumor. Orquidea had partial chemotherapy and radiation, however since that time has refused further treatment as she had difficulty tolerating treatments. Orquidea also refused colostomy and surgery at  for pneumoperitoneum with suspect bowel perforation. Orquidea was offered palliative or hospice care but declined at that time. Per pts daughter Lilli, her mother had been getting weaker over the few days before admission to [facility].  Lilli states she was having poor appetite, nausea, vomiting, and diarrhea as well as abdominal and pubic pain,She states she was fine one morning and was weaker in the evening and by the next morning could not get up at all, which led to her being brought to the ER.             P:   Palliative was able to discuss GOC with Orquidea this morning. Orquidea states that she would want to at least try to be resuscitated and to be put on ventilator, despite knowing that her prognosis is poor and she has cancer that she refused to have further treatment on. She seems to not understand the seriousness of her condition and how far progressed it is. I was able to speak with Orquidea's daughter Lilli to discuss her thoughts on her mothers condition and situation. Lilli is tearful as she feels her mother does not understand that resuscitation and intubation could cause her more suffering and in the end would not accomplish anything as her cancer would still be present. Lilli states that she is going to talk to her mother about changing to a DNR/DNI, as well has asked about options for hospice facilities. I let Lilli know that we are here to support her and her mother and if she needed us to speak with her and her mother together to let me know. I will touch base with Lilli and Orquidea tomorrow. I discussed patient with Dr Mcdonald in detail as well as spoke to daughter Lilli together.    We appreciate the consult and the  opportunity to participate in Orquidea Rosenberg's care. We will continue to follow along. Please do not hesitate to contact us regarding further symptom management or goals of care needs, including after hours or on weekends via our on call provider at [phone].     Time: 75 minutes spent reviewing medical and medication records, assessing and examining patient, discussing with family, answering questions, providing some guidance about a plan and documentation of care, and coordinating care with other healthcare members, with > 50% time spent face to face.     Aysha Saucedo, APRN    4/26/2023

## 2023-04-26 NOTE — PLAN OF CARE
Goal Outcome Evaluation:              Outcome Evaluation: Pt A&Ox4.  VSS with levophed infusing at 0.08.  Pt on 2L NC tolerating well.  NS infusing at 125ml/hr.  Purewick in place but pt incontinent bowel and bladder at times.  MRSA nares negative.  Daughter at bedside.  Bed in low position with bed alarm on.  Call light in reach.  [facility] 7a-7p.

## 2023-04-26 NOTE — PAYOR COMM NOTE
"CONTACT:  FREDDIE BARNES MSN, APRN  UTILIZATION MANAGEMENT DEPT.  [facility]  [address] [facility], [address]  PHONE:  [phone]  FAX: [phone]    CLINICAL FOR INPATIENT AUTHORIZATION REQUEST    REFERENCE # [id number]    Orquidea Rosenberg (77 y.o. Female)     Date of Birth   1945    Social Security Number       Address   [address] [address]    Home Phone   [phone]    MRN   [id number]       Taoism   Mormonism    Marital Status                               Admission Date   4/25/23    Admission Type   Emergency    Admitting Provider   Dalila Mcdonald MD    Attending Provider   Dalila Mcdonald MD    Department, Room/Bed   [facility] PROGRESS CARE, P205/1P       Discharge Date       Discharge Disposition       Discharge Destination                               Attending Provider: Dalila Mcdonald MD    Allergies: Penicillins    Isolation: None   Infection: None   Code Status: CPR    Ht: 167.6 cm (66\")   Wt: 74.8 kg (164 lb 14.5 oz)    Admission Cmt: None   Principal Problem: Vulvar cancer [C51.9]                 Active Insurance as of 4/25/2023     Primary Coverage     Payor Plan Insurance Group Employer/Plan Group    [facility] MEDICARE REPLACEMENT [facility] MEDICARE REPLACEMENT [id number]     Payor Plan Address Payor Plan Phone Number Payor Plan Fax Number Effective Dates    [address] [address]   1/1/2021 - None Entered    [facility] [id number]       Subscriber Name Subscriber Birth Date Member ID       ORQUIDEA ROSENBERG 1945 [id number]                 Emergency Contacts      (Rel.) Home Phone Work Phone Mobile Phone    Gavin Rosenberg (Son) [phone] -- --    AnabelLilli (Daughter) [phone] -- [phone]               History & Physical      Dalila Mcdonald MD at 04/25/23 1849              [facility] HOSPITALIST HISTORY AND PHYSICAL    Patient Identification:  Name:  Orquidea Rosenberg  Age:  77 " y.o.  Sex:  female  :  1945  MRN:  [id number]   Visit Number:  [id number]  Admit Date: 2023   Room number:  109/09  Primary Care Physician:  Jackeline Denson APRN     Chief complaint:    Chief Complaint   Patient presents with   • Diarrhea   • Vomiting   • Nausea       History of presenting illness:  77 y.o. female with longstanding history of static vulvar carcinoma diagnosed in 2018.  She has extensive vulvectomy which apparently was very complicated due to the extensive tumor.  She has an open wound that healed by second intention.  She underwent partial chemotherapy and radiation but since then has refused further treatment due to inability to tolerate.  On 2022 she was admitted at [facility] for pneumoperitoneum suspected of bowel perforation, patient refused colostomy and surgery, treatment included IR placement of drain catheter and antibiotic which apparently the patient improved.  She was told then that her prognosis will be very poor.  She was offered hospice and palliative care but she declined.    This time patient reported she has been getting weak for the past few days poor appetite pubic pain associate with nausea and vomiting.  This morning patient and daughter no history of urinating kalia stools were coming out instead of urine.  No fever, there is increased fatigability, there is more increased pelvic pain.    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  She has left-sided hydronephrosis, she has a stent noted on the left ureter.  She was noted to have anemia, ER physician ordered a unit packed RBC.  She was subsequent admitted for further  treatment.  ---------------------------------------------------------------------------------------------------------------------   Review of Systems   Constitutional: Positive for activity change (Easy fatigability), appetite change (Decreased appetite) and fatigue. Negative for chills, diaphoresis, fever and unexpected weight change.   HENT: Negative.    Eyes: Negative.    Respiratory: Negative.    Cardiovascular: Negative.    Gastrointestinal: Positive for abdominal pain (Pubic area), nausea, rectal pain and vomiting. Negative for anal bleeding, blood in stool, constipation and diarrhea.   Endocrine: Negative.    Genitourinary: Positive for pelvic pain.   Musculoskeletal: Negative.    Skin: Positive for pallor.   Allergic/Immunologic: Negative.    Neurological: Negative.    Hematological: Negative.    Psychiatric/Behavioral: Negative.         ---------------------------------------------------------------------------------------------------------------------   Past Medical History:   Diagnosis Date   • Arthritis    • COPD (chronic obstructive pulmonary disease)    • Elevated cholesterol    • History of transfusion    • Hypertension    • Vulval ca      Past Surgical History:   Procedure Laterality Date   • RADICAL VULVECTOMY  09/06/2019   • RADICAL VULVECTOMY Bilateral 06/2019     Family History   Problem Relation Age of Onset   • Alzheimer's disease Mother    • Cancer Father    • Cancer Brother    • Diabetes Maternal Grandmother      Social History     Socioeconomic History   • Marital status:    Tobacco Use   • Smoking status: Former   • Smokeless tobacco: Never   Vaping Use   • Vaping Use: Never used   Substance and Sexual Activity   • Alcohol use: No   • Drug use: No   • Sexual activity: Defer     ---------------------------------------------------------------------------------------------------------------------   Allergies:   Penicillins  ---------------------------------------------------------------------------------------------------------------------   Prior to Admission Medications     Prescriptions Last Dose Informant Patient Reported? Taking?    ascorbic acid (VITAMIN C) 100 MG tablet   Yes No    Take  by mouth Daily.    Calcium Carbonate 1500 (600 Ca) MG tablet   Yes No    Take  by mouth Daily.    doxycycline (MONODOX) 100 MG capsule   No No    Take 1 capsule by mouth 2 (Two) Times a Day.    loratadine (CLARITIN REDITABS) 10 MG disintegrating tablet   Yes No    Take 10 mg by mouth Daily.    losartan (COZAAR) 50 MG tablet   No No    Take 2 tablets by mouth Daily.    multivitamin (THERAGRAN) tablet tablet   Yes No    Take 1 tablet by mouth Daily.    ondansetron ODT (ZOFRAN-ODT) 4 MG disintegrating tablet   No No    Place 1 tablet on the tongue Every 8 (Eight) Hours As Needed for Nausea.    VITAMIN D PO   Yes No    Take  by mouth Daily.        ---------------------------------------------------------------------------------------------------------------------   Vital Signs:  Temp:  [98.5 °F (36.9 °C)] 98.5 °F (36.9 °C)  Heart Rate:  [] 76  Resp:  [18] 18  BP: ()/(42-68) 110/54    Mean Arterial Pressure (Non-Invasive) for the past 24 hrs (Last 3 readings):   Noninvasive MAP (mmHg)   04/25/23 1815 67   04/25/23 1800 66   04/25/23 1745 75     SpO2:  [90 %-100 %] 90 %  on   ;   Device (Oxygen Therapy): room air  Body mass index is 29.05 kg/m².    Wt Readings from Last 3 Encounters:   04/25/23 81.6 kg (179 lb 14.3 oz)   03/15/22 81.6 kg (180 lb)   02/21/22 81.6 kg (180 lb)               ---------------------------------------------------------------------------------------------------------------------   Physical Exam:  Constitutional: Patient is awake and alert she is not confused, chronically ill-appearing very pale, able to answer question appropriately.  No respiratory distress.      HENT:  Head: Normocephalic and  atraumatic.  Mouth:  Moist mucous membranes.    Eyes: Pale palpebral conjunctivae and EOM are normal.  Pupils are equal, round, and reactive to light.  No scleral icterus.  Neck:  Neck supple.  No JVD present.    No lymphadenopathy  Cardiovascular:  Normal rate, regular rhythm and normal heart sounds with no murmur.  Pulmonary/Chest:  No respiratory distress, no wheezes, no crackles, with normal breath sounds and good air movement.  Abdominal:  Soft.  Bowel sounds are normal.  She has subjective tenderness lower abdomen just below the umbilical area, I could not feel any mass.  No rigidity.  Tenderness is on deep palpation only   Musculoskeletal:  No edema, no tenderness, and no deformity.  No red or swollen joints anywhere.    Neurological:  Alert and oriented to person, place, and time.  No cranial nerve deficit.  No tongue deviation.  No facial droop.  No slurred speech.   Skin:  Skin is warm and dry.  No rash noted.  No pallor.   Peripheral vascular:  No edema and strong pulses on all 4 extremities.  Genitourinary: During my exam she has significant deformity of the pudendal area, notable yellowish creamy stools coming out vulvar area.  ---------------------------------------------------------------------------------------------------------------------  EKG:    EKG not done      Telemetry: Sinus rhythm 78 bpm  I have personally looked at both the EKG and the telemetry strips.  --------------------------------------------------------------------------------------------------------------------      I have personally reviewed the radiology images and read the final radiology report.  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  Antibiotic will be started.  I have a very long discussion with the patient herself regarding goals of care.  Currently she is a full code, she has refused hospice in the past, she has instructed her daughter last year that she wants everything done.  She has diffuse metastatic breast cancer, there will be continued contamination of her urinary tract and to complicate matters she has a stent on the left ureter due to compression from the tumor in the past.  Eradicating contamination to clear infection will be impossible with the fistula and tumor.  Please note that when this started a year ago she was offered colostomy and surgery which she refused, she was also given a choice of palliative chemotherapy which she has refused.  Overall I have explained to the patient and daughter present inside the room including her friend/caregiver her prognosis will be very poor.  Recommended to patient to revisit her advanced directive and discussed with palliative for goals of care.    Patient daughter has acknowledged the difficulty of the situation and overall prognosis.  Patient's son will be coming tomorrow from [place].    Plan outlined to patient together with daughter and caregiver and agreed.      Dalila Mcdonald MD  04/25/23  18:49 EDT    Electronically signed by Dalila Mcdonald MD at 04/25/23 1924          Emergency Department Notes      Balwinder Dixon MD at 04/25/23 1300          Subjective   History of Present Illness  77-year-old white female here today for diarrhea.  Patient states that over the past week and a half she has had frequent watery stools.  She also has had dysuria and thinks that she is passing stool through her urethra or vagina with  urination.  She has gotten increasingly weak and had shortness of breath and dyspnea on exertion.  They have not checked her blood pressures at home.  She has had nausea and loss of appetite with vomiting.  No fever, chills, abdominal pain.  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  She had some metastatic disease noted at that time in the lungs.  She had 1 round of chemotherapy, had some reaction, and did not continue with her treatment because she was told there was no other available treatment options.  She firmly refused hospice at that time.        Review of Systems   All other systems reviewed and are negative.          Objective   Physical Exam  Vitals and nursing note reviewed. Exam conducted with a chaperone present.   Constitutional:       Appearance: Normal appearance. She is normal weight.   HENT:      Head: Normocephalic and atraumatic.      Mouth/Throat:      Mouth: Mucous membranes are moist.      Pharynx: Oropharynx is clear.   Cardiovascular:      Rate and Rhythm: Normal rate and regular rhythm.      Heart sounds: Normal heart sounds. No murmur heard.    No friction rub. No gallop.   Pulmonary:      Effort: Pulmonary effort is normal. No respiratory distress.      Breath sounds: Normal breath sounds. No wheezing, rhonchi or rales.   Abdominal:      General: Abdomen is flat. Bowel sounds are normal. There is no distension.      Palpations: Abdomen is soft.      Tenderness: There is no abdominal tenderness.   Genitourinary:     Comments: Markedly distorted anatomy.  Some nonhealing ulcerations noted in the left crural crease and right labium.  Nursing unable to successfully identify urethra for catheterization.  Scant amount of stool noted on her Pure Wick.  Musculoskeletal:      Right lower leg: No edema.      Left lower leg: No edema.   Skin:     General: Skin is warm and dry.   Neurological:      General: No focal deficit present.       Mental Status: She is alert and oriented to person, place, and time.   Psychiatric:         Mood and Affect: Mood normal.         Behavior: Behavior normal.         Procedures      ECHO COMPLETE (DOPPLER / COLOR) W OR WO CONTRAST    Result Date: 4/10/2023  Narrative: This result has an attachment that is not available.                                                                                                                                                                                               Echocardiographic report Name  : Orquidea Rosenberg  : 1945 AGE: 77 y.o. GENDER: female PCP  Jackeline STONE Date of service: 2023 Clinical indication: Pericardial effusion on CT exam, CAD and metastatic cancer of valva Interpretation summary: .  Normal left ventricular systolic function.  Estimated EF-60 to 65%. .  Grade 1 LV diastolic dysfunction (impaired relaxation pattern) .  Normal cardiac chamber dimensions .  Moderate aortic valve regurgitation .  Severe pulmonary hypertension .  No evidence of pericardial effusion. The study quality: Good Echocardiographic findings: Left ventricle: Normal left ventricular cavity size and wall thickness. Normal left ventricular systolic function and wall motion. Estimated EF = 60 to 65%. Grade 1 LV diastolic dysfunction.(Impaired relaxation pattern) Right ventricle: The right ventricular cavity size and wall thickness of normal. Right ventricular systolic function is normal. Left atrium: Left atrial cavity size is normal Right atrium: Right atrial cavity size is normal. Aortic valve: The aortic valve exhibits sclerosis. Severe calcification of the aortic leaflets. No evidence of aortic valve stenosis. Moderate aortic valve regurgitation. Mitral valve: Severe mitral annular calcification. No evidence of mitral valve stenosis. Mild mitral valve regurgitation is present. Tricuspid valve: The tricuspid valve is normal in structure. Mild tricuspid valve regurgitation.  Estimated RV systolic pressure is > 55 mmHg. Severe pulmonary hypertension is present. Pulmonic valve: The pulmonic valve structure is normal. No evidence of pulmonic valve stenosis. Mild pulmonic valve regurgitation. Great vessels: The aortic root size size is normal. Pericardium: There is no evidence of pericardial effusion Measurements/dimensions: Left atrial cavity size: 3.2 cm Aortic root size: 3.1 cm Right atrial cavity size: 2.2 cm LV end-diastolic diameter: 4.0 cm LV end-systolic diameter: 2.9 cm LV septal wall thickness: 0.9 cm LV posterior wall thickness: 0.9 cm            ED Course  ED Course as of 04/25/23 1831   e Apr 25, 2023 1810 Reviewed the results of the work-up with patient and daughter.  She does have extensive fistulous connections related to her mass with possible necrosis or abscess.  She also has acute renal failure and worsened chronic anemia.  She still does not want to have colostomy, any surgery, or hospice care.  We will plan to admit for antibiotics IV fluids comfort measures and further plans per hospitalist. [BC]   1817 Discussed with Dr. Mcdonald.  Patient admitted, see orders. 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       Medication List      No changes were made to your prescriptions during this visit.          Balwinder Dixon MD  04/25/23 1831      Electronically signed by Balwinder Dixon MD at 04/25/23 1831         Vital Signs (last day)     Date/Time Temp Temp src Pulse Resp BP Patient Position SpO2    04/26/23 0630 -- -- 69 -- 113/52 -- 95    04/26/23 0600 -- -- 63 23 104/48 Lying 95    04/26/23 0530 -- -- 64 -- 98/58 -- 95    04/26/23 0500 --  -- 70 19 121/55 Lying 94    04/26/23 0430 -- -- 72 -- 109/51 -- 95    04/26/23 0406 -- -- 69 -- 96/49 -- --    04/26/23 0402 98.8 (37.1) Oral 64 19 96/49 Lying 96    04/26/23 0400 97.7 (36.5) Axillary -- -- -- -- --    04/26/23 0345 -- -- 58 23 93/51 Lying 95    04/26/23 0330 -- -- 65 -- 81/49 -- 95    04/26/23 0300 -- -- 66 20 83/54 Lying 95    04/26/23 0230 -- -- 68 -- 117/54 -- 94    04/26/23 0201 -- -- 67 19 92/42 Lying 94    04/26/23 0143 -- -- 67 -- -- -- 86    04/26/23 0140 97.7 (36.5) Oral 68 22 73/73 -- 99    04/26/23 0134 -- -- 68 -- 76/46 -- --    04/26/23 0125 97.7 (36.5) Oral 66 22 82/41 -- 99    04/26/23 0113 98.2 (36.8) Oral 69 23 83/44 -- 99    04/26/23 0101 -- -- 71 22 80/44 Lying 99    04/26/23 0100 -- -- 70 -- 80/44 -- 99    04/26/23 0000 98 (36.7) Oral 72 21 72/42 Lying 99    04/25/23 2319 -- -- 75 13 88/56 Lying 98    04/25/23 2300 -- -- 76 -- 82/47 -- 99    04/25/23 2231 -- -- 83 24 99/52 Lying 96    04/25/23 2223 98.1 (36.7) Oral -- 16 -- -- --    04/25/23 2015 -- -- 80 -- 122/49 -- --    04/25/23 1815 -- -- 76 -- 110/54 -- --    04/25/23 1810 -- -- 82 -- -- -- 90    04/25/23 1800 -- -- 82 -- 105/52 -- --    04/25/23 1745 -- -- 86 -- 101/58 -- --    04/25/23 1730 -- -- 76 -- 103/56 -- --    04/25/23 1700 -- -- 77 -- 95/58 -- --    04/25/23 1646 -- -- 80 -- -- -- 92    04/25/23 1645 -- -- 79 -- 110/50 -- --    04/25/23 1630 -- -- 82 -- 114/51 -- --    04/25/23 1617 -- -- 80 -- -- -- 91    04/25/23 1615 -- -- 80 -- 107/49 -- --    04/25/23 1600 -- -- 80 -- 99/47 -- --    04/25/23 1545 -- -- 81 -- 110/46 -- --    04/25/23 1530 -- -- 81 -- 115/68 -- --    04/25/23 1515 -- -- -- -- 124/54 -- --    04/25/23 1449 -- -- 104 -- 101/42 -- --    04/25/23 1315 -- -- 86 -- 87/47 -- --    04/25/23 1313 -- -- 89 -- -- -- 99    04/25/23 1300 -- -- 86 -- 92/47 -- --    04/25/23 1255 -- -- 90 -- -- -- 93    04/25/23 1249 -- -- 83 -- -- -- 100    04/25/23 1245 -- -- 89 -- 90/48 -- 92    04/25/23 1146 98.5 (36.9)  Oral 90 18 91/53 Sitting 91          Intake & Output (last day)       04/25 0701  04/26 0700 04/26 0701  04/27 0700    I.V. (mL/kg) 2704.1 (36.2)     Blood 300     IV Piggyback 1100     Total Intake(mL/kg) 4104.1 (54.9)     Net +4104.1           Urine Unmeasured Occurrence 3 x     Stool Unmeasured Occurrence 3 x           Medication Administration Report for Orquidea Rosenberg as of 04/26/23 0714   Medications 04/25/23 04/26/23    ascorbic acid (VITAMIN C) tablet 500 mg  Dose: 500 mg  Freq: Daily Route: PO  Start: 04/26/23 0900     0900               cefepime 2 gm IVPB in 100 ml NS (VTB)  Dose: 2 g  Freq: Every 24 Hours Route: IV  Indications of Use: SEPSIS  Start: 04/25/23 2200   End: 05/02/23 2159 2200               Enoxaparin Sodium (LOVENOX) syringe 30 mg  Dose: 30 mg  Freq: Daily Route: SC  Indications of Use: PROPHYLAXIS OF VENOUS THROMBOEMBOLISM  Start: 04/26/23 0900   Admin Instructions:   Give subcutaneous in abdomen only. Do not massage site after injection.     0900               metroNIDAZOLE (FLAGYL) IVPB 500 mg  Dose: 500 mg  Freq: Every 8 Hours Route: IV  Indications of Use: SEPSIS  Start: 04/25/23 2315   End: 05/02/23 2314   Admin Instructions:   Caution: Look alike/sound alike drug alert.  Do not refrigerate.    2252-New Bag            0615-New Bag     1515     2315             polyethylene glycol (MIRALAX) packet 17 g  Dose: 17 g  Freq: Daily Route: PO  Start: 04/26/23 0900   Admin Instructions:   Use 4-8 ounces of water, tea, or juice for each 17 gram dose.     0900               sodium chloride 0.9 % flush 10 mL  Dose: 10 mL  Freq: Every 12 Hours Scheduled Route: IV  Start: 04/26/23 0215     0201-Canceled Entry     0900     2100             sodium chloride 0.9 % flush 10 mL  Dose: 10 mL  Freq: Every 12 Hours Scheduled Route: IV  Start: 04/25/23 2315    2251-Given            0900     2100             Completed Medications  Medications 04/25/23 04/26/23       aztreonam (AZACTAM) 2 g in sodium  chloride 0.9 % 100 mL IVPB-VTB  Dose: 2 g  Freq: Once Route: IV  Indications of Use: SEPSIS  Start: 04/25/23 1816   End: 04/25/23 2047   Admin Instructions:   Activate vial before using.    1926-New Bag     2047-Stopped               cefepime 2 gm IVPB in 100 ml NS (VTB)  Dose: 2 g  Freq: Once Route: IV  Start: 04/25/23 2330   End: 04/25/23 2320 2250-New Bag                metroNIDAZOLE (FLAGYL) IVPB 500 mg  Dose: 500 mg  Freq: Once Route: IV  Indications of Use: SEPSIS  Start: 04/25/23 1816   End: 04/25/23 2146   Admin Instructions:   Caution: Look alike/sound alike drug alert.  Do not refrigerate.    2046-New Bag [C]                sodium chloride 0.9 % bolus 1,000 mL  Dose: 1,000 mL  Freq: Once Route: IV  Start: 04/25/23 1810   End: 04/25/23 2026 1956-New Bag [C]                sodium chloride 0.9 % bolus 1,000 mL  Dose: 1,000 mL  Freq: Once Route: IV  Start: 04/25/23 1215   End: 04/25/23 1449    1310-New Bag     1449-Stopped               sodium chloride 0.9 % bolus 500 mL  Dose: 500 mL  Freq: Once Route: IV  Start: 04/26/23 0030   End: 04/26/23 0009    2354-New Bag                vancomycin 1750 mg/500 mL 0.9% NS IVPB ([facility])  Dose: 20 mg/kg  Weight Dosing Info: 81.6 kg  Freq: Once Route: IV  Indications of Use: SEPSIS  Start: 04/25/23 1816   End: 04/26/23 0051    2251-New Bag [C]                      and   Medication Administration Report for Orquidea Rosenberg as of 04/26/23 0714   Medications 04/25/23 04/26/23    norepinephrine (LEVOPHED) 8 mg in 250 mL NS infusion (premix)  Rate: 2.81-42.08 mL/hr Dose: 0.02-0.3 mcg/kg/min  Weight Dosing Info: 74.8 kg  Freq: Titrated Route: IV  Start: 04/26/23 0145   Admin Instructions:   Initiate infusion at 0.02 mcg/kg/min and titrate by 0.02 - 0.06 mcg/kg/min every 5 - 10 minutes to use the lowest dose possible to maintain a MAP greater than or equal To 65 mmHg. Maximum Dose = 0.3 mcg/kg/min. Contact provider if unable to maintain MAP target at the maximum dose or if MAP is  greater than 95 mmHg. Once MAP target achieved, obtain vitals a minimum of every 30 minutes.  With provider order may titrate to maximum dose of 0.5 mcg/kg/min.  Concentration 8 mg/250 mL          Central line preferred, if unavailable use large bore IV access with frequent nurse monitoring of IV site.     0101-New Bag     0105-Canceled Entry     0134-Rate/Dose Change     0201-Rate/Dose Change     0406-Rate/Dose Change           Pharmacy Consult - Pharmacy to dose  Freq: Continuous Route: XX  Start: 04/25/23 2315   End: 05/02/23 2314   Order specific questions:   Pharmacy to Dose: Cefepime  Indication of Use Sepsis       0016-Canceled Entry               Pharmacy Consult - Pharmacy to dose  Freq: Continuous Route: XX  Start: 04/25/23 2315   End: 05/02/23 2314   Order specific questions:   Pharmacy to Dose: Vancomycin  Indication of Use Sepsis       0016-Canceled Entry               sodium chloride 0.9 % infusion  Rate: 125 mL/hr Dose: 125 mL/hr  Freq: Continuous Route: IV  Start: 04/25/23 1810 2046-New Bag            0456-New Bag              Discontinued Medications  Medications 04/25/23 04/26/23       sodium chloride 0.9 % infusion  Rate: 125 mL/hr Dose: 125 mL/hr  Freq: Continuous Route: IV  Start: 04/25/23 2315   End: 04/26/23 0050    2251-New Bag                       Blood Administration Record (From admission, onward)    Completed transfusions     Ordered     Start    04/25/23 1814  Transfuse RBC Infuse Each Unit Over: 2H  Transfusion        Released Time Blood Unit Number Status   04/26/23 0114   23  [id number]  [id number] Completed 04/26/23 0403 04/25/23 1813                Lab Results (all)     Procedure Component Value Units Date/Time    Hemoglobin & Hematocrit, Blood [[id number]]  (Abnormal) Collected: 04/26/23 0530    Specimen: Blood Updated: 04/26/23 0637     Hemoglobin 7.6 g/dL      Hematocrit 25.0 %     CBC Auto Differential [[id number]]  (Abnormal) Collected: 04/26/23 0051    Specimen: Blood  Updated: 04/26/23 0137     WBC 14.63 10*3/mm3      RBC 2.36 10*6/mm3      Hemoglobin 5.7 g/dL      Hematocrit 19.4 %      MCV 82.2 fL      MCH 24.2 pg      MCHC 29.4 g/dL      RDW 18.7 %      RDW-SD 56.9 fl      MPV 9.1 fL      Platelets 328 10*3/mm3      Neutrophil % 77.5 %      Lymphocyte % 6.3 %      Monocyte % 10.4 %      Eosinophil % 3.7 %      Basophil % 0.2 %      Immature Grans % 1.9 %      Neutrophils, Absolute 11.34 10*3/mm3      Lymphocytes, Absolute 0.92 10*3/mm3      Monocytes, Absolute 1.52 10*3/mm3      Eosinophils, Absolute 0.54 10*3/mm3      Basophils, Absolute 0.03 10*3/mm3      Immature Grans, Absolute 0.28 10*3/mm3      nRBC 0.0 /100 WBC     Comprehensive Metabolic Panel [[id number]]  (Abnormal) Collected: 04/26/23 0051    Specimen: Blood Updated: 04/26/23 0127     Glucose 85 mg/dL      BUN 85 mg/dL      Creatinine 3.03 mg/dL      Sodium 136 mmol/L      Potassium 5.1 mmol/L      Comment: Slight hemolysis detected by analyzer. Results may be affected.        Chloride 104 mmol/L      CO2 14.4 mmol/L      Calcium 8.2 mg/dL      Total Protein 5.2 g/dL      Albumin 2.6 g/dL      ALT (SGPT) 12 U/L      AST (SGOT) 23 U/L      Alkaline Phosphatase 84 U/L      Total Bilirubin <0.2 mg/dL      Globulin 2.6 gm/dL      A/G Ratio 1.0 g/dL      BUN/Creatinine Ratio 28.1     Anion Gap 17.6 mmol/L      eGFR 15.4 mL/min/1.73     Narrative:      GFR Normal >60  Chronic Kidney Disease <60  Kidney Failure <15    The GFR formula is only valid for adults with stable renal function between ages 18 and 70.    COVID-19 and FLU A/B PCR - Swab, Nasopharynx [[id number]]  (Normal) Collected: 04/25/23 1955    Specimen: Swab from Nasopharynx Updated: 04/25/23 2020     COVID19 Not Detected     Influenza A PCR Not Detected     Influenza B PCR Not Detected    Narrative:      Fact sheet for providers: https://www.fda.gov/media/387962/download    Fact sheet for patients: https://www.fda.gov/media/622403/download    Test performed by  PCR.    Comprehensive Metabolic Panel [[id number]]  (Abnormal) Collected: 04/25/23 1308    Specimen: Blood Updated: 04/25/23 1350     Glucose 100 mg/dL      BUN 94 mg/dL      Creatinine 3.76 mg/dL      Sodium 130 mmol/L      Potassium 5.0 mmol/L      Chloride 95 mmol/L      CO2 17.9 mmol/L      Calcium 9.5 mg/dL      Total Protein 6.8 g/dL      Albumin 2.9 g/dL      ALT (SGPT) 11 U/L      AST (SGOT) 17 U/L      Alkaline Phosphatase 104 U/L      Total Bilirubin 0.2 mg/dL      Globulin 3.9 gm/dL      A/G Ratio 0.7 g/dL      BUN/Creatinine Ratio 25.0     Anion Gap 17.1 mmol/L      eGFR 11.9 mL/min/1.73      Comment: <15 Indicative of kidney failure       Narrative:      GFR Normal >60  Chronic Kidney Disease <60  Kidney Failure <15    The GFR formula is only valid for adults with stable renal function between ages 18 and 70.    C-reactive Protein [[id number]]  (Abnormal) Collected: 04/25/23 1308    Specimen: Blood Updated: 04/25/23 1350     C-Reactive Protein 28.78 mg/dL     Lactic Acid, Plasma [[id number]]  (Normal) Collected: 04/25/23 1308    Specimen: Blood Updated: 04/25/23 1344     Lactate 1.2 mmol/L     Sedimentation Rate [[id number]]  (Abnormal) Collected: 04/25/23 1308    Specimen: Blood Updated: 04/25/23 1324     Sed Rate 73 mm/hr     CBC Auto Differential [[id number]]  (Abnormal) Collected: 04/25/23 1308    Specimen: Blood Updated: 04/25/23 1322     WBC 16.57 10*3/mm3      RBC 2.70 10*6/mm3      Hemoglobin 6.7 g/dL      Hematocrit 22.9 %      MCV 84.8 fL      MCH 24.8 pg      MCHC 29.3 g/dL      RDW 18.6 %      RDW-SD 57.3 fl      MPV 8.7 fL      Platelets 384 10*3/mm3      Neutrophil % 80.8 %      Lymphocyte % 7.5 %      Monocyte % 7.4 %      Eosinophil % 2.2 %      Basophil % 0.4 %      Immature Grans % 1.7 %      Neutrophils, Absolute 13.38 10*3/mm3      Lymphocytes, Absolute 1.25 10*3/mm3      Monocytes, Absolute 1.23 10*3/mm3      Eosinophils, Absolute 0.37 10*3/mm3      Basophils, Absolute 0.06 10*3/mm3       Immature Grans, Absolute 0.28 10*3/mm3      nRBC 0.0 /100 WBC     Blood Culture - Blood, Arm, Left [[id number]] Collected: 04/25/23 1308    Specimen: Blood from Arm, Left Updated: 04/25/23 1320    Blood Culture - Blood, Arm, Right [[id number]] Collected: 04/25/23 1308    Specimen: Blood from Arm, Right Updated: 04/25/23 1320          Imaging Results (All)     Procedure Component Value Units Date/Time    CT Abdomen Pelvis Without Contrast [[id number]] Collected: 04/25/23 1727     Updated: 04/25/23 1730    Narrative:      CT Abdomen Pelvis WO    INDICATION:   Rule out colovaginal fistula.    TECHNIQUE:   CT of the abdomen and pelvis without IV contrast. 60 cc of Gastrografin mixed with 60 cc water instilled per rectum. Pre and post enema scanning performed. Coronal and sagittal reconstructions were obtained.  Radiation dose reduction techniques included  automated exposure control or exposure modulation based on body size. Count of known CT and cardiac nuc med studies performed in previous 12 months: 1.     COMPARISON:   CT chest 3/10/2023 and CT abdomen and pelvis 3/15/2022    FINDINGS:  Abdomen: Widely disseminated pulmonary nodules within both lung bases increased in size and number highly suggestive of metastatic disease. Largest nodule measures about 1.7 cm. Small left pleural effusion. Small pericardial effusion.    Liver, spleen and gallbladder are unremarkable. Pancreas and adrenal glands unremarkable.    Left sided hydronephrosis with left ureteral stent in place. The distal pigtail may be partially within the bladder. Borderline aneurysmal dilatation of the infrarenal aorta at 2.9 cm. Diffuse aortic atherosclerotic change.    Precontrast imaging demonstrates a large 8.4 x 10.2 x 9.4 cm complex appearing mass with multiple calcifications and a 5.8 x 4.1 cm mixed collection of gas and debris centrally possibly representing an abscess and possibly within the uterus with multiple  calcified fibroids. This structure  lies in the expected position of the uterus anterior to the rectum and posterior superior to the bladder.    Following retrograde instillation of contrast contrast is noted within the rectum with abnormal passage of contrast to the above-mentioned complex mass with an apparent fistulous communication near the rectosigmoid junction about 11 or 12 cm from the  anal verge. Contrast intermixed is with the complex air collection centrally which may represent a debris-filled uterine cavity. Contrast also identified within the vagina but no direct fistulous communication between the rectum and the vagina. Contrast  also communicates into the bladder possibly from a fistula track along the left anteroinferior aspect of the mass in the left posterior superior bladder.    Pelvis: No fracture or aggressive bone lesion. Multilevel degenerative disc disease and facet arthropathy lumbar spine with multilevel stenosis. Moderate amount of induration and edema within the right groin possibly related to prior vascular procedure  or vascular access.      Impression:      Large complex pelvic mass measuring 8.4 x 9.4 x 10.2 cm and presumably represents the patient's known pelvic malignancy (vulvar cancer). This could represent secondary involvement of a leiomyomatous uterus or dystrophic calcifications within a  malignancy.    Abnormal fistulous communication between the anterior rectum and the posterior aspect of the above-mentioned pelvic mass with at least one and possibly 2 fistulous connections as demonstrated on CT. Central debris within the mass may represent abscess or  necrosis.    Apparent fistulous communication between the mass and the bladder.    Left sided hydronephrosis and hydroureter with left ureteral stent in place. The distal pigtail difficult to confirm within the bladder and may be within the distal ureter.    Progressive widely disseminated pulmonary metastases.          Signer Name: MINNIE Cerna MD   Signed:  4/25/2023 5:27 PM   Workstation Name: [id number]    [facility]          Orders (all)      Start     Ordered    04/26/23 0501  Red Rash Within Skin Fold Care Q12H  Every 12 Hours        Comments: - Notify Provider of Severe Rashes Within Skin Folds That May Require Antifungal Cream or Powder (Order Required)  - Wash Skin Folds With Soap & Water, Pat Dry  - Apply Light Coating of Appropriate Cream (Z Guard or Miconazole) or Powder (Miconazole) - If Ordered  - Keep Area Dry With Absorbent Material (Pillow Case, Dry Wash Cloth, Gauze), Change Every 12 Hours & As Needed  - Consult Wound Care if Rash Does Not Improve After 3 Days    04/26/23 0500    04/26/23 0501  Follow Pressure Ulcer Prevention Measures Policy  Continuous        Comments: Implement Appropriate Pressure Ulcer Prevention Measures  - Open Order Report to View Full Instructions  Enter Wound LDA & Document Assessment  Add Wound Care Plan  Add Patient Education Per Policy    04/26/23 0500    04/26/23 0500  Wound Ostomy Eval & Treat  Once         04/26/23 0500    04/26/23 0051  Inpatient General Surgery Consult  Once        Specialty:  General Surgery  Provider:  (Not yet assigned)    04/26/23 0051 04/25/23 2315  metroNIDAZOLE (FLAGYL) IVPB 500 mg  Every 8 Hours         04/25/23 2218 04/25/23 2315  sodium chloride 0.9 % infusion  Continuous,   Status:  Discontinued         04/25/23 2218 04/25/23 2315  sodium chloride 0.9 % flush 10 mL  Every 12 Hours Scheduled         04/25/23 2218 04/25/23 2231  Inpatient Case Management  Consult  Once        Provider:  (Not yet assigned)    04/25/23 2231 04/25/23 2231  Inpatient Nutrition Consult  Once        Provider:  (Not yet assigned)    04/25/23 2231 04/25/23 2219  Intake & Output  Every Shift       04/25/23 2218 04/25/23 2219  Weigh Patient  Once         04/25/23 2218 04/25/23 2219  Oxygen Therapy- Nasal Cannula; Titrate for SPO2: 90% - 95%  Continuous          04/25/23 2218 04/25/23 2219  Insert Peripheral IV  Once         04/25/23 2218 04/25/23 2219  Saline Lock & Maintain IV Access  Continuous,   Status:  Canceled         04/25/23 2218 04/25/23 2219  Telemetry - Maintain IV Access  Continuous         04/25/23 2218 04/25/23 2219  Continuous Cardiac Monitoring  Continuous        Comments: Follow Standing Orders As Outlined in Process Instructions (Open Order Report to View Full Instructions)    04/25/23 2218 04/25/23 2219  May Be Off Telemetry for Tests  Continuous         04/25/23 2218 04/25/23 2219  Activity - Ad Veronique  Until Discontinued         04/25/23 2218 04/25/23 2219  Diet: Regular/House Diet; Texture: Regular Texture (IDDSI 7); Fluid Consistency: Thin (IDDSI 0)  Diet Effective Now         04/25/23 2218 04/25/23 2219  Inpatient Palliative Care MD Consult  Once        Specialty:  Hospice and Palliative Medicine  Provider:  (Not yet assigned)    04/25/23 2218 04/25/23 1934  Inpatient Admission  Once         04/25/23 1937    04/25/23 1934  Code Status and Medical Interventions:  Continuous         04/25/23 1937 04/25/23 1814  Verify Informed Consent  Once         04/25/23 1814 04/25/23 1814  Prepare RBC, 1 Units  Blood - Once         04/25/23 1814 04/25/23 1813  Transfuse RBC Infuse Each Unit Over: 2H  Transfusion         04/25/23 1814    04/25/23 1809  Hospitalist (on-call MD unless specified)  Once        Specialty:  Hospitalist  Provider:  (Not yet assigned)    04/25/23 1810 04/25/23 1213  Cardiac Monitoring  Continuous,   Status:  Canceled        Comments: Follow Standing Orders As Outlined in Process Instructions (Open Order Report to View Full Instructions)    04/25/23 1213    Pending  ondansetron (ZOFRAN) tablet 4 mg  Every 8 Hours PRN         Pending    Pending  levothyroxine (SYNTHROID, LEVOTHROID) tablet 100 mcg  Every Early Morning         Pending

## 2023-04-26 NOTE — CONSULTS
INFECTIOUS DISEASE CONSULTATION REPORT        Patient Identification:  Name:  Orquidea Rosenberg  Age:  77 y.o.  Sex:  female  :  1945  MRN:  [id number]   Visit Number:  [id number]  Primary Care Physician:  Jackeline Denson APRN  Referring Provider:  Dalila Mcdonald MD  Reason for consult: Complicated UTI       LOS: 1 day        Subjective       Subjective     History of present illness:      Thank you Dr. Mcdonald for allowing us to participate in the care of your patient.  As you well know, Ms. Orquidea Rosenberg is a 77 y.o. 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    Today, patient is sitting up in bed on 2 L nasal cannula in no apparent distress. Complaining of abdominal and pelvic pain.  States that she feels raw in the vulvar area and is urinating fecal matter.  Denies nausea or vomiting.  Denies fever or chills.  Denies shortness of breath or cough.  Admits to poor appetite.  Diminished breath sounds bilaterally.  Abdomen is soft with tenderness to palpation diffusely, but especially in the suprapubic region. Afebrile, no diarrhea.  CRP on 2023 was very elevated at 28.78.  WBC is improved at 14.63.  Lactic acid on admission was normal at 1.2.  Chest x-ray on 2023 showed no change in distribution of bilateral lung opacities which may represent changes of fibrosis and diffuse pulmonary nodules.  No pneumothorax.  CT abdomen pelvis on 2023 showed large complex pelvic mass measuring 8.4 x 9.4 x 10.2 cm and presumably represents the patient's known pelvic malignancy (vulvar cancer).  This could represent secondary  involvement of the leiomyomatous uterus or dystrophic calcifications within the malignancy.  Abnormal fistulous communications between the anterior rectum and the posterior aspect of the above-mentioned pelvic mass with at least 1 and possibly 2 fistulous connections as demonstrated on CT.  Central debris within the mass may represent abscess or necrosis.  Apparent fistulous communication between the mass in the bladder.  Left-sided hydronephrosis and hydroureter with left ureteral stent in place.  The distal pigtail difficult to confirm within the bladder and may be within the distal ureter.  Progressive widely disseminated pulmonary metastases.  Blood cultures on 4/25/2023 are in progress.  MRSA screen on 4/26/2023 was negative.  COVID-19 and flu A/B PCR on 4/25/2023 was negative.    Infectious Disease consultation was requested for antimicrobial management.    ---------------------------------------------------------------------------------------------------------------------     Review Of Systems:    Constitutional: no fever, chills and night sweats. No appetite change or unexpected weight change.  Fatigue.  Poor appetite.  Eyes: no eye drainage, itching or redness.  HEENT: no mouth sores, dysphagia or nose bleed.  Respiratory: no for shortness of breath, cough or production of sputum.  Cardiovascular: no chest pain, no palpitations, no orthopnea.  Gastrointestinal: no nausea, vomiting or diarrhea. No hematemesis or rectal bleeding.  Abdominal pain, pelvic pain, and urinating fecal matter.  Genitourinary: no dysuria or polyuria.  Pelvic pain.  Hematologic/lymphatic: no lymph node abnormalities, no easy bruising or easy bleeding.  Musculoskeletal: no muscle or joint pain.  Skin: No rash and no itching.  Vulvar irritation.  Neurological: no loss of consciousness, no seizure, no headache.  Behavioral/Psych: no depression or suicidal ideation.  Endocrine: no hot flashes.  Immunologic:  negative.    ---------------------------------------------------------------------------------------------------------------------     Past Medical History    Past Medical History:   Diagnosis Date    Arthritis     COPD (chronic obstructive pulmonary disease)     Elevated cholesterol     History of transfusion     Hypertension     Vulval ca        Past Surgical History    Past Surgical History:   Procedure Laterality Date    RADICAL VULVECTOMY  09/06/2019    RADICAL VULVECTOMY Bilateral 06/2019       Family History    Family History   Problem Relation Age of Onset    Alzheimer's disease Mother     Cancer Father     Cancer Brother     Diabetes Maternal Grandmother        Social History    Social History     Tobacco Use    Smoking status: Former    Smokeless tobacco: Never   Vaping Use    Vaping Use: Never used   Substance Use Topics    Alcohol use: No    Drug use: No       Allergies    Penicillins  ---------------------------------------------------------------------------------------------------------------------     Home Medications:    Prior to Admission Medications       Prescriptions Last Dose Informant Patient Reported? Taking?    furosemide (LASIX) 20 MG tablet 4/24/2023 Pharmacy Yes Yes    Take 1 tablet by mouth Daily.    levothyroxine (SYNTHROID, LEVOTHROID) 100 MCG tablet 4/24/2023 Pharmacy Yes Yes    Take 1 tablet by mouth Daily.    losartan (COZAAR) 50 MG tablet 4/24/2023 Pharmacy Yes Yes    Take 1 tablet by mouth Daily.    ondansetron (ZOFRAN) 4 MG tablet 4/24/2023 Pharmacy Yes Yes    Take 1 tablet by mouth Every 8 (Eight) Hours As Needed for Nausea or Vomiting.          ---------------------------------------------------------------------------------------------------------------------    Objective       Objective     Hospital Scheduled Meds:  ascorbic acid, 500 mg, Oral, Daily  cefepime, 2 g, Intravenous, Q24H  enoxaparin, 30 mg, Subcutaneous, Daily  metroNIDAZOLE, 500 mg, Intravenous,  Q8H  polyethylene glycol, 17 g, Oral, Daily  sodium chloride, 10 mL, Intravenous, Q12H  sodium chloride, 10 mL, Intravenous, Q12H      norepinephrine, 0.02-0.3 mcg/kg/min, Last Rate: 0.08 mcg/kg/min (04/26/23 0406)  sodium chloride, 125 mL/hr, Last Rate: 125 mL/hr (04/26/23 0456)      ---------------------------------------------------------------------------------------------------------------------   Vital Signs:  Temp:  [97.7 °F (36.5 °C)-98.8 °F (37.1 °C)] 98.2 °F (36.8 °C)  Heart Rate:  [] 79  Resp:  [13-29] 29  BP: ()/(41-75) 138/71  Mean Arterial Pressure (Non-Invasive) for the past 24 hrs (Last 3 readings):   Noninvasive MAP (mmHg)   04/26/23 1200 94   04/26/23 1100 91   04/26/23 1000 92     SpO2 Percentage    04/26/23 1000 04/26/23 1100 04/26/23 1200   SpO2: 93% 92% (!) 89%     SpO2:  [86 %-99 %] 89 %  on  Flow (L/min):  [2] 2;   Device (Oxygen Therapy): nasal cannula    Body mass index is 26.62 kg/m².  Wt Readings from Last 3 Encounters:   04/26/23 74.8 kg (164 lb 14.5 oz)   03/15/22 81.6 kg (180 lb)   02/21/22 81.6 kg (180 lb)     ---------------------------------------------------------------------------------------------------------------------     Physical Exam:    Constitutional: Elderly  female is sitting up in bed on 2 L nasal cannula in no apparent distress.   HENT:  Head: Normocephalic and atraumatic.  Mouth:  Moist mucous membranes.    Eyes:  Conjunctivae and EOM are normal.  No scleral icterus.  Neck:  Neck supple.  No JVD present.    Cardiovascular:  Normal rate, regular rhythm and normal heart sounds with no murmur. No edema.  Pulmonary/Chest:  No respiratory distress, no wheezes, no crackles, with diminished breath sounds bilaterally.  Abdominal:  Soft.  Bowel sounds are normal.  No distension.  Diffuse tenderness to palpation, especially in the suprapubic region.  Fecal matter escaping from urinary catheter.  Musculoskeletal:  No edema, no tenderness, and no deformity.   No swelling or redness of joints.  Neurological:  Alert and oriented to person, place, and time.  No facial droop.  No slurred speech.   Skin:  Skin is warm and dry.  No rash noted.  No pallor.  Vulvar deformity due to prior surgeries.  Psychiatric:  Normal mood and affect.  Behavior is normal.    ---------------------------------------------------------------------------------------------------------------------              Results from last 7 days   Lab Units 04/26/23  0530 04/26/23  0051 04/25/23  1308   CRP mg/dL  --   --  28.78*   LACTATE mmol/L  --   --  1.2   WBC 10*3/mm3  --  14.63* 16.57*   HEMOGLOBIN g/dL 7.6* 5.7* 6.7*   HEMATOCRIT % 25.0* 19.4* 22.9*   MCV fL  --  82.2 84.8   MCHC g/dL  --  29.4* 29.3*   PLATELETS 10*3/mm3  --  328 384     Results from last 7 days   Lab Units 04/26/23 0051 04/25/23  1308   SODIUM mmol/L 136 130*   POTASSIUM mmol/L 5.1 5.0   CHLORIDE mmol/L 104 95*   CO2 mmol/L 14.4* 17.9*   BUN mg/dL 85* 94*   CREATININE mg/dL 3.03* 3.76*   CALCIUM mg/dL 8.2* 9.5   GLUCOSE mg/dL 85 100*   ALBUMIN g/dL 2.6* 2.9*   BILIRUBIN mg/dL <0.2 0.2   ALK PHOS U/L 84 104   AST (SGOT) U/L 23 17   ALT (SGPT) U/L 12 11   Estimated Creatinine Clearance: 16.1 mL/min (A) (by C-G formula based on SCr of 3.03 mg/dL (H)).  No results found for: AMMONIA    No results found for: HGBA1C, POCGLU  No results found for: HGBA1C  Lab Results   Component Value Date    TSH 2.620 03/15/2022       No results found for: BLOODCX  No results found for: URINECX  No results found for: WOUNDCX  No results found for: STOOLCX  No results found for: RESPCX  Pain Management Panel            View : No data to display.                    I have personally reviewed the above laboratory results.   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     COMPARISON:    03/08/2023     FINDINGS:    Lungs:  No change in distribution of bilateral lung opacities which  may represent changes of fibrosis and diffuse pulmonary nodules.    Pleural space:  See below.      Heart:  Unremarkable.  No cardiomegaly.    Mediastinum:  Unremarkable.    Bones/joints:  Stable bony structures.    Tubes, lines and devices:  Right IJ deep line noted with tip at the  cavoatrial junction. No pneumothorax.       Impression:      1.  No change in distribution of bilateral lung opacities which may  represent changes of fibrosis and diffuse pulmonary nodules.  2.  Right IJ deep line noted with tip at the cavoatrial junction. No  pneumothorax.     This report was finalized on 4/26/2023 11:31 AM by Dr. Justen Matias MD.       CT Abdomen Pelvis Without Contrast [[id number]] Collected: 04/25/23 1727     Updated: 04/25/23 1730    Narrative:      CT Abdomen Pelvis WO    INDICATION:   Rule out colovaginal fistula.    TECHNIQUE:   CT of the abdomen and pelvis without IV contrast. 60 cc of Gastrografin mixed with 60 cc water instilled per rectum. Pre and post enema scanning performed. Coronal and sagittal reconstructions were obtained.  Radiation dose reduction techniques included  automated exposure control or exposure modulation based on body size. Count of known CT and cardiac nuc med studies performed in previous 12 months: 1.     COMPARISON:   CT chest 3/10/2023 and CT abdomen and pelvis 3/15/2022    FINDINGS:  Abdomen: Widely disseminated pulmonary nodules within both lung bases increased in size and number  highly suggestive of metastatic disease. Largest nodule measures about 1.7 cm. Small left pleural effusion. Small pericardial effusion.    Liver, spleen and gallbladder are unremarkable. Pancreas and adrenal glands unremarkable.    Left sided hydronephrosis with left ureteral stent in place. The distal pigtail may be partially within the bladder. Borderline aneurysmal dilatation of the infrarenal aorta at 2.9 cm. Diffuse aortic atherosclerotic change.    Precontrast imaging demonstrates a large 8.4 x 10.2 x 9.4 cm complex appearing mass with multiple calcifications and a 5.8 x 4.1 cm mixed collection of gas and debris centrally possibly representing an abscess and possibly within the uterus with multiple  calcified fibroids. This structure lies in the expected position of the uterus anterior to the rectum and posterior superior to the bladder.    Following retrograde instillation of contrast contrast is noted within the rectum with abnormal passage of contrast to the above-mentioned complex mass with an apparent fistulous communication near the rectosigmoid junction about 11 or 12 cm from the  anal verge. Contrast intermixed is with the complex air collection centrally which may represent a debris-filled uterine cavity. Contrast also identified within the vagina but no direct fistulous communication between the rectum and the vagina. Contrast  also communicates into the bladder possibly from a fistula track along the left anteroinferior aspect of the mass in the left posterior superior bladder.    Pelvis: No fracture or aggressive bone lesion. Multilevel degenerative disc disease and facet arthropathy lumbar spine with multilevel stenosis. Moderate amount of induration and edema within the right groin possibly related to prior vascular procedure  or vascular access.      Impression:      Large complex pelvic mass measuring 8.4 x 9.4 x 10.2 cm and presumably represents the patient's known pelvic malignancy (vulvar  cancer). This could represent secondary involvement of a leiomyomatous uterus or dystrophic calcifications within a  malignancy.    Abnormal fistulous communication between the anterior rectum and the posterior aspect of the above-mentioned pelvic mass with at least one and possibly 2 fistulous connections as demonstrated on CT. Central debris within the mass may represent abscess or  necrosis.    Apparent fistulous communication between the mass and the bladder.    Left sided hydronephrosis and hydroureter with left ureteral stent in place. The distal pigtail difficult to confirm within the bladder and may be within the distal ureter.    Progressive widely disseminated pulmonary metastases.          Signer Name: MINNIE Cerna MD   Signed: 4/25/2023 5:27 PM   Workstation Name: [id number]    [facility]          I have personally reviewed the above radiology results.   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 However, she appears to be in significant distress and is complaining of abdominal and pelvic pain. She reports feeling raw in the vulvar area and urinating fecal matter. There is no indication of nausea or vomiting, fever or chills, shortness of breath, or cough, but the patient admits to having a poor appetite. Bilateral breath sounds are diminished, and the  abdomen is soft but tender to palpation throughout, with increased tenderness in the suprapubic region. There is no diarrhea, and the CRP on 4/25/2023 was very elevated at 28.78, while the WBC has improved to 14.63. The lactic acid on admission was normal at 1.2.    A chest x-ray on 4/26/2023 showed no significant changes in the distribution of bilateral lung opacities, which may indicate fibrosis and diffuse pulmonary nodules, but there was no pneumothorax. A CT of the abdomen and pelvis on 4/25/2023 revealed a large complex pelvic mass measuring 8.4 x 9.4 x 10.2 cm, which is presumed to represent the patient's known pelvic malignancy (vulvar cancer). This could indicate secondary involvement of the leiomyomatous uterus or dystrophic calcifications within the malignancy. There are abnormal fistulous communications between the anterior rectum and the posterior aspect of the above-mentioned pelvic mass, with at least one and possibly two fistulous connections demonstrated on CT. The central debris within the mass may represent an abscess or necrosis, and there appears to be a fistulous communication between the mass and the bladder. Additionally, there is left-sided hydronephrosis and hydroureter, with a left ureteral stent in place. The distal pigtail is difficult to confirm within the bladder and may be within the distal ureter. The patient also has widely disseminated pulmonary metastases.    Blood cultures were taken on 4/25/2023 and are currently in progress, while the MRSA screen on 4/26/2023 was negative. The COVID-19 and flu A/B PCR on 4/25/2023 were also negative.    We recommend for now treatment with cefepime and metronidazole for anaerobic coverage due to the central debris within the mass especially with previous Pseudomonas infections in the past.  Imaging could be consistent with an abscess or necrosis, as well as the likely presence of a UTI with complicated fistula and fecal matter in the urine.  Vancomycin has been discontinued, especially given the negative MRSA screen with risk outweighing benefit at this time.        ANTIMICROBIAL THERAPY    cefepime 2 gm IVPB in 100 ml NS (VTB)  metroNIDAZOLE - 500-0.79 MG/100ML-%       Again, thank you Dr. Mcdonald for allowing us to participate in the care of your patient and please feel free to call for any questions you may have.    Code Status:     Code Status and Medical Interventions:   Ordered at: 04/25/23 1937     Level Of Support Discussed With:    Patient     Code Status (Patient has no pulse and is not breathing):    CPR (Attempt to Resuscitate)     Medical Interventions (Patient has pulse or is breathing):    Full Support     Shell Harrison PA-C  04/26/23  13:14 EDT    Electronically signed by Shell Harrison PA-C, 04/26/23, 1:30 PM EDT.    Electronically signed by Socrates Flaherty MD, 04/27/23, 6:16 AM EDT.

## 2023-04-26 NOTE — PROCEDURES
Insert Central Line At Bedside    Date/Time: 4/26/2023 10:50 AM  Performed by: Gustavo Burris MD  Authorized by: Gustavo Burris MD   Consent: Verbal consent obtained. Written consent obtained.  Risks and benefits: risks, benefits and alternatives were discussed  Consent given by: patient  Required items: required blood products, implants, devices, and special equipment available  Patient identity confirmed: verbally with patient, arm band, provided demographic data and hospital-assigned identification number  Indications: vascular access  Anesthesia: local infiltration    Anesthesia:  Local Anesthetic: lidocaine 1% with epinephrine    Sedation:  Patient sedated: no    Preparation: skin prepped with ChloraPrep  Skin prep agent dried: skin prep agent completely dried prior to procedure  Sterile barriers: all five maximum sterile barriers used - cap, mask, sterile gown, sterile gloves, and large sterile sheet  Hand hygiene: hand hygiene performed prior to central venous catheter insertion  Location details: right internal jugular  Patient position: flat  Catheter type: triple lumen  Catheter size: 7 Fr  Pre-procedure: landmarks identified  Ultrasound guidance: yes  Sterile ultrasound techniques: sterile gel and sterile probe covers were used  Number of attempts: 1  Successful placement: yes  Post-procedure: line sutured and dressing applied  Assessment: blood return through all ports and free fluid flow  Patient tolerance: patient tolerated the procedure well with no immediate complications

## 2023-04-26 NOTE — PROGRESS NOTES
[facility] HOSPITALIST PROGRESS NOTE     Patient Identification:  Name:  Orquidea Rosenberg  Age:  77 y.o.  Sex:  female  :  1945  MRN:  [id number]  Visit Number:  [id number]  Primary Care Provider:  Jackeline Denson APRN    Length of stay:  1    Subjective: Patient seen and examined, events noted last night patient was hypotensive despite receiving fluid challenge and IV fluid bolus per protocol, renal function slightly improved.  Patient continues to urinate fecaloid material, packed RBC transfusion due to anemia was delayed due to presence of antibodies but facility received 1 unit packed RBC.  Review of chest x-ray today post central line placement noted extensive nodular infiltrates.  Not present last year.    Chief Complaint: Pelvic pain  ----------------------------------------------------------------------------------------------------------------------  Current Hospital Meds:  ascorbic acid, 500 mg, Oral, Daily  cefepime, 2 g, Intravenous, Q24H  enoxaparin, 30 mg, Subcutaneous, Daily  metroNIDAZOLE, 500 mg, Intravenous, Q8H  polyethylene glycol, 17 g, Oral, Daily  sodium chloride, 10 mL, Intravenous, Q12H  sodium chloride, 10 mL, Intravenous, Q12H      norepinephrine, 0.02-0.3 mcg/kg/min, Last Rate: 0.08 mcg/kg/min (23 0406)  sodium chloride, 125 mL/hr, Last Rate: 125 mL/hr (23 0456)      ----------------------------------------------------------------------------------------------------------------------  Vital Signs:  Temp:  [97.7 °F (36.5 °C)-98.8 °F (37.1 °C)] 97.7 °F (36.5 °C)  Heart Rate:  [] 70  Resp:  [13-24] 24  BP: ()/(41-73) 125/56       Tele: Sinus rhythm 76 bpm      23  1146 23  2223 23  0500   Weight: 81.6 kg (179 lb 14.3 oz) 74.8 kg (164 lb 14.5 oz) 74.8 kg (164 lb 14.5 oz)     Body mass index is 26.62 kg/m².    Intake/Output Summary (Last 24 hours) at 2023 1054  Last data filed at 2023 0700  Gross per 24 hour   Intake  4563.09 ml   Output --   Net 4563.09 ml     Diet: Regular/House Diet; Texture: Regular Texture (IDDSI 7); Fluid Consistency: Thin (IDDSI 0)  ----------------------------------------------------------------------------------------------------------------------  Physical exam:  General: Comfortable,awake, alert, oriented to self, place, and time, chronically ill-appearing, pale.  No respiratory distress.    Skin:  Skin is warm and dry. No rash noted. No pallor.    HENT:  Head:  Normocephalic and atraumatic.  Mouth:  Moist mucous membranes.    Eyes:  Conjunctivae and EOM are normal.  Pupils are equal, round, and reactive to light.  No scleral icterus.    Neck:  Neck supple.  No JVD present.  No lymphadenopathy   Pulmonary/Chest: Poor inspiratory effort, occasional I do not hear any wheezing, no rales or crackles.   Cardiovascular:  Normal rate, regular rhythm and normal heart sounds with no murmur.  Abdominal:  Soft.  Bowel sounds are normal.  No distension, subjective tenderness suprapubic area on deep palpation  Extremities:  No edema, no tenderness, and no deformity.  No red or swollen joints anywhere.  Strong pulses in all 4 extremities with no clubbing, no cyanosis, no edema.  Neurological:  Motor strength equal no obvious deficit, sensory grossly intact.   No cranial nerve deficit.  No tongue deviation.  No facial droop.  No slurred speech.    Genitourinary: Significant deformity of the vulvar area, still has fecaloid urine   ----------------------------------------------------------------------------------------------------------------------  ----------------------------------------------------------------------------------------------------------------------      Results from last 7 days   Lab Units 04/26/23  0530 04/26/23  0051 04/25/23  1308   CRP mg/dL  --   --  28.78*   LACTATE mmol/L  --   --  1.2   WBC 10*3/mm3  --  14.63* 16.57*   HEMOGLOBIN g/dL 7.6* 5.7* 6.7*   HEMATOCRIT % 25.0* 19.4* 22.9*   MCV fL   --  82.2 84.8   MCHC g/dL  --  29.4* 29.3*   PLATELETS 10*3/mm3  --  328 384         Results from last 7 days   Lab Units 04/26/23  0051 04/25/23  1308   SODIUM mmol/L 136 130*   POTASSIUM mmol/L 5.1 5.0   CHLORIDE mmol/L 104 95*   CO2 mmol/L 14.4* 17.9*   BUN mg/dL 85* 94*   CREATININE mg/dL 3.03* 3.76*   CALCIUM mg/dL 8.2* 9.5   GLUCOSE mg/dL 85 100*   ALBUMIN g/dL 2.6* 2.9*   BILIRUBIN mg/dL <0.2 0.2   ALK PHOS U/L 84 104   AST (SGOT) U/L 23 17   ALT (SGPT) U/L 12 11   Estimated Creatinine Clearance: 16.1 mL/min (A) (by C-G formula based on SCr of 3.03 mg/dL (H)).    No results found for: AMMONIA      No results found for: BLOODCX  No results found for: URINECX  No results found for: WOUNDCX  No results found for: STOOLCX    I have personally looked at the labs and they are summarized above.  ----------------------------------------------------------------------------------------------------------------------  Imaging Results (Last 24 Hours)     Procedure Component Value Units Date/Time    XR Chest 1 View [[id number]] Resulted: 04/26/23 1042     Updated: 04/26/23 1042    CT Abdomen Pelvis Without Contrast [[id number]] Collected: 04/25/23 1727     Updated: 04/25/23 1730    Narrative:      CT Abdomen Pelvis WO    INDICATION:   Rule out colovaginal fistula.    TECHNIQUE:   CT of the abdomen and pelvis without IV contrast. 60 cc of Gastrografin mixed with 60 cc water instilled per rectum. Pre and post enema scanning performed. Coronal and sagittal reconstructions were obtained.  Radiation dose reduction techniques included  automated exposure control or exposure modulation based on body size. Count of known CT and cardiac nuc med studies performed in previous 12 months: 1.     COMPARISON:   CT chest 3/10/2023 and CT abdomen and pelvis 3/15/2022    FINDINGS:  Abdomen: Widely disseminated pulmonary nodules within both lung bases increased in size and number highly suggestive of metastatic disease. Largest nodule measures  about 1.7 cm. Small left pleural effusion. Small pericardial effusion.    Liver, spleen and gallbladder are unremarkable. Pancreas and adrenal glands unremarkable.    Left sided hydronephrosis with left ureteral stent in place. The distal pigtail may be partially within the bladder. Borderline aneurysmal dilatation of the infrarenal aorta at 2.9 cm. Diffuse aortic atherosclerotic change.    Precontrast imaging demonstrates a large 8.4 x 10.2 x 9.4 cm complex appearing mass with multiple calcifications and a 5.8 x 4.1 cm mixed collection of gas and debris centrally possibly representing an abscess and possibly within the uterus with multiple  calcified fibroids. This structure lies in the expected position of the uterus anterior to the rectum and posterior superior to the bladder.    Following retrograde instillation of contrast contrast is noted within the rectum with abnormal passage of contrast to the above-mentioned complex mass with an apparent fistulous communication near the rectosigmoid junction about 11 or 12 cm from the  anal verge. Contrast intermixed is with the complex air collection centrally which may represent a debris-filled uterine cavity. Contrast also identified within the vagina but no direct fistulous communication between the rectum and the vagina. Contrast  also communicates into the bladder possibly from a fistula track along the left anteroinferior aspect of the mass in the left posterior superior bladder.    Pelvis: No fracture or aggressive bone lesion. Multilevel degenerative disc disease and facet arthropathy lumbar spine with multilevel stenosis. Moderate amount of induration and edema within the right groin possibly related to prior vascular procedure  or vascular access.      Impression:      Large complex pelvic mass measuring 8.4 x 9.4 x 10.2 cm and presumably represents the patient's known pelvic malignancy (vulvar cancer). This could represent secondary involvement of a  leiomyomatous uterus or dystrophic calcifications within a  malignancy.    Abnormal fistulous communication between the anterior rectum and the posterior aspect of the above-mentioned pelvic mass with at least one and possibly 2 fistulous connections as demonstrated on CT. Central debris within the mass may represent abscess or  necrosis.    Apparent fistulous communication between the mass and the bladder.    Left sided hydronephrosis and hydroureter with left ureteral stent in place. The distal pigtail difficult to confirm within the bladder and may be within the distal ureter.    Progressive widely disseminated pulmonary metastases.          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  Patient has left ureteral stent previously, will ask urology for opinion regarding this As per daughter this is supposed to be replaced.  Infectious disease consult will be requested for guidance and opinion regarding patient's continued soiling and contamination of the urinary bladder with feces through the fistulous tract.  We will also get 2D  echo    Very difficult case, will update daughter and son when present.  Palliative care consult for goals of care discussion.    Overall patient's prognosis is very poor      Disposition pending improvement      Dalila Mcdonald MD  04/26/23  10:54 EDT

## 2023-04-27 ENCOUNTER — APPOINTMENT (OUTPATIENT)
Dept: GENERAL RADIOLOGY | Facility: HOSPITAL | Age: 78
DRG: 871 | End: 2023-04-27
Payer: MEDICARE

## 2023-04-27 ENCOUNTER — APPOINTMENT (OUTPATIENT)
Dept: CARDIOLOGY | Facility: HOSPITAL | Age: 78
DRG: 871 | End: 2023-04-27
Payer: MEDICARE

## 2023-04-27 LAB
ANION GAP SERPL CALCULATED.3IONS-SCNC: 10.3 MMOL/L (ref 5–15)
BASOPHILS # BLD AUTO: 0.05 10*3/MM3 (ref 0–0.2)
BASOPHILS NFR BLD AUTO: 0.5 % (ref 0–1.5)
BH CV ECHO MEAS - ACS: 0.8 CM
BH CV ECHO MEAS - AO MAX PG: 27.8 MMHG
BH CV ECHO MEAS - AO MEAN PG: 14.3 MMHG
BH CV ECHO MEAS - AO ROOT DIAM: 3.1 CM
BH CV ECHO MEAS - AO V2 MAX: 263.7 CM/SEC
BH CV ECHO MEAS - AO V2 VTI: 55.2 CM
BH CV ECHO MEAS - AVA(I,D): 1.78 CM2
BH CV ECHO MEAS - EDV(CUBED): 85.2 ML
BH CV ECHO MEAS - EDV(MOD-SP2): 57.7 ML
BH CV ECHO MEAS - EDV(MOD-SP4): 33.5 ML
BH CV ECHO MEAS - EF(MOD-BP): 67.4 %
BH CV ECHO MEAS - EF(MOD-SP2): 72.4 %
BH CV ECHO MEAS - EF(MOD-SP4): 65.7 %
BH CV ECHO MEAS - ESV(CUBED): 10.6 ML
BH CV ECHO MEAS - ESV(MOD-SP2): 15.9 ML
BH CV ECHO MEAS - ESV(MOD-SP4): 11.5 ML
BH CV ECHO MEAS - FS: 50 %
BH CV ECHO MEAS - IVS/LVPW: 1 CM
BH CV ECHO MEAS - IVSD: 1.1 CM
BH CV ECHO MEAS - LA DIMENSION: 3.5 CM
BH CV ECHO MEAS - LAT PEAK E' VEL: 11.7 CM/SEC
BH CV ECHO MEAS - LV DIASTOLIC VOL/BSA (35-75): 17.8 CM2
BH CV ECHO MEAS - LV MASS(C)D: 168.9 GRAMS
BH CV ECHO MEAS - LV MAX PG: 10.1 MMHG
BH CV ECHO MEAS - LV MEAN PG: 4 MMHG
BH CV ECHO MEAS - LV SYSTOLIC VOL/BSA (12-30): 6.1 CM2
BH CV ECHO MEAS - LV V1 MAX: 159 CM/SEC
BH CV ECHO MEAS - LV V1 VTI: 31.2 CM
BH CV ECHO MEAS - LVIDD: 4.4 CM
BH CV ECHO MEAS - LVIDS: 2.2 CM
BH CV ECHO MEAS - LVOT AREA: 3.1 CM2
BH CV ECHO MEAS - LVOT DIAM: 2 CM
BH CV ECHO MEAS - LVPWD: 1.1 CM
BH CV ECHO MEAS - MED PEAK E' VEL: 8.2 CM/SEC
BH CV ECHO MEAS - MV A MAX VEL: 93.3 CM/SEC
BH CV ECHO MEAS - MV DEC SLOPE: 414 CM/SEC2
BH CV ECHO MEAS - MV DEC TIME: 0.22 MSEC
BH CV ECHO MEAS - MV E MAX VEL: 91.1 CM/SEC
BH CV ECHO MEAS - MV E/A: 0.98
BH CV ECHO MEAS - PA ACC TIME: 0.07 SEC
BH CV ECHO MEAS - PA PR(ACCEL): 46.6 MMHG
BH CV ECHO MEAS - RAP SYSTOLE: 10 MMHG
BH CV ECHO MEAS - RVSP: 66.6 MMHG
BH CV ECHO MEAS - SI(MOD-SP2): 22.2 ML/M2
BH CV ECHO MEAS - SI(MOD-SP4): 11.7 ML/M2
BH CV ECHO MEAS - SV(LVOT): 98 ML
BH CV ECHO MEAS - SV(MOD-SP2): 41.8 ML
BH CV ECHO MEAS - SV(MOD-SP4): 22 ML
BH CV ECHO MEAS - TAPSE (>1.6): 1.57 CM
BH CV ECHO MEAS - TR MAX PG: 56.6 MMHG
BH CV ECHO MEAS - TR MAX VEL: 376 CM/SEC
BH CV ECHO MEASUREMENTS AVERAGE E/E' RATIO: 9.16
BH CV XLRA - RV BASE: 3.6 CM
BH CV XLRA - RV LENGTH: 5.8 CM
BH CV XLRA - RV MID: 2.6 CM
BUN SERPL-MCNC: 68 MG/DL (ref 8–23)
BUN/CREAT SERPL: 35.2 (ref 7–25)
CALCIUM SPEC-SCNC: 8.1 MG/DL (ref 8.6–10.5)
CHLORIDE SERPL-SCNC: 112 MMOL/L (ref 98–107)
CO2 SERPL-SCNC: 16.7 MMOL/L (ref 22–29)
CREAT SERPL-MCNC: 1.93 MG/DL (ref 0.57–1)
DEPRECATED RDW RBC AUTO: 59.9 FL (ref 37–54)
EGFRCR SERPLBLD CKD-EPI 2021: 26.4 ML/MIN/1.73
EOSINOPHIL # BLD AUTO: 0.78 10*3/MM3 (ref 0–0.4)
EOSINOPHIL NFR BLD AUTO: 7.2 % (ref 0.3–6.2)
ERYTHROCYTE [DISTWIDTH] IN BLOOD BY AUTOMATED COUNT: 18.6 % (ref 12.3–15.4)
FERRITIN SERPL-MCNC: 129.9 NG/ML (ref 13–150)
GLUCOSE SERPL-MCNC: 119 MG/DL (ref 65–99)
HCT VFR BLD AUTO: 25.9 % (ref 34–46.6)
HGB BLD-MCNC: 7.5 G/DL (ref 12–15.9)
IMM GRANULOCYTES # BLD AUTO: 0.37 10*3/MM3 (ref 0–0.05)
IMM GRANULOCYTES NFR BLD AUTO: 3.4 % (ref 0–0.5)
IRON 24H UR-MRATE: 18 MCG/DL (ref 37–145)
IRON SATN MFR SERPL: 9 % (ref 20–50)
LEFT ATRIUM VOLUME INDEX: 19.8 ML/M2
LYMPHOCYTES # BLD AUTO: 0.63 10*3/MM3 (ref 0.7–3.1)
LYMPHOCYTES NFR BLD AUTO: 5.8 % (ref 19.6–45.3)
MAXIMAL PREDICTED HEART RATE: 143 BPM
MCH RBC QN AUTO: 25.6 PG (ref 26.6–33)
MCHC RBC AUTO-ENTMCNC: 29 G/DL (ref 31.5–35.7)
MCV RBC AUTO: 88.4 FL (ref 79–97)
MONOCYTES # BLD AUTO: 0.79 10*3/MM3 (ref 0.1–0.9)
MONOCYTES NFR BLD AUTO: 7.3 % (ref 5–12)
NEUTROPHILS NFR BLD AUTO: 75.8 % (ref 42.7–76)
NEUTROPHILS NFR BLD AUTO: 8.2 10*3/MM3 (ref 1.7–7)
NRBC BLD AUTO-RTO: 0 /100 WBC (ref 0–0.2)
PLATELET # BLD AUTO: 299 10*3/MM3 (ref 140–450)
PMV BLD AUTO: 8.5 FL (ref 6–12)
POTASSIUM SERPL-SCNC: 4.5 MMOL/L (ref 3.5–5.2)
RBC # BLD AUTO: 2.93 10*6/MM3 (ref 3.77–5.28)
SODIUM SERPL-SCNC: 139 MMOL/L (ref 136–145)
STRESS TARGET HR: 122 BPM
TIBC SERPL-MCNC: 204 MCG/DL (ref 298–536)
TRANSFERRIN SERPL-MCNC: 137 MG/DL (ref 200–360)
WBC NRBC COR # BLD: 10.82 10*3/MM3 (ref 3.4–10.8)

## 2023-04-27 PROCEDURE — 71045 X-RAY EXAM CHEST 1 VIEW: CPT | Performed by: RADIOLOGY

## 2023-04-27 PROCEDURE — 82728 ASSAY OF FERRITIN: CPT | Performed by: HOSPITALIST

## 2023-04-27 PROCEDURE — 99232 SBSQ HOSP IP/OBS MODERATE 35: CPT | Performed by: HOSPITALIST

## 2023-04-27 PROCEDURE — 93306 TTE W/DOPPLER COMPLETE: CPT

## 2023-04-27 PROCEDURE — 80048 BASIC METABOLIC PNL TOTAL CA: CPT | Performed by: HOSPITALIST

## 2023-04-27 PROCEDURE — 82746 ASSAY OF FOLIC ACID SERUM: CPT | Performed by: HOSPITALIST

## 2023-04-27 PROCEDURE — 94799 UNLISTED PULMONARY SVC/PX: CPT

## 2023-04-27 PROCEDURE — 94640 AIRWAY INHALATION TREATMENT: CPT

## 2023-04-27 PROCEDURE — 84466 ASSAY OF TRANSFERRIN: CPT | Performed by: HOSPITALIST

## 2023-04-27 PROCEDURE — 92610 EVALUATE SWALLOWING FUNCTION: CPT

## 2023-04-27 PROCEDURE — 94761 N-INVAS EAR/PLS OXIMETRY MLT: CPT

## 2023-04-27 PROCEDURE — 71045 X-RAY EXAM CHEST 1 VIEW: CPT

## 2023-04-27 PROCEDURE — 85025 COMPLETE CBC W/AUTO DIFF WBC: CPT | Performed by: HOSPITALIST

## 2023-04-27 PROCEDURE — 82607 VITAMIN B-12: CPT | Performed by: HOSPITALIST

## 2023-04-27 PROCEDURE — 99221 1ST HOSP IP/OBS SF/LOW 40: CPT

## 2023-04-27 PROCEDURE — 99232 SBSQ HOSP IP/OBS MODERATE 35: CPT | Performed by: INTERNAL MEDICINE

## 2023-04-27 PROCEDURE — 83540 ASSAY OF IRON: CPT | Performed by: HOSPITALIST

## 2023-04-27 PROCEDURE — 83010 ASSAY OF HAPTOGLOBIN QUANT: CPT | Performed by: HOSPITALIST

## 2023-04-27 PROCEDURE — 25010000002 ENOXAPARIN PER 10 MG: Performed by: HOSPITALIST

## 2023-04-27 PROCEDURE — 25010000002 CEFTRIAXONE PER 250 MG: Performed by: INTERNAL MEDICINE

## 2023-04-27 RX ORDER — HYDROCODONE BITARTRATE AND ACETAMINOPHEN 5; 325 MG/1; MG/1
1 TABLET ORAL EVERY 6 HOURS PRN
Status: DISCONTINUED | OUTPATIENT
Start: 2023-04-27 | End: 2023-05-04

## 2023-04-27 RX ORDER — IPRATROPIUM BROMIDE AND ALBUTEROL SULFATE 2.5; .5 MG/3ML; MG/3ML
3 SOLUTION RESPIRATORY (INHALATION)
Status: DISCONTINUED | OUTPATIENT
Start: 2023-04-27 | End: 2023-05-05 | Stop reason: HOSPADM

## 2023-04-27 RX ORDER — NYSTATIN 100000 [USP'U]/G
POWDER TOPICAL EVERY 12 HOURS SCHEDULED
Status: DISCONTINUED | OUTPATIENT
Start: 2023-04-27 | End: 2023-05-05 | Stop reason: HOSPADM

## 2023-04-27 RX ADMIN — NYSTATIN: 100000 POWDER TOPICAL at 16:18

## 2023-04-27 RX ADMIN — IPRATROPIUM BROMIDE AND ALBUTEROL SULFATE 3 ML: .5; 2.5 SOLUTION RESPIRATORY (INHALATION) at 12:27

## 2023-04-27 RX ADMIN — Medication 10 ML: at 20:49

## 2023-04-27 RX ADMIN — HYDROCODONE BITARTRATE AND ACETAMINOPHEN 1 TABLET: 5; 325 TABLET ORAL at 18:58

## 2023-04-27 RX ADMIN — Medication 10 ML: at 20:48

## 2023-04-27 RX ADMIN — IPRATROPIUM BROMIDE AND ALBUTEROL SULFATE 3 ML: .5; 2.5 SOLUTION RESPIRATORY (INHALATION) at 18:31

## 2023-04-27 RX ADMIN — OXYCODONE HYDROCHLORIDE AND ACETAMINOPHEN 500 MG: 500 TABLET ORAL at 09:20

## 2023-04-27 RX ADMIN — CEFTRIAXONE 2 G: 2 INJECTION, POWDER, FOR SOLUTION INTRAMUSCULAR; INTRAVENOUS at 20:48

## 2023-04-27 RX ADMIN — SODIUM CHLORIDE 125 ML/HR: 9 INJECTION, SOLUTION INTRAVENOUS at 06:23

## 2023-04-27 RX ADMIN — Medication 10 ML: at 09:21

## 2023-04-27 RX ADMIN — METRONIDAZOLE 500 MG: 5 INJECTION, SOLUTION INTRAVENOUS at 16:18

## 2023-04-27 RX ADMIN — ENOXAPARIN SODIUM 30 MG: 30 INJECTION SUBCUTANEOUS at 09:20

## 2023-04-27 RX ADMIN — METRONIDAZOLE 500 MG: 5 INJECTION, SOLUTION INTRAVENOUS at 23:52

## 2023-04-27 RX ADMIN — METRONIDAZOLE 500 MG: 5 INJECTION, SOLUTION INTRAVENOUS at 06:17

## 2023-04-27 RX ADMIN — NYSTATIN: 100000 POWDER TOPICAL at 20:48

## 2023-04-27 NOTE — PROGRESS NOTES
[facility] HOSPITALIST PROGRESS NOTE     Patient Identification:  Name:  Orquidea Rosenberg  Age:  77 y.o.  Sex:  female  :  1945  MRN:  [id number]  Visit Number:  [id number]  Primary Care Provider:  Jackeline Denson APRN    Length of stay:  2    Subjective: Patient seen and examined, she is more awake she appears better but dyspneic she has wheezing bilateral, denies coughing.  Urinary returns from external catheter is better at this time no longer fecaloid, appearance appears to be more clear today.  More awake and conversant.  She has wheezing bilateral expiratory, she denies choking when she eats.  Patient no longer on pressure support blood pressure stable,    Chief Complaint: Vomiting pelvic pain  ----------------------------------------------------------------------------------------------------------------------  Current Hospital Meds:  ascorbic acid, 500 mg, Oral, Daily  cefepime, 2 g, Intravenous, Q24H  enoxaparin, 30 mg, Subcutaneous, Daily  metroNIDAZOLE, 500 mg, Intravenous, Q8H  polyethylene glycol, 17 g, Oral, Daily  sodium chloride, 10 mL, Intravenous, Q12H  sodium chloride, 10 mL, Intravenous, Q12H  sodium chloride, 10 mL, Intravenous, Q12H  sodium chloride, 10 mL, Intravenous, Q12H  sodium chloride, 10 mL, Intravenous, Q12H      norepinephrine, 0.02-0.3 mcg/kg/min, Last Rate: Stopped (23)  sodium chloride, 125 mL/hr, Last Rate: 125 mL/hr (23 06)      ----------------------------------------------------------------------------------------------------------------------  Vital Signs:  Temp:  [97.1 °F (36.2 °C)-98.4 °F (36.9 °C)] 98.2 °F (36.8 °C)  Heart Rate:  [67-86] 74  Resp:  [] 25  BP: (117-148)/(51-76) 130/65       Tele: Sinus rhythm 85 bpm O2 saturations 98% on 2.5 L nasal cannula      23  2223 23  0500 23  0400   Weight: 74.8 kg (164 lb 14.5 oz) 74.8 kg (164 lb 14.5 oz) 78.6 kg (173 lb 4.5 oz)     Body mass index is 27.97  kg/m².    Intake/Output Summary (Last 24 hours) at 4/27/2023 0905  Last data filed at 4/27/2023 0800  Gross per 24 hour   Intake 4339.39 ml   Output 800 ml   Net 3539.39 ml     Diet: Regular/House Diet; Texture: Regular Texture (IDDSI 7); Fluid Consistency: Thin (IDDSI 0)  ----------------------------------------------------------------------------------------------------------------------  Physical exam:  General: Comfortable,awake, alert, oriented to self, place, and time,   No respiratory distress.    Skin:  Skin is warm and dry. No rash noted. No pallor.    HENT:  Head:  Normocephalic and atraumatic.  Mouth:  Moist mucous membranes.    Eyes:  Conjunctivae and EOM are normal.  Pupils are equal, round, and reactive to light.  No scleral icterus.    Neck:  Neck supple.  JVD difficult to assess due to presence of central line..    Right IJ central line,  Pulmonary/Chest: Polyphonic wheezing bilateral mostly expiratory, minimal coarse crackles both bases.  .good air movement.  Cardiovascular:  Normal rate, regular rhythm and normal heart sounds with no murmur.  Abdominal:  Soft.  Bowel sounds are normal.  No distension and no tenderness.  Suprapubic tenderness  Extremities:  No edema, no tenderness, and no deformity.  No red or swollen joints anywhere.  Strong pulses in all 4 extremities with no clubbing, no cyanosis, no edema.  Neurological:  Motor strength equal no obvious deficit, sensory grossly intact.   No cranial nerve deficit.  No tongue deviation.  No facial droop.  No slurred speech.    Genitourinary: External catheter now with clear urine noted on the catheter  Back:  ----------------------------------------------------------------------------------------------------------------------  ----------------------------------------------------------------------------------------------------------------------      Results from last 7 days   Lab Units 04/27/23  0027 04/26/23  0530 04/26/23  0051 04/25/23  1308    CRP mg/dL  --   --   --  28.78*   LACTATE mmol/L  --   --   --  1.2   WBC 10*3/mm3 10.82*  --  14.63* 16.57*   HEMOGLOBIN g/dL 7.5* 7.6* 5.7* 6.7*   HEMATOCRIT % 25.9* 25.0* 19.4* 22.9*   MCV fL 88.4  --  82.2 84.8   MCHC g/dL 29.0*  --  29.4* 29.3*   PLATELETS 10*3/mm3 299  --  328 384         Results from last 7 days   Lab Units 04/27/23  0027 04/26/23  0051 04/25/23  1308   SODIUM mmol/L 139 136 130*   POTASSIUM mmol/L 4.5 5.1 5.0   CHLORIDE mmol/L 112* 104 95*   CO2 mmol/L 16.7* 14.4* 17.9*   BUN mg/dL 68* 85* 94*   CREATININE mg/dL 1.93* 3.03* 3.76*   CALCIUM mg/dL 8.1* 8.2* 9.5   GLUCOSE mg/dL 119* 85 100*   ALBUMIN g/dL  --  2.6* 2.9*   BILIRUBIN mg/dL  --  <0.2 0.2   ALK PHOS U/L  --  84 104   AST (SGOT) U/L  --  23 17   ALT (SGPT) U/L  --  12 11   Estimated Creatinine Clearance: 25.8 mL/min (A) (by C-G formula based on SCr of 1.93 mg/dL (H)).    No results found for: AMMONIA      Blood Culture   Date Value Ref Range Status   04/25/2023 Abnormal Stain (C)  Preliminary   04/25/2023 Anaerobe (Organism type) (C)  Preliminary     No results found for: URINECX  No results found for: WOUNDCX  No results found for: STOOLCX    I have personally looked at the labs and they are summarized above.  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     COMPARISON:    03/08/2023     FINDINGS:    Lungs:  No change in distribution of bilateral lung  opacities which  may represent changes of fibrosis and diffuse pulmonary nodules.    Pleural space:  See below.      Heart:  Unremarkable.  No cardiomegaly.    Mediastinum:  Unremarkable.    Bones/joints:  Stable bony structures.    Tubes, lines and devices:  Right IJ deep line noted with tip at the  cavoatrial junction. No pneumothorax.       Impression:      1.  No change in distribution of bilateral lung opacities which may  represent changes of fibrosis and diffuse pulmonary nodules.  2.  Right IJ deep line noted with tip at the cavoatrial junction. No  pneumothorax.     This report was finalized on 4/26/2023 11:31 AM by Dr. Justen Matias MD.           ----------------------------------------------------------------------------------------------------------------------  Assessment and Plan:  -Sepsis present on admission secondary to complicated UTI  -UTI/with possible left-sided pyelonephritis  -Gram-positive bacilli bacteremia  -Vulvar cancer with diffuse metastasis including both lungs  -Bronchospasm, rule out concomitant pneumonia versus volume overload  -Chronic anemia with acute worsening status post 1 unit packed RBC  -Acute kidney injury  -Fistula formation between urinary bladder and pelvic tumor as well as tumor and rectum with fecaloid urine    Repeat x-ray today, follow-up 2D echo that was ordered yesterday, speech therapy evaluation, continue with cefepime and Flagyl, infectious disease help appreciated.  Supportive care.  Awaiting urology input regarding left ureteral stent previously placed.      Disposition pending improvement      Dalila Mcdonald MD  04/27/23  09:05 EDT

## 2023-04-27 NOTE — PROGRESS NOTES
"Palliative Care Daily Progress Note     S: Medical record reviewed, followed up with Primary RN Maryan and Dr Mcdonald regarding patient's condition. When palliative care entered the room Orquidea was awake and alert, she was audibly wheezing today in upper lobes and upper airways. She denied pain at this time, she did say that she was al little anxious as she did not sleep well and was tired this morning. She said that she had been a little nauseated this morning but it has improved. Despite her accessory muscle usage and audible wheezes, she denied shortness of breath. Her sister Scarlet and brother in law were at bedside.      O:   Palliative Performance Scale Score:     /63   Pulse 80   Temp 96.8 °F (36 °C) (Axillary)   Resp 24   Ht 167.6 cm (66\")   Wt 78.6 kg (173 lb 4.5 oz)   SpO2 96%   BMI 27.97 kg/m²     Intake/Output Summary (Last 24 hours) at 4/27/2023 1355  Last data filed at 4/27/2023 1200  Gross per 24 hour   Intake 4159.39 ml   Output 800 ml   Net 3359.39 ml       PE:  General Appearance:    Chronically ill appearing, alert, cooperative, NAD   HEENT:    NC/AT, without obvious abnormality, EOMI, anicteric    Neck:   supple, trachea midline, no JVD   Lungs:     Labored respirations this am, pt visibly retracting at neck and shoulder muscles., occasional rhochi, audible wheezing in upper lobes and upper airways,no rales noted    Heart:    RRR, normal S1 and S2, no M/R/G   Abdomen:     Soft, tenderness @ suprapubic region, ND, NABS    Extremities:   Moves all extremities, no edema   Pulses:   Pulses palpable and equal bilaterally   Skin:   Warm, dry   Neurologic:   A/Ox3, cooperative   Psych:   Calm, pleasant, almost child like           Meds: Reviewed and changes noted.    Labs:   Results from last 7 days   Lab Units 04/27/23  0027   WBC 10*3/mm3 10.82*   HEMOGLOBIN g/dL 7.5*   HEMATOCRIT % 25.9*   PLATELETS 10*3/mm3 299     Results from last 7 days   Lab Units 04/27/23  0027   SODIUM mmol/L 139 "   POTASSIUM mmol/L 4.5   CHLORIDE mmol/L 112*   CO2 mmol/L 16.7*   BUN mg/dL 68*   CREATININE mg/dL 1.93*   GLUCOSE mg/dL 119*   CALCIUM mg/dL 8.1*     Results from last 7 days   Lab Units 04/27/23  0027 04/26/23  0051   SODIUM mmol/L 139 136   POTASSIUM mmol/L 4.5 5.1   CHLORIDE mmol/L 112* 104   CO2 mmol/L 16.7* 14.4*   BUN mg/dL 68* 85*   CREATININE mg/dL 1.93* 3.03*   CALCIUM mg/dL 8.1* 8.2*   BILIRUBIN mg/dL  --  <0.2   ALK PHOS U/L  --  84   ALT (SGPT) U/L  --  12   AST (SGOT) U/L  --  23   GLUCOSE mg/dL 119* 85     Imaging Results (Last 72 Hours)     Procedure Component Value Units Date/Time    XR Chest AP [[id number]] Collected: 04/27/23 0947     Updated: 04/27/23 0950    Narrative:      EXAM:    XR Chest, 1 View     EXAM DATE:    4/27/2023 9:25 AM     CLINICAL HISTORY:    sob, wheeze; C51.9-Malignant neoplasm of vulva, unspecified;  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     COMPARISON:    04/26/2023     FINDINGS:    Lungs:  Bilateral lung opacities are stable.    Pleural space:  Trace left effusion noted.  No pneumothorax.    Heart:  Cardiomegaly is stable.    Mediastinum:  Unremarkable.    Bones/joints:  Unremarkable.    Tubes, lines and devices:  Right IJ deep line positioning is stable.       Impression:      1.  No interval change in the appearance of the chest. Bilateral lung  opacities appear stable.  2.  Support device positioning is stable.  3.  Trace left pleural effusion is noted.     This report was finalized on 4/27/2023 9:48 AM by Dr. Justen Matias MD.       XR Chest 1 View [[id number]] Collected: 04/26/23 1130     Updated: 04/26/23 1133    Narrative:      EXAM:    XR Chest, 1 View     EXAM DATE:    4/26/2023 10:42 AM     CLINICAL HISTORY:    central line placement; C51.9-Malignant neoplasm of vulva,  unspecified; N82.4-Other female intestinal-genital tract  fistulae;  N17.9-Acute kidney failure, unspecified; D64.9-Anemia, unspecified     TECHNIQUE:    Frontal view of the chest.     COMPARISON:    03/08/2023     FINDINGS:    Lungs:  No change in distribution of bilateral lung opacities which  may represent changes of fibrosis and diffuse pulmonary nodules.    Pleural space:  See below.      Heart:  Unremarkable.  No cardiomegaly.    Mediastinum:  Unremarkable.    Bones/joints:  Stable bony structures.    Tubes, lines and devices:  Right IJ deep line noted with tip at the  cavoatrial junction. No pneumothorax.       Impression:      1.  No change in distribution of bilateral lung opacities which may  represent changes of fibrosis and diffuse pulmonary nodules.  2.  Right IJ deep line noted with tip at the cavoatrial junction. No  pneumothorax.     This report was finalized on 4/26/2023 11:31 AM by Dr. Justen Matias MD.       CT Abdomen Pelvis Without Contrast [[id number]] Collected: 04/25/23 1727     Updated: 04/25/23 1730    Narrative:      CT Abdomen Pelvis WO    INDICATION:   Rule out colovaginal fistula.    TECHNIQUE:   CT of the abdomen and pelvis without IV contrast. 60 cc of Gastrografin mixed with 60 cc water instilled per rectum. Pre and post enema scanning performed. Coronal and sagittal reconstructions were obtained.  Radiation dose reduction techniques included  automated exposure control or exposure modulation based on body size. Count of known CT and cardiac nuc med studies performed in previous 12 months: 1.     COMPARISON:   CT chest 3/10/2023 and CT abdomen and pelvis 3/15/2022    FINDINGS:  Abdomen: Widely disseminated pulmonary nodules within both lung bases increased in size and number highly suggestive of metastatic disease. Largest nodule measures about 1.7 cm. Small left pleural effusion. Small pericardial effusion.    Liver, spleen and gallbladder are unremarkable. Pancreas and adrenal glands unremarkable.    Left sided hydronephrosis with left  ureteral stent in place. The distal pigtail may be partially within the bladder. Borderline aneurysmal dilatation of the infrarenal aorta at 2.9 cm. Diffuse aortic atherosclerotic change.    Precontrast imaging demonstrates a large 8.4 x 10.2 x 9.4 cm complex appearing mass with multiple calcifications and a 5.8 x 4.1 cm mixed collection of gas and debris centrally possibly representing an abscess and possibly within the uterus with multiple  calcified fibroids. This structure lies in the expected position of the uterus anterior to the rectum and posterior superior to the bladder.    Following retrograde instillation of contrast contrast is noted within the rectum with abnormal passage of contrast to the above-mentioned complex mass with an apparent fistulous communication near the rectosigmoid junction about 11 or 12 cm from the  anal verge. Contrast intermixed is with the complex air collection centrally which may represent a debris-filled uterine cavity. Contrast also identified within the vagina but no direct fistulous communication between the rectum and the vagina. Contrast  also communicates into the bladder possibly from a fistula track along the left anteroinferior aspect of the mass in the left posterior superior bladder.    Pelvis: No fracture or aggressive bone lesion. Multilevel degenerative disc disease and facet arthropathy lumbar spine with multilevel stenosis. Moderate amount of induration and edema within the right groin possibly related to prior vascular procedure  or vascular access.      Impression:      Large complex pelvic mass measuring 8.4 x 9.4 x 10.2 cm and presumably represents the patient's known pelvic malignancy (vulvar cancer). This could represent secondary involvement of a leiomyomatous uterus or dystrophic calcifications within a  malignancy.    Abnormal fistulous communication between the anterior rectum and the posterior aspect of the above-mentioned pelvic mass with at least one  and possibly 2 fistulous connections as demonstrated on CT. Central debris within the mass may represent abscess or  necrosis.    Apparent fistulous communication between the mass and the bladder.    Left sided hydronephrosis and hydroureter with left ureteral stent in place. The distal pigtail difficult to confirm within the bladder and may be within the distal ureter.    Progressive widely disseminated pulmonary metastases.          Signer Name: MINNIE Cerna MD   Signed: 4/25/2023 5:27 PM   Workstation Name: [facility]    [facility]            Diagnostics: Reviewed    A: Orquidea Rosenberg is a 77 y.o. female  admitted on 4/25/2023 with diarrhea, nausea, vomiting, and abdominal pain. 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 Orquidea had partial chemotherapy and radiation, however since that time has refused further treatment as she had difficulty tolerating treatments. Orquidea also refused colostomy and surgery at  for pneumoperitoneum with suspect bowel perforation. Orquidea was offered palliative or hospice care but declined at that time. Per pts daughter Lilli, her mother had been getting weaker over the few days before admission to [facility].  Lilli states she was having poor appetite, nausea, vomiting, and diarrhea as well as abdominal and pubic pain,She states she was fine one morning and was weaker in the evening and by the next morning could not get up at all, which led to her being brought to the ER.      P:  I was able to touch base with pts daughter Lilli via phone and she stated she was at hospital now. I had a conversation with daughter Lilli in the hallway outside her mothers room. We discussed that her mother was having more difficulty breathing today. Lilli stated that Orquidea has been talking to her mother that has passed in her sleep. We discussed that if Orquidea could no longer make  her decisions that she, Lilli is her POA as well as pts son Gavin is on the way, that the decision whether to resuscitate would fall to her(Lilli). Lilli states that she and her brother Gavin both understand that everything that could be done for her vulvar carcinoma and her condition that is to far progressed, has been done. Neither Lilli or her brother Gavin want their mother to go through resuscitation and being put on a ventilator. I discussed this conversation with Dr Mcdonald, Maryan Bell RN and Mayra ESPITIA.    We will continue to follow along. Please do not hesitate to contact us regarding further sx mgmt or GOC needs, including after hours or on weekends via our on call provider at [phone].     Aysha Saucedo, APRN    4/27/2023

## 2023-04-27 NOTE — CONSULTS
Name:  Orquidea Rosenberg  :  1945    DATE OF ADMISSION  2023    DATE OF CONSULT  2023     REFERRING PHYSICIAN  Dr. 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 female with longstanding history of static vulvar carcinoma diagnosed in 2018.  She has extensive vulvectomy which apparently was very complicated due to the extensive tumor.  She has an open wound that healed by second intention.  She underwent partial chemotherapy and radiation but since then has refused further treatment due to inability to tolerate.  On 2022 she was admitted at [facility] for pneumoperitoneum suspected of bowel perforation, patient refused colostomy and surgery, treatment included IR placement of drain catheter and antibiotic which apparently the patient improved.  She was told then that her prognosis will be very poor.  She was offered hospice and palliative care but she declined.     This time patient reported she has been getting weak for the past few days poor appetite pubic pain associate with nausea and vomiting.  This morning patient and daughter no history of urinating kalia stools were coming out instead of urine.  No fever, there is increased fatigability, there is more increased pelvic pain.    I saw Orquidea Rosenberg in their hospital room this morning.  Patient was sitting up in bed eating breakfast this morning.  She was on 2-1/2 L of oxygen as I entered the room.  Patient states that she was trying to receive oxygen therapy at home however was never able to set it up appropriately.  She has apparent wheezes on expiration. 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 She currently has a pure wick catheter in place with urinary output noted in collection canister.  Her urine is dark brown in color consistent with fecal matter contaminants.  I did discuss with the patient that given this fistula tract she will most likely have persistent gross fecal matter in her urine.  She does report that she has dysuria slightly prior before and when urinating but resolves after urination.  She has had multiple surgeries for vulvar cancer.  Patient reports that she has had her left ureteral stent in place for roughly 1 year now.  States that she was scheduled to have it changed out however did not keep her follow-up appointment.  Her BMP this morning does show improving BUN and creatinine with a decrease from 85-68 and 3.03-1.93 respectively.  Her GFR has also improved from 15 to 26.  She has had 2 red blood cell transfusions and her hemoglobin/hematocrit remained stable at roughly 7.5/25.9.  Her white blood cell count did decrease from 14.6-10.82.  She is currently receiving cefepime 2 g every 24 hours and Flagyl 500 mg every 8 hours.    PAST MEDICAL HISTORY  Past Medical History:   Diagnosis Date    Arthritis     COPD (chronic obstructive pulmonary disease)     Elevated cholesterol     History of transfusion     Hypertension     Vulval ca        PAST SURGICAL HISTORY  Past Surgical History:   Procedure Laterality Date    RADICAL VULVECTOMY  09/06/2019    RADICAL VULVECTOMY Bilateral 06/2019       SOCIAL HISTORY  Social History     Socioeconomic History    Marital status:    Tobacco Use    Smoking status: Former    Smokeless tobacco: Never   Vaping Use    Vaping Use: Never used   Substance and Sexual Activity    Alcohol use: No    Drug use: No    Sexual activity: Defer       FAMILY HISTORY  Family History   Problem Relation Age of Onset    Alzheimer's disease Mother     Cancer Father     Cancer Brother     Diabetes Maternal Grandmother         ALLERGIES  Allergies   Allergen Reactions    Penicillins Swelling       INPATIENT MEDICATIONS  Current Facility-Administered Medications   Medication Dose Route Frequency Provider Last Rate Last Admin    ascorbic acid (VITAMIN C) tablet 500 mg  500 mg Oral Daily Dalila Mcdonald MD   500 mg at 04/26/23 0914    cefepime 2 gm IVPB in 100 ml NS (VTB)  2 g Intravenous Q24H Dalila Mcdonald MD   2 g at 04/26/23 2238    Enoxaparin Sodium (LOVENOX) syringe 30 mg  30 mg Subcutaneous Daily Dalila Mcdonald MD   30 mg at 04/26/23 0914    metroNIDAZOLE (FLAGYL) IVPB 500 mg  500 mg Intravenous Q8H Dalila Mcdonald MD   500 mg at 04/27/23 0617    nitroglycerin (NITROSTAT) SL tablet 0.4 mg  0.4 mg Sublingual Q5 Min PRN Dalila Mcdonald MD        norepinephrine (LEVOPHED) 8 mg in 250 mL NS infusion (premix)  0.02-0.3 mcg/kg/min Intravenous Titrated Prudencio Solis MD   Stopped at 04/26/23 2307    polyethylene glycol (MIRALAX) packet 17 g  17 g Oral Daily Dalila Mcdonald MD   17 g at 04/26/23 0914    sodium chloride 0.9 % flush 10 mL  10 mL Intravenous PRN Balwinder Dixon MD        sodium chloride 0.9 % flush 10 mL  10 mL Intravenous Q12H Dalila Mcdonald MD   10 mL at 04/26/23 2112    sodium chloride 0.9 % flush 10 mL  10 mL Intravenous PRN Dalila Mcdonald MD        sodium chloride 0.9 % flush 10 mL  10 mL Intravenous Q12H Pruedncio Solis MD   10 mL at 04/26/23 2112    sodium chloride 0.9 % flush 10 mL  10 mL Intravenous PRN Prudencio Solis MD        sodium chloride 0.9 % flush 10 mL  10 mL Intravenous Q12H Dalila Mcdonald MD   10 mL at 04/26/23 2111    sodium chloride 0.9 % flush 10 mL  10 mL Intravenous Q12H Dalila Mcdonald MD   10 mL at 04/26/23 2111    sodium chloride 0.9 % flush 10 mL  10 mL Intravenous Q12H Dalila Mcdonald MD   10 mL at 04/26/23 2111    sodium chloride 0.9 % flush 10 mL  10 mL Intravenous PRN Dalila Mcdonald MD        sodium  "chloride 0.9 % flush 20 mL  20 mL Intravenous PRN Dalila Mcdonald MD        sodium chloride 0.9 % infusion 40 mL  40 mL Intravenous PRN Dalila Mcdonald MD        sodium chloride 0.9 % infusion 40 mL  40 mL Intravenous PRN Prudencio Solis MD        sodium chloride 0.9 % infusion 40 mL  40 mL Intravenous PRN Dalila Mcdonald MD        sodium chloride 0.9 % infusion  125 mL/hr Intravenous Continuous Balwinder Dixon  mL/hr at 04/27/23 0623 125 mL/hr at 04/27/23 0623       REVIEW OF SYSTEMS  CONSTITUTIONAL: Generalized weakness and fatigue.  Fever and chills.  EYES:  No blurry vision, diplopia or other vision changes.  ENT:  No hearing loss, nosebleeds or sore throat.  CARDIOVASCULAR:  No palpitations, arrhythmia, syncopal episodes or edema.  PULMONARY: Wheezing, chronic cough, and shortness of breath  GASTROINTESTINAL: Nausea and vomiting.  Diarrhea.  Left-sided abdominal pain  GENITOURINARY: Dysuria, frequency, leakage of fecal matter from the urethra; history of vulvar cancer; pelvic pain  MUSCULOSKELETAL:  No joint or back pains.  INTEGUMENTARY: No rashes or pruritus.  ENDOCRINE:  No excessive thirst or hot flashes.  HEMATOLOGIC:  No history of free bleeding, spontaneous bleeding or clotting.  IMMUNOLOGIC:  No allergies or frequent infections.  NEUROLOGIC: No numbness, tingling, seizures or weakness.  PSYCHIATRIC:  No anxiety or depression.    PHYSICAL EXAMINATION    /65   Pulse 74   Temp 98.2 °F (36.8 °C) (Oral)   Resp 25   Ht 167.6 cm (66\")   Wt 78.6 kg (173 lb 4.5 oz)   SpO2 97%   BMI 27.97 kg/m²     GENERAL: Chronically ill-appearing white female in no acute distress.  HEENT:  Pupils equally round and reactive to light.  Extraocular muscles intact.  CARDIOVASCULAR:  Regular rate and rhythm.  No murmurs, gallops or rubs.  LUNGS: Wheezing noted bilaterally with decreased air movement.  Currently on 2.5 L of oxygen.  ABDOMEN:  Soft, tenderness to palpation of the lateral " abdomen with no distention and positive bowel sounds noted  : Pure wick catheter in place with dark brown urine noted most consistent with fecal matter contaminants.  EXTREMITIES:  No clubbing, cyanosis or edema bilaterally.  SKIN:  No rashes or petechiae.  NEURO:  Cranial nerves grossly intact.  No focal deficits.  PSYCH:  Alert and oriented x3.    LABORATORY     WBC   Date Value Ref Range Status   04/27/2023 10.82 (H) 3.40 - 10.80 10*3/mm3 Final     RBC   Date Value Ref Range Status   04/27/2023 2.93 (L) 3.77 - 5.28 10*6/mm3 Final     Hemoglobin   Date Value Ref Range Status   04/27/2023 7.5 (L) 12.0 - 15.9 g/dL Final     Hematocrit   Date Value Ref Range Status   04/27/2023 25.9 (L) 34.0 - 46.6 % Final     MCV   Date Value Ref Range Status   04/27/2023 88.4 79.0 - 97.0 fL Final     MCH   Date Value Ref Range Status   04/27/2023 25.6 (L) 26.6 - 33.0 pg Final     MCHC   Date Value Ref Range Status   04/27/2023 29.0 (L) 31.5 - 35.7 g/dL Final     RDW   Date Value Ref Range Status   04/27/2023 18.6 (H) 12.3 - 15.4 % Final     RDW-SD   Date Value Ref Range Status   04/27/2023 59.9 (H) 37.0 - 54.0 fl Final     MPV   Date Value Ref Range Status   04/27/2023 8.5 6.0 - 12.0 fL Final     Platelets   Date Value Ref Range Status   04/27/2023 299 140 - 450 10*3/mm3 Final     Neutrophil %   Date Value Ref Range Status   04/27/2023 75.8 42.7 - 76.0 % Final     Lymphocyte %   Date Value Ref Range Status   04/27/2023 5.8 (L) 19.6 - 45.3 % Final     Monocyte %   Date Value Ref Range Status   04/27/2023 7.3 5.0 - 12.0 % Final     Eosinophil %   Date Value Ref Range Status   04/27/2023 7.2 (H) 0.3 - 6.2 % Final     Basophil %   Date Value Ref Range Status   04/27/2023 0.5 0.0 - 1.5 % Final     Immature Grans %   Date Value Ref Range Status   04/27/2023 3.4 (H) 0.0 - 0.5 % Final     Neutrophils, Absolute   Date Value Ref Range Status   04/27/2023 8.20 (H) 1.70 - 7.00 10*3/mm3 Final     Lymphocytes, Absolute   Date Value Ref Range  Status   04/27/2023 0.63 (L) 0.70 - 3.10 10*3/mm3 Final     Monocytes, Absolute   Date Value Ref Range Status   04/27/2023 0.79 0.10 - 0.90 10*3/mm3 Final     Eosinophils, Absolute   Date Value Ref Range Status   04/27/2023 0.78 (H) 0.00 - 0.40 10*3/mm3 Final     Basophils, Absolute   Date Value Ref Range Status   04/27/2023 0.05 0.00 - 0.20 10*3/mm3 Final     Immature Grans, Absolute   Date Value Ref Range Status   04/27/2023 0.37 (H) 0.00 - 0.05 10*3/mm3 Final     nRBC   Date Value Ref Range Status   04/27/2023 0.0 0.0 - 0.2 /100 WBC Final       Glucose   Date Value Ref Range Status   04/27/2023 119 (H) 65 - 99 mg/dL Final     Sodium   Date Value Ref Range Status   04/27/2023 139 136 - 145 mmol/L Final     Potassium   Date Value Ref Range Status   04/27/2023 4.5 3.5 - 5.2 mmol/L Final     CO2   Date Value Ref Range Status   04/27/2023 16.7 (L) 22.0 - 29.0 mmol/L Final     Chloride   Date Value Ref Range Status   04/27/2023 112 (H) 98 - 107 mmol/L Final     Anion Gap   Date Value Ref Range Status   04/27/2023 10.3 5.0 - 15.0 mmol/L Final     Creatinine   Date Value Ref Range Status   04/27/2023 1.93 (H) 0.57 - 1.00 mg/dL Final     BUN   Date Value Ref Range Status   04/27/2023 68 (H) 8 - 23 mg/dL Final     BUN/Creatinine Ratio   Date Value Ref Range Status   04/27/2023 35.2 (H) 7.0 - 25.0 Final     Calcium   Date Value Ref Range Status   04/27/2023 8.1 (L) 8.6 - 10.5 mg/dL Final     Alkaline Phosphatase   Date Value Ref Range Status   04/26/2023 84 39 - 117 U/L Final     Total Protein   Date Value Ref Range Status   04/26/2023 5.2 (L) 6.0 - 8.5 g/dL Final     ALT (SGPT)   Date Value Ref Range Status   04/26/2023 12 1 - 33 U/L Final     AST (SGOT)   Date Value Ref Range Status   04/26/2023 23 1 - 32 U/L Final     Total Bilirubin   Date Value Ref Range Status   04/26/2023 <0.2 0.0 - 1.2 mg/dL Final     Albumin   Date Value Ref Range Status   04/26/2023 2.6 (L) 3.5 - 5.2 g/dL Final     Globulin   Date Value Ref Range  Status   04/26/2023 2.6 gm/dL Final       No results found for: MG, PHOS  No results found for: LDH, URICACID     IMAGING  Imaging Results (Last 72 Hours)       Procedure Component Value Units Date/Time    XR Chest 1 View [[id number]] Collected: 04/26/23 1130     Updated: 04/26/23 1133    Narrative:      EXAM:    XR Chest, 1 View     EXAM DATE:    4/26/2023 10:42 AM     CLINICAL HISTORY:    central line placement; C51.9-Malignant neoplasm of vulva,  unspecified; 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     COMPARISON:    03/08/2023     FINDINGS:    Lungs:  No change in distribution of bilateral lung opacities which  may represent changes of fibrosis and diffuse pulmonary nodules.    Pleural space:  See below.      Heart:  Unremarkable.  No cardiomegaly.    Mediastinum:  Unremarkable.    Bones/joints:  Stable bony structures.    Tubes, lines and devices:  Right IJ deep line noted with tip at the  cavoatrial junction. No pneumothorax.       Impression:      1.  No change in distribution of bilateral lung opacities which may  represent changes of fibrosis and diffuse pulmonary nodules.  2.  Right IJ deep line noted with tip at the cavoatrial junction. No  pneumothorax.     This report was finalized on 4/26/2023 11:31 AM by Dr. Justen Matias MD.       CT Abdomen Pelvis Without Contrast [[id number]] Collected: 04/25/23 1727     Updated: 04/25/23 1730    Narrative:      CT Abdomen Pelvis WO    INDICATION:   Rule out colovaginal fistula.    TECHNIQUE:   CT of the abdomen and pelvis without IV contrast. 60 cc of Gastrografin mixed with 60 cc water instilled per rectum. Pre and post enema scanning performed. Coronal and sagittal reconstructions were obtained.  Radiation dose reduction techniques included  automated exposure control or exposure modulation based on body size. Count of known CT and  cardiac nuc med studies performed in previous 12 months: 1.     COMPARISON:   CT chest 3/10/2023 and CT abdomen and pelvis 3/15/2022    FINDINGS:  Abdomen: Widely disseminated pulmonary nodules within both lung bases increased in size and number highly suggestive of metastatic disease. Largest nodule measures about 1.7 cm. Small left pleural effusion. Small pericardial effusion.    Liver, spleen and gallbladder are unremarkable. Pancreas and adrenal glands unremarkable.    Left sided hydronephrosis with left ureteral stent in place. The distal pigtail may be partially within the bladder. Borderline aneurysmal dilatation of the infrarenal aorta at 2.9 cm. Diffuse aortic atherosclerotic change.    Precontrast imaging demonstrates a large 8.4 x 10.2 x 9.4 cm complex appearing mass with multiple calcifications and a 5.8 x 4.1 cm mixed collection of gas and debris centrally possibly representing an abscess and possibly within the uterus with multiple  calcified fibroids. This structure lies in the expected position of the uterus anterior to the rectum and posterior superior to the bladder.    Following retrograde instillation of contrast contrast is noted within the rectum with abnormal passage of contrast to the above-mentioned complex mass with an apparent fistulous communication near the rectosigmoid junction about 11 or 12 cm from the  anal verge. Contrast intermixed is with the complex air collection centrally which may represent a debris-filled uterine cavity. Contrast also identified within the vagina but no direct fistulous communication between the rectum and the vagina. Contrast  also communicates into the bladder possibly from a fistula track along the left anteroinferior aspect of the mass in the left posterior superior bladder.    Pelvis: No fracture or aggressive bone lesion. Multilevel degenerative disc disease and facet arthropathy lumbar spine with multilevel stenosis. Moderate amount of induration and  edema within the right groin possibly related to prior vascular procedure  or vascular access.      Impression:      Large complex pelvic mass measuring 8.4 x 9.4 x 10.2 cm and presumably represents the patient's known pelvic malignancy (vulvar cancer). This could represent secondary involvement of a leiomyomatous uterus or dystrophic calcifications within a  malignancy.    Abnormal fistulous communication between the anterior rectum and the posterior aspect of the above-mentioned pelvic mass with at least one and possibly 2 fistulous connections as demonstrated on CT. Central debris within the mass may represent abscess or  necrosis.    Apparent fistulous communication between the mass and the bladder.    Left sided hydronephrosis and hydroureter with left ureteral stent in place. The distal pigtail difficult to confirm within the bladder and may be within the distal ureter.    Progressive widely disseminated pulmonary metastases.          Signer Name: MINNIE Cerna MD   Signed: 4/25/2023 5:27 PM   Workstation Name: [email or web address] [place]    [facility]            CT Abdomen and Pelvis: No results found for this or any previous visit.       CT Stone Protocol: No results found for this or any previous visit.       KUB: No results found for this or any previous visit.   \    LABS:   Admission on 04/25/2023   Component Date Value Ref Range Status    Glucose 04/25/2023 100 (H)  65 - 99 mg/dL Final    BUN 04/25/2023 94 (H)  8 - 23 mg/dL Final    Creatinine 04/25/2023 3.76 (H)  0.57 - 1.00 mg/dL Final    Sodium 04/25/2023 130 (L)  136 - 145 mmol/L Final    Potassium 04/25/2023 5.0  3.5 - 5.2 mmol/L Final    Chloride 04/25/2023 95 (L)  98 - 107 mmol/L Final    CO2 04/25/2023 17.9 (L)  22.0 - 29.0 mmol/L Final    Calcium 04/25/2023 9.5  8.6 - 10.5 mg/dL Final    Total Protein 04/25/2023 6.8  6.0 - 8.5 g/dL Final    Albumin 04/25/2023 2.9 (L)  3.5 - 5.2 g/dL Final    ALT (SGPT) 04/25/2023 11  1 - 33 U/L  Final    AST (SGOT) 04/25/2023 17  1 - 32 U/L Final    Alkaline Phosphatase 04/25/2023 104  39 - 117 U/L Final    Total Bilirubin 04/25/2023 0.2  0.0 - 1.2 mg/dL Final    Globulin 04/25/2023 3.9  gm/dL Final    A/G Ratio 04/25/2023 0.7  g/dL Final    BUN/Creatinine Ratio 04/25/2023 25.0  7.0 - 25.0 Final    Anion Gap 04/25/2023 17.1 (H)  5.0 - 15.0 mmol/L Final    eGFR 04/25/2023 11.9 (L)  >60.0 mL/min/1.73 Final    <15 Indicative of kidney failure    Blood Culture 04/25/2023 Abnormal Stain (C)   Preliminary    Gram Stain 04/25/2023 Anaerobic Bottle Gram positive bacilli (C)   Preliminary    Blood Culture 04/25/2023 Anaerobe (Organism type) (C)   Preliminary    Gram Stain 04/25/2023 Anaerobic Bottle Gram positive bacilli (C)   Preliminary    Lactate 04/25/2023 1.2  0.5 - 2.0 mmol/L Final    C-Reactive Protein 04/25/2023 28.78 (H)  0.00 - 0.50 mg/dL Final    Sed Rate 04/25/2023 73 (H)  0 - 30 mm/hr Final    WBC 04/25/2023 16.57 (H)  3.40 - 10.80 10*3/mm3 Final    RBC 04/25/2023 2.70 (L)  3.77 - 5.28 10*6/mm3 Final    Hemoglobin 04/25/2023 6.7 (C)  12.0 - 15.9 g/dL Final    Hematocrit 04/25/2023 22.9 (L)  34.0 - 46.6 % Final    MCV 04/25/2023 84.8  79.0 - 97.0 fL Final    MCH 04/25/2023 24.8 (L)  26.6 - 33.0 pg Final    MCHC 04/25/2023 29.3 (L)  31.5 - 35.7 g/dL Final    RDW 04/25/2023 18.6 (H)  12.3 - 15.4 % Final    RDW-SD 04/25/2023 57.3 (H)  37.0 - 54.0 fl Final    MPV 04/25/2023 8.7  6.0 - 12.0 fL Final    Platelets 04/25/2023 384  140 - 450 10*3/mm3 Final    Neutrophil % 04/25/2023 80.8 (H)  42.7 - 76.0 % Final    Lymphocyte % 04/25/2023 7.5 (L)  19.6 - 45.3 % Final    Monocyte % 04/25/2023 7.4  5.0 - 12.0 % Final    Eosinophil % 04/25/2023 2.2  0.3 - 6.2 % Final    Basophil % 04/25/2023 0.4  0.0 - 1.5 % Final    Immature Grans % 04/25/2023 1.7 (H)  0.0 - 0.5 % Final    Neutrophils, Absolute 04/25/2023 13.38 (H)  1.70 - 7.00 10*3/mm3 Final    Lymphocytes, Absolute 04/25/2023 1.25  0.70 - 3.10 10*3/mm3 Final     Monocytes, Absolute 04/25/2023 1.23 (H)  0.10 - 0.90 10*3/mm3 Final    Eosinophils, Absolute 04/25/2023 0.37  0.00 - 0.40 10*3/mm3 Final    Basophils, Absolute 04/25/2023 0.06  0.00 - 0.20 10*3/mm3 Final    Immature Grans, Absolute 04/25/2023 0.28 (H)  0.00 - 0.05 10*3/mm3 Final    nRBC 04/25/2023 0.0  0.0 - 0.2 /100 WBC Final    Product Code 04/26/2023 [id number]   Final    Unit Number 04/26/2023 [id number]   Final    UNIT  ABO 04/26/2023 A   Final    UNIT  RH 04/26/2023 POS   Final    Crossmatch Interpretation 04/26/2023 Compatible   Final    Dispense Status 04/26/2023 PT   Final    Blood Expiration Date 04/26/2023 [id number]   Final    Blood Type Barcode 04/26/2023 6200   Final    ABO Type 04/25/2023 A   Final    RH type 04/25/2023 Positive   Final    Antibody Screen 04/25/2023 Positive   Final    T&S Expiration Date 04/25/2023 4/28/2023 11:59:59 PM   Final    COVID19 04/25/2023 Not Detected  Not Detected - Ref. Range Final    Influenza A PCR 04/25/2023 Not Detected  Not Detected Final    Influenza B PCR 04/25/2023 Not Detected  Not Detected Final    Glucose 04/26/2023 85  65 - 99 mg/dL Final    BUN 04/26/2023 85 (H)  8 - 23 mg/dL Final    Creatinine 04/26/2023 3.03 (H)  0.57 - 1.00 mg/dL Final    Sodium 04/26/2023 136  136 - 145 mmol/L Final    Potassium 04/26/2023 5.1  3.5 - 5.2 mmol/L Final    Slight hemolysis detected by analyzer. Results may be affected.    Chloride 04/26/2023 104  98 - 107 mmol/L Final    CO2 04/26/2023 14.4 (L)  22.0 - 29.0 mmol/L Final    Calcium 04/26/2023 8.2 (L)  8.6 - 10.5 mg/dL Final    Total Protein 04/26/2023 5.2 (L)  6.0 - 8.5 g/dL Final    Albumin 04/26/2023 2.6 (L)  3.5 - 5.2 g/dL Final    ALT (SGPT) 04/26/2023 12  1 - 33 U/L Final    AST (SGOT) 04/26/2023 23  1 - 32 U/L Final    Alkaline Phosphatase 04/26/2023 84  39 - 117 U/L Final    Total Bilirubin 04/26/2023 <0.2  0.0 - 1.2 mg/dL Final    Globulin 04/26/2023 2.6  gm/dL Final    A/G Ratio 04/26/2023 1.0  g/dL Final     BUN/Creatinine Ratio 04/26/2023 28.1 (H)  7.0 - 25.0 Final    Anion Gap 04/26/2023 17.6 (H)  5.0 - 15.0 mmol/L Final    eGFR 04/26/2023 15.4 (L)  >60.0 mL/min/1.73 Final    WBC 04/26/2023 14.63 (H)  3.40 - 10.80 10*3/mm3 Final    RBC 04/26/2023 2.36 (L)  3.77 - 5.28 10*6/mm3 Final    Hemoglobin 04/26/2023 5.7 (C)  12.0 - 15.9 g/dL Final    Hematocrit 04/26/2023 19.4 (C)  34.0 - 46.6 % Final    MCV 04/26/2023 82.2  79.0 - 97.0 fL Final    MCH 04/26/2023 24.2 (L)  26.6 - 33.0 pg Final    MCHC 04/26/2023 29.4 (L)  31.5 - 35.7 g/dL Final    RDW 04/26/2023 18.7 (H)  12.3 - 15.4 % Final    RDW-SD 04/26/2023 56.9 (H)  37.0 - 54.0 fl Final    MPV 04/26/2023 9.1  6.0 - 12.0 fL Final    Platelets 04/26/2023 328  140 - 450 10*3/mm3 Final    Neutrophil % 04/26/2023 77.5 (H)  42.7 - 76.0 % Final    Lymphocyte % 04/26/2023 6.3 (L)  19.6 - 45.3 % Final    Monocyte % 04/26/2023 10.4  5.0 - 12.0 % Final    Eosinophil % 04/26/2023 3.7  0.3 - 6.2 % Final    Basophil % 04/26/2023 0.2  0.0 - 1.5 % Final    Immature Grans % 04/26/2023 1.9 (H)  0.0 - 0.5 % Final    Neutrophils, Absolute 04/26/2023 11.34 (H)  1.70 - 7.00 10*3/mm3 Final    Lymphocytes, Absolute 04/26/2023 0.92  0.70 - 3.10 10*3/mm3 Final    Monocytes, Absolute 04/26/2023 1.52 (H)  0.10 - 0.90 10*3/mm3 Final    Eosinophils, Absolute 04/26/2023 0.54 (H)  0.00 - 0.40 10*3/mm3 Final    Basophils, Absolute 04/26/2023 0.03  0.00 - 0.20 10*3/mm3 Final    Immature Grans, Absolute 04/26/2023 0.28 (H)  0.00 - 0.05 10*3/mm3 Final    nRBC 04/26/2023 0.0  0.0 - 0.2 /100 WBC Final    Anti-Fya 04/25/2023 ANTI-FYA   Final    Product Code 04/26/2023 [id number]   Final    Unit Number 04/26/2023 [id number]   Final    UNIT  ABO 04/26/2023 A   Final    UNIT  RH 04/26/2023 POS   Final    Crossmatch Interpretation 04/26/2023 Compatible   Final    Dispense Status 04/26/2023 XM   Final    Blood Expiration Date 04/26/2023 [id number]   Final    Blood Type Barcode 04/26/2023 6200   Final     Hemoglobin 04/26/2023 7.6 (L)  12.0 - 15.9 g/dL Final    Hematocrit 04/26/2023 25.0 (L)  34.0 - 46.6 % Final    MRSA PCR 04/26/2023 No MRSA Detected  No MRSA Detected Final    Glucose 04/27/2023 119 (H)  65 - 99 mg/dL Final    BUN 04/27/2023 68 (H)  8 - 23 mg/dL Final    Creatinine 04/27/2023 1.93 (H)  0.57 - 1.00 mg/dL Final    Sodium 04/27/2023 139  136 - 145 mmol/L Final    Potassium 04/27/2023 4.5  3.5 - 5.2 mmol/L Final    Chloride 04/27/2023 112 (H)  98 - 107 mmol/L Final    CO2 04/27/2023 16.7 (L)  22.0 - 29.0 mmol/L Final    Calcium 04/27/2023 8.1 (L)  8.6 - 10.5 mg/dL Final    BUN/Creatinine Ratio 04/27/2023 35.2 (H)  7.0 - 25.0 Final    Anion Gap 04/27/2023 10.3  5.0 - 15.0 mmol/L Final    eGFR 04/27/2023 26.4 (L)  >60.0 mL/min/1.73 Final    WBC 04/27/2023 10.82 (H)  3.40 - 10.80 10*3/mm3 Final    RBC 04/27/2023 2.93 (L)  3.77 - 5.28 10*6/mm3 Final    Hemoglobin 04/27/2023 7.5 (L)  12.0 - 15.9 g/dL Final    Hematocrit 04/27/2023 25.9 (L)  34.0 - 46.6 % Final    MCV 04/27/2023 88.4  79.0 - 97.0 fL Final    MCH 04/27/2023 25.6 (L)  26.6 - 33.0 pg Final    MCHC 04/27/2023 29.0 (L)  31.5 - 35.7 g/dL Final    RDW 04/27/2023 18.6 (H)  12.3 - 15.4 % Final    RDW-SD 04/27/2023 59.9 (H)  37.0 - 54.0 fl Final    MPV 04/27/2023 8.5  6.0 - 12.0 fL Final    Platelets 04/27/2023 299  140 - 450 10*3/mm3 Final    Neutrophil % 04/27/2023 75.8  42.7 - 76.0 % Final    Lymphocyte % 04/27/2023 5.8 (L)  19.6 - 45.3 % Final    Monocyte % 04/27/2023 7.3  5.0 - 12.0 % Final    Eosinophil % 04/27/2023 7.2 (H)  0.3 - 6.2 % Final    Basophil % 04/27/2023 0.5  0.0 - 1.5 % Final    Immature Grans % 04/27/2023 3.4 (H)  0.0 - 0.5 % Final    Neutrophils, Absolute 04/27/2023 8.20 (H)  1.70 - 7.00 10*3/mm3 Final    Lymphocytes, Absolute 04/27/2023 0.63 (L)  0.70 - 3.10 10*3/mm3 Final    Monocytes, Absolute 04/27/2023 0.79  0.10 - 0.90 10*3/mm3 Final    Eosinophils, Absolute 04/27/2023 0.78 (H)  0.00 - 0.40 10*3/mm3 Final    Basophils,  Absolute 04/27/2023 0.05  0.00 - 0.20 10*3/mm3 Final    Immature Grans, Absolute 04/27/2023 0.37 (H)  0.00 - 0.05 10*3/mm3 Final    nRBC 04/27/2023 0.0  0.0 - 0.2 /100 WBC Final    BCID, PCR 04/25/2023 Negative by BCID PCR. Culture to Follow.  Negative by BCID PCR. Culture to Follow. Final        PATHOLOGY  * Cannot find OR log *    IMPRESSION AND PLAN  Orquidea Rosenberg is a 77 y.o., white female with:  Left-sided hydronephrosis -patient has a chronic stent in place due to his left-sided hydronephrosis from a pelvic mass that is pushing on the left ureter.  She was scheduled to have this stent replaced however never kept her follow-up appointment.  She is not a good surgical candidate at this time due to significant decreased H&H.  Also given her current bladder fistula it would be hard to replace a stent.  Her stent replacement should be completed by her initial urologist urology that placed it.  I discussed this with the patient and she verbalized understanding.  Bladder fistula with complicated UTI -discussed with the patient her most recent CT results revealing a fistula between her pelvic mass, bladder, and rectum.  Advised patient that she will have continuous leakage of feces into her bladder due to this fistula.  Advised patient that she will most likely have chronic urinary tract infections unless fistula is resolved.  Did discuss with the patient her very poor prognosis and outcome due to untreated metastatic disease.  Advised patient that she is not a candidate for any urological surgeries at this time.  Advised patient that she would benefit most from palliative care.  I discussed this with Dr. Tony and he is in agreements.  Patient verbalized understanding.    The patient was in agreement with these plans.    Thank you for asking us to participate in Orquidea Rosenberg's care.  We will continue to follow with you.  Please do not hesitate to call with any questions or concerns that you may have.    A total of 60  minutes were spent coordinating this patient’s care in clinic today; 30 minutes of which were face-to-face with the patient, reviewing medical history and counseling on the current treatment and followup plan.  All questions were answered to patient's satisfaction.         This document has been electronically signed by Unruly Rivera PA-C  April 27, 2023 09:01 EDT    Part of this note may be an electronic transcription/translation of spoken language to printed text using the Dragon Dictation System.

## 2023-04-27 NOTE — PLAN OF CARE
Problem: Adult Inpatient Plan of Care  Goal: Patient-Specific Goal (Individualized)  Outcome: Ongoing, Progressing     Problem: Adult Inpatient Plan of Care  Goal: Absence of Hospital-Acquired Illness or Injury  Outcome: Ongoing, Progressing     Problem: Adult Inpatient Plan of Care  Goal: Optimal Comfort and Wellbeing  Outcome: Ongoing, Progressing     Problem: Adult Inpatient Plan of Care  Goal: Readiness for Transition of Care  Outcome: Ongoing, Progressing     Problem: Fall Injury Risk  Goal: Absence of Fall and Fall-Related Injury  Outcome: Ongoing, Progressing     Problem: Skin Injury Risk Increased  Goal: Skin Health and Integrity  Outcome: Ongoing, Progressing     Problem: Hypertension Comorbidity  Goal: Blood Pressure in Desired Range  Outcome: Ongoing, Progressing   Goal Outcome Evaluation:              Outcome Evaluation: pt A&Ox4 VSS on 2L NC trolerating well. NS at 125ml/hr. pt is incontinent of bowel and bladder at times. Levophed stopped at 2300 pt has remained stable during shift. will continue to monitor, care ongoing.

## 2023-04-27 NOTE — PROGRESS NOTES
PROGRESS NOTE         Patient Identification:  Name:  Orquidea Rosenberg  Age:  77 y.o.  Sex:  female  :  1945  MRN:  [id number]  Visit Number:  [id number]  Primary Care Provider:  Jackeline Denson APRN         LOS: 2 days       ----------------------------------------------------------------------------------------------------------------------  Subjective       Chief Complaints:    Diarrhea, Vomiting, and Nausea        Interval History:      Patient resting in bed this morning.  Much more awake and alert.  No longer requiring Levophed for blood pressure support.  Continued bilateral expiratory wheezes.  Right IJ triple-lumen with no signs of infection.  Currently on 2 L per nasal cannula with no apparent distress.  Unable to obtain stool specimen due to anatomy.  Blood cultures from 2023 2 out of 2 sets positive for gram-positive bacilli.  WBC improved at 10.82.    Review of Systems:    Constitutional: no fever, chills and night sweats.  Generalized fatigue.  Eyes: no eye drainage, itching or redness.  HEENT: no mouth sores, dysphagia or nose bleed.  Respiratory: no for shortness of breath, cough or production of sputum.  Cardiovascular: no chest pain, no palpitations, no orthopnea.  Gastrointestinal: no nausea, vomiting or diarrhea. Positive abdominal pain, no hematemesis or rectal bleeding.  Genitourinary: no dysuria or polyuria.  Hematologic/lymphatic: no lymph node abnormalities, no easy bruising or easy bleeding.  Musculoskeletal: no muscle or joint pain.  Skin: No rash and no itching.  Neurological: no loss of consciousness, no seizure, no headache.  Behavioral/Psych: no depression or suicidal ideation.  Endocrine: no hot flashes.  Immunologic: negative.    ----------------------------------------------------------------------------------------------------------------------      Objective       [facility] Meds:  ascorbic acid, 500 mg, Oral, Daily  cefTRIAXone, 2 g, Intravenous,  Q24H  enoxaparin, 30 mg, Subcutaneous, Daily  ipratropium-albuterol, 3 mL, Nebulization, 4x Daily - RT  metroNIDAZOLE, 500 mg, Intravenous, Q8H  polyethylene glycol, 17 g, Oral, Daily  sodium chloride, 10 mL, Intravenous, Q12H  sodium chloride, 10 mL, Intravenous, Q12H  sodium chloride, 10 mL, Intravenous, Q12H  sodium chloride, 10 mL, Intravenous, Q12H  sodium chloride, 10 mL, Intravenous, Q12H         ----------------------------------------------------------------------------------------------------------------------    Vital Signs:  Temp:  [97.1 °F (36.2 °C)-98.4 °F (36.9 °C)] 98.2 °F (36.8 °C)  Heart Rate:  [67-86] 82  Resp:  [12-29] 24  BP: (117-148)/(51-80) 139/63  Mean Arterial Pressure (Non-Invasive) for the past 24 hrs (Last 3 readings):   Noninvasive MAP (mmHg)   04/27/23 1100 96   04/27/23 1000 87   04/27/23 0924 109     SpO2 Percentage    04/27/23 0924 04/27/23 1000 04/27/23 1100   SpO2: 99% 92% 90%     SpO2:  [87 %-100 %] 90 %  on  Flow (L/min):  [2-2.5] 2.5;   Device (Oxygen Therapy): nasal cannula    Body mass index is 27.97 kg/m².  Wt Readings from Last 3 Encounters:   04/27/23 78.6 kg (173 lb 4.5 oz)   03/15/22 81.6 kg (180 lb)   02/21/22 81.6 kg (180 lb)        Intake/Output Summary (Last 24 hours) at 4/27/2023 1217  Last data filed at 4/27/2023 0800  Gross per 24 hour   Intake 4109.39 ml   Output 800 ml   Net 3309.39 ml     Diet: Regular/House Diet; Texture: Regular Texture (IDDSI 7); Fluid Consistency: Thin (IDDSI 0)  ----------------------------------------------------------------------------------------------------------------------      Physical Exam:    Constitutional: Elderly, chronically ill-appearing.  More awake and alert today.  Currently on 2 L per nasal cannula.   HENT:  Head: Normocephalic and atraumatic.  Mouth:  Moist mucous membranes.    Eyes:  Conjunctivae and EOM are normal.  No scleral icterus.  Neck:  Neck supple.  No JVD present.    Cardiovascular:  Normal rate, regular  rhythm and normal heart sounds with no murmur. No edema.  Pulmonary/Chest: Bilateral expiratory wheezes noted.  Currently on 2 L per nasal cannula.  Abdominal:  Soft.  Bowel sounds are normal.  No distension and no tenderness.   Musculoskeletal:  No edema, no tenderness, and no deformity.  No swelling or redness of joints.  Neurological:  Alert and oriented to person, place, and time.  No facial droop.  No slurred speech.   Skin:  Skin is warm and dry.  No rash noted.  No pallor. Vulvar deformity due to prior surgeries.  Psychiatric:  Normal mood and affect.  Behavior is normal.        ----------------------------------------------------------------------------------------------------------------------            Results from last 7 days   Lab Units 04/27/23  0027 04/26/23  0530 04/26/23 0051 04/25/23  1308   CRP mg/dL  --   --   --  28.78*   LACTATE mmol/L  --   --   --  1.2   WBC 10*3/mm3 10.82*  --  14.63* 16.57*   HEMOGLOBIN g/dL 7.5* 7.6* 5.7* 6.7*   HEMATOCRIT % 25.9* 25.0* 19.4* 22.9*   MCV fL 88.4  --  82.2 84.8   MCHC g/dL 29.0*  --  29.4* 29.3*   PLATELETS 10*3/mm3 299  --  328 384     Results from last 7 days   Lab Units 04/27/23 0027 04/26/23 0051 04/25/23  1308   SODIUM mmol/L 139 136 130*   POTASSIUM mmol/L 4.5 5.1 5.0   CHLORIDE mmol/L 112* 104 95*   CO2 mmol/L 16.7* 14.4* 17.9*   BUN mg/dL 68* 85* 94*   CREATININE mg/dL 1.93* 3.03* 3.76*   CALCIUM mg/dL 8.1* 8.2* 9.5   GLUCOSE mg/dL 119* 85 100*   ALBUMIN g/dL  --  2.6* 2.9*   BILIRUBIN mg/dL  --  <0.2 0.2   ALK PHOS U/L  --  84 104   AST (SGOT) U/L  --  23 17   ALT (SGPT) U/L  --  12 11   Estimated Creatinine Clearance: 25.8 mL/min (A) (by C-G formula based on SCr of 1.93 mg/dL (H)).  No results found for: AMMONIA    No results found for: HGBA1C, POCGLU  No results found for: HGBA1C  Lab Results   Component Value Date    TSH 2.620 03/15/2022       Blood Culture   Date Value Ref Range Status   04/25/2023 Abnormal Stain (C)  Preliminary    04/25/2023 Anaerobe (Organism type) (C)  Preliminary     No results found for: URINECX  No results found for: WOUNDCX  No results found for: STOOLCX  No results found for: RESPCX  Pain Management Panel            View : No data to display.                        ----------------------------------------------------------------------------------------------------------------------  Imaging Results (Last 24 Hours)       Procedure Component Value Units Date/Time    XR Chest AP [[id number]] Collected: 04/27/23 0947     Updated: 04/27/23 0950    Narrative:      EXAM:    XR Chest, 1 View     EXAM DATE:    4/27/2023 9:25 AM     CLINICAL HISTORY:    sob, wheeze; C51.9-Malignant neoplasm of vulva, unspecified;  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     COMPARISON:    04/26/2023     FINDINGS:    Lungs:  Bilateral lung opacities are stable.    Pleural space:  Trace left effusion noted.  No pneumothorax.    Heart:  Cardiomegaly is stable.    Mediastinum:  Unremarkable.    Bones/joints:  Unremarkable.    Tubes, lines and devices:  Right IJ deep line positioning is stable.       Impression:      1.  No interval change in the appearance of the chest. Bilateral lung  opacities appear stable.  2.  Support device positioning is stable.  3.  Trace left pleural effusion is noted.     This report was finalized on 4/27/2023 9:48 AM by Dr. Justen Matias MD.               ----------------------------------------------------------------------------------------------------------------------    Pertinent Infectious Disease Results    Lactic acid on admission was normal at 1.2.  Chest x-ray on 4/26/2023 showed no change in distribution of bilateral lung opacities which may represent changes of fibrosis and diffuse pulmonary nodules.  No pneumothorax.  CT abdomen pelvis on 4/25/2023 showed large complex pelvic mass  measuring 8.4 x 9.4 x 10.2 cm and presumably represents the patient's known pelvic malignancy (vulvar cancer).  This could represent secondary involvement of the leiomyomatous uterus or dystrophic calcifications within the malignancy.  Abnormal fistulous communications between the anterior rectum and the posterior aspect of the above-mentioned pelvic mass with at least 1 and possibly 2 fistulous connections as demonstrated on CT.  Central debris within the mass may represent abscess or necrosis.  Apparent fistulous communication between the mass in the bladder.  Left-sided hydronephrosis and hydroureter with left ureteral stent in place.  The distal pigtail difficult to confirm within the bladder and may be within the distal ureter.  Progressive widely disseminated pulmonary metastases.  Blood cultures on 4/25/2023 are in progress.  MRSA screen on 4/26/2023 was negative.  COVID-19 and flu A/B PCR on 4/25/2023 was negative.            Assessment/Plan       Assessment       Sepsis present on admission  Pelvic mass with concern for necrosis/abscess  Likely UTI with complicated fistula between pelvic tumor to the bladder and pelvic tumor to the rectum  Gram-positive bacilli bacteremia      Plan      I saw the patient this morning with BERTHA Sanchez and got report from primary RN and here are my findings:    Patient is alert and no longer requiring Levophed for blood pressure support. However, the patient has bilateral expiratory wheezes and is currently on 2 L per nasal cannula. Due to the patient's anatomy, a stool specimen was not obtainable. The latest blood cultures from 4/25/2023 showed 2 out of 2 sets positive for gram-positive bacilli, indicating that bacteremia is likely caused by Clostridium species. WBC has improved to 10.82. Therefore, the treatment has been de-escalated to ceftriaxone 2 g IV every 24 hours with metronidazole 500 mg IV every 8 hours. It is recommended to perform a chest x-ray to rule  out the possibility of CHF versus aspiration.      ANTIMICROBIAL THERAPY    cefTRIAXone (ROCEPHIN) 2gm IVPB in 100 mL NS (VTB)  metroNIDAZOLE - 500-0.79 MG/100ML-%     Code Status:   Code Status and Medical Interventions:   Ordered at: 04/25/23 1937     Level Of Support Discussed With:    Patient     Code Status (Patient has no pulse and is not breathing):    CPR (Attempt to Resuscitate)     Medical Interventions (Patient has pulse or is breathing):    Full Support       BERTHA Sanchez  04/27/23  12:17 EDT     Electronically signed by BERTHA Sanchez, 04/27/23, 12:20 PM EDT.    Electronically signed by Socrates Flaherty MD, 04/27/23, 12:34 PM EDT.

## 2023-04-27 NOTE — THERAPY EVALUATION
Acute Care - Speech Language Pathology   Swallow Initial Evaluation [facility]   CLINICAL DYSPHAGIA ASSESSMENT     Patient Name: Orquidea Rosenberg  : 1945  MRN: [id number]  Today's Date: 2023             Admit Date: 2023    Visit Dx:     ICD-10-CM ICD-9-CM   1. Vulvar cancer  C51.9 184.4   2. 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 Acute kidney injury  N17.9 584.9   4. Chronic anemia  D64.9 285.9     Patient Active Problem List   Diagnosis   • COVID-19   • Vulvar cancer     Past Medical History:   Diagnosis Date   • Arthritis    • COPD (chronic obstructive pulmonary disease)    • Elevated cholesterol    • History of transfusion    • Hypertension    • Vulval ca      Past Surgical History:   Procedure Laterality Date   • RADICAL VULVECTOMY  2019   • RADICAL VULVECTOMY Bilateral 2019     Orquidea Rosenberg  is seen at bedside this am on PCU to assess safety/efficacy of swallowing fnx, determine safest/least restrictive diet tolerance.     Ms. Rosenberg presented to [facility] ED w/ frequent watery stools, dysuria, weakness, sob, dyspnea and n/v. She is admitted w/ sepsis 2/2 UTI as a complication of fistula between pelvic tumor to the bladder and pelvic tumor to the rectum. PMH is significant for h/o vulvar CA, s/p vulvovaginal reconstruction, mets to the lungs for which she has discontinued treatment, arthritis, COPD, elevated cholesterol, HTN, h/o transfusion. She is referred for dysphagia assessment. Ms. Rosenberg denies any overt s/s aspiration or dysphagia w/ po intake.     Social History     Socioeconomic History   • Marital status:    Tobacco Use   • Smoking status: Former   • Smokeless tobacco: Never   Vaping Use   • Vaping Use: Never used   Substance and Sexual Activity   • Alcohol use: No   • Drug use: No   • Sexual activity: Defer      EXAM:    XR Chest, 1 View     EXAM DATE:    2023 9:25 AM     CLINICAL HISTORY:    sob, wheeze; C51.9-Malignant neoplasm of vulva,  unspecified;  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     COMPARISON:    04/26/2023     FINDINGS:    Lungs:  Bilateral lung opacities are stable.    Pleural space:  Trace left effusion noted.  No pneumothorax.    Heart:  Cardiomegaly is stable.    Mediastinum:  Unremarkable.    Bones/joints:  Unremarkable.    Tubes, lines and devices:  Right IJ deep line positioning is stable.     IMPRESSION:  1.  No interval change in the appearance of the chest. Bilateral lung  opacities appear stable.  2.  Support device positioning is stable.  3.  Trace left pleural effusion is noted.     This report was finalized on 4/27/2023 9:48 AM by Dr. Justen Matias MD.    Diet Orders (active) (From admission, onward)     Start     Ordered    04/25/23 2219  Diet: Regular/House Diet; Texture: Regular Texture (IDDSI 7); Fluid Consistency: Thin (IDDSI 0)  Diet Effective Now         04/25/23 2218              She is observed on 2.5L O2 via NC w/o complications.     Pt is positioned upright and centered in bed to accept multiple po presentations of ice chips, solid cracker, puree and thin liquids via spoon, cup and straw. She does not self feed at this time, reports she is able to do so independently.     Facial/oral structures are symmetrical upon observation. Lingual protrusion reveals no deviation. Oral mucosa are moist, pink, and clean. Secretions are clear, thin, and well controlled. OROM/VALERIE is wfl to imitate oral postures. Gag is not assessed. Volitional cough is intact w/ adequate  intensity, clear in quality, non-productive. Voice is adequate in intensity, clear in quality w/ intelligible speech. She is oriented to person, place and time, follows directives and participates in simple conversational exchanges. She is mildly fatigued in general.     Upon po presentations, adequate bolus anticipation and  acceptance w/ good labial seal for bolus clearance via spoon bowl, cup rim stability and suction via straw. Bolus formation, manipulation and control are wfl w/ rotary mastication pattern. A-p transit is timely w/o oral residue. No overt s/s aspiration evidenced before the swallow.     Pharyngeal swallow is timely w/ adequate hyolaryngeal elevation per palpation. No overt s/s aspiration evidenced across this assessment. No silent aspiration suspected as pt is w/o changes in vocal quality, respirations or secretions post po presentations. Pt denies odynophagia.    Impression: Per this assessment, Ms. Rosenberg presents w/ wfl oropharyngeal swallow w/o s/s aspiration. No s/s indicative of silent aspiration. No odynophagia reported.      SLP Recommendation and Plan  1. Regular consistency, thin liquids.    2. Meds whole in puree/thins.   3. Upright and centered for all po intake  4. [facility] precautions.  5. Oral care protocol.  6. Assist w/ tray setup/meals prn.   No further formal SLP f/u warranted at this time.    D/w pt results and recommendations w/ verbal agreement.    Thank you for allowing me to participate in the care of your patient-  Dianne Martinez M.A., CCC-SLP    EDUCATION  The patient has been educated in the following areas:   Dysphagia (Swallowing Impairment).     Time Calculation:     Therapy Charges for Today     Code Description Service Date Service Provider Modifiers Qty    [id number]  ST EVAL ORAL PHARYNG SWALLOW 4 4/27/2023 Dianne Martinez MA,CCC-SLP GN 1          Dianne Martinez MA,CCC-SLP  4/27/2023

## 2023-04-27 NOTE — PLAN OF CARE
Goal Outcome Evaluation:              Outcome Evaluation: VSS pt alert and oriented x2 today.  Daughter in room most of the day.  NS stopped per order.  Pt incontinent bowel and bladder.  Bed in low position with bed alarm on.  Call light in reach.

## 2023-04-28 LAB
ANION GAP SERPL CALCULATED.3IONS-SCNC: 11.9 MMOL/L (ref 5–15)
ANISOCYTOSIS BLD QL: ABNORMAL
BACTERIA SPEC AEROBE CULT: ABNORMAL
BUN SERPL-MCNC: 47 MG/DL (ref 8–23)
BUN/CREAT SERPL: 34.8 (ref 7–25)
CALCIUM SPEC-SCNC: 8.4 MG/DL (ref 8.6–10.5)
CHLORIDE SERPL-SCNC: 113 MMOL/L (ref 98–107)
CO2 SERPL-SCNC: 17.1 MMOL/L (ref 22–29)
CREAT SERPL-MCNC: 1.35 MG/DL (ref 0.57–1)
CRP SERPL-MCNC: 18.26 MG/DL (ref 0–0.5)
DEPRECATED RDW RBC AUTO: 62.7 FL (ref 37–54)
EGFRCR SERPLBLD CKD-EPI 2021: 40.6 ML/MIN/1.73
EOSINOPHIL # BLD MANUAL: 0.93 10*3/MM3 (ref 0–0.4)
EOSINOPHIL NFR BLD MANUAL: 8 % (ref 0.3–6.2)
ERYTHROCYTE [DISTWIDTH] IN BLOOD BY AUTOMATED COUNT: 18.9 % (ref 12.3–15.4)
FOLATE SERPL-MCNC: 10.3 NG/ML (ref 4.78–24.2)
GLUCOSE SERPL-MCNC: 139 MG/DL (ref 65–99)
GRAM STN SPEC: ABNORMAL
HAPTOGLOB SERPL-MCNC: 339 MG/DL (ref 30–200)
HCT VFR BLD AUTO: 29 % (ref 34–46.6)
HGB BLD-MCNC: 8.1 G/DL (ref 12–15.9)
HYPOCHROMIA BLD QL: ABNORMAL
ISOLATED FROM: ABNORMAL
LYMPHOCYTES # BLD MANUAL: 0.81 10*3/MM3 (ref 0.7–3.1)
LYMPHOCYTES NFR BLD MANUAL: 9 % (ref 5–12)
MCH RBC QN AUTO: 25.2 PG (ref 26.6–33)
MCHC RBC AUTO-ENTMCNC: 27.9 G/DL (ref 31.5–35.7)
MCV RBC AUTO: 90.3 FL (ref 79–97)
METAMYELOCYTES NFR BLD MANUAL: 2 % (ref 0–0)
MONOCYTES # BLD: 1.04 10*3/MM3 (ref 0.1–0.9)
NEUTROPHILS # BLD AUTO: 8.58 10*3/MM3 (ref 1.7–7)
NEUTROPHILS NFR BLD MANUAL: 65 % (ref 42.7–76)
NEUTS BAND NFR BLD MANUAL: 9 % (ref 0–5)
PLAT MORPH BLD: NORMAL
PLATELET # BLD AUTO: 309 10*3/MM3 (ref 140–450)
PMV BLD AUTO: 8.4 FL (ref 6–12)
POTASSIUM SERPL-SCNC: 4.1 MMOL/L (ref 3.5–5.2)
RBC # BLD AUTO: 3.21 10*6/MM3 (ref 3.77–5.28)
SCAN SLIDE: NORMAL
SODIUM SERPL-SCNC: 142 MMOL/L (ref 136–145)
VARIANT LYMPHS NFR BLD MANUAL: 7 % (ref 19.6–45.3)
VIT B12 BLD-MCNC: 1262 PG/ML (ref 211–946)
WBC NRBC COR # BLD: 11.59 10*3/MM3 (ref 3.4–10.8)

## 2023-04-28 PROCEDURE — 63710000001 PREDNISONE PER 1 MG: Performed by: HOSPITALIST

## 2023-04-28 PROCEDURE — 94799 UNLISTED PULMONARY SVC/PX: CPT

## 2023-04-28 PROCEDURE — 85007 BL SMEAR W/DIFF WBC COUNT: CPT | Performed by: HOSPITALIST

## 2023-04-28 PROCEDURE — 99232 SBSQ HOSP IP/OBS MODERATE 35: CPT | Performed by: INTERNAL MEDICINE

## 2023-04-28 PROCEDURE — 94761 N-INVAS EAR/PLS OXIMETRY MLT: CPT

## 2023-04-28 PROCEDURE — 86140 C-REACTIVE PROTEIN: CPT | Performed by: HOSPITALIST

## 2023-04-28 PROCEDURE — 80048 BASIC METABOLIC PNL TOTAL CA: CPT | Performed by: HOSPITALIST

## 2023-04-28 PROCEDURE — 85025 COMPLETE CBC W/AUTO DIFF WBC: CPT | Performed by: HOSPITALIST

## 2023-04-28 PROCEDURE — 25010000002 ENOXAPARIN PER 10 MG: Performed by: HOSPITALIST

## 2023-04-28 PROCEDURE — 99232 SBSQ HOSP IP/OBS MODERATE 35: CPT | Performed by: HOSPITALIST

## 2023-04-28 RX ORDER — BUDESONIDE 0.5 MG/2ML
0.5 INHALANT ORAL
Status: DISCONTINUED | OUTPATIENT
Start: 2023-04-28 | End: 2023-05-05 | Stop reason: HOSPADM

## 2023-04-28 RX ORDER — PREDNISONE 20 MG/1
40 TABLET ORAL
Status: COMPLETED | OUTPATIENT
Start: 2023-04-28 | End: 2023-05-02

## 2023-04-28 RX ORDER — ENOXAPARIN SODIUM 100 MG/ML
40 INJECTION SUBCUTANEOUS DAILY
Status: DISCONTINUED | OUTPATIENT
Start: 2023-04-29 | End: 2023-05-05 | Stop reason: HOSPADM

## 2023-04-28 RX ADMIN — Medication 10 ML: at 09:56

## 2023-04-28 RX ADMIN — Medication 10 ML: at 09:55

## 2023-04-28 RX ADMIN — HYDROCODONE BITARTRATE AND ACETAMINOPHEN 1 TABLET: 5; 325 TABLET ORAL at 09:55

## 2023-04-28 RX ADMIN — IPRATROPIUM BROMIDE AND ALBUTEROL SULFATE 3 ML: .5; 2.5 SOLUTION RESPIRATORY (INHALATION) at 06:44

## 2023-04-28 RX ADMIN — NYSTATIN: 100000 POWDER TOPICAL at 09:55

## 2023-04-28 RX ADMIN — METRONIDAZOLE 500 MG: 5 INJECTION, SOLUTION INTRAVENOUS at 14:37

## 2023-04-28 RX ADMIN — Medication 10 ML: at 20:10

## 2023-04-28 RX ADMIN — METRONIDAZOLE 500 MG: 5 INJECTION, SOLUTION INTRAVENOUS at 07:07

## 2023-04-28 RX ADMIN — PREDNISONE 40 MG: 20 TABLET ORAL at 11:12

## 2023-04-28 RX ADMIN — ENOXAPARIN SODIUM 30 MG: 30 INJECTION SUBCUTANEOUS at 10:48

## 2023-04-28 RX ADMIN — OXYCODONE HYDROCHLORIDE AND ACETAMINOPHEN 500 MG: 500 TABLET ORAL at 10:49

## 2023-04-28 RX ADMIN — IPRATROPIUM BROMIDE AND ALBUTEROL SULFATE 3 ML: .5; 2.5 SOLUTION RESPIRATORY (INHALATION) at 18:36

## 2023-04-28 RX ADMIN — BUDESONIDE 0.5 MG: 0.5 SUSPENSION RESPIRATORY (INHALATION) at 12:36

## 2023-04-28 RX ADMIN — BUDESONIDE 0.5 MG: 0.5 SUSPENSION RESPIRATORY (INHALATION) at 18:36

## 2023-04-28 RX ADMIN — IPRATROPIUM BROMIDE AND ALBUTEROL SULFATE 3 ML: .5; 2.5 SOLUTION RESPIRATORY (INHALATION) at 00:34

## 2023-04-28 RX ADMIN — NYSTATIN: 100000 POWDER TOPICAL at 20:10

## 2023-04-28 RX ADMIN — Medication 10 ML: at 20:09

## 2023-04-28 RX ADMIN — IPRATROPIUM BROMIDE AND ALBUTEROL SULFATE 3 ML: .5; 2.5 SOLUTION RESPIRATORY (INHALATION) at 12:35

## 2023-04-28 NOTE — PLAN OF CARE
Goal Outcome Evaluation:  Plan of Care Reviewed With: patient, son        Progress: no change  Outcome Evaluation: Pt. alert and oriented times 4. VSS. 2.5L NC. Purewick in place. Turned every 2hrs. Pain treated with prns. No needs noted at this time. Bed alarm set, call light within reach.      Problem: Adult Inpatient Plan of Care  Goal: Plan of Care Review  Outcome: Ongoing, Progressing  Flowsheets (Taken 4/28/2023 1323)  Progress: no change  Plan of Care Reviewed With:   patient   son  Outcome Evaluation: Pt. alert and oriented times 4. VSS. 2.5L NC. Purewick in place. Turned every 2hrs. Pain treated with prns. No needs noted at this time. Bed alarm set, call light within reach.  Goal: Patient-Specific Goal (Individualized)  Outcome: Ongoing, Progressing  Goal: Absence of Hospital-Acquired Illness or Injury  Outcome: Ongoing, Progressing  Intervention: Identify and Manage Fall Risk  Recent Flowsheet Documentation  Taken 4/28/2023 1319 by Bernadette Molina RN  Safety Promotion/Fall Prevention:   activity supervised   fall prevention program maintained   safety round/check completed  Taken 4/28/2023 1300 by Bernadette Molina RN  Safety Promotion/Fall Prevention:   activity supervised   fall prevention program maintained   safety round/check completed  Taken 4/28/2023 1100 by Bernadette Molina RN  Safety Promotion/Fall Prevention:   activity supervised   fall prevention program maintained   safety round/check completed  Taken 4/28/2023 0900 by Bernadette Molina RN  Safety Promotion/Fall Prevention:   activity supervised   fall prevention program maintained   safety round/check completed  Taken 4/28/2023 0730 by Bernadette Molina RN  Safety Promotion/Fall Prevention:   activity supervised   fall prevention program maintained   safety round/check completed  Taken 4/28/2023 0700 by Bernadette Molina RN  Safety Promotion/Fall Prevention:   fall prevention program maintained   safety round/check completed  Intervention: Prevent Skin  Injury  Recent Flowsheet Documentation  Taken 4/28/2023 1319 by Bernadette Molina RN  Skin Protection:   adhesive use limited   incontinence pads utilized   skin sealant/moisture barrier applied   transparent dressing maintained   tubing/devices free from skin contact  Taken 4/28/2023 0730 by Bernadette Molina RN  Skin Protection:   adhesive use limited   incontinence pads utilized   skin sealant/moisture barrier applied   transparent dressing maintained   tubing/devices free from skin contact  Intervention: Prevent and Manage VTE (Venous Thromboembolism) Risk  Recent Flowsheet Documentation  Taken 4/28/2023 1319 by Bernadette Molina RN  Activity Management: bedrest  VTE Prevention/Management: (lovenox) other (see comments)  Taken 4/28/2023 0730 by Bernadette Molina RN  Activity Management: bedrest  VTE Prevention/Management: (lovenox) other (see comments)  Intervention: Prevent Infection  Recent Flowsheet Documentation  Taken 4/28/2023 1319 by Bernadette Molina RN  Infection Prevention:   single patient room provided   rest/sleep promoted   personal protective equipment utilized   hand hygiene promoted   equipment surfaces disinfected  Taken 4/28/2023 1300 by Bernadette Molina RN  Infection Prevention:   single patient room provided   rest/sleep promoted   personal protective equipment utilized   hand hygiene promoted   equipment surfaces disinfected  Taken 4/28/2023 1100 by Bernadette Molina RN  Infection Prevention:   single patient room provided   rest/sleep promoted   personal protective equipment utilized   hand hygiene promoted   equipment surfaces disinfected  Taken 4/28/2023 0900 by Bernadette Molina RN  Infection Prevention:   single patient room provided   rest/sleep promoted   personal protective equipment utilized   hand hygiene promoted   equipment surfaces disinfected  Taken 4/28/2023 0730 by Bernadette Molina RN  Infection Prevention:   single patient room provided   rest/sleep promoted   personal protective equipment  utilized   hand hygiene promoted   equipment surfaces disinfected  Taken 4/28/2023 0700 by Bernadette Molina RN  Infection Prevention:   single patient room provided   rest/sleep promoted   personal protective equipment utilized   hand hygiene promoted   equipment surfaces disinfected  Goal: Optimal Comfort and Wellbeing  Outcome: Ongoing, Progressing  Intervention: Provide Person-Centered Care  Recent Flowsheet Documentation  Taken 4/28/2023 1319 by Bernadette Molina RN  Trust Relationship/Rapport:   care explained   choices provided   emotional support provided   thoughts/feelings acknowledged  Taken 4/28/2023 0730 by Bernadette Molina RN  Trust Relationship/Rapport:   care explained   choices provided   emotional support provided   thoughts/feelings acknowledged  Goal: Readiness for Transition of Care  Outcome: Ongoing, Progressing     Problem: Fall Injury Risk  Goal: Absence of Fall and Fall-Related Injury  Outcome: Ongoing, Progressing  Intervention: Identify and Manage Contributors  Recent Flowsheet Documentation  Taken 4/28/2023 1319 by Bernadette Molina RN  Medication Review/Management: medications reviewed  Self-Care Promotion:   independence encouraged   BADL personal objects within reach   BADL personal routines maintained  Taken 4/28/2023 1300 by Bernadette Molina RN  Medication Review/Management: medications reviewed  Taken 4/28/2023 1100 by Bernadette Molina RN  Medication Review/Management: medications reviewed  Taken 4/28/2023 0900 by Bernadette Molina RN  Medication Review/Management: medications reviewed  Taken 4/28/2023 0730 by Bernadette Molina RN  Medication Review/Management: medications reviewed  Self-Care Promotion:   independence encouraged   BADL personal objects within reach   BADL personal routines maintained  Taken 4/28/2023 0700 by Bernadette Molina RN  Medication Review/Management: medications reviewed  Intervention: Promote Injury-Free Environment  Recent Flowsheet Documentation  Taken 4/28/2023 1319 by Tracy  NUPUR Fleming  Safety Promotion/Fall Prevention:   activity supervised   fall prevention program maintained   safety round/check completed  Taken 4/28/2023 1300 by Bernadette Molina RN  Safety Promotion/Fall Prevention:   activity supervised   fall prevention program maintained   safety round/check completed  Taken 4/28/2023 1100 by Bernadette Molina RN  Safety Promotion/Fall Prevention:   activity supervised   fall prevention program maintained   safety round/check completed  Taken 4/28/2023 0900 by Bernadette Molina RN  Safety Promotion/Fall Prevention:   activity supervised   fall prevention program maintained   safety round/check completed  Taken 4/28/2023 0730 by Bernadette Molina RN  Safety Promotion/Fall Prevention:   activity supervised   fall prevention program maintained   safety round/check completed  Taken 4/28/2023 0700 by Bernadette Molina RN  Safety Promotion/Fall Prevention:   fall prevention program maintained   safety round/check completed     Problem: Skin Injury Risk Increased  Goal: Skin Health and Integrity  Outcome: Ongoing, Progressing  Intervention: Promote and Optimize Oral Intake  Recent Flowsheet Documentation  Taken 4/28/2023 1319 by Bernadette Molina RN  Oral Nutrition Promotion: rest periods promoted  Taken 4/28/2023 0730 by Bernadette Molina RN  Oral Nutrition Promotion: rest periods promoted  Intervention: Optimize Skin Protection  Recent Flowsheet Documentation  Taken 4/28/2023 1319 by Bernadette Molina RN  Pressure Reduction Techniques:   frequent weight shift encouraged   weight shift assistance provided   heels elevated off bed  Pressure Reduction Devices:   pressure-redistributing mattress utilized   positioning supports utilized  Skin Protection:   adhesive use limited   incontinence pads utilized   skin sealant/moisture barrier applied   transparent dressing maintained   tubing/devices free from skin contact  Taken 4/28/2023 0730 by Bernadette Molina RN  Pressure Reduction Techniques:   weight shift  assistance provided   heels elevated off bed  Pressure Reduction Devices:   pressure-redistributing mattress utilized   positioning supports utilized  Skin Protection:   adhesive use limited   incontinence pads utilized   skin sealant/moisture barrier applied   transparent dressing maintained   tubing/devices free from skin contact     Problem: Hypertension Comorbidity  Goal: Blood Pressure in Desired Range  Outcome: Ongoing, Progressing  Intervention: Maintain Blood Pressure Management  Recent Flowsheet Documentation  Taken 4/28/2023 1319 by Bernadette Molina RN  Medication Review/Management: medications reviewed  Taken 4/28/2023 1300 by Bernadette Molina RN  Medication Review/Management: medications reviewed  Taken 4/28/2023 1100 by Benradette Molina RN  Medication Review/Management: medications reviewed  Taken 4/28/2023 0900 by Bernadette Molina RN  Medication Review/Management: medications reviewed  Taken 4/28/2023 0730 by Bernadette Molina RN  Medication Review/Management: medications reviewed  Taken 4/28/2023 0700 by Bernadette Molina RN  Medication Review/Management: medications reviewed

## 2023-04-28 NOTE — CASE MANAGEMENT/SOCIAL WORK
Discharge Planning Assessment   [facility]     Patient Name: Orquidea Rosenberg  MRN: [id number]  Today's Date: 4/28/2023    Admit Date: 4/25/2023         Discharge Plan     Row Name 04/28/23 1522       Plan    Plan SS contacted pt daughter Lilli about Discharge plans. Pt daughter voiced she does not think that she would be able to care for pt at home. Pt daughter voiced may be looking into Nursing Home placment and requested a NH list. SS provided pt daughter with NH list. SS to follow up with pt daughter on Monday 5/1/23. SS to follow.                ANASTASIA ThurstonW

## 2023-04-28 NOTE — PROGRESS NOTES
Antimicrobial Length of Therapy:    Day 4 of 7 metronidazole    Thank you.  Mariposa Hauser, Pharm.D.  4/28/2023  14:02 EDT

## 2023-04-28 NOTE — PROGRESS NOTES
[facility]     Patient Identification:  Name:  Orquidea Rosenberg  Age:  77 y.o.  Sex:  female  :  1945  MRN:  [id number]  Visit Number:  [id number]  Primary Care Provider:  Jackeline Denson APRN    Length of stay:  3    Subjective: Patient is awake and alert, she is still tachypneic but less labored, wheezing has improved chest x-ray no evidence of worsening of previous infiltrate, no obvious signs of volume overload, 2D echo shows severe pulmonary hypertension with normal ejection fraction.    Patient complaining of pelvic pain, dysuria.    Chief Complaint: Pelvic pain  ----------------------------------------------------------------------------------------------------------------------  Current Hospital Meds:  ascorbic acid, 500 mg, Oral, Daily  cefTRIAXone, 2 g, Intravenous, Q24H  enoxaparin, 30 mg, Subcutaneous, Daily  ipratropium-albuterol, 3 mL, Nebulization, 4x Daily - RT  metroNIDAZOLE, 500 mg, Intravenous, Q8H  nystatin, , Topical, Q12H  polyethylene glycol, 17 g, Oral, Daily  sodium chloride, 10 mL, Intravenous, Q12H  sodium chloride, 10 mL, Intravenous, Q12H  sodium chloride, 10 mL, Intravenous, Q12H  sodium chloride, 10 mL, Intravenous, Q12H  sodium chloride, 10 mL, Intravenous, Q12H         ----------------------------------------------------------------------------------------------------------------------  Vital Signs:  Temp:  [96.8 °F (36 °C)-98.3 °F (36.8 °C)] 98.3 °F (36.8 °C)  Heart Rate:  [] 83  Resp:  [16-29] 22  BP: ()/(46-76) 171/76       Tele: Sinus rhythm 83 bpm      23  0500 23  0400 23  0400   Weight: 74.8 kg (164 lb 14.5 oz) 78.6 kg (173 lb 4.5 oz) 81.6 kg (179 lb 14.3 oz)     Body mass index is 29.04 kg/m².    Intake/Output Summary (Last 24 hours) at 2023 1008  Last data filed at 2023 0800  Gross per 24 hour   Intake 681.61 ml   Output 1000 ml   Net -318.39 ml     Diet: Regular/House Diet; Texture:  Regular Texture (IDDSI 7); Fluid Consistency: Thin (IDDSI 0)  ----------------------------------------------------------------------------------------------------------------------  Physical exam:  General: Chronically ill-appearing, mildly tachypneic,   No respiratory distress.    Skin:  Skin is warm and dry. No rash noted. No pallor.    HENT:  Head:  Normocephalic and atraumatic.  Mouth:  Moist mucous membranes.    Eyes:  Conjunctivae and EOM are normal.  Pupils are equal, round, and reactive to light.  No scleral icterus.    Neck:  Neck supple.  No JVD present.    Right central line  Pulmonary/Chest: Presence of wheezing but improved compared to yesterday, monophonic in nature there is coarse breath sounds bilaterally worse on the left good air movement.  Cardiovascular:  Normal rate, regular rhythm and normal heart sounds with no murmur.  Abdominal:  Soft.  Bowel sounds are normal.  No distension, she has subjective tenderness mostly lower abdominal area on deep palpation only Extremities:  No edema, no tenderness, and no deformity.  No red or swollen joints anywhere.  Strong pulses in all 4 extremities with no clubbing, no cyanosis,   Neurological:  Motor strength equal no obvious deficit, sensory grossly intact.   No cranial nerve deficit.  No tongue deviation.  No facial droop.  No slurred speech.    Genitourinary: Germain catheter with probably brownish urine  Back:  ----------------------------------------------------------------------------------------------------------------------  ----------------------------------------------------------------------------------------------------------------------      Results from last 7 days   Lab Units 04/28/23  0024 04/27/23  0027 04/26/23  0530 04/26/23  0051 04/25/23  1308   CRP mg/dL 18.26*  --   --   --  28.78*   LACTATE mmol/L  --   --   --   --  1.2   WBC 10*3/mm3 11.59* 10.82*  --  14.63* 16.57*   HEMOGLOBIN g/dL 8.1* 7.5* 7.6* 5.7* 6.7*   HEMATOCRIT % 29.0*  25.9* 25.0* 19.4* 22.9*   MCV fL 90.3 88.4  --  82.2 84.8   MCHC g/dL 27.9* 29.0*  --  29.4* 29.3*   PLATELETS 10*3/mm3 309 299  --  328 384         Results from last 7 days   Lab Units 04/28/23  0024 04/27/23  0027 04/26/23  0051 04/25/23  1308   SODIUM mmol/L 142 139 136 130*   POTASSIUM mmol/L 4.1 4.5 5.1 5.0   CHLORIDE mmol/L 113* 112* 104 95*   CO2 mmol/L 17.1* 16.7* 14.4* 17.9*   BUN mg/dL 47* 68* 85* 94*   CREATININE mg/dL 1.35* 1.93* 3.03* 3.76*   CALCIUM mg/dL 8.4* 8.1* 8.2* 9.5   GLUCOSE mg/dL 139* 119* 85 100*   ALBUMIN g/dL  --   --  2.6* 2.9*   BILIRUBIN mg/dL  --   --  <0.2 0.2   ALK PHOS U/L  --   --  84 104   AST (SGOT) U/L  --   --  23 17   ALT (SGPT) U/L  --   --  12 11   Estimated Creatinine Clearance: 37.6 mL/min (A) (by C-G formula based on SCr of 1.35 mg/dL (H)).    No results found for: AMMONIA      Blood Culture   Date Value Ref Range Status   04/25/2023 Abnormal Stain (C)  Preliminary   04/25/2023 Anaerobe (Organism type) (C)  Preliminary     No results found for: URINECX  No results found for: WOUNDCX  No results found for: STOOLCX    I have personally looked at the labs and they are summarized above.  ----------------------------------------------------------------------------------------------------------------------  Imaging Results (Last 24 Hours)     ** No results found for the last 24 hours. 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    Patient on Flagyl and Rocephin, infectious disease help appreciated, urology evaluation recommendation noted and appreciated.  Continue respiratory treatment, monitor respiratory status.  Trial of inhaled corticosteroids and short course of prednisone.    Overall prognosis poor      Disposition pending improvement      Dalila Mcdonald MD  04/28/23  10:08 EDT

## 2023-04-28 NOTE — PROGRESS NOTES
PROGRESS NOTE         Patient Identification:  Name:  Orquidea Rosenberg  Age:  77 y.o.  Sex:  female  :  1945  MRN:  [id number]  Visit Number:  [id number]  Primary Care Provider:  Jackeline Denson APRN         LOS: 3 days       ----------------------------------------------------------------------------------------------------------------------  Subjective       Chief Complaints:    Diarrhea, Vomiting, and Nausea        Interval History:      Patient sitting up in bed this morning.  Currently on 2.5 L per nasal cannula with no apparent distress.  Overall feels better today.  Reports mild suprapubic pain this morning.  Feels the urge to pee.  Pure wick currently in place with stool and urine mixed in canister.  Lungs clear to auscultation bilaterally.  Abdomen soft, mild suprapubic tenderness to palpation.  WBC improved 11.59.  CRP improved at 18.26.  Blood cultures from 2023 finalized as Clostridium subterminale.    Review of Systems:    Constitutional: no fever, chills and night sweats.  Generalized fatigue.  Eyes: no eye drainage, itching or redness.  HEENT: no mouth sores, dysphagia or nose bleed.  Respiratory: no for shortness of breath, cough or production of sputum.  Cardiovascular: no chest pain, no palpitations, no orthopnea.  Gastrointestinal: no nausea, vomiting or diarrhea. Positive suprapubic abdominal pain, no hematemesis or rectal bleeding.  Genitourinary: no dysuria or polyuria.  Hematologic/lymphatic: no lymph node abnormalities, no easy bruising or easy bleeding.  Musculoskeletal: no muscle or joint pain.  Skin: No rash and no itching.  Neurological: no loss of consciousness, no seizure, no headache.  Behavioral/Psych: no depression or suicidal ideation.  Endocrine: no hot flashes.  Immunologic: negative.    ----------------------------------------------------------------------------------------------------------------------      Objective       Current Hospital Meds:  ascorbic  acid, 500 mg, Oral, Daily  budesonide, 0.5 mg, Nebulization, BID - RT  enoxaparin, 30 mg, Subcutaneous, Daily  ipratropium-albuterol, 3 mL, Nebulization, 4x Daily - RT  metroNIDAZOLE, 500 mg, Intravenous, Q8H  nystatin, , Topical, Q12H  polyethylene glycol, 17 g, Oral, Daily  predniSONE, 40 mg, Oral, Daily With Breakfast  sodium chloride, 10 mL, Intravenous, Q12H  sodium chloride, 10 mL, Intravenous, Q12H  sodium chloride, 10 mL, Intravenous, Q12H  sodium chloride, 10 mL, Intravenous, Q12H  sodium chloride, 10 mL, Intravenous, Q12H         ----------------------------------------------------------------------------------------------------------------------    Vital Signs:  Temp:  [97.6 °F (36.4 °C)-98.3 °F (36.8 °C)] 98.3 °F (36.8 °C)  Heart Rate:  [] 98  Resp:  [18-29] 20  BP: ()/(46-85) 153/84  Mean Arterial Pressure (Non-Invasive) for the past 24 hrs (Last 3 readings):   Noninvasive MAP (mmHg)   04/28/23 1100 112   04/28/23 1000 124   04/28/23 0900 95     SpO2 Percentage    04/28/23 1000 04/28/23 1100 04/28/23 1235   SpO2: 96% 94% 92%     SpO2:  [92 %-100 %] 92 %  on  Flow (L/min):  [2.5] 2.5;   Device (Oxygen Therapy): nasal cannula    Body mass index is 29.04 kg/m².  Wt Readings from Last 3 Encounters:   04/28/23 81.6 kg (179 lb 14.3 oz)   03/15/22 81.6 kg (180 lb)   02/21/22 81.6 kg (180 lb)        Intake/Output Summary (Last 24 hours) at 4/28/2023 1255  Last data filed at 4/28/2023 1000  Gross per 24 hour   Intake 631.61 ml   Output 1500 ml   Net -868.39 ml     Diet: Regular/House Diet; Texture: Regular Texture (IDDSI 7); Fluid Consistency: Thin (IDDSI 0)  ----------------------------------------------------------------------------------------------------------------------      Physical Exam:    Constitutional: Elderly, chronically ill-appearing.  More awake and alert today.  Currently on 2.5 L per nasal cannula.   HENT:  Head: Normocephalic and atraumatic.  Mouth:  Moist mucous membranes.     Eyes:  Conjunctivae and EOM are normal.  No scleral icterus.  Neck:  Neck supple.  No JVD present.    Cardiovascular:  Normal rate, regular rhythm and normal heart sounds with no murmur. No edema.  Pulmonary/Chest: Lungs clear to auscultation bilaterally.  Currently on 2.5 L per nasal cannula  Abdominal:  Soft.  Bowel sounds are normal.  No distension and Mild suprapubic tenderness.   Musculoskeletal:  No edema, no tenderness, and no deformity.  No swelling or redness of joints.  Neurological:  Alert and oriented to person, place, and time.  No facial droop.  No slurred speech.   Skin:  Skin is warm and dry.  No rash noted.  No pallor. Vulvar deformity due to prior surgeries.  Psychiatric:  Normal mood and affect.  Behavior is normal.        ----------------------------------------------------------------------------------------------------------------------            Results from last 7 days   Lab Units 04/28/23 0024 04/27/23 0027 04/26/23  0530 04/26/23 0051 04/25/23  1308   CRP mg/dL 18.26*  --   --   --  28.78*   LACTATE mmol/L  --   --   --   --  1.2   WBC 10*3/mm3 11.59* 10.82*  --  14.63* 16.57*   HEMOGLOBIN g/dL 8.1* 7.5* 7.6* 5.7* 6.7*   HEMATOCRIT % 29.0* 25.9* 25.0* 19.4* 22.9*   MCV fL 90.3 88.4  --  82.2 84.8   MCHC g/dL 27.9* 29.0*  --  29.4* 29.3*   PLATELETS 10*3/mm3 309 299  --  328 384     Results from last 7 days   Lab Units 04/28/23 0024 04/27/23 0027 04/26/23 0051 04/25/23  1308   SODIUM mmol/L 142 139 136 130*   POTASSIUM mmol/L 4.1 4.5 5.1 5.0   CHLORIDE mmol/L 113* 112* 104 95*   CO2 mmol/L 17.1* 16.7* 14.4* 17.9*   BUN mg/dL 47* 68* 85* 94*   CREATININE mg/dL 1.35* 1.93* 3.03* 3.76*   CALCIUM mg/dL 8.4* 8.1* 8.2* 9.5   GLUCOSE mg/dL 139* 119* 85 100*   ALBUMIN g/dL  --   --  2.6* 2.9*   BILIRUBIN mg/dL  --   --  <0.2 0.2   ALK PHOS U/L  --   --  84 104   AST (SGOT) U/L  --   --  23 17   ALT (SGPT) U/L  --   --  12 11   Estimated Creatinine Clearance: 37.6 mL/min (A) (by C-G formula  based on SCr of 1.35 mg/dL (H)).  No results found for: AMMONIA    No results found for: HGBA1C, POCGLU  No results found for: HGBA1C  Lab Results   Component Value Date    TSH 2.620 03/15/2022       Blood Culture   Date Value Ref Range Status   04/25/2023 Abnormal Stain (C)  Preliminary   04/25/2023 Anaerobe (Organism type) (C)  Preliminary     No results found for: URINECX  No results found for: WOUNDCX  No results found for: STOOLCX  No results found for: RESPCX  Pain Management Panel            View : No data to display.                        ----------------------------------------------------------------------------------------------------------------------  Imaging Results (Last 24 Hours)       ** No results found for the last 24 hours. **            ----------------------------------------------------------------------------------------------------------------------    Pertinent Infectious Disease Results    Lactic acid on admission was normal at 1.2.  Chest x-ray on 4/26/2023 showed no change in distribution of bilateral lung opacities which may represent changes of fibrosis and diffuse pulmonary nodules.  No pneumothorax.  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  This could represent secondary involvement of the leiomyomatous uterus or dystrophic calcifications within the malignancy.  Abnormal fistulous communications between the anterior rectum and the posterior aspect of the above-mentioned pelvic mass with at least 1 and possibly 2 fistulous connections as demonstrated on CT.  Central debris within the mass may represent abscess or necrosis.  Apparent fistulous communication between the mass in the bladder.  Left-sided hydronephrosis and hydroureter with left ureteral stent in place.  The distal pigtail difficult to confirm within the bladder and may be within the  distal ureter.  Progressive widely disseminated pulmonary metastases.  Blood cultures on 4/25/2023 are in progress.  MRSA screen on 4/26/2023 was negative.  COVID-19 and flu A/B PCR on 4/25/2023 was negative.        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 Overall, the patient feels better today but reports experiencing mild suprapubic pain in the morning and a strong urge to urinate. A PureWick is currently in place, and stool and urine are mixed in the canister. On lung auscultation, clear sounds are heard bilaterally, and the abdomen is soft with mild tenderness to palpation in the suprapubic area. The patient's WBC count has improved to 11.59, and CRP levels have also improved to 18.26. Blood cultures from 4/25/2023 were finalized as Clostridium subterminale, and based on these results, ceftriaxone was discontinued. For now, the patient will continue to receive metronidazole 500 mg IV every 8 hours. Might have to escalate coverage to Unasyn or Zosyn if worsening clinical status.      ANTIMICROBIAL THERAPY    metroNIDAZOLE - 500-0.79 MG/100ML-%     Code Status:   Code Status and Medical Interventions:   Ordered at: 04/25/23 1937     Level Of Support Discussed With:    Patient     Code Status (Patient has no pulse and is not breathing):    CPR (Attempt to Resuscitate)     Medical Interventions (Patient has pulse or is breathing):    Full Support       BERTHA Sanchez  04/28/23  12:55 EDT     Electronically signed by BERTHA Sanchez, 04/28/23, 12:57 PM  EDT.      Electronically signed by Socrates Flaherty MD, 04/28/23, 1:05 PM EDT.

## 2023-04-28 NOTE — PLAN OF CARE
Goal Outcome Evaluation:              Outcome Evaluation: Patient resting in bed at this time. Patient denies complaints currently. Patient remains on 2.5 L NC oxygen at this time. Call light within reach.

## 2023-04-29 LAB
BACTERIA BLD CULT: NORMAL
BH BB BLOOD EXPIRATION DATE: NORMAL
BH BB BLOOD TYPE BARCODE: 6200
BH BB DISPENSE STATUS: NORMAL
BH BB PRODUCT CODE: NORMAL
BH BB UNIT NUMBER: NORMAL
BOTTLE TYPE: NORMAL
CROSSMATCH INTERPRETATION: NORMAL
UNIT  ABO: NORMAL
UNIT  RH: NORMAL

## 2023-04-29 PROCEDURE — 99232 SBSQ HOSP IP/OBS MODERATE 35: CPT | Performed by: NURSE PRACTITIONER

## 2023-04-29 PROCEDURE — 99232 SBSQ HOSP IP/OBS MODERATE 35: CPT | Performed by: HOSPITALIST

## 2023-04-29 PROCEDURE — 87040 BLOOD CULTURE FOR BACTERIA: CPT | Performed by: NURSE PRACTITIONER

## 2023-04-29 PROCEDURE — 94799 UNLISTED PULMONARY SVC/PX: CPT

## 2023-04-29 PROCEDURE — 94761 N-INVAS EAR/PLS OXIMETRY MLT: CPT

## 2023-04-29 PROCEDURE — 63710000001 PREDNISONE PER 1 MG: Performed by: HOSPITALIST

## 2023-04-29 PROCEDURE — 25010000002 ENOXAPARIN PER 10 MG

## 2023-04-29 RX ADMIN — BUDESONIDE 0.5 MG: 0.5 SUSPENSION RESPIRATORY (INHALATION) at 18:22

## 2023-04-29 RX ADMIN — Medication 10 ML: at 20:48

## 2023-04-29 RX ADMIN — BUDESONIDE 0.5 MG: 0.5 SUSPENSION RESPIRATORY (INHALATION) at 06:19

## 2023-04-29 RX ADMIN — Medication 10 ML: at 09:00

## 2023-04-29 RX ADMIN — METRONIDAZOLE 500 MG: 5 INJECTION, SOLUTION INTRAVENOUS at 00:04

## 2023-04-29 RX ADMIN — HYDROCODONE BITARTRATE AND ACETAMINOPHEN 1 TABLET: 5; 325 TABLET ORAL at 00:25

## 2023-04-29 RX ADMIN — METRONIDAZOLE 500 MG: 5 INJECTION, SOLUTION INTRAVENOUS at 15:16

## 2023-04-29 RX ADMIN — OXYCODONE HYDROCHLORIDE AND ACETAMINOPHEN 500 MG: 500 TABLET ORAL at 09:00

## 2023-04-29 RX ADMIN — METRONIDAZOLE 500 MG: 5 INJECTION, SOLUTION INTRAVENOUS at 06:19

## 2023-04-29 RX ADMIN — ENOXAPARIN SODIUM 40 MG: 40 INJECTION SUBCUTANEOUS at 08:59

## 2023-04-29 RX ADMIN — METRONIDAZOLE 500 MG: 5 INJECTION, SOLUTION INTRAVENOUS at 23:49

## 2023-04-29 RX ADMIN — POLYETHYLENE GLYCOL 3350 17 G: 17 POWDER, FOR SOLUTION ORAL at 08:59

## 2023-04-29 RX ADMIN — PREDNISONE 40 MG: 20 TABLET ORAL at 09:00

## 2023-04-29 RX ADMIN — IPRATROPIUM BROMIDE AND ALBUTEROL SULFATE 3 ML: .5; 2.5 SOLUTION RESPIRATORY (INHALATION) at 18:22

## 2023-04-29 RX ADMIN — IPRATROPIUM BROMIDE AND ALBUTEROL SULFATE 3 ML: .5; 2.5 SOLUTION RESPIRATORY (INHALATION) at 06:19

## 2023-04-29 RX ADMIN — NYSTATIN: 100000 POWDER TOPICAL at 09:07

## 2023-04-29 RX ADMIN — IPRATROPIUM BROMIDE AND ALBUTEROL SULFATE 3 ML: .5; 2.5 SOLUTION RESPIRATORY (INHALATION) at 12:14

## 2023-04-29 RX ADMIN — HYDROCODONE BITARTRATE AND ACETAMINOPHEN 1 TABLET: 5; 325 TABLET ORAL at 09:01

## 2023-04-29 RX ADMIN — NYSTATIN: 100000 POWDER TOPICAL at 20:48

## 2023-04-29 NOTE — PLAN OF CARE
Goal Outcome Evaluation:              Outcome Evaluation: Patient resting in bed at this time. Patient denies complaints currently. Patient remains on 2.5L NC oxygen. Call light within reach.

## 2023-04-29 NOTE — PLAN OF CARE
Goal Outcome Evaluation:  Plan of Care Reviewed With: patient        Progress: no change  Outcome Evaluation: A/O x4. VSS. 2.5L NC. Wound care completed. No needs noted at this time. Bed alarm set, call light within reach.      Problem: Adult Inpatient Plan of Care  Goal: Plan of Care Review  Outcome: Ongoing, Progressing  Flowsheets (Taken 4/29/2023 1338)  Progress: no change  Plan of Care Reviewed With: patient  Outcome Evaluation: A/O x4. VSS. 2.5L NC. Wound care completed. No needs noted at this time. Bed alarm set, call light within reach.  Goal: Patient-Specific Goal (Individualized)  Outcome: Ongoing, Progressing  Goal: Absence of Hospital-Acquired Illness or Injury  Outcome: Ongoing, Progressing  Intervention: Identify and Manage Fall Risk  Recent Flowsheet Documentation  Taken 4/29/2023 1300 by Bernadette Molina RN  Safety Promotion/Fall Prevention:   activity supervised   fall prevention program maintained   safety round/check completed  Taken 4/29/2023 1100 by Bernadette Molina RN  Safety Promotion/Fall Prevention:   activity supervised   fall prevention program maintained   safety round/check completed  Taken 4/29/2023 0900 by Bernadette Molina RN  Safety Promotion/Fall Prevention:   activity supervised   fall prevention program maintained   safety round/check completed  Taken 4/29/2023 0700 by Bernadette Molina RN  Safety Promotion/Fall Prevention:   activity supervised   fall prevention program maintained   safety round/check completed  Intervention: Prevent Skin Injury  Recent Flowsheet Documentation  Taken 4/29/2023 1300 by Bernadette Molina RN  Skin Protection:   adhesive use limited   incontinence pads utilized   skin sealant/moisture barrier applied   transparent dressing maintained   tubing/devices free from skin contact  Taken 4/29/2023 0900 by Bernadette Molina RN  Skin Protection:   adhesive use limited   incontinence pads utilized   pulse oximeter probe site changed   skin sealant/moisture barrier applied    transparent dressing maintained   tubing/devices free from skin contact  Intervention: Prevent and Manage VTE (Venous Thromboembolism) Risk  Recent Flowsheet Documentation  Taken 4/29/2023 1300 by Bernadette Molina RN  Activity Management: bedrest  VTE Prevention/Management: (lovenox) other (see comments)  Taken 4/29/2023 0900 by Bernadette Molina RN  Activity Management: bedrest  VTE Prevention/Management: (lovenox) other (see comments)  Intervention: Prevent Infection  Recent Flowsheet Documentation  Taken 4/29/2023 1300 by Bernadette Molina RN  Infection Prevention:   single patient room provided   rest/sleep promoted   personal protective equipment utilized   hand hygiene promoted   equipment surfaces disinfected  Taken 4/29/2023 1100 by Bernadette Molina RN  Infection Prevention:   single patient room provided   rest/sleep promoted   personal protective equipment utilized   hand hygiene promoted   equipment surfaces disinfected  Taken 4/29/2023 0900 by Bernadette Molina RN  Infection Prevention:   single patient room provided   rest/sleep promoted   personal protective equipment utilized   hand hygiene promoted   equipment surfaces disinfected  Taken 4/29/2023 0700 by Bernadette Molina RN  Infection Prevention:   single patient room provided   rest/sleep promoted   personal protective equipment utilized   hand hygiene promoted   equipment surfaces disinfected  Goal: Optimal Comfort and Wellbeing  Outcome: Ongoing, Progressing  Intervention: Provide Person-Centered Care  Recent Flowsheet Documentation  Taken 4/29/2023 1300 by Bernadette Molina RN  Trust Relationship/Rapport:   care explained   choices provided   emotional support provided   thoughts/feelings acknowledged   reassurance provided  Taken 4/29/2023 0900 by Bernadette Molina RN  Trust Relationship/Rapport:   care explained   choices provided   emotional support provided   thoughts/feelings acknowledged  Goal: Readiness for Transition of Care  Outcome: Ongoing, Progressing      Problem: Fall Injury Risk  Goal: Absence of Fall and Fall-Related Injury  Outcome: Ongoing, Progressing  Intervention: Identify and Manage Contributors  Recent Flowsheet Documentation  Taken 4/29/2023 1300 by Bernadette Molina RN  Medication Review/Management: medications reviewed  Self-Care Promotion:   independence encouraged   BADL personal objects within reach   BADL personal routines maintained  Taken 4/29/2023 1100 by Bernadette Molina RN  Medication Review/Management: medications reviewed  Taken 4/29/2023 0900 by Bernadette Molina RN  Medication Review/Management: medications reviewed  Self-Care Promotion:   independence encouraged   BADL personal objects within reach   BADL personal routines maintained  Taken 4/29/2023 0700 by Bernadette Molina RN  Medication Review/Management: medications reviewed  Intervention: Promote Injury-Free Environment  Recent Flowsheet Documentation  Taken 4/29/2023 1300 by Bernadette Molina RN  Safety Promotion/Fall Prevention:   activity supervised   fall prevention program maintained   safety round/check completed  Taken 4/29/2023 1100 by Bernadette Molina RN  Safety Promotion/Fall Prevention:   activity supervised   fall prevention program maintained   safety round/check completed  Taken 4/29/2023 0900 by Bernadette Molina RN  Safety Promotion/Fall Prevention:   activity supervised   fall prevention program maintained   safety round/check completed  Taken 4/29/2023 0700 by Bernadette Molina RN  Safety Promotion/Fall Prevention:   activity supervised   fall prevention program maintained   safety round/check completed     Problem: Skin Injury Risk Increased  Goal: Skin Health and Integrity  Outcome: Ongoing, Progressing  Intervention: Promote and Optimize Oral Intake  Recent Flowsheet Documentation  Taken 4/29/2023 1300 by Bernadette Molina RN  Oral Nutrition Promotion: rest periods promoted  Taken 4/29/2023 0900 by Bernadette Molina RN  Oral Nutrition Promotion: rest periods promoted  Intervention:  Optimize Skin Protection  Recent Flowsheet Documentation  Taken 4/29/2023 1300 by Bernadette Molina RN  Pressure Reduction Techniques:   weight shift assistance provided   rest period provided between sit times   heels elevated off bed  Pressure Reduction Devices:   pressure-redistributing mattress utilized   positioning supports utilized  Skin Protection:   adhesive use limited   incontinence pads utilized   skin sealant/moisture barrier applied   transparent dressing maintained   tubing/devices free from skin contact  Taken 4/29/2023 0900 by Bernadette Molina RN  Pressure Reduction Techniques:   weight shift assistance provided   pressure points protected   positioned off wounds   heels elevated off bed  Pressure Reduction Devices:   pressure-redistributing mattress utilized   positioning supports utilized  Skin Protection:   adhesive use limited   incontinence pads utilized   pulse oximeter probe site changed   skin sealant/moisture barrier applied   transparent dressing maintained   tubing/devices free from skin contact     Problem: Hypertension Comorbidity  Goal: Blood Pressure in Desired Range  Outcome: Ongoing, Progressing  Intervention: Maintain Blood Pressure Management  Recent Flowsheet Documentation  Taken 4/29/2023 1300 by Bernadette Molina RN  Medication Review/Management: medications reviewed  Taken 4/29/2023 1100 by Bernadette Molina RN  Medication Review/Management: medications reviewed  Taken 4/29/2023 0900 by Bernadette Molina RN  Medication Review/Management: medications reviewed  Taken 4/29/2023 0700 by Bernadette Molina RN  Medication Review/Management: medications reviewed

## 2023-04-29 NOTE — PROGRESS NOTES
[facility]     Patient Identification:  Name:  Orquidea Rosenberg  Age:  77 y.o.  Sex:  female  :  1945  MRN:  [id number]  Visit Number:  [id number]  Primary Care Provider:  Jackeline Denson APRN    Length of stay:  4    Subjective: Patient seen and examined, patient asleep but arousable, she is mouth breathing, when mouth breathing her O2 sat is 84 to 87% on room air improved to 94% on 2 L nasal cannula.  She reports the pain has been well controlled with as needed medication breathing also has improved, afebrile.  Renal function continues to improve, blood culture noted Clostridium species     Chief Complaint: Abdominal pain  ----------------------------------------------------------------------------------------------------------------------  Current Hospital Meds:  ascorbic acid, 500 mg, Oral, Daily  budesonide, 0.5 mg, Nebulization, BID - RT  enoxaparin, 40 mg, Subcutaneous, Daily  ipratropium-albuterol, 3 mL, Nebulization, 4x Daily - RT  metroNIDAZOLE, 500 mg, Intravenous, Q8H  nystatin, , Topical, Q12H  polyethylene glycol, 17 g, Oral, Daily  predniSONE, 40 mg, Oral, Daily With Breakfast  sodium chloride, 10 mL, Intravenous, Q12H  sodium chloride, 10 mL, Intravenous, Q12H  sodium chloride, 10 mL, Intravenous, Q12H  sodium chloride, 10 mL, Intravenous, Q12H  sodium chloride, 10 mL, Intravenous, Q12H         ----------------------------------------------------------------------------------------------------------------------  Vital Signs:  Temp:  [97.4 °F (36.3 °C)-97.9 °F (36.6 °C)] 97.7 °F (36.5 °C)  Heart Rate:  [] 83  Resp:  [18-25] 20  BP: (107-181)/(50-92) 139/63       Tele: Sinus rhythm 87 bpm      23  0400 230 23   Weight: 78.6 kg (173 lb 4.5 oz) 81.6 kg (179 lb 14.3 oz) 79.9 kg (176 lb 2.4 oz)     Body mass index is 28.43 kg/m².    Intake/Output Summary (Last 24 hours) at 2023 1043  Last data filed at 2023  1000  Gross per 24 hour   Intake 431 ml   Output 600 ml   Net -169 ml     Diet: Regular/House Diet; Texture: Regular Texture (IDDSI 7); Fluid Consistency: Thin (IDDSI 0)  ----------------------------------------------------------------------------------------------------------------------  Physical exam:  General: Comfortable, was sleeping but easily arousable, awake, oriented to self, place, and time,  No respiratory distress.    Skin:  Skin is warm and dry. No rash noted. No pallor.    HENT:  Head:  Normocephalic and atraumatic.  Mouth:  Moist mucous membranes.    Eyes:  Conjunctivae and EOM are normal.  Pupils are equal, round, and reactive to light.  No scleral icterus.    Neck:  Neck supple.  No JVD present.    Pulmonary/Chest: Complete resolution of wheezing,  no respiratory distress, , no crackles, with normal breath sounds and good air movement.  Cardiovascular:  Normal rate, regular rhythm and normal heart sounds with no murmur.  Abdominal:  Soft.  Bowel sounds are normal.  No distension, suprapubic tenderness has resolved    Extremities:  No edema, no tenderness, and no deformity.  No red or swollen joints anywhere.  Strong pulses in all 4 extremities with no clubbing, no cyanosis, no edema.  Neurological:  Motor strength equal no obvious deficit, sensory grossly intact.   No cranial nerve deficit.  No tongue deviation.  No facial droop.  No slurred speech.    Genitourinary: External urinary catheter with fecaloid stained urine  Back:  ----------------------------------------------------------------------------------------------------------------------  ----------------------------------------------------------------------------------------------------------------------      Results from last 7 days   Lab Units 04/28/23  0024 04/27/23  0027 04/26/23  0530 04/26/23  0051 04/25/23  1308   CRP mg/dL 18.26*  --   --   --  28.78*   LACTATE mmol/L  --   --   --   --  1.2   WBC 10*3/mm3 11.59* 10.82*  --  14.63*  16.57*   HEMOGLOBIN g/dL 8.1* 7.5* 7.6* 5.7* 6.7*   HEMATOCRIT % 29.0* 25.9* 25.0* 19.4* 22.9*   MCV fL 90.3 88.4  --  82.2 84.8   MCHC g/dL 27.9* 29.0*  --  29.4* 29.3*   PLATELETS 10*3/mm3 309 299  --  328 384         Results from last 7 days   Lab Units 04/28/23  0024 04/27/23  0027 04/26/23  0051 04/25/23  1308   SODIUM mmol/L 142 139 136 130*   POTASSIUM mmol/L 4.1 4.5 5.1 5.0   CHLORIDE mmol/L 113* 112* 104 95*   CO2 mmol/L 17.1* 16.7* 14.4* 17.9*   BUN mg/dL 47* 68* 85* 94*   CREATININE mg/dL 1.35* 1.93* 3.03* 3.76*   CALCIUM mg/dL 8.4* 8.1* 8.2* 9.5   GLUCOSE mg/dL 139* 119* 85 100*   ALBUMIN g/dL  --   --  2.6* 2.9*   BILIRUBIN mg/dL  --   --  <0.2 0.2   ALK PHOS U/L  --   --  84 104   AST (SGOT) U/L  --   --  23 17   ALT (SGPT) U/L  --   --  12 11   Estimated Creatinine Clearance: 37.2 mL/min (A) (by C-G formula based on SCr of 1.35 mg/dL (H)).    No results found for: AMMONIA      Blood Culture   Date Value Ref Range Status   04/25/2023 Clostridium subterminale (C)  Preliminary     Comment:     Beta lactamase positive confers resistance to ampcillin, amoxicillin, penicillin, ticarcillin, and piperacillin but NOT amoxicillin-clavulanate, ampicillin-sulbactam, or piperacillin-tazobactam.     04/25/2023 Clostridium subterminale (C)  Final     Comment:     Beta lactamase positive confers resistance to ampcillin, amoxicillin, penicillin, ticarcillin, and piperacillin but NOT amoxicillin-clavulanate, ampicillin-sulbactam, or piperacillin-tazobactam.       No results found for: URINECX  No results found for: WOUNDCX  No results found for: STOOLCX    I have personally looked at the labs and they are summarized above.  ----------------------------------------------------------------------------------------------------------------------  Imaging Results (Last 24 Hours)     ** No results found for the last 24 hours. **         ----------------------------------------------------------------------------------------------------------------------  Assessment and Plan:  -Complicated UTI due to presence of pelvic tumor with communication to the bladder and rectum by fistula  -Clostridium subterminales bacteremia source likely GI ?  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  She is full code.  Suspect she may require oxygen supplementation likely.       notes patient request nursing home placement.    Oral prognosis is very poor      Disposition pending improvement      Dalila Mcdonald MD  04/29/23  10:43 EDT

## 2023-04-29 NOTE — PROGRESS NOTES
PROGRESS NOTE         Patient Identification:  Name:  Orquidea Rosenberg  Age:  77 y.o.  Sex:  female  :  1945  MRN:  [id number]  Visit Number:  [id number]  Primary Care Provider:  Jackeline Denson APRN         LOS: 4 days       ----------------------------------------------------------------------------------------------------------------------  Subjective       Chief Complaints:    Diarrhea, Vomiting, and Nausea        Interval History:      Patient sitting up in bed this morning.  Overall feels better today.  More awake and alert today.  No complaints or issues.  Denies abdominal pain.  Reports improvement in diarrhea.  Afebrile.  Lungs clear to auscultation bilaterally.  Abdomen soft, nontender.  Continues on 2.5 L per nasal cannula with no apparent distress.    Review of Systems:    Constitutional: no fever, chills and night sweats.  Generalized fatigue.  Eyes: no eye drainage, itching or redness.  HEENT: no mouth sores, dysphagia or nose bleed.  Respiratory: no for shortness of breath, cough or production of sputum.  Cardiovascular: no chest pain, no palpitations, no orthopnea.  Gastrointestinal: no nausea, vomiting or diarrhea. No abdominal pain, no hematemesis or rectal bleeding.  Genitourinary: no dysuria or polyuria.  Hematologic/lymphatic: no lymph node abnormalities, no easy bruising or easy bleeding.  Musculoskeletal: no muscle or joint pain.  Skin: No rash and no itching.  Neurological: no loss of consciousness, no seizure, no headache.  Behavioral/Psych: no depression or suicidal ideation.  Endocrine: no hot flashes.  Immunologic: negative.    ----------------------------------------------------------------------------------------------------------------------      Objective       \[facility] [place] [facility]\"" Meds:  ascorbic acid, 500 mg, Oral, Daily  budesonide, 0.5 mg, Nebulization, BID - RT  enoxaparin, 40 mg, Subcutaneous, Daily  ipratropium-albuterol, 3 mL, Nebulization, 4x Daily -  RT  metroNIDAZOLE, 500 mg, Intravenous, Q8H  nystatin, , Topical, Q12H  polyethylene glycol, 17 g, Oral, Daily  predniSONE, 40 mg, Oral, Daily With Breakfast  sodium chloride, 10 mL, Intravenous, Q12H  sodium chloride, 10 mL, Intravenous, Q12H  sodium chloride, 10 mL, Intravenous, Q12H  sodium chloride, 10 mL, Intravenous, Q12H  sodium chloride, 10 mL, Intravenous, Q12H         ----------------------------------------------------------------------------------------------------------------------    Vital Signs:  Temp:  [97.4 °F (36.3 °C)-97.9 °F (36.6 °C)] 97.7 °F (36.5 °C)  Heart Rate:  [] 66  Resp:  [18-25] 20  BP: (107-181)/(50-92) 124/56  Mean Arterial Pressure (Non-Invasive) for the past 24 hrs (Last 3 readings):   Noninvasive MAP (mmHg)   04/29/23 1100 76   04/29/23 1000 89   04/29/23 0900 126     SpO2 Percentage    04/29/23 1000 04/29/23 1037 04/29/23 1100   SpO2: 100% (!) 85% 90%     SpO2:  [85 %-100 %] 90 %  on  Flow (L/min):  [2-2.5] 2.5;   Device (Oxygen Therapy): nasal cannula    Body mass index is 28.43 kg/m².  Wt Readings from Last 3 Encounters:   04/29/23 79.9 kg (176 lb 2.4 oz)   03/15/22 81.6 kg (180 lb)   02/21/22 81.6 kg (180 lb)        Intake/Output Summary (Last 24 hours) at 4/29/2023 1128  Last data filed at 4/29/2023 1000  Gross per 24 hour   Intake 431 ml   Output 600 ml   Net -169 ml     Diet: Regular/House Diet; Texture: Regular Texture (IDDSI 7); Fluid Consistency: Thin (IDDSI 0)  ----------------------------------------------------------------------------------------------------------------------      Physical Exam:    Constitutional: Elderly, chronically ill-appearing.   Currently on 2.5 L per nasal cannula.   Overall feels better today.  HENT:  Head: Normocephalic and atraumatic.  Mouth:  Moist mucous membranes.    Eyes:  Conjunctivae and EOM are normal.  No scleral icterus.  Neck:  Neck supple.  No JVD present.    Cardiovascular:  Normal rate, regular rhythm and normal heart  sounds with no murmur. No edema.  Pulmonary/Chest: Lungs clear to auscultation bilaterally.  Currently on 2.5 L per nasal cannula  Abdominal:  Soft.  Bowel sounds are normal.  No distension and no tenderness.  Musculoskeletal:  No edema, no tenderness, and no deformity.  No swelling or redness of joints.  Neurological:  Alert and oriented to person, place, and time.  No facial droop.  No slurred speech.   Skin:  Skin is warm and dry.  No rash noted.  No pallor. Vulvar deformity due to prior surgeries.  Psychiatric:  Normal mood and affect.  Behavior is normal.        ----------------------------------------------------------------------------------------------------------------------            Results from last 7 days   Lab Units 04/28/23  0024 04/27/23  0027 04/26/23  0530 04/26/23  0051 04/25/23  1308   CRP mg/dL 18.26*  --   --   --  28.78*   LACTATE mmol/L  --   --   --   --  1.2   WBC 10*3/mm3 11.59* 10.82*  --  14.63* 16.57*   HEMOGLOBIN g/dL 8.1* 7.5* 7.6* 5.7* 6.7*   HEMATOCRIT % 29.0* 25.9* 25.0* 19.4* 22.9*   MCV fL 90.3 88.4  --  82.2 84.8   MCHC g/dL 27.9* 29.0*  --  29.4* 29.3*   PLATELETS 10*3/mm3 309 299  --  328 384     Results from last 7 days   Lab Units 04/28/23  0024 04/27/23  0027 04/26/23  0051 04/25/23  1308   SODIUM mmol/L 142 139 136 130*   POTASSIUM mmol/L 4.1 4.5 5.1 5.0   CHLORIDE mmol/L 113* 112* 104 95*   CO2 mmol/L 17.1* 16.7* 14.4* 17.9*   BUN mg/dL 47* 68* 85* 94*   CREATININE mg/dL 1.35* 1.93* 3.03* 3.76*   CALCIUM mg/dL 8.4* 8.1* 8.2* 9.5   GLUCOSE mg/dL 139* 119* 85 100*   ALBUMIN g/dL  --   --  2.6* 2.9*   BILIRUBIN mg/dL  --   --  <0.2 0.2   ALK PHOS U/L  --   --  84 104   AST (SGOT) U/L  --   --  23 17   ALT (SGPT) U/L  --   --  12 11   Estimated Creatinine Clearance: 37.2 mL/min (A) (by C-G formula based on SCr of 1.35 mg/dL (H)).  No results found for: AMMONIA    No results found for: HGBA1C, POCGLU  No results found for: HGBA1C  Lab Results   Component Value Date    TSH  2.620 03/15/2022       Blood Culture   Date Value Ref Range Status   04/25/2023 Abnormal Stain (C)  Preliminary   04/25/2023 Anaerobe (Organism type) (C)  Preliminary     No results found for: URINECX  No results found for: WOUNDCX  No results found for: STOOLCX  No results found for: RESPCX  Pain Management Panel          View : No data to display.                        ----------------------------------------------------------------------------------------------------------------------  Imaging Results (Last 24 Hours)     ** No results found for the last 24 hours. **          ----------------------------------------------------------------------------------------------------------------------    Pertinent Infectious Disease Results    Lactic acid on admission was normal at 1.2.  Chest x-ray on 4/26/2023 showed no change in distribution of bilateral lung opacities which may represent changes of fibrosis and diffuse pulmonary nodules.  No pneumothorax.  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  This could represent secondary involvement of the leiomyomatous uterus or dystrophic calcifications within the malignancy.  Abnormal fistulous communications between the anterior rectum and the posterior aspect of the above-mentioned pelvic mass with at least 1 and possibly 2 fistulous connections as demonstrated on CT.  Central debris within the mass may represent abscess or necrosis.  Apparent fistulous communication between the mass in the bladder.  Left-sided hydronephrosis and hydroureter with left ureteral stent in place.  The distal pigtail difficult to confirm within the bladder and may be within the distal ureter.  Progressive widely disseminated pulmonary metastases.  Blood cultures on 4/25/2023 are in progress.  MRSA screen on 4/26/2023 was negative.  COVID-19 and flu A/B PCR on  4/25/2023 was negative.        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  Overall feels better today.  More awake and alert today.  No complaints or issues.  Denies abdominal pain.  Reports improvement in diarrhea.  Afebrile.  Lungs clear to auscultation bilaterally.  Abdomen soft, nontender.  Continues on 2.5 L per nasal cannula with no apparent distress.    Blood cultures from 4/25/2023 2 out of 2 sets positive for Clostridium subterminale.     For now we will continue metronidazole for treatment of Clostridium bacteremia.  Repeat blood cultures x2 ordered for today.  Unfortunately due to patient's anatomy and cancerous process she will remain at high risk for relapsing sepsis and infections.      ANTIMICROBIAL THERAPY    metroNIDAZOLE - 500-0.79 MG/100ML-%     Code Status:   Code Status and Medical Interventions:   Ordered at: 04/25/23 1937     Level Of Support Discussed With:    Patient     Code Status (Patient has no pulse and is not breathing):    CPR (Attempt to Resuscitate)     Medical Interventions (Patient has pulse or is breathing):    Full Support       BERTHA Sanchez  04/29/23  11:28 EDT

## 2023-04-30 PROCEDURE — 25010000002 ENOXAPARIN PER 10 MG

## 2023-04-30 PROCEDURE — 99232 SBSQ HOSP IP/OBS MODERATE 35: CPT | Performed by: NURSE PRACTITIONER

## 2023-04-30 PROCEDURE — 94799 UNLISTED PULMONARY SVC/PX: CPT

## 2023-04-30 PROCEDURE — 63710000001 PREDNISONE PER 1 MG: Performed by: HOSPITALIST

## 2023-04-30 PROCEDURE — 99232 SBSQ HOSP IP/OBS MODERATE 35: CPT | Performed by: HOSPITALIST

## 2023-04-30 PROCEDURE — 94761 N-INVAS EAR/PLS OXIMETRY MLT: CPT

## 2023-04-30 RX ORDER — FERROUS SULFATE 325(65) MG
325 TABLET ORAL
Status: DISCONTINUED | OUTPATIENT
Start: 2023-04-30 | End: 2023-05-05 | Stop reason: HOSPADM

## 2023-04-30 RX ADMIN — BUDESONIDE 0.5 MG: 0.5 SUSPENSION RESPIRATORY (INHALATION) at 06:46

## 2023-04-30 RX ADMIN — Medication 10 ML: at 08:58

## 2023-04-30 RX ADMIN — ENOXAPARIN SODIUM 40 MG: 40 INJECTION SUBCUTANEOUS at 08:57

## 2023-04-30 RX ADMIN — Medication 10 ML: at 08:57

## 2023-04-30 RX ADMIN — HYDROCODONE BITARTRATE AND ACETAMINOPHEN 1 TABLET: 5; 325 TABLET ORAL at 09:00

## 2023-04-30 RX ADMIN — HYDROCODONE BITARTRATE AND ACETAMINOPHEN 1 TABLET: 5; 325 TABLET ORAL at 19:58

## 2023-04-30 RX ADMIN — FERROUS SULFATE TAB 325 MG (65 MG ELEMENTAL FE) 325 MG: 325 (65 FE) TAB at 11:48

## 2023-04-30 RX ADMIN — IPRATROPIUM BROMIDE AND ALBUTEROL SULFATE 3 ML: .5; 2.5 SOLUTION RESPIRATORY (INHALATION) at 12:20

## 2023-04-30 RX ADMIN — METRONIDAZOLE 500 MG: 5 INJECTION, SOLUTION INTRAVENOUS at 22:51

## 2023-04-30 RX ADMIN — Medication 10 ML: at 20:00

## 2023-04-30 RX ADMIN — NYSTATIN: 100000 POWDER TOPICAL at 20:00

## 2023-04-30 RX ADMIN — IPRATROPIUM BROMIDE AND ALBUTEROL SULFATE 3 ML: .5; 2.5 SOLUTION RESPIRATORY (INHALATION) at 00:46

## 2023-04-30 RX ADMIN — BUDESONIDE 0.5 MG: 0.5 SUSPENSION RESPIRATORY (INHALATION) at 19:21

## 2023-04-30 RX ADMIN — IPRATROPIUM BROMIDE AND ALBUTEROL SULFATE 3 ML: .5; 2.5 SOLUTION RESPIRATORY (INHALATION) at 19:21

## 2023-04-30 RX ADMIN — NYSTATIN: 100000 POWDER TOPICAL at 08:58

## 2023-04-30 RX ADMIN — PREDNISONE 40 MG: 20 TABLET ORAL at 08:57

## 2023-04-30 RX ADMIN — OXYCODONE HYDROCHLORIDE AND ACETAMINOPHEN 500 MG: 500 TABLET ORAL at 08:57

## 2023-04-30 RX ADMIN — METRONIDAZOLE 500 MG: 5 INJECTION, SOLUTION INTRAVENOUS at 06:24

## 2023-04-30 RX ADMIN — IPRATROPIUM BROMIDE AND ALBUTEROL SULFATE 3 ML: .5; 2.5 SOLUTION RESPIRATORY (INHALATION) at 06:46

## 2023-04-30 RX ADMIN — METRONIDAZOLE 500 MG: 5 INJECTION, SOLUTION INTRAVENOUS at 14:56

## 2023-04-30 NOTE — PROGRESS NOTES
[facility]     Patient Identification:  Name:  Orquidea Rosenberg  Age:  77 y.o.  Sex:  female  :  1945  MRN:  [id number]  Visit Number:  [id number]  Primary Care Provider:  Jackeline Denson APRN    Length of stay:  5    Subjective: Patient seen and examined, still has suprapubic pain but improved, bronchospasm has resolved, vital signs stable, patient now on Flagyl only per infectious disease.  Breathing is much better she reports.  Still requires oxygen supplementation except for continued fecaloid urine and dysuria, patient is stable otherwise, will transfer to medical floors    Chief Complaint: Sepsis dysuria  ----------------------------------------------------------------------------------------------------------------------  Current Hospital Meds:  ascorbic acid, 500 mg, Oral, Daily  budesonide, 0.5 mg, Nebulization, BID - RT  enoxaparin, 40 mg, Subcutaneous, Daily  ipratropium-albuterol, 3 mL, Nebulization, 4x Daily - RT  metroNIDAZOLE, 500 mg, Intravenous, Q8H  nystatin, , Topical, Q12H  polyethylene glycol, 17 g, Oral, Daily  predniSONE, 40 mg, Oral, Daily With Breakfast  sodium chloride, 10 mL, Intravenous, Q12H  sodium chloride, 10 mL, Intravenous, Q12H  sodium chloride, 10 mL, Intravenous, Q12H  sodium chloride, 10 mL, Intravenous, Q12H  sodium chloride, 10 mL, Intravenous, Q12H         ----------------------------------------------------------------------------------------------------------------------  Vital Signs:  Temp:  [96.5 °F (35.8 °C)-98.2 °F (36.8 °C)] 96.5 °F (35.8 °C)  Heart Rate:  [] 78  Resp:  [20-] 22  BP: (121-175)/(56-93) 121/59       Tele: Sinus rhythm 80 bpm      23  0400 23  0400 23   Weight: 81.6 kg (179 lb 14.3 oz) 79.9 kg (176 lb 2.4 oz) 82.2 kg (181 lb 3.5 oz)     Body mass index is 29.25 kg/m².    Intake/Output Summary (Last 24 hours) at 2023 1054  Last data filed at 2023 0800  Gross per 24  hour   Intake 711.09 ml   Output 200 ml   Net 511.09 ml     Diet: Regular/House Diet; Texture: Regular Texture (IDDSI 7); Fluid Consistency: Thin (IDDSI 0)  ----------------------------------------------------------------------------------------------------------------------  Physical exam:  General: Comfortable,awake, alert, oriented to self, place, and time, No respiratory distress.    Skin:  Skin is warm and dry. No rash noted. No pallor.    HENT:  Head:  Normocephalic and atraumatic.  Mouth:  Moist mucous membranes.    Eyes:  Conjunctivae and EOM are normal.  Pupils are equal, round, and reactive to light.  No scleral icterus.    Neck:  Neck supple.  No JVD present.    Pulmonary/Chest: Complete resolution of wheeze no respiratory distress, no wheezes, no crackles, with normal breath sounds and good air movement.  Cardiovascular:  Normal rate, regular rhythm and normal heart sounds with no murmur.  Abdominal: Subjective tenderness only on deep palpation suprapubic area soft.  Bowel sounds are normal.   Extremities: +1 edema both lower extremity   Strong pulses in all 4 extremities with no clubbing, no cyanosis, Neurological:  Motor strength equal no obvious deficit, sensory grossly intact.   No cranial nerve deficit.  No tongue deviation.  No facial droop.  No slurred speech.    Genitourinary: External urinary catheter with brownish urine  Back:  ----------------------------------------------------------------------------------------------------------------------  ----------------------------------------------------------------------------------------------------------------------      Results from last 7 days   Lab Units 04/28/23  0024 04/27/23  0027 04/26/23  0530 04/26/23  0051 04/25/23  1308   CRP mg/dL 18.26*  --   --   --  28.78*   LACTATE mmol/L  --   --   --   --  1.2   WBC 10*3/mm3 11.59* 10.82*  --  14.63* 16.57*   HEMOGLOBIN g/dL 8.1* 7.5* 7.6* 5.7* 6.7*   HEMATOCRIT % 29.0* 25.9* 25.0* 19.4* 22.9*    MCV fL 90.3 88.4  --  82.2 84.8   MCHC g/dL 27.9* 29.0*  --  29.4* 29.3*   PLATELETS 10*3/mm3 309 299  --  328 384         Results from last 7 days   Lab Units 04/28/23  0024 04/27/23  0027 04/26/23  0051 04/25/23  1308   SODIUM mmol/L 142 139 136 130*   POTASSIUM mmol/L 4.1 4.5 5.1 5.0   CHLORIDE mmol/L 113* 112* 104 95*   CO2 mmol/L 17.1* 16.7* 14.4* 17.9*   BUN mg/dL 47* 68* 85* 94*   CREATININE mg/dL 1.35* 1.93* 3.03* 3.76*   CALCIUM mg/dL 8.4* 8.1* 8.2* 9.5   GLUCOSE mg/dL 139* 119* 85 100*   ALBUMIN g/dL  --   --  2.6* 2.9*   BILIRUBIN mg/dL  --   --  <0.2 0.2   ALK PHOS U/L  --   --  84 104   AST (SGOT) U/L  --   --  23 17   ALT (SGPT) U/L  --   --  12 11   Estimated Creatinine Clearance: 37.7 mL/min (A) (by C-G formula based on SCr of 1.35 mg/dL (H)).    No results found for: AMMONIA      Blood Culture   Date Value Ref Range Status   04/25/2023 Clostridium subterminale (C)  Preliminary     Comment:     Beta lactamase positive confers resistance to ampcillin, amoxicillin, penicillin, ticarcillin, and piperacillin but NOT amoxicillin-clavulanate, ampicillin-sulbactam, or piperacillin-tazobactam.     04/25/2023 Clostridium subterminale (C)  Final     Comment:     Beta lactamase positive confers resistance to ampcillin, amoxicillin, penicillin, ticarcillin, and piperacillin but NOT amoxicillin-clavulanate, ampicillin-sulbactam, or piperacillin-tazobactam.       No results found for: URINECX  No results found for: WOUNDCX  No results found for: STOOLCX    I have personally looked at the labs and they are summarized above.  ----------------------------------------------------------------------------------------------------------------------  Imaging Results (Last 24 Hours)     ** No results found for the last 24 hours. **        ----------------------------------------------------------------------------------------------------------------------  Assessment and Plan:  -Complicated UTI due to the presence of  necrotic large pelvic tumor with fistula formation between the tumor and urinary bladder causing fecaloid urine and fistula between the tumor and rectum  -Clostridium subterminales bacteremia  -Vulvar cancer with diffuse metastasis including extensive lung metastasis  -Acute hypoxic respiratory failure secondary to metastatic disease although underlying pneumonia cannot be entirely ruled out  -Chronic anemia with iron deficiency with acute worsening requiring 1 unit packed RBC  -Vulvar cancer with large pelvic tumor with necrotic debris  -Bronchospasm improved with Pulmicort and short course of prednisone  -Acute kidney injury resolving    Continue with antibiotic per infectious disease, she is high risk for recurrence and less likely to completely clear her infection due to the presence of fistula and pelvic tumor.  Patient is aware, she is for placement      Disposition nursing home placement      Dalila Mcdonald MD  04/30/23  10:54 EDT

## 2023-04-30 NOTE — PLAN OF CARE
Goal Outcome Evaluation:              Outcome Evaluation: Patient resting in bed at this time. Patient remains on 2.5L NC oxygen. Patient denies complaints currently. Call light within reach.

## 2023-04-30 NOTE — PLAN OF CARE
Goal Outcome Evaluation:  Progress: no change   Pt transferred from PCU this shift. Received report from NUPUR Fleming. Pt resting in bed, watching television. Pt has tolerated all interventions. No complaints/concerns. No acute distress noted. Will continue to follow the plan of care.

## 2023-04-30 NOTE — PROGRESS NOTES
PROGRESS NOTE         Patient Identification:  Name:  Orquidea Rosenberg  Age:  77 y.o.  Sex:  female  :  1945  MRN:  [id number]  Visit Number:  [id number]  Primary Care Provider:  Jackeline Denson APRN         LOS: 5 days       ----------------------------------------------------------------------------------------------------------------------  Subjective       Chief Complaints:    Diarrhea, Vomiting, and Nausea        Interval History:      Patient sitting up in bed this morning.  Currently on 2.5 L per nasal cannula with no apparent distress.  Lungs clear to auscultation bilaterally.  Abdomen soft, nontender.  Reports mild nausea this morning.  Persistent dry cough this morning.  Blood cultures from 2023 currently in process.    Review of Systems:    Constitutional: no fever, chills and night sweats.  Generalized fatigue.  Eyes: no eye drainage, itching or redness.  HEENT: no mouth sores, dysphagia or nose bleed.  Respiratory: no for shortness of breath, Positive cough. No production of sputum.  Cardiovascular: no chest pain, no palpitations, no orthopnea.  Gastrointestinal: Positive nausea, no vomiting or diarrhea. No abdominal pain, no hematemesis or rectal bleeding.  Genitourinary: no dysuria or polyuria.  Hematologic/lymphatic: no lymph node abnormalities, no easy bruising or easy bleeding.  Musculoskeletal: no muscle or joint pain.  Skin: No rash and no itching.  Neurological: no loss of consciousness, no seizure, no headache.  Behavioral/Psych: no depression or suicidal ideation.  Endocrine: no hot flashes.  Immunologic: negative.    ----------------------------------------------------------------------------------------------------------------------      Objective       [facility] Meds:  ascorbic acid, 500 mg, Oral, Daily  budesonide, 0.5 mg, Nebulization, BID - RT  enoxaparin, 40 mg, Subcutaneous, Daily  ipratropium-albuterol, 3 mL, Nebulization, 4x Daily - RT  metroNIDAZOLE, 500  mg, Intravenous, Q8H  nystatin, , Topical, Q12H  polyethylene glycol, 17 g, Oral, Daily  predniSONE, 40 mg, Oral, Daily With Breakfast  sodium chloride, 10 mL, Intravenous, Q12H  sodium chloride, 10 mL, Intravenous, Q12H  sodium chloride, 10 mL, Intravenous, Q12H  sodium chloride, 10 mL, Intravenous, Q12H  sodium chloride, 10 mL, Intravenous, Q12H         ----------------------------------------------------------------------------------------------------------------------    Vital Signs:  Temp:  [96.5 °F (35.8 °C)-98.2 °F (36.8 °C)] 96.5 °F (35.8 °C)  Heart Rate:  [] 78  Resp:  [20-28] 22  BP: (121-175)/(56-93) 121/59  Mean Arterial Pressure (Non-Invasive) for the past 24 hrs (Last 3 readings):   Noninvasive MAP (mmHg)   04/30/23 1000 88   04/30/23 0900 70   04/30/23 0800 79     SpO2 Percentage    04/30/23 0800 04/30/23 0900 04/30/23 1000   SpO2: 97% 96% 97%     SpO2:  [84 %-100 %] 97 %  on  Flow (L/min):  [2.5-5] 2.5;   Device (Oxygen Therapy): nasal cannula    Body mass index is 29.25 kg/m².  Wt Readings from Last 3 Encounters:   04/30/23 82.2 kg (181 lb 3.5 oz)   03/15/22 81.6 kg (180 lb)   02/21/22 81.6 kg (180 lb)        Intake/Output Summary (Last 24 hours) at 4/30/2023 1100  Last data filed at 4/30/2023 0800  Gross per 24 hour   Intake 711.09 ml   Output 200 ml   Net 511.09 ml     Diet: Regular/House Diet; Texture: Regular Texture (IDDSI 7); Fluid Consistency: Thin (IDDSI 0)  ----------------------------------------------------------------------------------------------------------------------      Physical Exam:    Constitutional: Elderly, chronically ill-appearing.   Currently on 2.5 L per nasal cannula.   Reports nausea this morning.  HENT:  Head: Normocephalic and atraumatic.  Mouth:  Moist mucous membranes.    Eyes:  Conjunctivae and EOM are normal.  No scleral icterus.  Neck:  Neck supple.  No JVD present.    Cardiovascular:  Normal rate, regular rhythm and normal heart sounds with no murmur. No  edema.  Pulmonary/Chest: Lungs clear to auscultation bilaterally.  Currently on 2.5 L per nasal cannula  Abdominal:  Soft.  Bowel sounds are normal.  No distension and no tenderness.  Musculoskeletal:  No edema, no tenderness, and no deformity.  No swelling or redness of joints.  Neurological:  Alert and oriented to person, place, and time.  No facial droop.  No slurred speech.   Skin:  Skin is warm and dry.  No rash noted.  No pallor. Vulvar deformity due to prior surgeries.  Psychiatric:  Normal mood and affect.  Behavior is normal.        ----------------------------------------------------------------------------------------------------------------------            Results from last 7 days   Lab Units 04/28/23  0024 04/27/23  0027 04/26/23  0530 04/26/23 0051 04/25/23  1308   CRP mg/dL 18.26*  --   --   --  28.78*   LACTATE mmol/L  --   --   --   --  1.2   WBC 10*3/mm3 11.59* 10.82*  --  14.63* 16.57*   HEMOGLOBIN g/dL 8.1* 7.5* 7.6* 5.7* 6.7*   HEMATOCRIT % 29.0* 25.9* 25.0* 19.4* 22.9*   MCV fL 90.3 88.4  --  82.2 84.8   MCHC g/dL 27.9* 29.0*  --  29.4* 29.3*   PLATELETS 10*3/mm3 309 299  --  328 384     Results from last 7 days   Lab Units 04/28/23  0024 04/27/23  0027 04/26/23  0051 04/25/23  1308   SODIUM mmol/L 142 139 136 130*   POTASSIUM mmol/L 4.1 4.5 5.1 5.0   CHLORIDE mmol/L 113* 112* 104 95*   CO2 mmol/L 17.1* 16.7* 14.4* 17.9*   BUN mg/dL 47* 68* 85* 94*   CREATININE mg/dL 1.35* 1.93* 3.03* 3.76*   CALCIUM mg/dL 8.4* 8.1* 8.2* 9.5   GLUCOSE mg/dL 139* 119* 85 100*   ALBUMIN g/dL  --   --  2.6* 2.9*   BILIRUBIN mg/dL  --   --  <0.2 0.2   ALK PHOS U/L  --   --  84 104   AST (SGOT) U/L  --   --  23 17   ALT (SGPT) U/L  --   --  12 11   Estimated Creatinine Clearance: 37.7 mL/min (A) (by C-G formula based on SCr of 1.35 mg/dL (H)).  No results found for: AMMONIA    No results found for: HGBA1C, POCGLU  No results found for: HGBA1C  Lab Results   Component Value Date    TSH 2.620 03/15/2022       Blood  Culture   Date Value Ref Range Status   04/25/2023 Abnormal Stain (C)  Preliminary   04/25/2023 Anaerobe (Organism type) (C)  Preliminary     No results found for: URINECX  No results found for: WOUNDCX  No results found for: STOOLCX  No results found for: RESPCX  Pain Management Panel          View : No data to display.                        ----------------------------------------------------------------------------------------------------------------------  Imaging Results (Last 24 Hours)     ** No results found for the last 24 hours. **          ----------------------------------------------------------------------------------------------------------------------    Pertinent Infectious Disease Results    Lactic acid on admission was normal at 1.2.  Chest x-ray on 4/26/2023 showed no change in distribution of bilateral lung opacities which may represent changes of fibrosis and diffuse pulmonary nodules.  No pneumothorax.  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  This could represent secondary involvement of the leiomyomatous uterus or dystrophic calcifications within the malignancy.  Abnormal fistulous communications between the anterior rectum and the posterior aspect of the above-mentioned pelvic mass with at least 1 and possibly 2 fistulous connections as demonstrated on CT.  Central debris within the mass may represent abscess or necrosis.  Apparent fistulous communication between the mass in the bladder.  Left-sided hydronephrosis and hydroureter with left ureteral stent in place.  The distal pigtail difficult to confirm within the bladder and may be within the distal ureter.  Progressive widely disseminated pulmonary metastases.  Blood cultures on 4/25/2023 are in progress.  MRSA screen on 4/26/2023 was negative.  COVID-19 and flu A/B PCR on 4/25/2023 was  negative.        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  Currently on 2.5 L per nasal cannula with no apparent distress.  Lungs clear to auscultation bilaterally.  Abdomen soft, nontender.  Reports mild nausea this morning.  Persistent dry cough this morning.  Blood cultures from 4/29/2023 currently in process.    Blood cultures from 4/25/2023 2 out of 2 sets positive for Clostridium subterminale.     For now we will continue metronidazole for treatment of Clostridium bacteremia.   Unfortunately due to patient's anatomy and cancerous process she will remain at high risk for relapsing sepsis and infections.      ANTIMICROBIAL THERAPY    metroNIDAZOLE - 500-0.79 MG/100ML-%     Code Status:   Code Status and Medical Interventions:   Ordered at: 04/25/23 1937     Level Of Support Discussed With:    Patient     Code Status (Patient has no pulse and is not breathing):    CPR (Attempt to Resuscitate)     Medical Interventions (Patient has pulse or is breathing):    Full Support       BERTHA Sanchez  04/30/23  11:00 EDT

## 2023-05-01 LAB
ANISOCYTOSIS BLD QL: ABNORMAL
CRP SERPL-MCNC: 2.83 MG/DL (ref 0–0.5)
DEPRECATED RDW RBC AUTO: 64.2 FL (ref 37–54)
EOSINOPHIL # BLD MANUAL: 0.58 10*3/MM3 (ref 0–0.4)
EOSINOPHIL NFR BLD MANUAL: 4 % (ref 0.3–6.2)
ERYTHROCYTE [DISTWIDTH] IN BLOOD BY AUTOMATED COUNT: 20.3 % (ref 12.3–15.4)
HCT VFR BLD AUTO: 30.7 % (ref 34–46.6)
HGB BLD-MCNC: 8.9 G/DL (ref 12–15.9)
HYPOCHROMIA BLD QL: ABNORMAL
LYMPHOCYTES # BLD MANUAL: 1.59 10*3/MM3 (ref 0.7–3.1)
LYMPHOCYTES NFR BLD MANUAL: 3 % (ref 5–12)
MCH RBC QN AUTO: 25.5 PG (ref 26.6–33)
MCHC RBC AUTO-ENTMCNC: 29 G/DL (ref 31.5–35.7)
MCV RBC AUTO: 88 FL (ref 79–97)
METAMYELOCYTES NFR BLD MANUAL: 1 % (ref 0–0)
MONOCYTES # BLD: 0.43 10*3/MM3 (ref 0.1–0.9)
NEUTROPHILS # BLD AUTO: 11.7 10*3/MM3 (ref 1.7–7)
NEUTROPHILS NFR BLD MANUAL: 74 % (ref 42.7–76)
NEUTS BAND NFR BLD MANUAL: 7 % (ref 0–5)
NRBC SPEC MANUAL: 1 /100 WBC (ref 0–0.2)
PLAT MORPH BLD: NORMAL
PLATELET # BLD AUTO: 392 10*3/MM3 (ref 140–450)
PMV BLD AUTO: 8.7 FL (ref 6–12)
RBC # BLD AUTO: 3.49 10*6/MM3 (ref 3.77–5.28)
SCAN SLIDE: NORMAL
VARIANT LYMPHS NFR BLD MANUAL: 11 % (ref 19.6–45.3)
WBC NRBC COR # BLD: 14.45 10*3/MM3 (ref 3.4–10.8)

## 2023-05-01 PROCEDURE — 99232 SBSQ HOSP IP/OBS MODERATE 35: CPT | Performed by: INTERNAL MEDICINE

## 2023-05-01 PROCEDURE — 94799 UNLISTED PULMONARY SVC/PX: CPT

## 2023-05-01 PROCEDURE — 85007 BL SMEAR W/DIFF WBC COUNT: CPT | Performed by: NURSE PRACTITIONER

## 2023-05-01 PROCEDURE — 85025 COMPLETE CBC W/AUTO DIFF WBC: CPT | Performed by: NURSE PRACTITIONER

## 2023-05-01 PROCEDURE — 25010000002 MEROPENEM PER 100 MG: Performed by: INTERNAL MEDICINE

## 2023-05-01 PROCEDURE — 94761 N-INVAS EAR/PLS OXIMETRY MLT: CPT

## 2023-05-01 PROCEDURE — 86140 C-REACTIVE PROTEIN: CPT | Performed by: NURSE PRACTITIONER

## 2023-05-01 PROCEDURE — 25010000002 ENOXAPARIN PER 10 MG: Performed by: HOSPITALIST

## 2023-05-01 PROCEDURE — 63710000001 PREDNISONE PER 1 MG: Performed by: HOSPITALIST

## 2023-05-01 RX ORDER — LEVOTHYROXINE SODIUM 0.1 MG/1
100 TABLET ORAL
Status: DISCONTINUED | OUTPATIENT
Start: 2023-05-01 | End: 2023-05-05 | Stop reason: HOSPADM

## 2023-05-01 RX ORDER — ONDANSETRON 4 MG/1
4 TABLET, FILM COATED ORAL EVERY 8 HOURS PRN
Status: DISCONTINUED | OUTPATIENT
Start: 2023-05-01 | End: 2023-05-05 | Stop reason: HOSPADM

## 2023-05-01 RX ADMIN — FERROUS SULFATE TAB 325 MG (65 MG ELEMENTAL FE) 325 MG: 325 (65 FE) TAB at 09:57

## 2023-05-01 RX ADMIN — IPRATROPIUM BROMIDE AND ALBUTEROL SULFATE 3 ML: .5; 2.5 SOLUTION RESPIRATORY (INHALATION) at 06:59

## 2023-05-01 RX ADMIN — LEVOTHYROXINE SODIUM 100 MCG: 100 TABLET ORAL at 10:01

## 2023-05-01 RX ADMIN — NYSTATIN: 100000 POWDER TOPICAL at 09:58

## 2023-05-01 RX ADMIN — NYSTATIN: 100000 POWDER TOPICAL at 20:40

## 2023-05-01 RX ADMIN — Medication 10 ML: at 09:58

## 2023-05-01 RX ADMIN — BUDESONIDE 0.5 MG: 0.5 SUSPENSION RESPIRATORY (INHALATION) at 18:49

## 2023-05-01 RX ADMIN — HYDROCODONE BITARTRATE AND ACETAMINOPHEN 1 TABLET: 5; 325 TABLET ORAL at 09:57

## 2023-05-01 RX ADMIN — MEROPENEM 1 G: 1 INJECTION, POWDER, FOR SOLUTION INTRAVENOUS at 10:01

## 2023-05-01 RX ADMIN — HYDROCODONE BITARTRATE AND ACETAMINOPHEN 1 TABLET: 5; 325 TABLET ORAL at 17:14

## 2023-05-01 RX ADMIN — ENOXAPARIN SODIUM 40 MG: 40 INJECTION SUBCUTANEOUS at 09:57

## 2023-05-01 RX ADMIN — METRONIDAZOLE 500 MG: 5 INJECTION, SOLUTION INTRAVENOUS at 07:08

## 2023-05-01 RX ADMIN — Medication 10 ML: at 20:41

## 2023-05-01 RX ADMIN — IPRATROPIUM BROMIDE AND ALBUTEROL SULFATE 3 ML: .5; 2.5 SOLUTION RESPIRATORY (INHALATION) at 13:41

## 2023-05-01 RX ADMIN — POLYETHYLENE GLYCOL 3350 17 G: 17 POWDER, FOR SOLUTION ORAL at 09:57

## 2023-05-01 RX ADMIN — BUDESONIDE 0.5 MG: 0.5 SUSPENSION RESPIRATORY (INHALATION) at 06:59

## 2023-05-01 RX ADMIN — MEROPENEM 1 G: 1 INJECTION, POWDER, FOR SOLUTION INTRAVENOUS at 21:00

## 2023-05-01 RX ADMIN — PREDNISONE 40 MG: 20 TABLET ORAL at 09:57

## 2023-05-01 RX ADMIN — IPRATROPIUM BROMIDE AND ALBUTEROL SULFATE 3 ML: .5; 2.5 SOLUTION RESPIRATORY (INHALATION) at 18:49

## 2023-05-01 RX ADMIN — HYDROCODONE BITARTRATE AND ACETAMINOPHEN 1 TABLET: 5; 325 TABLET ORAL at 23:15

## 2023-05-01 RX ADMIN — OXYCODONE HYDROCHLORIDE AND ACETAMINOPHEN 500 MG: 500 TABLET ORAL at 09:57

## 2023-05-01 NOTE — PROGRESS NOTES
[facility]     Progress Note    Patient Name: Orquidea Rosenberg  : 1945  MRN: [id number]  Primary Care Physician:  Jackeline Denson, BERTHA  Date of admission: 2023    Subjective   Subjective     Chief Complaint: 76 y/o female being seen in follow up for sepsis    History of Present Illness  Patient Reports feeling somewhat improved today. Still reports intermittent shortness of air. Non-productive cough. Patient states that she does not want surgery for her fistula as she adamantly refuses colostomy (patient's  had one)    Present during visit: patient's daughter    Review of Systems  No nausea or vomiting today; did not report pain today.     Objective   Objective     Vitals:   Temp:  [97.6 °F (36.4 °C)-98.3 °F (36.8 °C)] 97.9 °F (36.6 °C)  Heart Rate:  [72-92] 92  Resp:  [16-22] 20  BP: (132-170)/(60-73) 152/73  Flow (L/min):  [3] 3    Physical Exam  Constitutional:       General: She is not in acute distress.     Appearance: She is well-developed.      Interventions: Nasal cannula in place.   HENT:      Head: Normocephalic and atraumatic.   Eyes:      Conjunctiva/sclera: Conjunctivae normal.   Neck:      Trachea: No tracheal deviation.   Cardiovascular:      Rate and Rhythm: Normal rate and regular rhythm.      Heart sounds: No murmur heard.    No friction rub. No gallop.   Pulmonary:      Effort: No respiratory distress.      Breath sounds: Decreased breath sounds present. No wheezing or rales.   Abdominal:      General: Bowel sounds are normal. There is no distension.      Palpations: Abdomen is soft.      Tenderness: There is no abdominal tenderness. There is no guarding.   Musculoskeletal:         General: No tenderness.   Skin:     General: Skin is warm and dry.      Findings: No erythema or rash.   Neurological:      Mental Status: She is alert.      Cranial Nerves: No cranial nerve deficit.      Comments: Oriented to self and place; follows commands          Result Review    Result  Review:  I have personally reviewed the results from the time of this admission to 5/1/2023 18:33 EDT and agree with these findings:  [x]  Laboratory list / accordion  []  Microbiology  []  Radiology  []  EKG/Telemetry   []  Cardiology/Vascular   []  Pathology  []  Old records  []  Other:  Most notable findings include: BMP with BUN 47 and creatinine 1.35, overall improving; CO2 17.1 and sodium 142 with a glucose of 139.  C-RP much improved at 2.83.  WBC slightly worsened at 14.45, H/H 8.9 and 30.7 respectively, platelets 392.    Blood Culture   Date Value Ref Range Status   04/29/2023 No growth at 2 days  Preliminary   04/29/2023 No growth at 2 days  Preliminary   04/25/2023 Clostridium subterminale (C)  Preliminary     Comment:     Beta lactamase positive confers resistance to ampcillin, amoxicillin, penicillin, ticarcillin, and piperacillin but NOT amoxicillin-clavulanate, ampicillin-sulbactam, or piperacillin-tazobactam.     04/25/2023 Clostridium subterminale (C)  Final     Comment:     Beta lactamase positive confers resistance to ampcillin, amoxicillin, penicillin, ticarcillin, and piperacillin but NOT amoxicillin-clavulanate, ampicillin-sulbactam, or piperacillin-tazobactam.           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 causing fecaloid urine  #Clostridium subterminale bacteremia  #Sepsis, present upon admission  #Suspect steroid-induced leukocytosis  #Metastatic vulvar cancer to include extensive lung metastasis  #Acute hypoxic respiratory failure likely secondary to lung metastasis although underlying pneumonia cannot be entirely ruled out  #Chronic anemia with iron deficiency and acute exacerbation requiring 1 unit PRBCs  #Bronchospasm, clinically and symptomatically improving  #Acute kidney injury likely due to sepsis  #Generalized weakness/physical debility  -Continue  Merrem monotherapy per ID recommendations; briefly discussed the case with Dr. Flaherty today  -Repeat blood cultures with no growth to date  -Continue with prednisone until completion; suspect leukocytosis is related to steroids  -Continue with nebulized inhalants and Pulmicort  -Continue supplemental oxygen as needed titrate for saturations 90 to 95%  -Continue with subcutaneous Lovenox for now; low threshold to rule out PE/DVT in the setting of malignancy and Clostridium bacteremia  -She is currently refusing any surgical intervention  -PT/OT as tolerated/as available; patient states that she is not opposed to short-term skilled nursing facility if this is felt indicated prior to returning home  -Repeat CBC in a.m.; continue to monitor temperature curve  -Repeat BMP in a.m. to monitor on patient's renal function which is overall improving    Further management pending clinical course    DVT prophylaxis:  Lovenox    Disposition:  I expect patient to be discharged to SNF v home with home health pending PT/OT evaluations    Taisha Crowell, DO

## 2023-05-01 NOTE — PLAN OF CARE
Goal Outcome Evaluation:    Pt is resting in bed with no s/s of distress noted. VSS. Central line access maintained, capped, dressing is C/D/I. Wound care completed per order. Will continue with plan of care.

## 2023-05-01 NOTE — PROGRESS NOTES
PROGRESS NOTE         Patient Identification:  Name:  Orquidea Rosenberg  Age:  77 y.o.  Sex:  female  :  1945  MRN:  [id number]  Visit Number:  [id number]  Primary Care Provider:  Jackeline Denson APRN         LOS: 6 days       ----------------------------------------------------------------------------------------------------------------------  Subjective       Chief Complaints:    Diarrhea, Vomiting, and Nausea        Interval History:      Patient sitting up in bed eating breakfast this morning.  Complains of shortness of breath.  Tachypneic this morning.  Bilateral expiratory wheezes noted on lung exam.  Congested cough.  Abdomen soft, nontender.  Currently on 3 L per nasal cannula.  WBC elevated at 14.45.  CRP improved at 2.83.  Blood cultures from 2023 show no growth thus far.    Review of Systems:    Constitutional: no fever, chills and night sweats.  Generalized fatigue.  Eyes: no eye drainage, itching or redness.  HEENT: no mouth sores, dysphagia or nose bleed.  Respiratory: Positive shortness of breath, Positive cough. No production of sputum.  Cardiovascular: no chest pain, no palpitations, no orthopnea.  Gastrointestinal: No nausea, no vomiting or diarrhea. No abdominal pain, no hematemesis or rectal bleeding.  Genitourinary: no dysuria or polyuria.  Hematologic/lymphatic: no lymph node abnormalities, no easy bruising or easy bleeding.  Musculoskeletal: no muscle or joint pain.  Skin: No rash and no itching.  Neurological: no loss of consciousness, no seizure, no headache.  Behavioral/Psych: no depression or suicidal ideation.  Endocrine: no hot flashes.  Immunologic: negative.    ----------------------------------------------------------------------------------------------------------------------      Objective       [facility] Meds:  ascorbic acid, 500 mg, Oral, Daily  budesonide, 0.5 mg, Nebulization, BID - RT  enoxaparin, 40 mg, Subcutaneous, Daily  ferrous sulfate, 325 mg,  Oral, Daily With Breakfast  ipratropium-albuterol, 3 mL, Nebulization, 4x Daily - RT  levothyroxine, 100 mcg, Oral, Q AM  meropenem, 1 g, Intravenous, Q12H  meropenem, 1 g, Intravenous, Once  nystatin, , Topical, Q12H  polyethylene glycol, 17 g, Oral, Daily  predniSONE, 40 mg, Oral, Daily With Breakfast  sodium chloride, 10 mL, Intravenous, Q12H  sodium chloride, 10 mL, Intravenous, Q12H  sodium chloride, 10 mL, Intravenous, Q12H  sodium chloride, 10 mL, Intravenous, Q12H  sodium chloride, 10 mL, Intravenous, Q12H         ----------------------------------------------------------------------------------------------------------------------    Vital Signs:  Temp:  [97.6 °F (36.4 °C)-98.3 °F (36.8 °C)] 98 °F (36.7 °C)  Heart Rate:  [72-93] 81  Resp:  [16-22] 20  BP: (121-170)/(59-72) 170/72  Mean Arterial Pressure (Non-Invasive) for the past 24 hrs (Last 3 readings):   Noninvasive MAP (mmHg)   05/01/23 0643 121   05/01/23 0225 122   04/30/23 2234 110     SpO2 Percentage    05/01/23 0225 05/01/23 0643 05/01/23 0659   SpO2: 96% 96% 96%     SpO2:  [90 %-97 %] 96 %  on  Flow (L/min):  [3] 3;   Device (Oxygen Therapy): nasal cannula    Body mass index is 29.2 kg/m².  Wt Readings from Last 3 Encounters:   05/01/23 82.1 kg (180 lb 14.4 oz)   03/15/22 81.6 kg (180 lb)   02/21/22 81.6 kg (180 lb)        Intake/Output Summary (Last 24 hours) at 5/1/2023 0934  Last data filed at 5/1/2023 0900  Gross per 24 hour   Intake 611.42 ml   Output 900 ml   Net -288.58 ml     Diet: Regular/House Diet; Texture: Regular Texture (IDDSI 7); Fluid Consistency: Thin (IDDSI 0)  ----------------------------------------------------------------------------------------------------------------------      Physical Exam:    Constitutional: Elderly, chronically ill-appearing.   Currently on 3 L per nasal cannula.     HENT:  Head: Normocephalic and atraumatic.  Mouth:  Moist mucous membranes.    Eyes:  Conjunctivae and EOM are normal.  No scleral  icterus.  Neck:  Neck supple.  No JVD present.    Cardiovascular:  Normal rate, regular rhythm and normal heart sounds with no murmur. No edema.  Pulmonary/Chest: Tachypneic.  Bilateral expiratory wheezes noted on lung exam.  Congested cough.  Very short of breath this morning.  Currently on 3 L per nasal cannula  Abdominal:  Soft.  Bowel sounds are normal.  No distension and no tenderness.  Musculoskeletal:  No edema, no tenderness, and no deformity.  No swelling or redness of joints.  Neurological:  Alert and oriented to person, place, and time.  No facial droop.  No slurred speech.   Skin:  Skin is warm and dry.  No rash noted.  No pallor. Vulvar deformity due to prior surgeries.  Psychiatric:  Normal mood and affect.  Behavior is normal.        ----------------------------------------------------------------------------------------------------------------------            Results from last 7 days   Lab Units 05/01/23  0729 04/28/23  0024 04/27/23  0027 04/26/23  0051 04/25/23  1308   CRP mg/dL 2.83* 18.26*  --   --  28.78*   LACTATE mmol/L  --   --   --   --  1.2   WBC 10*3/mm3 14.45* 11.59* 10.82*   < > 16.57*   HEMOGLOBIN g/dL 8.9* 8.1* 7.5*   < > 6.7*   HEMATOCRIT % 30.7* 29.0* 25.9*   < > 22.9*   MCV fL 88.0 90.3 88.4   < > 84.8   MCHC g/dL 29.0* 27.9* 29.0*   < > 29.3*   PLATELETS 10*3/mm3 392 309 299   < > 384    < > = values in this interval not displayed.     Results from last 7 days   Lab Units 04/28/23  0024 04/27/23  0027 04/26/23  0051 04/25/23  1308   SODIUM mmol/L 142 139 136 130*   POTASSIUM mmol/L 4.1 4.5 5.1 5.0   CHLORIDE mmol/L 113* 112* 104 95*   CO2 mmol/L 17.1* 16.7* 14.4* 17.9*   BUN mg/dL 47* 68* 85* 94*   CREATININE mg/dL 1.35* 1.93* 3.03* 3.76*   CALCIUM mg/dL 8.4* 8.1* 8.2* 9.5   GLUCOSE mg/dL 139* 119* 85 100*   ALBUMIN g/dL  --   --  2.6* 2.9*   BILIRUBIN mg/dL  --   --  <0.2 0.2   ALK PHOS U/L  --   --  84 104   AST (SGOT) U/L  --   --  23 17   ALT (SGPT) U/L  --   --  12 11    Estimated Creatinine Clearance: 37.7 mL/min (A) (by C-G formula based on SCr of 1.35 mg/dL (H)).  No results found for: AMMONIA    No results found for: HGBA1C, POCGLU  No results found for: HGBA1C  Lab Results   Component Value Date    TSH 2.620 03/15/2022       Blood Culture   Date Value Ref Range Status   04/25/2023 Abnormal Stain (C)  Preliminary   04/25/2023 Anaerobe (Organism type) (C)  Preliminary     No results found for: URINECX  No results found for: WOUNDCX  No results found for: STOOLCX  No results found for: RESPCX  Pain Management Panel            View : No data to display.                        ----------------------------------------------------------------------------------------------------------------------  Imaging Results (Last 24 Hours)       ** No results found for the last 24 hours. **            ----------------------------------------------------------------------------------------------------------------------    Pertinent Infectious Disease Results    Lactic acid on admission was normal at 1.2.  Chest x-ray on 4/26/2023 showed no change in distribution of bilateral lung opacities which may represent changes of fibrosis and diffuse pulmonary nodules.  No pneumothorax.  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  This could represent secondary involvement of the leiomyomatous uterus or dystrophic calcifications within the malignancy.  Abnormal fistulous communications between the anterior rectum and the posterior aspect of the above-mentioned pelvic mass with at least 1 and possibly 2 fistulous connections as demonstrated on CT.  Central debris within the mass may represent abscess or necrosis.  Apparent fistulous communication between the mass in the bladder.  Left-sided hydronephrosis and hydroureter with left ureteral stent in place.  The distal pigtail  difficult to confirm within the bladder and may be within the distal ureter.  Progressive widely disseminated pulmonary metastases.  Blood cultures on 4/25/2023 are in progress.  MRSA screen on 4/26/2023 was negative.  COVID-19 and flu A/B PCR on 4/25/2023 was negative.      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 Bilateral expiratory wheezes were noted on lung examination, and a congested cough was present. The abdomen is soft and nontender. The patient is currently receiving 3 L of oxygen per nasal cannula. WBC is elevated at 14.45, and CRP has improved at 2.83. Blood cultures from 4/29/2023 have shown no growth thus far. However, two out of two blood culture sets from 4/25/2023 were positive for Clostridium subterminale. Due to the worsening shortness of breath and slightly increased oxygen requirements, we have escalated the patient's treatment to meropenem 1 g IV every 12 hours from metronidazole. We suspect the possibility of aspiration pneumonia and septic emboli.      ANTIMICROBIAL THERAPY    meropenem (MERREM) 1gm IVPB in 100ml NS (VTB)     Code Status:   Code Status and Medical Interventions:   Ordered at: 04/25/23 1937     Level Of Support Discussed With:    Patient     Code Status (Patient has no pulse and is not breathing):    CPR (Attempt to Resuscitate)     Medical Interventions (Patient has pulse or is breathing):    Full Support       BERTHA Sanchez  05/01/23  09:34 EDT     Electronically signed by BERTHA Sanchez, 05/01/23, 9:37 AM EDT.    Electronically signed by  Socrates Flaherty MD, 05/01/23, 12:04 PM EDT.

## 2023-05-01 NOTE — PLAN OF CARE
Goal Outcome Evaluation:      Patient has rested well tonight. No complaints of chest pain or shortness of breath. Vital signs are stable. No indicators of acute distress noted.

## 2023-05-01 NOTE — CASE MANAGEMENT/SOCIAL WORK
Discharge Planning Assessment   [place]     Patient Name: Orquidea Rosenberg  MRN: [id number]  Today's Date: 5/1/2023    Admit Date: 4/25/2023    Plan: SS left message for pt's daughter Lilli to return call regarding discharge disposition.  SS will follow.     Discharge Plan     Row Name 05/01/23 1721       Plan    Plan SS left message for pt's daughter Lilli to return call regarding discharge disposition.  SS will follow.            ANASTASIA BellW

## 2023-05-01 NOTE — NURSING NOTE
Wound consult for denuded area to the LT side of the vagina. Area presents as a full thickness wound with subcutaneous fat present and a moist erick wound skin with rolled edges. New order for thera -honey and cover with an island dressing daily and PRN

## 2023-05-02 ENCOUNTER — APPOINTMENT (OUTPATIENT)
Dept: CT IMAGING | Facility: HOSPITAL | Age: 78
DRG: 871 | End: 2023-05-02
Payer: MEDICARE

## 2023-05-02 LAB
ANION GAP SERPL CALCULATED.3IONS-SCNC: 8.2 MMOL/L (ref 5–15)
ANISOCYTOSIS BLD QL: ABNORMAL
BACTERIA SPEC AEROBE CULT: ABNORMAL
BACTERIA SPEC AEROBE CULT: ABNORMAL
BUN SERPL-MCNC: 33 MG/DL (ref 8–23)
BUN/CREAT SERPL: 30.8 (ref 7–25)
CALCIUM SPEC-SCNC: 8.8 MG/DL (ref 8.6–10.5)
CHLORIDE SERPL-SCNC: 111 MMOL/L (ref 98–107)
CO2 SERPL-SCNC: 21.8 MMOL/L (ref 22–29)
CREAT SERPL-MCNC: 1.07 MG/DL (ref 0.57–1)
DACRYOCYTES BLD QL SMEAR: ABNORMAL
DEPRECATED RDW RBC AUTO: 64.4 FL (ref 37–54)
EGFRCR SERPLBLD CKD-EPI 2021: 53.6 ML/MIN/1.73
ERYTHROCYTE [DISTWIDTH] IN BLOOD BY AUTOMATED COUNT: 20.4 % (ref 12.3–15.4)
GLUCOSE SERPL-MCNC: 147 MG/DL (ref 65–99)
GRAM STN SPEC: ABNORMAL
GRAM STN SPEC: ABNORMAL
HCT VFR BLD AUTO: 30.5 % (ref 34–46.6)
HGB BLD-MCNC: 8.9 G/DL (ref 12–15.9)
HYPOCHROMIA BLD QL: ABNORMAL
ISOLATED FROM: ABNORMAL
ISOLATED FROM: ABNORMAL
LYMPHOCYTES # BLD MANUAL: 0.35 10*3/MM3 (ref 0.7–3.1)
LYMPHOCYTES NFR BLD MANUAL: 4 % (ref 5–12)
MCH RBC QN AUTO: 25.6 PG (ref 26.6–33)
MCHC RBC AUTO-ENTMCNC: 29.2 G/DL (ref 31.5–35.7)
MCV RBC AUTO: 87.6 FL (ref 79–97)
METAMYELOCYTES NFR BLD MANUAL: 3 % (ref 0–0)
MONOCYTES # BLD: 0.7 10*3/MM3 (ref 0.1–0.9)
MYELOCYTES NFR BLD MANUAL: 4 % (ref 0–0)
NEUTROPHILS # BLD AUTO: 15.33 10*3/MM3 (ref 1.7–7)
NEUTROPHILS NFR BLD MANUAL: 86 % (ref 42.7–76)
NEUTS BAND NFR BLD MANUAL: 1 % (ref 0–5)
OVALOCYTES BLD QL SMEAR: ABNORMAL
PLAT MORPH BLD: NORMAL
PLATELET # BLD AUTO: 399 10*3/MM3 (ref 140–450)
PMV BLD AUTO: 8.8 FL (ref 6–12)
POTASSIUM SERPL-SCNC: 4.9 MMOL/L (ref 3.5–5.2)
RBC # BLD AUTO: 3.48 10*6/MM3 (ref 3.77–5.28)
SODIUM SERPL-SCNC: 141 MMOL/L (ref 136–145)
VARIANT LYMPHS NFR BLD MANUAL: 2 % (ref 19.6–45.3)
WBC NRBC COR # BLD: 17.62 10*3/MM3 (ref 3.4–10.8)

## 2023-05-02 PROCEDURE — 85007 BL SMEAR W/DIFF WBC COUNT: CPT | Performed by: INTERNAL MEDICINE

## 2023-05-02 PROCEDURE — 71260 CT THORAX DX C+: CPT

## 2023-05-02 PROCEDURE — 25010000002 ENOXAPARIN PER 10 MG: Performed by: HOSPITALIST

## 2023-05-02 PROCEDURE — 71260 CT THORAX DX C+: CPT | Performed by: RADIOLOGY

## 2023-05-02 PROCEDURE — 99233 SBSQ HOSP IP/OBS HIGH 50: CPT | Performed by: INTERNAL MEDICINE

## 2023-05-02 PROCEDURE — 97162 PT EVAL MOD COMPLEX 30 MIN: CPT

## 2023-05-02 PROCEDURE — 97166 OT EVAL MOD COMPLEX 45 MIN: CPT

## 2023-05-02 PROCEDURE — 85025 COMPLETE CBC W/AUTO DIFF WBC: CPT | Performed by: INTERNAL MEDICINE

## 2023-05-02 PROCEDURE — 94799 UNLISTED PULMONARY SVC/PX: CPT

## 2023-05-02 PROCEDURE — 63710000001 PREDNISONE PER 1 MG: Performed by: HOSPITALIST

## 2023-05-02 PROCEDURE — 94761 N-INVAS EAR/PLS OXIMETRY MLT: CPT

## 2023-05-02 PROCEDURE — 99232 SBSQ HOSP IP/OBS MODERATE 35: CPT | Performed by: NURSE PRACTITIONER

## 2023-05-02 PROCEDURE — 25010000002 MEROPENEM PER 100 MG: Performed by: INTERNAL MEDICINE

## 2023-05-02 PROCEDURE — 25510000001 IOPAMIDOL PER 1 ML: Performed by: INTERNAL MEDICINE

## 2023-05-02 PROCEDURE — 99221 1ST HOSP IP/OBS SF/LOW 40: CPT

## 2023-05-02 PROCEDURE — 74178 CT ABD&PLV WO CNTR FLWD CNTR: CPT

## 2023-05-02 PROCEDURE — 63710000001 ONDANSETRON PER 8 MG: Performed by: INTERNAL MEDICINE

## 2023-05-02 PROCEDURE — 25010000002 MORPHINE PER 10 MG: Performed by: INTERNAL MEDICINE

## 2023-05-02 PROCEDURE — 74178 CT ABD&PLV WO CNTR FLWD CNTR: CPT | Performed by: RADIOLOGY

## 2023-05-02 PROCEDURE — 80048 BASIC METABOLIC PNL TOTAL CA: CPT | Performed by: INTERNAL MEDICINE

## 2023-05-02 RX ORDER — GUAIFENESIN 600 MG/1
600 TABLET, EXTENDED RELEASE ORAL EVERY 12 HOURS SCHEDULED
Status: DISCONTINUED | OUTPATIENT
Start: 2023-05-02 | End: 2023-05-05 | Stop reason: HOSPADM

## 2023-05-02 RX ORDER — HYDROCODONE BITARTRATE AND ACETAMINOPHEN 10; 325 MG/1; MG/1
1 TABLET ORAL EVERY 6 HOURS PRN
Status: DISCONTINUED | OUTPATIENT
Start: 2023-05-02 | End: 2023-05-04

## 2023-05-02 RX ORDER — MORPHINE SULFATE 2 MG/ML
2 INJECTION, SOLUTION INTRAMUSCULAR; INTRAVENOUS EVERY 4 HOURS PRN
Status: DISCONTINUED | OUTPATIENT
Start: 2023-05-02 | End: 2023-05-04

## 2023-05-02 RX ADMIN — IOPAMIDOL 88 ML: 755 INJECTION, SOLUTION INTRAVENOUS at 11:59

## 2023-05-02 RX ADMIN — IPRATROPIUM BROMIDE AND ALBUTEROL SULFATE 3 ML: .5; 2.5 SOLUTION RESPIRATORY (INHALATION) at 06:53

## 2023-05-02 RX ADMIN — FERROUS SULFATE TAB 325 MG (65 MG ELEMENTAL FE) 325 MG: 325 (65 FE) TAB at 08:25

## 2023-05-02 RX ADMIN — PREDNISONE 40 MG: 20 TABLET ORAL at 08:25

## 2023-05-02 RX ADMIN — HYDROCODONE BITARTRATE AND ACETAMINOPHEN 1 TABLET: 5; 325 TABLET ORAL at 20:15

## 2023-05-02 RX ADMIN — HYDROCODONE BITARTRATE AND ACETAMINOPHEN 1 TABLET: 5; 325 TABLET ORAL at 08:25

## 2023-05-02 RX ADMIN — IPRATROPIUM BROMIDE AND ALBUTEROL SULFATE 3 ML: .5; 2.5 SOLUTION RESPIRATORY (INHALATION) at 00:11

## 2023-05-02 RX ADMIN — GUAIFENESIN 600 MG: 600 TABLET, EXTENDED RELEASE ORAL at 08:24

## 2023-05-02 RX ADMIN — Medication 10 ML: at 08:25

## 2023-05-02 RX ADMIN — MEROPENEM 1 G: 1 INJECTION, POWDER, FOR SOLUTION INTRAVENOUS at 09:16

## 2023-05-02 RX ADMIN — ONDANSETRON HYDROCHLORIDE 4 MG: 4 TABLET, FILM COATED ORAL at 14:23

## 2023-05-02 RX ADMIN — GUAIFENESIN 600 MG: 600 TABLET, EXTENDED RELEASE ORAL at 20:15

## 2023-05-02 RX ADMIN — IPRATROPIUM BROMIDE AND ALBUTEROL SULFATE 3 ML: .5; 2.5 SOLUTION RESPIRATORY (INHALATION) at 13:14

## 2023-05-02 RX ADMIN — NYSTATIN: 100000 POWDER TOPICAL at 08:25

## 2023-05-02 RX ADMIN — OXYCODONE HYDROCHLORIDE AND ACETAMINOPHEN 500 MG: 500 TABLET ORAL at 08:25

## 2023-05-02 RX ADMIN — BUDESONIDE 0.5 MG: 0.5 SUSPENSION RESPIRATORY (INHALATION) at 06:53

## 2023-05-02 RX ADMIN — BUDESONIDE 0.5 MG: 0.5 SUSPENSION RESPIRATORY (INHALATION) at 18:43

## 2023-05-02 RX ADMIN — ENOXAPARIN SODIUM 40 MG: 40 INJECTION SUBCUTANEOUS at 08:24

## 2023-05-02 RX ADMIN — MEROPENEM 1 G: 1 INJECTION, POWDER, FOR SOLUTION INTRAVENOUS at 21:10

## 2023-05-02 RX ADMIN — NYSTATIN: 100000 POWDER TOPICAL at 20:16

## 2023-05-02 RX ADMIN — HYDROCODONE BITARTRATE AND ACETAMINOPHEN 1 TABLET: 10; 325 TABLET ORAL at 14:23

## 2023-05-02 RX ADMIN — IPRATROPIUM BROMIDE AND ALBUTEROL SULFATE 3 ML: .5; 2.5 SOLUTION RESPIRATORY (INHALATION) at 18:43

## 2023-05-02 RX ADMIN — MORPHINE SULFATE 2 MG: 2 INJECTION, SOLUTION INTRAMUSCULAR; INTRAVENOUS at 10:24

## 2023-05-02 RX ADMIN — POLYETHYLENE GLYCOL 3350 17 G: 17 POWDER, FOR SOLUTION ORAL at 08:24

## 2023-05-02 RX ADMIN — Medication 10 ML: at 20:17

## 2023-05-02 RX ADMIN — LEVOTHYROXINE SODIUM 100 MCG: 100 TABLET ORAL at 06:08

## 2023-05-02 RX ADMIN — GUAIFENESIN 600 MG: 600 TABLET, EXTENDED RELEASE ORAL at 01:19

## 2023-05-02 NOTE — THERAPY EVALUATION
Patient Name: Orquidea Rosenberg  : 1945    MRN: [id number]                              Today's Date: 2023       Admit Date: 2023    Visit Dx:     ICD-10-CM ICD-9-CM   1. Vulvar cancer  C51.9 184.4   2. 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 Acute kidney injury  N17.9 584.9   4. Chronic anemia  D64.9 285.9   5. Bacteremia  R78.81 790.7     Patient Active Problem List   Diagnosis   • COVID-19   • Vulvar cancer     Past Medical History:   Diagnosis Date   • Arthritis    • COPD (chronic obstructive pulmonary disease)    • Elevated cholesterol    • History of transfusion    • Hypertension    • Vulval ca      Past Surgical History:   Procedure Laterality Date   • RADICAL VULVECTOMY  2019   • RADICAL VULVECTOMY Bilateral 2019      General Information     Row Name 23 1346          OT Time and Intention    Document Type evaluation  -     Mode of Treatment individual therapy;occupational therapy  -     Row Name 23 1346          General Information    Patient Profile Reviewed yes  -     Prior Level of Function --  functional decline over past year or so related to medical status; daughter resides with patient since diagnosis; increased difficulty over recent weeks/month  -     Existing Precautions/Restrictions fall  -     Barriers to Rehab medically complex;previous functional deficit  -     Row Name 23 1346          Occupational Profile    Reason for Services/Referral (Occupational Profile) Patient admitted to [facility] on 2023. She was referred for OT evaluation due to change in functional performance with ADLs, functional mobility, and/or transfers.  -     Row Name 23 1346          Living Environment    People in Home child(jolly), adult  -     Row Name 23 1346          Cognition    Orientation Status (Cognition) oriented x 4  -     Row Name 23 1346          Safety Issues, Functional Mobility    Impairments  Affecting Function (Mobility) balance;endurance/activity tolerance;shortness of breath;pain;strength  -           User Key  (r) = Recorded By, (t) = Taken By, (c) = Cosigned By    Initials Name Provider Type     Samira Baca OT Occupational Therapist                 Mobility/ADL's     [facility] 05/02/23 1348          Bed Mobility    Bed Mobility supine-sit;sit-supine  -     Supine-Sit [place] (Bed Mobility) standby assist  -     Sit-Supine [place] (Bed Mobility) standby assist  -     Assistive Device (Bed Mobility) bed rails;head of bed elevated  -KP     Row Name 05/02/23 1348          Transfers    Transfers sit-stand transfer;stand-sit transfer  -     Comment, (Transfers) poor tolerance for sustained standing  -KP     Row Name 05/02/23 1348          Sit-Stand Transfer    Sit-Stand [place] (Transfers) contact guard  -KP     Row Name 05/02/23 1348          Stand-Sit Transfer    Stand-Sit [place] (Transfers) contact guard  -KP     Row Name 05/02/23 1348          Activities of Daily Living    BADL Assessment/Intervention bathing;upper body dressing;lower body dressing;grooming;feeding;toileting  -KP     Row Name 05/02/23 1348          Bathing Assessment/Intervention    [place] Level (Bathing) bathing skills;moderate assist (50% patient effort)  -KP     Row Name 05/02/23 1348          Upper Body Dressing Assessment/Training    [place] Level (Upper Body Dressing) upper body dressing skills;moderate assist (50% patient effort)  -KP     Row Name 05/02/23 1348          Lower Body Dressing Assessment/Training    [place] Level (Lower Body Dressing) lower body dressing skills;maximum assist (25% patient effort)  -KP     Row Name 05/02/23 1348          Grooming Assessment/Training    [place] Level (Grooming) grooming skills;moderate assist (50% patient effort)  -KP     Row Name 05/02/23 1348          Self-Feeding Assessment/Training    [place] Level (Feeding) feeding  skills;set up  -[facility] Name 05/02/23 1348          Toileting Assessment/Training    [place] Level (Toileting) toileting skills;maximum assist (25% patient effort)  -           User Key  (r) = Recorded By, (t) = Taken By, (c) = Cosigned By    Initials Name Provider Type     Samira Baca OT Occupational Therapist               Obj/Interventions     Row Name 05/02/23 1349          Sensory Assessment (Somatosensory)    Sensory Assessment (Somatosensory) UE sensation intact  -KP     Row Name 05/02/23 1349          Vision Assessment/Intervention    Visual Impairment/Limitations WFL;corrective lenses for reading  -KP     Row Name 05/02/23 1349          Range of Motion Comprehensive    General Range of Motion bilateral upper extremity ROM WFL  -KP     Row Name 05/02/23 1349          Strength Comprehensive (MMT)    Comment, General Manual Muscle Testing (MMT) Assessment 4/5 MMT in BUEs  -KP     Row Name 05/02/23 1349          Motor Skills    Motor Skills coordination;functional endurance  -     Coordination WFL;bilateral;upper extremity  -     Functional Endurance fair minus  -KP     Row Name 05/02/23 1349          Balance    Balance Assessment sitting static balance;standing static balance  -     Static Sitting Balance standby assist  -     Static Standing Balance contact guard  -           User Key  (r) = Recorded By, (t) = Taken By, (c) = Cosigned By    Initials Name Provider Type     Samira Baca OT Occupational Therapist               Goals/Plan     Row Name 05/02/23 1352          Transfer Goal 1 (OT)    Activity/Assistive Device (Transfer Goal 1, OT) toilet  -     [place] Level/Cues Needed (Transfer Goal 1, OT) standby assist  -     Time Frame (Transfer Goal 1, OT) by discharge  -KP     Row Name 05/02/23 1352          Dressing Goal 1 (OT)    Activity/Device (Dressing Goal 1, OT) dressing skills, all  -     [place]/Cues Needed (Dressing Goal 1, OT) minimum assist (75% or  more patient effort)  -     Time Frame (Dressing Goal 1, OT) by discharge  -     Row Name 05/02/23 1174          Problem Specific Goal 1 (OT)    Problem Specific Goal 1 (OT) Patient will perform sustained activity X12 minutes to promote functional endurance/activity tolerance needed for daily routine.  -     Time Frame (Problem Specific Goal 1, OT) by discharge  -     Row Name 05/02/23 1355          Therapy Assessment/Plan (OT)    Planned Therapy Interventions (OT) activity tolerance training;BADL retraining;occupation/activity based interventions;functional balance retraining;patient/caregiver education/training;ROM/therapeutic exercise;strengthening exercise;transfer/mobility retraining  -           User Key  (r) = Recorded By, (t) = Taken By, (c) = Cosigned By    Initials Name Provider Type    Samira Rueda, OT Occupational Therapist               Clinical Impression     Row Name 05/02/23 0950          Pain Assessment    Pretreatment Pain Rating 2/10  -     Posttreatment Pain Rating 2/10  -     Pain Location generalized  -     Row Name 05/02/23 5519          Plan of Care Review    Plan of Care Reviewed With patient;daughter  -     Progress no change  -     Outcome Evaluation Patient seen for OT evaluation. She presents with functional deficits including: generalized weakness, impaired functional endurance/activity tolerance, pain, shortness of breath, and impaired balance. Patient would benefit from skilled OT services to promote highest level of independence and safety with daily occupations.  -     Row Name 05/02/23 3748          Therapy Assessment/Plan (OT)    Patient/Family Therapy Goal Statement (OT) be able to return home with supports as needed  -     Rehab Potential (OT) fair, will monitor progress closely  -     Criteria for Skilled Therapeutic Interventions Met (OT) yes;meets criteria;skilled treatment is necessary  -     Therapy Frequency (OT) 3 times/wk  3-5x/week as  able and available to promote functional progress  -KP     Predicted Duration of Therapy Intervention (OT) discharge  -     Row Name 05/02/23 1350          Vital Signs    O2 Delivery Pre Treatment supplemental O2  -KP     O2 Delivery Intra Treatment supplemental O2  -KP     O2 Delivery Post Treatment supplemental O2  -KP     Row Name 05/02/23 1350          Positioning and Restraints    Pre-Treatment Position in bed  -KP     Post Treatment Position bed  -KP     In Bed call light within reach;encouraged to call for assist;exit alarm on;with family/caregiver  -KP           User Key  (r) = Recorded By, (t) = Taken By, (c) = Cosigned By    Initials Name Provider Type    Samira Rueda, VERONICA Occupational Therapist               Outcome Measures     Row Name 05/02/23 0825          How much help from another person do you currently need...    Turning from your back to your side while in flat bed without using bedrails? 2  -LB     Moving from lying on back to sitting on the side of a flat bed without bedrails? 2  -LB     Moving to and from a bed to a chair (including a wheelchair)? 2  -LB     Standing up from a chair using your arms (e.g., wheelchair, bedside chair)? 2  -LB     Climbing 3-5 steps with a railing? 1  -LB     To walk in hospital room? 2  -LB     AM-PAC 6 Clicks Score (PT) 11  -LB     Highest level of mobility 4 --> Transferred to chair/commode  -LB           User Key  (r) = Recorded By, (t) = Taken By, (c) = Cosigned By    Initials Name Provider Type    Veronica Bansal, RN Registered Nurse                  OT Recommendation and Plan  Planned Therapy Interventions (OT): activity tolerance training, BADL retraining, occupation/activity based interventions, functional balance retraining, patient/caregiver education/training, ROM/therapeutic exercise, strengthening exercise, transfer/mobility retraining  Therapy Frequency (OT): 3 times/wk (3-5x/week as able and available to promote functional progress)  Plan of  Care Review  Plan of Care Reviewed With: patient, daughter  Progress: no change  Outcome Evaluation: Patient seen for OT evaluation. She presents with functional deficits including: generalized weakness, impaired functional endurance/activity tolerance, pain, shortness of breath, and impaired balance. Patient would benefit from skilled OT services to promote highest level of independence and safety with daily occupations.     Time Calculation:    Time Calculation- OT     Row Name 05/02/23 1353             Time Calculation- OT    OT Received On 05/02/23  -            User Key  (r) = Recorded By, (t) = Taken By, (c) = Cosigned By    Initials Name Provider Type    Samira Rueda OT Occupational Therapist              Therapy Charges for Today     Code Description Service Date Service Provider Modifiers Qty    06262840701 HC OT EVAL MOD COMPLEXITY 4 5/2/2023 Samira Baca OT GO 1               Samira Baca OT  5/2/2023

## 2023-05-02 NOTE — PROGRESS NOTES
PROGRESS NOTE         Patient Identification:  Name:  Orquidea Rosenberg  Age:  77 y.o.  Sex:  female  :  1945  MRN:  [id number]  Visit Number:  [id number]  Primary Care Provider:  Jackeline Denson APRN         LOS: 7 days       ----------------------------------------------------------------------------------------------------------------------  Subjective       Chief Complaints:    Diarrhea, Vomiting, and Nausea        Interval History:      Patient sitting up in bed this morning.  Shortness of breath is improved.  Stable on 3 L per nasal cannula with no apparent distress.  Lungs clear to auscultation bilaterally.  Abdomen soft, reports suprapubic tenderness this morning.  Afebrile, denies diarrhea.  WBC elevated 17.62.  Blood cultures from 2023 1 out of 2 sets now reporting Gemella morbillorum and 2 out of 2 sets positive for Clostridium subterminale.     Review of Systems:    Constitutional: no fever, chills and night sweats.  Generalized fatigue.  Eyes: no eye drainage, itching or redness.  HEENT: no mouth sores, dysphagia or nose bleed.  Respiratory: Positive shortness of breath, Positive cough, improved. No production of sputum.  Cardiovascular: no chest pain, no palpitations, no orthopnea.  Gastrointestinal: No nausea, no vomiting or diarrhea. No abdominal pain, no hematemesis or rectal bleeding.  Genitourinary: no dysuria or polyuria.  Hematologic/lymphatic: no lymph node abnormalities, no easy bruising or easy bleeding.  Musculoskeletal: no muscle or joint pain.  Skin: No rash and no itching.  Neurological: no loss of consciousness, no seizure, no headache.  Behavioral/Psych: no depression or suicidal ideation.  Endocrine: no hot flashes.  Immunologic: negative.    ----------------------------------------------------------------------------------------------------------------------      Objective       Current Hospital Meds:  ascorbic acid, 500 mg, Oral, Daily  budesonide, 0.5 mg,  Nebulization, BID - RT  enoxaparin, 40 mg, Subcutaneous, Daily  ferrous sulfate, 325 mg, Oral, Daily With Breakfast  guaiFENesin, 600 mg, Oral, Q12H  ipratropium-albuterol, 3 mL, Nebulization, 4x Daily - RT  levothyroxine, 100 mcg, Oral, Q AM  meropenem, 1 g, Intravenous, Q12H  nystatin, , Topical, Q12H  polyethylene glycol, 17 g, Oral, Daily  sodium chloride, 10 mL, Intravenous, Q12H  sodium chloride, 10 mL, Intravenous, Q12H  sodium chloride, 10 mL, Intravenous, Q12H  sodium chloride, 10 mL, Intravenous, Q12H  sodium chloride, 10 mL, Intravenous, Q12H         ----------------------------------------------------------------------------------------------------------------------    Vital Signs:  Temp:  [97.3 °F (36.3 °C)-98 °F (36.7 °C)] 97.4 °F (36.3 °C)  Heart Rate:  [78-95] 85  Resp:  [18-22] 20  BP: (132-167)/(60-89) 167/67  Mean Arterial Pressure (Non-Invasive) for the past 24 hrs (Last 3 readings):   Noninvasive MAP (mmHg)   05/02/23 0622 136   05/02/23 0212 104   05/01/23 2243 104     SpO2 Percentage    05/02/23 0212 05/02/23 0622 05/02/23 0653   SpO2: 96% 94% 96%     SpO2:  [93 %-99 %] 96 %  on  Flow (L/min):  [3] 3;   Device (Oxygen Therapy): nasal cannula    Body mass index is 29.18 kg/m².  Wt Readings from Last 3 Encounters:   05/02/23 82 kg (180 lb 12.8 oz)   03/15/22 81.6 kg (180 lb)   02/21/22 81.6 kg (180 lb)        Intake/Output Summary (Last 24 hours) at 5/2/2023 0928  Last data filed at 5/2/2023 0825  Gross per 24 hour   Intake 1628.39 ml   Output 200 ml   Net 1428.39 ml     Diet: Regular/House Diet; Texture: Regular Texture (IDDSI 7); Fluid Consistency: Thin (IDDSI 0)  ----------------------------------------------------------------------------------------------------------------------      Physical Exam:    Constitutional: Elderly, chronically ill-appearing.   Currently on 3 L per nasal cannula.     HENT:  Head: Normocephalic and atraumatic.  Mouth:  Moist mucous membranes.    Eyes:  Conjunctivae  and EOM are normal.  No scleral icterus.  Neck:  Neck supple.  No JVD present.    Cardiovascular:  Normal rate, regular rhythm and normal heart sounds with no murmur. No edema.  Pulmonary/Chest: Lungs clear to auscultation bilaterally.  Currently on 3 L per nasal cannula  Abdominal:  Soft.  Bowel sounds are normal.  No distension and no tenderness.  Musculoskeletal:  No edema, no tenderness, and no deformity.  No swelling or redness of joints.  Neurological:  Alert and oriented to person, place, and time.  No facial droop.  No slurred speech.   Skin:  Skin is warm and dry.  No rash noted.  No pallor. Vulvar deformity due to prior surgeries.  Psychiatric:  Normal mood and affect.  Behavior is normal.        ----------------------------------------------------------------------------------------------------------------------            Results from last 7 days   Lab Units 05/02/23  0047 05/01/23  0729 04/28/23  0024 04/26/23  0051 04/25/23  1308   CRP mg/dL  --  2.83* 18.26*  --  28.78*   LACTATE mmol/L  --   --   --   --  1.2   WBC 10*3/mm3 17.62* 14.45* 11.59*   < > 16.57*   HEMOGLOBIN g/dL 8.9* 8.9* 8.1*   < > 6.7*   HEMATOCRIT % 30.5* 30.7* 29.0*   < > 22.9*   MCV fL 87.6 88.0 90.3   < > 84.8   MCHC g/dL 29.2* 29.0* 27.9*   < > 29.3*   PLATELETS 10*3/mm3 399 392 309   < > 384    < > = values in this interval not displayed.     Results from last 7 days   Lab Units 05/02/23  0047 04/28/23  0024 04/27/23  0027 04/26/23  0051 04/25/23  1308   SODIUM mmol/L 141 142 139 136 130*   POTASSIUM mmol/L 4.9 4.1 4.5 5.1 5.0   CHLORIDE mmol/L 111* 113* 112* 104 95*   CO2 mmol/L 21.8* 17.1* 16.7* 14.4* 17.9*   BUN mg/dL 33* 47* 68* 85* 94*   CREATININE mg/dL 1.07* 1.35* 1.93* 3.03* 3.76*   CALCIUM mg/dL 8.8 8.4* 8.1* 8.2* 9.5   GLUCOSE mg/dL 147* 139* 119* 85 100*   ALBUMIN g/dL  --   --   --  2.6* 2.9*   BILIRUBIN mg/dL  --   --   --  <0.2 0.2   ALK PHOS U/L  --   --   --  84 104   AST (SGOT) U/L  --   --   --  23 17   ALT  (SGPT) U/L  --   --   --  12 11   Estimated Creatinine Clearance: 47.5 mL/min (A) (by C-G formula based on SCr of 1.07 mg/dL (H)).  No results found for: AMMONIA    No results found for: HGBA1C, POCGLU  No results found for: HGBA1C  Lab Results   Component Value Date    TSH 2.620 03/15/2022       Blood Culture   Date Value Ref Range Status   04/25/2023 Abnormal Stain (C)  Preliminary   04/25/2023 Anaerobe (Organism type) (C)  Preliminary     No results found for: URINECX  No results found for: WOUNDCX  No results found for: STOOLCX  No results found for: RESPCX  Pain Management Panel          View : No data to display.                        ----------------------------------------------------------------------------------------------------------------------  Imaging Results (Last 24 Hours)     ** No results found for the last 24 hours. **          ----------------------------------------------------------------------------------------------------------------------    Pertinent Infectious Disease Results    Lactic acid on admission was normal at 1.2.  Chest x-ray on 4/26/2023 showed no change in distribution of bilateral lung opacities which may represent changes of fibrosis and diffuse pulmonary nodules.  No pneumothorax.  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  This could represent secondary involvement of the leiomyomatous uterus or dystrophic calcifications within the malignancy.  Abnormal fistulous communications between the anterior rectum and the posterior aspect of the above-mentioned pelvic mass with at least 1 and possibly 2 fistulous connections as demonstrated on CT.  Central debris within the mass may represent abscess or necrosis.  Apparent fistulous communication between the mass in the bladder.  Left-sided hydronephrosis and hydroureter with left ureteral stent in  place.  The distal pigtail difficult to confirm within the bladder and may be within the distal ureter.  Progressive widely disseminated pulmonary metastases.  Blood cultures on 4/25/2023 are in progress.  MRSA screen on 4/26/2023 was negative.  COVID-19 and flu A/B PCR on 4/25/2023 was negative.      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  Shortness of breath is improved.  Stable on 3 L per nasal cannula with no apparent distress.  Lungs clear to auscultation bilaterally.  Abdomen soft, reports suprapubic tenderness this morning.  Afebrile, denies diarrhea.  WBC elevated 17.62.  Blood cultures from 4/25/2023 1 out of 2 sets now reporting Gemella morbillorum and 2 out of 2 sets positive for Clostridium subterminale.     Due to patient's increased shortness of breath yesterday antibiotic therapy was escalated to meropenem, will continue meropenem monotherapy for now.  Due to patient's worsening suprapubic pain recommend CT abdomen pelvis or KUB.  We will continue to follow closely and adjust antibiotic therapy as needed.      ANTIMICROBIAL THERAPY    meropenem (MERREM) 1gm IVPB in 100ml NS (VTB)     Code Status:   Code Status and Medical Interventions:   Ordered at: 04/25/23 1937     Level Of Support Discussed With:    Patient     Code Status (Patient has no pulse and is not breathing):    CPR (Attempt to Resuscitate)     Medical Interventions (Patient has pulse or is breathing):    Full Support       BERTHA Sanchez  05/02/23  09:28 EDT

## 2023-05-02 NOTE — CASE MANAGEMENT/SOCIAL WORK
Discharge Planning Assessment   Jorge     Patient Name: Orquidea Rosenberg  MRN: [id number]  Today's Date: 5/2/2023    Admit Date: 4/25/2023    Plan: SS noted inpt rehab consult and notified Rehab at ext [phone].  SS will follow and assist with discharge disposition.     Discharge Plan     Row Name 05/02/23 1524       Plan    Plan SS noted inpt rehab consult and notified Rehab at ext [phone].  SS will follow and assist with discharge disposition.    Row Name 05/02/23 8614       Plan    Plan SS spoke with pt and daughter at bedside on this date.  Pt and family request inpt rehab consult rather than short term nursing home placement for rehab.  SS will follow.                       ANASTASIA BellW

## 2023-05-02 NOTE — THERAPY EVALUATION
Acute Care - Physical Therapy Initial Evaluation   Jorge     Patient Name: Orquidea Rosenberg  : 1945  MRN: [id number]  Today's Date: 2023   Onset of Illness/Injury or Date of Surgery: 23 (admission date)  Visit Dx:     ICD-10-CM ICD-9-CM   1. Vulvar cancer  C51.9 184.4   2. 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 Acute kidney injury  N17.9 584.9   4. Chronic anemia  D64.9 285.9   5. Bacteremia  R78.81 790.7     Patient Active Problem List   Diagnosis   • COVID-19   • Vulvar cancer     Past Medical History:   Diagnosis Date   • Arthritis    • COPD (chronic obstructive pulmonary disease)    • Elevated cholesterol    • History of transfusion    • Hypertension    • Vulval ca      Past Surgical History:   Procedure Laterality Date   • RADICAL VULVECTOMY  2019   • RADICAL VULVECTOMY Bilateral 2019     PT Assessment (last 12 hours)     PT Evaluation and Treatment     Row Name 23 0915          Physical Therapy Time and Intention    Document Type evaluation  -     Mode of Treatment physical therapy  -     Patient Effort good  -     Comment Pt seen this AM with daughter in room. Pt describes being grossly (I) PLOF and tried to not use AD. However in the past couple of weeks pt may have declined  -     Row Name 23 0915          General Information    Patient Profile Reviewed yes  -     Onset of Illness/Injury or Date of Surgery 23  admission date  -     Patient Observations alert;cooperative  -     Equipment Currently Used at Home --  has W/C and has rollator per pt report  -     Pertinent History of Current Functional Problem vulvectomy  -     Row Name 23 0915          Strength Comprehensive (MMT)    Comment, General Manual Muscle Testing (MMT) Assessment Planes grossly 5/5, hip flexion 3/5, 4/5 knee flexion  -     Row Name 23 0915          Bed Mobility    Bed Mobility supine-sit;sit-supine  -     Supine-Sit [place] (Bed  Mobility) modified independence;independent  -     Sit-Supine [place] (Bed Mobility) modified independence;independent  -     Row Name 05/02/23 0915          Transfers    Transfers sit-stand transfer;stand-sit transfer  -     Row Name 05/02/23 0915          Sit-Stand Transfer    Sit-Stand [place] (Transfers) modified independence;standby assist;contact guard  -     Assistive Device (Sit-Stand Transfers) --  using RW once standing  -     Row Name 05/02/23 0915          Stand-Sit Transfer    Stand-Sit [place] (Transfers) standby assist;contact guard;modified independence  -     Assistive Device (Stand-Sit Transfers) --  using RW once standing  -     Row Name 05/02/23 0915          Gait/Stairs (Locomotion)    Comment, (Gait/Stairs) Able to take some lateral steps with RW and CGA, however pt may not have been able to ambulate far d/t fatigue.  -     Row Name             Wound 05/01/23 1414 Left vagina    Wound - Properties Group Placement Date: 05/01/23  -LB Placement Time: 1414  -LB Present on Hospital Admission: Y  -LB Side: Left  -LB Location: vagina  -LB    Retired Wound - Properties Group Placement Date: 05/01/23  -LB Placement Time: 1414  -LB Present on Hospital Admission: Y  -LB Side: Left  -LB Location: vagina  -LB    Retired Wound - Properties Group Date first assessed: 05/01/23  -LB Time first assessed: 1414  -LB Present on Hospital Admission: Y  -LB Side: Left  -LB Location: vagina  -LB    Row Name 05/02/23 0915          Plan of Care Review    Outcome Evaluation Pt seen for evaluation this date, may benefit from skilled therapeutic intervention to increase tolerance to activity, progress towards PLOF, maximize energy conservation/safety with mobility.  -     Row Name 05/02/23 0915          Positioning and Restraints    Pre-Treatment Position in bed  -     Post Treatment Position bed  -KH     In Bed supine;call light within reach;with family/caregiver  -     Row Name  05/02/23 0915          Therapy Assessment/Plan (PT)    PT Diagnosis (PT) Impaired/decreased functional mobility, may benefit from skilled PT  Vulvar CA  -     Rehab Potential (PT) --  guarded  -     Criteria for Skilled Interventions Met (PT) yes  -     Therapy Frequency (PT) 2 times/wk  1-5x/wk  -     Predicted Duration of Therapy Intervention (PT) length of stay  -     Row Name 05/02/23 0915          Physical Therapy Goals    Gait Training Goal Selection (PT) gait training, PT goal 1  -     Row Name 05/02/23 0915          Gait Training Goal 1 (PT)    Activity/Assistive Device (Gait Training Goal 1, PT) gait (walking locomotion);assistive device use;increase endurance/gait distance  -     [place] Level (Gait Training Goal 1, PT) modified independence  -     Distance (Gait Training Goal 1, PT) 10'  enough to ambulate to bathroom at home  -     Time Frame (Gait Training Goal 1, PT) long term goal (LTG)  -           User Key  (r) = Recorded By, (t) = Taken By, (c) = Cosigned By    Initials Name Provider Type     Charlotte Machado, PT Physical Therapist    Veronica Bansal, RN Registered Nurse                  PT Recommendation and Plan  Planned Therapy Interventions (PT): balance training, bed mobility training, gait training, transfer training, strengthening, patient/family education, postural re-education, stretching, stair training, ROM (range of motion)  Therapy Frequency (PT): 2 times/wk (1-5x/wk)  Outcome Evaluation: Pt seen for evaluation this date, may benefit from skilled therapeutic intervention to increase tolerance to activity, progress towards PLOF, maximize energy conservation/safety with mobility.       Time Calculation:    PT Charges     Row Name 05/02/23 1141             Time Calculation    Start Time 0915  -      PT Received On 05/02/23  -      PT Goal Re-Cert Due Date 05/16/23  -            User Key  (r) = Recorded By, (t) = Taken By, (c) = Cosigned By    Initials  Name Provider Type     Charlotte Machado, PT Physical Therapist              Therapy Charges for Today     Code Description Service Date Service Provider Modifiers Qty    23483402633 HC PT EVAL MOD COMPLEXITY 4 5/2/2023 Charlotte Machado, PT GP 1          PT G-Codes  AM-PAC 6 Clicks Score (PT): 11    Charlotte Machado, PT  5/2/2023

## 2023-05-02 NOTE — CONSULTS
[facility] General Surgery  CONSULT    Patient Identification:  Name:  Orquidea Rosenberg  :  1945  MRN:  [id number]   Admit Date: 2023   Referring Physician:  Provider, No Known    Subjective     Chief complaint fistula    Subjective     Patient is a 77 y.o. female who presents with a personal history of vulvar cancer.  Patient reports that she has had bowel movements with urination out of her vagina.  She states that she is unsure of when this started.  She states that she quit keeping track of her health.  She does report that she was diagnosed with vulvar cancer in the past and has had plastic surgery to remove cancerous area.  She denies any abdominal surgeries in the past.  Patient does complain of having abdominal pain at times as well as nausea and vomiting.        ---------------------------------------------------------------------------------------------------------------------   Review of Systems:    Review of Systems   Constitutional: Negative for activity change.   HENT: Negative for congestion.    Eyes: Negative for blurred vision.   Respiratory: Negative for shortness of breath.    Cardiovascular: Negative for chest pain.   Gastrointestinal: Positive for abdominal pain, nausea and vomiting.   Endocrine: Negative for cold intolerance.   Genitourinary: Positive for vaginal pain. Negative for flank pain.   Musculoskeletal: Negative for arthralgias.   Skin: Negative for bruise.   Allergic/Immunologic: Negative for environmental allergies.   Neurological: Negative for confusion.   Hematological: Negative for adenopathy.   Psychiatric/Behavioral: Negative for agitation.       ---------------------------------------------------------------------------------------------------------------------   History  Past Medical History:   Diagnosis Date   • Arthritis    • COPD (chronic obstructive pulmonary disease)    • Elevated cholesterol    • History of transfusion    • Hypertension    • Vulval ca       Past Surgical History:   Procedure Laterality Date   • RADICAL VULVECTOMY  09/06/2019   • RADICAL VULVECTOMY Bilateral 06/2019     Family History   Problem Relation Age of Onset   • Alzheimer's disease Mother    • Cancer Father    • Cancer Brother    • Diabetes Maternal Grandmother      Social History     Tobacco Use   • Smoking status: Former   • Smokeless tobacco: Never   Vaping Use   • Vaping Use: Never used   Substance Use Topics   • Alcohol use: No   • Drug use: No     Medications Prior to Admission   Medication Sig Dispense Refill Last Dose   • furosemide (LASIX) 20 MG tablet Take 1 tablet by mouth Daily.   4/24/2023   • levothyroxine (SYNTHROID, LEVOTHROID) 100 MCG tablet Take 1 tablet by mouth Daily.   4/24/2023   • losartan (COZAAR) 50 MG tablet Take 1 tablet by mouth Daily.   4/24/2023   • ondansetron (ZOFRAN) 4 MG tablet Take 1 tablet by mouth Every 8 (Eight) Hours As Needed for Nausea or Vomiting.   4/24/2023     Allergies:  Penicillins    Objective     Objective     Vital Signs:  Temp:  [97.3 °F (36.3 °C)-97.9 °F (36.6 °C)] 97.7 °F (36.5 °C)  Heart Rate:  [78-95] 85  Resp:  [16-20] 20  BP: (132-167)/(60-89) 161/71      04/30/23  0400 05/01/23  0500 05/02/23  0500   Weight: 82.2 kg (181 lb 3.5 oz) 82.1 kg (180 lb 14.4 oz) 82 kg (180 lb 12.8 oz)     Body mass index is 29.18 kg/m².  ---------------------------------------------------------------------------------------------------------------------       Physical Exam  Constitutional:       Appearance: Normal appearance.   HENT:      Head: Normocephalic and atraumatic.      Right Ear: External ear normal.      Left Ear: External ear normal.   Eyes:      Conjunctiva/sclera: Conjunctivae normal.      Pupils: Pupils are equal, round, and reactive to light.   Cardiovascular:      Rate and Rhythm: Normal rate.      Pulses: Normal pulses.   Pulmonary:      Effort: Pulmonary effort is normal.      Breath sounds: Normal breath sounds.   Abdominal:       General: Abdomen is flat. Bowel sounds are normal. There is no distension.      Palpations: Abdomen is soft.      Tenderness: There is no abdominal tenderness.   Genitourinary:     Comments: Left vulvar mass, upon examination of vagina there is stool noted.  Musculoskeletal:         General: Normal range of motion.      Cervical back: Normal range of motion and neck supple.   Skin:     General: Skin is warm and dry.      Capillary Refill: Capillary refill takes less than 2 seconds.   Neurological:      General: No focal deficit present.      Mental Status: She is alert and oriented to person, place, and time.   Psychiatric:         Mood and Affect: Mood normal.         Behavior: Behavior normal.     ---------------------------------------------------------------------------------------------------------------------   Results Review:       Results from last 7 days   Lab Units 05/02/23  0047 05/01/23  0729 04/28/23  0024   CRP mg/dL  --  2.83* 18.26*   WBC 10*3/mm3 17.62* 14.45* 11.59*   HEMOGLOBIN g/dL 8.9* 8.9* 8.1*   HEMATOCRIT % 30.5* 30.7* 29.0*   PLATELETS 10*3/mm3 399 392 309         Results from last 7 days   Lab Units 05/02/23  0047 04/28/23  0024 04/27/23  0027 04/26/23  0051   SODIUM mmol/L 141 142 139 136   POTASSIUM mmol/L 4.9 4.1 4.5 5.1   CHLORIDE mmol/L 111* 113* 112* 104   CO2 mmol/L 21.8* 17.1* 16.7* 14.4*   BUN mg/dL 33* 47* 68* 85*   CREATININE mg/dL 1.07* 1.35* 1.93* 3.03*   CALCIUM mg/dL 8.8 8.4* 8.1* 8.2*   GLUCOSE mg/dL 147* 139* 119* 85   ALBUMIN g/dL  --   --   --  2.6*   BILIRUBIN mg/dL  --   --   --  <0.2   ALK PHOS U/L  --   --   --  84   AST (SGOT) U/L  --   --   --  23   ALT (SGPT) U/L  --   --   --  12   Estimated Creatinine Clearance: 47.5 mL/min (A) (by C-G formula based on SCr of 1.07 mg/dL (H)).  No results found for: AMMONIA      Blood Culture   Date Value Ref Range Status   04/29/2023 No growth at 3 days  Preliminary   04/29/2023 No growth at 3 days  Preliminary     No results  found for: URINECX  No results found for: WOUNDCX  No results found for: STOOLCX  ---------------------------------------------------------------------------------------------------------------------  Imaging:  Imaging Results (Last 24 Hours)     Procedure Component Value Units Date/Time    CT Chest With Contrast Diagnostic [[id number]] Collected: 05/02/23 1237     Updated: 05/02/23 1240    Narrative:      EXAM:    CT Chest With Intravenous Contrast     EXAM DATE:    5/2/2023 11:25 AM     CLINICAL HISTORY:    Pneumonia, complication suspected, xray done     TECHNIQUE:    Axial computed tomography images of the chest with intravenous  contrast.  Sagittal and coronal reformatted images were created and  reviewed.  This CT exam was performed using one or more of the following  dose reduction techniques:  automated exposure control, adjustment of  the mA and/or kV according to patient size, and/or use of iterative  reconstruction technique.     COMPARISON:    03/10/2023     FINDINGS:    LUNGS:  Bilateral parenchymal pulmonary nodules are again noted. A  posterior left upper lobe pulmonary nodule measures 2.2 cm and was about  2.2 cm. Other nodules appear stable also.    PLEURAL SPACE:  Now small bilateral pleural effusions.  No  pneumothorax.    HEART:  Tiny pericardial effusion again noted.  No significant  coronary artery calcifications.    BONES/JOINTS:  Unremarkable.  No acute fracture.  No dislocation.    SOFT TISSUES:  Unremarkable.    VASCULATURE:  Unremarkable.  No thoracic aortic aneurysm.    LYMPH NODES:  Unremarkable.  No enlarged lymph nodes.       Impression:      1.  Now small bilateral pleural effusions.  2.  Tiny pericardial effusion again noted.  3.  Bilateral parenchymal pulmonary nodules are again noted. A posterior  left upper lobe pulmonary nodule measures 2.2 cm and was about 2.2 cm.  Other nodules appear stable also.     This report was finalized on 5/2/2023 12:38 PM by Dr. Philip Lopez MD.        CT Abdomen Pelvis With & Without Contrast [[id number]] Collected: 05/02/23 1222     Updated: 05/02/23 1225    Narrative:      EXAM:    CT Abdomen and Pelvis Without and With Intravenous Contrast     EXAM DATE:    5/2/2023 11:26 AM     CLINICAL HISTORY:    Sepsis; C51.9-Malignant neoplasm of vulva, unspecified; N82.4-Other  female intestinal-genital tract fistulae; N17.9-Acute kidney failure,  unspecified; D64.9-Anemia, unspecified; R78.81-Bacteremia     TECHNIQUE:    Axial computed tomography images of the abdomen and pelvis without and  with intravenous contrast.  Sagittal and coronal reformatted images were  created and reviewed.  This CT exam was performed using one or more of  the following dose reduction techniques:  automated exposure control,  adjustment of the mA and/or kV according to patient size, and/or use of  iterative reconstruction technique.     COMPARISON:    04/25/2023     FINDINGS:    LUNG BASES:  Bilateral pulmonary lung base nodules are again noted.    PLEURAL SPACE:  Tiny bilateral pleural effusions.      ABDOMEN:    LIVER:  Unremarkable.  No mass.    GALLBLADDER AND BILE DUCTS:  Unremarkable.  No calcified stones.  No  ductal dilation.    PANCREAS:  Unremarkable.  No mass.  No ductal dilation.    SPLEEN:  Unremarkable.  No splenomegaly.    ADRENALS:  Unremarkable.  No mass.    KIDNEYS AND URETERS:  Again there is a left double-J ureteral stent  and moderate to severe hydronephrosis of the left kidney.    STOMACH AND BOWEL:  Moderate stool throughout the colon.  No  obstruction.  No mucosal thickening.      PELVIS:    APPENDIX:  No findings to suggest acute appendicitis.    BLADDER:  Unremarkable.  No mass.  No stones.    REPRODUCTIVE:  Unremarkable as visualized.      ABDOMEN and PELVIS:    INTRAPERITONEAL SPACE:  Unremarkable.  No free air.  No significant  fluid collection.    BONES/JOINTS:  No acute fracture.  No dislocation.    SOFT TISSUES:  Unremarkable.    VASCULATURE:  Atherosclerotic  disease.  No abdominal aortic aneurysm.    LYMPH NODES:  Unremarkable.  No enlarged lymph nodes.       Impression:      1.  Again there is a left double-J ureteral stent and moderate to severe  hydronephrosis of the left kidney.  2.  Moderate stool throughout the colon.  3.  Tiny bilateral pleural effusions.  4.  Bilateral pulmonary lung base nodules are again noted.     This report was finalized on 5/2/2023 12:23 PM by Dr. Philip Lopez MD.             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 However, given her refusal of treatment in the past, Dr. Gan is reluctant to offer this, as this represents disease progression.      Discussion:  I discussed this case with patient, primary nurse and Dr. Gan.        Pan Pollock, APRN  05/02/23  14:29 EDT  ---------------------------------------------------------------------------------------------------------------------     Please note that portions of this note were completed with a voice recognition program.

## 2023-05-02 NOTE — PLAN OF CARE
Goal Outcome Evaluation:              Pt had c/o pain, PRN medications given (see MAR). Pt had c/o of congested cough, [place] PA aware (see Orders/MAR). No s/s of acute distress noted at this time. No complaints verbalized at this time. Plan of care ongoing.

## 2023-05-02 NOTE — PLAN OF CARE
Goal Outcome Evaluation:              Outcome Evaluation: Pt seen for evaluation this date, may benefit from skilled therapeutic intervention to increase tolerance to activity, progress towards PLOF, maximize energy conservation/safety with mobility.

## 2023-05-02 NOTE — CASE MANAGEMENT/SOCIAL WORK
Discharge Planning Assessment   [place]     Patient Name: Orquidea Rosenberg  MRN: [id number]  Today's Date: 5/2/2023    Admit Date: 4/25/2023    Plan: SS spoke with pt and daughter at bedside on this date.  Pt and family request inpt rehab consult rather than short term nursing home placement for rehab.  SS will follow.     Discharge Plan     Row Name 05/02/23 1259       Plan    Plan SS spoke with pt and daughter at bedside on this date.  Pt and family request inpt rehab consult rather than short term nursing home placement for rehab.  SS will follow.                ANASTASIA BellW

## 2023-05-02 NOTE — PROGRESS NOTES
[facility]     Progress Note    Patient Name: Orquidea Rosenberg  : 1945  MRN: [id number]  Primary Care Physician:  Jackeline Denson, APRN  Date of admission: 2023    Subjective   Subjective     Chief Complaint: 78 y/o female being seen in follow up for sepsis    History of Present Illness  Patient Reports not feeling well today.  She reports poor sleep and intermittent suprapubic pain.    Present during visit: patient's daughter and NUPUR Martin    Review of Systems   Gastrointestinal: Positive for diarrhea, nausea and vomiting.     No nausea or vomiting today; states her wheezing and SOA is slightly improved    Objective   Objective     Vitals:   Temp:  [97.3 °F (36.3 °C)-97.9 °F (36.6 °C)] 97.3 °F (36.3 °C)  Heart Rate:  [78-95] 87  Resp:  [16-20] 16  BP: (123-167)/(61-89) 123/61  Flow (L/min):  [3] 3    Physical Exam  Constitutional:       General: She is not in acute distress.     Appearance: She is well-developed.      Interventions: Nasal cannula in place.   HENT:      Head: Normocephalic and atraumatic.   Eyes:      Conjunctiva/sclera: Conjunctivae normal.   Neck:      Trachea: No tracheal deviation.   Cardiovascular:      Rate and Rhythm: Normal rate and regular rhythm.      Heart sounds: No murmur heard.    No friction rub. No gallop.   Pulmonary:      Effort: No respiratory distress.      Breath sounds: Decreased breath sounds present. No wheezing or rales.   Abdominal:      General: Bowel sounds are normal. There is no distension.      Palpations: Abdomen is soft.      Tenderness: There is no abdominal tenderness. There is no guarding.   Genitourinary:     Comments: Right sided fistula noted in right groin with light brown stool; on left groin is noted previous nephrostomy placement per daughter  Musculoskeletal:         General: No tenderness.   Skin:     General: Skin is warm and dry.      Findings: No erythema or rash.   Neurological:      Mental Status: She is alert.      Cranial Nerves: No  cranial nerve deficit.      Comments: Oriented to self and place; follows commands          Result Review    Result Review:  I have personally reviewed the results from the time of this admission to 5/2/2023 17:02 EDT and agree with these findings:  [x]  Laboratory list / accordion  []  Microbiology  []  Radiology  []  EKG/Telemetry   []  Cardiology/Vascular   []  Pathology  []  Old records  []  Other:  Most notable findings include: BMP with BUN 33 and creatinine 1.07, overall improving; CO2 21.8 and sodium 141 with a glucose of 147.  WBC slightly worsened at 17.62, H/H 8.9 and 30.5 respectively, platelets 399.    Blood Culture   Date Value Ref Range Status   04/29/2023 No growth at 2 days  Preliminary   04/29/2023 No growth at 2 days  Preliminary   04/25/2023 Clostridium subterminale (C)  Preliminary     Comment:     Beta lactamase positive confers resistance to ampcillin, amoxicillin, penicillin, ticarcillin, and piperacillin but NOT amoxicillin-clavulanate, ampicillin-sulbactam, or piperacillin-tazobactam.     04/25/2023 Clostridium subterminale (C)  Final     Comment:     Beta lactamase positive confers resistance to ampcillin, amoxicillin, penicillin, ticarcillin, and piperacillin but NOT amoxicillin-clavulanate, ampicillin-sulbactam, or piperacillin-tazobactam.           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 causing fecaloid urine  #Clostridium subterminale bacteremia  #Sepsis, present upon admission  #Suspect steroid-induced leukocytosis  #Metastatic vulvar cancer to include extensive lung metastasis  #Acute hypoxic respiratory failure likely secondary to lung metastasis although underlying pneumonia cannot be entirely ruled out  #Chronic anemia with iron deficiency and acute exacerbation requiring 1 unit PRBCs  #Bronchospasm, clinically and symptomatically improving  #Acute  kidney injury likely due to sepsis  #Generalized weakness/physical debility  -Continue Merrem monotherapy per ID recommendations  -Given worsening leukocytosis (still suspect steroid induced), I will go ahead and obtain CT scans of the chest, abdomen and pelvis  -Patient has adamantly refused a colostomy in the past as she states that her  had a colostomy in the past and she is not interested but would like to d/w general surgery if other surgical options may exist; consult placed  -Repeat blood cultures with no growth to date  -Patient completed a course of Prednisone  -Continue with nebulized inhalants and Pulmicort  -Continue supplemental oxygen as needed titrate for saturations 90 to 95%  -Continue with subcutaneous Lovenox for now; low threshold to rule out PE/DVT in the setting of malignancy and Clostridium bacteremia  -PT/OT as tolerated/as available; patient states that she is not opposed to short-term skilled nursing facility if this is felt indicated prior to returning home  -Per ORLY Delaney, patient and daughter requested IPR; consult placed  -Repeat CBC in a.m.; continue to monitor temperature curve  -Repeat BMP in a.m. to monitor on patient's renal function which is overall improving  -Pain does not appear controlled on Norco 5 mg PO PRN; will add a 10 mg option and PRN Morphine    Further management pending clinical course    DVT prophylaxis:  Lovenox    Disposition:  I expect patient to be discharged to IPR v SNF v home with home health pending PT/OT evaluations    Taisha Crowell,

## 2023-05-02 NOTE — PLAN OF CARE
Goal Outcome Evaluation:    Pt is resting in bed with no s/s of distress noted. VSS. Central line maintained, dressing is C/D/I. Wound care completed. Will continue with plan of care.

## 2023-05-03 LAB
ALBUMIN SERPL-MCNC: 2.6 G/DL (ref 3.5–5.2)
ALBUMIN/GLOB SERPL: 0.9 G/DL
ALP SERPL-CCNC: 76 U/L (ref 39–117)
ALT SERPL W P-5'-P-CCNC: 5 U/L (ref 1–33)
ANION GAP SERPL CALCULATED.3IONS-SCNC: 8 MMOL/L (ref 5–15)
ANISOCYTOSIS BLD QL: ABNORMAL
AST SERPL-CCNC: 6 U/L (ref 1–32)
BILIRUB SERPL-MCNC: <0.2 MG/DL (ref 0–1.2)
BUN SERPL-MCNC: 36 MG/DL (ref 8–23)
BUN/CREAT SERPL: 29.8 (ref 7–25)
CALCIUM SPEC-SCNC: 8.9 MG/DL (ref 8.6–10.5)
CHLORIDE SERPL-SCNC: 112 MMOL/L (ref 98–107)
CO2 SERPL-SCNC: 22 MMOL/L (ref 22–29)
CREAT SERPL-MCNC: 1.21 MG/DL (ref 0.57–1)
DACRYOCYTES BLD QL SMEAR: ABNORMAL
DEPRECATED RDW RBC AUTO: 65.2 FL (ref 37–54)
EGFRCR SERPLBLD CKD-EPI 2021: 46.3 ML/MIN/1.73
ERYTHROCYTE [DISTWIDTH] IN BLOOD BY AUTOMATED COUNT: 20.7 % (ref 12.3–15.4)
GLOBULIN UR ELPH-MCNC: 2.9 GM/DL
GLUCOSE SERPL-MCNC: 136 MG/DL (ref 65–99)
HCT VFR BLD AUTO: 28.3 % (ref 34–46.6)
HGB BLD-MCNC: 8.1 G/DL (ref 12–15.9)
HYPOCHROMIA BLD QL: ABNORMAL
LYMPHOCYTES # BLD MANUAL: 1.21 10*3/MM3 (ref 0.7–3.1)
LYMPHOCYTES NFR BLD MANUAL: 1 % (ref 5–12)
MCH RBC QN AUTO: 25.2 PG (ref 26.6–33)
MCHC RBC AUTO-ENTMCNC: 28.6 G/DL (ref 31.5–35.7)
MCV RBC AUTO: 87.9 FL (ref 79–97)
MONOCYTES # BLD: 0.15 10*3/MM3 (ref 0.1–0.9)
NEUTROPHILS # BLD AUTO: 13.75 10*3/MM3 (ref 1.7–7)
NEUTROPHILS NFR BLD MANUAL: 87 % (ref 42.7–76)
NEUTS BAND NFR BLD MANUAL: 4 % (ref 0–5)
PLAT MORPH BLD: NORMAL
PLATELET # BLD AUTO: 350 10*3/MM3 (ref 140–450)
PMV BLD AUTO: 9.3 FL (ref 6–12)
POTASSIUM SERPL-SCNC: 5.1 MMOL/L (ref 3.5–5.2)
POTASSIUM SERPL-SCNC: 5.7 MMOL/L (ref 3.5–5.2)
PROT SERPL-MCNC: 5.5 G/DL (ref 6–8.5)
RBC # BLD AUTO: 3.22 10*6/MM3 (ref 3.77–5.28)
SODIUM SERPL-SCNC: 142 MMOL/L (ref 136–145)
VARIANT LYMPHS NFR BLD MANUAL: 8 % (ref 19.6–45.3)
WBC NRBC COR # BLD: 15.11 10*3/MM3 (ref 3.4–10.8)

## 2023-05-03 PROCEDURE — 25010000002 ENOXAPARIN PER 10 MG: Performed by: HOSPITALIST

## 2023-05-03 PROCEDURE — 25010000002 MEROPENEM PER 100 MG: Performed by: INTERNAL MEDICINE

## 2023-05-03 PROCEDURE — 94799 UNLISTED PULMONARY SVC/PX: CPT

## 2023-05-03 PROCEDURE — 25010000002 MORPHINE PER 10 MG: Performed by: INTERNAL MEDICINE

## 2023-05-03 PROCEDURE — 84132 ASSAY OF SERUM POTASSIUM: CPT | Performed by: INTERNAL MEDICINE

## 2023-05-03 PROCEDURE — 97530 THERAPEUTIC ACTIVITIES: CPT

## 2023-05-03 PROCEDURE — 80053 COMPREHEN METABOLIC PANEL: CPT | Performed by: INTERNAL MEDICINE

## 2023-05-03 PROCEDURE — 94760 N-INVAS EAR/PLS OXIMETRY 1: CPT

## 2023-05-03 PROCEDURE — 99233 SBSQ HOSP IP/OBS HIGH 50: CPT | Performed by: INTERNAL MEDICINE

## 2023-05-03 PROCEDURE — 85025 COMPLETE CBC W/AUTO DIFF WBC: CPT | Performed by: INTERNAL MEDICINE

## 2023-05-03 PROCEDURE — 85007 BL SMEAR W/DIFF WBC COUNT: CPT | Performed by: INTERNAL MEDICINE

## 2023-05-03 PROCEDURE — 97110 THERAPEUTIC EXERCISES: CPT

## 2023-05-03 RX ADMIN — Medication 10 ML: at 08:11

## 2023-05-03 RX ADMIN — MORPHINE SULFATE 2 MG: 2 INJECTION, SOLUTION INTRAMUSCULAR; INTRAVENOUS at 04:49

## 2023-05-03 RX ADMIN — NYSTATIN: 100000 POWDER TOPICAL at 21:27

## 2023-05-03 RX ADMIN — IPRATROPIUM BROMIDE AND ALBUTEROL SULFATE 3 ML: .5; 2.5 SOLUTION RESPIRATORY (INHALATION) at 19:16

## 2023-05-03 RX ADMIN — IPRATROPIUM BROMIDE AND ALBUTEROL SULFATE 3 ML: .5; 2.5 SOLUTION RESPIRATORY (INHALATION) at 00:13

## 2023-05-03 RX ADMIN — HYDROCODONE BITARTRATE AND ACETAMINOPHEN 1 TABLET: 10; 325 TABLET ORAL at 20:52

## 2023-05-03 RX ADMIN — IPRATROPIUM BROMIDE AND ALBUTEROL SULFATE 3 ML: .5; 2.5 SOLUTION RESPIRATORY (INHALATION) at 12:53

## 2023-05-03 RX ADMIN — Medication 10 ML: at 21:27

## 2023-05-03 RX ADMIN — POLYETHYLENE GLYCOL 3350 17 G: 17 POWDER, FOR SOLUTION ORAL at 08:11

## 2023-05-03 RX ADMIN — GUAIFENESIN 600 MG: 600 TABLET, EXTENDED RELEASE ORAL at 20:52

## 2023-05-03 RX ADMIN — MEROPENEM 1 G: 1 INJECTION, POWDER, FOR SOLUTION INTRAVENOUS at 10:51

## 2023-05-03 RX ADMIN — NYSTATIN 1 APPLICATION: 100000 POWDER TOPICAL at 08:11

## 2023-05-03 RX ADMIN — Medication 10 ML: at 08:12

## 2023-05-03 RX ADMIN — BUDESONIDE 0.5 MG: 0.5 SUSPENSION RESPIRATORY (INHALATION) at 19:16

## 2023-05-03 RX ADMIN — MORPHINE SULFATE 2 MG: 2 INJECTION, SOLUTION INTRAMUSCULAR; INTRAVENOUS at 18:26

## 2023-05-03 RX ADMIN — GUAIFENESIN 600 MG: 600 TABLET, EXTENDED RELEASE ORAL at 08:11

## 2023-05-03 RX ADMIN — FERROUS SULFATE TAB 325 MG (65 MG ELEMENTAL FE) 325 MG: 325 (65 FE) TAB at 08:11

## 2023-05-03 RX ADMIN — LEVOTHYROXINE SODIUM 100 MCG: 100 TABLET ORAL at 05:00

## 2023-05-03 RX ADMIN — OXYCODONE HYDROCHLORIDE AND ACETAMINOPHEN 500 MG: 500 TABLET ORAL at 08:11

## 2023-05-03 RX ADMIN — ENOXAPARIN SODIUM 40 MG: 40 INJECTION SUBCUTANEOUS at 08:11

## 2023-05-03 RX ADMIN — MEROPENEM 1 G: 1 INJECTION, POWDER, FOR SOLUTION INTRAVENOUS at 21:28

## 2023-05-03 RX ADMIN — BUDESONIDE 0.5 MG: 0.5 SUSPENSION RESPIRATORY (INHALATION) at 06:51

## 2023-05-03 RX ADMIN — IPRATROPIUM BROMIDE AND ALBUTEROL SULFATE 3 ML: .5; 2.5 SOLUTION RESPIRATORY (INHALATION) at 06:51

## 2023-05-03 NOTE — PROGRESS NOTES
"Palliative Care Daily Progress Note     S: Medical record reviewed, followed up with Primary RN Jim and Dr Crowell regarding patient's condition. Orquidea was sitting up in bedside chair when palliative entered the room. She was c/o pain\"down there\" as sitting up creating pressure on it.      O:   Palliative Performance Scale Score:     /71 (BP Location: Left arm, Patient Position: Lying)   Pulse 95   Temp 97.8 °F (36.6 °C) (Oral)   Resp 20   Ht 167.6 cm (66\")   Wt 82.2 kg (181 lb 3.2 oz)   SpO2 96%   BMI 29.25 kg/m²     Intake/Output Summary (Last 24 hours) at 5/3/2023 1715  Last data filed at 5/3/2023 1200  Gross per 24 hour   Intake 1018.74 ml   Output --   Net 1018.74 ml       PE:    General Appearance:    Chronically ill appearing, alert, cooperative, NAD   HEENT:    NC/AT, without obvious abnormality, EOMI, anicteric    Neck:   supple, trachea midline, no JVD   Lungs:     Labored respirations this am, pt visibly retracting at neck and shoulder muscles., occasional rhochi, audible wheezing in upper lobes and upper airways,no rales noted    Heart:    RRR, normal S1 and S2, no M/R/G   Abdomen:     Soft, tenderness @ suprapubic region, ND, NABS    Extremities:   Moves all extremities, no edema   Pulses:   Pulses palpable and equal bilaterally   Skin:   Warm, dry   Neurologic:   A/Ox3, cooperative   Psych:   Calm, pleasant, almost child like               Meds: Reviewed and changes noted.    Labs:   Results from last 7 days   Lab Units 05/03/23  0040   WBC 10*3/mm3 15.11*   HEMOGLOBIN g/dL 8.1*   HEMATOCRIT % 28.3*   PLATELETS 10*3/mm3 350     Results from last 7 days   Lab Units 05/03/23  0040   SODIUM mmol/L 142   POTASSIUM mmol/L 5.7*   CHLORIDE mmol/L 112*   CO2 mmol/L 22.0   BUN mg/dL 36*   CREATININE mg/dL 1.21*   GLUCOSE mg/dL 136*   CALCIUM mg/dL 8.9     Results from last 7 days   Lab Units 05/03/23  0040   SODIUM mmol/L 142   POTASSIUM mmol/L 5.7*   CHLORIDE mmol/L 112*   CO2 mmol/L 22.0   BUN " mg/dL 36*   CREATININE mg/dL 1.21*   CALCIUM mg/dL 8.9   BILIRUBIN mg/dL <0.2   ALK PHOS U/L 76   ALT (SGPT) U/L 5   AST (SGOT) U/L 6   GLUCOSE mg/dL 136*     Imaging Results (Last 72 Hours)     Procedure Component Value Units Date/Time    CT Abdomen Pelvis With & Without Contrast [[id number]] Collected: 05/02/23 1222     Updated: 05/03/23 0843    Addenda:        Addendum: Again noted is a somewhat necrotic appearing 7.7 cm mass in  the pelvis that appear somewhat contiguous with the distal sigmoid  colon. Air is again noted within the urinary bladder. Impression.     This report was finalized on 5/3/2023 8:41 AM by Dr. Philip Lopez MD.     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  Sagittal and coronal reformatted images were  created and reviewed.  This CT exam was performed using one or more of  the following dose reduction techniques:  automated exposure control,  adjustment of the mA and/or kV according to patient size, and/or use of  iterative reconstruction technique.     COMPARISON:    04/25/2023     FINDINGS:    LUNG BASES:  Bilateral pulmonary lung base nodules are again noted.    PLEURAL SPACE:  Tiny bilateral pleural effusions.      ABDOMEN:    LIVER:  Unremarkable.  No mass.    GALLBLADDER AND BILE DUCTS:  Unremarkable.  No calcified stones.  No  ductal dilation.    PANCREAS:  Unremarkable.  No mass.  No ductal dilation.    SPLEEN:  Unremarkable.  No splenomegaly.    ADRENALS:  Unremarkable.  No mass.    KIDNEYS AND URETERS:  Again there is a  left double-J ureteral stent  and moderate to severe hydronephrosis of the left kidney.    STOMACH AND BOWEL:  Moderate stool throughout the colon.  No  obstruction.  No mucosal thickening.      PELVIS:    APPENDIX:  No findings to suggest acute appendicitis.    BLADDER:  Unremarkable.  No mass.  No stones.    REPRODUCTIVE:  Unremarkable as visualized.      ABDOMEN and PELVIS:    INTRAPERITONEAL SPACE:  Unremarkable.  No free air.  No significant  fluid collection.    BONES/JOINTS:  No acute fracture.  No dislocation.    SOFT TISSUES:  Unremarkable.    VASCULATURE:  Atherosclerotic disease.  No abdominal aortic aneurysm.    LYMPH NODES:  Unremarkable.  No enlarged lymph nodes.       Impression:      1.  Again there is a left double-J ureteral stent and moderate to severe  hydronephrosis of the left kidney.  2.  Moderate stool throughout the colon.  3.  Tiny bilateral pleural effusions.  4.  Bilateral pulmonary lung base nodules are again noted.     This report was finalized on 5/2/2023 12:23 PM by Dr. Philip Lopez MD.       CT Chest With Contrast Diagnostic [[id number]] Collected: 05/02/23 1237     Updated: 05/02/23 1240    Narrative:      EXAM:    CT Chest With Intravenous Contrast     EXAM DATE:    5/2/2023 11:25 AM     CLINICAL HISTORY:    Pneumonia, complication suspected, xray done     TECHNIQUE:    Axial computed tomography images of the chest with intravenous  contrast.  Sagittal and coronal reformatted images were created and  reviewed.  This CT exam was performed using one or more of the following  dose reduction techniques:  automated exposure control, adjustment of  the mA and/or kV according to patient size, and/or use of iterative  reconstruction technique.     COMPARISON:    03/10/2023     FINDINGS:    LUNGS:  Bilateral parenchymal pulmonary nodules are again noted. A  posterior left upper lobe pulmonary nodule measures 2.2 cm and was about  2.2 cm. Other nodules appear stable also.    PLEURAL SPACE:   Now small bilateral pleural effusions.  No  pneumothorax.    HEART:  Tiny pericardial effusion again noted.  No significant  coronary artery calcifications.    BONES/JOINTS:  Unremarkable.  No acute fracture.  No dislocation.    SOFT TISSUES:  Unremarkable.    VASCULATURE:  Unremarkable.  No thoracic aortic aneurysm.    LYMPH NODES:  Unremarkable.  No enlarged lymph nodes.       Impression:      1.  Now small bilateral pleural effusions.  2.  Tiny pericardial effusion again noted.  3.  Bilateral parenchymal pulmonary nodules are again noted. A posterior  left upper lobe pulmonary nodule measures 2.2 cm and was about 2.2 cm.  Other nodules appear stable also.     This report was finalized on 5/2/2023 12:38 PM by Dr. Philip Lopez MD.               Diagnostics: Reviewed    A: Orquidea Rosenberg is a 77 y.o. female  admitted on 4/25/2023 with diarrhea, nausea, vomiting, and abdominal pain. 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 Orquidea had partial chemotherapy and radiation, however since that time has refused further treatment as she had difficulty tolerating treatments. Orquidea also refused colostomy and surgery at  for pneumoperitoneum with suspect bowel perforation. Orquidea was offered palliative or hospice care but declined at that time. Per pts daughter Lilli, her mother had been getting weaker over the few days before admission to [facility].  Lilli states she was having poor appetite, nausea, vomiting, and diarrhea as well as abdominal and pubic pain,She states she was fine one morning and was weaker in the evening and by the next morning could not get up at all, which led to her being brought to the ER.      P:   I was unable to touch base with pts daughter Lilli today. I will ret and reach out to her tomorrow.    We will continue to follow along. Please do not hesitate to contact us regarding further sx mgmt or GOC  needs, including after hours or on weekends via our on call provider at [phone].     Aysha Sauecdo, APRN    5/3/2023

## 2023-05-03 NOTE — PROGRESS NOTES
PROGRESS NOTE         Patient Identification:  Name:  Orquidea Rosenberg  Age:  77 y.o.  Sex:  female  :  1945  MRN:  [id number]  Visit Number:  [id number]  Primary Care Provider:  Jackeline Denson APRN         LOS: 8 days       ----------------------------------------------------------------------------------------------------------------------  Subjective       Chief Complaints:    Diarrhea, Vomiting, and Nausea        Interval History:      Patient sitting up in bed this morning.  Awake and alert.  Overall feels significantly better today.  Abdomen soft, nontender.  Afebrile.  Lungs clear to auscultation bilaterally.  Currently on 3 L per nasal cannula with no apparent distress.  WBC improved at 15.11.  CT of the chest from 23 reports small bilateral pleural effusions, tiny pericardial effusion, bilateral parenchymal pulmonary nodules are again noted.  CT of the abdomen pelvis from 23 reports left double-J ureteral stent and moderate to severe left hydronephrosis of the left kidney, moderate stool throughout the colon, tiny bilateral pleural effusions, bilateral pulmonary lung base nodules are again noted.    Review of Systems:    Constitutional: no fever, chills and night sweats.  Generalized fatigue.  Eyes: no eye drainage, itching or redness.  HEENT: no mouth sores, dysphagia or nose bleed.  Respiratory: No shortness of breath, No cough. No production of sputum.  Cardiovascular: no chest pain, no palpitations, no orthopnea.  Gastrointestinal: No nausea, no vomiting or diarrhea. No abdominal pain, no hematemesis or rectal bleeding.  Genitourinary: no dysuria or polyuria.  Hematologic/lymphatic: no lymph node abnormalities, no easy bruising or easy bleeding.  Musculoskeletal: no muscle or joint pain.  Skin: No rash and no itching.  Neurological: no loss of consciousness, no seizure, no headache.  Behavioral/Psych: no depression or suicidal ideation.  Endocrine: no hot flashes.  Immunologic:  negative.    ----------------------------------------------------------------------------------------------------------------------      Objective       [facility] Meds:  ascorbic acid, 500 mg, Oral, Daily  budesonide, 0.5 mg, Nebulization, BID - RT  enoxaparin, 40 mg, Subcutaneous, Daily  ferrous sulfate, 325 mg, Oral, Daily With Breakfast  guaiFENesin, 600 mg, Oral, Q12H  ipratropium-albuterol, 3 mL, Nebulization, 4x Daily - RT  levothyroxine, 100 mcg, Oral, Q AM  meropenem, 1 g, Intravenous, Q12H  nystatin, , Topical, Q12H  polyethylene glycol, 17 g, Oral, Daily  sodium chloride, 10 mL, Intravenous, Q12H  sodium chloride, 10 mL, Intravenous, Q12H  sodium chloride, 10 mL, Intravenous, Q12H  sodium chloride, 10 mL, Intravenous, Q12H  sodium chloride, 10 mL, Intravenous, Q12H         ----------------------------------------------------------------------------------------------------------------------    Vital Signs:  Temp:  [97.3 °F (36.3 °C)-97.7 °F (36.5 °C)] 97.3 °F (36.3 °C)  Heart Rate:  [74-87] 83  Resp:  [16-20] 20  BP: (123-161)/(54-71) 144/62  Mean Arterial Pressure (Non-Invasive) for the past 24 hrs (Last 3 readings):   Noninvasive MAP (mmHg)   05/03/23 0652 105   05/03/23 0449 78   05/03/23 0205 106     SpO2 Percentage    05/03/23 0023 05/03/23 0205 05/03/23 0652   SpO2: 98% 98% 93%     SpO2:  [93 %-99 %] 93 %  on  Flow (L/min):  [3] 3;   Device (Oxygen Therapy): nasal cannula    Body mass index is 29.25 kg/m².  Wt Readings from Last 3 Encounters:   05/03/23 82.2 kg (181 lb 3.2 oz)   03/15/22 81.6 kg (180 lb)   02/21/22 81.6 kg (180 lb)        Intake/Output Summary (Last 24 hours) at 5/3/2023 0838  Last data filed at 5/2/2023 1810  Gross per 24 hour   Intake 338.74 ml   Output --   Net 338.74 ml     Diet: Regular/House Diet; Texture: Regular Texture (IDDSI 7); Fluid Consistency: Thin (IDDSI  0)  ----------------------------------------------------------------------------------------------------------------------      Physical Exam:    Constitutional: Elderly, chronically ill-appearing.   Currently on 3 L per nasal cannula.   More awake and alert today.  HENT:  Head: Normocephalic and atraumatic.  Mouth:  Moist mucous membranes.    Eyes:  Conjunctivae and EOM are normal.  No scleral icterus.  Neck:  Neck supple.  No JVD present.    Cardiovascular:  Normal rate, regular rhythm and normal heart sounds with no murmur. No edema.  Pulmonary/Chest: Lungs clear to auscultation bilaterally.  Currently on 3 L per nasal cannula  Abdominal:  Soft.  Bowel sounds are normal.  No distension and no tenderness.  Musculoskeletal:  No edema, no tenderness, and no deformity.  No swelling or redness of joints.  Neurological:  Alert and oriented to person, place, and time.  No facial droop.  No slurred speech.   Skin:  Skin is warm and dry.  No rash noted.  No pallor. Vulvar deformity due to prior surgeries.  Psychiatric:  Normal mood and affect.  Behavior is normal.        ----------------------------------------------------------------------------------------------------------------------            Results from last 7 days   Lab Units 05/03/23  0040 05/02/23  0047 05/01/23  0729 04/28/23  0024   CRP mg/dL  --   --  2.83* 18.26*   WBC 10*3/mm3 15.11* 17.62* 14.45* 11.59*   HEMOGLOBIN g/dL 8.1* 8.9* 8.9* 8.1*   HEMATOCRIT % 28.3* 30.5* 30.7* 29.0*   MCV fL 87.9 87.6 88.0 90.3   MCHC g/dL 28.6* 29.2* 29.0* 27.9*   PLATELETS 10*3/mm3 350 399 392 309     Results from last 7 days   Lab Units 05/03/23  0040 05/02/23  0047 04/28/23  0024   SODIUM mmol/L 142 141 142   POTASSIUM mmol/L 5.7* 4.9 4.1   CHLORIDE mmol/L 112* 111* 113*   CO2 mmol/L 22.0 21.8* 17.1*   BUN mg/dL 36* 33* 47*   CREATININE mg/dL 1.21* 1.07* 1.35*   CALCIUM mg/dL 8.9 8.8 8.4*   GLUCOSE mg/dL 136* 147* 139*   ALBUMIN g/dL 2.6*  --   --    BILIRUBIN mg/dL <0.2   --   --    ALK PHOS U/L 76  --   --    AST (SGOT) U/L 6  --   --    ALT (SGPT) U/L 5  --   --    Estimated Creatinine Clearance: 42.1 mL/min (A) (by C-G formula based on SCr of 1.21 mg/dL (H)).  No results found for: AMMONIA    No results found for: HGBA1C, POCGLU  No results found for: HGBA1C  Lab Results   Component Value Date    TSH 2.620 03/15/2022       Blood Culture   Date Value Ref Range Status   04/25/2023 Abnormal Stain (C)  Preliminary   04/25/2023 Anaerobe (Organism type) (C)  Preliminary     No results found for: URINECX  No results found for: WOUNDCX  No results found for: STOOLCX  No results found for: RESPCX  Pain Management Panel            View : No data to display.                        ----------------------------------------------------------------------------------------------------------------------  Imaging Results (Last 24 Hours)       Procedure Component Value Units Date/Time    CT Chest With Contrast Diagnostic [[id number]] Collected: 05/02/23 1237     Updated: 05/02/23 1240    Narrative:      EXAM:    CT Chest With Intravenous Contrast     EXAM DATE:    5/2/2023 11:25 AM     CLINICAL HISTORY:    Pneumonia, complication suspected, xray done     TECHNIQUE:    Axial computed tomography images of the chest with intravenous  contrast.  Sagittal and coronal reformatted images were created and  reviewed.  This CT exam was performed using one or more of the following  dose reduction techniques:  automated exposure control, adjustment of  the mA and/or kV according to patient size, and/or use of iterative  reconstruction technique.     COMPARISON:    03/10/2023     FINDINGS:    LUNGS:  Bilateral parenchymal pulmonary nodules are again noted. A  posterior left upper lobe pulmonary nodule measures 2.2 cm and was about  2.2 cm. Other nodules appear stable also.    PLEURAL SPACE:  Now small bilateral pleural effusions.  No  pneumothorax.    HEART:  Tiny pericardial effusion again noted.  No  significant  coronary artery calcifications.    BONES/JOINTS:  Unremarkable.  No acute fracture.  No dislocation.    SOFT TISSUES:  Unremarkable.    VASCULATURE:  Unremarkable.  No thoracic aortic aneurysm.    LYMPH NODES:  Unremarkable.  No enlarged lymph nodes.       Impression:      1.  Now small bilateral pleural effusions.  2.  Tiny pericardial effusion again noted.  3.  Bilateral parenchymal pulmonary nodules are again noted. A posterior  left upper lobe pulmonary nodule measures 2.2 cm and was about 2.2 cm.  Other nodules appear stable also.     This report was finalized on 5/2/2023 12:38 PM by Dr. Philip Lopez MD.       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  Sagittal and coronal reformatted images were  created and reviewed.  This CT exam was performed using one or more of  the following dose reduction techniques:  automated exposure control,  adjustment of the mA and/or kV according to patient size, and/or use of  iterative reconstruction technique.     COMPARISON:    04/25/2023     FINDINGS:    LUNG BASES:  Bilateral pulmonary lung base nodules are again noted.    PLEURAL SPACE:  Tiny bilateral pleural effusions.      ABDOMEN:    LIVER:  Unremarkable.  No mass.    GALLBLADDER AND BILE DUCTS:  Unremarkable.  No calcified stones.  No  ductal dilation.    PANCREAS:  Unremarkable.  No mass.  No ductal dilation.    SPLEEN:  Unremarkable.  No splenomegaly.    ADRENALS:  Unremarkable.  No  mass.    KIDNEYS AND URETERS:  Again there is a left double-J ureteral stent  and moderate to severe hydronephrosis of the left kidney.    STOMACH AND BOWEL:  Moderate stool throughout the colon.  No  obstruction.  No mucosal thickening.      PELVIS:    APPENDIX:  No findings to suggest acute appendicitis.    BLADDER:  Unremarkable.  No mass.  No stones.    REPRODUCTIVE:  Unremarkable as visualized.      ABDOMEN and PELVIS:    INTRAPERITONEAL SPACE:  Unremarkable.  No free air.  No significant  fluid collection.    BONES/JOINTS:  No acute fracture.  No dislocation.    SOFT TISSUES:  Unremarkable.    VASCULATURE:  Atherosclerotic disease.  No abdominal aortic aneurysm.    LYMPH NODES:  Unremarkable.  No enlarged lymph nodes.       Impression:      1.  Again there is a left double-J ureteral stent and moderate to severe  hydronephrosis of the left kidney.  2.  Moderate stool throughout the colon.  3.  Tiny bilateral pleural effusions.  4.  Bilateral pulmonary lung base nodules are again noted.     This report was finalized on 5/2/2023 12:23 PM by Dr. Philip Lopez MD.               ----------------------------------------------------------------------------------------------------------------------    Pertinent Infectious Disease Results    Lactic acid on admission was normal at 1.2.  Chest x-ray on 4/26/2023 showed no change in distribution of bilateral lung opacities which may represent changes of fibrosis and diffuse pulmonary nodules.  No pneumothorax.  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  This could represent secondary involvement of the leiomyomatous uterus or dystrophic calcifications within the malignancy.  Abnormal fistulous communications between the anterior rectum and the posterior aspect of the above-mentioned pelvic mass with at least 1 and possibly 2 fistulous  connections as demonstrated on CT.  Central debris within the mass may represent abscess or necrosis.  Apparent fistulous communication between the mass in the bladder.  Left-sided hydronephrosis and hydroureter with left ureteral stent in place.  The distal pigtail difficult to confirm within the bladder and may be within the distal ureter.  Progressive widely disseminated pulmonary metastases.  Blood cultures on 4/25/2023 are in progress.  MRSA screen on 4/26/2023 was negative.  COVID-19 and flu A/B PCR on 4/25/2023 was negative.        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 Overall, they report feeling significantly better today. The abdomen is soft and nontender, and the patient is afebrile. A bilateral lung auscultation revealed clear lungs, and the patient is currently on 3 L per nasal cannula with no apparent distress. The WBC count has improved to 15.11.    CT scans from 5/2/23 reveal small bilateral pleural and pericardial effusions, as well as bilateral pulmonary nodules. In addition, a CT of the abdomen pelvis from the same date reports a left double-J ureteral stent and moderate to severe left hydronephrosis of the left kidney, moderate stool throughout the colon, and bilateral pulmonary nodules.    Blood cultures from 4/25/2023 revealed 1 out of 2 sets positive for Gemella morbillorum, and 2 out of 2 sets positive for Clostridium subterminale. Currently, we suspect the Gemella to be a contaminant, and we recommend continuing with meropenem monotherapy for now with the possibility to de-escalate coverage upon  discharge to oral regimen with metronidazole.    ANTIMICROBIAL THERAPY    meropenem (MERREM) 1gm IVPB in 100ml NS (VTB)     Code Status:   Code Status and Medical Interventions:   Ordered at: 04/25/23 1937     Level Of Support Discussed With:    Patient     Code Status (Patient has no pulse and is not breathing):    CPR (Attempt to Resuscitate)     Medical Interventions (Patient has pulse or is breathing):    Full Support       BERTHA Sanchez  05/03/23  08:38 EDT     Electronically signed by BERTHA Sanchez, 05/03/23, 9:14 AM EDT.      Electronically signed by Socrates Flaherty MD, 05/03/23, 9:38 AM EDT.

## 2023-05-03 NOTE — PROGRESS NOTES
[facility]     Progress Note    Patient Name: Orquidea Rosenberg  : 1945  MRN: [id number]  Primary Care Physician:  Jackeline Denson, BERTHA  Date of admission: 2023    Subjective   Subjective     Chief Complaint: 78 y/o female being seen in follow up for sepsis    Diarrhea   Associated symptoms include vomiting.   Vomiting   Associated symptoms include diarrhea.   Nausea  Associated symptoms include nausea and vomiting.     Patient Reports feeling much better today.     Present during visit: patient's daughter and SHEILA Porter    Review of Systems   Gastrointestinal: Positive for diarrhea, nausea and vomiting.     No nausea or vomiting today; states her wheezing and SOA is improved. She denied any suprapubic pain during my visit.     Objective   Objective     Vitals:   Temp:  [97.3 °F (36.3 °C)-97.8 °F (36.6 °C)] 97.8 °F (36.6 °C)  Heart Rate:  [] 95  Resp:  [18-20] 20  BP: (126-159)/(54-71) 156/71  Flow (L/min):  [3] 3    Physical Exam  Constitutional:       General: She is not in acute distress.     Appearance: She is well-developed.      Interventions: Nasal cannula in place.   HENT:      Head: Normocephalic and atraumatic.   Eyes:      Conjunctiva/sclera: Conjunctivae normal.   Neck:      Trachea: No tracheal deviation.   Cardiovascular:      Rate and Rhythm: Normal rate and regular rhythm.      Heart sounds: No murmur heard.    No friction rub. No gallop.   Pulmonary:      Effort: No respiratory distress.      Breath sounds: Decreased breath sounds present. No wheezing or rales.   Abdominal:      General: Bowel sounds are normal. There is no distension.      Palpations: Abdomen is soft.      Tenderness: There is no abdominal tenderness. There is no guarding.   Musculoskeletal:         General: No tenderness.   Skin:     General: Skin is warm and dry.      Findings: No erythema or rash.   Neurological:      Mental Status: She is alert.      Cranial Nerves: No cranial nerve deficit.      Comments:  Oriented to self and place; follows commands          Result Review    Result Review:  I have personally reviewed the results from the time of this admission to 5/3/2023 16:14 EDT and agree with these findings:  [x]  Laboratory list / accordion  []  Microbiology  []  Radiology  []  EKG/Telemetry   []  Cardiology/Vascular   []  Pathology  []  Old records  []  Other:  Most notable findings include: CMP with BUN 36 and creatinine 1.21, overall improving; CO2 22 and sodium 142 with a glucose of 136.  WBC slightly improved at 15.11, H/H 8.1 and 28.3 respectively, platelets 350.    Blood Culture   Date Value Ref Range Status   04/29/2023 No growth at 2 days  Preliminary   04/29/2023 No growth at 2 days  Preliminary   04/25/2023 Clostridium subterminale (C)  Preliminary     Comment:     Beta lactamase positive confers resistance to ampcillin, amoxicillin, penicillin, ticarcillin, and piperacillin but NOT amoxicillin-clavulanate, ampicillin-sulbactam, or piperacillin-tazobactam.     04/25/2023 Clostridium subterminale (C)  Final     Comment:     Beta lactamase positive confers resistance to ampcillin, amoxicillin, penicillin, ticarcillin, and piperacillin but NOT amoxicillin-clavulanate, ampicillin-sulbactam, or piperacillin-tazobactam.       CT chest/abdomen/pelvis:  IMPRESSION:  1.  Now small bilateral pleural effusions.  2.  Tiny pericardial effusion again noted.  3.  Bilateral parenchymal pulmonary nodules are again noted. A posterior  left upper lobe pulmonary nodule measures 2.2 cm and was about 2.2 cm.  Other nodules appear stable also.    1.  Again there is a left double-J ureteral stent and moderate to severe  hydronephrosis of the left kidney.  2.  Moderate stool throughout the colon.  3.  Tiny bilateral pleural effusions.  4.  Bilateral pulmonary lung base nodules are again noted.    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 causing fecaloid urine  #Clostridium subterminale bacteremia  #Sepsis, present upon admission  #Suspect steroid-induced leukocytosis, improved today  #Metastatic vulvar cancer to include extensive lung metastasis  #Acute hypoxic respiratory failure likely secondary to lung metastasis although underlying pneumonia cannot be entirely ruled out  #Chronic anemia with iron deficiency and acute exacerbation requiring 1 unit PRBCs  #Bronchospasm, clinically and symptomatically improving  #Acute kidney injury likely due to sepsis  #Generalized weakness/physical debility  #Moderate hypoalbuminemia  -Obtain plasma potassium level  -Continue Merrem monotherapy per ID recommendations  -CT scans reviewed; no significant changes  -Appreciate general surgery recommendations  -Repeat blood cultures with no growth to date  -Patient completed a course of Prednisone  -Continue with nebulized inhalants and Pulmicort  -Continue supplemental oxygen as needed titrate for saturations 90 to 95%  -Continue with subcutaneous Lovenox for now; low threshold to rule out PE/DVT in the setting of malignancy and Clostridium bacteremia  -PT/OT as tolerated/as available  -Patient deemed not to be a candidate for IPR  -D/W daughter and ORLY Delaney; swing bed consult placed  -Repeat CBC in a.m.; continue to monitor temperature curve  -Repeat BMP in a.m. to monitor on patient's renal function which is overall improving  -Continue current PRN pain medications    Further management pending clinical course    DVT prophylaxis:  Lovenox    Disposition:  I expect patient to be discharged to IPR v SNF v home with home health pending PT/OT evaluations    Taisha Crowell,

## 2023-05-03 NOTE — PLAN OF CARE
Goal Outcome Evaluation:              Pt had c/o of pain, PRN medications given (see MAR). No s/s of acute distress noted at this time. No complaints verbalized at this time. Wound care completed. Plan of care ongoing.

## 2023-05-03 NOTE — CASE MANAGEMENT/SOCIAL WORK
Discharge Planning Assessment   [facility]     Patient Name: Orquidea Rosenberg  MRN: [id number]  Today's Date: 5/3/2023    Admit Date: 4/25/2023    Plan: SS noted that inpt rehab has denied pt. and Swing bed consult noted.  SS will follow and assist with discharge disposition.        Discharge Plan     Row Name 05/03/23 1448       Plan    Plan SS noted that inpt rehab has denied pt. and Swing bed consult noted.  SS will follow and assist with discharge disposition.              ANASTASIA BellW

## 2023-05-03 NOTE — THERAPY TREATMENT NOTE
Acute Care - Physical Therapy Treatment Note  [facility]     Patient Name: Orquidea Rosenberg  : 1945  MRN: [id number]  Today's Date: 5/3/2023   Onset of Illness/Injury or Date of Surgery: 23 (admission date)  Visit Dx:     ICD-10-CM ICD-9-CM   1. Vulvar cancer  C51.9 184.4   2. 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 Acute kidney injury  N17.9 584.9   4. Chronic anemia  D64.9 285.9   5. Bacteremia  R78.81 790.7     Patient Active Problem List   Diagnosis   • COVID-19   • Vulvar cancer     Past Medical History:   Diagnosis Date   • Arthritis    • COPD (chronic obstructive pulmonary disease)    • Elevated cholesterol    • History of transfusion    • Hypertension    • Vulval ca      Past Surgical History:   Procedure Laterality Date   • RADICAL VULVECTOMY  2019   • RADICAL VULVECTOMY Bilateral 2019     PT Assessment (last 12 hours)     PT Evaluation and Treatment     Row Name 23 1050          Physical Therapy Time and Intention    Document Type therapy note (daily note)  -     Mode of Treatment physical therapy  -     Patient Effort good  -     Comment Pt seen this AM working on LE TE and ambulation/mobility  -     Row Name 23 1050          Bed Mobility    Bed Mobility supine-sit  -     Supine-Sit [place] (Bed Mobility) modified independence  -[facility] Name 23 1050          Transfers    Transfers sit-stand transfer;stand-sit transfer  -[facility] Name 23 1050          Sit-Stand Transfer    Sit-Stand [place] (Transfers) standby assist;contact guard  -     Row Name 23 1050          Stand-Sit Transfer    Stand-Sit [place] (Transfers) standby assist;contact guard  instructed to sit down  -     Row Name 23 1050          Safety Issues, Functional Mobility    Comment, Safety Issues/Impairments (Mobility) Pt ambulated grossly 6' around bed with RW, instructed to sit down despite pt saying she felt good, however upon  sitting pt was SOA, O2 saturation on finger puls ox of 92%  -     Row Name 05/03/23 1050          Motor Skills    Therapeutic Exercise hip;knee;ankle  marches 3x15 grossly, 2-3x10 LAQ BL, ankle pumps sitting grossly 3x20  -KH     Row Name             Wound 05/01/23 1414 Left vagina    Wound - Properties Group Placement Date: 05/01/23  -LB Placement Time: 1414  -LB Present on Hospital Admission: Y  -LB Side: Left  -LB Location: vagina  -LB    Retired Wound - Properties Group Placement Date: 05/01/23  -LB Placement Time: 1414  -LB Present on Hospital Admission: Y  -LB Side: Left  -LB Location: vagina  -LB    Retired Wound - Properties Group Date first assessed: 05/01/23  -LB Time first assessed: 1414  -LB Present on Hospital Admission: Y  -LB Side: Left  -LB Location: vagina  -LB    Row Name 05/03/23 1050          Positioning and Restraints    Pre-Treatment Position in bed  -KH     Post Treatment Position chair  -KH     In Chair call light within reach;encouraged to call for assist;sitting  -KH           User Key  (r) = Recorded By, (t) = Taken By, (c) = Cosigned By    Initials Name Provider Type    Charlotte Hogue, PT Physical Therapist    Veronica Bansal, RN Registered Nurse                  PT Recommendation and Plan  Planned Therapy Interventions (PT): balance training, bed mobility training, gait training, transfer training, strengthening, patient/family education, postural re-education, stretching, stair training, ROM (range of motion)  Therapy Frequency (PT): 2 times/wk (1-5x/wk)  Outcome Evaluation: Pt seen for evaluation this date, may benefit from skilled therapeutic intervention to increase tolerance to activity, progress towards PLOF, maximize energy conservation/safety with mobility.       Time Calculation:    PT Charges     Row Name 05/03/23 1138             Time Calculation    Start Time 1050  -KH      PT Received On 05/03/23  -KH         Time Calculation- PT    Total Timed Code Minutes- PT 25  minute(s)  -MARY ELLEN            User Key  (r) = Recorded By, (t) = Taken By, (c) = Cosigned By    Initials Name Provider Type    Charlotte Hogue, PT Physical Therapist              Therapy Charges for Today     Code Description Service Date Service Provider Modifiers Qty    71696054603 HC PT EVAL MOD COMPLEXITY 4 5/2/2023 Charlotte Machado, PT GP 1    54499464763 HC PT THER PROC EA 15 MIN 5/3/2023 Charlotte Machado, PT GP 1    26991317638 HC PT THERAPEUTIC ACT EA 15 MIN 5/3/2023 Charlotte Machado, PT GP 1          PT G-Codes  AM-PAC 6 Clicks Score (PT): 9    Charlotte Machado, PT  5/3/2023

## 2023-05-03 NOTE — NURSING NOTE
Rehab consult follow up:  The IRF provider has reviewed this referral, and is agreeable the patient does not meet Inpatient Rehabilitation criteria.  Spoke to Elaina in SS.  Thank you for the consult.   Other (specify)

## 2023-05-03 NOTE — PLAN OF CARE
Goal Outcome Evaluation:  Plan of Care Reviewed With: patient  Progress: no change   Pt resting in bed, watching television. Pt has tolerated all interventions. No complaints/concerns. No acute distress noted. Will continue to follow the plan of care.

## 2023-05-04 LAB
ANION GAP SERPL CALCULATED.3IONS-SCNC: 5.3 MMOL/L (ref 5–15)
ANISOCYTOSIS BLD QL: ABNORMAL
BACTERIA SPEC AEROBE CULT: NORMAL
BACTERIA SPEC AEROBE CULT: NORMAL
BUN SERPL-MCNC: 35 MG/DL (ref 8–23)
BUN/CREAT SERPL: 36.5 (ref 7–25)
CALCIUM SPEC-SCNC: 8.8 MG/DL (ref 8.6–10.5)
CHLORIDE SERPL-SCNC: 111 MMOL/L (ref 98–107)
CO2 SERPL-SCNC: 23.7 MMOL/L (ref 22–29)
CREAT SERPL-MCNC: 0.96 MG/DL (ref 0.57–1)
DACRYOCYTES BLD QL SMEAR: ABNORMAL
DEPRECATED RDW RBC AUTO: 68.2 FL (ref 37–54)
EGFRCR SERPLBLD CKD-EPI 2021: 61.1 ML/MIN/1.73
EOSINOPHIL # BLD MANUAL: 0.16 10*3/MM3 (ref 0–0.4)
EOSINOPHIL NFR BLD MANUAL: 1 % (ref 0.3–6.2)
ERYTHROCYTE [DISTWIDTH] IN BLOOD BY AUTOMATED COUNT: 21.1 % (ref 12.3–15.4)
GLUCOSE SERPL-MCNC: 99 MG/DL (ref 65–99)
HCT VFR BLD AUTO: 28.8 % (ref 34–46.6)
HGB BLD-MCNC: 8.1 G/DL (ref 12–15.9)
HYPOCHROMIA BLD QL: ABNORMAL
LARGE PLATELETS: ABNORMAL
LYMPHOCYTES # BLD MANUAL: 1.71 10*3/MM3 (ref 0.7–3.1)
LYMPHOCYTES NFR BLD MANUAL: 3 % (ref 5–12)
MAGNESIUM SERPL-MCNC: 1.9 MG/DL (ref 1.6–2.4)
MCH RBC QN AUTO: 25.5 PG (ref 26.6–33)
MCHC RBC AUTO-ENTMCNC: 28.1 G/DL (ref 31.5–35.7)
MCV RBC AUTO: 90.6 FL (ref 79–97)
METAMYELOCYTES NFR BLD MANUAL: 2 % (ref 0–0)
MONOCYTES # BLD: 0.47 10*3/MM3 (ref 0.1–0.9)
MYELOCYTES NFR BLD MANUAL: 1 % (ref 0–0)
NEUTROPHILS # BLD AUTO: 12.78 10*3/MM3 (ref 1.7–7)
NEUTROPHILS NFR BLD MANUAL: 81 % (ref 42.7–76)
NEUTS BAND NFR BLD MANUAL: 1 % (ref 0–5)
OVALOCYTES BLD QL SMEAR: ABNORMAL
PLATELET # BLD AUTO: 336 10*3/MM3 (ref 140–450)
PMV BLD AUTO: 8.8 FL (ref 6–12)
POTASSIUM SERPL-SCNC: 5.3 MMOL/L (ref 3.5–5.2)
RBC # BLD AUTO: 3.18 10*6/MM3 (ref 3.77–5.28)
SCAN SLIDE: NORMAL
SODIUM SERPL-SCNC: 140 MMOL/L (ref 136–145)
VARIANT LYMPHS NFR BLD MANUAL: 11 % (ref 19.6–45.3)
WBC NRBC COR # BLD: 15.59 10*3/MM3 (ref 3.4–10.8)

## 2023-05-04 PROCEDURE — 80048 BASIC METABOLIC PNL TOTAL CA: CPT | Performed by: INTERNAL MEDICINE

## 2023-05-04 PROCEDURE — 94799 UNLISTED PULMONARY SVC/PX: CPT

## 2023-05-04 PROCEDURE — 25010000002 MEROPENEM PER 100 MG: Performed by: INTERNAL MEDICINE

## 2023-05-04 PROCEDURE — 85007 BL SMEAR W/DIFF WBC COUNT: CPT | Performed by: INTERNAL MEDICINE

## 2023-05-04 PROCEDURE — 97530 THERAPEUTIC ACTIVITIES: CPT

## 2023-05-04 PROCEDURE — 25010000002 MORPHINE PER 10 MG: Performed by: INTERNAL MEDICINE

## 2023-05-04 PROCEDURE — 25010000002 ENOXAPARIN PER 10 MG: Performed by: HOSPITALIST

## 2023-05-04 PROCEDURE — 94664 DEMO&/EVAL PT USE INHALER: CPT

## 2023-05-04 PROCEDURE — 99232 SBSQ HOSP IP/OBS MODERATE 35: CPT | Performed by: INTERNAL MEDICINE

## 2023-05-04 PROCEDURE — 83735 ASSAY OF MAGNESIUM: CPT | Performed by: INTERNAL MEDICINE

## 2023-05-04 PROCEDURE — 97110 THERAPEUTIC EXERCISES: CPT

## 2023-05-04 PROCEDURE — 85025 COMPLETE CBC W/AUTO DIFF WBC: CPT | Performed by: INTERNAL MEDICINE

## 2023-05-04 RX ORDER — OXYCODONE AND ACETAMINOPHEN 10; 325 MG/1; MG/1
1 TABLET ORAL EVERY 6 HOURS PRN
Status: DISCONTINUED | OUTPATIENT
Start: 2023-05-04 | End: 2023-05-05 | Stop reason: HOSPADM

## 2023-05-04 RX ADMIN — Medication 10 ML: at 08:43

## 2023-05-04 RX ADMIN — IPRATROPIUM BROMIDE AND ALBUTEROL SULFATE 3 ML: .5; 2.5 SOLUTION RESPIRATORY (INHALATION) at 00:42

## 2023-05-04 RX ADMIN — ENOXAPARIN SODIUM 40 MG: 40 INJECTION SUBCUTANEOUS at 08:41

## 2023-05-04 RX ADMIN — BUDESONIDE 0.5 MG: 0.5 SUSPENSION RESPIRATORY (INHALATION) at 06:36

## 2023-05-04 RX ADMIN — LEVOTHYROXINE SODIUM 100 MCG: 100 TABLET ORAL at 05:04

## 2023-05-04 RX ADMIN — IPRATROPIUM BROMIDE AND ALBUTEROL SULFATE 3 ML: .5; 2.5 SOLUTION RESPIRATORY (INHALATION) at 12:19

## 2023-05-04 RX ADMIN — Medication 10 ML: at 20:13

## 2023-05-04 RX ADMIN — FERROUS SULFATE TAB 325 MG (65 MG ELEMENTAL FE) 325 MG: 325 (65 FE) TAB at 08:41

## 2023-05-04 RX ADMIN — MEROPENEM 1 G: 1 INJECTION, POWDER, FOR SOLUTION INTRAVENOUS at 10:53

## 2023-05-04 RX ADMIN — HYDROCODONE BITARTRATE AND ACETAMINOPHEN 1 TABLET: 10; 325 TABLET ORAL at 06:32

## 2023-05-04 RX ADMIN — MEROPENEM 1 G: 1 INJECTION, POWDER, FOR SOLUTION INTRAVENOUS at 21:40

## 2023-05-04 RX ADMIN — MORPHINE SULFATE 4 MG: 4 INJECTION, SOLUTION INTRAMUSCULAR; INTRAVENOUS at 19:56

## 2023-05-04 RX ADMIN — Medication 10 ML: at 08:36

## 2023-05-04 RX ADMIN — BUDESONIDE 0.5 MG: 0.5 SUSPENSION RESPIRATORY (INHALATION) at 19:18

## 2023-05-04 RX ADMIN — GUAIFENESIN 600 MG: 600 TABLET, EXTENDED RELEASE ORAL at 22:30

## 2023-05-04 RX ADMIN — MORPHINE SULFATE 4 MG: 4 INJECTION, SOLUTION INTRAMUSCULAR; INTRAVENOUS at 15:27

## 2023-05-04 RX ADMIN — IPRATROPIUM BROMIDE AND ALBUTEROL SULFATE 3 ML: .5; 2.5 SOLUTION RESPIRATORY (INHALATION) at 06:36

## 2023-05-04 RX ADMIN — MORPHINE SULFATE 2 MG: 2 INJECTION, SOLUTION INTRAMUSCULAR; INTRAVENOUS at 08:36

## 2023-05-04 RX ADMIN — GUAIFENESIN 600 MG: 600 TABLET, EXTENDED RELEASE ORAL at 08:41

## 2023-05-04 RX ADMIN — MORPHINE SULFATE 2 MG: 2 INJECTION, SOLUTION INTRAMUSCULAR; INTRAVENOUS at 03:12

## 2023-05-04 RX ADMIN — NYSTATIN: 100000 POWDER TOPICAL at 08:41

## 2023-05-04 RX ADMIN — NYSTATIN: 100000 POWDER TOPICAL at 21:40

## 2023-05-04 RX ADMIN — Medication 10 ML: at 08:41

## 2023-05-04 RX ADMIN — OXYCODONE HYDROCHLORIDE AND ACETAMINOPHEN 1 TABLET: 10; 325 TABLET ORAL at 21:43

## 2023-05-04 RX ADMIN — IPRATROPIUM BROMIDE AND ALBUTEROL SULFATE 3 ML: .5; 2.5 SOLUTION RESPIRATORY (INHALATION) at 19:18

## 2023-05-04 RX ADMIN — OXYCODONE HYDROCHLORIDE AND ACETAMINOPHEN 1 TABLET: 10; 325 TABLET ORAL at 10:51

## 2023-05-04 RX ADMIN — OXYCODONE HYDROCHLORIDE AND ACETAMINOPHEN 500 MG: 500 TABLET ORAL at 08:41

## 2023-05-04 NOTE — CASE MANAGEMENT/SOCIAL WORK
Discharge Planning Assessment  [facility]     Patient Name: Orquidea Rosenberg  MRN: [id number]  Today's Date: 5/4/2023    Admit Date: 4/25/2023    Plan:  spoke with Swing bed Andrew ESPITIA who states he has submitted pre-auth to pt's insurance for swingbed admission.  to to follow.     Discharge Plan     Row Name 05/04/23 1042       Plan    Plan SS spoke with Swing bed Andrew ESPITIA who states he has submitted pre-auth to pt's insurance for swingbed admission.  to to follow.              HARPAL Stanley

## 2023-05-04 NOTE — PROGRESS NOTES
[facility]     Progress Note    Patient Name: Orquidea Rosenberg  : 1945  MRN: [id number]  Primary Care Physician:  Jackeline Denson, BERTHA  Date of admission: 2023    Subjective   Subjective     Chief Complaint: 78 y/o female being seen in follow up for sepsis    Diarrhea   Associated symptoms include vomiting.   Vomiting   Associated symptoms include diarrhea.   Nausea  Associated symptoms include nausea and vomiting.     Patient Reports having suprapubic and abdominal pain today when seen.     Present during visit: NUPUR Lucero    Review of Systems   Gastrointestinal: Positive for diarrhea, nausea and vomiting.     No nausea or vomiting today; states her wheezing and SOA is improved but not back to baseline.     Objective   Objective     Vitals:   Temp:  [97.3 °F (36.3 °C)-97.8 °F (36.6 °C)] 97.3 °F (36.3 °C)  Heart Rate:  [77-95] 94  Resp:  [18-32] 24  BP: (145-161)/(57-76) 161/63  Flow (L/min):  [3-3.5] 3.5    Physical Exam  Constitutional:       General: She is in acute distress.      Appearance: She is well-developed.      Interventions: Nasal cannula in place.   HENT:      Head: Normocephalic and atraumatic.   Eyes:      Conjunctiva/sclera: Conjunctivae normal.   Neck:      Trachea: No tracheal deviation.   Cardiovascular:      Rate and Rhythm: Normal rate and regular rhythm.      Heart sounds: No murmur heard.    No friction rub. No gallop.   Pulmonary:      Effort: No respiratory distress.      Breath sounds: Decreased breath sounds present. No wheezing or rales.   Abdominal:      General: Bowel sounds are normal. There is no distension.      Palpations: Abdomen is soft.      Tenderness: There is no abdominal tenderness. There is no guarding.   Genitourinary:     Comments: Light brown stool noted in right groin/cutaneous fistula  Musculoskeletal:         General: No tenderness.   Skin:     General: Skin is warm and dry.      Findings: No erythema or rash.   Neurological:      Mental Status: She is  alert.      Cranial Nerves: No cranial nerve deficit.      Comments: Oriented to self and place; follows commands          Result Review    Result Review:  I have personally reviewed the results from the time of this admission to 5/4/2023 14:32 EDT and agree with these findings:  [x]  Laboratory list / accordion  []  Microbiology  []  Radiology  []  EKG/Telemetry   []  Cardiology/Vascular   []  Pathology  []  Old records  []  Other:  Most notable findings include: BMP with BUN 35/Cr0.96, overall improving; CO2 23.7 and sodium 140 with a glucose of 99.  WBC stable at 15.59, H/H 8.1 and 28.8 respectively, platelets 336.    Blood Culture   Date Value Ref Range Status   04/29/2023 No growth at 2 days  Preliminary   04/29/2023 No growth at 2 days  Preliminary   04/25/2023 Clostridium subterminale (C)  Preliminary     Comment:     Beta lactamase positive confers resistance to ampcillin, amoxicillin, penicillin, ticarcillin, and piperacillin but NOT amoxicillin-clavulanate, ampicillin-sulbactam, or piperacillin-tazobactam.     04/25/2023 Clostridium subterminale (C)  Final     Comment:     Beta lactamase positive confers resistance to ampcillin, amoxicillin, penicillin, ticarcillin, and piperacillin but NOT amoxicillin-clavulanate, ampicillin-sulbactam, or piperacillin-tazobactam.       CT chest/abdomen/pelvis:  IMPRESSION:  1.  Now small bilateral pleural effusions.  2.  Tiny pericardial effusion again noted.  3.  Bilateral parenchymal pulmonary nodules are again noted. A posterior  left upper lobe pulmonary nodule measures 2.2 cm and was about 2.2 cm.  Other nodules appear stable also.    1.  Again there is a left double-J ureteral stent and moderate to severe  hydronephrosis of the left kidney.  2.  Moderate stool throughout the colon.  3.  Tiny bilateral pleural effusions.  4.  Bilateral pulmonary lung base nodules are again noted.    Assessment / Plan     #Mild hyperkalemia, improved  #Complicated UTI in the setting  of necrotic large pelvic tumor/vulvar cancer and fistula formation between the tumor and urinary bladder and rectum causing fecaloid urine  #Clostridium subterminale bacteremia  #Sepsis, present upon admission  #Suspect steroid-induced leukocytosis  #Metastatic vulvar cancer to include extensive lung metastasis  #Acute hypoxic respiratory failure likely secondary to lung metastasis although underlying pneumonia cannot be entirely ruled out  #Chronic anemia with iron deficiency and acute exacerbation requiring 1 unit PRBCs  #Bronchospasm, clinically and symptomatically improving  #Acute kidney injury likely due to sepsis, resolving  #Generalized weakness/physical debility  #Moderate hypoalbuminemia  #Neoplasm related pain  -Repeat chemistry panel in a.m   -Continue Merrem monotherapy per ID recommendations  -CT scans reviewed; no significant changes  -Appreciate general surgery recommendations  -Repeat blood cultures with no growth to date  -Patient completed a course of Prednisone  -Continue with nebulized inhalants and Pulmicort  -Continue supplemental oxygen as needed titrate for saturations 90 to 95%  -Continue with subcutaneous Lovenox for now; low threshold to rule out PE/DVT in the setting of malignancy and Clostridium bacteremia  -PT/OT as tolerated/as available  -Patient deemed not to be a candidate for IPR  -Will move to 3N today in anticipation of admission to swing bed soon  -Repeat CBC in a.m.; continue to monitor temperature curve  -Repeat BMP in a.m. to monitor on patient's renal function which is overall improving  -Norco does not appear to be effective for treating patient's neoplasm related pain; as such will discontinue Norco 10 and begin Percocet 10 Q6HPRN; keep Morphine on as needed but increase to 4 mg Q4H; discussed with patient and nursing staff at bedside  -Appreciate Palliative Care assistance    Further management pending clinical course    DVT prophylaxis:  Lovenox    Disposition:  I  expect patient to be discharged to swing bed prior to returning home    Taisha Crowell, DO

## 2023-05-04 NOTE — PLAN OF CARE
Goal Outcome Evaluation:  Plan of Care Reviewed With: patient           Outcome Evaluation: patient was transfrered from [address] [place] [address] is in bed resting quietly pain meds were given before she came over said pain was some better is resting quietly

## 2023-05-04 NOTE — PLAN OF CARE
Pt tolerating IV antibiotics and interventions well. Some complaints of pain- PRN medications administered, pt reported that helped. No other complaints or symptoms of distress noted. Continue plan of care.

## 2023-05-04 NOTE — THERAPY TREATMENT NOTE
Acute Care - Physical Therapy Treatment Note   [facility]     Patient Name: Orquidea Rosenberg  : 1945  MRN: [id number]  Today's Date: 2023   Onset of Illness/Injury or Date of Surgery: 23 (admission date)  Visit Dx:     ICD-10-CM ICD-9-CM   1. Vulvar cancer  C51.9 184.4   2. 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 Acute kidney injury  N17.9 584.9   4. Chronic anemia  D64.9 285.9   5. Bacteremia  R78.81 790.7     Patient Active Problem List   Diagnosis   • COVID-19   • Vulvar cancer     Past Medical History:   Diagnosis Date   • Arthritis    • COPD (chronic obstructive pulmonary disease)    • Elevated cholesterol    • History of transfusion    • Hypertension    • Vulval ca      Past Surgical History:   Procedure Laterality Date   • RADICAL VULVECTOMY  2019   • RADICAL VULVECTOMY Bilateral 2019     PT Assessment (last 12 hours)     PT Evaluation and Treatment     Row Name 23 1153          Physical Therapy Time and Intention    Document Type therapy note (daily note)  -     Mode of Treatment physical therapy  -     Patient Effort adequate  -     Comment Pt did not want to attempt to sit in chair and agreed to supine LE TE  -     Row Name 23 1153          Motor Skills    Therapeutic Exercise hip;ankle  hip ER while in supine x15, ankle pumps x20 3 sets, SLR 3x10 grossly=, HEP of ankle pumps x15 with meals  -KH     Row Name             Wound 23 1414 Left vagina    Wound - Properties Group Placement Date: 23  -LB Placement Time: 1414  -LB Present on Hospital Admission: Y  -LB Side: Left  -LB Location: vagina  -LB    Retired Wound - Properties Group Placement Date: 23  -LB Placement Time: 1414  -LB Present on Hospital Admission: Y  -LB Side: Left  -LB Location: vagina  -LB    Retired Wound - Properties Group Date first assessed: 23  -LB Time first assessed: 141  -LB Present on Hospital Admission: Y  -LB Side: Left  -LB Location: vagina   -LB    Row Name 05/04/23 1153          Plan of Care Review    Outcome Evaluation Pt seen for treatment this date with reported good tolerance from intervgention. During TE pt expressed some exertion of breath, TE targeted for endurance.  -     Row Name 05/04/23 1153          Positioning and Restraints    Pre-Treatment Position in bed  -     Post Treatment Position bed  -KH     In Bed supine;with family/caregiver  -           User Key  (r) = Recorded By, (t) = Taken By, (c) = Cosigned By    Initials Name Provider Type    Charlotte Hogue PT Physical Therapist    Veronica Bansal, RN Registered Nurse                  PT Recommendation and Plan  Planned Therapy Interventions (PT): balance training, bed mobility training, gait training, transfer training, strengthening, patient/family education, postural re-education, stretching, stair training, ROM (range of motion)  Therapy Frequency (PT): 2 times/wk (1-5x/wk)  Outcome Evaluation: Pt seen for treatment this date with reported good tolerance from intervgention. During TE pt expressed some exertion of breath, TE targeted for endurance.       Time Calculation:    PT Charges     Row Name 05/04/23 1156             Time Calculation    Start Time 1129  -      PT Received On 05/04/23  -         Time Calculation- PT    Total Timed Code Minutes- PT 23 minute(s)  -            User Key  (r) = Recorded By, (t) = Taken By, (c) = Cosigned By    Initials Name Provider Type    Charlotte Hogue PT Physical Therapist              Therapy Charges for Today     Code Description Service Date Service Provider Modifiers Qty    90098942587 HC PT THER PROC EA 15 MIN 5/3/2023 Charlotte Machado, PT GP 1    72191054741 HC PT THERAPEUTIC ACT EA 15 MIN 5/3/2023 Charlotte Machado, PT GP 1    44239667785 HC PT THER PROC EA 15 MIN 5/4/2023 Charlotte Machado, PT GP 1    16266671298 HC PT THERAPEUTIC ACT EA 15 MIN 5/4/2023 Charlotte Machado, PT GP 1          PT G-Codes  AM-PAC 6 Clicks  Score (PT): 9    Charlotte Machado, PT  5/4/2023

## 2023-05-04 NOTE — PAYOR COMM NOTE
"Andrew Clinton RN  Swing Bed Nurse  [phone] Ex [phone] FAX: [phone]  [email or web address]  [facility]  NPI [id number]  Reference Number [id number]    ICD 10  C51.9  A41.9  R53.81    Patient needs post acute swing bed admission fo IV antibiotics, PT/OT strength and mobility training       Orquidea Mcclain (77 y.o. Female)    YOB: 1945  Social Security Number:   Address: [address] [place] [address]  Home Phone: [phone]  MRN: [id number]  Christian: [employer]ist  Marital Status:         Admission Date: 4/25/23  Admission Type: Emergency  Admitting Provider: Dalila Mcdonald MD  Attending Provider: Taisha Crowell DO  Department, Room/Bed: [address], [facility]/  Discharge Date:   Discharge Disposition:   Discharge Destination:               Attending Provider: Taisha Crowell DO    Allergies: Penicillins    Isolation: None   Infection: None   Code Status: CPR    Ht: 167.6 cm (66\")   Wt: 82.6 kg (182 lb 1.6 oz)    Admission Cmt: None   Principal Problem: Vulvar cancer [C51.9]                 Active Insurance as of 4/25/2023     Primary Coverage     Payor Plan Insurance Group Employer/Plan Group    [facility] MEDICARE REPLACEMENT [facility] MEDICARE REPLACEMENT [id number]     Payor Plan Address Payor Plan Phone Number Payor Plan Fax Number Effective Dates    [address] [address]   1/1/2021 - None Entered    [facility] [id number]       Subscriber Name Subscriber Birth Date Member ID       ORQUIDEA MCCLAIN 1945 [id number]                 Emergency Contacts      (Rel.) Home Phone Work Phone Mobile Phone    Gavin Mcclain (Son) [phone] None None    AnabelLilli (Daughter) [phone] None [phone]               History & Physical      Dalila Mcdonald MD at 04/25/23 1849              [facility] HOSPITALIST HISTORY AND PHYSICAL    Patient Identification:  Name:  Orquidea Mcclain  Age:  77 " y.o.  Sex:  female  :  1945  MRN:  [id number]   Visit Number:  [id number]  Admit Date: 2023   Room number:  109/09  Primary Care Physician:  Jackeline Denson APRN     Chief complaint:    Chief Complaint   Patient presents with   • Diarrhea   • Vomiting   • Nausea       History of presenting illness:  77 y.o. female with longstanding history of static vulvar carcinoma diagnosed in 2018.  She has extensive vulvectomy which apparently was very complicated due to the extensive tumor.  She has an open wound that healed by second intention.  She underwent partial chemotherapy and radiation but since then has refused further treatment due to inability to tolerate.  On 2022 she was admitted at [facility] for pneumoperitoneum suspected of bowel perforation, patient refused colostomy and surgery, treatment included IR placement of drain catheter and antibiotic which apparently the patient improved.  She was told then that her prognosis will be very poor.  She was offered hospice and palliative care but she declined.    This time patient reported she has been getting weak for the past few days poor appetite pubic pain associate with nausea and vomiting.  This morning patient and daughter no history of urinating kalia stools were coming out instead of urine.  No fever, there is increased fatigability, there is more increased pelvic pain.    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  She has left-sided hydronephrosis, she has a stent noted on the left ureter.  She was noted to have anemia, ER physician ordered a unit packed RBC.  She was subsequent admitted for further  treatment.  ---------------------------------------------------------------------------------------------------------------------   Review of Systems   Constitutional: Positive for activity change (Easy fatigability), appetite change (Decreased appetite) and fatigue. Negative for chills, diaphoresis, fever and unexpected weight change.   HENT: Negative.    Eyes: Negative.    Respiratory: Negative.    Cardiovascular: Negative.    Gastrointestinal: Positive for abdominal pain (Pubic area), nausea, rectal pain and vomiting. Negative for anal bleeding, blood in stool, constipation and diarrhea.   Endocrine: Negative.    Genitourinary: Positive for pelvic pain.   Musculoskeletal: Negative.    Skin: Positive for pallor.   Allergic/Immunologic: Negative.    Neurological: Negative.    Hematological: Negative.    Psychiatric/Behavioral: Negative.         ---------------------------------------------------------------------------------------------------------------------   Past Medical History:   Diagnosis Date   • Arthritis    • COPD (chronic obstructive pulmonary disease)    • Elevated cholesterol    • History of transfusion    • Hypertension    • Vulval ca      Past Surgical History:   Procedure Laterality Date   • RADICAL VULVECTOMY  09/06/2019   • RADICAL VULVECTOMY Bilateral 06/2019     Family History   Problem Relation Age of Onset   • Alzheimer's disease Mother    • Cancer Father    • Cancer Brother    • Diabetes Maternal Grandmother      Social History     Socioeconomic History   • Marital status:    Tobacco Use   • Smoking status: Former   • Smokeless tobacco: Never   Vaping Use   • Vaping Use: Never used   Substance and Sexual Activity   • Alcohol use: No   • Drug use: No   • Sexual activity: Defer     ---------------------------------------------------------------------------------------------------------------------   Allergies:   Penicillins  ---------------------------------------------------------------------------------------------------------------------   Prior to Admission Medications     Prescriptions Last Dose Informant Patient Reported? Taking?    ascorbic acid (VITAMIN C) 100 MG tablet   Yes No    Take  by mouth Daily.    Calcium Carbonate 1500 (600 Ca) MG tablet   Yes No    Take  by mouth Daily.    doxycycline (MONODOX) 100 MG capsule   No No    Take 1 capsule by mouth 2 (Two) Times a Day.    loratadine (CLARITIN REDITABS) 10 MG disintegrating tablet   Yes No    Take 10 mg by mouth Daily.    losartan (COZAAR) 50 MG tablet   No No    Take 2 tablets by mouth Daily.    multivitamin (THERAGRAN) tablet tablet   Yes No    Take 1 tablet by mouth Daily.    ondansetron ODT (ZOFRAN-ODT) 4 MG disintegrating tablet   No No    Place 1 tablet on the tongue Every 8 (Eight) Hours As Needed for Nausea.    VITAMIN D PO   Yes No    Take  by mouth Daily.        ---------------------------------------------------------------------------------------------------------------------   Vital Signs:  Temp:  [98.5 °F (36.9 °C)] 98.5 °F (36.9 °C)  Heart Rate:  [] 76  Resp:  [18] 18  BP: ()/(42-68) 110/54    Mean Arterial Pressure (Non-Invasive) for the past 24 hrs (Last 3 readings):   Noninvasive MAP (mmHg)   04/25/23 1815 67   04/25/23 1800 66   04/25/23 1745 75     SpO2:  [90 %-100 %] 90 %  on   ;   Device (Oxygen Therapy): room air  Body mass index is 29.05 kg/m².    Wt Readings from Last 3 Encounters:   04/25/23 81.6 kg (179 lb 14.3 oz)   03/15/22 81.6 kg (180 lb)   02/21/22 81.6 kg (180 lb)               ---------------------------------------------------------------------------------------------------------------------   Physical Exam:  Constitutional: Patient is awake and alert she is not confused, chronically ill-appearing very pale, able to answer question appropriately.  No respiratory distress.      HENT:  Head: Normocephalic and  atraumatic.  Mouth:  Moist mucous membranes.    Eyes: Pale palpebral conjunctivae and EOM are normal.  Pupils are equal, round, and reactive to light.  No scleral icterus.  Neck:  Neck supple.  No JVD present.    No lymphadenopathy  Cardiovascular:  Normal rate, regular rhythm and normal heart sounds with no murmur.  Pulmonary/Chest:  No respiratory distress, no wheezes, no crackles, with normal breath sounds and good air movement.  Abdominal:  Soft.  Bowel sounds are normal.  She has subjective tenderness lower abdomen just below the umbilical area, I could not feel any mass.  No rigidity.  Tenderness is on deep palpation only   Musculoskeletal:  No edema, no tenderness, and no deformity.  No red or swollen joints anywhere.    Neurological:  Alert and oriented to person, place, and time.  No cranial nerve deficit.  No tongue deviation.  No facial droop.  No slurred speech.   Skin:  Skin is warm and dry.  No rash noted.  No pallor.   Peripheral vascular:  No edema and strong pulses on all 4 extremities.  Genitourinary: During my exam she has significant deformity of the pudendal area, notable yellowish creamy stools coming out vulvar area.  ---------------------------------------------------------------------------------------------------------------------  EKG:    EKG not done      Telemetry: Sinus rhythm 78 bpm  I have personally looked at both the EKG and the telemetry strips.  --------------------------------------------------------------------------------------------------------------------    Results from last 7 days   Lab Units 04/25/23  1308   CRP mg/dL 28.78*   LACTATE mmol/L 1.2   WBC 10*3/mm3 16.57*   HEMOGLOBIN g/dL 6.7*   HEMATOCRIT % 22.9*   MCV fL 84.8   MCHC g/dL 29.3*   PLATELETS 10*3/mm3 384     Results from last 7 days   Lab Units 04/25/23  1308   SODIUM mmol/L 130*   POTASSIUM mmol/L 5.0   CHLORIDE mmol/L 95*   CO2 mmol/L 17.9*   BUN mg/dL 94*   CREATININE mg/dL 3.76*   CALCIUM mg/dL 9.5    GLUCOSE mg/dL 100*   ALBUMIN g/dL 2.9*   BILIRUBIN mg/dL 0.2   ALK PHOS U/L 104   AST (SGOT) U/L 17   ALT (SGPT) U/L 11   Estimated Creatinine Clearance: 13.5 mL/min (A) (by C-G formula based on SCr of 3.76 mg/dL (H)).    No results found for: HGBA1C, POCGLU  No results found for: AMMONIA        No results found for: BLOODCXNo results found for: RESPCXNo results found for: URINECXNo results found for: WOUNDCXNo results found for: BODYFLDCXNo results found for: STOOLCX  pH No results found for: PHART   pO2 No results found for: PO2ART   pCO2 No results found for: BUN2PIA   HCO3 No results found for: FBF4QPY     I have personally looked at the labs and they are summarized above.  ----------------------------------------------------------------------------------------------------------------------  Imaging Results (Last 24 Hours)     Procedure Component Value Units Date/Time    CT Abdomen Pelvis Without Contrast [[id number]] Collected: 04/25/23 1727     Updated: 04/25/23 1730    Narrative:      CT Abdomen Pelvis WO    INDICATION:   Rule out colovaginal fistula.    TECHNIQUE:   CT of the abdomen and pelvis without IV contrast. 60 cc of Gastrografin mixed with 60 cc water instilled per rectum. Pre and post enema scanning performed. Coronal and sagittal reconstructions were obtained.  Radiation dose reduction techniques included  automated exposure control or exposure modulation based on body size. Count of known CT and cardiac nuc med studies performed in previous 12 months: 1.     COMPARISON:   CT chest 3/10/2023 and CT abdomen and pelvis 3/15/2022    FINDINGS:  Abdomen: Widely disseminated pulmonary nodules within both lung bases increased in size and number highly suggestive of metastatic disease. Largest nodule measures about 1.7 cm. Small left pleural effusion. Small pericardial effusion.    Liver, spleen and gallbladder are unremarkable. Pancreas and adrenal glands unremarkable.    Left sided hydronephrosis with  left ureteral stent in place. The distal pigtail may be partially within the bladder. Borderline aneurysmal dilatation of the infrarenal aorta at 2.9 cm. Diffuse aortic atherosclerotic change.    Precontrast imaging demonstrates a large 8.4 x 10.2 x 9.4 cm complex appearing mass with multiple calcifications and a 5.8 x 4.1 cm mixed collection of gas and debris centrally possibly representing an abscess and possibly within the uterus with multiple  calcified fibroids. This structure lies in the expected position of the uterus anterior to the rectum and posterior superior to the bladder.    Following retrograde instillation of contrast contrast is noted within the rectum with abnormal passage of contrast to the above-mentioned complex mass with an apparent fistulous communication near the rectosigmoid junction about 11 or 12 cm from the  anal verge. Contrast intermixed is with the complex air collection centrally which may represent a debris-filled uterine cavity. Contrast also identified within the vagina but no direct fistulous communication between the rectum and the vagina. Contrast  also communicates into the bladder possibly from a fistula track along the left anteroinferior aspect of the mass in the left posterior superior bladder.    Pelvis: No fracture or aggressive bone lesion. Multilevel degenerative disc disease and facet arthropathy lumbar spine with multilevel stenosis. Moderate amount of induration and edema within the right groin possibly related to prior vascular procedure  or vascular access.      Impression:      Large complex pelvic mass measuring 8.4 x 9.4 x 10.2 cm and presumably represents the patient's known pelvic malignancy (vulvar cancer). This could represent secondary involvement of a leiomyomatous uterus or dystrophic calcifications within a  malignancy.    Abnormal fistulous communication between the anterior rectum and the posterior aspect of the above-mentioned pelvic mass with at  least one and possibly 2 fistulous connections as demonstrated on CT. Central debris within the mass may represent abscess or  necrosis.    Apparent fistulous communication between the mass and the bladder.    Left sided hydronephrosis and hydroureter with left ureteral stent in place. The distal pigtail difficult to confirm within the bladder and may be within the distal ureter.    Progressive widely disseminated pulmonary metastases.          Signer Name: MINNIE Cerna MD   Signed: 4/25/2023 5:27 PM   Workstation Name: [facility]    [facility]        I have personally reviewed the radiology images and read the final radiology report.  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  Antibiotic will be started.  I have a very long discussion with the patient herself regarding goals of care.  Currently she is a full code, she has refused hospice in the past, she has instructed her daughter last year that she wants everything done.  She has diffuse metastatic breast cancer, there will be continued contamination of her urinary tract and to complicate matters she has a stent on the left ureter due to compression from the tumor in the past.  Eradicating contamination to clear infection will be impossible with the fistula and tumor.  Please note that when this started a year ago she was offered colostomy and surgery which she refused, she was also given a  choice of palliative chemotherapy which she has refused.  Overall I have explained to the patient and daughter present inside the room including her friend/caregiver her prognosis will be very poor.  Recommended to patient to revisit her advanced directive and discussed with palliative for goals of care.    Patient daughter has acknowledged the difficulty of the situation and overall prognosis.  Patient's son will be coming tomorrow from [place].    Plan outlined to patient together with daughter and caregiver and agreed.      Dalila Mcdonald MD  04/25/23  18:49 EDT    Electronically signed by Dalila Mcdonald MD at 04/25/23 1929         Current Facility-Administered Medications   Medication Dose Route Frequency Provider Last Rate Last Admin   • ascorbic acid (VITAMIN C) tablet 500 mg  500 mg Oral Daily Dalila Mcdonald MD   500 mg at 05/03/23 0811   • budesonide (PULMICORT) nebulizer solution 0.5 mg  0.5 mg Nebulization BID - RT Dalila Mcdonald MD   0.5 mg at 05/04/23 0636   • Enoxaparin Sodium (LOVENOX) syringe 40 mg  40 mg Subcutaneous Daily Dalila Mcdonald MD   40 mg at 05/03/23 0811   • ferrous sulfate tablet 325 mg  325 mg Oral Daily With Breakfast Dalila Mcdonald MD   325 mg at 05/03/23 0811   • guaiFENesin (MUCINEX) 12 hr tablet 600 mg  600 mg Oral Q12H Frances Moreira PA-C   600 mg at 05/03/23 2052   • HYDROcodone-acetaminophen (NORCO)  MG per tablet 1 tablet  1 tablet Oral Q6H PRN Taisha Crowell, DO   1 tablet at 05/04/23 0632   • HYDROcodone-acetaminophen (NORCO) 5-325 MG per tablet 1 tablet  1 tablet Oral Q6H PRN Taisha Crowell, DO   1 tablet at 05/02/23 2015   • ipratropium-albuterol (DUO-NEB) nebulizer solution 3 mL  3 mL Nebulization 4x Daily - RT Dalila Mcdonald MD   3 mL at 05/04/23 0636   • levothyroxine (SYNTHROID, LEVOTHROID) tablet 100 mcg  100 mcg Oral Q AM Taisha Crowell, DO   100 mcg at 05/04/23 0504   • meropenem (MERREM) 1 g in  sodium chloride 0.9 % 100 mL IVPB-VTB  1 g Intravenous Q12H Socrates Flaherty MD   1 g at 05/03/23 2128   • morphine injection 2 mg  2 mg Intravenous Q4H PRN Taisha Crowell DO   2 mg at 05/04/23 0312   • nitroglycerin (NITROSTAT) SL tablet 0.4 mg  0.4 mg Sublingual Q5 Min PRN Dalila Mcdonald MD       • nystatin (MYCOSTATIN) powder   Topical Q12H Dalila Mcdonald MD   Given at 05/03/23 2127   • ondansetron (ZOFRAN) tablet 4 mg  4 mg Oral Q8H PRN Taisha Crowell DO   4 mg at 05/02/23 1423   • polyethylene glycol (MIRALAX) packet 17 g  17 g Oral Daily Dalila Mcdonald MD   17 g at 05/03/23 0811   • sodium chloride 0.9 % flush 10 mL  10 mL Intravenous PRN Dalila Mcdonald MD       • sodium chloride 0.9 % flush 10 mL  10 mL Intravenous Q12H Dalila Mcdonald MD   10 mL at 05/03/23 2127   • sodium chloride 0.9 % flush 10 mL  10 mL Intravenous PRN Dalila Mcdonald MD       • sodium chloride 0.9 % flush 10 mL  10 mL Intravenous Q12H Dalila Mcdonald MD   10 mL at 05/03/23 2127   • sodium chloride 0.9 % flush 10 mL  10 mL Intravenous PRN Dalila Mcdonald MD       • sodium chloride 0.9 % flush 10 mL  10 mL Intravenous Q12H Dalila Mcdonald MD   10 mL at 05/03/23 2127   • sodium chloride 0.9 % flush 10 mL  10 mL Intravenous Q12H Dalila Mcdonald MD   10 mL at 05/03/23 2127   • sodium chloride 0.9 % flush 10 mL  10 mL Intravenous Q12H Dalila Mcdonald MD   10 mL at 05/03/23 2127   • sodium chloride 0.9 % flush 10 mL  10 mL Intravenous PRN Dalila Mcdonald MD       • sodium chloride 0.9 % flush 20 mL  20 mL Intravenous PRN Dalila Mcdonald MD       • sodium chloride 0.9 % infusion 40 mL  40 mL Intravenous PRN Dalila Mcdonald MD       • sodium chloride 0.9 % infusion 40 mL  40 mL Intravenous PRN Dalila Mcdonald MD       • sodium chloride 0.9 % infusion 40 mL  40 mL Intravenous PRN Dalila Mcdonald MD            Operative/Procedure Notes (most recent  note)      Gustavo Burris MD at 04/26/23 1050      Procedure Orders    1. Insert Central Line At Bedside [[id number]] ordered by Gustavo Burris MD           Post-procedure Diagnoses    1. Acute kidney injury [N17.9]             Insert Central Line At Bedside    Date/Time: 4/26/2023 10:50 AM  Performed by: Gustavo Burris MD  Authorized by: Gustavo Burris MD   Consent: Verbal consent obtained. Written consent obtained.  Risks and benefits: risks, benefits and alternatives were discussed  Consent given by: patient  Required items: required blood products, implants, devices, and special equipment available  Patient identity confirmed: verbally with patient, arm band, provided demographic data and hospital-assigned identification number  Indications: vascular access  Anesthesia: local infiltration    Anesthesia:  Local Anesthetic: lidocaine 1% with epinephrine    Sedation:  Patient sedated: no    Preparation: skin prepped with ChloraPrep  Skin prep agent dried: skin prep agent completely dried prior to procedure  Sterile barriers: all five maximum sterile barriers used - cap, mask, sterile gown, sterile gloves, and large sterile sheet  Hand hygiene: hand hygiene performed prior to central venous catheter insertion  Location details: right internal jugular  Patient position: flat  Catheter type: triple lumen  Catheter size: 7 Fr  Pre-procedure: landmarks identified  Ultrasound guidance: yes  Sterile ultrasound techniques: sterile gel and sterile probe covers were used  Number of attempts: 1  Successful placement: yes  Post-procedure: line sutured and dressing applied  Assessment: blood return through all ports and free fluid flow  Patient tolerance: patient tolerated the procedure well with no immediate complications          Electronically signed by Gustavo Burris MD at 04/26/23 1050          Physician Progress Notes (most recent note)      Taisha Crowell DO at 05/03/23 5582            [facility]     Progress Note    Patient Name: Orquidea Rosenberg  : 1945  MRN: [id number]  Primary Care Physician:  Jackeline Denson, BERTHA  Date of admission: 2023    Subjective   Subjective     Chief Complaint: 76 y/o female being seen in follow up for sepsis    Diarrhea   Associated symptoms include vomiting.   Vomiting   Associated symptoms include diarrhea.   Nausea  Associated symptoms include nausea and vomiting.     Patient Reports feeling much better today.     Present during visit: patient's daughter and SHEILA Porter    Review of Systems   Gastrointestinal: Positive for diarrhea, nausea and vomiting.     No nausea or vomiting today; states her wheezing and SOA is improved. She denied any suprapubic pain during my visit.     Objective   Objective     Vitals:   Temp:  [97.3 °F (36.3 °C)-97.8 °F (36.6 °C)] 97.8 °F (36.6 °C)  Heart Rate:  [] 95  Resp:  [18-20] 20  BP: (126-159)/(54-71) 156/71  Flow (L/min):  [3] 3    Physical Exam  Constitutional:       General: She is not in acute distress.     Appearance: She is well-developed.      Interventions: Nasal cannula in place.   HENT:      Head: Normocephalic and atraumatic.   Eyes:      Conjunctiva/sclera: Conjunctivae normal.   Neck:      Trachea: No tracheal deviation.   Cardiovascular:      Rate and Rhythm: Normal rate and regular rhythm.      Heart sounds: No murmur heard.    No friction rub. No gallop.   Pulmonary:      Effort: No respiratory distress.      Breath sounds: Decreased breath sounds present. No wheezing or rales.   Abdominal:      General: Bowel sounds are normal. There is no distension.      Palpations: Abdomen is soft.      Tenderness: There is no abdominal tenderness. There is no guarding.   Musculoskeletal:         General: No tenderness.   Skin:     General: Skin is warm and dry.      Findings: No erythema or rash.   Neurological:      Mental Status: She is alert.      Cranial Nerves: No cranial nerve deficit.       Comments: Oriented to self and place; follows commands          Result Review    Result Review:  I have personally reviewed the results from the time of this admission to 5/3/2023 16:14 EDT and agree with these findings:  [x]  Laboratory list / accordion  []  Microbiology  []  Radiology  []  EKG/Telemetry   []  Cardiology/Vascular   []  Pathology  []  Old records  []  Other:  Most notable findings include: CMP with BUN 36 and creatinine 1.21, overall improving; CO2 22 and sodium 142 with a glucose of 136.  WBC slightly improved at 15.11, H/H 8.1 and 28.3 respectively, platelets 350.    Blood Culture   Date Value Ref Range Status   04/29/2023 No growth at 2 days  Preliminary   04/29/2023 No growth at 2 days  Preliminary   04/25/2023 Clostridium subterminale (C)  Preliminary     Comment:     Beta lactamase positive confers resistance to ampcillin, amoxicillin, penicillin, ticarcillin, and piperacillin but NOT amoxicillin-clavulanate, ampicillin-sulbactam, or piperacillin-tazobactam.     04/25/2023 Clostridium subterminale (C)  Final     Comment:     Beta lactamase positive confers resistance to ampcillin, amoxicillin, penicillin, ticarcillin, and piperacillin but NOT amoxicillin-clavulanate, ampicillin-sulbactam, or piperacillin-tazobactam.       CT chest/abdomen/pelvis:  IMPRESSION:  1.  Now small bilateral pleural effusions.  2.  Tiny pericardial effusion again noted.  3.  Bilateral parenchymal pulmonary nodules are again noted. A posterior  left upper lobe pulmonary nodule measures 2.2 cm and was about 2.2 cm.  Other nodules appear stable also.    1.  Again there is a left double-J ureteral stent and moderate to severe  hydronephrosis of the left kidney.  2.  Moderate stool throughout the colon.  3.  Tiny bilateral pleural effusions.  4.  Bilateral pulmonary lung base nodules are again noted.    Assessment / Plan     #Mild hyperkalemia  #Complicated UTI in the setting of necrotic large pelvic tumor/vulvar cancer  and fistula formation between the tumor and urinary bladder and rectum causing fecaloid urine  #Clostridium subterminale bacteremia  #Sepsis, present upon admission  #Suspect steroid-induced leukocytosis, improved today  #Metastatic vulvar cancer to include extensive lung metastasis  #Acute hypoxic respiratory failure likely secondary to lung metastasis although underlying pneumonia cannot be entirely ruled out  #Chronic anemia with iron deficiency and acute exacerbation requiring 1 unit PRBCs  #Bronchospasm, clinically and symptomatically improving  #Acute kidney injury likely due to sepsis  #Generalized weakness/physical debility  #Moderate hypoalbuminemia  -Obtain plasma potassium level  -Continue Merrem monotherapy per ID recommendations  -CT scans reviewed; no significant changes  -Appreciate general surgery recommendations  -Repeat blood cultures with no growth to date  -Patient completed a course of Prednisone  -Continue with nebulized inhalants and Pulmicort  -Continue supplemental oxygen as needed titrate for saturations 90 to 95%  -Continue with subcutaneous Lovenox for now; low threshold to rule out PE/DVT in the setting of malignancy and Clostridium bacteremia  -PT/OT as tolerated/as available  -Patient deemed not to be a candidate for IPR  -D/W daughter and ORLY Delaney; swing bed consult placed  -Repeat CBC in a.m.; continue to monitor temperature curve  -Repeat BMP in a.m. to monitor on patient's renal function which is overall improving  -Continue current PRN pain medications    Further management pending clinical course    DVT prophylaxis:  Lovenox    Disposition:  I expect patient to be discharged to IPR v SNF v home with home health pending PT/OT evaluations    Taisha Crowell DO             Electronically signed by Taisha Crowell DO at 05/03/23 1634          Consult Notes (most recent note)      Pan Pollock APRN at 05/02/23 1428      Consult Orders    1. Inpatient General  Surgery Consult [[id number]] ordered by Taisha Crowell DO at 23 1300          Attestation signed by Homer Gan MD at 23 0820    I have reviewed this documentation and agree. 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 Discussed palliative/hospice and attempted to breach what are the patient's overall goals.   This represents disease progression. Stool diversion is possible but I don't believe it will change the eventual outcome. Will discuss further with primary team                      [facility] Surgery  CONSULT    Patient Identification:  Name:  Orquidea Rosenberg  :  1945  MRN:  [id number]   Admit Date: 2023   Referring Physician:  Provider, No Known    Subjective     Chief complaint fistula    Subjective     Patient is a 77 y.o. female who presents with a personal history of vulvar cancer.  Patient reports that she has had bowel movements with urination out of her vagina.  She states that she is unsure of when this started.  She states that she quit keeping track of her health.  She does report that she was diagnosed with vulvar cancer in the past and has had plastic surgery to remove cancerous area.  She denies any abdominal surgeries in the past.  Patient does complain of having abdominal pain at times as well as nausea and vomiting.        ---------------------------------------------------------------------------------------------------------------------   Review of Systems:    Review of Systems   Constitutional: Negative for activity change.   HENT: Negative for congestion.    Eyes: Negative for blurred vision.   Respiratory: Negative for shortness of breath.    Cardiovascular: Negative for chest pain.   Gastrointestinal: Positive for abdominal pain, nausea and vomiting.   Endocrine: Negative for cold intolerance.   Genitourinary: Positive for vaginal pain. Negative for flank pain.   Musculoskeletal: Negative for arthralgias.    Skin: Negative for bruise.   Allergic/Immunologic: Negative for environmental allergies.   Neurological: Negative for confusion.   Hematological: Negative for adenopathy.   Psychiatric/Behavioral: Negative for agitation.       ---------------------------------------------------------------------------------------------------------------------   History  Past Medical History:   Diagnosis Date   • Arthritis    • COPD (chronic obstructive pulmonary disease)    • Elevated cholesterol    • History of transfusion    • Hypertension    • Vulval ca      Past Surgical History:   Procedure Laterality Date   • RADICAL VULVECTOMY  09/06/2019   • RADICAL VULVECTOMY Bilateral 06/2019     Family History   Problem Relation Age of Onset   • Alzheimer's disease Mother    • Cancer Father    • Cancer Brother    • Diabetes Maternal Grandmother      Social History     Tobacco Use   • Smoking status: Former   • Smokeless tobacco: Never   Vaping Use   • Vaping Use: Never used   Substance Use Topics   • Alcohol use: No   • Drug use: No     Medications Prior to Admission   Medication Sig Dispense Refill Last Dose   • furosemide (LASIX) 20 MG tablet Take 1 tablet by mouth Daily.   4/24/2023   • levothyroxine (SYNTHROID, LEVOTHROID) 100 MCG tablet Take 1 tablet by mouth Daily.   4/24/2023   • losartan (COZAAR) 50 MG tablet Take 1 tablet by mouth Daily.   4/24/2023   • ondansetron (ZOFRAN) 4 MG tablet Take 1 tablet by mouth Every 8 (Eight) Hours As Needed for Nausea or Vomiting.   4/24/2023     Allergies:  Penicillins    Objective     Objective     Vital Signs:  Temp:  [97.3 °F (36.3 °C)-97.9 °F (36.6 °C)] 97.7 °F (36.5 °C)  Heart Rate:  [78-95] 85  Resp:  [16-20] 20  BP: (132-167)/(60-89) 161/71      04/30/23  0400 05/01/23  0500 05/02/23  0500   Weight: 82.2 kg (181 lb 3.5 oz) 82.1 kg (180 lb 14.4 oz) 82 kg (180 lb 12.8 oz)     Body mass index is 29.18  kg/m².  ---------------------------------------------------------------------------------------------------------------------       Physical Exam  Constitutional:       Appearance: Normal appearance.   HENT:      Head: Normocephalic and atraumatic.      Right Ear: External ear normal.      Left Ear: External ear normal.   Eyes:      Conjunctiva/sclera: Conjunctivae normal.      Pupils: Pupils are equal, round, and reactive to light.   Cardiovascular:      Rate and Rhythm: Normal rate.      Pulses: Normal pulses.   Pulmonary:      Effort: Pulmonary effort is normal.      Breath sounds: Normal breath sounds.   Abdominal:      General: Abdomen is flat. Bowel sounds are normal. There is no distension.      Palpations: Abdomen is soft.      Tenderness: There is no abdominal tenderness.   Genitourinary:     Comments: Left vulvar mass, upon examination of vagina there is stool noted.  Musculoskeletal:         General: Normal range of motion.      Cervical back: Normal range of motion and neck supple.   Skin:     General: Skin is warm and dry.      Capillary Refill: Capillary refill takes less than 2 seconds.   Neurological:      General: No focal deficit present.      Mental Status: She is alert and oriented to person, place, and time.   Psychiatric:         Mood and Affect: Mood normal.         Behavior: Behavior normal.     ---------------------------------------------------------------------------------------------------------------------   Results Review:       Results from last 7 days   Lab Units 05/02/23  0047 05/01/23  0729 04/28/23  0024   CRP mg/dL  --  2.83* 18.26*   WBC 10*3/mm3 17.62* 14.45* 11.59*   HEMOGLOBIN g/dL 8.9* 8.9* 8.1*   HEMATOCRIT % 30.5* 30.7* 29.0*   PLATELETS 10*3/mm3 399 392 309         Results from last 7 days   Lab Units 05/02/23  0047 04/28/23  0024 04/27/23  0027 04/26/23  0051   SODIUM mmol/L 141 142 139 136   POTASSIUM mmol/L 4.9 4.1 4.5 5.1   CHLORIDE mmol/L 111* 113* 112* 104   CO2  mmol/L 21.8* 17.1* 16.7* 14.4*   BUN mg/dL 33* 47* 68* 85*   CREATININE mg/dL 1.07* 1.35* 1.93* 3.03*   CALCIUM mg/dL 8.8 8.4* 8.1* 8.2*   GLUCOSE mg/dL 147* 139* 119* 85   ALBUMIN g/dL  --   --   --  2.6*   BILIRUBIN mg/dL  --   --   --  <0.2   ALK PHOS U/L  --   --   --  84   AST (SGOT) U/L  --   --   --  23   ALT (SGPT) U/L  --   --   --  12   Estimated Creatinine Clearance: 47.5 mL/min (A) (by C-G formula based on SCr of 1.07 mg/dL (H)).  No results found for: AMMONIA      Blood Culture   Date Value Ref Range Status   04/29/2023 No growth at 3 days  Preliminary   04/29/2023 No growth at 3 days  Preliminary     No results found for: URINECX  No results found for: WOUNDCX  No results found for: STOOLCX  ---------------------------------------------------------------------------------------------------------------------  Imaging:  Imaging Results (Last 24 Hours)     Procedure Component Value Units Date/Time    CT Chest With Contrast Diagnostic [[id number]] Collected: 05/02/23 1237     Updated: 05/02/23 1240    Narrative:      EXAM:    CT Chest With Intravenous Contrast     EXAM DATE:    5/2/2023 11:25 AM     CLINICAL HISTORY:    Pneumonia, complication suspected, xray done     TECHNIQUE:    Axial computed tomography images of the chest with intravenous  contrast.  Sagittal and coronal reformatted images were created and  reviewed.  This CT exam was performed using one or more of the following  dose reduction techniques:  automated exposure control, adjustment of  the mA and/or kV according to patient size, and/or use of iterative  reconstruction technique.     COMPARISON:    03/10/2023     FINDINGS:    LUNGS:  Bilateral parenchymal pulmonary nodules are again noted. A  posterior left upper lobe pulmonary nodule measures 2.2 cm and was about  2.2 cm. Other nodules appear stable also.    PLEURAL SPACE:  Now small bilateral pleural effusions.  No  pneumothorax.    HEART:  Tiny pericardial effusion again noted.  No  significant  coronary artery calcifications.    BONES/JOINTS:  Unremarkable.  No acute fracture.  No dislocation.    SOFT TISSUES:  Unremarkable.    VASCULATURE:  Unremarkable.  No thoracic aortic aneurysm.    LYMPH NODES:  Unremarkable.  No enlarged lymph nodes.       Impression:      1.  Now small bilateral pleural effusions.  2.  Tiny pericardial effusion again noted.  3.  Bilateral parenchymal pulmonary nodules are again noted. A posterior  left upper lobe pulmonary nodule measures 2.2 cm and was about 2.2 cm.  Other nodules appear stable also.     This report was finalized on 5/2/2023 12:38 PM by Dr. Philip Lopez MD.       CT Abdomen Pelvis With & Without Contrast [[id number]] Collected: 05/02/23 1222     Updated: 05/02/23 1225    Narrative:      EXAM:    CT Abdomen and Pelvis Without and With Intravenous Contrast     EXAM DATE:    5/2/2023 11:26 AM     CLINICAL HISTORY:    Sepsis; C51.9-Malignant neoplasm of vulva, unspecified; N82.4-Other  female intestinal-genital tract fistulae; N17.9-Acute kidney failure,  unspecified; D64.9-Anemia, unspecified; R78.81-Bacteremia     TECHNIQUE:    Axial computed tomography images of the abdomen and pelvis without and  with intravenous contrast.  Sagittal and coronal reformatted images were  created and reviewed.  This CT exam was performed using one or more of  the following dose reduction techniques:  automated exposure control,  adjustment of the mA and/or kV according to patient size, and/or use of  iterative reconstruction technique.     COMPARISON:    04/25/2023     FINDINGS:    LUNG BASES:  Bilateral pulmonary lung base nodules are again noted.    PLEURAL SPACE:  Tiny bilateral pleural effusions.      ABDOMEN:    LIVER:  Unremarkable.  No mass.    GALLBLADDER AND BILE DUCTS:  Unremarkable.  No calcified stones.  No  ductal dilation.    PANCREAS:  Unremarkable.  No mass.  No ductal dilation.    SPLEEN:  Unremarkable.  No splenomegaly.    ADRENALS:  Unremarkable.  No  mass.    KIDNEYS AND URETERS:  Again there is a left double-J ureteral stent  and moderate to severe hydronephrosis of the left kidney.    STOMACH AND BOWEL:  Moderate stool throughout the colon.  No  obstruction.  No mucosal thickening.      PELVIS:    APPENDIX:  No findings to suggest acute appendicitis.    BLADDER:  Unremarkable.  No mass.  No stones.    REPRODUCTIVE:  Unremarkable as visualized.      ABDOMEN and PELVIS:    INTRAPERITONEAL SPACE:  Unremarkable.  No free air.  No significant  fluid collection.    BONES/JOINTS:  No acute fracture.  No dislocation.    SOFT TISSUES:  Unremarkable.    VASCULATURE:  Atherosclerotic disease.  No abdominal aortic aneurysm.    LYMPH NODES:  Unremarkable.  No enlarged lymph nodes.       Impression:      1.  Again there is a left double-J ureteral stent and moderate to severe  hydronephrosis of the left kidney.  2.  Moderate stool throughout the colon.  3.  Tiny bilateral pleural effusions.  4.  Bilateral pulmonary lung base nodules are again noted.     This report was finalized on 5/2/2023 12:23 PM by Dr. Philip Lopez MD.             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 However, given her refusal of treatment in the past, Dr. Gan is reluctant to offer this, as this represents disease progression.      Discussion:  I discussed this case with patient, primary nurse and Dr. Gan.        Pan Pollock  BERTHA  23  14:29 EDT  ---------------------------------------------------------------------------------------------------------------------     Please note that portions of this note were completed with a voice recognition program.    Electronically signed by Homer Gan MD at 23 0855        Socrates Flaherty MD   Physician  Infectious Disease  Progress Notes     Signed  Date of Service:  23  Creation Time:  23     Signed                        PROGRESS NOTE           Patient Identification:  Name:  Orquidea Rosenberg  Age:  77 y.o.  Sex:  female  :  1945  MRN:  [id number]  Visit Number:  [id number]  Primary Care Provider:  Jackeline Denson APRN            LOS: 8 days         ----------------------------------------------------------------------------------------------------------------------  Subjective         Chief Complaints:     Diarrhea, Vomiting, and Nausea           Interval History:       Patient sitting up in bed this morning.  Awake and alert.  Overall feels significantly better today.  Abdomen soft, nontender.  Afebrile.  Lungs clear to auscultation bilaterally.  Currently on 3 L per nasal cannula with no apparent distress.  WBC improved at 15.11.  CT of the chest from 23 reports small bilateral pleural effusions, tiny pericardial effusion, bilateral parenchymal pulmonary nodules are again noted.  CT of the abdomen pelvis from 23 reports left double-J ureteral stent and moderate to severe left hydronephrosis of the left kidney, moderate stool throughout the colon, tiny bilateral pleural effusions, bilateral pulmonary lung base nodules are again noted.     Review of Systems:     Constitutional: no fever, chills and night sweats.  Generalized fatigue.  Eyes: no eye drainage, itching or redness.  HEENT: no mouth sores, dysphagia or nose bleed.  Respiratory: No shortness of breath, No cough. No production of sputum.  Cardiovascular: no chest pain, no  palpitations, no orthopnea.  Gastrointestinal: No nausea, no vomiting or diarrhea. No abdominal pain, no hematemesis or rectal bleeding.  Genitourinary: no dysuria or polyuria.  Hematologic/lymphatic: no lymph node abnormalities, no easy bruising or easy bleeding.  Musculoskeletal: no muscle or joint pain.  Skin: No rash and no itching.  Neurological: no loss of consciousness, no seizure, no headache.  Behavioral/Psych: no depression or suicidal ideation.  Endocrine: no hot flashes.  Immunologic: negative.     ----------------------------------------------------------------------------------------------------------------------        Objective         [facility] Meds:  ascorbic acid, 500 mg, Oral, Daily  budesonide, 0.5 mg, Nebulization, BID - RT  enoxaparin, 40 mg, Subcutaneous, Daily  ferrous sulfate, 325 mg, Oral, Daily With Breakfast  guaiFENesin, 600 mg, Oral, Q12H  ipratropium-albuterol, 3 mL, Nebulization, 4x Daily - RT  levothyroxine, 100 mcg, Oral, Q AM  meropenem, 1 g, Intravenous, Q12H  nystatin, , Topical, Q12H  polyethylene glycol, 17 g, Oral, Daily  sodium chloride, 10 mL, Intravenous, Q12H  sodium chloride, 10 mL, Intravenous, Q12H  sodium chloride, 10 mL, Intravenous, Q12H  sodium chloride, 10 mL, Intravenous, Q12H  sodium chloride, 10 mL, Intravenous, Q12H        ----------------------------------------------------------------------------------------------------------------------     Vital Signs:  Temp:  [97.3 °F (36.3 °C)-97.7 °F (36.5 °C)] 97.3 °F (36.3 °C)  Heart Rate:  [74-87] 83  Resp:  [16-20] 20  BP: (123-161)/(54-71) 144/62  Mean Arterial Pressure (Non-Invasive) for the past 24 hrs (Last 3 readings):    Noninvasive MAP (mmHg)   05/03/23 0652 105   05/03/23 0449 78   05/03/23 0205 106            SpO2 Percentage     05/03/23 0023 05/03/23 0205 05/03/23 0652   SpO2: 98% 98% 93%      SpO2:  [93 %-99 %] 93 %  on  Flow (L/min):  [3] 3;   Device (Oxygen Therapy): nasal cannula     Body mass  index is 29.25 kg/m².  Wt Readings from Last 3 Encounters:   05/03/23 82.2 kg (181 lb 3.2 oz)   03/15/22 81.6 kg (180 lb)   02/21/22 81.6 kg (180 lb)         Intake/Output Summary (Last 24 hours) at 5/3/2023 0882  Last data filed at 5/2/2023 1810      Gross per 24 hour   Intake 338.74 ml   Output --   Net 338.74 ml      Diet: Regular/House Diet; Texture: Regular Texture (IDDSI 7); Fluid Consistency: Thin (IDDSI 0)  ----------------------------------------------------------------------------------------------------------------------        Physical Exam:     Constitutional: Elderly, chronically ill-appearing.   Currently on 3 L per nasal cannula.   More awake and alert today.  HENT:  Head: Normocephalic and atraumatic.  Mouth:  Moist mucous membranes.    Eyes:  Conjunctivae and EOM are normal.  No scleral icterus.  Neck:  Neck supple.  No JVD present.    Cardiovascular:  Normal rate, regular rhythm and normal heart sounds with no murmur. No edema.  Pulmonary/Chest: Lungs clear to auscultation bilaterally.  Currently on 3 L per nasal cannula  Abdominal:  Soft.  Bowel sounds are normal.  No distension and no tenderness.  Musculoskeletal:  No edema, no tenderness, and no deformity.  No swelling or redness of joints.  Neurological:  Alert and oriented to person, place, and time.  No facial droop.  No slurred speech.   Skin:  Skin is warm and dry.  No rash noted.  No pallor. Vulvar deformity due to prior surgeries.  Psychiatric:  Normal mood and affect.  Behavior is normal.           ----------------------------------------------------------------------------------------------------------------------            Results from last 7 days   Lab Units 05/03/23  0040 05/02/23  0047 05/01/23  0729 04/28/23  0024   CRP mg/dL  --   --  2.83* 18.26*   WBC 10*3/mm3 15.11* 17.62* 14.45* 11.59*   HEMOGLOBIN g/dL 8.1* 8.9* 8.9* 8.1*   HEMATOCRIT % 28.3* 30.5* 30.7* 29.0*   MCV fL 87.9 87.6 88.0 90.3   MCHC g/dL 28.6* 29.2* 29.0* 27.9*    PLATELETS 10*3/mm3 350 399 392 309      Results from last 7 days   Lab Units 05/03/23  0040 05/02/23  0047 04/28/23  0024   SODIUM mmol/L 142 141 142   POTASSIUM mmol/L 5.7* 4.9 4.1   CHLORIDE mmol/L 112* 111* 113*   CO2 mmol/L 22.0 21.8* 17.1*   BUN mg/dL 36* 33* 47*   CREATININE mg/dL 1.21* 1.07* 1.35*   CALCIUM mg/dL 8.9 8.8 8.4*   GLUCOSE mg/dL 136* 147* 139*   ALBUMIN g/dL 2.6*  --   --    BILIRUBIN mg/dL <0.2  --   --    ALK PHOS U/L 76  --   --    AST (SGOT) U/L 6  --   --    ALT (SGPT) U/L 5  --   --    Estimated Creatinine Clearance: 42.1 mL/min (A) (by C-G formula based on SCr of 1.21 mg/dL (H)).  No results found for: AMMONIA     No results found for: HGBA1C, POCGLU  No results found for: HGBA1C        Lab Results   Component Value Date     TSH 2.620 03/15/2022               Blood Culture   Date Value Ref Range Status   04/25/2023 Abnormal Stain (C)   Preliminary   04/25/2023 Anaerobe (Organism type) (C)   Preliminary      No results found for: URINECX  No results found for: WOUNDCX  No results found for: STOOLCX  No results found for: RESPCX  Pain Management Panel                View : No data to display.                            ----------------------------------------------------------------------------------------------------------------------  Imaging Results (Last 24 Hours)         Procedure Component Value Units Date/Time     CT Chest With Contrast Diagnostic [[id number]] Collected: 05/02/23 1237       Updated: 05/02/23 1240     Narrative:       EXAM:    CT Chest With Intravenous Contrast     EXAM DATE:    5/2/2023 11:25 AM     CLINICAL HISTORY:    Pneumonia, complication suspected, xray done     TECHNIQUE:    Axial computed tomography images of the chest with intravenous  contrast.  Sagittal and coronal reformatted images were created and  reviewed.  This CT exam was performed using one or more of the following  dose reduction techniques:  automated exposure control, adjustment of  the mA  and/or kV according to patient size, and/or use of iterative  reconstruction technique.     COMPARISON:    03/10/2023     FINDINGS:    LUNGS:  Bilateral parenchymal pulmonary nodules are again noted. A  posterior left upper lobe pulmonary nodule measures 2.2 cm and was about  2.2 cm. Other nodules appear stable also.    PLEURAL SPACE:  Now small bilateral pleural effusions.  No  pneumothorax.    HEART:  Tiny pericardial effusion again noted.  No significant  coronary artery calcifications.    BONES/JOINTS:  Unremarkable.  No acute fracture.  No dislocation.    SOFT TISSUES:  Unremarkable.    VASCULATURE:  Unremarkable.  No thoracic aortic aneurysm.    LYMPH NODES:  Unremarkable.  No enlarged lymph nodes.        Impression:       1.  Now small bilateral pleural effusions.  2.  Tiny pericardial effusion again noted.  3.  Bilateral parenchymal pulmonary nodules are again noted. A posterior  left upper lobe pulmonary nodule measures 2.2 cm and was about 2.2 cm.  Other nodules appear stable also.     This report was finalized on 5/2/2023 12:38 PM by Dr. Philip Lopez MD.        CT Abdomen Pelvis With & Without Contrast [[id number]] Collected: 05/02/23 1222       Updated: 05/02/23 1225     Narrative:       EXAM:    CT Abdomen and Pelvis Without and With Intravenous Contrast     EXAM DATE:    5/2/2023 11:26 AM     CLINICAL HISTORY:    Sepsis; C51.9-Malignant neoplasm of vulva, unspecified; N82.4-Other  female intestinal-genital tract fistulae; N17.9-Acute kidney failure,  unspecified; D64.9-Anemia, unspecified; R78.81-Bacteremia     TECHNIQUE:    Axial computed tomography images of the abdomen and pelvis without and  with intravenous contrast.  Sagittal and coronal reformatted images were  created and reviewed.  This CT exam was performed using one or more of  the following dose reduction techniques:  automated exposure control,  adjustment of the mA and/or kV according to patient size, and/or use of  iterative  reconstruction technique.     COMPARISON:    04/25/2023     FINDINGS:    LUNG BASES:  Bilateral pulmonary lung base nodules are again noted.    PLEURAL SPACE:  Tiny bilateral pleural effusions.      ABDOMEN:    LIVER:  Unremarkable.  No mass.    GALLBLADDER AND BILE DUCTS:  Unremarkable.  No calcified stones.  No  ductal dilation.    PANCREAS:  Unremarkable.  No mass.  No ductal dilation.    SPLEEN:  Unremarkable.  No splenomegaly.    ADRENALS:  Unremarkable.  No mass.    KIDNEYS AND URETERS:  Again there is a left double-J ureteral stent  and moderate to severe hydronephrosis of the left kidney.    STOMACH AND BOWEL:  Moderate stool throughout the colon.  No  obstruction.  No mucosal thickening.      PELVIS:    APPENDIX:  No findings to suggest acute appendicitis.    BLADDER:  Unremarkable.  No mass.  No stones.    REPRODUCTIVE:  Unremarkable as visualized.      ABDOMEN and PELVIS:    INTRAPERITONEAL SPACE:  Unremarkable.  No free air.  No significant  fluid collection.    BONES/JOINTS:  No acute fracture.  No dislocation.    SOFT TISSUES:  Unremarkable.    VASCULATURE:  Atherosclerotic disease.  No abdominal aortic aneurysm.    LYMPH NODES:  Unremarkable.  No enlarged lymph nodes.        Impression:       1.  Again there is a left double-J ureteral stent and moderate to severe  hydronephrosis of the left kidney.  2.  Moderate stool throughout the colon.  3.  Tiny bilateral pleural effusions.  4.  Bilateral pulmonary lung base nodules are again noted.     This report was finalized on 5/2/2023 12:23 PM by Dr. Philip Lopez MD.                   ----------------------------------------------------------------------------------------------------------------------     Pertinent Infectious Disease Results     Lactic acid on admission was normal at 1.2.  Chest x-ray on 4/26/2023 showed no change in distribution of bilateral lung opacities which may represent changes of fibrosis and diffuse pulmonary nodules.  No  pneumothorax.  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  This could represent secondary involvement of the leiomyomatous uterus or dystrophic calcifications within the malignancy.  Abnormal fistulous communications between the anterior rectum and the posterior aspect of the above-mentioned pelvic mass with at least 1 and possibly 2 fistulous connections as demonstrated on CT.  Central debris within the mass may represent abscess or necrosis.  Apparent fistulous communication between the mass in the bladder.  Left-sided hydronephrosis and hydroureter with left ureteral stent in place.  The distal pigtail difficult to confirm within the bladder and may be within the distal ureter.  Progressive widely disseminated pulmonary metastases.  Blood cultures on 4/25/2023 are in progress.  MRSA screen on 4/26/2023 was negative.  COVID-19 and flu A/B PCR on 4/25/2023 was negative.           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 Overall, they report feeling significantly better today. The abdomen is soft and nontender, and the patient is afebrile. A bilateral lung auscultation revealed clear lungs, and the patient is currently on 3 L per nasal cannula with no apparent distress. The WBC count has improved to 15.11.     CT scans from 5/2/23 reveal small bilateral pleural and pericardial effusions, as well as bilateral pulmonary nodules. In addition, a CT of the abdomen pelvis from the same date  reports a left double-J ureteral stent and moderate to severe left hydronephrosis of the left kidney, moderate stool throughout the colon, and bilateral pulmonary nodules.     Blood cultures from 2023 revealed 1 out of 2 sets positive for Gemella morbillorum, and 2 out of 2 sets positive for Clostridium subterminale. Currently, we suspect the Gemella to be a contaminant, and we recommend continuing with meropenem monotherapy for now with the possibility to de-escalate coverage upon discharge to oral regimen with metronidazole.     ANTIMICROBIAL THERAPY     meropenem (MERREM) 1gm IVPB in 100ml NS (VTB)      Code Status:       Code Status and Medical Interventions:   Ordered at: 23 193     Level Of Support Discussed With:     Patient     Code Status (Patient has no pulse and is not breathing):     CPR (Attempt to Resuscitate)     Medical Interventions (Patient has pulse or is breathing):     Full Support         BERTHA Sanchez  23  08:38 EDT      Electronically signed by BERTHA Sanchez, 23, 9:14 AM EDT.        Electronically signed by Socrates Flaherty MD, 23, 9:38 AM EDT.              Revision History    Date/Time User Provider Type Action   23 0940 Socrates Flaherty MD Physician Sign   23 0914 Corina Connolly APRN Nurse Practitioner Sign    View Details Report          Nutrition Notes (most recent note)    No notes exist for this encounter.            Physical Therapy Notes (most recent note)      Charlotte Machado, PT at 23 1139  Version 1 of 1         Acute Care - Physical Therapy Treatment Note  [facility]     Patient Name: Orquidea Rosenberg  : 1945  MRN: [id number]  Today's Date: 5/3/2023   Onset of Illness/Injury or Date of Surgery: 23 (admission date)  Visit Dx:     ICD-10-CM ICD-9-CM   1. Vulvar cancer  C51.9 184.4   2. 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 Acute kidney injury  N17.9 584.9   4. Chronic anemia   D64.9 285.9   5. Bacteremia  R78.81 790.7     Patient Active Problem List   Diagnosis   • COVID-19   • Vulvar cancer     Past Medical History:   Diagnosis Date   • Arthritis    • COPD (chronic obstructive pulmonary disease)    • Elevated cholesterol    • History of transfusion    • Hypertension    • Vulval ca      Past Surgical History:   Procedure Laterality Date   • RADICAL VULVECTOMY  09/06/2019   • RADICAL VULVECTOMY Bilateral 06/2019     PT Assessment (last 12 hours)     PT Evaluation and Treatment     Row Name 05/03/23 1050          Physical Therapy Time and Intention    Document Type therapy note (daily note)  -     Mode of Treatment physical therapy  -     Patient Effort good  -     Comment Pt seen this AM working on LE TE and ambulation/mobility  -     Row Name 05/03/23 1050          Bed Mobility    Bed Mobility supine-sit  -     Supine-Sit [place] (Bed Mobility) modified independence  -[facility] Name 05/03/23 1050          Transfers    Transfers sit-stand transfer;stand-sit transfer  -[facility] Name 05/03/23 1050          Sit-Stand Transfer    Sit-Stand [place] (Transfers) standby assist;contact guard  -[facility] Name 05/03/23 1050          Stand-Sit Transfer    Stand-Sit [place] (Transfers) standby assist;contact guard  instructed to sit down  -     Row Name 05/03/23 1050          Safety Issues, Functional Mobility    Comment, Safety Issues/Impairments (Mobility) Pt ambulated grossly 6' around bed with RW, instructed to sit down despite pt saying she felt good, however upon sitting pt was SOA, O2 saturation on finger puls ox of 92%  -[facility] Name 05/03/23 1050          Motor Skills    Therapeutic Exercise hip;knee;ankle  marches 3x15 grossly, 2-3x10 LAQ BL, ankle pumps sitting grossly 3x20  -     Row Name             Wound 05/01/23 1414 Left vagina    Wound - Properties Group Placement Date: 05/01/23  -LB Placement Time: 1414  -LB Present on Hospital Admission: Y  -LB  Side: Left  -LB Location: vagina  -LB    Retired Wound - Properties Group Placement Date: 05/01/23  -LB Placement Time: 1414  -LB Present on Hospital Admission: Y  -LB Side: Left  -LB Location: vagina  -LB    Retired Wound - Properties Group Date first assessed: 05/01/23  -LB Time first assessed: 1414  -LB Present on Hospital Admission: Y  -LB Side: Left  -LB Location: vagina  -LB    Row Name 05/03/23 1050          Positioning and Restraints    Pre-Treatment Position in bed  -KH     Post Treatment Position chair  -KH     In Chair call light within reach;encouraged to call for assist;sitting  -           User Key  (r) = Recorded By, (t) = Taken By, (c) = Cosigned By    Initials Name Provider Type    Charlotte Hogue PT Physical Therapist    Veronica Bansal RN Registered Nurse                  PT Recommendation and Plan  Planned Therapy Interventions (PT): balance training, bed mobility training, gait training, transfer training, strengthening, patient/family education, postural re-education, stretching, stair training, ROM (range of motion)  Therapy Frequency (PT): 2 times/wk (1-5x/wk)  Outcome Evaluation: Pt seen for evaluation this date, may benefit from skilled therapeutic intervention to increase tolerance to activity, progress towards PLOF, maximize energy conservation/safety with mobility.       Time Calculation:    PT Charges     Row Name 05/03/23 1138             Time Calculation    Start Time 1050  -KH      PT Received On 05/03/23  -KH         Time Calculation- PT    Total Timed Code Minutes- PT 25 minute(s)  -            User Key  (r) = Recorded By, (t) = Taken By, (c) = Cosigned By    Initials Name Provider Type    Charlotte Hogue PT Physical Therapist              Therapy Charges for Today     Code Description Service Date Service Provider Modifiers Qty    52813860944 HC PT EVAL MOD COMPLEXITY 4 5/2/2023 Charlotte Machado, PT GP 1    93451154810 HC PT THER PROC EA 15 MIN 5/3/2023 Carter  Charlotte, PT GP 1    [id number]  PT THERAPEUTIC ACT EA 15 MIN 5/3/2023 Charlotte Machado, PT GP 1          PT G-Codes  AM-PAC 6 Clicks Score (PT): 9    Charlotte Machado, PT  5/3/2023      Electronically signed by Charlotte Machado PT at 05/03/23 1139          Occupational Therapy Notes (most recent note)      Samira Baca, OT at 05/03/23 1347        Patient in bed resting, pleasantly declined.     Electronically signed by Samira Baca OT at 05/03/23 1347       Respiratory Therapy Notes (most recent note)    No notes exist for this encounter.       Elaina Martell BSW     Case Management  Case Management/Social Work     Signed  Date of Service:  05/03/23 1448  Creation Time:  05/03/23 1448     Signed          Discharge Planning Assessment   [place]     Patient Name: Orquidea Rosenberg             MRN: [id number]  Today's Date: 5/3/2023                       Admit Date: 4/25/2023     Plan: SS noted that inpt rehab has denied pt. and Swing bed consult noted.  SS will follow and assist with discharge disposition.                Discharge Plan      Row Name 05/03/23 1448           Plan     Plan SS noted that inpt rehab has denied pt. and Swing bed consult noted.  SS will follow and assist with discharge disposition.                 HARPAL Bell

## 2023-05-04 NOTE — PLAN OF CARE
Pt was transferred to [address] [place] [address]. Pt complained of pain; PRN medications given; see MAR. No s/s of acute distress. VSS

## 2023-05-04 NOTE — THERAPY TREATMENT NOTE
Acute Care - Occupational Therapy Treatment Note   [facility]     Patient Name: Orquidea Rosenberg  : 1945  MRN: [id number]  Today's Date: 2023  Onset of Illness/Injury or Date of Surgery: 23 (admission date)          Admit Date: 2023       ICD-10-CM ICD-9-CM   1. Vulvar cancer  C51.9 184.4   2. 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 Acute kidney injury  N17.9 584.9   4. Chronic anemia  D64.9 285.9   5. Bacteremia  R78.81 790.7     Patient Active Problem List   Diagnosis   • COVID-19   • Vulvar cancer     Past Medical History:   Diagnosis Date   • Arthritis    • COPD (chronic obstructive pulmonary disease)    • Elevated cholesterol    • History of transfusion    • Hypertension    • Vulval ca      Past Surgical History:   Procedure Laterality Date   • RADICAL VULVECTOMY  2019   • RADICAL VULVECTOMY Bilateral 2019         OT ASSESSMENT FLOWSHEET (last 12 hours)     OT Evaluation and Treatment     Row Name 23 1115                   OT Time and Intention    Document Type therapy note (daily note)  -KR        Mode of Treatment occupational therapy  -KR        Patient Effort adequate  -KR           General Information    General Observations of Patient alert/cooperative  -KR           Living Environment    Current Living Arrangements home  -KR        People in Home child(jolly), adult  -KR           Home Use of Assistive/Adaptive Equipment    Equipment Currently Used at Home oxygen  -KR           Cognition    Affect/Mental Status (Cognition) WFL  -KR        Orientation Status (Cognition) oriented x 3  -KR        Follows Commands (Cognition) WFL  -KR           Motor Skills    Therapeutic Exercise shoulder;hand  -KR           Shoulder (Therapeutic Exercise)    Shoulder (Therapeutic Exercise) AROM (active range of motion)  -KR        Shoulder AROM (Therapeutic Exercise) bilateral;flexion;extension;supine  -KR           Hand (Therapeutic Exercise)    Hand (Therapeutic  Exercise) AROM (active range of motion)  -KR        Hand AROM/AAROM (Therapeutic Exercise) bilateral;finger flexion;finger extension  -KR           Wound 05/01/23 1414 Left vagina    Wound - Properties Group Placement Date: 05/01/23  -LB Placement Time: 1414  -LB Present on Hospital Admission: Y  -LB Side: Left  -LB Location: vagina  -LB    Retired Wound - Properties Group Placement Date: 05/01/23  -LB Placement Time: 1414  -LB Present on Hospital Admission: Y  -LB Side: Left  -LB Location: vagina  -LB    Retired Wound - Properties Group Date first assessed: 05/01/23  -LB Time first assessed: 1414  -LB Present on Hospital Admission: Y  -LB Side: Left  -LB Location: vagina  -LB       Plan of Care Review    Plan of Care Reviewed With patient;daughter  -KR           Therapy Assessment/Plan (OT)    Comment, Therapy Assessment/Plan (OT) Pt seen on this date for BUE AROM to promote strength/endurance needed for self care/mobility performance. Fxl history/needs discussed with pt and family for future safety performance in home, as possible. OT to continue as tolerated.  -KR              User Key  (r) = Recorded By, (t) = Taken By, (c) = Cosigned By    Initials Name Effective Dates    KR Justen Clark OT 06/16/21 -     LB Veronica Mendez RN 10/20/21 -                        OT Recommendation and Plan     Plan of Care Review  Plan of Care Reviewed With: patient, daughter  Plan of Care Reviewed With: patient, daughter        Time Calculation:     Therapy Charges for Today     Code Description Service Date Service Provider Modifiers Qty    12649335282  OT THERAPEUTIC ACT EA 15 MIN 5/4/2023 Justen Clark OT GO 1               Justen Calrk OT  5/4/2023

## 2023-05-05 ENCOUNTER — HOSPITAL ENCOUNTER (INPATIENT)
Facility: HOSPITAL | Age: 78
LOS: 15 days | Discharge: NURSING, REHAB OR LONG-TERM CARE | DRG: 871 | End: 2023-05-20
Attending: INTERNAL MEDICINE | Admitting: STUDENT IN AN ORGANIZED HEALTH CARE EDUCATION/TRAINING PROGRAM
Payer: MEDICARE

## 2023-05-05 VITALS
HEART RATE: 72 BPM | DIASTOLIC BLOOD PRESSURE: 92 MMHG | RESPIRATION RATE: 15 BRPM | WEIGHT: 182 LBS | SYSTOLIC BLOOD PRESSURE: 144 MMHG | BODY MASS INDEX: 29.25 KG/M2 | TEMPERATURE: 97.8 F | OXYGEN SATURATION: 98 % | HEIGHT: 66 IN

## 2023-05-05 DIAGNOSIS — R78.81 BACTEREMIA: Primary | ICD-10-CM

## 2023-05-05 PROBLEM — R53.81 PHYSICAL DEBILITY: Status: ACTIVE | Noted: 2023-05-05

## 2023-05-05 LAB
ANION GAP SERPL CALCULATED.3IONS-SCNC: 5.6 MMOL/L (ref 5–15)
ANISOCYTOSIS BLD QL: NORMAL
BASOPHILS # BLD AUTO: 0.03 10*3/MM3 (ref 0–0.2)
BASOPHILS NFR BLD AUTO: 0.3 % (ref 0–1.5)
BUN SERPL-MCNC: 29 MG/DL (ref 8–23)
BUN/CREAT SERPL: 31.5 (ref 7–25)
CALCIUM SPEC-SCNC: 8.7 MG/DL (ref 8.6–10.5)
CHLORIDE SERPL-SCNC: 110 MMOL/L (ref 98–107)
CO2 SERPL-SCNC: 23.4 MMOL/L (ref 22–29)
CREAT SERPL-MCNC: 0.92 MG/DL (ref 0.57–1)
DACRYOCYTES BLD QL SMEAR: NORMAL
DEPRECATED RDW RBC AUTO: 68.9 FL (ref 37–54)
EGFRCR SERPLBLD CKD-EPI 2021: 64.3 ML/MIN/1.73
EOSINOPHIL # BLD AUTO: 0.58 10*3/MM3 (ref 0–0.4)
EOSINOPHIL NFR BLD AUTO: 4.9 % (ref 0.3–6.2)
ERYTHROCYTE [DISTWIDTH] IN BLOOD BY AUTOMATED COUNT: 21 % (ref 12.3–15.4)
GLUCOSE SERPL-MCNC: 87 MG/DL (ref 65–99)
HCT VFR BLD AUTO: 27.9 % (ref 34–46.6)
HGB BLD-MCNC: 7.8 G/DL (ref 12–15.9)
HYPOCHROMIA BLD QL: NORMAL
IMM GRANULOCYTES # BLD AUTO: 0.47 10*3/MM3 (ref 0–0.05)
IMM GRANULOCYTES NFR BLD AUTO: 3.9 % (ref 0–0.5)
LYMPHOCYTES # BLD AUTO: 0.94 10*3/MM3 (ref 0.7–3.1)
LYMPHOCYTES NFR BLD AUTO: 7.9 % (ref 19.6–45.3)
MCH RBC QN AUTO: 25.7 PG (ref 26.6–33)
MCHC RBC AUTO-ENTMCNC: 28 G/DL (ref 31.5–35.7)
MCV RBC AUTO: 91.8 FL (ref 79–97)
MONOCYTES # BLD AUTO: 0.66 10*3/MM3 (ref 0.1–0.9)
MONOCYTES NFR BLD AUTO: 5.5 % (ref 5–12)
NEUTROPHILS NFR BLD AUTO: 77.5 % (ref 42.7–76)
NEUTROPHILS NFR BLD AUTO: 9.22 10*3/MM3 (ref 1.7–7)
NRBC BLD AUTO-RTO: 0 /100 WBC (ref 0–0.2)
OVALOCYTES BLD QL SMEAR: NORMAL
PLAT MORPH BLD: NORMAL
PLATELET # BLD AUTO: 293 10*3/MM3 (ref 140–450)
PMV BLD AUTO: 9.1 FL (ref 6–12)
POTASSIUM SERPL-SCNC: 5.1 MMOL/L (ref 3.5–5.2)
RBC # BLD AUTO: 3.04 10*6/MM3 (ref 3.77–5.28)
SODIUM SERPL-SCNC: 139 MMOL/L (ref 136–145)
WBC NRBC COR # BLD: 11.9 10*3/MM3 (ref 3.4–10.8)

## 2023-05-05 PROCEDURE — 85007 BL SMEAR W/DIFF WBC COUNT: CPT | Performed by: INTERNAL MEDICINE

## 2023-05-05 PROCEDURE — 94761 N-INVAS EAR/PLS OXIMETRY MLT: CPT

## 2023-05-05 PROCEDURE — 94799 UNLISTED PULMONARY SVC/PX: CPT

## 2023-05-05 PROCEDURE — 25010000002 MEROPENEM PER 100 MG: Performed by: INTERNAL MEDICINE

## 2023-05-05 PROCEDURE — 25010000002 ENOXAPARIN PER 10 MG: Performed by: INTERNAL MEDICINE

## 2023-05-05 PROCEDURE — 25010000002 KETOROLAC TROMETHAMINE PER 15 MG: Performed by: PHYSICIAN ASSISTANT

## 2023-05-05 PROCEDURE — 97535 SELF CARE MNGMENT TRAINING: CPT

## 2023-05-05 PROCEDURE — 97530 THERAPEUTIC ACTIVITIES: CPT

## 2023-05-05 PROCEDURE — 85025 COMPLETE CBC W/AUTO DIFF WBC: CPT | Performed by: INTERNAL MEDICINE

## 2023-05-05 PROCEDURE — 99232 SBSQ HOSP IP/OBS MODERATE 35: CPT | Performed by: PHYSICIAN ASSISTANT

## 2023-05-05 PROCEDURE — 97166 OT EVAL MOD COMPLEX 45 MIN: CPT

## 2023-05-05 PROCEDURE — 80048 BASIC METABOLIC PNL TOTAL CA: CPT | Performed by: INTERNAL MEDICINE

## 2023-05-05 PROCEDURE — 25010000002 MORPHINE PER 10 MG: Performed by: INTERNAL MEDICINE

## 2023-05-05 RX ORDER — SODIUM CHLORIDE 0.9 % (FLUSH) 0.9 %
10 SYRINGE (ML) INJECTION EVERY 12 HOURS SCHEDULED
Status: CANCELLED | OUTPATIENT
Start: 2023-05-05

## 2023-05-05 RX ORDER — POLYETHYLENE GLYCOL 3350 17 G/17G
17 POWDER, FOR SOLUTION ORAL DAILY
Status: DISCONTINUED | OUTPATIENT
Start: 2023-05-05 | End: 2023-05-22 | Stop reason: HOSPADM

## 2023-05-05 RX ORDER — ONDANSETRON 4 MG/1
4 TABLET, FILM COATED ORAL EVERY 8 HOURS PRN
Status: CANCELLED | OUTPATIENT
Start: 2023-05-05

## 2023-05-05 RX ORDER — OXYCODONE AND ACETAMINOPHEN 10; 325 MG/1; MG/1
1 TABLET ORAL EVERY 6 HOURS PRN
Status: DISCONTINUED | OUTPATIENT
Start: 2023-05-05 | End: 2023-05-07

## 2023-05-05 RX ORDER — NYSTATIN 100000 [USP'U]/G
POWDER TOPICAL EVERY 12 HOURS SCHEDULED
Status: DISCONTINUED | OUTPATIENT
Start: 2023-05-05 | End: 2023-05-22 | Stop reason: HOSPADM

## 2023-05-05 RX ORDER — SODIUM CHLORIDE 0.9 % (FLUSH) 0.9 %
10 SYRINGE (ML) INJECTION EVERY 12 HOURS SCHEDULED
Status: DISCONTINUED | OUTPATIENT
Start: 2023-05-05 | End: 2023-05-22 | Stop reason: HOSPADM

## 2023-05-05 RX ORDER — SODIUM CHLORIDE 0.9 % (FLUSH) 0.9 %
10 SYRINGE (ML) INJECTION AS NEEDED
Status: CANCELLED | OUTPATIENT
Start: 2023-05-05

## 2023-05-05 RX ORDER — BUDESONIDE 0.5 MG/2ML
0.5 INHALANT ORAL
Status: CANCELLED | OUTPATIENT
Start: 2023-05-05

## 2023-05-05 RX ORDER — SODIUM CHLORIDE 9 MG/ML
40 INJECTION, SOLUTION INTRAVENOUS AS NEEDED
Status: DISCONTINUED | OUTPATIENT
Start: 2023-05-05 | End: 2023-05-22 | Stop reason: HOSPADM

## 2023-05-05 RX ORDER — POLYETHYLENE GLYCOL 3350 17 G/17G
17 POWDER, FOR SOLUTION ORAL DAILY
Status: CANCELLED | OUTPATIENT
Start: 2023-05-06

## 2023-05-05 RX ORDER — IPRATROPIUM BROMIDE AND ALBUTEROL SULFATE 2.5; .5 MG/3ML; MG/3ML
3 SOLUTION RESPIRATORY (INHALATION)
Status: CANCELLED | OUTPATIENT
Start: 2023-05-05

## 2023-05-05 RX ORDER — SODIUM CHLORIDE 9 MG/ML
40 INJECTION, SOLUTION INTRAVENOUS AS NEEDED
Status: CANCELLED | OUTPATIENT
Start: 2023-05-05

## 2023-05-05 RX ORDER — GUAIFENESIN 600 MG/1
600 TABLET, EXTENDED RELEASE ORAL EVERY 12 HOURS SCHEDULED
Status: DISCONTINUED | OUTPATIENT
Start: 2023-05-05 | End: 2023-05-22 | Stop reason: HOSPADM

## 2023-05-05 RX ORDER — OXYCODONE AND ACETAMINOPHEN 10; 325 MG/1; MG/1
1 TABLET ORAL EVERY 6 HOURS PRN
Status: CANCELLED | OUTPATIENT
Start: 2023-05-05 | End: 2023-05-11

## 2023-05-05 RX ORDER — FERROUS SULFATE 325(65) MG
325 TABLET ORAL
Status: CANCELLED | OUTPATIENT
Start: 2023-05-06

## 2023-05-05 RX ORDER — SODIUM CHLORIDE 0.9 % (FLUSH) 0.9 %
20 SYRINGE (ML) INJECTION AS NEEDED
Status: DISCONTINUED | OUTPATIENT
Start: 2023-05-05 | End: 2023-05-22 | Stop reason: HOSPADM

## 2023-05-05 RX ORDER — ASCORBIC ACID 500 MG
500 TABLET ORAL DAILY
Status: CANCELLED | OUTPATIENT
Start: 2023-05-06

## 2023-05-05 RX ORDER — SODIUM CHLORIDE 0.9 % (FLUSH) 0.9 %
10 SYRINGE (ML) INJECTION AS NEEDED
Status: DISCONTINUED | OUTPATIENT
Start: 2023-05-05 | End: 2023-05-22 | Stop reason: HOSPADM

## 2023-05-05 RX ORDER — ENOXAPARIN SODIUM 100 MG/ML
40 INJECTION SUBCUTANEOUS DAILY
Status: DISCONTINUED | OUTPATIENT
Start: 2023-05-06 | End: 2023-05-22 | Stop reason: HOSPADM

## 2023-05-05 RX ORDER — SODIUM CHLORIDE 0.9 % (FLUSH) 0.9 %
20 SYRINGE (ML) INJECTION AS NEEDED
Status: CANCELLED | OUTPATIENT
Start: 2023-05-05

## 2023-05-05 RX ORDER — KETOROLAC TROMETHAMINE 30 MG/ML
15 INJECTION, SOLUTION INTRAMUSCULAR; INTRAVENOUS ONCE
Status: COMPLETED | OUTPATIENT
Start: 2023-05-05 | End: 2023-05-05

## 2023-05-05 RX ORDER — NYSTATIN 100000 [USP'U]/G
POWDER TOPICAL EVERY 12 HOURS SCHEDULED
Status: CANCELLED | OUTPATIENT
Start: 2023-05-05

## 2023-05-05 RX ORDER — FERROUS SULFATE 325(65) MG
325 TABLET ORAL
Status: DISCONTINUED | OUTPATIENT
Start: 2023-05-06 | End: 2023-05-22 | Stop reason: HOSPADM

## 2023-05-05 RX ORDER — LEVOTHYROXINE SODIUM 0.1 MG/1
100 TABLET ORAL
Status: DISCONTINUED | OUTPATIENT
Start: 2023-05-06 | End: 2023-05-22 | Stop reason: HOSPADM

## 2023-05-05 RX ORDER — ONDANSETRON 4 MG/1
4 TABLET, FILM COATED ORAL EVERY 8 HOURS PRN
Status: DISCONTINUED | OUTPATIENT
Start: 2023-05-05 | End: 2023-05-22 | Stop reason: HOSPADM

## 2023-05-05 RX ORDER — ENOXAPARIN SODIUM 100 MG/ML
40 INJECTION SUBCUTANEOUS DAILY
Status: CANCELLED | OUTPATIENT
Start: 2023-05-06

## 2023-05-05 RX ORDER — NITROGLYCERIN 0.4 MG/1
0.4 TABLET SUBLINGUAL
Status: CANCELLED | OUTPATIENT
Start: 2023-05-05

## 2023-05-05 RX ORDER — ASCORBIC ACID 500 MG
500 TABLET ORAL DAILY
Status: DISCONTINUED | OUTPATIENT
Start: 2023-05-05 | End: 2023-05-16

## 2023-05-05 RX ORDER — LEVOTHYROXINE SODIUM 0.1 MG/1
100 TABLET ORAL
Status: CANCELLED | OUTPATIENT
Start: 2023-05-06

## 2023-05-05 RX ORDER — BUDESONIDE 0.5 MG/2ML
0.5 INHALANT ORAL
Status: DISCONTINUED | OUTPATIENT
Start: 2023-05-05 | End: 2023-05-22 | Stop reason: HOSPADM

## 2023-05-05 RX ORDER — GUAIFENESIN 600 MG/1
600 TABLET, EXTENDED RELEASE ORAL EVERY 12 HOURS SCHEDULED
Status: CANCELLED | OUTPATIENT
Start: 2023-05-05

## 2023-05-05 RX ORDER — IPRATROPIUM BROMIDE AND ALBUTEROL SULFATE 2.5; .5 MG/3ML; MG/3ML
3 SOLUTION RESPIRATORY (INHALATION)
Status: DISCONTINUED | OUTPATIENT
Start: 2023-05-05 | End: 2023-05-14

## 2023-05-05 RX ORDER — NITROGLYCERIN 0.4 MG/1
0.4 TABLET SUBLINGUAL
Status: DISCONTINUED | OUTPATIENT
Start: 2023-05-05 | End: 2023-05-22 | Stop reason: HOSPADM

## 2023-05-05 RX ADMIN — IPRATROPIUM BROMIDE AND ALBUTEROL SULFATE 3 ML: .5; 2.5 SOLUTION RESPIRATORY (INHALATION) at 00:35

## 2023-05-05 RX ADMIN — NYSTATIN: 100000 POWDER TOPICAL at 20:17

## 2023-05-05 RX ADMIN — Medication 10 ML: at 08:33

## 2023-05-05 RX ADMIN — Medication 10 ML: at 20:20

## 2023-05-05 RX ADMIN — OXYCODONE HYDROCHLORIDE AND ACETAMINOPHEN 1 TABLET: 10; 325 TABLET ORAL at 20:24

## 2023-05-05 RX ADMIN — Medication 10 ML: at 20:19

## 2023-05-05 RX ADMIN — BUDESONIDE 0.5 MG: 0.5 INHALANT RESPIRATORY (INHALATION) at 18:47

## 2023-05-05 RX ADMIN — Medication 10 ML: at 08:34

## 2023-05-05 RX ADMIN — FERROUS SULFATE TAB 325 MG (65 MG ELEMENTAL FE) 325 MG: 325 (65 FE) TAB at 08:33

## 2023-05-05 RX ADMIN — MORPHINE SULFATE 4 MG: 4 INJECTION, SOLUTION INTRAMUSCULAR; INTRAVENOUS at 19:57

## 2023-05-05 RX ADMIN — KETOROLAC TROMETHAMINE 15 MG: 30 INJECTION, SOLUTION INTRAMUSCULAR; INTRAVENOUS at 22:43

## 2023-05-05 RX ADMIN — MEROPENEM 1 G: 1 INJECTION, POWDER, FOR SOLUTION INTRAVENOUS at 17:08

## 2023-05-05 RX ADMIN — MEROPENEM 1 G: 1 INJECTION, POWDER, FOR SOLUTION INTRAVENOUS at 10:56

## 2023-05-05 RX ADMIN — OXYCODONE HYDROCHLORIDE AND ACETAMINOPHEN 500 MG: 500 TABLET ORAL at 08:33

## 2023-05-05 RX ADMIN — LEVOTHYROXINE SODIUM 100 MCG: 100 TABLET ORAL at 08:33

## 2023-05-05 RX ADMIN — OXYCODONE HYDROCHLORIDE AND ACETAMINOPHEN 1 TABLET: 10; 325 TABLET ORAL at 08:59

## 2023-05-05 RX ADMIN — GUAIFENESIN 600 MG: 600 TABLET, EXTENDED RELEASE ORAL at 08:33

## 2023-05-05 RX ADMIN — IPRATROPIUM BROMIDE AND ALBUTEROL SULFATE 3 ML: .5; 2.5 SOLUTION RESPIRATORY (INHALATION) at 12:53

## 2023-05-05 RX ADMIN — GUAIFENESIN 600 MG: 600 TABLET, EXTENDED RELEASE ORAL at 20:16

## 2023-05-05 RX ADMIN — TUBERCULIN PURIFIED PROTEIN DERIVATIVE 5 UNITS: 5 INJECTION, SOLUTION INTRADERMAL at 17:08

## 2023-05-05 RX ADMIN — BUDESONIDE 0.5 MG: 0.5 SUSPENSION RESPIRATORY (INHALATION) at 06:41

## 2023-05-05 RX ADMIN — Medication 10 ML: at 20:18

## 2023-05-05 RX ADMIN — IPRATROPIUM BROMIDE AND ALBUTEROL SULFATE 3 ML: .5; 2.5 SOLUTION RESPIRATORY (INHALATION) at 18:47

## 2023-05-05 RX ADMIN — NYSTATIN: 100000 POWDER TOPICAL at 08:33

## 2023-05-05 RX ADMIN — IPRATROPIUM BROMIDE AND ALBUTEROL SULFATE 3 ML: .5; 2.5 SOLUTION RESPIRATORY (INHALATION) at 06:40

## 2023-05-05 RX ADMIN — ENOXAPARIN SODIUM 40 MG: 40 INJECTION SUBCUTANEOUS at 08:33

## 2023-05-05 NOTE — THERAPY EVALUATION
Acute Care - Occupational Therapy Initial Evaluation   [place]     Patient Name: Orquidea Rosenberg  : 1945  MRN: [id number]  Today's Date: 2023             Admit Date: 2023     No diagnosis found.  Patient Active Problem List   Diagnosis   • COVID-19   • Vulvar cancer   • Physical debility     Past Medical History:   Diagnosis Date   • Arthritis    • COPD (chronic obstructive pulmonary disease)    • Elevated cholesterol    • History of transfusion    • Hypertension    • Vulval ca      Past Surgical History:   Procedure Laterality Date   • RADICAL VULVECTOMY  2019   • RADICAL VULVECTOMY Bilateral 2019         OT ASSESSMENT FLOWSHEET (last 12 hours)     OT Evaluation and Treatment     Row Name 23 1616                   OT Time and Intention    Document Type evaluation  -KR        Mode of Treatment occupational therapy  -KR        Patient Effort adequate  -KR           General Information    General Observations of Patient alert/cooperative  -KR           Living Environment    Current Living Arrangements home  -KR        People in Home child(jolly), adult  -KR           Cognition    Affect/Mental Status (Cognition) WFL  -KR        Orientation Status (Cognition) oriented x 3  -KR        Follows Commands (Cognition) WFL  -KR           Range of Motion Comprehensive    Comment, General Range of Motion BUE WFL  -KR           Strength Comprehensive (MMT)    Comment, General Manual Muscle Testing (MMT) Assessment BUE 3/5  -KR           Bathing Assessment/Intervention    [place] Level (Bathing) bathing skills;moderate assist (50% patient effort)  -KR           Upper Body Dressing Assessment/Training    [place] Level (Upper Body Dressing) upper body dressing skills;minimum assist (75% patient effort)  -KR           Lower Body Dressing Assessment/Training    [place] Level (Lower Body Dressing) lower body dressing skills;moderate assist (50% patient effort)  -KR           Grooming  Assessment/Training    [place] Level (Grooming) grooming skills;minimum assist (75% patient effort)  -KR           Self-Feeding Assessment/Training    [place] Level (Feeding) feeding skills;set up  -KR           Toileting Assessment/Training    [place] Level (Toileting) toileting skills;dependent (less than 25% patient effort)  -KR           Motor Skills    Functional Endurance poor  -KR           Wound 05/01/23 1414 Left vagina    Wound - Properties Group Placement Date: 05/01/23  -LB Placement Time: 1414  -LB Present on Hospital Admission: Y  -LB Side: Left  -LB Location: vagina  -LB    Retired Wound - Properties Group Placement Date: 05/01/23  -LB Placement Time: 1414  -LB Present on Hospital Admission: Y  -LB Side: Left  -LB Location: vagina  -LB    Retired Wound - Properties Group Date first assessed: 05/01/23  -LB Time first assessed: 1414  -LB Present on Hospital Admission: Y  -LB Side: Left  -LB Location: vagina  -LB       Plan of Care Review    Plan of Care Reviewed With patient  -KR           Therapy Assessment/Plan (OT)    Criteria for Skilled Therapeutic Interventions Met (OT) yes  -KR        Planned Therapy Interventions (OT) activity tolerance training;adaptive equipment training;BADL retraining;strengthening exercise  -KR        Comment, Therapy Assessment/Plan (OT) Pt presents with general debility and would benefit from additonal therapy to increase fxl endurance/performance. Recreational activity plan presented as desired for hospital stay. Pt desires no further recreational activity plan to be provided at this time, stating television will be adequate entertainment throughout her stay  -KR           Therapy Plan Review/Discharge Plan (OT)    Anticipated Discharge Disposition (OT) home with assist  -KR           OT Goals    Endurance Goal Selection (OT) endurance, OT goal 1  -KR           Dressing Goal 1 (OT)    Activity/Device (Dressing Goal 1, OT) dressing skills, all  -KR         [place]/Cues Needed (Dressing Goal 1, OT) standby assist  -KR        Time Frame (Dressing Goal 1, OT) by discharge  -KR            Endurance Goal 1 (OT)    Endurance Goal 1 (OT) Increase to enhance self care/mobility performance  -KR        Activity Level (Endurance Goal 1, OT) endurance 2 good -  -KR        Time Frame (Endurance Goal 1, OT) by discharge  -KR           Patient Education Goal (OT)    Activity (Patient Education Goal, OT) AE/DME training as needed to enhance independence and safety in home environment  -KR        [place]/Cues/Accuracy (Memory Goal 2, OT) verbalizes understanding  -KR        Time Frame (Patient Education Goal, OT) by discharge  -KR              User Key  (r) = Recorded By, (t) = Taken By, (c) = Cosigned By    Initials Name Effective Dates    Justen Gurrola OT 06/16/21 -     Veronica Bansal RN 10/20/21 -                        OT Recommendation and Plan  Planned Therapy Interventions (OT): activity tolerance training, adaptive equipment training, BADL retraining, strengthening exercise  Plan of Care Review  Plan of Care Reviewed With: patient  Plan of Care Reviewed With: patient     Outcome Measures     Row Name 05/05/23 1623 05/05/23 1600          How much help from another is currently needed...    Putting on and taking off regular lower body clothing? 2  -KR --     Bathing (including washing, rinsing, and drying) 2  -KR --     Toileting (which includes using toilet bed pan or urinal) 1  -KR --     Putting on and taking off regular upper body clothing 3  -KR --     Taking care of personal grooming (such as brushing teeth) 3  -KR --     Eating meals 3  -KR --     AM-PAC 6 Clicks Score (OT) 14  -KR --        Functional Assessment    Outcome Measure Options -- AM-PAC 6 Clicks Daily Activity (OT)  -KR           User Key  (r) = Recorded By, (t) = Taken By, (c) = Cosigned By    Initials Name Provider Type    Justen Gurrola, OT Occupational Therapist                Time  Calculation:    Time Calculation- OT     Row Name 05/05/23 1624             Time Calculation- OT    Total Timed Code Minutes- OT 30 minute(s)  -[place]            User Key  (r) = Recorded By, (t) = Taken By, (c) = Cosigned By    Initials Name Provider Type    Justen Gurrola OT Occupational Therapist              Therapy Charges for Today     Code Description Service Date Service Provider Modifiers Qty    32717732046  OT SELF CARE/MGMT/TRAIN EA 15 MIN 5/5/2023 Justen Clark OT GO 2    [id number]  OT EVAL MOD COMPLEXITY 2 5/5/2023 Justen Clark OT GO 1               Justen Clark OT  5/5/2023

## 2023-05-05 NOTE — PROGRESS NOTES
"Palliative Care Daily Progress Note     S: Medical record reviewed, upon entering the room, pt was working with occupational therapy. She c/o lower abd pain that comes and goes. She reports that pain medications help but do not last long enough. She currently rates pain as 1-2 at this time. Pt reports having some vaginal discharge at times. She does any SOA, + nausea but no vomiting. She reports that she has has had some increased weakness and more fatigue over the past few days.    O:   Palliative Performance Scale Score:     /61 (BP Location: Left arm, Patient Position: Lying)   Pulse 82   Temp 97.7 °F (36.5 °C) (Oral)   Resp 20   Ht 167.6 cm (66\")   Wt 82.6 kg (182 lb)   SpO2 99%   BMI 29.38 kg/m²     Intake/Output Summary (Last 24 hours) at 5/5/2023 1146  Last data filed at 5/5/2023 0821  Gross per 24 hour   Intake 800 ml   Output --   Net 800 ml       PE:    General Appearance:    Chronically ill appearing, alert, cooperative, NAD   HEENT:    NC/AT, without obvious abnormality, EOMI, anicteric    Neck:   supple, trachea midline, no JVD   Lungs:     CTAB without w/r/r, 3lpm n/c     Heart:    RRR, normal S1 and S2, no M/R/G   Abdomen:     Soft, lower abd tenderness with palpation RLQ/LLQ/Pelvic tenderness, ND, NABS    Extremities:   Moves all extremities, generalized edema   Pulses:   Pulses palpable and equal bilaterally   Skin:   Warm, dry, vulvar deformity    Neurologic:   A/Ox3, cooperative   Psych:   Calm, appropriate         Meds: Reviewed and no changes    Labs:   Results from last 7 days   Lab Units 05/05/23  0454   WBC 10*3/mm3 11.90*   HEMOGLOBIN g/dL 7.8*   HEMATOCRIT % 27.9*   PLATELETS 10*3/mm3 293     Results from last 7 days   Lab Units 05/05/23  0454   SODIUM mmol/L 139   POTASSIUM mmol/L 5.1   CHLORIDE mmol/L 110*   CO2 mmol/L 23.4   BUN mg/dL 29*   CREATININE mg/dL 0.92   GLUCOSE mg/dL 87   CALCIUM mg/dL 8.7     Results from last 7 days   Lab Units 05/05/23 0454 05/03/23  1655 " 05/03/23  0040   SODIUM mmol/L 139   < > 142   POTASSIUM mmol/L 5.1   < > 5.7*   CHLORIDE mmol/L 110*   < > 112*   CO2 mmol/L 23.4   < > 22.0   BUN mg/dL 29*   < > 36*   CREATININE mg/dL 0.92   < > 1.21*   CALCIUM mg/dL 8.7   < > 8.9   BILIRUBIN mg/dL  --   --  <0.2   ALK PHOS U/L  --   --  76   ALT (SGPT) U/L  --   --  5   AST (SGOT) U/L  --   --  6   GLUCOSE mg/dL 87   < > 136*    < > = values in this interval not displayed.     Imaging Results (Last 72 Hours)     Procedure Component Value Units Date/Time    CT Abdomen Pelvis With & Without Contrast [[id number]] Collected: 05/02/23 1222     Updated: 05/03/23 0843    Addenda:        Addendum: Again noted is a somewhat necrotic appearing 7.7 cm mass in  the pelvis that appear somewhat contiguous with the distal sigmoid  colon. Air is again noted within the urinary bladder. Impression.     This report was finalized on 5/3/2023 8:41 AM by Dr. Philip Lopez MD.     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  Sagittal and coronal reformatted images were  created and reviewed.  This CT exam was performed using one or more of  the following dose reduction techniques:  automated exposure control,  adjustment of the mA and/or kV according to patient size, and/or use of  iterative reconstruction technique.     COMPARISON:    04/25/2023     FINDINGS:    LUNG BASES:  Bilateral pulmonary lung base nodules are again noted.    PLEURAL SPACE:  Tiny bilateral pleural effusions.      ABDOMEN:    LIVER:  Unremarkable.  No  mass.    GALLBLADDER AND BILE DUCTS:  Unremarkable.  No calcified stones.  No  ductal dilation.    PANCREAS:  Unremarkable.  No mass.  No ductal dilation.    SPLEEN:  Unremarkable.  No splenomegaly.    ADRENALS:  Unremarkable.  No mass.    KIDNEYS AND URETERS:  Again there is a left double-J ureteral stent  and moderate to severe hydronephrosis of the left kidney.    STOMACH AND BOWEL:  Moderate stool throughout the colon.  No  obstruction.  No mucosal thickening.      PELVIS:    APPENDIX:  No findings to suggest acute appendicitis.    BLADDER:  Unremarkable.  No mass.  No stones.    REPRODUCTIVE:  Unremarkable as visualized.      ABDOMEN and PELVIS:    INTRAPERITONEAL SPACE:  Unremarkable.  No free air.  No significant  fluid collection.    BONES/JOINTS:  No acute fracture.  No dislocation.    SOFT TISSUES:  Unremarkable.    VASCULATURE:  Atherosclerotic disease.  No abdominal aortic aneurysm.    LYMPH NODES:  Unremarkable.  No enlarged lymph nodes.       Impression:      1.  Again there is a left double-J ureteral stent and moderate to severe  hydronephrosis of the left kidney.  2.  Moderate stool throughout the colon.  3.  Tiny bilateral pleural effusions.  4.  Bilateral pulmonary lung base nodules are again noted.     This report was finalized on 5/2/2023 12:23 PM by Dr. Philip Lopez MD.       CT Chest With Contrast Diagnostic [[id number]] Collected: 05/02/23 1237     Updated: 05/02/23 1240    Narrative:      EXAM:    CT Chest With Intravenous Contrast     EXAM DATE:    5/2/2023 11:25 AM     CLINICAL HISTORY:    Pneumonia, complication suspected, xray done     TECHNIQUE:    Axial computed tomography images of the chest with intravenous  contrast.  Sagittal and coronal reformatted images were created and  reviewed.  This CT exam was performed using one or more of the following  dose reduction techniques:  automated exposure control, adjustment of  the mA and/or kV according to patient size, and/or use of  iterative  reconstruction technique.     COMPARISON:    03/10/2023     FINDINGS:    LUNGS:  Bilateral parenchymal pulmonary nodules are again noted. A  posterior left upper lobe pulmonary nodule measures 2.2 cm and was about  2.2 cm. Other nodules appear stable also.    PLEURAL SPACE:  Now small bilateral pleural effusions.  No  pneumothorax.    HEART:  Tiny pericardial effusion again noted.  No significant  coronary artery calcifications.    BONES/JOINTS:  Unremarkable.  No acute fracture.  No dislocation.    SOFT TISSUES:  Unremarkable.    VASCULATURE:  Unremarkable.  No thoracic aortic aneurysm.    LYMPH NODES:  Unremarkable.  No enlarged lymph nodes.       Impression:      1.  Now small bilateral pleural effusions.  2.  Tiny pericardial effusion again noted.  3.  Bilateral parenchymal pulmonary nodules are again noted. A posterior  left upper lobe pulmonary nodule measures 2.2 cm and was about 2.2 cm.  Other nodules appear stable also.     This report was finalized on 5/2/2023 12:38 PM by Dr. Philip Lopez MD.               Diagnostics: Reviewed    A: Clinically, pt's symptoms are managed, she does have lower abd pain that comes and goes, + facial grimacing with palpation, but is pretty controlled with current regimen. H&H 7.8/27 , blood cultures were negative. Vs 134/61 hr 82 rr 20 sat 99% 3lpm n/c    P: Continue with current regimen, pt has been admitted to swing bed today. I spoke with pt about GOC/ACP, wants to remain a full code, would want to be placed on ventilator and would want CPR if a medical emergency were to occur. Pt reports not wanting to name a healthcare surrogate at this time and is unsure if she would want a tracheostomy/peg tube placed at this time. She reports that she would like to think about it. She also reports that she would not want to have chemotherapy or a surgical intervention at this time however has been thinking about what her short term and long term goals were. Will continue to  provide symtomatic and supportive palliative care for pt and family. Will assist with dispo when needed.       We will continue to follow along. Please do not hesitate to contact us regarding further sx mgmt or GOC needs, including after hours or on weekends via our on call provider at [phone].     Adriana Miller, APRN    5/5/2023/  Hr  rr  Sat

## 2023-05-05 NOTE — PROGRESS NOTES
PROGRESS NOTE         Patient Identification:  Name:  Orquidea Rosenberg  Age:  77 y.o.  Sex:  female  :  1945  MRN:  [id number]  Visit Number:  [id number]  Primary Care Provider:  Jackeline Denson APRN         LOS: 10 days       ----------------------------------------------------------------------------------------------------------------------  Subjective       Chief Complaints:    Diarrhea, Vomiting, and Nausea        Interval History:      Patient is resting in bed on 3 L nasal cannula in no apparent distress.  She is awake and alert and seems comfortable.  No new issues or complaints this morning.  Lungs are clear to auscultation bilaterally.  Abdomen is soft, nontender, with normal bowel sounds.  White count has improved at 11.90.  Blood cultures on 2023 finalized as no growth.    Review of Systems:    Constitutional: no fever, chills and night sweats.  Generalized fatigue.  Eyes: no eye drainage, itching or redness.  HEENT: no mouth sores, dysphagia or nose bleed.  Respiratory: No shortness of breath, No cough. No production of sputum.  Cardiovascular: no chest pain, no palpitations, no orthopnea.  Gastrointestinal: No nausea, no vomiting or diarrhea. No abdominal pain, no hematemesis or rectal bleeding.  Genitourinary: no dysuria or polyuria.  Hematologic/lymphatic: no lymph node abnormalities, no easy bruising or easy bleeding.  Musculoskeletal: no muscle or joint pain.  Skin: No rash and no itching.  Neurological: no loss of consciousness, no seizure, no headache.  Behavioral/Psych: no depression or suicidal ideation.  Endocrine: no hot flashes.  Immunologic: negative.    ----------------------------------------------------------------------------------------------------------------------      Objective       [facility] Meds:  ascorbic acid, 500 mg, Oral, Daily  budesonide, 0.5 mg, Nebulization, BID - RT  enoxaparin, 40 mg, Subcutaneous, Daily  ferrous sulfate, 325 mg, Oral, Daily  With Breakfast  guaiFENesin, 600 mg, Oral, Q12H  ipratropium-albuterol, 3 mL, Nebulization, 4x Daily - RT  levothyroxine, 100 mcg, Oral, Q AM  meropenem, 1 g, Intravenous, Q8H  nystatin, , Topical, Q12H  polyethylene glycol, 17 g, Oral, Daily  sodium chloride, 10 mL, Intravenous, Q12H  sodium chloride, 10 mL, Intravenous, Q12H  sodium chloride, 10 mL, Intravenous, Q12H  sodium chloride, 10 mL, Intravenous, Q12H  sodium chloride, 10 mL, Intravenous, Q12H         ----------------------------------------------------------------------------------------------------------------------    Vital Signs:  Temp:  [97.3 °F (36.3 °C)-98.3 °F (36.8 °C)] 97.7 °F (36.5 °C)  Heart Rate:  [] 82  Resp:  [18-24] 20  BP: (108-168)/(54-63) 134/61  Mean Arterial Pressure (Non-Invasive) for the past 24 hrs (Last 3 readings):   Noninvasive MAP (mmHg)   05/04/23 1444 78   05/04/23 0954 114     SpO2 Percentage    05/05/23 0604 05/05/23 0640 05/05/23 0701   SpO2: 98% 96% 99%     SpO2:  [92 %-99 %] 99 %  on  Flow (L/min):  [3-3.5] 3;   Device (Oxygen Therapy): nasal cannula    Body mass index is 29.38 kg/m².  Wt Readings from Last 3 Encounters:   05/04/23 82.6 kg (182 lb)   03/15/22 81.6 kg (180 lb)   02/21/22 81.6 kg (180 lb)        Intake/Output Summary (Last 24 hours) at 5/5/2023 0943  Last data filed at 5/5/2023 0821  Gross per 24 hour   Intake 800 ml   Output --   Net 800 ml     Diet: Regular/House Diet; Texture: Regular Texture (IDDSI 7); Fluid Consistency: Thin (IDDSI 0)  ----------------------------------------------------------------------------------------------------------------------      Physical Exam:    Constitutional: Elderly, chronically ill-appearing female is resting in bed on 3 L nasal cannula. Awake, alert, and oriented. Appears comfortable.  HENT:  Head: Normocephalic and atraumatic.  Mouth:  Moist mucous membranes.    Eyes:  Conjunctivae and EOM are normal.  No scleral icterus.  Neck:  Neck supple.  No JVD present.   CVC to right IJ.   Cardiovascular:  Normal rate, regular rhythm and normal heart sounds with no murmur. No edema.  Pulmonary/Chest: Lungs clear to auscultation bilaterally.  Currently on 3 L nasal cannula.  Abdominal:  Soft.  Bowel sounds are normal.  No distension and no tenderness.  Musculoskeletal:  No edema, no tenderness, and no deformity.  No swelling or redness of joints.  Neurological:  Alert and oriented to person, place, and time.  No facial droop.  No slurred speech.   Skin:  Skin is warm and dry.  No rash noted.  No pallor. Vulvar deformity due to prior surgeries.  Psychiatric:  Normal mood and affect.  Behavior is normal.    ----------------------------------------------------------------------------------------------------------------------            Results from last 7 days   Lab Units 05/05/23 0454 05/04/23  0037 05/03/23  0040 05/02/23  0047 05/01/23  0729   CRP mg/dL  --   --   --   --  2.83*   WBC 10*3/mm3 11.90* 15.59* 15.11*   < > 14.45*   HEMOGLOBIN g/dL 7.8* 8.1* 8.1*   < > 8.9*   HEMATOCRIT % 27.9* 28.8* 28.3*   < > 30.7*   MCV fL 91.8 90.6 87.9   < > 88.0   MCHC g/dL 28.0* 28.1* 28.6*   < > 29.0*   PLATELETS 10*3/mm3 293 336 350   < > 392    < > = values in this interval not displayed.     Results from last 7 days   Lab Units 05/05/23 0454 05/04/23  0037 05/03/23  1655 05/03/23  0040   SODIUM mmol/L 139 140  --  142   POTASSIUM mmol/L 5.1 5.3* 5.1 5.7*   MAGNESIUM mg/dL  --  1.9  --   --    CHLORIDE mmol/L 110* 111*  --  112*   CO2 mmol/L 23.4 23.7  --  22.0   BUN mg/dL 29* 35*  --  36*   CREATININE mg/dL 0.92 0.96  --  1.21*   CALCIUM mg/dL 8.7 8.8  --  8.9   GLUCOSE mg/dL 87 99  --  136*   ALBUMIN g/dL  --   --   --  2.6*   BILIRUBIN mg/dL  --   --   --  <0.2   ALK PHOS U/L  --   --   --  76   AST (SGOT) U/L  --   --   --  6   ALT (SGPT) U/L  --   --   --  5   Estimated Creatinine Clearance: 55.5 mL/min (by C-G formula based on SCr of 0.92 mg/dL).  No results found for: AMMONIA    No  results found for: HGBA1C, POCGLU  No results found for: HGBA1C  Lab Results   Component Value Date    TSH 2.620 03/15/2022       Blood Culture   Date Value Ref Range Status   04/25/2023 Abnormal Stain (C)  Preliminary   04/25/2023 Anaerobe (Organism type) (C)  Preliminary     No results found for: URINECX  No results found for: WOUNDCX  No results found for: STOOLCX  No results found for: RESPCX  Pain Management Panel            View : No data to display.                        ----------------------------------------------------------------------------------------------------------------------  Imaging Results (Last 24 Hours)       ** No results found for the last 24 hours. **            ----------------------------------------------------------------------------------------------------------------------    Pertinent Infectious Disease Results    Chest x-ray on 4/26/2023 showed no change in distribution of bilateral lung opacities which may represent changes of fibrosis and diffuse pulmonary nodules.  No pneumothorax.  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  This could represent secondary involvement of the leiomyomatous uterus or dystrophic calcifications within the malignancy.  Abnormal fistulous communications between the anterior rectum and the posterior aspect of the above-mentioned pelvic mass with at least 1 and possibly 2 fistulous connections as demonstrated on CT.  Central debris within the mass may represent abscess or necrosis.  Apparent fistulous communication between the mass in the bladder.  Left-sided hydronephrosis and hydroureter with left ureteral stent in place.  The distal pigtail difficult to confirm within the bladder and may be within the distal ureter.  Progressive widely disseminated pulmonary metastases.  Blood cultures on 4/25/2023 finalized as  Clostridium subterminale and Gemella morbillorum.  Blood cultures on 4/29/2023 finalized as no growth. MRSA screen on 4/26/2023 was negative.  COVID-19 and flu A/B PCR on 4/25/2023 was negative. CT of the chest from 5/2/23 reports small bilateral pleural effusions, tiny pericardial effusion, bilateral parenchymal pulmonary nodules are again noted.  CT of the abdomen pelvis from 5/2/23 reports left double-J ureteral stent and moderate to severe left hydronephrosis of the left kidney, moderate stool throughout the colon, tiny bilateral pleural effusions, bilateral pulmonary lung base nodules are again noted.      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  She is awake and alert and seems comfortable.  No new issues or complaints this morning.  Lungs are clear to auscultation bilaterally.  Abdomen is soft, nontender, with normal bowel sounds.  White count has improved at 11.90.  Blood cultures on 4/29/2023 finalized as no growth.    Based on blood culture results, recommend to continue on meropenem monotherapy for now with the possibility to de-escalate coverage upon discharge to oral regimen with metronidazole.  We believe Gemella to be contaminant.    ANTIMICROBIAL THERAPY    meropenem (MERREM) 1gm IVPB in 100ml NS (VTB)     Code Status:   Code Status and Medical Interventions:   Ordered at: 04/25/23 1937     Level Of Support Discussed With:    Patient     Code Status (Patient has no pulse and is not breathing):    CPR (Attempt to Resuscitate)     Medical Interventions (Patient has pulse or is breathing):    Full Support       Shell Harrison PA-C  05/05/23  09:43 EDT

## 2023-05-05 NOTE — THERAPY TREATMENT NOTE
Acute Care - Occupational Therapy Treatment Note   Jorge     Patient Name: Orquidea Rosenberg  : 1945  MRN: [id number]  Today's Date: 2023  Onset of Illness/Injury or Date of Surgery: 23 (admission date)          Admit Date: 2023       ICD-10-CM ICD-9-CM   1. Vulvar cancer  C51.9 184.4   2. 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 Acute kidney injury  N17.9 584.9   4. Chronic anemia  D64.9 285.9   5. Bacteremia  R78.81 790.7     Patient Active Problem List   Diagnosis   • COVID-19   • Vulvar cancer     Past Medical History:   Diagnosis Date   • Arthritis    • COPD (chronic obstructive pulmonary disease)    • Elevated cholesterol    • History of transfusion    • Hypertension    • Vulval ca      Past Surgical History:   Procedure Laterality Date   • RADICAL VULVECTOMY  2019   • RADICAL VULVECTOMY Bilateral 2019         OT ASSESSMENT FLOWSHEET (last 12 hours)     OT Evaluation and Treatment     Row Name 23 0956                   OT Time and Intention    Document Type therapy note (daily note)  -KR        Mode of Treatment occupational therapy  -KR        Patient Effort adequate  -KR        Symptoms Noted During/After Treatment fatigue  -KR           Shoulder (Therapeutic Exercise)    Shoulder AROM (Therapeutic Exercise) bilateral;flexion;extension;10 repetitions;3 sets;supine  -KR           Hand (Therapeutic Exercise)    Hand AROM/AAROM (Therapeutic Exercise) bilateral;finger flexion;finger extension;10 repetitions;3 sets  -KR           Wound 23 1414 Left vagina    Wound - Properties Group Placement Date: 23  -LB Placement Time: 1414  -LB Present on Hospital Admission: Y  -LB Side: Left  -LB Location: vagina  -LB    Retired Wound - Properties Group Placement Date: 23  -LB Placement Time: 1414  -LB Present on Hospital Admission: Y  -LB Side: Left  -LB Location: vagina  -LB    Retired Wound - Properties Group Date first assessed: 23  -LB  Time first assessed: 1414  -LB Present on Hospital Admission: Y  -LB Side: Left  -LB Location: vagina  -LB       Therapy Assessment/Plan (OT)    Comment, Therapy Assessment/Plan (OT) Pt seen on this date for continued BUE ther ex to enhance strength/endurance needed to assist with self care/mobility skills. Pt tolerating activity adequately at this time.  -TAURUS              User Key  (r) = Recorded By, (t) = Taken By, (c) = Cosigned By    Initials Name Effective Dates    KR Justen Clark OT 06/16/21 -     Veronica Bansal RN 10/20/21 -                        OT Recommendation and Plan     Plan of Care Review  Plan of Care Reviewed With: patient, daughter  Plan of Care Reviewed With: patient, daughter        Time Calculation:     Therapy Charges for Today     Code Description Service Date Service Provider Modifiers Qty    70020346260  OT THERAPEUTIC ACT EA 15 MIN 5/4/2023 Justen Clark OT GO 1    [id number] HC OT THERAPEUTIC ACT EA 15 MIN 5/5/2023 Justen Clark OT GO 2               Justen Clark OT  5/5/2023

## 2023-05-05 NOTE — PLAN OF CARE
Goal Outcome Evaluation:           Progress: no change  Outcome Evaluation: pt has been resting in bed. pt worked with PT. PT currently in chair. wound care done per orders. pt had c/o pain; prn meds given. no other changes to note at this time; will continue to monitor.

## 2023-05-05 NOTE — THERAPY TREATMENT NOTE
Acute Care - Physical Therapy Treatment Note   [place]     Patient Name: Orquidea Rosenberg  : 1945  MRN: [id number]  Today's Date: 2023   Onset of Illness/Injury or Date of Surgery: 23 (admission date)  Visit Dx:     ICD-10-CM ICD-9-CM   1. Vulvar cancer  C51.9 184.4   2. 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 Acute kidney injury  N17.9 584.9   4. Chronic anemia  D64.9 285.9   5. Bacteremia  R78.81 790.7     Patient Active Problem List   Diagnosis   • COVID-19   • Vulvar cancer     Past Medical History:   Diagnosis Date   • Arthritis    • COPD (chronic obstructive pulmonary disease)    • Elevated cholesterol    • History of transfusion    • Hypertension    • Vulval ca      Past Surgical History:   Procedure Laterality Date   • RADICAL VULVECTOMY  2019   • RADICAL VULVECTOMY Bilateral 2019     PT Assessment (last 12 hours)     PT Evaluation and Treatment     Row Name 23 [facility]          Physical Therapy Time and Intention    Document Type therapy note (daily note)  -     Mode of Treatment physical therapy  -     Patient Effort adequate  -     Comment Pt assisted to chair near bedside, time taken for O2 to recover, LE TE performed while in chair, left reclined with CL and family in room.  -     Row Name 23 1123          Bed Mobility    Bed Mobility supine-sit  -     Supine-Sit [place] (Bed Mobility) modified independence  -     Row Name 23          Transfers    Transfers sit-stand transfer;stand-sit transfer;bed-chair transfer  -     Row Name 23          Bed-Chair Transfer    Bed-Chair [place] (Transfers) --  CGA given and recommended for transfer  -     Row Name 23          Sit-Stand Transfer    Sit-Stand [place] (Transfers) standby assist;contact guard  -     Assistive Device (Sit-Stand Transfers) --  this PT HHA for safety given  -     Row Name 23          Stand-Sit Transfer     Stand-Sit [place] (Transfers) set up;standby assist;contact guard  set up locking chair  -     Assistive Device (Stand-Sit Transfers) --  chair/this PT  -     Row Name 05/05/23 1123          Gait/Stairs (Locomotion)    Comment, (Gait/Stairs) Some steps taken at bedside with transfer into chair  -     Row Name 05/05/23 1123          Motor Skills    Therapeutic Exercise hip;knee  LAQ, marches grossly x15-2- reps 2-3 sets  -     Row Name             Wound 05/01/23 1414 Left vagina    Wound - Properties Group Placement Date: 05/01/23  -LB Placement Time: 1414  -LB Present on Hospital Admission: Y  -LB Side: Left  -LB Location: vagina  -LB    Retired Wound - Properties Group Placement Date: 05/01/23  -LB Placement Time: 1414  -LB Present on Hospital Admission: Y  -LB Side: Left  -LB Location: vagina  -LB    Retired Wound - Properties Group Date first assessed: 05/01/23  -LB Time first assessed: 1414  -LB Present on Hospital Admission: Y  -LB Side: Left  -LB Location: vagina  -LB    Row Name 05/05/23 1123          Plan of Care Review    Outcome Evaluation Pt tolerated intervention well, pleasant and cooperative with therapy, needed recovery from transfer form bed to chair  -[facility] Name 05/05/23 1123          Positioning and Restraints    Pre-Treatment Position in bed  -     Post Treatment Position [facility]     In Chair sitting;call light within reach;with family/caregiver  -           User Key  (r) = Recorded By, (t) = Taken By, (c) = Cosigned By    Initials Name Provider Type    Charlotte Hogue, PT Physical Therapist    Veronica Bansal, RN Registered Nurse                  PT Recommendation and Plan  Planned Therapy Interventions (PT): balance training, bed mobility training, gait training, transfer training, strengthening, patient/family education, postural re-education, stretching, stair training, ROM (range of motion)  Therapy Frequency (PT): 2 times/wk (1-5x/wk)  Outcome Evaluation: Pt  tolerated intervention well, pleasant and cooperative with therapy, needed recovery from transfer form bed to chair       Time Calculation:    PT Charges     Row Name 05/05/23 1150             Time Calculation    Start Time 1123  -      PT Received On 05/05/23  -         Time Calculation- PT    Total Timed Code Minutes- PT 15 minute(s)  -            User Key  (r) = Recorded By, (t) = Taken By, (c) = Cosigned By    Initials Name Provider Type     Charlotte Machado, PT Physical Therapist              Therapy Charges for Today     Code Description Service Date Service Provider Modifiers Qty    46319148087 HC PT THER PROC EA 15 MIN 5/4/2023 Charlotte Machado, PT GP 1    69229120109 HC PT THERAPEUTIC ACT EA 15 MIN 5/4/2023 Charlotte Machado, PT GP 1    78262566471 HC PT THERAPEUTIC ACT EA 15 MIN 5/5/2023 Charlotte Machado, PT GP 1          PT G-Codes  AM-PAC 6 Clicks Score (PT): 9    Charlotte Machado, PT  5/5/2023

## 2023-05-05 NOTE — PLAN OF CARE
Goal Outcome Evaluation:              Outcome Evaluation: patient has rested well tonight. prn pain medication given. patient continues to have bowel movements states it is very painful but that pain medication helps her.

## 2023-05-05 NOTE — PLAN OF CARE
Goal Outcome Evaluation:  Plan of Care Reviewed With: patient        Progress: no change  Outcome Evaluation: pt admitted to swing bed. vitals and assessment obtained

## 2023-05-05 NOTE — PAYOR COMM NOTE
"CONTACT:  FREDDIE BARNES MSN, APRN  UTILIZATION MANAGEMENT DEPT.  [facility]  [address], [address]  PHONE:  [phone]  FAX: [phone]    PATIENT DISCHARGED TO SWING BED ON 5/5/23    REFER TO AUTH # [id number]     Orquidea Rosenberg (77 y.o. Female)       Date of Birth   1945    Social Security Number       Address   [address] [address]    Home Phone   [phone]    MRN   [id number]       Anglican   Scientologist    Marital Status                               Admission Date   4/25/23    Admission Type   Emergency    Admitting Provider   Dalila Mcdonald MD    Attending Provider       Department, Room/Bed   [place] 425977/       Discharge Date   5/5/2023    Discharge Disposition   Swing Bed w/Planned Readmission    Discharge Destination   Other                              Attending Provider: (none)   Allergies: Penicillins    Isolation: None   Infection: None   Code Status: CPR    Ht: 167.6 cm (66\")   Wt: 82.6 kg (182 lb)    Admission Cmt: None   Principal Problem: Vulvar cancer [C51.9]                   Active Insurance as of 4/25/2023       Primary Coverage       Payor Plan Insurance Group Employer/Plan Group    [facility] MEDICARE REPLACEMENT [facility] MEDICARE REPLACEMENT [id number]       Payor Plan Address Payor Plan Phone Number Payor Plan Fax Number Effective Dates    [address] [address]   1/1/2021 - None Entered    [facility] [id number]         Subscriber Name Subscriber Birth Date Member ID       ORQUIDEA ROSENBERG 1945 [id number]                     Emergency Contacts        (Rel.) Home Phone Work Phone Mobile Phone    Gavin Rosenberg (Son) [phone] -- --    AnabelArronLilli (Daughter) [phone] -- [phone]              Discharge Summary    No notes of this type exist for this encounter.       "

## 2023-05-05 NOTE — INTERVAL H&P NOTE
H&P updated. The patient was examined and the following changes are noted:    Patient was admitted with abdominal pain, nausea vomiting and diarrhea in the setting of complex pelvic mass due to metastatic vulvar cancer.  Patient is being treated for possible underlying abscess/infection with IV Merrem at the direction of infectious disease.  The patient also has required supplemental oxygen during her hospital stay for presumed bronchospasm with some improvement in her respiratory status.  Patient's pain medication has been adjusted for improved pain management.  Patient with significant generalized weakness/debility and has been working with PT and OT.  It is felt that the patient would benefit from ongoing therapy prior to returning home and patient also remains on IV antibiotics; as such, patient has been accepted and discharged to swing bed status today.  For physical examination, please refer to my discharge summary dated today.

## 2023-05-05 NOTE — DISCHARGE SUMMARY
Discharge Summary:    Date of Admission: 4/25/2023  Date of Discharge:  5/5/2023    PCP: Jackeline Denson APRN    Patient being discharged to Swing Bed today    DISCHARGE DIAGNOSIS  #Complicated UTI in the setting of necrotic large pelvic tumor/vulvar cancer and fistula formation between the tumor and urinary bladder and rectum causing fecaloid urine  #Clostridium subterminale bacteremia  #Sepsis, present upon admission  #Suspect steroid-induced leukocytosis, improved  #Mild hyperkalemia, improved  #Metastatic vulvar cancer to include extensive lung metastasis  #Acute hypoxic respiratory failure likely secondary to lung metastasis although underlying pneumonia cannot be entirely ruled out  #Chronic anemia with iron deficiency and acute exacerbation requiring 1 unit PRBCs  #Bronchospasm, clinically and symptomatically improving  #Acute kidney injury likely due to sepsis, resolving  #Generalized weakness/physical debility  #Moderate hypoalbuminemia  #Neoplasm related pain    SECONDARY DIAGNOSES  Past Medical History:   Diagnosis Date   • Arthritis    • COPD (chronic obstructive pulmonary disease)    • Elevated cholesterol    • History of transfusion    • Hypertension    • Vulval ca        CONSULTS   Dr. Flaherty - Infectious disease  Aysha Saucedo - Palliative Care BERTHA Rivera - Urology BERTHA Lehman/Dr. Gan - General surgery    PROCEDURES PERFORMED  -R IJ Central line placement    -Echocardiogram:  Interpretation Summary       •  Left ventricular systolic function is normal. Left ventricular ejection fraction appears to be 66 - 70%.  •  Left ventricular wall thickness is consistent with mild concentric hypertrophy.  •  Left ventricular diastolic function is consistent with (grade I) impaired relaxation.  •  The right atrial cavity is mildly  dilated.  •  Mild calcific aortic valve stenosis and mild  aortic valve regurgitation are present.  •  Moderate tricuspid valve regurgitation is present.  •   Estimated right ventricular systolic pressure from tricuspid regurgitation is markedly elevated (>55 mmHg).  •  Severe pulmonary hypertension is present.  •  There is no evidence of pericardial effusion.     HOSPITAL COURSE  Patient is a 77 y.o. female presented to [facility] complaining of nausea, vomiting, diarrhea and suprapubic pain.  Please see the admitting history and physical for further details.    Patient was admitted to the hospital medicine service.  CT abdomen/pelvis failed a large complex pelvic mass with abnormal fistulous communications between the anterior rectum and the posterior aspect of the pelvic mass and at least 1/possibly 2 fistulous connections between the mass and bladder.  Patient with central debris noted within the mass that represented abscess versus necrosis.  Patient also with left-sided hydronephrosis and hydroureter with left-sided ureteral stent in place.    Work-up revealed Clostridium bacteremia.  Patient with CT abdomen/pelvis findings noted above indicative of progression of her metastatic vulvar cancer.  CT imaging of the lungs revealed metastatic lung lesions.  Patient was seen by palliative care and at present time remains a full code (despite her overall poor prognosis).  Patient was seen by infectious disease and was started on antibiotic therapy; currently is on intravenous Merrem.    During her hospitalization, the patient experienced some respiratory distress, hypoxia and was noted to have wheezing.  Patient was treated with scheduled nebulized inhalants and a course of steroids for suspected bronchospasm.  Patient is improving from a respiratory standpoint and overall remained stable on 2 to 3 L/min nasal cannula.    Patient with worsening leukocytosis during her hospitalization that was thought to be steroid-induced.  Repeat CT chest, abdomen and pelvis were performed and did not reveal any acute changes.  The patient's leukocytosis is slowly downtrending  "at this time.  Patient has remained afebrile.  Repeat blood cultures are with no growth to date.    Patient with significant physical weakness and debility.  PT and OT were consulted and patient has been participating with them.  It was felt that the patient would benefit from further physical and occupational therapy in addition to intravenous antibiotics.  Patient has been accepted to swing bed today to complete antibiotic therapy and to continue with the above-mentioned therapies.    CONDITION ON DISCHARGE  Stable.    VITAL SIGNS  /92 (BP Location: Right arm, Patient Position: Lying)   Pulse 72   Temp 97.8 °F (36.6 °C) (Oral)   Resp 15   Ht 167.6 cm (66\")   Wt 82.6 kg (182 lb)   SpO2 98%   BMI 29.38 kg/m²   Objective:  General Appearance:  Comfortable, well-appearing and in no acute distress (Supplemental oxygen in place).    Vital signs: (most recent): Blood pressure 144/92, pulse 72, temperature 97.8 °F (36.6 °C), temperature source Oral, resp. rate 15, height 167.6 cm (66\"), weight 82.6 kg (182 lb), SpO2 98 %, not currently breastfeeding.  Vital signs are normal.    Output: Producing stool (from cutaneous ostomy).    HEENT: Normal HEENT exam.    Lungs:  Normal effort.  Breath sounds clear to auscultation.  There are decreased breath sounds (B/L breath sounds).  No rales, wheezes or rhonchi.    Heart: Normal rate.  S1 normal and S2 normal.  No murmur, gallop or friction rub.   Chest: Symmetric chest wall expansion.   Abdomen: Abdomen is soft and non-distended.  Bowel sounds are normal.   There is no abdominal tenderness.     Extremities: There is no deformity or dependent edema.    Pulses: Distal pulses are intact.    Neurological: Patient is alert and oriented to person, place and time.    Skin:  Warm and dry.  No rash or ulceration.         Present during visit: CNAs    DISCHARGE DISPOSITION   Swing Bed w/Planned Readmission    DISCHARGE MEDICATIONS  No current facility-administered medications " for this encounter.  No current outpatient medications on file.    Facility-Administered Medications Ordered in Other Encounters:   •  ascorbic acid (VITAMIN C) tablet 500 mg, 500 mg, Oral, Daily, Taisha Crowell DO  •  budesonide (PULMICORT) nebulizer solution 0.5 mg, 0.5 mg, Nebulization, BID - RT, Taisha Crowell, DO  •  [START ON 5/6/2023] Enoxaparin Sodium (LOVENOX) syringe 40 mg, 40 mg, Subcutaneous, Daily, Taisha Crowell DO  •  [START ON 5/6/2023] ferrous sulfate tablet 325 mg, 325 mg, Oral, Daily With Breakfast, Taisha Crowell, DO  •  guaiFENesin (MUCINEX) 12 hr tablet 600 mg, 600 mg, Oral, Q12H, Taisha Crowell, DO  •  ipratropium-albuterol (DUO-NEB) nebulizer solution 3 mL, 3 mL, Nebulization, 4x Daily - RT, Taisha Crowell DO  •  [START ON 5/6/2023] levothyroxine (SYNTHROID, LEVOTHROID) tablet 100 mcg, 100 mcg, Oral, Q AM, Taisha Crowell, DO  •  meropenem (MERREM) 1 g in sodium chloride 0.9 % 100 mL IVPB-VTB, 1 g, Intravenous, Q8H, Taisha Crowell, , 1 g at 05/05/23 1708  •  morphine injection 4 mg, 4 mg, Intravenous, Q4H PRN, Taisha Crowell DO  •  nitroglycerin (NITROSTAT) SL tablet 0.4 mg, 0.4 mg, Sublingual, Q5 Min PRN, Taisha Crowell DO  •  nystatin (MYCOSTATIN) powder, , Topical, Q12H, Taisha Crowell DO  •  ondansetron (ZOFRAN) tablet 4 mg, 4 mg, Oral, Q8H PRN, Taisha Crowell, DO  •  oxyCODONE-acetaminophen (PERCOCET)  MG per tablet 1 tablet, 1 tablet, Oral, Q6H PRN, Taisha Crowell, DO  •  polyethylene glycol (MIRALAX) packet 17 g, 17 g, Oral, Daily, Taisha Crowell, DO  •  sodium chloride 0.9 % flush 10 mL, 10 mL, Intravenous, PRN, Taisha Crowell, DO  •  sodium chloride 0.9 % flush 10 mL, 10 mL, Intravenous, Q12H, Taisha Crowell, DO  •  sodium chloride 0.9 % flush 10 mL, 10 mL, Intravenous, PRN, Taisha Crowell, DO  •  sodium chloride 0.9 % flush 10 mL, 10 mL, Intravenous, Q12H, Taisha Crowell  DO Ryan  •  sodium chloride 0.9 % flush 10 mL, 10 mL, Intravenous, PRN, Taisha Crowell DO  •  sodium chloride 0.9 % flush 10 mL, 10 mL, Intravenous, Q12H, Taisha Crowell DO  •  sodium chloride 0.9 % flush 10 mL, 10 mL, Intravenous, Q12H, Taisha Crowell,   •  sodium chloride 0.9 % flush 10 mL, 10 mL, Intravenous, Q12H, Taisha Crowell DO  •  sodium chloride 0.9 % flush 10 mL, 10 mL, Intravenous, PRN, Taisha Crowell DO  •  sodium chloride 0.9 % flush 20 mL, 20 mL, Intravenous, PRN, Taisha Crowell DO  •  sodium chloride 0.9 % infusion 40 mL, 40 mL, Intravenous, PRN, Taisha Crowell DO  •  sodium chloride 0.9 % infusion 40 mL, 40 mL, Intravenous, PRN, Taisha Crowell DO  •  sodium chloride 0.9 % infusion 40 mL, 40 mL, Intravenous, PRN, Taisha Crowell DO  •  [START ON 5/19/2023] tuberculin injection 5 Units, 5 Units, Intradermal, Once, Taisha Crowell DO    DISCHARGE DIET   Dietary Orders (From admission, onward)     Start     Ordered    04/25/23 2219  Diet: Regular/House Diet; Texture: Regular Texture (IDDSI 7); Fluid Consistency: Thin (IDDSI 0)  Diet Effective Now        References:    Diet Order [place]   Question Answer Comment   Diets: Regular/House Diet    Texture: Regular Texture (IDDSI 7)    Fluid Consistency: Thin (IDDSI 0)        04/25/23 2218                ACTIVITY AT DISCHARGE   as per therapy orders      TEST  RESULTS PENDING AT DISCHARGE       The ASCVD Risk score (Dewayne DK, et al., 2019) failed to calculate for the following reasons:    Cannot find a previous HDL lab    Cannot find a previous total cholesterol lab     Taisha Crowell DO  05/05/23  14:41 EDT      Time: greater than 30 minutes.

## 2023-05-05 NOTE — CASE MANAGEMENT/SOCIAL WORK
Discharge Planning Assessment  [facility]     Patient Name: Orquidea Rosenberg  MRN: [id number]  Today's Date: 5/5/2023    Admit Date: 4/25/2023    Plan: SS was notifed by Andrew Bell RN pt has been approved for swing bed admission on this date. SS to follow.   Discharge Plan     Row Name 05/05/23 1002       Plan    Plan SS was notifed by ADEOLA Gonzalez RN, Andrew pt has been approved for swing bed admission on this date. SS to follow.                HARPAL Stanley

## 2023-05-06 PROCEDURE — 99308 SBSQ NF CARE LOW MDM 20: CPT | Performed by: PHYSICIAN ASSISTANT

## 2023-05-06 PROCEDURE — 94799 UNLISTED PULMONARY SVC/PX: CPT

## 2023-05-06 PROCEDURE — 94640 AIRWAY INHALATION TREATMENT: CPT

## 2023-05-06 PROCEDURE — 97110 THERAPEUTIC EXERCISES: CPT | Performed by: OCCUPATIONAL THERAPIST

## 2023-05-06 PROCEDURE — 25010000002 MEROPENEM PER 100 MG: Performed by: INTERNAL MEDICINE

## 2023-05-06 PROCEDURE — 25010000002 ENOXAPARIN PER 10 MG: Performed by: INTERNAL MEDICINE

## 2023-05-06 PROCEDURE — 97162 PT EVAL MOD COMPLEX 30 MIN: CPT

## 2023-05-06 RX ADMIN — ENOXAPARIN SODIUM 40 MG: 40 INJECTION SUBCUTANEOUS at 08:31

## 2023-05-06 RX ADMIN — Medication 10 ML: at 20:45

## 2023-05-06 RX ADMIN — GUAIFENESIN 600 MG: 600 TABLET, EXTENDED RELEASE ORAL at 20:45

## 2023-05-06 RX ADMIN — Medication 10 ML: at 08:31

## 2023-05-06 RX ADMIN — LEVOTHYROXINE SODIUM 100 MCG: 100 TABLET ORAL at 05:28

## 2023-05-06 RX ADMIN — IPRATROPIUM BROMIDE AND ALBUTEROL SULFATE 3 ML: .5; 2.5 SOLUTION RESPIRATORY (INHALATION) at 18:21

## 2023-05-06 RX ADMIN — IPRATROPIUM BROMIDE AND ALBUTEROL SULFATE 3 ML: .5; 2.5 SOLUTION RESPIRATORY (INHALATION) at 07:23

## 2023-05-06 RX ADMIN — MEROPENEM 1 G: 1 INJECTION, POWDER, FOR SOLUTION INTRAVENOUS at 17:15

## 2023-05-06 RX ADMIN — OXYCODONE HYDROCHLORIDE AND ACETAMINOPHEN 1 TABLET: 10; 325 TABLET ORAL at 08:31

## 2023-05-06 RX ADMIN — Medication 10 ML: at 20:46

## 2023-05-06 RX ADMIN — BUDESONIDE 0.5 MG: 0.5 INHALANT RESPIRATORY (INHALATION) at 18:21

## 2023-05-06 RX ADMIN — IPRATROPIUM BROMIDE AND ALBUTEROL SULFATE 3 ML: .5; 2.5 SOLUTION RESPIRATORY (INHALATION) at 00:21

## 2023-05-06 RX ADMIN — MEROPENEM 1 G: 1 INJECTION, POWDER, FOR SOLUTION INTRAVENOUS at 02:03

## 2023-05-06 RX ADMIN — GUAIFENESIN 600 MG: 600 TABLET, EXTENDED RELEASE ORAL at 08:31

## 2023-05-06 RX ADMIN — NYSTATIN: 100000 POWDER TOPICAL at 20:45

## 2023-05-06 RX ADMIN — OXYCODONE HYDROCHLORIDE AND ACETAMINOPHEN 1 TABLET: 10; 325 TABLET ORAL at 17:37

## 2023-05-06 RX ADMIN — OXYCODONE HYDROCHLORIDE AND ACETAMINOPHEN 500 MG: 500 TABLET ORAL at 08:31

## 2023-05-06 RX ADMIN — MEROPENEM 1 G: 1 INJECTION, POWDER, FOR SOLUTION INTRAVENOUS at 11:52

## 2023-05-06 RX ADMIN — Medication 10 ML: at 08:32

## 2023-05-06 RX ADMIN — NYSTATIN: 100000 POWDER TOPICAL at 08:31

## 2023-05-06 RX ADMIN — FERROUS SULFATE TAB 325 MG (65 MG ELEMENTAL FE) 325 MG: 325 (65 FE) TAB at 08:31

## 2023-05-06 RX ADMIN — BUDESONIDE 0.5 MG: 0.5 INHALANT RESPIRATORY (INHALATION) at 07:23

## 2023-05-06 RX ADMIN — IPRATROPIUM BROMIDE AND ALBUTEROL SULFATE 3 ML: .5; 2.5 SOLUTION RESPIRATORY (INHALATION) at 12:43

## 2023-05-06 NOTE — THERAPY TREATMENT NOTE
Acute Care - Occupational Therapy Treatment Note  [facility]     Patient Name: Orquidea Rosenberg  : 1945  MRN: [id number]  Today's Date: 2023             Admit Date: 2023     No diagnosis found.  Patient Active Problem List   Diagnosis   • COVID-19   • Vulvar cancer   • Physical debility     Past Medical History:   Diagnosis Date   • Arthritis    • COPD (chronic obstructive pulmonary disease)    • Elevated cholesterol    • History of transfusion    • Hypertension    • Vulval ca      Past Surgical History:   Procedure Laterality Date   • RADICAL VULVECTOMY  2019   • RADICAL VULVECTOMY Bilateral 2019         OT ASSESSMENT FLOWSHEET (last 12 hours)     OT Evaluation and Treatment     Row Name 23 1456                   OT Time and Intention    Subjective Information complains of;weakness;dyspnea  -BF        Document Type therapy note (daily note)  -BF        Mode of Treatment occupational therapy  -BF        Patient Effort good  -BF        Symptoms Noted During/After Treatment fatigue;shortness of breath  -BF           General Information    Existing Precautions/Restrictions fall;oxygen therapy device and L/min  -BF           Cognition    Orientation Status (Cognition) oriented x 3  -BF        Follows Commands (Cognition) WFL  -BF           Motor Skills    Motor Skills motor control/coordination interventions  -BF        Functional Endurance Poor  -BF        Motor Control/Coordination Interventions gross motor coordination activities;therapeutic exercise/ROM  BUE GMC therex, strengthening w/ theraband, handgripper  -BF           Wound 23 1414 Left vagina    Wound - Properties Group Placement Date: 23  -LB Placement Time: 1414  -LB Present on Hospital Admission: Y  -LB Side: Left  -LB Location: vagina  -LB    Retired Wound - Properties Group Placement Date: 23  -LB Placement Time: 1414  -LB Present on Hospital Admission: Y  -LB Side: Left  -LB Location: vagina  -LB    Retired  Wound - Properties Group Date first assessed: 05/01/23  -LB Time first assessed: 1414  -LB Present on Hospital Admission: Y  -LB Side: Left  -LB Location: vagina  -LB       Positioning and Restraints    In Bed supine;call light within reach;encouraged to call for assist  -BF              User Key  (r) = Recorded By, (t) = Taken By, (c) = Cosigned By    Initials Name Effective Dates    Leah Hills OT 06/16/21 -     LB Veronica Mendez RN 10/20/21 -                        OT Recommendation and Plan           Outcome Measures     Row Name 05/06/23 1500 05/05/23 1623 05/05/23 1600       How much help from another is currently needed...    Putting on and taking off regular lower body clothing? 2  -BF 2  -KR --    Bathing (including washing, rinsing, and drying) 2  -BF 2  -KR --    Toileting (which includes using toilet bed pan or urinal) 1  -BF 1  -KR --    Putting on and taking off regular upper body clothing 3  -BF 3  -KR --    Taking care of personal grooming (such as brushing teeth) 3  -BF 3  -KR --    Eating meals 3  -BF 3  -KR --    AM-PAC 6 Clicks Score (OT) 14  -BF 14  -KR --       Functional Assessment    Outcome Measure Options AM-PAC 6 Clicks Daily Activity (OT)  -BF -- AM-PAC 6 Clicks Daily Activity (OT)  -KR          User Key  (r) = Recorded By, (t) = Taken By, (c) = Cosigned By    Initials Name Provider Type    Leah Hills OT Occupational Therapist    Justen Gurrola OT Occupational Therapist                Time Calculation:    Time Calculation- OT     Row Name 05/06/23 1501             Time Calculation- OT    OT Start Time 1430  -BF      Total Timed Code Minutes- OT 15 minute(s)  -BF            User Key  (r) = Recorded By, (t) = Taken By, (c) = Cosigned By    Initials Name Provider Type    Leah Hills OT Occupational Therapist              Therapy Charges for Today     Code Description Service Date Service Provider Modifiers Qty    99219649304  OT THER PROC EA  15 MIN 5/6/2023 Leah Hickman, OT GO 1               Leah Hickman, OT  5/6/2023

## 2023-05-06 NOTE — THERAPY EVALUATION
Acute Care - Physical Therapy Initial Evaluation   Jorge     Patient Name: Orquidea Rosenberg  : 1945  MRN: [id number]  Today's Date: 2023   Onset of Illness/Injury or Date of Surgery: 23 (swing bed admit)  Visit Dx:   No diagnosis found.  Patient Active Problem List   Diagnosis   • COVID-19   • Vulvar cancer   • Physical debility     Past Medical History:   Diagnosis Date   • Arthritis    • COPD (chronic obstructive pulmonary disease)    • Elevated cholesterol    • History of transfusion    • Hypertension    • Vulval ca      Past Surgical History:   Procedure Laterality Date   • RADICAL VULVECTOMY  2019   • RADICAL VULVECTOMY Bilateral 2019     PT Assessment (last 12 hours)     PT Evaluation and Treatment     Row Name 23 1546          Physical Therapy Time and Intention    Subjective Information complains of;dyspnea  -CT     Document Type evaluation  swing bed  -CT     Mode of Treatment physical therapy  -CT     Patient Effort adequate  -CT     Comment Pt admitted to swing bed and evaluated this date. Pt reports she was independent PLOF.  -CT     Row Name 23 1546          General Information    Patient Profile Reviewed yes  -CT     Onset of Illness/Injury or Date of Surgery 23  swing bed admit  -CT     Referring Physician Mervat  -CT     Patient Observations alert;cooperative;agree to therapy  -CT     Prior Level of Function independent:  -CT     Equipment Currently Used at Home rollator;wheelchair  -CT     Existing Precautions/Restrictions fall;oxygen therapy device and L/min  -CT     Equipment Issued to Patient gait belt  -CT     Risks Reviewed patient:  -CT     Benefits Reviewed patient:  -CT     Barriers to Rehab medically complex  -CT     Row Name 23 1546          Living Environment    Current Living Arrangements home  -CT     People in Home alone  -CT     Row Name 23 1546          Cognition    Affect/Mental Status (Cognition) WFL  -CT     Orientation Status  (Cognition) oriented x 3  -CT     Follows Commands (Cognition) WFL  -CT     Row Name 05/06/23 1546          Range of Motion Comprehensive    Comment, General Range of Motion BLE grossly WFL  -CT     Row Name 05/06/23 1546          Strength Comprehensive (MMT)    Comment, General Manual Muscle Testing (MMT) Assessment BLE grossly 4/5  -CT     Row Name 05/06/23 1546          Bed Mobility    Bed Mobility supine-sit  -CT     Supine-Sit Henry (Bed Mobility) modified independence  -CT     Bed Mobility, Safety Issues decreased use of arms for pushing/pulling;decreased use of legs for bridging/pushing  -CT     Assistive Device (Bed Mobility) bed rails;head of bed elevated  -CT     Row Name 05/06/23 1546          Transfers    Transfers sit-stand transfer;stand-sit transfer;bed-chair transfer  -CT     Row Name 05/06/23 1546          Bed-Chair Transfer    Bed-Chair Henry (Transfers) contact guard  -CT     Assistive Device (Bed-Chair Transfers) walker, front-wheeled  -CT     Row Name 05/06/23 1546          Sit-Stand Transfer    Sit-Stand Henry (Transfers) contact guard  -CT     Assistive Device (Sit-Stand Transfers) walker, front-wheeled  -CT     Row Name 05/06/23 1546          Stand-Sit Transfer    Stand-Sit Henry (Transfers) contact guard  -CT     Assistive Device (Stand-Sit Transfers) walker, front-wheeled  -CT     Row Name 05/06/23 1546          Gait/Stairs (Locomotion)    Henry Level (Gait) contact guard  -CT     Assistive Device (Gait) walker, front-wheeled  -CT     Distance in Feet (Gait) 4 ft during transfer to chair  -CT     Pattern (Gait) swing-to  -CT     Deviations/Abnormal Patterns (Gait) gait speed decreased;weight shifting decreased  -CT     Comment, (Gait/Stairs) pts shortness of breath limits mobility  -CT     Row Name 05/06/23 1546          Balance    Balance Assessment sitting static balance;sitting dynamic balance;standing static balance;standing dynamic balance  -CT      Static Sitting Balance set-up  -CT     Dynamic Sitting Balance contact guard  -CT     Position, Sitting Balance sitting edge of bed  -CT     Static Standing Balance contact guard  -CT     Position/Device Used, Standing Balance walker, front-wheeled  -CT     Row Name             Wound 05/01/23 1414 Left vagina    Wound - Properties Group Placement Date: 05/01/23  -LB Placement Time: 1414  -LB Present on Hospital Admission: Y  -LB Side: Left  -LB Location: vagina  -LB    Retired Wound - Properties Group Placement Date: 05/01/23  -LB Placement Time: 1414  -LB Present on Hospital Admission: Y  -LB Side: Left  -LB Location: vagina  -LB    Retired Wound - Properties Group Date first assessed: 05/01/23  -LB Time first assessed: 1414  -LB Present on Hospital Admission: Y  -LB Side: Left  -LB Location: vagina  -LB    Row Name 05/06/23 1546          Plan of Care Review    Plan of Care Reviewed With patient  -CT     Row Name 05/06/23 1546          Positioning and Restraints    Pre-Treatment Position in bed  -CT     Post Treatment Position chair  -CT     In Chair sitting;call light within reach;encouraged to call for assist;notified nsg  -CT     Row Name 05/06/23 1546          Therapy Assessment/Plan (PT)    Patient/Family Therapy Goals Statement (PT) Pt goals are to return to PLOF  -CT     Functional Level at Time of Evaluation (PT) CGA  -CT     PT Diagnosis (PT) decreased functional mobility  -CT     Rehab Potential (PT) fair, will monitor progress closely  -CT     Criteria for Skilled Interventions Met (PT) yes  -CT     Therapy Frequency (PT) 5 times/wk  -CT     Predicted Duration of Therapy Intervention (PT) 2-3 weeks  -CT     Row Name 05/06/23 1546          Therapy Plan Review/Discharge Plan (PT)    Therapy Plan Review (PT) evaluation/treatment results reviewed;care plan/treatment goals reviewed;risks/benefits reviewed;current/potential barriers reviewed;participants voiced agreement with care plan;participants  included;patient  -CT     Row Name 05/06/23 1546          Physical Therapy Goals    Transfer Goal Selection (PT) transfer, PT goal 1  -CT     Gait Training Goal Selection (PT) gait training, PT goal 1  -CT     Row Name 05/06/23 1546          Transfer Goal 1 (PT)    Activity/Assistive Device (Transfer Goal 1, PT) sit-to-stand/stand-to-sit;bed-to-chair/chair-to-bed  -CT     Searcy Level/Cues Needed (Transfer Goal 1, PT) modified independence  -CT     Time Frame (Transfer Goal 1, PT) by discharge  -CT     Row Name 05/06/23 1546          Gait Training Goal 1 (PT)    Activity/Assistive Device (Gait Training Goal 1, PT) gait (walking locomotion);assistive device use;increase endurance/gait distance  -CT     Searcy Level (Gait Training Goal 1, PT) modified independence  -CT     Distance (Gait Training Goal 1, PT) 15  -CT     Time Frame (Gait Training Goal 1, PT) by discharge  -CT           User Key  (r) = Recorded By, (t) = Taken By, (c) = Cosigned By    Initials Name Provider Type    CT Yahaira Gonzalez, PT Physical Therapist    Veronica Bansal, RN Registered Nurse                  PT Recommendation and Plan  Planned Therapy Interventions (PT): gait training, bed mobility training, balance training, home exercise program, manual therapy techniques, motor coordination training, neuromuscular re-education, postural re-education, patient/family education, strengthening, transfer training  Therapy Frequency (PT): 5 times/wk  Plan of Care Reviewed With: patient   Outcome Measures     Row Name 05/06/23 1500 05/05/23 1623 05/05/23 1600       How much help from another is currently needed...    Putting on and taking off regular lower body clothing? 2  -BF 2  -KR --    Bathing (including washing, rinsing, and drying) 2  -BF 2  -KR --    Toileting (which includes using toilet bed pan or urinal) 1  -BF 1  -KR --    Putting on and taking off regular upper body clothing 3  -BF 3  -KR --    Taking care of personal grooming  (such as brushing teeth) 3  -BF 3  -KR --    Eating meals 3  -BF 3  -KR --    AM-PAC 6 Clicks Score (OT) 14  -BF 14  -KR --       Functional Assessment    Outcome Measure Options AM-PAC 6 Clicks Daily Activity (OT)  -BF -- AM-PAC 6 Clicks Daily Activity (OT)  -KR          User Key  (r) = Recorded By, (t) = Taken By, (c) = Cosigned By    Initials Name Provider Type    BF Leah Hickman, OT Occupational Therapist    KR Justen Clark, OT Occupational Therapist                 Time Calculation:    PT Charges     Row Name 05/06/23 1555             Time Calculation    PT Received On 05/06/23  -CT      PT Goal Re-Cert Due Date 05/19/23  -CT            User Key  (r) = Recorded By, (t) = Taken By, (c) = Cosigned By    Initials Name Provider Type    CT Yahaira Gonzalez, PT Physical Therapist              Therapy Charges for Today     Code Description Service Date Service Provider Modifiers Qty    08834896576 HC PT EVAL MOD COMPLEXITY 4 5/6/2023 Yahaira Gonzalez, PT GP 1          PT G-Codes  Outcome Measure Options: AM-PAC 6 Clicks Daily Activity (OT)  AM-PAC 6 Clicks Score (PT): 10  AM-PAC 6 Clicks Score (OT): 14    Yahaira Gonzalez PT  5/6/2023

## 2023-05-06 NOTE — PROGRESS NOTES
"The patient has been admitted to swing bed; briefly seen and examined at bedside. She was in good spirits; when asked if the new pain medications (Percocet) were helping, she replied, \"I believe so; something is helping me.\" VSSAF; will repeat CBC in a.m. Patient has been afebrile. Continue PT/OT as tolerated/as available.   "

## 2023-05-06 NOTE — PLAN OF CARE
Goal Outcome Evaluation:           Progress: no change  Outcome Evaluation: Pt. resting in bed at this time. Pt. had complaints of severe pain, PRN pain meds given, MD contacted, see orders. No acute changes at this time. Pt. has no other complaints or concerns at this time. Will continue with plan of care.

## 2023-05-06 NOTE — PROGRESS NOTES
PROGRESS NOTE         Patient Identification:  Name:  Orquidea Rosenberg  Age:  77 y.o.  Sex:  female  :  1945  MRN:  [id number]  Visit Number:  [id number]  Primary Care Provider:  Jackeline Denson APRN         LOS: 1 day       ----------------------------------------------------------------------------------------------------------------------  Subjective       Chief Complaints:    No chief complaint on file.        Interval History:      Patient is sitting up in bed on 3 L nasal cannula in no apparent distress.  Eating breakfast and appears comfortable.  Family member is at bedside.  Denies any pain at time of exam, but states that she has intermittent abdominal pain.  Denies any other new complaints at this time.  Lungs are clear to auscultation bilaterally.  Abdomen is soft, nontender, with normal bowel sounds.  Afebrile.  No new labs today.    Review of Systems:    Constitutional: no fever, chills and night sweats.  Generalized fatigue.  Eyes: no eye drainage, itching or redness.  HEENT: no mouth sores, dysphagia or nose bleed.  Respiratory: No shortness of breath, No cough. No production of sputum.  Cardiovascular: no chest pain, no palpitations, no orthopnea.  Gastrointestinal: No nausea, no vomiting or diarrhea. No hematemesis or rectal bleeding.  Intermittent abdominal pain.  Genitourinary: no dysuria or polyuria.  Hematologic/lymphatic: no lymph node abnormalities, no easy bruising or easy bleeding.  Musculoskeletal: no muscle or joint pain.  Skin: No rash and no itching.  Neurological: no loss of consciousness, no seizure, no headache.  Behavioral/Psych: no depression or suicidal ideation.  Endocrine: no hot flashes.  Immunologic: negative.    ----------------------------------------------------------------------------------------------------------------------      Objective       Current [facility] Meds:  ascorbic acid, 500 mg, Oral, Daily  budesonide, 0.5 mg, Nebulization, BID -  RT  enoxaparin, 40 mg, Subcutaneous, Daily  ferrous sulfate, 325 mg, Oral, Daily With Breakfast  guaiFENesin, 600 mg, Oral, Q12H  ipratropium-albuterol, 3 mL, Nebulization, 4x Daily - RT  levothyroxine, 100 mcg, Oral, Q AM  meropenem, 1 g, Intravenous, Q8H  nystatin, , Topical, Q12H  polyethylene glycol, 17 g, Oral, Daily  sodium chloride, 10 mL, Intravenous, Q12H  sodium chloride, 10 mL, Intravenous, Q12H  sodium chloride, 10 mL, Intravenous, Q12H  sodium chloride, 10 mL, Intravenous, Q12H  sodium chloride, 10 mL, Intravenous, Q12H  [START ON 5/19/2023] tuberculin, 5 Units, Intradermal, Once         ----------------------------------------------------------------------------------------------------------------------    Vital Signs:  Temp:  [97.7 °F (36.5 °C)-98.4 °F (36.9 °C)] 97.7 °F (36.5 °C)  Heart Rate:  [] 77  Resp:  [15-20] 18  BP: ()/(41-92) 117/58  No data found.  SpO2 Percentage    05/06/23 0030 05/06/23 0723 05/06/23 0742   SpO2: 99% 99% 99%     SpO2:  [88 %-100 %] 99 %  on  Flow (L/min):  [3] 3;   Device (Oxygen Therapy): nasal cannula    Body mass index is 29.86 kg/m².  Wt Readings from Last 3 Encounters:   05/05/23 83.9 kg (185 lb)   05/04/23 82.6 kg (182 lb)   03/15/22 81.6 kg (180 lb)        Intake/Output Summary (Last 24 hours) at 5/6/2023 0945  Last data filed at 5/6/2023 0240  Gross per 24 hour   Intake 695.4 ml   Output --   Net 695.4 ml     Diet: Regular/House Diet; Texture: Regular Texture (IDDSI 7); Fluid Consistency: Thin (IDDSI 0)  ----------------------------------------------------------------------------------------------------------------------      Physical Exam:    Constitutional: Elderly, chronically ill-appearing female is sitting up in bed on 3 L nasal cannula.   Eating breakfast and appears comfortable.  Family member at bedside.  HENT:  Head: Normocephalic and atraumatic.  Mouth:  Moist mucous membranes.    Eyes:  Conjunctivae and EOM are normal.  No scleral  icterus.  Neck:  Neck supple.  No JVD present.  CVC to right IJ.   Cardiovascular:  Normal rate, regular rhythm and normal heart sounds with no murmur. No edema.  Pulmonary/Chest: Lungs clear to auscultation bilaterally.  Currently on 3 L nasal cannula.  Abdominal:  Soft.  Bowel sounds are normal.  No distension and no tenderness.  Musculoskeletal:  No edema, no tenderness, and no deformity.  No swelling or redness of joints.  Neurological:  Alert and oriented to person, place, and time.  No facial droop.  No slurred speech.   Skin:  Skin is warm and dry.  No rash noted.  No pallor. Vulvar deformity due to prior surgeries.  Psychiatric:  Normal mood and affect.  Behavior is normal.    ----------------------------------------------------------------------------------------------------------------------            Results from last 7 days   Lab Units 05/05/23  0454 05/04/23  0037 05/03/23  0040 05/02/23  0047 05/01/23  0729   CRP mg/dL  --   --   --   --  2.83*   WBC 10*3/mm3 11.90* 15.59* 15.11*   < > 14.45*   HEMOGLOBIN g/dL 7.8* 8.1* 8.1*   < > 8.9*   HEMATOCRIT % 27.9* 28.8* 28.3*   < > 30.7*   MCV fL 91.8 90.6 87.9   < > 88.0   MCHC g/dL 28.0* 28.1* 28.6*   < > 29.0*   PLATELETS 10*3/mm3 293 336 350   < > 392    < > = values in this interval not displayed.     Results from last 7 days   Lab Units 05/05/23  0454 05/04/23  0037 05/03/23  1655 05/03/23  0040   SODIUM mmol/L 139 140  --  142   POTASSIUM mmol/L 5.1 5.3* 5.1 5.7*   MAGNESIUM mg/dL  --  1.9  --   --    CHLORIDE mmol/L 110* 111*  --  112*   CO2 mmol/L 23.4 23.7  --  22.0   BUN mg/dL 29* 35*  --  36*   CREATININE mg/dL 0.92 0.96  --  1.21*   CALCIUM mg/dL 8.7 8.8  --  8.9   GLUCOSE mg/dL 87 99  --  136*   ALBUMIN g/dL  --   --   --  2.6*   BILIRUBIN mg/dL  --   --   --  <0.2   ALK PHOS U/L  --   --   --  76   AST (SGOT) U/L  --   --   --  6   ALT (SGPT) U/L  --   --   --  5   Estimated Creatinine Clearance: 55.9 mL/min (by C-G formula based on SCr of 0.92  mg/dL).  No results found for: AMMONIA    No results found for: HGBA1C, POCGLU  No results found for: HGBA1C  Lab Results   Component Value Date    TSH 2.620 03/15/2022       Blood Culture   Date Value Ref Range Status   04/25/2023 Abnormal Stain (C)  Preliminary   04/25/2023 Anaerobe (Organism type) (C)  Preliminary     No results found for: URINECX  No results found for: WOUNDCX  No results found for: STOOLCX  No results found for: RESPCX  Pain Management Panel            View : No data to display.                        ----------------------------------------------------------------------------------------------------------------------  Imaging Results (Last 24 Hours)       ** No results found for the last 24 hours. **            ----------------------------------------------------------------------------------------------------------------------    Pertinent Infectious Disease Results    Chest x-ray on 4/26/2023 showed no change in distribution of bilateral lung opacities which may represent changes of fibrosis and diffuse pulmonary nodules.  No pneumothorax.  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  This could represent secondary involvement of the leiomyomatous uterus or dystrophic calcifications within the malignancy.  Abnormal fistulous communications between the anterior rectum and the posterior aspect of the above-mentioned pelvic mass with at least 1 and possibly 2 fistulous connections as demonstrated on CT.  Central debris within the mass may represent abscess or necrosis.  Apparent fistulous communication between the mass in the bladder.  Left-sided hydronephrosis and hydroureter with left ureteral stent in place.  The distal pigtail difficult to confirm within the bladder and may be within the distal ureter.  Progressive widely disseminated pulmonary metastases.   Blood cultures on 4/25/2023 finalized as Clostridium subterminale and Gemella morbillorum.  Blood cultures on 4/29/2023 finalized as no growth. MRSA screen on 4/26/2023 was negative.  COVID-19 and flu A/B PCR on 4/25/2023 was negative. CT of the chest from 5/2/23 reports small bilateral pleural effusions, tiny pericardial effusion, bilateral parenchymal pulmonary nodules are again noted.  CT of the abdomen pelvis from 5/2/23 reports left double-J ureteral stent and moderate to severe left hydronephrosis of the left kidney, moderate stool throughout the colon, tiny bilateral pleural effusions, bilateral pulmonary lung base nodules are again noted.     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  Eating breakfast and appears comfortable.  Family member is at bedside.  Denies any pain at time of exam, but states that she has intermittent abdominal pain.  Denies any other new complaints at this time.  Lungs are clear to auscultation bilaterally.  Abdomen is soft, nontender, with normal bowel sounds.  Afebrile.  No new labs today.    Based on blood culture results, recommend to continue on meropenem monotherapy for now.  Patient could be de-escalated to oral metronidazole upon discharge to continue through 5/12/2023.  We believe Gemella to be contaminant.    ANTIMICROBIAL THERAPY    meropenem (MERREM) 1gm IVPB in 100ml NS (VTB)     Code Status:   Code Status and Medical Interventions:   Ordered at: 05/05/23 1450     Level Of Support Discussed With:    Patient     Code Status (Patient has no pulse and is not breathing):    CPR (Attempt to Resuscitate)     Medical Interventions (Patient has pulse or is  breathing):    Full Support       Shell Harrison PA-C  05/06/23  09:45 EDT

## 2023-05-06 NOTE — PLAN OF CARE
Goal Outcome Evaluation:  Plan of Care Reviewed With: patient        Progress: no change  Outcome Evaluation: pt has been resting in bed. wound care done per orders. no other changes to note at this time; will continue to monitor

## 2023-05-07 LAB
A-A DO2: 93.6 MMHG (ref 0–300)
ARTERIAL PATENCY WRIST A: POSITIVE
ATMOSPHERIC PRESS: 725 MMHG
BASE EXCESS BLDA CALC-SCNC: 0.4 MMOL/L (ref 0–2)
BASOPHILS # BLD AUTO: 0.03 10*3/MM3 (ref 0–0.2)
BASOPHILS NFR BLD AUTO: 0.4 % (ref 0–1.5)
BDY SITE: ABNORMAL
CO2 BLDA-SCNC: 26 MMOL/L (ref 22–33)
COHGB MFR BLD: 1.8 % (ref 0–5)
DEPRECATED RDW RBC AUTO: 65.8 FL (ref 37–54)
EOSINOPHIL # BLD AUTO: 0.44 10*3/MM3 (ref 0–0.4)
EOSINOPHIL NFR BLD AUTO: 6.1 % (ref 0.3–6.2)
ERYTHROCYTE [DISTWIDTH] IN BLOOD BY AUTOMATED COUNT: 20.1 % (ref 12.3–15.4)
GAS FLOW AIRWAY: 2 LPM
HCO3 BLDA-SCNC: 24.8 MMOL/L (ref 20–26)
HCT VFR BLD AUTO: 25.2 % (ref 34–46.6)
HCT VFR BLD CALC: 24.3 % (ref 38–51)
HGB BLD-MCNC: 7.3 G/DL (ref 12–15.9)
HGB BLDA-MCNC: 7.9 G/DL (ref 13.5–17.5)
IMM GRANULOCYTES # BLD AUTO: 0.15 10*3/MM3 (ref 0–0.05)
IMM GRANULOCYTES NFR BLD AUTO: 2.1 % (ref 0–0.5)
INDURATION: 0 MM (ref 0–10)
INHALED O2 CONCENTRATION: 28 %
LYMPHOCYTES # BLD AUTO: 0.71 10*3/MM3 (ref 0.7–3.1)
LYMPHOCYTES NFR BLD AUTO: 9.8 % (ref 19.6–45.3)
Lab: ABNORMAL
Lab: ABNORMAL
MCH RBC QN AUTO: 25.9 PG (ref 26.6–33)
MCHC RBC AUTO-ENTMCNC: 29 G/DL (ref 31.5–35.7)
MCV RBC AUTO: 89.4 FL (ref 79–97)
METHGB BLD QL: 0 % (ref 0–3)
MODALITY: ABNORMAL
MONOCYTES # BLD AUTO: 0.63 10*3/MM3 (ref 0.1–0.9)
MONOCYTES NFR BLD AUTO: 8.7 % (ref 5–12)
NEUTROPHILS NFR BLD AUTO: 5.3 10*3/MM3 (ref 1.7–7)
NEUTROPHILS NFR BLD AUTO: 72.9 % (ref 42.7–76)
NOTE: ABNORMAL
NOTIFIED BY: ABNORMAL
NOTIFIED WHO: ABNORMAL
NRBC BLD AUTO-RTO: 0 /100 WBC (ref 0–0.2)
OXYHGB MFR BLDV: 87.4 % (ref 94–99)
PCO2 BLDA: 38 MM HG (ref 35–45)
PCO2 TEMP ADJ BLD: ABNORMAL MM[HG]
PH BLDA: 7.42 PH UNITS (ref 7.35–7.45)
PH, TEMP CORRECTED: ABNORMAL
PLATELET # BLD AUTO: 268 10*3/MM3 (ref 140–450)
PMV BLD AUTO: 9 FL (ref 6–12)
PO2 BLDA: 53.8 MM HG (ref 83–108)
PO2 TEMP ADJ BLD: ABNORMAL MM[HG]
RBC # BLD AUTO: 2.82 10*6/MM3 (ref 3.77–5.28)
SAO2 % BLDCOA: 89 % (ref 94–99)
TB SKIN TEST: NEGATIVE
VENTILATOR MODE: ABNORMAL
WBC NRBC COR # BLD: 7.26 10*3/MM3 (ref 3.4–10.8)

## 2023-05-07 PROCEDURE — 94761 N-INVAS EAR/PLS OXIMETRY MLT: CPT

## 2023-05-07 PROCEDURE — 25010000002 MEROPENEM PER 100 MG: Performed by: PHYSICIAN ASSISTANT

## 2023-05-07 PROCEDURE — 94799 UNLISTED PULMONARY SVC/PX: CPT

## 2023-05-07 PROCEDURE — 82805 BLOOD GASES W/O2 SATURATION: CPT

## 2023-05-07 PROCEDURE — 99308 SBSQ NF CARE LOW MDM 20: CPT | Performed by: PHYSICIAN ASSISTANT

## 2023-05-07 PROCEDURE — 82375 ASSAY CARBOXYHB QUANT: CPT

## 2023-05-07 PROCEDURE — 25010000002 ENOXAPARIN PER 10 MG: Performed by: INTERNAL MEDICINE

## 2023-05-07 PROCEDURE — 25010000002 MEROPENEM PER 100 MG: Performed by: INTERNAL MEDICINE

## 2023-05-07 PROCEDURE — 83050 HGB METHEMOGLOBIN QUAN: CPT

## 2023-05-07 PROCEDURE — 85025 COMPLETE CBC W/AUTO DIFF WBC: CPT | Performed by: INTERNAL MEDICINE

## 2023-05-07 PROCEDURE — 36600 WITHDRAWAL OF ARTERIAL BLOOD: CPT

## 2023-05-07 RX ORDER — LOSARTAN POTASSIUM 50 MG/1
50 TABLET ORAL
Status: DISCONTINUED | OUTPATIENT
Start: 2023-05-07 | End: 2023-05-17

## 2023-05-07 RX ORDER — OXYCODONE AND ACETAMINOPHEN 10; 325 MG/1; MG/1
1 TABLET ORAL EVERY 4 HOURS PRN
Status: DISCONTINUED | OUTPATIENT
Start: 2023-05-07 | End: 2023-05-22 | Stop reason: HOSPADM

## 2023-05-07 RX ORDER — HYDROXYZINE HYDROCHLORIDE 25 MG/1
25 TABLET, FILM COATED ORAL 3 TIMES DAILY PRN
Status: DISCONTINUED | OUTPATIENT
Start: 2023-05-07 | End: 2023-05-22 | Stop reason: HOSPADM

## 2023-05-07 RX ADMIN — Medication 10 ML: at 08:21

## 2023-05-07 RX ADMIN — Medication 10 ML: at 20:55

## 2023-05-07 RX ADMIN — BUDESONIDE 0.5 MG: 0.5 INHALANT RESPIRATORY (INHALATION) at 18:34

## 2023-05-07 RX ADMIN — FERROUS SULFATE TAB 325 MG (65 MG ELEMENTAL FE) 325 MG: 325 (65 FE) TAB at 08:17

## 2023-05-07 RX ADMIN — OXYCODONE HYDROCHLORIDE AND ACETAMINOPHEN 500 MG: 500 TABLET ORAL at 08:17

## 2023-05-07 RX ADMIN — GUAIFENESIN 600 MG: 600 TABLET, EXTENDED RELEASE ORAL at 08:17

## 2023-05-07 RX ADMIN — OXYCODONE HYDROCHLORIDE AND ACETAMINOPHEN 1 TABLET: 10; 325 TABLET ORAL at 14:34

## 2023-05-07 RX ADMIN — NYSTATIN: 100000 POWDER TOPICAL at 20:56

## 2023-05-07 RX ADMIN — OXYCODONE HYDROCHLORIDE AND ACETAMINOPHEN 1 TABLET: 10; 325 TABLET ORAL at 18:44

## 2023-05-07 RX ADMIN — OXYCODONE HYDROCHLORIDE AND ACETAMINOPHEN 1 TABLET: 10; 325 TABLET ORAL at 08:18

## 2023-05-07 RX ADMIN — IPRATROPIUM BROMIDE AND ALBUTEROL SULFATE 3 ML: .5; 2.5 SOLUTION RESPIRATORY (INHALATION) at 06:25

## 2023-05-07 RX ADMIN — MEROPENEM 1 G: 1 INJECTION, POWDER, FOR SOLUTION INTRAVENOUS at 02:17

## 2023-05-07 RX ADMIN — MEROPENEM 1 G: 1 INJECTION, POWDER, FOR SOLUTION INTRAVENOUS at 11:02

## 2023-05-07 RX ADMIN — NYSTATIN: 100000 POWDER TOPICAL at 08:17

## 2023-05-07 RX ADMIN — IPRATROPIUM BROMIDE AND ALBUTEROL SULFATE 3 ML: .5; 2.5 SOLUTION RESPIRATORY (INHALATION) at 00:21

## 2023-05-07 RX ADMIN — BUDESONIDE 0.5 MG: 0.5 INHALANT RESPIRATORY (INHALATION) at 06:25

## 2023-05-07 RX ADMIN — OXYCODONE HYDROCHLORIDE AND ACETAMINOPHEN 1 TABLET: 10; 325 TABLET ORAL at 23:15

## 2023-05-07 RX ADMIN — MEROPENEM 1 G: 1 INJECTION, POWDER, FOR SOLUTION INTRAVENOUS at 17:53

## 2023-05-07 RX ADMIN — IPRATROPIUM BROMIDE AND ALBUTEROL SULFATE 3 ML: .5; 2.5 SOLUTION RESPIRATORY (INHALATION) at 18:34

## 2023-05-07 RX ADMIN — IPRATROPIUM BROMIDE AND ALBUTEROL SULFATE 3 ML: .5; 2.5 SOLUTION RESPIRATORY (INHALATION) at 12:21

## 2023-05-07 RX ADMIN — GUAIFENESIN 600 MG: 600 TABLET, EXTENDED RELEASE ORAL at 20:55

## 2023-05-07 RX ADMIN — HYDROXYZINE HYDROCHLORIDE 25 MG: 25 TABLET, FILM COATED ORAL at 20:55

## 2023-05-07 RX ADMIN — ENOXAPARIN SODIUM 40 MG: 40 INJECTION SUBCUTANEOUS at 08:17

## 2023-05-07 RX ADMIN — LEVOTHYROXINE SODIUM 100 MCG: 100 TABLET ORAL at 06:01

## 2023-05-07 RX ADMIN — LOSARTAN POTASSIUM 50 MG: 50 TABLET, FILM COATED ORAL at 15:47

## 2023-05-07 NOTE — PLAN OF CARE
Goal Outcome Evaluation:  Plan of Care Reviewed With: patient            Pt has been resting in bed for entirety of shift. Pt has had no complaints. VSS. Wound care performed. Will continue with plan of care.

## 2023-05-07 NOTE — PLAN OF CARE
Goal Outcome Evaluation:  Plan of Care Reviewed With: patient           Outcome Evaluation: Patient is resting in bed, VSS on 2L NC. Patient refused ambulation today due to fatigue, pt educated. Pt reports pain controlled with PRN medication. Wound care completed per orders, no acute changes, will cont POC

## 2023-05-07 NOTE — PROGRESS NOTES
PROGRESS NOTE         Patient Identification:  Name:  Orquidea Rosenberg  Age:  77 y.o.  Sex:  female  :  1945  MRN:  [id number]  Visit Number:  [id number]  Primary Care Provider:  Jackeline Denson APRN         LOS: 2 days       ----------------------------------------------------------------------------------------------------------------------  Subjective       Chief Complaints:    No chief complaint on file.        Interval History:      The patient is sitting up in bed on 3 L nasal cannula in no apparent distress.  Eating breakfast this morning.  Denies any new complaints at this time but does complain of occasional nausea with eating.  Lungs are clear to auscultation bilaterally.  Abdomen is soft, nontender, with normal bowel sounds.  2+ edema bilateral lower extremities.  Afebrile, no diarrhea.  Leukocytosis is resolved with a WBC of 7.29.    Review of Systems:    Constitutional: no fever, chills and night sweats.  Generalized fatigue.  Eyes: no eye drainage, itching or redness.  HEENT: no mouth sores, dysphagia or nose bleed.  Respiratory: No shortness of breath, No cough. No production of sputum.  Cardiovascular: no chest pain, no palpitations, no orthopnea.  Gastrointestinal: No nausea, no vomiting or diarrhea. No abdominal pain, hematemesis, or rectal bleeding.  Intermittent nausea.  Genitourinary: no dysuria or polyuria.  Hematologic/lymphatic: no lymph node abnormalities, no easy bruising or easy bleeding.  Musculoskeletal: no muscle or joint pain.  Skin: No rash and no itching.  Neurological: no loss of consciousness, no seizure, no headache.  Behavioral/Psych: no depression or suicidal ideation.  Endocrine: no hot flashes.  Immunologic: negative.    ----------------------------------------------------------------------------------------------------------------------      Objective       Current [facility] Meds:  ascorbic acid, 500 mg, Oral, Daily  budesonide, 0.5 mg, Nebulization, BID -  RT  enoxaparin, 40 mg, Subcutaneous, Daily  ferrous sulfate, 325 mg, Oral, Daily With Breakfast  guaiFENesin, 600 mg, Oral, Q12H  ipratropium-albuterol, 3 mL, Nebulization, 4x Daily - RT  levothyroxine, 100 mcg, Oral, Q AM  meropenem, 1 g, Intravenous, Q8H  nystatin, , Topical, Q12H  polyethylene glycol, 17 g, Oral, Daily  sodium chloride, 10 mL, Intravenous, Q12H  sodium chloride, 10 mL, Intravenous, Q12H  sodium chloride, 10 mL, Intravenous, Q12H  sodium chloride, 10 mL, Intravenous, Q12H  sodium chloride, 10 mL, Intravenous, Q12H  [START ON 5/19/2023] tuberculin, 5 Units, Intradermal, Once         ----------------------------------------------------------------------------------------------------------------------    Vital Signs:  Temp:  [98.6 °F (37 °C)-98.9 °F (37.2 °C)] 98.6 °F (37 °C)  Heart Rate:  [76-93] 93  Resp:  [16-20] 16  BP: (110-154)/(50-65) 154/65  No data found.  SpO2 Percentage    05/07/23 0625 05/07/23 0640 05/07/23 0647   SpO2: 95% 96% 94%     SpO2:  [93 %-97 %] 94 %  on  Flow (L/min):  [3] 3;   Device (Oxygen Therapy): nasal cannula    Body mass index is 29.86 kg/m².  Wt Readings from Last 3 Encounters:   05/05/23 83.9 kg (185 lb)   05/04/23 82.6 kg (182 lb)   03/15/22 81.6 kg (180 lb)        Intake/Output Summary (Last 24 hours) at 5/7/2023 0915  Last data filed at 5/7/2023 0324  Gross per 24 hour   Intake 120 ml   Output --   Net 120 ml     Diet: Regular/House Diet; Texture: Regular Texture (IDDSI 7); Fluid Consistency: Thin (IDDSI 0)  ----------------------------------------------------------------------------------------------------------------------      Physical Exam:    Constitutional: Elderly, chronically ill-appearing female is sitting up in bed on 3 L nasal cannula.   Eating breakfast and appears comfortable.   HENT:  Head: Normocephalic and atraumatic.  Mouth:  Moist mucous membranes.    Eyes:  Conjunctivae and EOM are normal.  No scleral icterus.  Neck:  Neck supple.  No JVD  present.  CVC to right IJ.   Cardiovascular:  Normal rate, regular rhythm and normal heart sounds with no murmur. No edema.  Pulmonary/Chest: Lungs clear to auscultation bilaterally.  Currently on 3 L nasal cannula.  Abdominal:  Soft.  Bowel sounds are normal.  No distension and no tenderness.  Musculoskeletal:  No edema, no tenderness, and no deformity.  No swelling or redness of joints.  Neurological:  Alert and oriented to person, place, and time.  No facial droop.  No slurred speech.   Skin:  Skin is warm and dry.  No rash noted.  No pallor. Vulvar deformity due to prior surgeries.  Psychiatric:  Normal mood and affect.  Behavior is normal.    ----------------------------------------------------------------------------------------------------------------------            Results from last 7 days   Lab Units 05/07/23  0330 05/05/23  0454 05/04/23  0037 05/02/23  0047 05/01/23  0729   CRP mg/dL  --   --   --   --  2.83*   WBC 10*3/mm3 7.26 11.90* 15.59*   < > 14.45*   HEMOGLOBIN g/dL 7.3* 7.8* 8.1*   < > 8.9*   HEMATOCRIT % 25.2* 27.9* 28.8*   < > 30.7*   MCV fL 89.4 91.8 90.6   < > 88.0   MCHC g/dL 29.0* 28.0* 28.1*   < > 29.0*   PLATELETS 10*3/mm3 268 293 336   < > 392    < > = values in this interval not displayed.     Results from last 7 days   Lab Units 05/05/23  0454 05/04/23  0037 05/03/23  1655 05/03/23  0040   SODIUM mmol/L 139 140  --  142   POTASSIUM mmol/L 5.1 5.3* 5.1 5.7*   MAGNESIUM mg/dL  --  1.9  --   --    CHLORIDE mmol/L 110* 111*  --  112*   CO2 mmol/L 23.4 23.7  --  22.0   BUN mg/dL 29* 35*  --  36*   CREATININE mg/dL 0.92 0.96  --  1.21*   CALCIUM mg/dL 8.7 8.8  --  8.9   GLUCOSE mg/dL 87 99  --  136*   ALBUMIN g/dL  --   --   --  2.6*   BILIRUBIN mg/dL  --   --   --  <0.2   ALK PHOS U/L  --   --   --  76   AST (SGOT) U/L  --   --   --  6   ALT (SGPT) U/L  --   --   --  5   Estimated Creatinine Clearance: 55.9 mL/min (by C-G formula based on SCr of 0.92 mg/dL).  No results found for:  AMMONIA    No results found for: HGBA1C, POCGLU  No results found for: HGBA1C  Lab Results   Component Value Date    TSH 2.620 03/15/2022       Blood Culture   Date Value Ref Range Status   04/25/2023 Abnormal Stain (C)  Preliminary   04/25/2023 Anaerobe (Organism type) (C)  Preliminary     No results found for: URINECX  No results found for: WOUNDCX  No results found for: STOOLCX  No results found for: RESPCX  Pain Management Panel            View : No data to display.                        ----------------------------------------------------------------------------------------------------------------------  Imaging Results (Last 24 Hours)       ** No results found for the last 24 hours. **            ----------------------------------------------------------------------------------------------------------------------    Pertinent Infectious Disease Results    Chest x-ray on 4/26/2023 showed no change in distribution of bilateral lung opacities which may represent changes of fibrosis and diffuse pulmonary nodules.  No pneumothorax.  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  This could represent secondary involvement of the leiomyomatous uterus or dystrophic calcifications within the malignancy.  Abnormal fistulous communications between the anterior rectum and the posterior aspect of the above-mentioned pelvic mass with at least 1 and possibly 2 fistulous connections as demonstrated on CT.  Central debris within the mass may represent abscess or necrosis.  Apparent fistulous communication between the mass in the bladder.  Left-sided hydronephrosis and hydroureter with left ureteral stent in place.  The distal pigtail difficult to confirm within the bladder and may be within the distal ureter.  Progressive widely disseminated pulmonary metastases.  Blood cultures on 4/25/2023  finalized as Clostridium subterminale and Gemella morbillorum.  Blood cultures on 4/29/2023 finalized as no growth. MRSA screen on 4/26/2023 was negative.  COVID-19 and flu A/B PCR on 4/25/2023 was negative. CT of the chest from 5/2/23 reports small bilateral pleural effusions, tiny pericardial effusion, bilateral parenchymal pulmonary nodules are again noted.  CT of the abdomen pelvis from 5/2/23 reports left double-J ureteral stent and moderate to severe left hydronephrosis of the left kidney, moderate stool throughout the colon, tiny bilateral pleural effusions, bilateral pulmonary lung base nodules are again noted.     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  Eating breakfast this morning.  Denies any new complaints at this time but does complain of occasional nausea with eating.  Lungs are clear to auscultation bilaterally.  Abdomen is soft, nontender, with normal bowel sounds.  2+ edema bilateral lower extremities.  Afebrile, no diarrhea.  Leukocytosis is resolved with a WBC of 7.29.    Recommend to continue on meropenem monotherapy for now for Clostridium bacteremia.  We believe Gemella to be contaminant.  Patient could be de-escalated to oral metronidazole upon discharge to continue through 5/12/2023.    ANTIMICROBIAL THERAPY    meropenem (MERREM) 1gm IVPB in 100ml NS (VTB)     Code Status:   Code Status and Medical Interventions:   Ordered at: 05/05/23 1450     Level Of Support Discussed With:    Patient     Code Status (Patient has no pulse and is not breathing):    CPR (Attempt to Resuscitate)     Medical Interventions (Patient has pulse or is breathing):    Full Support        Shell Harrison PA-C  05/07/23  09:15 EDT

## 2023-05-07 NOTE — PROGRESS NOTES
Briefly seen and examined at bedside. Politely declined up in chair today; stated she sat up for a very long time yesterday. Continues to report intermittent abdominal pain. States she hopes she improves her functional status enough to eventually return home - unclear at this time. Will resume home losartan; hold lasix for now. Continue Percocet as needed; appears more effective than Norco for pain control. Has PRN Morphine as well.     Overall prognosis: guarded/poor.

## 2023-05-08 LAB
ANION GAP SERPL CALCULATED.3IONS-SCNC: 7.7 MMOL/L (ref 5–15)
ANISOCYTOSIS BLD QL: NORMAL
BASOPHILS # BLD AUTO: 0.05 10*3/MM3 (ref 0–0.2)
BASOPHILS NFR BLD AUTO: 0.8 % (ref 0–1.5)
BUN SERPL-MCNC: 22 MG/DL (ref 8–23)
BUN/CREAT SERPL: 21.2 (ref 7–25)
CALCIUM SPEC-SCNC: 8.5 MG/DL (ref 8.6–10.5)
CHLORIDE SERPL-SCNC: 108 MMOL/L (ref 98–107)
CO2 SERPL-SCNC: 23.3 MMOL/L (ref 22–29)
CREAT SERPL-MCNC: 1.04 MG/DL (ref 0.57–1)
DEPRECATED RDW RBC AUTO: 66.1 FL (ref 37–54)
EGFRCR SERPLBLD CKD-EPI 2021: 55.5 ML/MIN/1.73
EOSINOPHIL # BLD AUTO: 0.39 10*3/MM3 (ref 0–0.4)
EOSINOPHIL NFR BLD AUTO: 6.2 % (ref 0.3–6.2)
ERYTHROCYTE [DISTWIDTH] IN BLOOD BY AUTOMATED COUNT: 20 % (ref 12.3–15.4)
GLUCOSE SERPL-MCNC: 93 MG/DL (ref 65–99)
HCT VFR BLD AUTO: 24.9 % (ref 34–46.6)
HGB BLD-MCNC: 7 G/DL (ref 12–15.9)
HYPOCHROMIA BLD QL: NORMAL
IMM GRANULOCYTES # BLD AUTO: 0.1 10*3/MM3 (ref 0–0.05)
IMM GRANULOCYTES NFR BLD AUTO: 1.6 % (ref 0–0.5)
LYMPHOCYTES # BLD AUTO: 0.83 10*3/MM3 (ref 0.7–3.1)
LYMPHOCYTES NFR BLD AUTO: 13.1 % (ref 19.6–45.3)
MCH RBC QN AUTO: 25.2 PG (ref 26.6–33)
MCHC RBC AUTO-ENTMCNC: 28.1 G/DL (ref 31.5–35.7)
MCV RBC AUTO: 89.6 FL (ref 79–97)
MONOCYTES # BLD AUTO: 0.57 10*3/MM3 (ref 0.1–0.9)
MONOCYTES NFR BLD AUTO: 9 % (ref 5–12)
NEUTROPHILS NFR BLD AUTO: 4.4 10*3/MM3 (ref 1.7–7)
NEUTROPHILS NFR BLD AUTO: 69.3 % (ref 42.7–76)
NRBC BLD AUTO-RTO: 0 /100 WBC (ref 0–0.2)
PLAT MORPH BLD: NORMAL
PLATELET # BLD AUTO: 290 10*3/MM3 (ref 140–450)
PMV BLD AUTO: 9.2 FL (ref 6–12)
POTASSIUM SERPL-SCNC: 4.8 MMOL/L (ref 3.5–5.2)
RBC # BLD AUTO: 2.78 10*6/MM3 (ref 3.77–5.28)
SODIUM SERPL-SCNC: 139 MMOL/L (ref 136–145)
WBC NRBC COR # BLD: 6.34 10*3/MM3 (ref 3.4–10.8)

## 2023-05-08 PROCEDURE — 94760 N-INVAS EAR/PLS OXIMETRY 1: CPT

## 2023-05-08 PROCEDURE — 80048 BASIC METABOLIC PNL TOTAL CA: CPT | Performed by: INTERNAL MEDICINE

## 2023-05-08 PROCEDURE — 94799 UNLISTED PULMONARY SVC/PX: CPT

## 2023-05-08 PROCEDURE — 85007 BL SMEAR W/DIFF WBC COUNT: CPT | Performed by: INTERNAL MEDICINE

## 2023-05-08 PROCEDURE — 25010000002 FUROSEMIDE PER 20 MG: Performed by: HOSPITALIST

## 2023-05-08 PROCEDURE — 25010000002 ENOXAPARIN PER 10 MG: Performed by: INTERNAL MEDICINE

## 2023-05-08 PROCEDURE — 97110 THERAPEUTIC EXERCISES: CPT

## 2023-05-08 PROCEDURE — 25010000002 MEROPENEM PER 100 MG: Performed by: PHYSICIAN ASSISTANT

## 2023-05-08 PROCEDURE — 25010000002 MORPHINE PER 10 MG: Performed by: INTERNAL MEDICINE

## 2023-05-08 PROCEDURE — 97530 THERAPEUTIC ACTIVITIES: CPT

## 2023-05-08 PROCEDURE — 85025 COMPLETE CBC W/AUTO DIFF WBC: CPT | Performed by: INTERNAL MEDICINE

## 2023-05-08 PROCEDURE — 99310 SBSQ NF CARE HIGH MDM 45: CPT | Performed by: INTERNAL MEDICINE

## 2023-05-08 RX ORDER — FUROSEMIDE 10 MG/ML
20 INJECTION INTRAMUSCULAR; INTRAVENOUS ONCE
Status: DISCONTINUED | OUTPATIENT
Start: 2023-05-08 | End: 2023-05-08

## 2023-05-08 RX ORDER — METRONIDAZOLE 250 MG/1
500 TABLET ORAL EVERY 8 HOURS SCHEDULED
Status: COMPLETED | OUTPATIENT
Start: 2023-05-08 | End: 2023-05-12

## 2023-05-08 RX ORDER — FUROSEMIDE 10 MG/ML
20 INJECTION INTRAMUSCULAR; INTRAVENOUS ONCE
Status: COMPLETED | OUTPATIENT
Start: 2023-05-08 | End: 2023-05-08

## 2023-05-08 RX ORDER — METOLAZONE 2.5 MG/1
2.5 TABLET ORAL DAILY
Status: COMPLETED | OUTPATIENT
Start: 2023-05-08 | End: 2023-05-08

## 2023-05-08 RX ADMIN — METRONIDAZOLE 500 MG: 250 TABLET ORAL at 21:19

## 2023-05-08 RX ADMIN — OXYCODONE HYDROCHLORIDE AND ACETAMINOPHEN 500 MG: 500 TABLET ORAL at 09:45

## 2023-05-08 RX ADMIN — IPRATROPIUM BROMIDE AND ALBUTEROL SULFATE 3 ML: .5; 2.5 SOLUTION RESPIRATORY (INHALATION) at 07:09

## 2023-05-08 RX ADMIN — ENOXAPARIN SODIUM 40 MG: 40 INJECTION SUBCUTANEOUS at 09:45

## 2023-05-08 RX ADMIN — FUROSEMIDE 20 MG: 10 INJECTION, SOLUTION INTRAMUSCULAR; INTRAVENOUS at 11:30

## 2023-05-08 RX ADMIN — LOSARTAN POTASSIUM 50 MG: 50 TABLET, FILM COATED ORAL at 09:45

## 2023-05-08 RX ADMIN — NYSTATIN: 100000 POWDER TOPICAL at 21:29

## 2023-05-08 RX ADMIN — Medication 10 ML: at 21:19

## 2023-05-08 RX ADMIN — OXYCODONE HYDROCHLORIDE AND ACETAMINOPHEN 1 TABLET: 10; 325 TABLET ORAL at 17:36

## 2023-05-08 RX ADMIN — IPRATROPIUM BROMIDE AND ALBUTEROL SULFATE 3 ML: .5; 2.5 SOLUTION RESPIRATORY (INHALATION) at 13:55

## 2023-05-08 RX ADMIN — METRONIDAZOLE 500 MG: 250 TABLET ORAL at 15:10

## 2023-05-08 RX ADMIN — Medication 10 ML: at 21:20

## 2023-05-08 RX ADMIN — METOLAZONE 2.5 MG: 2.5 TABLET ORAL at 11:30

## 2023-05-08 RX ADMIN — GUAIFENESIN 600 MG: 600 TABLET, EXTENDED RELEASE ORAL at 09:45

## 2023-05-08 RX ADMIN — METRONIDAZOLE 500 MG: 250 TABLET ORAL at 09:45

## 2023-05-08 RX ADMIN — IPRATROPIUM BROMIDE AND ALBUTEROL SULFATE 3 ML: .5; 2.5 SOLUTION RESPIRATORY (INHALATION) at 00:17

## 2023-05-08 RX ADMIN — POLYETHYLENE GLYCOL 3350 17 G: 17 POWDER, FOR SOLUTION ORAL at 09:45

## 2023-05-08 RX ADMIN — MORPHINE SULFATE 4 MG: 4 INJECTION, SOLUTION INTRAMUSCULAR; INTRAVENOUS at 14:08

## 2023-05-08 RX ADMIN — NYSTATIN: 100000 POWDER TOPICAL at 09:45

## 2023-05-08 RX ADMIN — OXYCODONE HYDROCHLORIDE AND ACETAMINOPHEN 1 TABLET: 10; 325 TABLET ORAL at 07:47

## 2023-05-08 RX ADMIN — MEROPENEM 1 G: 1 INJECTION, POWDER, FOR SOLUTION INTRAVENOUS at 02:05

## 2023-05-08 RX ADMIN — IPRATROPIUM BROMIDE AND ALBUTEROL SULFATE 3 ML: .5; 2.5 SOLUTION RESPIRATORY (INHALATION) at 18:53

## 2023-05-08 RX ADMIN — BUDESONIDE 0.5 MG: 0.5 INHALANT RESPIRATORY (INHALATION) at 18:56

## 2023-05-08 RX ADMIN — FERROUS SULFATE TAB 325 MG (65 MG ELEMENTAL FE) 325 MG: 325 (65 FE) TAB at 07:47

## 2023-05-08 RX ADMIN — LEVOTHYROXINE SODIUM 100 MCG: 100 TABLET ORAL at 05:04

## 2023-05-08 RX ADMIN — BUDESONIDE 0.5 MG: 0.5 INHALANT RESPIRATORY (INHALATION) at 07:08

## 2023-05-08 RX ADMIN — GUAIFENESIN 600 MG: 600 TABLET, EXTENDED RELEASE ORAL at 21:19

## 2023-05-08 NOTE — THERAPY TREATMENT NOTE
Acute Care - Physical Therapy Treatment Note   Jorge     Patient Name: Orquidea Rosenberg  : 1945  MRN: [id number]  Today's Date: 2023   Onset of Illness/Injury or Date of Surgery: 23 (swing bed admit)  Visit Dx:   No diagnosis found.  Patient Active Problem List   Diagnosis   • COVID-19   • Vulvar cancer   • Physical debility     Past Medical History:   Diagnosis Date   • Arthritis    • COPD (chronic obstructive pulmonary disease)    • Elevated cholesterol    • History of transfusion    • Hypertension    • Vulval ca      Past Surgical History:   Procedure Laterality Date   • RADICAL VULVECTOMY  2019   • RADICAL VULVECTOMY Bilateral 2019     PT Assessment (last 12 hours)     PT Evaluation and Treatment     Row Name 23 1248          Physical Therapy Time and Intention    Subjective Information complains of;dyspnea  -     Document Type other (see comments)  swing bed  -     Mode of Treatment physical therapy  -HC     Patient Effort adequate  -     Comment Pt and RN in agreement for PT. Pt was SOB on this but is able to complete all activites with rest breaks as needed. Pt completed supine exercises to tolernace and sitting exercises up in chair. Pt walked 4 steps from bed to chair with RW CGA.  -HC     Row Name 23 1248          General Information    Patient Profile Reviewed yes  -     Equipment Currently Used at Home rollator;wheelchair  -     Existing Precautions/Restrictions fall;oxygen therapy device and L/min  -HC     Row Name 23 1248          Cognition    Affect/Mental Status (Cognition) WFL  -HC     Orientation Status (Cognition) oriented x 3  -HC     Follows Commands (Cognition) WFL  -     Personal Safety Interventions fall prevention program maintained;gait belt;supervised activity;nonskid shoes/slippers when out of bed  -     Row Name 23 1248          Bed Mobility    Bed Mobility supine-sit  -HC     Supine-Sit [place] (Bed Mobility) modified  independence  -     Bed Mobility, Safety Issues decreased use of arms for pushing/pulling;decreased use of legs for bridging/pushing  -     Assistive Device (Bed Mobility) bed rails;head of bed elevated  -     Row Name 05/08/23 1248          Transfers    Transfers sit-stand transfer;stand-sit transfer;bed-chair transfer  -     Row Name 05/08/23 1248          Bed-Chair Transfer    Bed-Chair [place] (Transfers) contact guard  -     Assistive Device (Bed-Chair Transfers) walker, front-wheeled  -HC     Row Name 05/08/23 1248          Sit-Stand Transfer    Sit-Stand [place] (Transfers) contact guard  -HC     Assistive Device (Sit-Stand Transfers) walker, front-wheeled  -HC     Row Name 05/08/23 1248          Stand-Sit Transfer    Stand-Sit [place] (Transfers) contact guard  -HC     Assistive Device (Stand-Sit Transfers) walker, front-wheeled  -     Row Name 05/08/23 1248          Gait/Stairs (Locomotion)    [place] Level (Gait) contact guard  -     Assistive Device (Gait) walker, front-wheeled  -     Distance in Feet (Gait) 4 steps from bed to chair  -     Pattern (Gait) swing-to  -HC     Deviations/Abnormal Patterns (Gait) gait speed decreased;weight shifting decreased  -     Row Name 05/08/23 1248          Motor Skills    Therapeutic Exercise other (see comments)  Supine: AP, inversion/eversion, SLR, hip abd, heel slide. Sitting: LAQ, March, knees in/out, AP.  -     Row Name             Wound 05/01/23 1414 Left vagina    Wound - Properties Group Placement Date: 05/01/23  -LB Placement Time: 1414  -LB Present on Hospital Admission: Y  -LB Side: Left  -LB Location: vagina  -LB    Retired Wound - Properties Group Placement Date: 05/01/23  -LB Placement Time: 1414  -LB Present on Hospital Admission: Y  -LB Side: Left  -LB Location: vagina  -LB    Retired Wound - Properties Group Date first assessed: 05/01/23  -LB Time first assessed: 1414  -LB Present on Hospital Admission: Y  -LB  Side: Left  -LB Location: vagina  -LB    Row Name 05/08/23 1248          Positioning and Restraints    Pre-Treatment Position in bed  -HC     Post Treatment Position chair  -HC     In Chair reclined;call light within reach;encouraged to call for assist  -HC     Row Name 05/08/23 1248          Therapy Assessment/Plan (PT)    Rehab Potential (PT) fair, will monitor progress closely  -HC     Criteria for Skilled Interventions Met (PT) yes  -HC     Therapy Frequency (PT) 5 times/wk  -HC     Row Name 05/08/23 1248          Physical Therapy Goals    Transfer Goal Selection (PT) transfer, PT goal 1  -HC     Gait Training Goal Selection (PT) gait training, PT goal 1  -HC     Row Name 05/08/23 1248          Transfer Goal 1 (PT)    Activity/Assistive Device (Transfer Goal 1, PT) sit-to-stand/stand-to-sit;bed-to-chair/chair-to-bed  -HC     [place] Level/Cues Needed (Transfer Goal 1, PT) modified independence  -HC     Time Frame (Transfer Goal 1, PT) by discharge  -HC     Row Name 05/08/23 1248          Gait Training Goal 1 (PT)    Activity/Assistive Device (Gait Training Goal 1, PT) gait (walking locomotion);assistive device use;increase endurance/gait distance  -HC     [place] Level (Gait Training Goal 1, PT) modified independence  -HC     Distance (Gait Training Goal 1, PT) 15  -HC     Time Frame (Gait Training Goal 1, PT) by discharge  -           User Key  (r) = Recorded By, (t) = Taken By, (c) = Cosigned By    Initials Name Provider Type    LB Veronica Mendez RN Registered Nurse    Gayle Moreno PTA Physical Therapist Assistant                  PT Recommendation and Plan  Therapy Frequency (PT): 5 times/wk      Outcome Measures     Row Name 05/06/23 1500 05/05/23 1623 05/05/23 1600       How much help from another is currently needed...    Putting on and taking off regular lower body clothing? 2  -BF 2  -KR --    Bathing (including washing, rinsing, and drying) 2  -BF 2  -KR --    Toileting (which  includes using toilet bed pan or urinal) 1  -BF 1  -KR --    Putting on and taking off regular upper body clothing 3  -BF 3  -KR --    Taking care of personal grooming (such as brushing teeth) 3  -BF 3  -KR --    Eating meals 3  -BF 3  -KR --    AM-PAC 6 Clicks Score (OT) 14  -BF 14  -KR --       Functional Assessment    Outcome Measure Options AM-PAC 6 Clicks Daily Activity (OT)  -BF -- AM-PAC 6 Clicks Daily Activity (OT)  -KR          User Key  (r) = Recorded By, (t) = Taken By, (c) = Cosigned By    Initials Name Provider Type    BF Leah Hickman, OT Occupational Therapist    KR Justen Clark, OT Occupational Therapist                 Time Calculation:    PT Charges     Row Name 05/08/23 1300             Time Calculation    PT Received On 05/08/23  -         Time Calculation- PT    Total Timed Code Minutes- PT 40 minute(s)  -HC            User Key  (r) = Recorded By, (t) = Taken By, (c) = Cosigned By    Initials Name Provider Type    Gayle Moreno PTA Physical Therapist Assistant              Therapy Charges for Today     Code Description Service Date Service Provider Modifiers Qty    43162826900  PT THER PROC EA 15 MIN 5/8/2023 Gayle Peralta PTA GP, CQ 1    62243015524 HC PT THERAPEUTIC ACT EA 15 MIN 5/8/2023 Gayle Peralta PTA GP, CQ 2          PT G-Codes  Outcome Measure Options: AM-PAC 6 Clicks Daily Activity (OT)  AM-PAC 6 Clicks Score (PT): 16  AM-PAC 6 Clicks Score (OT): 14    Gayle Peralta PTA  5/8/2023

## 2023-05-08 NOTE — CASE MANAGEMENT/SOCIAL WORK
Discharge Planning Assessment   Jorge     Patient Name: Orquidea Rosenberg  MRN: [id number]  Today's Date: 5/8/2023    Admit Date: 5/5/2023    Plan: Pt received a consult for swing bed. Pt was admitted to swing bed on 5/5/23. SS spoke with Swing bed RN on this date who state clinical updates have been submitted for reviewed stay at this time. Pt lives at home and daughter Lilli has been staying with pt for the last two years. Pt does no utilize  services at this time. Pt PCP Jackeline Denson. Pt has oxgyen at 2 liters at night via [employer] for DME needs. Pt daughter states pt has POA( not on file), no living will. Per Daughter who states pt has been in bed since saturday and disposition is unsure at this time. SS to follow and assist as needed with discharge planning.   Discharge Needs Assessment     Row Name 05/08/23 1739       Living Environment    People in Home child(jolly), adult    Name(s) of People in Home Lilli puckett    Current Living Arrangements home    Potentially Unsafe Housing Conditions none    Provides Primary Care For no one    Family Caregiver if Needed child(jolly), adult    Family Caregiver Names Lilli Puckett    Quality of Family Relationships helpful;involved;supportive    Able to Return to Prior Arrangements yes       Resource/Environmental Concerns    Resource/Environmental Concerns none       Transition Planning    Patient/Family Anticipates Transition to home with family    Patient/Family Anticipated Services at Transition none    Transportation Anticipated family or friend will provide       Discharge Needs Assessment    Equipment Currently Used at Home oxygen  O2@ 2L via [employer].               Discharge Plan     Row Name 05/08/23 8769       Plan    Plan Pt received a consult for swing bed. Pt was admitted to swing bed on 5/5/23. SS spoke with Swing bed RN on this date who state clinical updates have been submitted for reviewed stay at this time. Pt lives at home and daughter Lilli has been  staying with pt for the last two years. Pt does no utilize HH services at this time. Pt PCP Jackeline Denson. Pt has oxgyen at 2 liters at night via [employer] for DME needs. Pt daughter states pt has POA( not on file), no living will. Per Daughter who states pt has been in bed since saturday and disposition is unsure at this time. SS to follow and assist as needed with discharge planning.              Continued Care and Services - Admitted Since 5/5/2023    Coordination has not been started for this encounter.          Demographic Summary     Row Name 05/08/23 2290       General Information    Admission Type inpatient    Referral Source physician    Reason for Consult discharge planning  SS received a consult for swing bed.                HARPAL Stanley

## 2023-05-08 NOTE — PLAN OF CARE
Goal Outcome Evaluation:  Plan of Care Reviewed With: patient           Outcome Evaluation: patient is resting in bed, has been up to chair today. pt complains of severe pain, PRN medication given per orders. patient reports pain is now mild. VSS on 2L, no acute changes. Will cont POC

## 2023-05-08 NOTE — PROGRESS NOTES
PROGRESS NOTE         Patient Identification:  Name:  Orquidea Rosenberg  Age:  77 y.o.  Sex:  female  :  1945  MRN:  [id number]  Visit Number:  [id number]  Primary Care Provider:  Jackeline Denson APRN         LOS: 3 days       ----------------------------------------------------------------------------------------------------------------------  Subjective       Chief Complaints:    No chief complaint on file.        Interval History:      The patient is sitting up in bed on 3 L nasal cannula in no apparent distress.  Participating in physical therapy this morning.  No new issues or complaints today.  Wheezing at end expiration.  Abdomen is soft, nontender, with normal bowel sounds.  2+ edema of bilateral lower extremities.  Afebrile, no diarrhea.  WBC remains normal at 6.34.  Chest x-ray on 2023 showed multiple bilateral pulmonary nodular densities are similar to the prior study.  No acute infiltrates.  Small left pleural effusion is stable.    Review of Systems:    Constitutional: no fever, chills and night sweats.  Generalized fatigue.  Eyes: no eye drainage, itching or redness.  HEENT: no mouth sores, dysphagia or nose bleed.  Respiratory: No shortness of breath, No cough. No production of sputum.  Cardiovascular: no chest pain, no palpitations, no orthopnea.  Gastrointestinal: No nausea, no vomiting or diarrhea. No abdominal pain, hematemesis, or rectal bleeding.   Genitourinary: no dysuria or polyuria.  Hematologic/lymphatic: no lymph node abnormalities, no easy bruising or easy bleeding.  Musculoskeletal: no muscle or joint pain.  Skin: No rash and no itching.  Neurological: no loss of consciousness, no seizure, no headache.  Behavioral/Psych: no depression or suicidal ideation.  Endocrine: no hot flashes.  Immunologic: negative.    ----------------------------------------------------------------------------------------------------------------------      Objective       Current Hospital  Meds:  ascorbic acid, 500 mg, Oral, Daily  budesonide, 0.5 mg, Nebulization, BID - RT  enoxaparin, 40 mg, Subcutaneous, Daily  ferrous sulfate, 325 mg, Oral, Daily With Breakfast  furosemide, 20 mg, Intravenous, Once  guaiFENesin, 600 mg, Oral, Q12H  ipratropium-albuterol, 3 mL, Nebulization, 4x Daily - RT  levothyroxine, 100 mcg, Oral, Q AM  losartan, 50 mg, Oral, Q24H  metOLazone, 2.5 mg, Oral, Daily  metroNIDAZOLE, 500 mg, Oral, Q8H  nystatin, , Topical, Q12H  polyethylene glycol, 17 g, Oral, Daily  sodium chloride, 10 mL, Intravenous, Q12H  sodium chloride, 10 mL, Intravenous, Q12H  sodium chloride, 10 mL, Intravenous, Q12H  sodium chloride, 10 mL, Intravenous, Q12H  sodium chloride, 10 mL, Intravenous, Q12H  [START ON 5/19/2023] tuberculin, 5 Units, Intradermal, Once         ----------------------------------------------------------------------------------------------------------------------    Vital Signs:  Temp:  [97.7 °F (36.5 °C)-98.2 °F (36.8 °C)] 97.7 °F (36.5 °C)  Heart Rate:  [] 88  Resp:  [16-18] 18  BP: (137-173)/(55-83) 173/83  No data found.  SpO2 Percentage    05/08/23 0027 05/08/23 0600 05/08/23 0708   SpO2: 93% 95% 93%     SpO2:  [91 %-98 %] 93 %  on  Flow (L/min):  [2-3] 3;   Device (Oxygen Therapy): nasal cannula    Body mass index is 29.86 kg/m².  Wt Readings from Last 3 Encounters:   05/05/23 83.9 kg (185 lb)   05/04/23 82.6 kg (182 lb)   03/15/22 81.6 kg (180 lb)        Intake/Output Summary (Last 24 hours) at 5/8/2023 1027  Last data filed at 5/8/2023 0307  Gross per 24 hour   Intake 460 ml   Output --   Net 460 ml     Diet: Regular/House Diet; Texture: Regular Texture (IDDSI 7); Fluid Consistency: Thin (IDDSI 0)  ----------------------------------------------------------------------------------------------------------------------      Physical Exam:    Constitutional: Elderly, chronically ill-appearing female is sitting up in bed on 3 L nasal cannula.   Participating in physical  therapy.  HENT:  Head: Normocephalic and atraumatic.  Mouth:  Moist mucous membranes.    Eyes:  Conjunctivae and EOM are normal.  No scleral icterus.  Neck:  Neck supple.  No JVD present.  CVC to right IJ.   Cardiovascular:  Normal rate, regular rhythm and normal heart sounds with no murmur. No edema.  Pulmonary/Chest: End expiration wheezing.  Currently on 3 L nasal cannula.  Abdominal:  Soft.  Bowel sounds are normal.  No distension and no tenderness.  Musculoskeletal:  No tenderness and no deformity.  No swelling or redness of joints.  2+ edema bilateral lower extremities.  Neurological:  Alert and oriented to person, place, and time.  No facial droop.  No slurred speech.   Skin:  Skin is warm and dry.  No rash noted.  No pallor. Vulvar deformity due to prior surgeries.  Psychiatric:  Normal mood and affect.  Behavior is normal.    ----------------------------------------------------------------------------------------------------------------------          Results from last 7 days   Lab Units 05/07/23 2002   PH, ARTERIAL pH units 7.423   PO2 ART mm Hg 53.8*   PCO2, ARTERIAL mm Hg 38.0   HCO3 ART mmol/L 24.8     Results from last 7 days   Lab Units 05/08/23  0204 05/07/23  0330 05/05/23  0454   WBC 10*3/mm3 6.34 7.26 11.90*   HEMOGLOBIN g/dL 7.0* 7.3* 7.8*   HEMATOCRIT % 24.9* 25.2* 27.9*   MCV fL 89.6 89.4 91.8   MCHC g/dL 28.1* 29.0* 28.0*   PLATELETS 10*3/mm3 290 268 293     Results from last 7 days   Lab Units 05/08/23  0204 05/05/23  0454 05/04/23  0037 05/03/23  1655 05/03/23  0040   SODIUM mmol/L 139 139 140  --  142   POTASSIUM mmol/L 4.8 5.1 5.3*   < > 5.7*   MAGNESIUM mg/dL  --   --  1.9  --   --    CHLORIDE mmol/L 108* 110* 111*  --  112*   CO2 mmol/L 23.3 23.4 23.7  --  22.0   BUN mg/dL 22 29* 35*  --  36*   CREATININE mg/dL 1.04* 0.92 0.96  --  1.21*   CALCIUM mg/dL 8.5* 8.7 8.8  --  8.9   GLUCOSE mg/dL 93 87 99  --  136*   ALBUMIN g/dL  --   --   --   --  2.6*   BILIRUBIN mg/dL  --   --   --   --   <0.2   ALK PHOS U/L  --   --   --   --  76   AST (SGOT) U/L  --   --   --   --  6   ALT (SGPT) U/L  --   --   --   --  5    < > = values in this interval not displayed.   Estimated Creatinine Clearance: 49.4 mL/min (A) (by C-G formula based on SCr of 1.04 mg/dL (H)).  No results found for: AMMONIA    No results found for: HGBA1C, POCGLU  No results found for: HGBA1C  Lab Results   Component Value Date    TSH 2.620 03/15/2022       Blood Culture   Date Value Ref Range Status   04/25/2023 Abnormal Stain (C)  Preliminary   04/25/2023 Anaerobe (Organism type) (C)  Preliminary     No results found for: URINECX  No results found for: WOUNDCX  No results found for: STOOLCX  No results found for: RESPCX  Pain Management Panel            View : No data to display.                        ----------------------------------------------------------------------------------------------------------------------  Imaging Results (Last 24 Hours)       Procedure Component Value Units Date/Time    XR Chest 1 View [[id number]] Collected: 05/07/23 2101     Updated: 05/07/23 2104    Narrative:      Single view of the chest     Indications: Shortness of breath     Findings:     Single view the chest is compared to the prior study dated 4/27/23.     The heart is not enlarged.  A right IJ central line is in place with the  distal tip at the atrial caval junction.  Multifocal bilateral pulmonary  nodular densities are unchanged.  Small left pleural effusion is  unchanged.  Osseous structures are normal.       Impression:      Impression:     1. Multiple bilateral pulmonary nodular densities are similar to the  prior study.  2.  No acute infiltrates  3.  Small left pleural effusion is stable     This report was finalized on 5/7/2023 9:01 PM by Gustavo Silverman MD.               ----------------------------------------------------------------------------------------------------------------------    Pertinent Infectious Disease Results    Chest  x-ray on 4/26/2023 showed no change in distribution of bilateral lung opacities which may represent changes of fibrosis and diffuse pulmonary nodules.  No pneumothorax.  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  This could represent secondary involvement of the leiomyomatous uterus or dystrophic calcifications within the malignancy.  Abnormal fistulous communications between the anterior rectum and the posterior aspect of the above-mentioned pelvic mass with at least 1 and possibly 2 fistulous connections as demonstrated on CT.  Central debris within the mass may represent abscess or necrosis.  Apparent fistulous communication between the mass in the bladder.  Left-sided hydronephrosis and hydroureter with left ureteral stent in place.  The distal pigtail difficult to confirm within the bladder and may be within the distal ureter.  Progressive widely disseminated pulmonary metastases.  Blood cultures on 4/25/2023 finalized as Clostridium subterminale and Gemella morbillorum.  Blood cultures on 4/29/2023 finalized as no growth. MRSA screen on 4/26/2023 was negative.  COVID-19 and flu A/B PCR on 4/25/2023 was negative. CT of the chest from 5/2/23 reports small bilateral pleural effusions, tiny pericardial effusion, bilateral parenchymal pulmonary nodules are again noted.  CT of the abdomen pelvis from 5/2/23 reports left double-J ureteral stent and moderate to severe left hydronephrosis of the left kidney, moderate stool throughout the colon, tiny bilateral pleural effusions, bilateral pulmonary lung base nodules are again noted.       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 They participated in physical therapy this morning and have not reported any new issues or complaints. However, there is wheezing at end expiration, and examination of the abdomen reveals that it is soft, non-tender, and has normal bowel sounds. Additionally, there is bilateral lower extremity edema at 2+. Fortunately, the patient remains afebrile with no signs of diarrhea. Lab results indicate that the WBC count is normal at 6.34. The recent chest x-ray, performed on 5/7/2023, showed multiple bilateral pulmonary nodular densities that are similar to the prior study, with no acute infiltrates. The small left pleural effusion remains stable. Given the presence of wheezing and edema, it is believed that the patient is experiencing volume overload. From an infectious disease standpoint, the patient has shown clinical improvement, and meropenem has been de-escalated to oral metronidazole to continue through 5/12/2023. This should be an appropriate treatment for Clostridium bacteremia. It is also believed that Gemella may have been a contaminant, especially as it was only seen in one out of two blood cultures.    ANTIMICROBIAL THERAPY    metroNIDAZOLE - 250 MG     Code Status:   Code Status and Medical Interventions:   Ordered at: 05/05/23 1450     Level Of Support Discussed With:    Patient     Code Status (Patient has no pulse and is not breathing):    CPR (Attempt to Resuscitate)     Medical Interventions (Patient has pulse or is breathing):    Full Support       Shell Harrison PA-C  05/08/23  10:27 EDT     Electronically signed by Shell Harrison PA-C, 05/08/23, 10:30 AM EDT.      Electronically signed by Socrates Flaherty MD,  05/08/23, 1:01 PM EDT.

## 2023-05-08 NOTE — PROGRESS NOTES
Patient seen and examined she is eating breakfast, no complaints except for continued urination with fecaloid material.  No abdominal pain but reports occasional soreness.  Will transition to oral Flagyl per infectious disease recommendation.  Both lower extremity after the thigh is edematous.      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  PT OT    Overall prognosis is poor.

## 2023-05-08 NOTE — PLAN OF CARE
Goal Outcome Evaluation:               PT has rested in bed this shift. Had complained of SOA at beginning of shift, manage through breathing techniques and an increase from 2 to 3L NC. Hospitalist made aware, see new orders. Otherwise pt has had no c/o or acute changes noted this shift. Wound care completed per orders and central line dressing changed. VSS, will continue POC

## 2023-05-08 NOTE — PAYOR COMM NOTE
"Andrew Clinton RN  Swing Bed Nurse  [phone] Ex [phone] FAX: [phone]  [email or web address]  [facility]  NPI [id number]  Reference Number [id number]    Patient admitted to swing bed for PT/OT strength and mobility training. Patient plans to return home with family at discharge.        Orquidea Mcclain (77 y.o. Female)    YOB: 1945  Social Security Number:   Address: [address] [place] [address]  Home Phone: [phone]  MRN: [id number]  Moravian: Oriental orthodox  Marital Status:         Admission Date: 5/5/23  Admission Type: Urgent  Admitting Provider: aTisha Crowell DO  Attending Provider: Dalila Mcdonald MD  Department, Room/Bed: [place] [address]/  Discharge Date:   Discharge Disposition:   Discharge Destination:               Attending Provider: Dalila Mcdonald MD    Allergies: Penicillins    Isolation: None   Infection: None   Code Status: CPR    Ht: 167.6 cm (66\")   Wt: 83.9 kg (185 lb)    Admission Cmt: None   Principal Problem: Physical debility [R53.81]                 Active Insurance as of 5/5/2023     Primary Coverage     Payor Plan Insurance Group Employer/Plan Group    [facility] MEDICARE REPLACEMENT [facility] MEDICARE REPLACEMENT [id number]     Payor Plan Address Payor Plan Phone Number Payor Plan Fax Number Effective Dates    [address] [address]   1/1/2021 - None Entered    [facility] [id number]       Subscriber Name Subscriber Birth Date Member ID       ORQUIDEA MCCLAIN 1945 [id number]                 Emergency Contacts      (Rel.) Home Phone Work Phone Mobile Phone    Gavin Mcclain (Son) [phone] None None    AnabelArronLilli (Daughter) [phone] None [phone]              Current Facility-Administered Medications   Medication Dose Route Frequency Provider Last Rate Last Admin   • ascorbic acid (VITAMIN C) tablet 500 mg  500 mg Oral Daily Taisha Crowell DO   500 mg at " 05/07/23 0817   • budesonide (PULMICORT) nebulizer solution 0.5 mg  0.5 mg Nebulization BID - RT Taisha Crowell DO   0.5 mg at 05/08/23 0708   • Enoxaparin Sodium (LOVENOX) syringe 40 mg  40 mg Subcutaneous Daily Taisha Crowell DO   40 mg at 05/07/23 0817   • ferrous sulfate tablet 325 mg  325 mg Oral Daily With Breakfast Taisha Crowell DO   325 mg at 05/07/23 0817   • guaiFENesin (MUCINEX) 12 hr tablet 600 mg  600 mg Oral Q12H Taisha Crowell DO   600 mg at 05/07/23 2055   • hydrOXYzine (ATARAX) tablet 25 mg  25 mg Oral TID PRN Shell Segrua PA-C   25 mg at 05/07/23 2055   • ipratropium-albuterol (DUO-NEB) nebulizer solution 3 mL  3 mL Nebulization 4x Daily - RT Taisha Crowell DO   3 mL at 05/08/23 0709   • levothyroxine (SYNTHROID, LEVOTHROID) tablet 100 mcg  100 mcg Oral Q AM Taisha Crowell DO   100 mcg at 05/08/23 0504   • losartan (COZAAR) tablet 50 mg  50 mg Oral Q24H Taisha Crowell DO   50 mg at 05/07/23 1547   • meropenem (MERREM) 1 g in sodium chloride 0.9 % 100 mL IVPB-VTB  1 g Intravenous Q8H Shell Harrison PA-C   1 g at 05/08/23 0205   • morphine injection 4 mg  4 mg Intravenous Q4H PRN Taisha Crowell DO   4 mg at 05/05/23 1957   • nitroglycerin (NITROSTAT) SL tablet 0.4 mg  0.4 mg Sublingual Q5 Min PRN Taisha Crowell DO       • nystatin (MYCOSTATIN) powder   Topical Q12H Taisha Crowell DO   Given at 05/07/23 2056   • ondansetron (ZOFRAN) tablet 4 mg  4 mg Oral Q8H PRN Taisha Crowell DO       • oxyCODONE-acetaminophen (PERCOCET)  MG per tablet 1 tablet  1 tablet Oral Q4H PRN Taisha Crowell DO   1 tablet at 05/07/23 2318   • polyethylene glycol (MIRALAX) packet 17 g  17 g Oral Daily Taisha Crowell, DO       • sodium chloride 0.9 % flush 10 mL  10 mL Intravenous PRN Taisha Crowell, DO       • sodium chloride 0.9 % flush 10 mL  10 mL Intravenous Q12H Taisha Crowell DO   10 mL at 05/07/23  2055   • sodium chloride 0.9 % flush 10 mL  10 mL Intravenous PRN Taisha Crowell, DO       • sodium chloride 0.9 % flush 10 mL  10 mL Intravenous Q12H Taisha Crowell, DO   10 mL at 05/07/23 2055   • sodium chloride 0.9 % flush 10 mL  10 mL Intravenous PRN Taisha Crowell, DO       • sodium chloride 0.9 % flush 10 mL  10 mL Intravenous Q12H Taisha Crowell, DO   10 mL at 05/07/23 2055   • sodium chloride 0.9 % flush 10 mL  10 mL Intravenous Q12H Mervat, Taisha Davis, DO   10 mL at 05/07/23 2055   • sodium chloride 0.9 % flush 10 mL  10 mL Intravenous Q12H Taisha Crowell, DO   10 mL at 05/07/23 2055   • sodium chloride 0.9 % flush 10 mL  10 mL Intravenous PRN Taisha Crowell, DO       • sodium chloride 0.9 % flush 20 mL  20 mL Intravenous PRN Taisah Crowell, DO       • sodium chloride 0.9 % infusion 40 mL  40 mL Intravenous PRN Taisha Crowell, DO       • sodium chloride 0.9 % infusion 40 mL  40 mL Intravenous PRN Taisha Crowell, DO       • sodium chloride 0.9 % infusion 40 mL  40 mL Intravenous PRN Taisha Crowell, DO       • [START ON 5/19/2023] tuberculin injection 5 Units  5 Units Intradermal Once Taisha Crowell, DO            Physician Progress Notes (last 48 hours)      Taisha Crowell DO at 05/07/23 1431        Briefly seen and examined at bedside. Politely declined up in chair today; stated she sat up for a very long time yesterday. Continues to report intermittent abdominal pain. States she hopes she improves her functional status enough to eventually return home - unclear at this time. Will resume home losartan; hold lasix for now. Continue Percocet as needed; appears more effective than Norco for pain control. Has PRN Morphine as well.     Overall prognosis: guarded/poor.    Electronically signed by Taisha Crowell DO at 05/07/23 1433     Shell Harrison PA-C at 05/07/23 0915                     PROGRESS NOTE         Patient  Identification:  Name:  Orquidea Rosenberg  Age:  77 y.o.  Sex:  female  :  1945  MRN:  [id number]  Visit Number:  [id number]  Primary Care Provider:  Jackeline Denson APRN         LOS: 2 days       ----------------------------------------------------------------------------------------------------------------------  Subjective       Chief Complaints:    No chief complaint on file.        Interval History:      The patient is sitting up in bed on 3 L nasal cannula in no apparent distress.  Eating breakfast this morning.  Denies any new complaints at this time but does complain of occasional nausea with eating.  Lungs are clear to auscultation bilaterally.  Abdomen is soft, nontender, with normal bowel sounds.  2+ edema bilateral lower extremities.  Afebrile, no diarrhea.  Leukocytosis is resolved with a WBC of 7.29.    Review of Systems:    Constitutional: no fever, chills and night sweats.  Generalized fatigue.  Eyes: no eye drainage, itching or redness.  HEENT: no mouth sores, dysphagia or nose bleed.  Respiratory: No shortness of breath, No cough. No production of sputum.  Cardiovascular: no chest pain, no palpitations, no orthopnea.  Gastrointestinal: No nausea, no vomiting or diarrhea. No abdominal pain, hematemesis, or rectal bleeding.  Intermittent nausea.  Genitourinary: no dysuria or polyuria.  Hematologic/lymphatic: no lymph node abnormalities, no easy bruising or easy bleeding.  Musculoskeletal: no muscle or joint pain.  Skin: No rash and no itching.  Neurological: no loss of consciousness, no seizure, no headache.  Behavioral/Psych: no depression or suicidal ideation.  Endocrine: no hot flashes.  Immunologic: negative.    ----------------------------------------------------------------------------------------------------------------------      Objective       [facility] Meds:  ascorbic acid, 500 mg, Oral, Daily  budesonide, 0.5 mg, Nebulization, BID - RT  enoxaparin, 40 mg, Subcutaneous,  Daily  ferrous sulfate, 325 mg, Oral, Daily With Breakfast  guaiFENesin, 600 mg, Oral, Q12H  ipratropium-albuterol, 3 mL, Nebulization, 4x Daily - RT  levothyroxine, 100 mcg, Oral, Q AM  meropenem, 1 g, Intravenous, Q8H  nystatin, , Topical, Q12H  polyethylene glycol, 17 g, Oral, Daily  sodium chloride, 10 mL, Intravenous, Q12H  sodium chloride, 10 mL, Intravenous, Q12H  sodium chloride, 10 mL, Intravenous, Q12H  sodium chloride, 10 mL, Intravenous, Q12H  sodium chloride, 10 mL, Intravenous, Q12H  [START ON 5/19/2023] tuberculin, 5 Units, Intradermal, Once         ----------------------------------------------------------------------------------------------------------------------    Vital Signs:  Temp:  [98.6 °F (37 °C)-98.9 °F (37.2 °C)] 98.6 °F (37 °C)  Heart Rate:  [76-93] 93  Resp:  [16-20] 16  BP: (110-154)/(50-65) 154/65  No data found.  SpO2 Percentage    05/07/23 0625 05/07/23 0640 05/07/23 0647   SpO2: 95% 96% 94%     SpO2:  [93 %-97 %] 94 %  on  Flow (L/min):  [3] 3;   Device (Oxygen Therapy): nasal cannula    Body mass index is 29.86 kg/m².  Wt Readings from Last 3 Encounters:   05/05/23 83.9 kg (185 lb)   05/04/23 82.6 kg (182 lb)   03/15/22 81.6 kg (180 lb)        Intake/Output Summary (Last 24 hours) at 5/7/2023 0932  Last data filed at 5/7/2023 0324  Gross per 24 hour   Intake 120 ml   Output --   Net 120 ml     Diet: Regular/House Diet; Texture: Regular Texture (IDDSI 7); Fluid Consistency: Thin (IDDSI 0)  ----------------------------------------------------------------------------------------------------------------------      Physical Exam:    Constitutional: Elderly, chronically ill-appearing female is sitting up in bed on 3 L nasal cannula.   Eating breakfast and appears comfortable.   HENT:  Head: Normocephalic and atraumatic.  Mouth:  Moist mucous membranes.    Eyes:  Conjunctivae and EOM are normal.  No scleral icterus.  Neck:  Neck supple.  No JVD present.  CVC to right IJ.   Cardiovascular:   Normal rate, regular rhythm and normal heart sounds with no murmur. No edema.  Pulmonary/Chest: Lungs clear to auscultation bilaterally.  Currently on 3 L nasal cannula.  Abdominal:  Soft.  Bowel sounds are normal.  No distension and no tenderness.  Musculoskeletal:  No edema, no tenderness, and no deformity.  No swelling or redness of joints.  Neurological:  Alert and oriented to person, place, and time.  No facial droop.  No slurred speech.   Skin:  Skin is warm and dry.  No rash noted.  No pallor. Vulvar deformity due to prior surgeries.  Psychiatric:  Normal mood and affect.  Behavior is normal.    ----------------------------------------------------------------------------------------------------------------------            Results from last 7 days   Lab Units 05/07/23  0330 05/05/23  0454 05/04/23  0037 05/02/23  0047 05/01/23  0729   CRP mg/dL  --   --   --   --  2.83*   WBC 10*3/mm3 7.26 11.90* 15.59*   < > 14.45*   HEMOGLOBIN g/dL 7.3* 7.8* 8.1*   < > 8.9*   HEMATOCRIT % 25.2* 27.9* 28.8*   < > 30.7*   MCV fL 89.4 91.8 90.6   < > 88.0   MCHC g/dL 29.0* 28.0* 28.1*   < > 29.0*   PLATELETS 10*3/mm3 268 293 336   < > 392    < > = values in this interval not displayed.     Results from last 7 days   Lab Units 05/05/23  0454 05/04/23  0037 05/03/23  1655 05/03/23  0040   SODIUM mmol/L 139 140  --  142   POTASSIUM mmol/L 5.1 5.3* 5.1 5.7*   MAGNESIUM mg/dL  --  1.9  --   --    CHLORIDE mmol/L 110* 111*  --  112*   CO2 mmol/L 23.4 23.7  --  22.0   BUN mg/dL 29* 35*  --  36*   CREATININE mg/dL 0.92 0.96  --  1.21*   CALCIUM mg/dL 8.7 8.8  --  8.9   GLUCOSE mg/dL 87 99  --  136*   ALBUMIN g/dL  --   --   --  2.6*   BILIRUBIN mg/dL  --   --   --  <0.2   ALK PHOS U/L  --   --   --  76   AST (SGOT) U/L  --   --   --  6   ALT (SGPT) U/L  --   --   --  5   Estimated Creatinine Clearance: 55.9 mL/min (by C-G formula based on SCr of 0.92 mg/dL).  No results found for: AMMONIA    No results found for: HGBA1C, POCGLU  No  results found for: HGBA1C  Lab Results   Component Value Date    TSH 2.620 03/15/2022       Blood Culture   Date Value Ref Range Status   04/25/2023 Abnormal Stain (C)  Preliminary   04/25/2023 Anaerobe (Organism type) (C)  Preliminary     No results found for: URINECX  No results found for: WOUNDCX  No results found for: STOOLCX  No results found for: RESPCX  Pain Management Panel          View : No data to display.                        ----------------------------------------------------------------------------------------------------------------------  Imaging Results (Last 24 Hours)     ** No results found for the last 24 hours. **          ----------------------------------------------------------------------------------------------------------------------    Pertinent Infectious Disease Results    Chest x-ray on 4/26/2023 showed no change in distribution of bilateral lung opacities which may represent changes of fibrosis and diffuse pulmonary nodules.  No pneumothorax.  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  This could represent secondary involvement of the leiomyomatous uterus or dystrophic calcifications within the malignancy.  Abnormal fistulous communications between the anterior rectum and the posterior aspect of the above-mentioned pelvic mass with at least 1 and possibly 2 fistulous connections as demonstrated on CT.  Central debris within the mass may represent abscess or necrosis.  Apparent fistulous communication between the mass in the bladder.  Left-sided hydronephrosis and hydroureter with left ureteral stent in place.  The distal pigtail difficult to confirm within the bladder and may be within the distal ureter.  Progressive widely disseminated pulmonary metastases.  Blood cultures on 4/25/2023 finalized as Clostridium subterminale and Gemella morbillorum.   Blood cultures on 4/29/2023 finalized as no growth. MRSA screen on 4/26/2023 was negative.  COVID-19 and flu A/B PCR on 4/25/2023 was negative. CT of the chest from 5/2/23 reports small bilateral pleural effusions, tiny pericardial effusion, bilateral parenchymal pulmonary nodules are again noted.  CT of the abdomen pelvis from 5/2/23 reports left double-J ureteral stent and moderate to severe left hydronephrosis of the left kidney, moderate stool throughout the colon, tiny bilateral pleural effusions, bilateral pulmonary lung base nodules are again noted.     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  Eating breakfast this morning.  Denies any new complaints at this time but does complain of occasional nausea with eating.  Lungs are clear to auscultation bilaterally.  Abdomen is soft, nontender, with normal bowel sounds.  2+ edema bilateral lower extremities.  Afebrile, no diarrhea.  Leukocytosis is resolved with a WBC of 7.29.    Recommend to continue on meropenem monotherapy for now for Clostridium bacteremia.  We believe Gemella to be contaminant.  Patient could be de-escalated to oral metronidazole upon discharge to continue through 5/12/2023.    ANTIMICROBIAL THERAPY    meropenem (MERREM) 1gm IVPB in 100ml NS (VTB)     Code Status:   Code Status and Medical Interventions:   Ordered at: 05/05/23 1450     Level Of Support Discussed With:    Patient     Code Status (Patient has no pulse and is not breathing):    CPR (Attempt to Resuscitate)     Medical Interventions (Patient has pulse or is breathing):    Full Support       Shell Harrison PA-C  05/07/23  09:15 EDT           Electronically  "signed by Shell Harrison PA-C at 23     Taisha Crowell DO at 23 1422        The patient has been admitted to swing bed; briefly seen and examined at bedside. She was in good spirits; when asked if the new pain medications (Percocet) were helping, she replied, \"I believe so; something is helping me.\" VSSAF; will repeat CBC in a.m. Patient has been afebrile. Continue PT/OT as tolerated/as available.     Electronically signed by Taisha Crowell DO at 23     Shell Harrison PA-C at 23 0922                     PROGRESS NOTE         Patient Identification:  Name:  Orquidea Rosenberg  Age:  77 y.o.  Sex:  female  :  1945  MRN:  [id number]  Visit Number:  [id number]  Primary Care Provider:  Jackeline Denson APRN         LOS: 1 day       ----------------------------------------------------------------------------------------------------------------------  Subjective       Chief Complaints:    No chief complaint on file.        Interval History:      Patient is sitting up in bed on 3 L nasal cannula in no apparent distress.  Eating breakfast and appears comfortable.  Family member is at bedside.  Denies any pain at time of exam, but states that she has intermittent abdominal pain.  Denies any other new complaints at this time.  Lungs are clear to auscultation bilaterally.  Abdomen is soft, nontender, with normal bowel sounds.  Afebrile.  No new labs today.    Review of Systems:    Constitutional: no fever, chills and night sweats.  Generalized fatigue.  Eyes: no eye drainage, itching or redness.  HEENT: no mouth sores, dysphagia or nose bleed.  Respiratory: No shortness of breath, No cough. No production of sputum.  Cardiovascular: no chest pain, no palpitations, no orthopnea.  Gastrointestinal: No nausea, no vomiting or diarrhea. No hematemesis or rectal bleeding.  Intermittent abdominal pain.  Genitourinary: no dysuria or polyuria.  Hematologic/lymphatic: no lymph " node abnormalities, no easy bruising or easy bleeding.  Musculoskeletal: no muscle or joint pain.  Skin: No rash and no itching.  Neurological: no loss of consciousness, no seizure, no headache.  Behavioral/Psych: no depression or suicidal ideation.  Endocrine: no hot flashes.  Immunologic: negative.    ----------------------------------------------------------------------------------------------------------------------      Objective       [facility] Meds:  ascorbic acid, 500 mg, Oral, Daily  budesonide, 0.5 mg, Nebulization, BID - RT  enoxaparin, 40 mg, Subcutaneous, Daily  ferrous sulfate, 325 mg, Oral, Daily With Breakfast  guaiFENesin, 600 mg, Oral, Q12H  ipratropium-albuterol, 3 mL, Nebulization, 4x Daily - RT  levothyroxine, 100 mcg, Oral, Q AM  meropenem, 1 g, Intravenous, Q8H  nystatin, , Topical, Q12H  polyethylene glycol, 17 g, Oral, Daily  sodium chloride, 10 mL, Intravenous, Q12H  sodium chloride, 10 mL, Intravenous, Q12H  sodium chloride, 10 mL, Intravenous, Q12H  sodium chloride, 10 mL, Intravenous, Q12H  sodium chloride, 10 mL, Intravenous, Q12H  [START ON 5/19/2023] tuberculin, 5 Units, Intradermal, Once         ----------------------------------------------------------------------------------------------------------------------    Vital Signs:  Temp:  [97.7 °F (36.5 °C)-98.4 °F (36.9 °C)] 97.7 °F (36.5 °C)  Heart Rate:  [] 77  Resp:  [15-20] 18  BP: ()/(41-92) 117/58  No data found.  SpO2 Percentage    05/06/23 0030 05/06/23 0723 05/06/23 0742   SpO2: 99% 99% 99%     SpO2:  [88 %-100 %] 99 %  on  Flow (L/min):  [3] 3;   Device (Oxygen Therapy): nasal cannula    Body mass index is 29.86 kg/m².  Wt Readings from Last 3 Encounters:   05/05/23 83.9 kg (185 lb)   05/04/23 82.6 kg (182 lb)   03/15/22 81.6 kg (180 lb)        Intake/Output Summary (Last 24 hours) at 5/6/2023 0920  Last data filed at 5/6/2023 0240  Gross per 24 hour   Intake 695.4 ml   Output --   Net 695.4 ml     Diet:  Regular/House Diet; Texture: Regular Texture (IDDSI 7); Fluid Consistency: Thin (IDDSI 0)  ----------------------------------------------------------------------------------------------------------------------      Physical Exam:    Constitutional: Elderly, chronically ill-appearing female is sitting up in bed on 3 L nasal cannula.   Eating breakfast and appears comfortable.  Family member at bedside.  HENT:  Head: Normocephalic and atraumatic.  Mouth:  Moist mucous membranes.    Eyes:  Conjunctivae and EOM are normal.  No scleral icterus.  Neck:  Neck supple.  No JVD present.  CVC to right IJ.   Cardiovascular:  Normal rate, regular rhythm and normal heart sounds with no murmur. No edema.  Pulmonary/Chest: Lungs clear to auscultation bilaterally.  Currently on 3 L nasal cannula.  Abdominal:  Soft.  Bowel sounds are normal.  No distension and no tenderness.  Musculoskeletal:  No edema, no tenderness, and no deformity.  No swelling or redness of joints.  Neurological:  Alert and oriented to person, place, and time.  No facial droop.  No slurred speech.   Skin:  Skin is warm and dry.  No rash noted.  No pallor. Vulvar deformity due to prior surgeries.  Psychiatric:  Normal mood and affect.  Behavior is normal.    ----------------------------------------------------------------------------------------------------------------------            Results from last 7 days   Lab Units 05/05/23  0454 05/04/23  0037 05/03/23  0040 05/02/23  0047 05/01/23  0729   CRP mg/dL  --   --   --   --  2.83*   WBC 10*3/mm3 11.90* 15.59* 15.11*   < > 14.45*   HEMOGLOBIN g/dL 7.8* 8.1* 8.1*   < > 8.9*   HEMATOCRIT % 27.9* 28.8* 28.3*   < > 30.7*   MCV fL 91.8 90.6 87.9   < > 88.0   MCHC g/dL 28.0* 28.1* 28.6*   < > 29.0*   PLATELETS 10*3/mm3 293 336 350   < > 392    < > = values in this interval not displayed.     Results from last 7 days   Lab Units 05/05/23  0454 05/04/23  0037 05/03/23  1655 05/03/23  0040   SODIUM mmol/L 139 140  --   142   POTASSIUM mmol/L 5.1 5.3* 5.1 5.7*   MAGNESIUM mg/dL  --  1.9  --   --    CHLORIDE mmol/L 110* 111*  --  112*   CO2 mmol/L 23.4 23.7  --  22.0   BUN mg/dL 29* 35*  --  36*   CREATININE mg/dL 0.92 0.96  --  1.21*   CALCIUM mg/dL 8.7 8.8  --  8.9   GLUCOSE mg/dL 87 99  --  136*   ALBUMIN g/dL  --   --   --  2.6*   BILIRUBIN mg/dL  --   --   --  <0.2   ALK PHOS U/L  --   --   --  76   AST (SGOT) U/L  --   --   --  6   ALT (SGPT) U/L  --   --   --  5   Estimated Creatinine Clearance: 55.9 mL/min (by C-G formula based on SCr of 0.92 mg/dL).  No results found for: AMMONIA    No results found for: HGBA1C, POCGLU  No results found for: HGBA1C  Lab Results   Component Value Date    TSH 2.620 03/15/2022       Blood Culture   Date Value Ref Range Status   04/25/2023 Abnormal Stain (C)  Preliminary   04/25/2023 Anaerobe (Organism type) (C)  Preliminary     No results found for: URINECX  No results found for: WOUNDCX  No results found for: STOOLCX  No results found for: RESPCX  Pain Management Panel          View : No data to display.                        ----------------------------------------------------------------------------------------------------------------------  Imaging Results (Last 24 Hours)     ** No results found for the last 24 hours. **          ----------------------------------------------------------------------------------------------------------------------    Pertinent Infectious Disease Results    Chest x-ray on 4/26/2023 showed no change in distribution of bilateral lung opacities which may represent changes of fibrosis and diffuse pulmonary nodules.  No pneumothorax.  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  This could represent secondary involvement of the leiomyomatous uterus or dystrophic calcifications within the malignancy.  Abnormal fistulous communications  between the anterior rectum and the posterior aspect of the above-mentioned pelvic mass with at least 1 and possibly 2 fistulous connections as demonstrated on CT.  Central debris within the mass may represent abscess or necrosis.  Apparent fistulous communication between the mass in the bladder.  Left-sided hydronephrosis and hydroureter with left ureteral stent in place.  The distal pigtail difficult to confirm within the bladder and may be within the distal ureter.  Progressive widely disseminated pulmonary metastases.  Blood cultures on 4/25/2023 finalized as Clostridium subterminale and Gemella morbillorum.  Blood cultures on 4/29/2023 finalized as no growth. MRSA screen on 4/26/2023 was negative.  COVID-19 and flu A/B PCR on 4/25/2023 was negative. CT of the chest from 5/2/23 reports small bilateral pleural effusions, tiny pericardial effusion, bilateral parenchymal pulmonary nodules are again noted.  CT of the abdomen pelvis from 5/2/23 reports left double-J ureteral stent and moderate to severe left hydronephrosis of the left kidney, moderate stool throughout the colon, tiny bilateral pleural effusions, bilateral pulmonary lung base nodules are again noted.     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  Eating breakfast and appears comfortable.  Family member is at bedside.  Denies any pain at time of exam, but states that she has intermittent abdominal pain.  Denies any other new complaints at this time.  Lungs are clear to auscultation bilaterally.  Abdomen is soft, nontender, with normal bowel sounds.  Afebrile.  No new labs today.    Based on blood culture results, recommend to  continue on meropenem monotherapy for now.  Patient could be de-escalated to oral metronidazole upon discharge to continue through 2023.  We believe Gemella to be contaminant.    ANTIMICROBIAL THERAPY    meropenem (MERREM) 1gm IVPB in 100ml NS (VTB)     Code Status:   Code Status and Medical Interventions:   Ordered at: 23 1450     Level Of Support Discussed With:    Patient     Code Status (Patient has no pulse and is not breathing):    CPR (Attempt to Resuscitate)     Medical Interventions (Patient has pulse or is breathing):    Full Support       Shell Harrison PA-C  23  09:45 EDT           Electronically signed by Shell Harrison PA-C at 23 0900       Consult Notes (most recent note)    No notes of this type exist for this encounter.         Nutrition Notes (most recent note)    No notes exist for this encounter.            Physical Therapy Notes (most recent note)      Yahaira Gonzalez, PT at 23 4420  Version 1 of 1         Acute Care - Physical Therapy Initial Evaluation  [facility]     Patient Name: Orquidea Rosenberg  : 1945  MRN: [id number]  Today's Date: 2023   Onset of Illness/Injury or Date of Surgery: 23 (swing bed admit)  Visit Dx:   No diagnosis found.  Patient Active Problem List   Diagnosis   • COVID-19   • Vulvar cancer   • Physical debility     Past Medical History:   Diagnosis Date   • Arthritis    • COPD (chronic obstructive pulmonary disease)    • Elevated cholesterol    • History of transfusion    • Hypertension    • Vulval ca      Past Surgical History:   Procedure Laterality Date   • RADICAL VULVECTOMY  2019   • RADICAL VULVECTOMY Bilateral 2019     PT Assessment (last 12 hours)     PT Evaluation and Treatment     Row Name 23 1546          Physical Therapy Time and Intention    Subjective Information complains of;dyspnea  -CT     Document Type evaluation  swing bed  -CT     Mode of Treatment physical therapy  -CT     Patient Effort  adequate  -CT     Comment Pt admitted to swing bed and evaluated this date. Pt reports she was independent PLOF.  -CT     Row Name 05/06/23 1546          General Information    Patient Profile Reviewed yes  -CT     Onset of Illness/Injury or Date of Surgery 05/05/23  swing bed admit  -CT     Referring Physician Mervat  -CT     Patient Observations alert;cooperative;agree to therapy  -CT     Prior Level of Function independent:  -CT     Equipment Currently Used at Home rollator;wheelchair  -CT     Existing Precautions/Restrictions fall;oxygen therapy device and L/min  -CT     Equipment Issued to Patient gait belt  -CT     Risks Reviewed patient:  -CT     Benefits Reviewed patient:  -CT     Barriers to Rehab medically complex  -CT     Row Name 05/06/23 1546          Living Environment    Current Living Arrangements home  -CT     People in Home alone  -CT     Row Name 05/06/23 1546          Cognition    Affect/Mental Status (Cognition) WFL  -CT     Orientation Status (Cognition) oriented x 3  -CT     Follows Commands (Cognition) WFL  -CT     Row Name 05/06/23 1546          Range of Motion Comprehensive    Comment, General Range of Motion BLE grossly WFL  -CT     Row Name 05/06/23 1546          Strength Comprehensive (MMT)    Comment, General Manual Muscle Testing (MMT) Assessment BLE grossly 4/5  -CT     Row Name 05/06/23 1546          Bed Mobility    Bed Mobility supine-sit  -CT     Supine-Sit [place] (Bed Mobility) modified independence  -CT     Bed Mobility, Safety Issues decreased use of arms for pushing/pulling;decreased use of legs for bridging/pushing  -CT     Assistive Device (Bed Mobility) bed rails;head of bed elevated  -CT     Row Name 05/06/23 1546          Transfers    Transfers sit-stand transfer;stand-sit transfer;bed-chair transfer  -CT     Row Name 05/06/23 1546          Bed-Chair Transfer    Bed-Chair [place] (Transfers) contact guard  -CT     Assistive Device (Bed-Chair Transfers) walker,  front-wheeled  -CT     Row Name 05/06/23 1546          Sit-Stand Transfer    Sit-Stand [place] (Transfers) contact guard  -CT     Assistive Device (Sit-Stand Transfers) walker, front-wheeled  -CT     Row Name 05/06/23 1546          Stand-Sit Transfer    Stand-Sit [place] (Transfers) contact guard  -CT     Assistive Device (Stand-Sit Transfers) walker, front-wheeled  -CT     Row Name 05/06/23 1546          Gait/Stairs (Locomotion)    [place] Level (Gait) contact guard  -CT     Assistive Device (Gait) walker, front-wheeled  -CT     Distance in Feet (Gait) 4 ft during transfer to chair  -CT     Pattern (Gait) swing-to  -CT     Deviations/Abnormal Patterns (Gait) gait speed decreased;weight shifting decreased  -CT     Comment, (Gait/Stairs) pts shortness of breath limits mobility  -CT     Row Name 05/06/23 1546          Balance    Balance Assessment sitting static balance;sitting dynamic balance;standing static balance;standing dynamic balance  -CT     Static Sitting Balance set-up  -CT     Dynamic Sitting Balance contact guard  -CT     Position, Sitting Balance sitting edge of bed  -CT     Static Standing Balance contact guard  -CT     Position/Device Used, Standing Balance walker, front-wheeled  -CT     Row Name             Wound 05/01/23 1414 Left vagina    Wound - Properties Group Placement Date: 05/01/23  -LB Placement Time: 1414  -LB Present on Hospital Admission: Y  -LB Side: Left  -LB Location: vagina  -LB    Retired Wound - Properties Group Placement Date: 05/01/23  -LB Placement Time: 1414  -LB Present on Hospital Admission: Y  -LB Side: Left  -LB Location: vagina  -LB    Retired Wound - Properties Group Date first assessed: 05/01/23  -LB Time first assessed: 1414  -LB Present on Hospital Admission: Y  -LB Side: Left  -LB Location: vagina  -LB    Row Name 05/06/23 1546          Plan of Care Review    Plan of Care Reviewed With patient  -CT     Row Name 05/06/23 1546          Positioning and  Restraints    Pre-Treatment Position in bed  -CT     Post Treatment Position chair  -CT     In Chair sitting;call light within reach;encouraged to call for assist;notified nsg  -CT     Row Name 05/06/23 1546          Therapy Assessment/Plan (PT)    Patient/Family Therapy Goals Statement (PT) Pt goals are to return to PLOF  -CT     Functional Level at Time of Evaluation (PT) CGA  -CT     PT Diagnosis (PT) decreased functional mobility  -CT     Rehab Potential (PT) fair, will monitor progress closely  -CT     Criteria for Skilled Interventions Met (PT) yes  -CT     Therapy Frequency (PT) 5 times/wk  -CT     Predicted Duration of Therapy Intervention (PT) 2-3 weeks  -CT     Row Name 05/06/23 1546          Therapy Plan Review/Discharge Plan (PT)    Therapy Plan Review (PT) evaluation/treatment results reviewed;care plan/treatment goals reviewed;risks/benefits reviewed;current/potential barriers reviewed;participants voiced agreement with care plan;participants included;patient  -CT     Row Name 05/06/23 1546          Physical Therapy Goals    Transfer Goal Selection (PT) transfer, PT goal 1  -CT     Gait Training Goal Selection (PT) gait training, PT goal 1  -CT     Row Name 05/06/23 1546          Transfer Goal 1 (PT)    Activity/Assistive Device (Transfer Goal 1, PT) sit-to-stand/stand-to-sit;bed-to-chair/chair-to-bed  -CT     [place] Level/Cues Needed (Transfer Goal 1, PT) modified independence  -CT     Time Frame (Transfer Goal 1, PT) by discharge  -CT     Row Name 05/06/23 1546          Gait Training Goal 1 (PT)    Activity/Assistive Device (Gait Training Goal 1, PT) gait (walking locomotion);assistive device use;increase endurance/gait distance  -CT     [place] Level (Gait Training Goal 1, PT) modified independence  -CT     Distance (Gait Training Goal 1, PT) 15  -CT     Time Frame (Gait Training Goal 1, PT) by discharge  -CT           User Key  (r) = Recorded By, (t) = Taken By, (c) = Cosigned By     Initials Name Provider Type    CT Yahaira Gonzalez, SHEILA Physical Therapist    Veronica Bansal RN Registered Nurse                  PT Recommendation and Plan  Planned Therapy Interventions (PT): gait training, bed mobility training, balance training, home exercise program, manual therapy techniques, motor coordination training, neuromuscular re-education, postural re-education, patient/family education, strengthening, transfer training  Therapy Frequency (PT): 5 times/wk  Plan of Care Reviewed With: patient   Outcome Measures     Row Name 05/06/23 1500 05/05/23 1623 05/05/23 1600       How much help from another is currently needed...    Putting on and taking off regular lower body clothing? 2  -BF 2  -KR --    Bathing (including washing, rinsing, and drying) 2  -BF 2  -KR --    Toileting (which includes using toilet bed pan or urinal) 1  -BF 1  -KR --    Putting on and taking off regular upper body clothing 3  -BF 3  -KR --    Taking care of personal grooming (such as brushing teeth) 3  -BF 3  -KR --    Eating meals 3  -BF 3  -KR --    AM-PAC 6 Clicks Score (OT) 14  -BF 14  -KR --       Functional Assessment    Outcome Measure Options AM-PAC 6 Clicks Daily Activity (OT)  -BF -- AM-PAC 6 Clicks Daily Activity (OT)  -KR          User Key  (r) = Recorded By, (t) = Taken By, (c) = Cosigned By    Initials Name Provider Type     Leah Hickman, OT Occupational Therapist    Justen Gurrola, OT Occupational Therapist                 Time Calculation:    PT Charges     Row Name 05/06/23 1555             Time Calculation    PT Received On 05/06/23  -CT      PT Goal Re-Cert Due Date 05/19/23  -CT            User Key  (r) = Recorded By, (t) = Taken By, (c) = Cosigned By    Initials Name Provider Type    CT Yahaira Gonzalez PT Physical Therapist              Therapy Charges for Today     Code Description Service Date Service Provider Modifiers Qty    54414367508 HC PT EVAL MOD COMPLEXITY 4 5/6/2023 Yahaira Gonzalez  PT GP 1          PT G-Codes  Outcome Measure Options: AM-PAC 6 Clicks Daily Activity (OT)  AM-PAC 6 Clicks Score (PT): 10  AM-PAC 6 Clicks Score (OT): 14    Yahaira Gonzalez, PT  2023      Electronically signed by Yahaira Gonzalez, PT at 23 1556          Occupational Therapy Notes (most recent note)      Leah Hickman, OT at 23 1502          Acute Care - Occupational Therapy Treatment Note  [facility] [place]     Patient Name: Orquidea Rosenberg  : 1945  MRN: [id number]  Today's Date: 2023             Admit Date: 2023     No diagnosis found.  Patient Active Problem List   Diagnosis   • COVID-19   • Vulvar cancer   • Physical debility     Past Medical History:   Diagnosis Date   • Arthritis    • COPD (chronic obstructive pulmonary disease)    • Elevated cholesterol    • History of transfusion    • Hypertension    • Vulval ca      Past Surgical History:   Procedure Laterality Date   • RADICAL VULVECTOMY  2019   • RADICAL VULVECTOMY Bilateral 2019         OT ASSESSMENT FLOWSHEET (last 12 hours)     OT Evaluation and Treatment     Row Name 23 1456                   OT Time and Intention    Subjective Information complains of;weakness;dyspnea  -BF        Document Type therapy note (daily note)  -BF        Mode of Treatment occupational therapy  -BF        Patient Effort good  -BF        Symptoms Noted During/After Treatment fatigue;shortness of breath  -BF           General Information    Existing Precautions/Restrictions fall;oxygen therapy device and L/min  -BF           Cognition    Orientation Status (Cognition) oriented x 3  -BF        Follows Commands (Cognition) WFL  -BF           Motor Skills    Motor Skills motor control/coordination interventions  -BF        Functional Endurance Poor  -BF        Motor Control/Coordination Interventions gross motor coordination activities;therapeutic exercise/ROM  BUE C therex, strengthening w/ theraband, handgripper  -BF           Wound  05/01/23 1414 Left vagina    Wound - Properties Group Placement Date: 05/01/23  -LB Placement Time: 1414  -LB Present on Hospital Admission: Y  -LB Side: Left  -LB Location: vagina  -LB    Retired Wound - Properties Group Placement Date: 05/01/23  -LB Placement Time: 1414  -LB Present on Hospital Admission: Y  -LB Side: Left  -LB Location: vagina  -LB    Retired Wound - Properties Group Date first assessed: 05/01/23  -LB Time first assessed: 1414  -LB Present on Hospital Admission: Y  -LB Side: Left  -LB Location: vagina  -LB       Positioning and Restraints    In Bed supine;call light within reach;encouraged to call for assist  -BF              User Key  (r) = Recorded By, (t) = Taken By, (c) = Cosigned By    Initials Name Effective Dates    BF Leah Hickman, OT 06/16/21 -     LB Veronica Mendez RN 10/20/21 -                        OT Recommendation and Plan           Outcome Measures     Row Name 05/06/23 1500 05/05/23 1623 05/05/23 1600       How much help from another is currently needed...    Putting on and taking off regular lower body clothing? 2  -BF 2  -KR --    Bathing (including washing, rinsing, and drying) 2  -BF 2  -KR --    Toileting (which includes using toilet bed pan or urinal) 1  -BF 1  -KR --    Putting on and taking off regular upper body clothing 3  -BF 3  -KR --    Taking care of personal grooming (such as brushing teeth) 3  -BF 3  -KR --    Eating meals 3  -BF 3  -KR --    AM-PAC 6 Clicks Score (OT) 14  -BF 14  -KR --       Functional Assessment    Outcome Measure Options AM-PAC 6 Clicks Daily Activity (OT)  -BF -- AM-PAC 6 Clicks Daily Activity (OT)  -KR          User Key  (r) = Recorded By, (t) = Taken By, (c) = Cosigned By    Initials Name Provider Type    Leah Hills, OT Occupational Therapist    Justen Gurrola OT Occupational Therapist                Time Calculation:    Time Calculation- OT     Row Name 05/06/23 1501             Time Calculation- OT    OT Start  Time 1430  -BF      Total Timed Code Minutes- OT 15 minute(s)  -BF            User Key  (r) = Recorded By, (t) = Taken By, (c) = Cosigned By    Initials Name Provider Type    BF Leah Hickman OT Occupational Therapist              Therapy Charges for Today     Code Description Service Date Service Provider Modifiers Qty    46643043136 HC OT THER PROC EA 15 MIN 5/6/2023 Leah Hickman OT GO 1               Leah Hickman OT  5/6/2023    Electronically signed by Leah Hickman OT at 05/06/23 1503       Discharge Planning Assessment   Jorge     Patient Name: Orquidea Rosenberg             MRN: [id number]  Today's Date: 5/5/2023                       Admit Date: 4/25/2023     Plan: SS was notifed by Andrew Bell RN pt has been approved for swing bed admission on this date. SS to follow.         Discharge Plan      Row Name 05/05/23 1002           Plan     Plan SS was notifed by Andrew Bell RN pt has been approved for swing bed admission on this date. SS to follow.                    HARPAL Stanley

## 2023-05-09 PROCEDURE — 25010000002 ENOXAPARIN PER 10 MG: Performed by: INTERNAL MEDICINE

## 2023-05-09 PROCEDURE — 94760 N-INVAS EAR/PLS OXIMETRY 1: CPT

## 2023-05-09 PROCEDURE — 94799 UNLISTED PULMONARY SVC/PX: CPT

## 2023-05-09 PROCEDURE — 94761 N-INVAS EAR/PLS OXIMETRY MLT: CPT

## 2023-05-09 PROCEDURE — 97110 THERAPEUTIC EXERCISES: CPT

## 2023-05-09 PROCEDURE — 97530 THERAPEUTIC ACTIVITIES: CPT

## 2023-05-09 PROCEDURE — 99308 SBSQ NF CARE LOW MDM 20: CPT | Performed by: PHYSICIAN ASSISTANT

## 2023-05-09 PROCEDURE — 63710000001 ONDANSETRON PER 8 MG: Performed by: INTERNAL MEDICINE

## 2023-05-09 RX ADMIN — IPRATROPIUM BROMIDE AND ALBUTEROL SULFATE 3 ML: .5; 2.5 SOLUTION RESPIRATORY (INHALATION) at 00:53

## 2023-05-09 RX ADMIN — OXYCODONE HYDROCHLORIDE AND ACETAMINOPHEN 1 TABLET: 10; 325 TABLET ORAL at 12:04

## 2023-05-09 RX ADMIN — BUDESONIDE 0.5 MG: 0.5 INHALANT RESPIRATORY (INHALATION) at 18:32

## 2023-05-09 RX ADMIN — Medication 10 ML: at 08:27

## 2023-05-09 RX ADMIN — GUAIFENESIN 600 MG: 600 TABLET, EXTENDED RELEASE ORAL at 20:39

## 2023-05-09 RX ADMIN — LOSARTAN POTASSIUM 50 MG: 50 TABLET, FILM COATED ORAL at 08:27

## 2023-05-09 RX ADMIN — POLYETHYLENE GLYCOL 3350 17 G: 17 POWDER, FOR SOLUTION ORAL at 08:27

## 2023-05-09 RX ADMIN — LEVOTHYROXINE SODIUM 100 MCG: 100 TABLET ORAL at 06:03

## 2023-05-09 RX ADMIN — Medication 10 ML: at 08:28

## 2023-05-09 RX ADMIN — Medication 10 ML: at 20:40

## 2023-05-09 RX ADMIN — METRONIDAZOLE 500 MG: 250 TABLET ORAL at 20:39

## 2023-05-09 RX ADMIN — OXYCODONE HYDROCHLORIDE AND ACETAMINOPHEN 1 TABLET: 10; 325 TABLET ORAL at 17:05

## 2023-05-09 RX ADMIN — FERROUS SULFATE TAB 325 MG (65 MG ELEMENTAL FE) 325 MG: 325 (65 FE) TAB at 08:26

## 2023-05-09 RX ADMIN — METRONIDAZOLE 500 MG: 250 TABLET ORAL at 06:04

## 2023-05-09 RX ADMIN — GUAIFENESIN 600 MG: 600 TABLET, EXTENDED RELEASE ORAL at 08:26

## 2023-05-09 RX ADMIN — BUDESONIDE 0.5 MG: 0.5 INHALANT RESPIRATORY (INHALATION) at 07:05

## 2023-05-09 RX ADMIN — IPRATROPIUM BROMIDE AND ALBUTEROL SULFATE 3 ML: .5; 2.5 SOLUTION RESPIRATORY (INHALATION) at 18:32

## 2023-05-09 RX ADMIN — IPRATROPIUM BROMIDE AND ALBUTEROL SULFATE 3 ML: .5; 2.5 SOLUTION RESPIRATORY (INHALATION) at 07:04

## 2023-05-09 RX ADMIN — NYSTATIN: 100000 POWDER TOPICAL at 08:26

## 2023-05-09 RX ADMIN — Medication 10 ML: at 20:41

## 2023-05-09 RX ADMIN — OXYCODONE HYDROCHLORIDE AND ACETAMINOPHEN 500 MG: 500 TABLET ORAL at 08:26

## 2023-05-09 RX ADMIN — METRONIDAZOLE 500 MG: 250 TABLET ORAL at 14:54

## 2023-05-09 RX ADMIN — ENOXAPARIN SODIUM 40 MG: 40 INJECTION SUBCUTANEOUS at 08:26

## 2023-05-09 RX ADMIN — NYSTATIN: 100000 POWDER TOPICAL at 20:40

## 2023-05-09 RX ADMIN — ONDANSETRON HYDROCHLORIDE 4 MG: 4 TABLET, FILM COATED ORAL at 12:04

## 2023-05-09 RX ADMIN — IPRATROPIUM BROMIDE AND ALBUTEROL SULFATE 3 ML: .5; 2.5 SOLUTION RESPIRATORY (INHALATION) at 13:36

## 2023-05-09 NOTE — PLAN OF CARE
Goal Outcome Evaluation:               PT has rested in bed this shift. Wound care completed per order. SOA appears to have improved this shift. PT refused ambulation this shift, was educated on importance of ambulating but was still non compliant at this time. VSS, will continue POC

## 2023-05-09 NOTE — PLAN OF CARE
Goal Outcome Evaluation:  Plan of Care Reviewed With: patient        Progress: no change  Outcome Evaluation: Patient has rested in bed, has some episodes of SOB during bathing, pain controled per MAR, No acute changes. Will continue POC.

## 2023-05-09 NOTE — THERAPY TREATMENT NOTE
Acute Care - Physical Therapy Treatment Note   Jorge     Patient Name: Orquidea Rosenberg  : 1945  MRN: [id number]  Today's Date: 2023   Onset of Illness/Injury or Date of Surgery: 23 (swing bed admit)  Visit Dx:   No diagnosis found.  Patient Active Problem List   Diagnosis   • COVID-19   • Vulvar cancer   • Physical debility     Past Medical History:   Diagnosis Date   • Arthritis    • COPD (chronic obstructive pulmonary disease)    • Elevated cholesterol    • History of transfusion    • Hypertension    • Vulval ca      Past Surgical History:   Procedure Laterality Date   • RADICAL VULVECTOMY  2019   • RADICAL VULVECTOMY Bilateral 2019     PT Assessment (last 12 hours)     PT Evaluation and Treatment     Row Name 23 1400          Physical Therapy Time and Intention    Subjective Information complains of;weakness  -KM     Document Type therapy note (daily note)  -KM     Mode of Treatment individual therapy;physical therapy  -KM     Patient Effort adequate  -KM     Symptoms Noted During/After Treatment fatigue;shortness of breath  -KM     Row Name 23 1400          General Information    Patient Profile Reviewed yes  -KM     Patient Observations alert;cooperative;agree to therapy  -KM     Existing Precautions/Restrictions fall;oxygen therapy device and L/min  -KM     Row Name 23 1400          Cognition    Affect/Mental Status (Cognition) WFL  -KM     Follows Commands (Cognition) WFL  -KM     Row Name 23 1400          Bed Mobility    Bed Mobility supine-sit  -KM     Supine-Sit [place] (Bed Mobility) modified independence  -KM     Bed Mobility, Safety Issues decreased use of arms for pushing/pulling;decreased use of legs for bridging/pushing  -KM     Assistive Device (Bed Mobility) bed rails;head of bed elevated  -KM     Row Name 23 1400          Transfers    Transfers sit-stand transfer;stand-sit transfer;bed-chair transfer  -KM     Row Name 23 1400           Bed-Chair Transfer    Bed-Chair [place] (Transfers) contact guard;minimum assist (75% patient effort)  -KM     Assistive Device (Bed-Chair Transfers) --  HHA  -KM     Row Name 05/09/23 1400          Sit-Stand Transfer    Sit-Stand [place] (Transfers) contact guard  -KM     Assistive Device (Sit-Stand Transfers) --  HHA  -KM     Row Name 05/09/23 1400          Stand-Sit Transfer    Stand-Sit [place] (Transfers) contact guard  -KM     Assistive Device (Stand-Sit Transfers) --  A  -KM     Row Name 05/09/23 1400          Gait/Stairs (Locomotion)    Gait/Stairs Locomotion gait/ambulation independence;distance ambulated  -KM     [place] Level (Gait) contact guard  -KM     Assistive Device (Gait) --  A  -KM     Distance in Feet (Gait) 5' to chair  -KM     Pattern (Gait) swing-to  -KM     Deviations/Abnormal Patterns (Gait) gait speed decreased;weight shifting decreased  -KM     Row Name 05/09/23 1400          Safety Issues, Functional Mobility    Impairments Affecting Function (Mobility) balance;endurance/activity tolerance;shortness of breath;pain;strength  -KM     Row Name 05/09/23 1400          Motor Skills    Comments, Therapeutic Exercise seated ther-ex  -KM     Additional Documentation Comments, Therapeutic Exercise (Row)  -KM     Row Name             Wound 05/01/23 1414 Left vagina    Wound - Properties Group Placement Date: 05/01/23  -LB Placement Time: 1414  -LB Present on Hospital Admission: Y  -LB Side: Left  -LB Location: vagina  -LB    Retired Wound - Properties Group Placement Date: 05/01/23  -LB Placement Time: 1414  -LB Present on Hospital Admission: Y  -LB Side: Left  -LB Location: vagina  -LB    Retired Wound - Properties Group Date first assessed: 05/01/23  -LB Time first assessed: 1414  -LB Present on Hospital Admission: Y  -LB Side: Left  -LB Location: vagina  -LB    Row Name 05/09/23 1400          Progress Summary (PT)    Daily Progress Summary (PT) Pt. demonstrates  functional mobility skills w/ CGA. She was able to ambulate from bed-chair w/ HHA. She tolerated session well w/ minor complaints of fatigue and shortness of breath. Pt. would continue to benefit from skilled PT services.  -KM           User Key  (r) = Recorded By, (t) = Taken By, (c) = Cosigned By    Initials Name Provider Type    Ander Garcia, PT Physical Therapist    Veronica Bansal, RN Registered Nurse                  PT Recommendation and Plan     Progress Summary (PT)  Daily Progress Summary (PT): Pt. demonstrates functional mobility skills w/ CGA. She was able to ambulate from bed-chair w/ HHA. She tolerated session well w/ minor complaints of fatigue and shortness of breath. Pt. would continue to benefit from skilled PT services.   Outcome Measures     Row Name 05/09/23 1118 05/06/23 1500          How much help from another is currently needed...    Putting on and taking off regular lower body clothing? 2  -LM 2  -BF     Bathing (including washing, rinsing, and drying) 2  -LM 2  -BF     Toileting (which includes using toilet bed pan or urinal) 1  -LM 1  -BF     Putting on and taking off regular upper body clothing 3  -LM 3  -BF     Taking care of personal grooming (such as brushing teeth) 3  -LM 3  -BF     Eating meals 3  -LM 3  -BF     AM-PAC 6 Clicks Score (OT) 14  -LM 14  -BF        Functional Assessment    Outcome Measure Options -- AM-PAC 6 Clicks Daily Activity (OT)  -BF           User Key  (r) = Recorded By, (t) = Taken By, (c) = Cosigned By    Initials Name Provider Type    Leah Hills, OT Occupational Therapist    LM Joanne Colon, OT Occupational Therapist                 Time Calculation:    PT Charges     Row Name 05/09/23 1419             Time Calculation    PT Received On 05/09/23  -KM         Time Calculation- PT    Total Timed Code Minutes- PT 24 minute(s)  -KM            User Key  (r) = Recorded By, (t) = Taken By, (c) = Cosigned By    Initials Name Provider Type    CELY  Ander Shirley, PT Physical Therapist              Therapy Charges for Today     Code Description Service Date Service Provider Modifiers Qty    36550795794 HC PT THER PROC EA 15 MIN 5/9/2023 Ander Shirley, PT GP 1    16146184826 HC PT THERAPEUTIC ACT EA 15 MIN 5/9/2023 Ander Shirley, PT GP 1          PT G-Codes  Outcome Measure Options: AM-PAC 6 Clicks Daily Activity (OT)  AM-PAC 6 Clicks Score (PT): 16  AM-PAC 6 Clicks Score (OT): 14    Ander Shirley PT  5/9/2023

## 2023-05-09 NOTE — PROGRESS NOTES
PROGRESS NOTE         Patient Identification:  Name:  Orquidea Rosenberg  Age:  77 y.o.  Sex:  female  :  1945  MRN:  [id number]  Visit Number:  [id number]  Primary Care Provider:  Jackeline Denson APRN         LOS: 4 days       ----------------------------------------------------------------------------------------------------------------------  Subjective       Chief Complaints:    No chief complaint on file.        Interval History:      Patient is sitting up in bed on 3 L nasal cannula in no apparent distress.  Eating breakfast and appears comfortable.  Denies any new complaints at this time including cough or shortness of breath.  Admits to occasional nausea with eating.  Lungs are clear to auscultation bilaterally.  Abdomen is soft, nontender, with normal bowel sounds.  Afebrile, no diarrhea.  No new labs today.    Review of Systems:    Constitutional: no fever, chills and night sweats.  Generalized fatigue.  Eyes: no eye drainage, itching or redness.  HEENT: no mouth sores, dysphagia or nose bleed.  Respiratory: No shortness of breath, No cough. No production of sputum.  Cardiovascular: no chest pain, no palpitations, no orthopnea.  Gastrointestinal: No vomiting or diarrhea. No abdominal pain, hematemesis, or rectal bleeding.  Occasional nausea with meals.  Genitourinary: no dysuria or polyuria.  Hematologic/lymphatic: no lymph node abnormalities, no easy bruising or easy bleeding.  Musculoskeletal: no muscle or joint pain.  Skin: No rash and no itching.  Neurological: no loss of consciousness, no seizure, no headache.  Behavioral/Psych: no depression or suicidal ideation.  Endocrine: no hot flashes.  Immunologic: negative.    ----------------------------------------------------------------------------------------------------------------------      Objective       [facility] [place] Meds:  ascorbic acid, 500 mg, Oral, Daily  budesonide, 0.5 mg, Nebulization, BID - RT  enoxaparin, 40 mg,  Subcutaneous, Daily  ferrous sulfate, 325 mg, Oral, Daily With Breakfast  guaiFENesin, 600 mg, Oral, Q12H  ipratropium-albuterol, 3 mL, Nebulization, 4x Daily - RT  levothyroxine, 100 mcg, Oral, Q AM  losartan, 50 mg, Oral, Q24H  metroNIDAZOLE, 500 mg, Oral, Q8H  nystatin, , Topical, Q12H  polyethylene glycol, 17 g, Oral, Daily  sodium chloride, 10 mL, Intravenous, Q12H  sodium chloride, 10 mL, Intravenous, Q12H  sodium chloride, 10 mL, Intravenous, Q12H  sodium chloride, 10 mL, Intravenous, Q12H  sodium chloride, 10 mL, Intravenous, Q12H  [START ON 5/19/2023] tuberculin, 5 Units, Intradermal, Once         ----------------------------------------------------------------------------------------------------------------------    Vital Signs:  Temp:  [98 °F (36.7 °C)-98.5 °F (36.9 °C)] 98.5 °F (36.9 °C)  Heart Rate:  [] 83  Resp:  [18-20] 18  BP: (146-179)/(57-76) 179/76  No data found.  SpO2 Percentage    05/08/23 1856 05/09/23 0053 05/09/23 0704   SpO2: 91% 94% 93%     SpO2:  [91 %-99 %] 93 %  on  Flow (L/min):  [2-3] 3;   Device (Oxygen Therapy): nasal cannula    Body mass index is 29.86 kg/m².  Wt Readings from Last 3 Encounters:   05/05/23 83.9 kg (185 lb)   05/04/23 82.6 kg (182 lb)   03/15/22 81.6 kg (180 lb)        Intake/Output Summary (Last 24 hours) at 5/9/2023 1036  Last data filed at 5/9/2023 0242  Gross per 24 hour   Intake 1720 ml   Output --   Net 1720 ml     Diet: Regular/House Diet; Texture: Regular Texture (IDDSI 7); Fluid Consistency: Thin (IDDSI 0)  ----------------------------------------------------------------------------------------------------------------------      Physical Exam:    Constitutional: Elderly, chronically ill-appearing female is sitting up in bed on 3 L nasal cannula.   Eating breakfast and appears comfortable.  HENT:  Head: Normocephalic and atraumatic.  Mouth:  Moist mucous membranes.    Eyes:  Conjunctivae and EOM are normal.  No scleral icterus.  Neck:  Neck supple.  No  JVD present.  CVC to right IJ.   Cardiovascular:  Normal rate, regular rhythm and normal heart sounds with no murmur. No edema.  Pulmonary/Chest: Clear to auscultation bilaterally.  No rhonchi, wheezes, or rales.  Abdominal:  Soft.  Bowel sounds are normal.  No distension and no tenderness.  Musculoskeletal:  No tenderness and no deformity.  No swelling or redness of joints.  2+ edema bilateral lower extremities.  Neurological:  Alert and oriented to person, place, and time.  No facial droop.  No slurred speech.   Skin:  Skin is warm and dry.  No rash noted.  No pallor. Vulvar deformity due to prior surgeries.  Psychiatric:  Normal mood and affect.  Behavior is normal.    ----------------------------------------------------------------------------------------------------------------------          Results from last 7 days   Lab Units 05/07/23 2002   PH, ARTERIAL pH units 7.423   PO2 ART mm Hg 53.8*   PCO2, ARTERIAL mm Hg 38.0   HCO3 ART mmol/L 24.8     Results from last 7 days   Lab Units 05/08/23  0204 05/07/23  0330 05/05/23  0454   WBC 10*3/mm3 6.34 7.26 11.90*   HEMOGLOBIN g/dL 7.0* 7.3* 7.8*   HEMATOCRIT % 24.9* 25.2* 27.9*   MCV fL 89.6 89.4 91.8   MCHC g/dL 28.1* 29.0* 28.0*   PLATELETS 10*3/mm3 290 268 293     Results from last 7 days   Lab Units 05/08/23  0204 05/05/23  0454 05/04/23  0037 05/03/23  1655 05/03/23  0040   SODIUM mmol/L 139 139 140  --  142   POTASSIUM mmol/L 4.8 5.1 5.3*   < > 5.7*   MAGNESIUM mg/dL  --   --  1.9  --   --    CHLORIDE mmol/L 108* 110* 111*  --  112*   CO2 mmol/L 23.3 23.4 23.7  --  22.0   BUN mg/dL 22 29* 35*  --  36*   CREATININE mg/dL 1.04* 0.92 0.96  --  1.21*   CALCIUM mg/dL 8.5* 8.7 8.8  --  8.9   GLUCOSE mg/dL 93 87 99  --  136*   ALBUMIN g/dL  --   --   --   --  2.6*   BILIRUBIN mg/dL  --   --   --   --  <0.2   ALK PHOS U/L  --   --   --   --  76   AST (SGOT) U/L  --   --   --   --  6   ALT (SGPT) U/L  --   --   --   --  5    < > = values in this interval not  displayed.   Estimated Creatinine Clearance: 49.4 mL/min (A) (by C-G formula based on SCr of 1.04 mg/dL (H)).  No results found for: AMMONIA    No results found for: HGBA1C, POCGLU  No results found for: HGBA1C  Lab Results   Component Value Date    TSH 2.620 03/15/2022       Blood Culture   Date Value Ref Range Status   04/25/2023 Abnormal Stain (C)  Preliminary   04/25/2023 Anaerobe (Organism type) (C)  Preliminary     No results found for: URINECX  No results found for: WOUNDCX  No results found for: STOOLCX  No results found for: RESPCX  Pain Management Panel            View : No data to display.                        ----------------------------------------------------------------------------------------------------------------------  Imaging Results (Last 24 Hours)       ** No results found for the last 24 hours. 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  This could represent secondary involvement of the leiomyomatous uterus or dystrophic calcifications within the malignancy.  Abnormal fistulous communications between the anterior rectum and the posterior aspect of the above-mentioned pelvic mass with at least 1 and possibly 2 fistulous connections as demonstrated on CT.  Central debris within the mass may represent abscess or necrosis.  Apparent fistulous communication between the mass in the bladder.  Left-sided hydronephrosis and hydroureter with left ureteral stent in place.  The distal pigtail difficult to confirm within the bladder and may be within the distal ureter.  Progressive widely disseminated pulmonary metastases.  Blood cultures on 4/25/2023 finalized as Clostridium subterminale and Gemella  morbillorum.  Blood cultures on 4/29/2023 finalized as no growth. MRSA screen on 4/26/2023 was negative.  COVID-19 and flu A/B PCR on 4/25/2023 was negative. CT of the chest from 5/2/23 reports small bilateral pleural effusions, tiny pericardial effusion, bilateral parenchymal pulmonary nodules are again noted.  CT of the abdomen pelvis from 5/2/23 reports left double-J ureteral stent and moderate to severe left hydronephrosis of the left kidney, moderate stool throughout the colon, tiny bilateral pleural effusions, bilateral pulmonary lung base nodules are again noted. Chest x-ray on 5/7/2023 showed multiple bilateral pulmonary nodular densities are similar to the prior study.  No acute infiltrates.  Small left pleural effusion is stable.    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  Eating breakfast and appears comfortable.  Denies any new complaints at this time including cough or shortness of breath.  Admits to occasional nausea with eating.  Lungs are clear to auscultation bilaterally.  Abdomen is soft, nontender, with normal bowel sounds.  Afebrile, no diarrhea.  No new labs today.    Meropenem was de-escalated to oral metronidazole yesterday.  Recommend to continue through 5/12/2023 for Clostridium bacteremia.  It is also believed that Gemella may have been a contaminant, especially as it was only seen in 1 out of 2 blood cultures.    ANTIMICROBIAL THERAPY    metroNIDAZOLE - 250 MG     Code Status:   Code Status and Medical Interventions:   Ordered at: 05/05/23 1450     Level Of Support Discussed With:    Patient     Code Status (Patient has no pulse and is not breathing):    CPR (Attempt  to Resuscitate)     Medical Interventions (Patient has pulse or is breathing):    Full Support       Shell Harrison PA-C  05/09/23  10:36 EDT

## 2023-05-09 NOTE — THERAPY TREATMENT NOTE
Acute Care - Occupational Therapy Treatment Note  JOSE ROBERTO Patel     Patient Name: Orquidea Rosenberg  : 1945  MRN: [id number]  Today's Date: 2023  Onset of Illness/Injury or Date of Surgery: 23 (swing bed admit)     Referring Physician: Mervat    Admit Date: 2023     No diagnosis found.  Patient Active Problem List   Diagnosis   • COVID-19   • Vulvar cancer   • Physical debility     Past Medical History:   Diagnosis Date   • Arthritis    • COPD (chronic obstructive pulmonary disease)    • Elevated cholesterol    • History of transfusion    • Hypertension    • Vulval ca      Past Surgical History:   Procedure Laterality Date   • RADICAL VULVECTOMY  2019   • RADICAL VULVECTOMY Bilateral 2019         OT ASSESSMENT FLOWSHEET (last 12 hours)     OT Evaluation and Treatment     Row Name 23 1115                   OT Time and Intention    Subjective Information complains of;weakness;fatigue;nausea/vomiting  -LM        Document Type therapy note (daily note)  -LM        Mode of Treatment occupational therapy  -LM        Patient Effort adequate  -LM        Comment Patient seen this am for light strengtheinng with bue.  Therex with red tband and frequent rest breaks.  Performed supine in bed.  setup with feeding and light grooming.  total assist with bathing, dressing, toileting.  -LM           General Information    Existing Precautions/Restrictions fall;oxygen therapy device and L/min  -LM           Cognition    Affect/Mental Status (Cognition) WFL  -LM        Orientation Status (Cognition) oriented x 3  -LM        Follows Commands (Cognition) WFL  -LM           Wound 23 1414 Left vagina    Wound - Properties Group Placement Date: 23  -LB Placement Time: 1414  -LB Present on Hospital Admission: Y  -LB Side: Left  -LB Location: vagina  -LB    Retired Wound - Properties Group Placement Date: 23  -LB Placement Time: 1414  -LB Present on Hospital Admission: Y  -LB Side: Left  -LB  Location: vagina  -LB    Retired Wound - Properties Group Date first assessed: 05/01/23  -LB Time first assessed: 1414  -LB Present on Hospital Admission: Y  -LB Side: Left  -LB Location: vagina  -LB       Positioning and Restraints    Post Treatment Position bed  -LM        In Bed call light within reach;encouraged to call for assist  -LM              User Key  (r) = Recorded By, (t) = Taken By, (c) = Cosigned By    Initials Name Effective Dates    LM Joanne Colon OT 06/16/21 -     LB Veronica Mendez RN 10/20/21 -                        OT Recommendation and Plan           Outcome Measures     Row Name 05/09/23 1118 05/06/23 1500          How much help from another is currently needed...    Putting on and taking off regular lower body clothing? 2  -LM 2  -BF     Bathing (including washing, rinsing, and drying) 2  -LM 2  -BF     Toileting (which includes using toilet bed pan or urinal) 1  -LM 1  -BF     Putting on and taking off regular upper body clothing 3  -LM 3  -BF     Taking care of personal grooming (such as brushing teeth) 3  -LM 3  -BF     Eating meals 3  -LM 3  -BF     AM-PAC 6 Clicks Score (OT) 14  -LM 14  -BF        Functional Assessment    Outcome Measure Options -- AM-PAC 6 Clicks Daily Activity (OT)  -BF           User Key  (r) = Recorded By, (t) = Taken By, (c) = Cosigned By    Initials Name Provider Type    BF Leah Hickman OT Occupational Therapist    LM Joanne Colon, OT Occupational Therapist                Time Calculation:     Therapy Charges for Today     Code Description Service Date Service Provider Modifiers Qty    [id number]  OT THER PROC EA 15 MIN 5/9/2023 Joanne Colon OT GO 1               Joanne Colon OT  5/9/2023

## 2023-05-10 PROCEDURE — 25010000002 MORPHINE PER 10 MG: Performed by: INTERNAL MEDICINE

## 2023-05-10 PROCEDURE — 97116 GAIT TRAINING THERAPY: CPT

## 2023-05-10 PROCEDURE — 97530 THERAPEUTIC ACTIVITIES: CPT

## 2023-05-10 PROCEDURE — 94799 UNLISTED PULMONARY SVC/PX: CPT

## 2023-05-10 PROCEDURE — 94761 N-INVAS EAR/PLS OXIMETRY MLT: CPT

## 2023-05-10 PROCEDURE — 25010000002 ENOXAPARIN PER 10 MG: Performed by: INTERNAL MEDICINE

## 2023-05-10 PROCEDURE — 25010000002 FUROSEMIDE PER 20 MG: Performed by: HOSPITALIST

## 2023-05-10 PROCEDURE — 99309 SBSQ NF CARE MODERATE MDM 30: CPT | Performed by: INTERNAL MEDICINE

## 2023-05-10 PROCEDURE — 97110 THERAPEUTIC EXERCISES: CPT

## 2023-05-10 RX ORDER — METOLAZONE 2.5 MG/1
2.5 TABLET ORAL DAILY
Status: COMPLETED | OUTPATIENT
Start: 2023-05-10 | End: 2023-05-10

## 2023-05-10 RX ORDER — FUROSEMIDE 10 MG/ML
20 INJECTION INTRAMUSCULAR; INTRAVENOUS ONCE
Status: COMPLETED | OUTPATIENT
Start: 2023-05-10 | End: 2023-05-10

## 2023-05-10 RX ADMIN — GUAIFENESIN 600 MG: 600 TABLET, EXTENDED RELEASE ORAL at 09:09

## 2023-05-10 RX ADMIN — MORPHINE SULFATE 4 MG: 4 INJECTION, SOLUTION INTRAMUSCULAR; INTRAVENOUS at 12:06

## 2023-05-10 RX ADMIN — METRONIDAZOLE 500 MG: 250 TABLET ORAL at 15:00

## 2023-05-10 RX ADMIN — Medication 10 ML: at 20:25

## 2023-05-10 RX ADMIN — OXYCODONE HYDROCHLORIDE AND ACETAMINOPHEN 1 TABLET: 10; 325 TABLET ORAL at 20:25

## 2023-05-10 RX ADMIN — NYSTATIN: 100000 POWDER TOPICAL at 09:10

## 2023-05-10 RX ADMIN — FERROUS SULFATE TAB 325 MG (65 MG ELEMENTAL FE) 325 MG: 325 (65 FE) TAB at 09:00

## 2023-05-10 RX ADMIN — Medication 10 ML: at 09:10

## 2023-05-10 RX ADMIN — IPRATROPIUM BROMIDE AND ALBUTEROL SULFATE 3 ML: .5; 2.5 SOLUTION RESPIRATORY (INHALATION) at 07:27

## 2023-05-10 RX ADMIN — NYSTATIN: 100000 POWDER TOPICAL at 20:25

## 2023-05-10 RX ADMIN — METRONIDAZOLE 500 MG: 250 TABLET ORAL at 22:05

## 2023-05-10 RX ADMIN — LEVOTHYROXINE SODIUM 100 MCG: 100 TABLET ORAL at 05:24

## 2023-05-10 RX ADMIN — IPRATROPIUM BROMIDE AND ALBUTEROL SULFATE 3 ML: .5; 2.5 SOLUTION RESPIRATORY (INHALATION) at 12:38

## 2023-05-10 RX ADMIN — GUAIFENESIN 600 MG: 600 TABLET, EXTENDED RELEASE ORAL at 20:25

## 2023-05-10 RX ADMIN — METRONIDAZOLE 500 MG: 250 TABLET ORAL at 05:24

## 2023-05-10 RX ADMIN — LOSARTAN POTASSIUM 50 MG: 50 TABLET, FILM COATED ORAL at 09:09

## 2023-05-10 RX ADMIN — METOLAZONE 2.5 MG: 2.5 TABLET ORAL at 13:08

## 2023-05-10 RX ADMIN — OXYCODONE HYDROCHLORIDE AND ACETAMINOPHEN 1 TABLET: 10; 325 TABLET ORAL at 15:14

## 2023-05-10 RX ADMIN — OXYCODONE HYDROCHLORIDE AND ACETAMINOPHEN 500 MG: 500 TABLET ORAL at 09:09

## 2023-05-10 RX ADMIN — MORPHINE SULFATE 4 MG: 4 INJECTION, SOLUTION INTRAMUSCULAR; INTRAVENOUS at 18:00

## 2023-05-10 RX ADMIN — ENOXAPARIN SODIUM 40 MG: 40 INJECTION SUBCUTANEOUS at 09:09

## 2023-05-10 RX ADMIN — Medication 10 ML: at 09:11

## 2023-05-10 RX ADMIN — FUROSEMIDE 20 MG: 10 INJECTION, SOLUTION INTRAMUSCULAR; INTRAVENOUS at 12:43

## 2023-05-10 RX ADMIN — IPRATROPIUM BROMIDE AND ALBUTEROL SULFATE 3 ML: .5; 2.5 SOLUTION RESPIRATORY (INHALATION) at 18:49

## 2023-05-10 NOTE — PROGRESS NOTES
Patient seen and examined, patient reports occasional suprapubic pain and soreness, she is not able to tell if she has bowel movements.  He continues to have fecaloid urine.  Edema has improved, she reports heaviness on breathing but has not changed.  Reports this is chronic for quite some time.  She informed likely this is secondary to her diffuse metastatic lesions.  Currently she has no wheezing.  She is now on monotherapy with Flagyl per infectious disease secondary to Clostridium bacteremia.    We will give her dose of Lasix today, repeat BMP in the morning, patient has good appetite.    Overall prognosis is poor.

## 2023-05-10 NOTE — PLAN OF CARE
Goal Outcome Evaluation:  Plan of Care Reviewed With: patient        Progress: no change  Outcome Evaluation: Patient has worked with PT today, up in a chair throughout the day, Pain controled per MAR, VVS, No requests or complaints at this time, No acute distress noted. will continue POC.

## 2023-05-10 NOTE — PLAN OF CARE
Goal Outcome Evaluation:                      Pt has rested in bed this shift, no c/o or acute changes noted. Deferred ambulation or to sit at bedside this shift. VSSAF, will continue POC

## 2023-05-10 NOTE — CASE MANAGEMENT/SOCIAL WORK
Discharge Planning Assessment  [facility]     Patient Name: Orquidea Rosenberg  MRN: [id number]  Today's Date: 5/10/2023    Admit Date: 5/5/2023    Plan: Pt was admitted to swing bed on 5/5/23. Pt has been approved through 5/15/23 with clincal updates due.  Pt lives at home and daughter Lilli has been staying with pt for the last two years. Pt does no utilize HH services at this time. Pt PCP Jackeline Denson. Pt has oxgyen at 2 liters at night via [employer] for DME needs. Pt daughter states pt has POA( not on file), no living will. Per Daughter who states pt has been in bed since saturday and disposition is unsure at this time. SS to follow         Discharge Plan     Row Name 05/10/23 1300       Plan    Plan Pt was admitted to swing bed on 5/5/23. Pt has been approved through 5/15/23 with clincal updates due.  Pt lives at home and daughter Lilli has been staying with pt for the last two years. Pt does no utilize HH services at this time. Pt PCP Jackeline Denson. Pt has oxgyen at 2 liters at night via [employer] for DME needs. Pt daughter states pt has POA( not on file), no living will. Per Daughter who states pt has been in bed since saturday and disposition is unsure at this time. SS to follow              HARPAL Stanley

## 2023-05-10 NOTE — THERAPY TREATMENT NOTE
Acute Care - Physical Therapy Treatment Note   [facility]     Patient Name: Orquidea Rosenberg  : 1945  MRN: [id number]  Today's Date: 5/10/2023   Onset of Illness/Injury or Date of Surgery: 23 (swing bed admit)  Visit Dx:   No diagnosis found.  Patient Active Problem List   Diagnosis   • COVID-19   • Vulvar cancer   • Physical debility     Past Medical History:   Diagnosis Date   • Arthritis    • COPD (chronic obstructive pulmonary disease)    • Elevated cholesterol    • History of transfusion    • Hypertension    • Vulval ca      Past Surgical History:   Procedure Laterality Date   • RADICAL VULVECTOMY  2019   • RADICAL VULVECTOMY Bilateral 2019     PT Assessment (last 12 hours)     PT Evaluation and Treatment     Row Name 05/10/23 1102          Physical Therapy Time and Intention    Subjective Information complains of;dyspnea  -HC     Document Type therapy note (daily note)  -HC     Mode of Treatment individual therapy;physical therapy  -HC     Patient Effort adequate  -HC     Comment Pt and RN Yanet in agreement for PT. Pt did walk on this date 14' with RW CGA, minimal cueing to keep feet inside RW. Supine and sitting exercises completed until tolerance with rest breaks as needed. Had to bump O2 from 2 for 4L secondary to SOB, notified nursing.  -HC     Row Name 05/10/23 1102          General Information    Patient Profile Reviewed yes  -HC     Existing Precautions/Restrictions fall;oxygen therapy device and L/min  -HC     Row Name 05/10/23 1102          Cognition    Affect/Mental Status (Cognition) WFL  -HC     Follows Commands (Cognition) WFL  -HC     Personal Safety Interventions fall prevention program maintained;gait belt;nonskid shoes/slippers when out of bed;supervised activity  -     Row Name 05/10/23 1102          Bed Mobility    Bed Mobility supine-sit  -HC     Supine-Sit [place] (Bed Mobility) modified independence  -HC     Bed Mobility, Safety Issues decreased use of arms for  pushing/pulling;decreased use of legs for bridging/pushing  -     Assistive Device (Bed Mobility) bed rails;head of bed elevated  -     Row Name 05/10/23 1102          Transfers    Transfers sit-stand transfer;stand-sit transfer;bed-chair transfer  -     Row Name 05/10/23 1102          Sit-Stand Transfer    Sit-Stand [place] (Transfers) contact guard  -HC     Assistive Device (Sit-Stand Transfers) walker, front-wheeled  -HC     Row Name 05/10/23 1102          Stand-Sit Transfer    Stand-Sit [place] (Transfers) contact guard  -HC     Assistive Device (Stand-Sit Transfers) walker, front-wheeled  -HC     Row Name 05/10/23 1102          Gait/Stairs (Locomotion)    Gait/Stairs Locomotion gait/ambulation independence;distance ambulated  -     [place] Level (Gait) contact guard  -HC     Assistive Device (Gait) walker, front-wheeled  -HC     Distance in Feet (Gait) 14'  -HC     Pattern (Gait) swing-to  -HC     Deviations/Abnormal Patterns (Gait) gait speed decreased;weight shifting decreased  -     Row Name 05/10/23 1102          Safety Issues, Functional Mobility    Impairments Affecting Function (Mobility) balance;endurance/activity tolerance;shortness of breath;pain;strength  -     Row Name 05/10/23 1102          Motor Skills    Therapeutic Exercise other (see comments)  Supine: AP, inversion/eversion, SLR, hip abd, heel slide. Sitting: AP, LAQ, March, knees in/out  -     Row Name             Wound 05/01/23 1414 Left vagina    Wound - Properties Group Placement Date: 05/01/23  -LB Placement Time: 1414  -LB Present on Hospital Admission: Y  -LB Side: Left  -LB Location: vagina  -LB    Retired Wound - Properties Group Placement Date: 05/01/23  -LB Placement Time: 1414  -LB Present on Hospital Admission: Y  -LB Side: Left  -LB Location: vagina  -LB    Retired Wound - Properties Group Date first assessed: 05/01/23  -LB Time first assessed: 1414  -LB Present on Hospital Admission: Y  -LB Side:  Left  -LB Location: vagina  -LB    Row Name 05/10/23 1102          Positioning and Restraints    Pre-Treatment Position in bed  -HC     Post Treatment Position chair  -HC     In Chair reclined;call light within reach;encouraged to call for assist;notified nsg  -HC           User Key  (r) = Recorded By, (t) = Taken By, (c) = Cosigned By    Initials Name Provider Type     Veronica Mendez, RN Registered Nurse     Gayle Peralta PTA Physical Therapist Assistant                  PT Recommendation and Plan  Therapy Frequency (PT): 5 times/wk      Outcome Measures     Row Name 05/09/23 1118             How much help from another is currently needed...    Putting on and taking off regular lower body clothing? 2  -LM      Bathing (including washing, rinsing, and drying) 2  -LM      Toileting (which includes using toilet bed pan or urinal) 1  -LM      Putting on and taking off regular upper body clothing 3  -LM      Taking care of personal grooming (such as brushing teeth) 3  -LM      Eating meals 3  -LM      AM-PAC 6 Clicks Score (OT) 14  -LM            User Key  (r) = Recorded By, (t) = Taken By, (c) = Cosigned By    Initials Name Provider Type    LM Joanne Colon, OT Occupational Therapist                 Time Calculation:    PT Charges     Row Name 05/10/23 1108             Time Calculation    PT Received On 05/10/23  -         Time Calculation- PT    Total Timed Code Minutes- PT 38 minute(s)  -HC            User Key  (r) = Recorded By, (t) = Taken By, (c) = Cosigned By    Initials Name Provider Type     Gayle Peralta PTA Physical Therapist Assistant              Therapy Charges for Today     Code Description Service Date Service Provider Modifiers Qty    [id number]  GAIT TRAINING EA 15 MIN 5/10/2023 Gayle Peralta PTA GP, CQ 1    29861141161 HC PT THER PROC EA 15 MIN 5/10/2023 Gayle Peralta PTA GP, CQ 1    [id number]  PT THERAPEUTIC ACT EA 15 MIN 5/10/2023 Gayle Peratla  [place], DEIDRA GP, CQ 1          PT G-Codes  Outcome Measure Options: AM-PAC 6 Clicks Daily Activity (OT)  AM-PAC 6 Clicks Score (PT): 16  AM-PAC 6 Clicks Score (OT): 14    Gayle Peralta PTA  5/10/2023

## 2023-05-10 NOTE — PROGRESS NOTES
PROGRESS NOTE         Patient Identification:  Name:  Orquidea Rosenberg  Age:  77 y.o.  Sex:  female  :  1945  MRN:  [id number]  Visit Number:  [id number]  Primary Care Provider:  Jackeline Denson APRN         LOS: 5 days       ----------------------------------------------------------------------------------------------------------------------  Subjective       Chief Complaints:    No chief complaint on file.        Interval History:      Patient is sitting up in bed on 2.5 L nasal cannula in no apparent distress.  Slightly tachypneic this morning and nurse reports that she has had some dyspnea on exertion.  Patient complains of a mild cough without production of sputum.  Wheezing bilaterally.  Abdomen is soft, nontender, with normal bowel sounds.  2+ edema bilateral lower extremities.  Afebrile, no diarrhea.  No new labs today.    Review of Systems:    Constitutional: no fever, chills and night sweats.  Generalized fatigue.  Eyes: no eye drainage, itching or redness.  HEENT: no mouth sores, dysphagia or nose bleed.  Respiratory: No cough. No production of sputum.  Dyspnea on exertion  Cardiovascular: no chest pain, no palpitations, no orthopnea.  Gastrointestinal: No vomiting or diarrhea. No abdominal pain, hematemesis, or rectal bleeding.  Occasional nausea with meals.  Genitourinary: no dysuria or polyuria.  Hematologic/lymphatic: no lymph node abnormalities, no easy bruising or easy bleeding.  Musculoskeletal: no muscle or joint pain.  Skin: No rash and no itching.  Neurological: no loss of consciousness, no seizure, no headache.  Behavioral/Psych: no depression or suicidal ideation.  Endocrine: no hot flashes.  Immunologic: negative.    ----------------------------------------------------------------------------------------------------------------------      Objective       [facility] Meds:  ascorbic acid, 500 mg, Oral, Daily  budesonide, 0.5 mg, Nebulization, BID - RT  enoxaparin, 40 mg,  Subcutaneous, Daily  ferrous sulfate, 325 mg, Oral, Daily With Breakfast  guaiFENesin, 600 mg, Oral, Q12H  ipratropium-albuterol, 3 mL, Nebulization, 4x Daily - RT  levothyroxine, 100 mcg, Oral, Q AM  losartan, 50 mg, Oral, Q24H  metroNIDAZOLE, 500 mg, Oral, Q8H  nystatin, , Topical, Q12H  polyethylene glycol, 17 g, Oral, Daily  sodium chloride, 10 mL, Intravenous, Q12H  sodium chloride, 10 mL, Intravenous, Q12H  sodium chloride, 10 mL, Intravenous, Q12H  sodium chloride, 10 mL, Intravenous, Q12H  sodium chloride, 10 mL, Intravenous, Q12H  [START ON 5/19/2023] tuberculin, 5 Units, Intradermal, Once         ----------------------------------------------------------------------------------------------------------------------    Vital Signs:  Temp:  [94.6 °F (34.8 °C)-98.5 °F (36.9 °C)] 98.5 °F (36.9 °C)  Heart Rate:  [76-89] 85  Resp:  [16-20] 16  BP: (106-140)/(50-60) 140/60  No data found.  SpO2 Percentage    05/10/23 0057 05/10/23 0727 05/10/23 0744   SpO2: 94% 97% 98%     SpO2:  [93 %-98 %] 98 %  on  Flow (L/min):  [2.5-3] 2.5;   Device (Oxygen Therapy): nasal cannula    Body mass index is 29.86 kg/m².  Wt Readings from Last 3 Encounters:   05/05/23 83.9 kg (185 lb)   05/04/23 82.6 kg (182 lb)   03/15/22 81.6 kg (180 lb)        Intake/Output Summary (Last 24 hours) at 5/10/2023 1022  Last data filed at 5/9/2023 7859  Gross per 24 hour   Intake 1000 ml   Output --   Net 1000 ml     Diet: Regular/House Diet; Texture: Regular Texture (IDDSI 7); Fluid Consistency: Thin (IDDSI 0)  ----------------------------------------------------------------------------------------------------------------------      Physical Exam:    Constitutional: Elderly, chronically ill-appearing female is sitting up in bed on 2.5 L nasal cannula.  Mild tachypnea.  Nurse and physical therapy assistant are at bedside.  HENT:  Head: Normocephalic and atraumatic.  Mouth:  Moist mucous membranes.    Eyes:  Conjunctivae and EOM are normal.  No  scleral icterus.  Neck:  Neck supple.  No JVD present.  CVC to right IJ.   Cardiovascular:  Normal rate, regular rhythm and normal heart sounds with no murmur. No edema.  Pulmonary/Chest: Wheezing bilaterally.  Abdominal:  Soft.  Bowel sounds are normal.  No distension and no tenderness.  Musculoskeletal:  No tenderness and no deformity.  No swelling or redness of joints.  2+ edema bilateral lower extremities.  Neurological:  Alert and oriented to person, place, and time.  No facial droop.  No slurred speech.   Skin:  Skin is warm and dry.  No rash noted.  No pallor. Vulvar deformity due to prior surgeries.  Psychiatric:  Normal mood and affect.  Behavior is normal.    ----------------------------------------------------------------------------------------------------------------------          Results from last 7 days   Lab Units 05/07/23 2002   PH, ARTERIAL pH units 7.423   PO2 ART mm Hg 53.8*   PCO2, ARTERIAL mm Hg 38.0   HCO3 ART mmol/L 24.8     Results from last 7 days   Lab Units 05/08/23  0204 05/07/23  0330 05/05/23  0454   WBC 10*3/mm3 6.34 7.26 11.90*   HEMOGLOBIN g/dL 7.0* 7.3* 7.8*   HEMATOCRIT % 24.9* 25.2* 27.9*   MCV fL 89.6 89.4 91.8   MCHC g/dL 28.1* 29.0* 28.0*   PLATELETS 10*3/mm3 290 268 293     Results from last 7 days   Lab Units 05/08/23  0204 05/05/23  0454 05/04/23  0037   SODIUM mmol/L 139 139 140   POTASSIUM mmol/L 4.8 5.1 5.3*   MAGNESIUM mg/dL  --   --  1.9   CHLORIDE mmol/L 108* 110* 111*   CO2 mmol/L 23.3 23.4 23.7   BUN mg/dL 22 29* 35*   CREATININE mg/dL 1.04* 0.92 0.96   CALCIUM mg/dL 8.5* 8.7 8.8   GLUCOSE mg/dL 93 87 99   Estimated Creatinine Clearance: 49.4 mL/min (A) (by C-G formula based on SCr of 1.04 mg/dL (H)).  No results found for: AMMONIA    No results found for: HGBA1C, POCGLU  No results found for: HGBA1C  Lab Results   Component Value Date    TSH 2.620 03/15/2022       Blood Culture   Date Value Ref Range Status   04/25/2023 Abnormal Stain (C)  Preliminary    04/25/2023 Anaerobe (Organism type) (C)  Preliminary     No results found for: URINECX  No results found for: WOUNDCX  No results found for: STOOLCX  No results found for: RESPCX  Pain Management Panel            View : No data to display.                        ----------------------------------------------------------------------------------------------------------------------  Imaging Results (Last 24 Hours)       ** No results found for the last 24 hours. 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  This could represent secondary involvement of the leiomyomatous uterus or dystrophic calcifications within the malignancy.  Abnormal fistulous communications between the anterior rectum and the posterior aspect of the above-mentioned pelvic mass with at least 1 and possibly 2 fistulous connections as demonstrated on CT.  Central debris within the mass may represent abscess or necrosis.  Apparent fistulous communication between the mass in the bladder.  Left-sided hydronephrosis and hydroureter with left ureteral stent in place.  The distal pigtail difficult to confirm within the bladder and may be within the distal ureter.  Progressive widely disseminated pulmonary metastases.  Blood cultures on 4/25/2023 finalized as Clostridium subterminale and Gemella morbillorum.  Blood cultures on 4/29/2023 finalized as no growth. MRSA screen on 4/26/2023 was negative.  COVID-19 and flu A/B PCR on 4/25/2023 was negative. CT of the chest from 5/2/23 reports small bilateral pleural effusions, tiny pericardial effusion, bilateral parenchymal pulmonary nodules are again noted.  CT of the abdomen pelvis from 5/2/23 reports left  double-J ureteral stent and moderate to severe left hydronephrosis of the left kidney, moderate stool throughout the colon, tiny bilateral pleural effusions, bilateral pulmonary lung base nodules are again noted. Chest x-ray on 5/7/2023 showed multiple bilateral pulmonary nodular densities are similar to the prior study.  No acute infiltrates.  Small left pleural effusion is stable.    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 However, the patient has exhibited slightly rapid breathing this morning, and the nurse reports that she has experienced some shortness of breath when exerting herself. The patient has also reported a mild cough with no sputum production and bilateral wheezing. Her abdomen is soft and non-tender, with normal bowel sounds. Bilateral lower extremities exhibit 2+ edema. The patient is afebrile and has not experienced any diarrhea, and no new labs were conducted today. It is recommended that the patient continue taking oral metronidazole until 5/12/2023 to treat the Clostridium bacteremia. It is also believed that Gemella may have been a contaminant, especially since it was only detected in one out of two blood cultures. It is recommended to perform a repeat chest x-ray and order a CBC and CRP in the morning.    ANTIMICROBIAL THERAPY    metroNIDAZOLE - 250 MG     Code Status:   Code Status and Medical Interventions:   Ordered at: 05/05/23 1450     Level Of Support Discussed With:    Patient     Code Status (Patient has no  pulse and is not breathing):    CPR (Attempt to Resuscitate)     Medical Interventions (Patient has pulse or is breathing):    Full Support       Shell Harrison PA-C  05/10/23  10:22 EDT     Electronically signed by Shell Harrison PA-C, 05/10/23, 10:25 AM EDT.    Electronically signed by Socrates Flaherty MD, 05/10/23, 11:46 AM EDT.

## 2023-05-11 LAB
BASOPHILS # BLD AUTO: 0.1 10*3/MM3 (ref 0–0.2)
BASOPHILS NFR BLD AUTO: 1.1 % (ref 0–1.5)
CRP SERPL-MCNC: 11.77 MG/DL (ref 0–0.5)
DEPRECATED RDW RBC AUTO: 62.4 FL (ref 37–54)
EOSINOPHIL # BLD AUTO: 0.35 10*3/MM3 (ref 0–0.4)
EOSINOPHIL NFR BLD AUTO: 3.8 % (ref 0.3–6.2)
ERYTHROCYTE [DISTWIDTH] IN BLOOD BY AUTOMATED COUNT: 19.6 % (ref 12.3–15.4)
GLUCOSE BLDC GLUCOMTR-MCNC: 105 MG/DL (ref 70–130)
HCT VFR BLD AUTO: 26.1 % (ref 34–46.6)
HGB BLD-MCNC: 7.6 G/DL (ref 12–15.9)
IMM GRANULOCYTES # BLD AUTO: 0.1 10*3/MM3 (ref 0–0.05)
IMM GRANULOCYTES NFR BLD AUTO: 1.1 % (ref 0–0.5)
LYMPHOCYTES # BLD AUTO: 1.07 10*3/MM3 (ref 0.7–3.1)
LYMPHOCYTES NFR BLD AUTO: 11.6 % (ref 19.6–45.3)
MCH RBC QN AUTO: 25.4 PG (ref 26.6–33)
MCHC RBC AUTO-ENTMCNC: 29.1 G/DL (ref 31.5–35.7)
MCV RBC AUTO: 87.3 FL (ref 79–97)
MONOCYTES # BLD AUTO: 0.83 10*3/MM3 (ref 0.1–0.9)
MONOCYTES NFR BLD AUTO: 9 % (ref 5–12)
NEUTROPHILS NFR BLD AUTO: 6.81 10*3/MM3 (ref 1.7–7)
NEUTROPHILS NFR BLD AUTO: 73.4 % (ref 42.7–76)
NRBC BLD AUTO-RTO: 0 /100 WBC (ref 0–0.2)
PLATELET # BLD AUTO: 308 10*3/MM3 (ref 140–450)
PMV BLD AUTO: 9.2 FL (ref 6–12)
RBC # BLD AUTO: 2.99 10*6/MM3 (ref 3.77–5.28)
WBC NRBC COR # BLD: 9.26 10*3/MM3 (ref 3.4–10.8)

## 2023-05-11 PROCEDURE — 97110 THERAPEUTIC EXERCISES: CPT

## 2023-05-11 PROCEDURE — 94761 N-INVAS EAR/PLS OXIMETRY MLT: CPT

## 2023-05-11 PROCEDURE — 99309 SBSQ NF CARE MODERATE MDM 30: CPT | Performed by: INTERNAL MEDICINE

## 2023-05-11 PROCEDURE — 97116 GAIT TRAINING THERAPY: CPT

## 2023-05-11 PROCEDURE — 85025 COMPLETE CBC W/AUTO DIFF WBC: CPT | Performed by: PHYSICIAN ASSISTANT

## 2023-05-11 PROCEDURE — 25010000002 ENOXAPARIN PER 10 MG: Performed by: INTERNAL MEDICINE

## 2023-05-11 PROCEDURE — 86140 C-REACTIVE PROTEIN: CPT | Performed by: PHYSICIAN ASSISTANT

## 2023-05-11 PROCEDURE — 94799 UNLISTED PULMONARY SVC/PX: CPT

## 2023-05-11 PROCEDURE — 97530 THERAPEUTIC ACTIVITIES: CPT

## 2023-05-11 PROCEDURE — 82948 REAGENT STRIP/BLOOD GLUCOSE: CPT

## 2023-05-11 PROCEDURE — 63710000001 ONDANSETRON PER 8 MG: Performed by: INTERNAL MEDICINE

## 2023-05-11 RX ADMIN — OXYCODONE HYDROCHLORIDE AND ACETAMINOPHEN 1 TABLET: 10; 325 TABLET ORAL at 03:59

## 2023-05-11 RX ADMIN — OXYCODONE HYDROCHLORIDE AND ACETAMINOPHEN 1 TABLET: 10; 325 TABLET ORAL at 12:51

## 2023-05-11 RX ADMIN — OXYCODONE HYDROCHLORIDE AND ACETAMINOPHEN 1 TABLET: 10; 325 TABLET ORAL at 08:47

## 2023-05-11 RX ADMIN — OXYCODONE HYDROCHLORIDE AND ACETAMINOPHEN 500 MG: 500 TABLET ORAL at 08:36

## 2023-05-11 RX ADMIN — GUAIFENESIN 600 MG: 600 TABLET, EXTENDED RELEASE ORAL at 20:27

## 2023-05-11 RX ADMIN — Medication 10 ML: at 08:37

## 2023-05-11 RX ADMIN — NYSTATIN: 100000 POWDER TOPICAL at 20:28

## 2023-05-11 RX ADMIN — IPRATROPIUM BROMIDE AND ALBUTEROL SULFATE 3 ML: .5; 2.5 SOLUTION RESPIRATORY (INHALATION) at 18:28

## 2023-05-11 RX ADMIN — GUAIFENESIN 600 MG: 600 TABLET, EXTENDED RELEASE ORAL at 08:36

## 2023-05-11 RX ADMIN — FERROUS SULFATE TAB 325 MG (65 MG ELEMENTAL FE) 325 MG: 325 (65 FE) TAB at 08:36

## 2023-05-11 RX ADMIN — METRONIDAZOLE 500 MG: 250 TABLET ORAL at 14:32

## 2023-05-11 RX ADMIN — IPRATROPIUM BROMIDE AND ALBUTEROL SULFATE 3 ML: .5; 2.5 SOLUTION RESPIRATORY (INHALATION) at 12:49

## 2023-05-11 RX ADMIN — METRONIDAZOLE 500 MG: 250 TABLET ORAL at 05:23

## 2023-05-11 RX ADMIN — ONDANSETRON HYDROCHLORIDE 4 MG: 4 TABLET, FILM COATED ORAL at 08:47

## 2023-05-11 RX ADMIN — NYSTATIN: 100000 POWDER TOPICAL at 08:37

## 2023-05-11 RX ADMIN — LEVOTHYROXINE SODIUM 100 MCG: 100 TABLET ORAL at 05:23

## 2023-05-11 RX ADMIN — Medication 10 ML: at 20:28

## 2023-05-11 RX ADMIN — METRONIDAZOLE 500 MG: 250 TABLET ORAL at 22:14

## 2023-05-11 RX ADMIN — OXYCODONE HYDROCHLORIDE AND ACETAMINOPHEN 1 TABLET: 10; 325 TABLET ORAL at 22:14

## 2023-05-11 RX ADMIN — OXYCODONE HYDROCHLORIDE AND ACETAMINOPHEN 1 TABLET: 10; 325 TABLET ORAL at 18:01

## 2023-05-11 RX ADMIN — LOSARTAN POTASSIUM 50 MG: 50 TABLET, FILM COATED ORAL at 08:36

## 2023-05-11 RX ADMIN — IPRATROPIUM BROMIDE AND ALBUTEROL SULFATE 3 ML: .5; 2.5 SOLUTION RESPIRATORY (INHALATION) at 00:21

## 2023-05-11 RX ADMIN — ENOXAPARIN SODIUM 40 MG: 40 INJECTION SUBCUTANEOUS at 08:36

## 2023-05-11 RX ADMIN — IPRATROPIUM BROMIDE AND ALBUTEROL SULFATE 3 ML: .5; 2.5 SOLUTION RESPIRATORY (INHALATION) at 07:16

## 2023-05-11 NOTE — PLAN OF CARE
Goal Outcome Evaluation:           Progress: no change  Outcome Evaluation: Pt. resting in bed at this time. Pt had complaints of pain this shift, PRN pain meds given, see MAR. Wound care completed. VSS, no acute changes this shift. Will continue with plan of care.

## 2023-05-11 NOTE — PROGRESS NOTES
PROGRESS NOTE         Patient Identification:  Name:  Orquidea Rosenberg  Age:  77 y.o.  Sex:  female  :  1945  MRN:  [id number]  Visit Number:  [id number]  Primary Care Provider:  Jackeline Denson APRN         LOS: 6 days       ----------------------------------------------------------------------------------------------------------------------  Subjective       Chief Complaints:    No chief complaint on file.        Interval History:      Patient overall doing well today.  Resting comfortably in bed.  Continues on 2.5 L per nasal cannula with no apparent distress.  Lungs clear to auscultation bilaterally.  Abdomen soft, diffusely tender, however this is chronic in nature.  WBC normal at 9.26.  CRP elevated 11.77.    Review of Systems:    Constitutional: no fever, chills and night sweats.  Generalized fatigue.  Eyes: no eye drainage, itching or redness.  HEENT: no mouth sores, dysphagia or nose bleed.  Respiratory: No cough. No production of sputum.    Cardiovascular: no chest pain, no palpitations, no orthopnea.  Gastrointestinal: No vomiting or diarrhea. No abdominal pain, hematemesis, or rectal bleeding.  Occasional nausea with meals.  Genitourinary: no dysuria or polyuria.  Hematologic/lymphatic: no lymph node abnormalities, no easy bruising or easy bleeding.  Musculoskeletal: no muscle or joint pain.  Skin: No rash and no itching.  Neurological: no loss of consciousness, no seizure, no headache.  Behavioral/Psych: no depression or suicidal ideation.  Endocrine: no hot flashes.  Immunologic: negative.    ----------------------------------------------------------------------------------------------------------------------      Objective       [facility] Meds:  ascorbic acid, 500 mg, Oral, Daily  budesonide, 0.5 mg, Nebulization, BID - RT  enoxaparin, 40 mg, Subcutaneous, Daily  ferrous sulfate, 325 mg, Oral, Daily With Breakfast  guaiFENesin, 600 mg, Oral, Q12H  ipratropium-albuterol, 3 mL,  Nebulization, 4x Daily - RT  levothyroxine, 100 mcg, Oral, Q AM  losartan, 50 mg, Oral, Q24H  metroNIDAZOLE, 500 mg, Oral, Q8H  nystatin, , Topical, Q12H  polyethylene glycol, 17 g, Oral, Daily  sodium chloride, 10 mL, Intravenous, Q12H  sodium chloride, 10 mL, Intravenous, Q12H  sodium chloride, 10 mL, Intravenous, Q12H  sodium chloride, 10 mL, Intravenous, Q12H  sodium chloride, 10 mL, Intravenous, Q12H  [START ON 5/19/2023] tuberculin, 5 Units, Intradermal, Once         ----------------------------------------------------------------------------------------------------------------------    Vital Signs:  Temp:  [97.9 °F (36.6 °C)-98.2 °F (36.8 °C)] 97.9 °F (36.6 °C)  Heart Rate:  [] 77  Resp:  [16-18] 18  BP: (106-133)/(50-58) 106/50  No data found.  SpO2 Percentage    05/11/23 0030 05/11/23 0716 05/11/23 0729   SpO2: 96% 94% 99%     SpO2:  [94 %-99 %] 99 %  on  Flow (L/min):  [2.5] 2.5;   Device (Oxygen Therapy): nasal cannula    Body mass index is 29.86 kg/m².  Wt Readings from Last 3 Encounters:   05/05/23 83.9 kg (185 lb)   05/04/23 82.6 kg (182 lb)   03/15/22 81.6 kg (180 lb)        Intake/Output Summary (Last 24 hours) at 5/11/2023 1009  Last data filed at 5/11/2023 0729  Gross per 24 hour   Intake 1020 ml   Output --   Net 1020 ml     Diet: Regular/House Diet; Texture: Regular Texture (IDDSI 7); Fluid Consistency: Thin (IDDSI 0)  ----------------------------------------------------------------------------------------------------------------------      Physical Exam:    Constitutional: Elderly, chronically ill-appearing female is sitting up in bed on 2.5 L nasal cannula.  Appears comfortable this morning.  HENT:  Head: Normocephalic and atraumatic.  Mouth:  Moist mucous membranes.    Eyes:  Conjunctivae and EOM are normal.  No scleral icterus.  Neck:  Neck supple.  No JVD present.  CVC to right IJ.   Cardiovascular:  Normal rate, regular rhythm and normal heart sounds with no murmur. No  edema.  Pulmonary/Chest: Lungs clear to auscultation bilaterally.  Currently on 2.5 L per nasal cannula.  Abdominal:  Soft.  Bowel sounds are normal.  No distension.  Chronic diffuse tenderness.  Musculoskeletal:  No tenderness and no deformity.  No swelling or redness of joints.  2+ edema bilateral lower extremities.  Neurological:  Alert and oriented to person, place, and time.  No facial droop.  No slurred speech.   Skin:  Skin is warm and dry.  No rash noted.  No pallor. Vulvar deformity due to prior surgeries.  Psychiatric:  Normal mood and affect.  Behavior is normal.    ----------------------------------------------------------------------------------------------------------------------          Results from last 7 days   Lab Units 05/07/23 2002   PH, ARTERIAL pH units 7.423   PO2 ART mm Hg 53.8*   PCO2, ARTERIAL mm Hg 38.0   HCO3 ART mmol/L 24.8     Results from last 7 days   Lab Units 05/11/23  0147 05/08/23  0204 05/07/23  0330   CRP mg/dL 11.77*  --   --    WBC 10*3/mm3 9.26 6.34 7.26   HEMOGLOBIN g/dL 7.6* 7.0* 7.3*   HEMATOCRIT % 26.1* 24.9* 25.2*   MCV fL 87.3 89.6 89.4   MCHC g/dL 29.1* 28.1* 29.0*   PLATELETS 10*3/mm3 308 290 268     Results from last 7 days   Lab Units 05/08/23  0204 05/05/23  0454   SODIUM mmol/L 139 139   POTASSIUM mmol/L 4.8 5.1   CHLORIDE mmol/L 108* 110*   CO2 mmol/L 23.3 23.4   BUN mg/dL 22 29*   CREATININE mg/dL 1.04* 0.92   CALCIUM mg/dL 8.5* 8.7   GLUCOSE mg/dL 93 87   Estimated Creatinine Clearance: 49.4 mL/min (A) (by C-G formula based on SCr of 1.04 mg/dL (H)).  No results found for: AMMONIA    Glucose   Date/Time Value Ref Range Status   05/11/2023 0617 105 70 - 130 mg/dL Final     Comment:     Meter: [id number] : [id number] PATY HERNANDEZ     No results found for: HGBA1C  Lab Results   Component Value Date    TSH 2.620 03/15/2022       Blood Culture   Date Value Ref Range Status   04/25/2023 Abnormal Stain (C)  Preliminary   04/25/2023 Anaerobe (Organism type) (C)   Preliminary     No results found for: URINECX  No results found for: WOUNDCX  No results found for: STOOLCX  No results found for: RESPCX  Pain Management Panel            View : No data to display.                        ----------------------------------------------------------------------------------------------------------------------  Imaging Results (Last 24 Hours)       ** No results found for the last 24 hours. 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  This could represent secondary involvement of the leiomyomatous uterus or dystrophic calcifications within the malignancy.  Abnormal fistulous communications between the anterior rectum and the posterior aspect of the above-mentioned pelvic mass with at least 1 and possibly 2 fistulous connections as demonstrated on CT.  Central debris within the mass may represent abscess or necrosis.  Apparent fistulous communication between the mass in the bladder.  Left-sided hydronephrosis and hydroureter with left ureteral stent in place.  The distal pigtail difficult to confirm within the bladder and may be within the distal ureter.  Progressive widely disseminated pulmonary metastases.  Blood cultures on 4/25/2023 finalized as Clostridium subterminale and Gemella morbillorum.  Blood cultures on 4/29/2023 finalized as no growth. MRSA screen on 4/26/2023 was negative.  COVID-19 and flu A/B PCR on 4/25/2023 was negative. CT of the chest from 5/2/23 reports small bilateral pleural effusions, tiny pericardial effusion, bilateral parenchymal pulmonary nodules are again noted.  CT of the abdomen pelvis from 5/2/23 reports left double-J ureteral stent and moderate to  severe left hydronephrosis of the left kidney, moderate stool throughout the colon, tiny bilateral pleural effusions, bilateral pulmonary lung base nodules are again noted. Chest x-ray on 5/7/2023 showed multiple bilateral pulmonary nodular densities are similar to the prior study.  No acute infiltrates.  Small left pleural effusion is stable.      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 The patient is currently receiving 2.5 L of oxygen per nasal cannula and is not experiencing any distress. Bilateral lung auscultation indicates clear lungs. The abdomen is soft and the patient reports diffuse tenderness, which is consistent with chronic pain. The patient's WBC count is normal at 9.26, but the CRP level is elevated at 11.77, indicating some inflammation. The patient is currently receiving metronidazole for the treatment of Clostridium bacteremia, which will continue until 5/12/2023. Since Gemella was detected in only one of the two blood cultures, it is likely a contaminant. We will closely monitor the patient's condition and follow up once the antibiotic treatment has ended.      ANTIMICROBIAL THERAPY    metroNIDAZOLE - 250 MG     Code Status:   Code Status and Medical Interventions:   Ordered at: 05/05/23 1450     Level Of Support Discussed With:    Patient     Code Status (Patient has no pulse and is not breathing):    CPR (Attempt to Resuscitate)     Medical Interventions (Patient has pulse or is breathing):    Full Support       BERTHA Sanchez  05/11/23  10:09 EDT      Electronically signed by BERTHA Sanchez, 05/11/23, 10:10 AM EDT.      Electronically signed by Socrates Flaherty MD, 05/11/23, 10:36 AM EDT.

## 2023-05-11 NOTE — THERAPY TREATMENT NOTE
Acute Care - Physical Therapy Treatment Note   Jorge     Patient Name: Orquidea Rosenberg  : 1945  MRN: [id number]  Today's Date: 2023   Onset of Illness/Injury or Date of Surgery: 23 (swing bed admit)  Visit Dx:   No diagnosis found.  Patient Active Problem List   Diagnosis   • COVID-19   • Vulvar cancer   • Physical debility     Past Medical History:   Diagnosis Date   • Arthritis    • COPD (chronic obstructive pulmonary disease)    • Elevated cholesterol    • History of transfusion    • Hypertension    • Vulval ca      Past Surgical History:   Procedure Laterality Date   • RADICAL VULVECTOMY  2019   • RADICAL VULVECTOMY Bilateral 2019     PT Assessment (last 12 hours)     PT Evaluation and Treatment     Row Name 23 1300          Physical Therapy Time and Intention    Subjective Information complains of;weakness;fatigue;dyspnea  -KM     Document Type therapy note (daily note)  -KM     Mode of Treatment individual therapy;physical therapy  -KM     Patient Effort good  -KM     Symptoms Noted During/After Treatment fatigue;shortness of breath  -KM     Row Name 23 1300          General Information    Patient Profile Reviewed yes  -KM     Patient Observations alert;cooperative;agree to therapy  -KM     Existing Precautions/Restrictions fall;oxygen therapy device and L/min  -KM     Row Name 23 1300          Cognition    Affect/Mental Status (Cognition) WFL  -KM     Follows Commands (Cognition) WFL  -KM     Row Name 23 1300          Bed Mobility    Bed Mobility supine-sit  -KM     Supine-Sit [place] (Bed Mobility) modified independence  -KM     Bed Mobility, Safety Issues decreased use of arms for pushing/pulling;decreased use of legs for bridging/pushing  -KM     Assistive Device (Bed Mobility) bed rails;head of bed elevated  -KM     Row Name 23 1300          Transfers    Transfers sit-stand transfer;stand-sit transfer;bed-chair transfer  -KM     Row Name  05/11/23 1300          Bed-Chair Transfer    Bed-Chair [place] (Transfers) contact guard;minimum assist (75% patient effort);verbal cues  -KM     Assistive Device (Bed-Chair Transfers) --  hand held assist  -KM     Row Name 05/11/23 1300          Sit-Stand Transfer    Sit-Stand [place] (Transfers) contact guard;verbal cues  -KM     Assistive Device (Sit-Stand Transfers) --  hand held assist  -KM     Row Name 05/11/23 1300          Stand-Sit Transfer    Stand-Sit [place] (Transfers) contact guard;verbal cues  -KM     Assistive Device (Stand-Sit Transfers) --  hand held assist  -KM     Row Name 05/11/23 1300          Gait/Stairs (Locomotion)    Gait/Stairs Locomotion gait/ambulation independence;distance ambulated  -KM     [place] Level (Gait) verbal cues;contact guard  -KM     Assistive Device (Gait) --  hand held assist  -KM     Distance in Feet (Gait) 15'  -KM     Pattern (Gait) step-through  -KM     Deviations/Abnormal Patterns (Gait) ataxic;gait speed decreased;weight shifting decreased  -KM     Comment, (Gait/Stairs) pt. ambulates limited distance d/t shortness of breath  -KM     Row Name 05/11/23 1300          Safety Issues, Functional Mobility    Impairments Affecting Function (Mobility) balance;endurance/activity tolerance;shortness of breath;pain;strength  -KM     Row Name 05/11/23 1300          Motor Skills    Comments, Therapeutic Exercise seated ther-ex until fatigue  -KM     Row Name             Wound 05/01/23 1414 Left vagina    Wound - Properties Group Placement Date: 05/01/23  -LB Placement Time: 1414  -LB Present on Hospital Admission: Y  -LB Side: Left  -LB Location: vagina  -LB    Retired Wound - Properties Group Placement Date: 05/01/23  -LB Placement Time: 1414  -LB Present on Hospital Admission: Y  -LB Side: Left  -LB Location: vagina  -LB    Retired Wound - Properties Group Date first assessed: 05/01/23  -LB Time first assessed: 1414  -LB Present on Hospital Admission: Y   -LB Side: Left  -LB Location: vagina  -LB    Row Name 05/11/23 1300          Progress Summary (PT)    Daily Progress Summary (PT) Pt. demonstrates functional mobility skills at similar level as prior session. She was able to ambulate similar distance w/ decreased assistance. She required verbal cues during ambulation for balance impairments. Pt. would continue to benefit from skilled PT services.  -KM           User Key  (r) = Recorded By, (t) = Taken By, (c) = Cosigned By    Initials Name Provider Type    Ander Garcia, PT Physical Therapist    LB Veronica Mendez, RN Registered Nurse                  PT Recommendation and Plan     Progress Summary (PT)  Daily Progress Summary (PT): Pt. demonstrates functional mobility skills at similar level as prior session. She was able to ambulate similar distance w/ decreased assistance. She required verbal cues during ambulation for balance impairments. Pt. would continue to benefit from skilled PT services.   Outcome Measures     Row Name 05/09/23 1118             How much help from another is currently needed...    Putting on and taking off regular lower body clothing? 2  -LM      Bathing (including washing, rinsing, and drying) 2  -LM      Toileting (which includes using toilet bed pan or urinal) 1  -LM      Putting on and taking off regular upper body clothing 3  -LM      Taking care of personal grooming (such as brushing teeth) 3  -LM      Eating meals 3  -LM      AM-PAC 6 Clicks Score (OT) 14  -LM            User Key  (r) = Recorded By, (t) = Taken By, (c) = Cosigned By    Initials Name Provider Type    LM Joanne Colon, OT Occupational Therapist                 Time Calculation:    PT Charges     Row Name 05/11/23 1342             Time Calculation    PT Received On 05/11/23  -KM         Time Calculation- PT    Total Timed Code Minutes- PT 38 minute(s)  -KM            User Key  (r) = Recorded By, (t) = Taken By, (c) = Cosigned By    Initials Name Provider Type    CELY  Ander Shirley, PT Physical Therapist              Therapy Charges for Today     Code Description Service Date Service Provider Modifiers Qty    27228147581 HC GAIT TRAINING EA 15 MIN 5/11/2023 Ander Shirley, PT GP 1    65079041578 HC PT THER PROC EA 15 MIN 5/11/2023 Ander Shirley, PT GP 1    23154833747 HC PT THERAPEUTIC ACT EA 15 MIN 5/11/2023 Ander Shirley, PT GP 1          PT G-Codes  Outcome Measure Options: AM-PAC 6 Clicks Daily Activity (OT)  AM-PAC 6 Clicks Score (PT): 16  AM-PAC 6 Clicks Score (OT): 14    Ander Shirley PT  5/11/2023

## 2023-05-11 NOTE — PLAN OF CARE
Goal Outcome Evaluation:  Plan of Care Reviewed With: patient        Progress: no change  Outcome Evaluation: patient has rested in bed this shift, requested breathing treatment x1, pain control per MAR, VSS, no requests or complaints at this time, No acute distress noted wound care completed per ordered will continue POC.

## 2023-05-11 NOTE — PROGRESS NOTES
"Palliative Care Daily Progress Note     S: Medical record reviewed, upon entering the room pt was found to be lying comfortably in bed. She denies any needs at this time, reports that she has been working with physical therapy and that it is going well. She does c/o some AGUAYO/SOA at times however feels that it comes and goes. Pt's biggest complaint is generalized fatigue.     O:   Palliative Performance Scale Score:     /50 (BP Location: Right arm, Patient Position: Lying)   Pulse 77   Temp 97.9 °F (36.6 °C) (Oral)   Resp 18   Ht 167.6 cm (66\")   Wt 83.9 kg (185 lb)   SpO2 99%   BMI 29.86 kg/m²     Intake/Output Summary (Last 24 hours) at 5/11/2023 1136  Last data filed at 5/11/2023 0729  Gross per 24 hour   Intake 1020 ml   Output --   Net 1020 ml       PE:    General Appearance:    Chronically ill appearing, alert, cooperative, NAD   HEENT:    NC/AT, without obvious abnormality, EOMI, anicteric    Neck:   supple, trachea midline, no JVD, CVC to right IJ    Lungs:     CTAB without w/r/r, 02@2.5lpm n/c in use     Heart:    RRR, normal S1 and S2, no M/R/G   Abdomen:     Soft, NT, ND, NABS    Extremities:   Moves all extremities, 2+ edema   Pulses:   Pulses palpable and equal bilaterally   Skin:   Warm, dry, Vulvar deformities r/t previous surgical interventions   Neurologic:   A/Ox3, cooperative   Psych:   Calm, flat affect          Meds: Reviewed and no changes     Labs:   Results from last 7 days   Lab Units 05/11/23  0147   WBC 10*3/mm3 9.26   HEMOGLOBIN g/dL 7.6*   HEMATOCRIT % 26.1*   PLATELETS 10*3/mm3 308     Results from last 7 days   Lab Units 05/08/23  0204   SODIUM mmol/L 139   POTASSIUM mmol/L 4.8   CHLORIDE mmol/L 108*   CO2 mmol/L 23.3   BUN mg/dL 22   CREATININE mg/dL 1.04*   GLUCOSE mg/dL 93   CALCIUM mg/dL 8.5*     Results from last 7 days   Lab Units 05/08/23  0204   SODIUM mmol/L 139   POTASSIUM mmol/L 4.8   CHLORIDE mmol/L 108*   CO2 mmol/L 23.3   BUN mg/dL 22   CREATININE mg/dL 1.04* "   CALCIUM mg/dL 8.5*   GLUCOSE mg/dL 93     Imaging Results (Last 72 Hours)     ** No results found for the last 72 hours. **            Diagnostics: Reviewed    A: Clinically, she is still ill but remains stable, she is currently a swing bed pt. Vs 106/50 hr 77 sat 99 rr 18 , all symptoms are managed at this time       P: Continue with current regimen, will assist with dispo as needed, Will also continue with symptomatic and supportive care for pt.       We will continue to follow along. Please do not hesitate to contact us regarding further sx mgmt or GOC needs, including after hours or on weekends via our on call provider at [phone].     Adriana Miller, APRN    5/11/2023

## 2023-05-12 LAB
ABO GROUP BLD: NORMAL
ANION GAP SERPL CALCULATED.3IONS-SCNC: 11 MMOL/L (ref 5–15)
ANTI-FYA: NORMAL
BLD GP AB SCN SERPL QL: POSITIVE
BUN SERPL-MCNC: 21 MG/DL (ref 8–23)
BUN/CREAT SERPL: 16 (ref 7–25)
CALCIUM SPEC-SCNC: 8.9 MG/DL (ref 8.6–10.5)
CHLORIDE SERPL-SCNC: 99 MMOL/L (ref 98–107)
CO2 SERPL-SCNC: 26 MMOL/L (ref 22–29)
CREAT SERPL-MCNC: 1.31 MG/DL (ref 0.57–1)
EGFRCR SERPLBLD CKD-EPI 2021: 42.1 ML/MIN/1.73
GLUCOSE SERPL-MCNC: 116 MG/DL (ref 65–99)
POTASSIUM SERPL-SCNC: 4.5 MMOL/L (ref 3.5–5.2)
RH BLD: POSITIVE
SODIUM SERPL-SCNC: 136 MMOL/L (ref 136–145)
T&S EXPIRATION DATE: NORMAL

## 2023-05-12 PROCEDURE — 86850 RBC ANTIBODY SCREEN: CPT

## 2023-05-12 PROCEDURE — 99308 SBSQ NF CARE LOW MDM 20: CPT | Performed by: NURSE PRACTITIONER

## 2023-05-12 PROCEDURE — 80048 BASIC METABOLIC PNL TOTAL CA: CPT | Performed by: HOSPITALIST

## 2023-05-12 PROCEDURE — 97530 THERAPEUTIC ACTIVITIES: CPT

## 2023-05-12 PROCEDURE — 86900 BLOOD TYPING SEROLOGIC ABO: CPT

## 2023-05-12 PROCEDURE — 86870 RBC ANTIBODY IDENTIFICATION: CPT

## 2023-05-12 PROCEDURE — 25010000002 ENOXAPARIN PER 10 MG: Performed by: INTERNAL MEDICINE

## 2023-05-12 PROCEDURE — 86920 COMPATIBILITY TEST SPIN: CPT

## 2023-05-12 PROCEDURE — 94664 DEMO&/EVAL PT USE INHALER: CPT

## 2023-05-12 PROCEDURE — 86901 BLOOD TYPING SEROLOGIC RH(D): CPT

## 2023-05-12 PROCEDURE — 86902 BLOOD TYPE ANTIGEN DONOR EA: CPT

## 2023-05-12 PROCEDURE — 97116 GAIT TRAINING THERAPY: CPT

## 2023-05-12 PROCEDURE — 63710000001 ONDANSETRON PER 8 MG: Performed by: INTERNAL MEDICINE

## 2023-05-12 PROCEDURE — 97110 THERAPEUTIC EXERCISES: CPT

## 2023-05-12 PROCEDURE — 94760 N-INVAS EAR/PLS OXIMETRY 1: CPT

## 2023-05-12 PROCEDURE — 94799 UNLISTED PULMONARY SVC/PX: CPT

## 2023-05-12 PROCEDURE — 86922 COMPATIBILITY TEST ANTIGLOB: CPT

## 2023-05-12 PROCEDURE — 94761 N-INVAS EAR/PLS OXIMETRY MLT: CPT

## 2023-05-12 RX ORDER — SODIUM CHLORIDE 9 MG/ML
75 INJECTION, SOLUTION INTRAVENOUS CONTINUOUS
Status: DISCONTINUED | OUTPATIENT
Start: 2023-05-12 | End: 2023-05-15

## 2023-05-12 RX ADMIN — ENOXAPARIN SODIUM 40 MG: 40 INJECTION SUBCUTANEOUS at 09:04

## 2023-05-12 RX ADMIN — IPRATROPIUM BROMIDE AND ALBUTEROL SULFATE 3 ML: .5; 2.5 SOLUTION RESPIRATORY (INHALATION) at 06:44

## 2023-05-12 RX ADMIN — OXYCODONE HYDROCHLORIDE AND ACETAMINOPHEN 500 MG: 500 TABLET ORAL at 09:04

## 2023-05-12 RX ADMIN — OXYCODONE HYDROCHLORIDE AND ACETAMINOPHEN 1 TABLET: 10; 325 TABLET ORAL at 09:08

## 2023-05-12 RX ADMIN — OXYCODONE HYDROCHLORIDE AND ACETAMINOPHEN 1 TABLET: 10; 325 TABLET ORAL at 14:34

## 2023-05-12 RX ADMIN — OXYCODONE HYDROCHLORIDE AND ACETAMINOPHEN 1 TABLET: 10; 325 TABLET ORAL at 18:42

## 2023-05-12 RX ADMIN — Medication 10 ML: at 09:05

## 2023-05-12 RX ADMIN — LOSARTAN POTASSIUM 50 MG: 50 TABLET, FILM COATED ORAL at 09:04

## 2023-05-12 RX ADMIN — NYSTATIN: 100000 POWDER TOPICAL at 09:05

## 2023-05-12 RX ADMIN — FERROUS SULFATE TAB 325 MG (65 MG ELEMENTAL FE) 325 MG: 325 (65 FE) TAB at 09:04

## 2023-05-12 RX ADMIN — LEVOTHYROXINE SODIUM 100 MCG: 100 TABLET ORAL at 05:09

## 2023-05-12 RX ADMIN — GUAIFENESIN 600 MG: 600 TABLET, EXTENDED RELEASE ORAL at 20:44

## 2023-05-12 RX ADMIN — IPRATROPIUM BROMIDE AND ALBUTEROL SULFATE 3 ML: .5; 2.5 SOLUTION RESPIRATORY (INHALATION) at 13:59

## 2023-05-12 RX ADMIN — BUDESONIDE 0.5 MG: 0.5 INHALANT RESPIRATORY (INHALATION) at 06:44

## 2023-05-12 RX ADMIN — METRONIDAZOLE 500 MG: 250 TABLET ORAL at 14:30

## 2023-05-12 RX ADMIN — BUDESONIDE 0.5 MG: 0.5 INHALANT RESPIRATORY (INHALATION) at 19:46

## 2023-05-12 RX ADMIN — METRONIDAZOLE 500 MG: 250 TABLET ORAL at 05:09

## 2023-05-12 RX ADMIN — Medication 10 ML: at 20:44

## 2023-05-12 RX ADMIN — METRONIDAZOLE 500 MG: 250 TABLET ORAL at 21:33

## 2023-05-12 RX ADMIN — SODIUM CHLORIDE 75 ML/HR: 9 INJECTION, SOLUTION INTRAVENOUS at 11:02

## 2023-05-12 RX ADMIN — IPRATROPIUM BROMIDE AND ALBUTEROL SULFATE 3 ML: .5; 2.5 SOLUTION RESPIRATORY (INHALATION) at 00:18

## 2023-05-12 RX ADMIN — GUAIFENESIN 600 MG: 600 TABLET, EXTENDED RELEASE ORAL at 09:04

## 2023-05-12 RX ADMIN — IPRATROPIUM BROMIDE AND ALBUTEROL SULFATE 3 ML: .5; 2.5 SOLUTION RESPIRATORY (INHALATION) at 19:46

## 2023-05-12 RX ADMIN — NYSTATIN: 100000 POWDER TOPICAL at 20:44

## 2023-05-12 RX ADMIN — ONDANSETRON HYDROCHLORIDE 4 MG: 4 TABLET, FILM COATED ORAL at 00:39

## 2023-05-12 NOTE — PLAN OF CARE
Goal Outcome Evaluation:           Progress: no change  Outcome Evaluation: Pt. resting in bed at this time. Pt had complaints of pain and vomiting, Pt. given PRN meds, see MAR. Wound care completed per orders. VSS, no acute changes at this time. Will continue with plan of care.

## 2023-05-12 NOTE — THERAPY TREATMENT NOTE
Acute Care - Physical Therapy Treatment Note   Jorge     Patient Name: Orquidea Rosenberg  : 1945  MRN: [id number]  Today's Date: 2023   Onset of Illness/Injury or Date of Surgery: 23 (swing bed admit)  Visit Dx:   No diagnosis found.  Patient Active Problem List   Diagnosis   • COVID-19   • Vulvar cancer   • Physical debility     Past Medical History:   Diagnosis Date   • Arthritis    • COPD (chronic obstructive pulmonary disease)    • Elevated cholesterol    • History of transfusion    • Hypertension    • Vulval ca      Past Surgical History:   Procedure Laterality Date   • RADICAL VULVECTOMY  2019   • RADICAL VULVECTOMY Bilateral 2019     PT Assessment (last 12 hours)     PT Evaluation and Treatment     Row Name 23 1458          Physical Therapy Time and Intention    Subjective Information complains of;weakness;dyspnea  -KM     Document Type therapy note (daily note)  -KM     Mode of Treatment individual therapy;physical therapy  -KM     Patient Effort good  -KM     Symptoms Noted During/After Treatment fatigue;shortness of breath  -KM     Row Name 23 1458          General Information    Patient Profile Reviewed yes  -KM     Patient Observations alert;cooperative;agree to therapy  -KM     Existing Precautions/Restrictions fall;oxygen therapy device and L/min  -KM     Row Name 23 1458          Cognition    Affect/Mental Status (Cognition) WFL  -KM     Follows Commands (Cognition) WFL  -KM     Row Name 23 1458          Bed Mobility    Bed Mobility supine-sit;sit-supine  -KM     Supine-Sit Holder (Bed Mobility) modified independence  -KM     Sit-Supine Holder (Bed Mobility) supervision  -KM     Bed Mobility, Safety Issues decreased use of arms for pushing/pulling;decreased use of legs for bridging/pushing  -KM     Assistive Device (Bed Mobility) bed rails;head of bed elevated  -KM     Row Name 23 1458          Transfers    Transfers sit-stand  transfer;stand-sit transfer  -KM     Row Name 05/12/23 1458          Sit-Stand Transfer    Sit-Stand [place] (Transfers) contact guard;verbal cues  -KM     Assistive Device (Sit-Stand Transfers) --  hand held assist  -KM     Row Name 05/12/23 1458          Stand-Sit Transfer    Stand-Sit [place] (Transfers) contact guard;verbal cues  -KM     Assistive Device (Stand-Sit Transfers) --  hand held assist  -KM     Row Name 05/12/23 1458          Gait/Stairs (Locomotion)    Gait/Stairs Locomotion gait/ambulation independence;distance ambulated  -KM     [place] Level (Gait) verbal cues;contact guard  -KM     Assistive Device (Gait) --  hand held assist  -KM     Distance in Feet (Gait) 10'  -KM     Pattern (Gait) step-through  -KM     Deviations/Abnormal Patterns (Gait) ataxic;gait speed decreased;weight shifting decreased  -KM     Comment, (Gait/Stairs) pt. ambulates limited distance d/t shortness of breath  -KM     Row Name 05/12/23 1458          Safety Issues, Functional Mobility    Impairments Affecting Function (Mobility) balance;endurance/activity tolerance;shortness of breath;pain;strength  -KM     Row Name 05/12/23 1458          Motor Skills    Comments, Therapeutic Exercise EOB ther-ex  -KM     Row Name             Wound 05/01/23 1414 Left vagina    Wound - Properties Group Placement Date: 05/01/23  -LB Placement Time: 1414  -LB Present on Hospital Admission: Y  -LB Side: Left  -LB Location: vagina  -LB    Retired Wound - Properties Group Placement Date: 05/01/23  -LB Placement Time: 1414  -LB Present on Hospital Admission: Y  -LB Side: Left  -LB Location: vagina  -LB    Retired Wound - Properties Group Date first assessed: 05/01/23  -LB Time first assessed: 1414  -LB Present on Hospital Admission: Y  -LB Side: Left  -LB Location: vagina  -LB    Row Name 05/12/23 1458          Progress Summary (PT)    Daily Progress Summary (PT) Pt. demonstrates functional mobility skills w/ improved ability during  PT session. She was able to ambulate short distance prior to becoming short of breath. She continues to be limited w/ activity d/t shortness of breath. Pt. would continue to benefit from skilled PT services.  -KM           User Key  (r) = Recorded By, (t) = Taken By, (c) = Cosigned By    Initials Name Provider Type    Ander Garcia PT Physical Therapist    Veronica Bansal RN Registered Nurse                  PT Recommendation and Plan     Progress Summary (PT)  Daily Progress Summary (PT): Pt. demonstrates functional mobility skills w/ improved ability during PT session. She was able to ambulate short distance prior to becoming short of breath. She continues to be limited w/ activity d/t shortness of breath. Pt. would continue to benefit from skilled PT services.       Time Calculation:    PT Charges     Row Name 05/12/23 1458             Time Calculation    PT Received On 05/12/23  -KM         Time Calculation- PT    Total Timed Code Minutes- PT 23 minute(s)  -KM            User Key  (r) = Recorded By, (t) = Taken By, (c) = Cosigned By    Initials Name Provider Type    Ander Garcia, PT Physical Therapist              Therapy Charges for Today     Code Description Service Date Service Provider Modifiers Qty    57123514381 HC GAIT TRAINING EA 15 MIN 5/11/2023 Ander Shirley, PT GP 1    62926574400 HC PT THER PROC EA 15 MIN 5/11/2023 Ander Shirley, PT GP 1    15392306564 HC PT THERAPEUTIC ACT EA 15 MIN 5/11/2023 Ander Shirley, PT GP 1    28749285405 HC PT THER PROC EA 15 MIN 5/12/2023 Ander Shirley, PT GP 1    00153966720 HC GAIT TRAINING EA 15 MIN 5/12/2023 Ander Shirley, PT GP 1          PT G-Codes  Outcome Measure Options: AM-PAC 6 Clicks Daily Activity (OT)  AM-PAC 6 Clicks Score (PT): 16  AM-PAC 6 Clicks Score (OT): 14    Ander Shirley PT  5/12/2023

## 2023-05-12 NOTE — PROGRESS NOTES
PROGRESS NOTE         Patient Identification:  Name:  Orquidea Rosenberg  Age:  77 y.o.  Sex:  female  :  1945  MRN:  [id number]  Visit Number:  [id number]  Primary Care Provider:  Jackeline Denson APRN         LOS: 7 days       ----------------------------------------------------------------------------------------------------------------------  Subjective       Chief Complaints:    No chief complaint on file.        Interval History:      Patient sitting up in bed this morning.  Overall feels well today.  Afebrile, denies diarrhea.  Lungs clear to auscultation bilaterally.  Currently on 2.5 L per nasal cannula with no apparent distress.  Abdomen soft, nontender.  Patient completing course of antibiotic therapy today.    Review of Systems:    Constitutional: no fever, chills and night sweats.  Generalized fatigue.  Eyes: no eye drainage, itching or redness.  HEENT: no mouth sores, dysphagia or nose bleed.  Respiratory: No cough. No production of sputum.    Cardiovascular: no chest pain, no palpitations, no orthopnea.  Gastrointestinal: No vomiting or diarrhea. No abdominal pain, hematemesis, or rectal bleeding.  Occasional nausea with meals.  Genitourinary: no dysuria or polyuria.  Hematologic/lymphatic: no lymph node abnormalities, no easy bruising or easy bleeding.  Musculoskeletal: no muscle or joint pain.  Skin: No rash and no itching.  Neurological: no loss of consciousness, no seizure, no headache.  Behavioral/Psych: no depression or suicidal ideation.  Endocrine: no hot flashes.  Immunologic: negative.    ----------------------------------------------------------------------------------------------------------------------      Objective       [facility] Meds:  ascorbic acid, 500 mg, Oral, Daily  budesonide, 0.5 mg, Nebulization, BID - RT  enoxaparin, 40 mg, Subcutaneous, Daily  ferrous sulfate, 325 mg, Oral, Daily With Breakfast  guaiFENesin, 600 mg, Oral, Q12H  ipratropium-albuterol, 3  mL, Nebulization, 4x Daily - RT  levothyroxine, 100 mcg, Oral, Q AM  losartan, 50 mg, Oral, Q24H  metroNIDAZOLE, 500 mg, Oral, Q8H  nystatin, , Topical, Q12H  polyethylene glycol, 17 g, Oral, Daily  sodium chloride, 10 mL, Intravenous, Q12H  sodium chloride, 10 mL, Intravenous, Q12H  sodium chloride, 10 mL, Intravenous, Q12H  sodium chloride, 10 mL, Intravenous, Q12H  sodium chloride, 10 mL, Intravenous, Q12H  [START ON 5/19/2023] tuberculin, 5 Units, Intradermal, Once      sodium chloride, 75 mL/hr, Last Rate: 75 mL/hr (05/12/23 1102)      ----------------------------------------------------------------------------------------------------------------------    Vital Signs:  Temp:  [97.8 °F (36.6 °C)-98.2 °F (36.8 °C)] 98 °F (36.7 °C)  Heart Rate:  [79-92] 79  Resp:  [16-18] 16  BP: (104-117)/(49-63) 105/49  No data found.  SpO2 Percentage    05/12/23 0027 05/12/23 0644 05/12/23 1359   SpO2: 95% 98% 98%     SpO2:  [95 %-99 %] 98 %  on  Flow (L/min):  [2-2.5] 2;   Device (Oxygen Therapy): nasal cannula    Body mass index is 29.86 kg/m².  Wt Readings from Last 3 Encounters:   05/05/23 83.9 kg (185 lb)   05/04/23 82.6 kg (182 lb)   03/15/22 81.6 kg (180 lb)        Intake/Output Summary (Last 24 hours) at 5/12/2023 1616  Last data filed at 5/12/2023 0904  Gross per 24 hour   Intake 360 ml   Output --   Net 360 ml     Diet: Regular/House Diet; Texture: Regular Texture (IDDSI 7); Fluid Consistency: Thin (IDDSI 0)  ----------------------------------------------------------------------------------------------------------------------      Physical Exam:    Constitutional: Elderly, chronically ill-appearing female is sitting up in bed on 2.5 L nasal cannula.  Appears comfortable this morning.  HENT:  Head: Normocephalic and atraumatic.  Mouth:  Moist mucous membranes.    Eyes:  Conjunctivae and EOM are normal.  No scleral icterus.  Neck:  Neck supple.  No JVD present.    Cardiovascular:  Normal rate, regular rhythm and normal  heart sounds with no murmur. No edema.  Pulmonary/Chest: Lungs clear to auscultation bilaterally.  Currently on 2.5 L per nasal cannula.  Abdominal:  Soft.  Bowel sounds are normal.  No distension.  Chronic diffuse tenderness.  Musculoskeletal:  No tenderness and no deformity.  No swelling or redness of joints.  2+ edema bilateral lower extremities.  Neurological:  Alert and oriented to person, place, and time.  No facial droop.  No slurred speech.   Skin:  Skin is warm and dry.  No rash noted.  No pallor. Vulvar deformity due to prior surgeries.  Psychiatric:  Normal mood and affect.  Behavior is normal.    ----------------------------------------------------------------------------------------------------------------------          Results from last 7 days   Lab Units 05/07/23 2002   PH, ARTERIAL pH units 7.423   PO2 ART mm Hg 53.8*   PCO2, ARTERIAL mm Hg 38.0   HCO3 ART mmol/L 24.8     Results from last 7 days   Lab Units 05/11/23  0147 05/08/23  0204 05/07/23  0330   CRP mg/dL 11.77*  --   --    WBC 10*3/mm3 9.26 6.34 7.26   HEMOGLOBIN g/dL 7.6* 7.0* 7.3*   HEMATOCRIT % 26.1* 24.9* 25.2*   MCV fL 87.3 89.6 89.4   MCHC g/dL 29.1* 28.1* 29.0*   PLATELETS 10*3/mm3 308 290 268     Results from last 7 days   Lab Units 05/12/23  0145 05/08/23  0204   SODIUM mmol/L 136 139   POTASSIUM mmol/L 4.5 4.8   CHLORIDE mmol/L 99 108*   CO2 mmol/L 26.0 23.3   BUN mg/dL 21 22   CREATININE mg/dL 1.31* 1.04*   CALCIUM mg/dL 8.9 8.5*   GLUCOSE mg/dL 116* 93   Estimated Creatinine Clearance: 39.2 mL/min (A) (by C-G formula based on SCr of 1.31 mg/dL (H)).  No results found for: AMMONIA    Glucose   Date/Time Value Ref Range Status   05/11/2023 0617 105 70 - 130 mg/dL Final     Comment:     Meter: [id number] : [id number] PATY HERNANDEZ     No results found for: HGBA1C  Lab Results   Component Value Date    TSH 2.620 03/15/2022       Blood Culture   Date Value Ref Range Status   04/25/2023 Abnormal Stain (C)  Preliminary    04/25/2023 Anaerobe (Organism type) (C)  Preliminary     No results found for: URINECX  No results found for: WOUNDCX  No results found for: STOOLCX  No results found for: RESPCX  Pain Management Panel          View : No data to display.                        ----------------------------------------------------------------------------------------------------------------------  Imaging Results (Last 24 Hours)     ** No results found for the last 24 hours. **          ----------------------------------------------------------------------------------------------------------------------    Pertinent Infectious Disease Results    CT abdomen pelvis on 4/25/2023 showed large complex pelvic mass measuring 8.4 x 9.4 x 10.2 cm and presumably represents the patient's known pelvic malignancy (vulvar cancer).  This could represent secondary involvement of the leiomyomatous uterus or dystrophic calcifications within the malignancy.  Abnormal fistulous communications between the anterior rectum and the posterior aspect of the above-mentioned pelvic mass with at least 1 and possibly 2 fistulous connections as demonstrated on CT.  Central debris within the mass may represent abscess or necrosis.  Apparent fistulous communication between the mass in the bladder.  Left-sided hydronephrosis and hydroureter with left ureteral stent in place.  The distal pigtail difficult to confirm within the bladder and may be within the distal ureter.  Progressive widely disseminated pulmonary metastases.  Blood cultures on 4/25/2023 finalized as Clostridium subterminale and Gemella morbillorum.  Blood cultures on 4/29/2023 finalized as no growth. MRSA screen on 4/26/2023 was negative.  COVID-19 and flu A/B PCR on 4/25/2023 was negative. CT of the chest from 5/2/23 reports small bilateral pleural effusions, tiny pericardial effusion, bilateral parenchymal pulmonary nodules are again noted.  CT of the abdomen pelvis from 5/2/23 reports left double-J  ureteral stent and moderate to severe left hydronephrosis of the left kidney, moderate stool throughout the colon, tiny bilateral pleural effusions, bilateral pulmonary lung base nodules are again noted. Chest x-ray on 5/7/2023 showed multiple bilateral pulmonary nodular densities are similar to the prior study.  No acute infiltrates.  Small left pleural effusion is stable.      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  Overall feels well today.  Afebrile, denies diarrhea.  Lungs clear to auscultation bilaterally.  Currently on 2.5 L per nasal cannula with no apparent distress.  Abdomen soft, nontender.  Patient completing course of antibiotic therapy today.    The patient is currently receiving metronidazole for the treatment of Clostridium bacteremia, which will continue until 5/12/2023. Since Gemella was detected in only one of the two blood cultures, it is likely a contaminant. We will closely monitor the patient's condition and follow up once the antibiotic treatment has ended.  Recommend outpatient ID follow-up.      ANTIMICROBIAL THERAPY    metroNIDAZOLE - 250 MG     Code Status:   Code Status and Medical Interventions:   Ordered at: 05/05/23 1450     Level Of Support Discussed With:    Patient     Code Status (Patient has no pulse and is not breathing):    CPR (Attempt to Resuscitate)     Medical Interventions (Patient has pulse or is breathing):    Full Support       BERTHA Sanchez  05/12/23  16:16 EDT

## 2023-05-12 NOTE — PROGRESS NOTES
Nutrition Services    Patient Name:  Orquidea Rosenberg  YOB: 1945  MRN: [id number]  Admit Date:  5/5/2023    Swing Bed Note  BMI: 29.86  Diet: Regular  Intake: 55% x 11 meals  Fluid:  Not meeting needs  Encourage intake.      Electronically signed by:  Daniela Prince RD  05/12/23 12:48 EDT

## 2023-05-12 NOTE — CASE MANAGEMENT/SOCIAL WORK
Discharge Planning Assessment   Jorge     Patient Name: Orquidea Rosenberg  MRN: [id number]  Today's Date: 5/12/2023    Admit Date: 5/5/2023     Discharge Plan       Row Name 05/12/23 1137       Plan    Plan Pt was admitted to swing bed on 5/5/23. Pt has been approved for swing bed through 5/15/23.  Pt lives at home and daughter Lilli has been staying with pt for the last two years. Pt does not utilize home health services at this time. Pt has oxgyen at 2 liters at night via [facility]. Pt daughter states pt has POA (not on file) and does not a living will. SS to follow             RYDER Chahal

## 2023-05-12 NOTE — PLAN OF CARE
Goal Outcome Evaluation:  Plan of Care Reviewed With: patient        Progress: no change  Outcome Evaluation: Patient's VSS. Pt c/o of pain throughout shift & PRN pain medication given. PT wound care completed. Pt worked with PT. Pt voiced no further concerns at this time.

## 2023-05-13 LAB
ANION GAP SERPL CALCULATED.3IONS-SCNC: 8.8 MMOL/L (ref 5–15)
BASOPHILS # BLD AUTO: 0.09 10*3/MM3 (ref 0–0.2)
BASOPHILS NFR BLD AUTO: 1.2 % (ref 0–1.5)
BUN SERPL-MCNC: 18 MG/DL (ref 8–23)
BUN/CREAT SERPL: 16.1 (ref 7–25)
CALCIUM SPEC-SCNC: 8.8 MG/DL (ref 8.6–10.5)
CHLORIDE SERPL-SCNC: 101 MMOL/L (ref 98–107)
CO2 SERPL-SCNC: 26.2 MMOL/L (ref 22–29)
CREAT SERPL-MCNC: 1.12 MG/DL (ref 0.57–1)
DEPRECATED RDW RBC AUTO: 62.8 FL (ref 37–54)
EGFRCR SERPLBLD CKD-EPI 2021: 50.8 ML/MIN/1.73
EOSINOPHIL # BLD AUTO: 0.35 10*3/MM3 (ref 0–0.4)
EOSINOPHIL NFR BLD AUTO: 4.6 % (ref 0.3–6.2)
ERYTHROCYTE [DISTWIDTH] IN BLOOD BY AUTOMATED COUNT: 19.7 % (ref 12.3–15.4)
GLUCOSE SERPL-MCNC: 100 MG/DL (ref 65–99)
HCT VFR BLD AUTO: 25.7 % (ref 34–46.6)
HGB BLD-MCNC: 7.5 G/DL (ref 12–15.9)
IMM GRANULOCYTES # BLD AUTO: 0.1 10*3/MM3 (ref 0–0.05)
IMM GRANULOCYTES NFR BLD AUTO: 1.3 % (ref 0–0.5)
LYMPHOCYTES # BLD AUTO: 0.66 10*3/MM3 (ref 0.7–3.1)
LYMPHOCYTES NFR BLD AUTO: 8.6 % (ref 19.6–45.3)
MCH RBC QN AUTO: 25.7 PG (ref 26.6–33)
MCHC RBC AUTO-ENTMCNC: 29.2 G/DL (ref 31.5–35.7)
MCV RBC AUTO: 88 FL (ref 79–97)
MONOCYTES # BLD AUTO: 0.63 10*3/MM3 (ref 0.1–0.9)
MONOCYTES NFR BLD AUTO: 8.2 % (ref 5–12)
NEUTROPHILS NFR BLD AUTO: 5.83 10*3/MM3 (ref 1.7–7)
NEUTROPHILS NFR BLD AUTO: 76.1 % (ref 42.7–76)
NRBC BLD AUTO-RTO: 0 /100 WBC (ref 0–0.2)
PLATELET # BLD AUTO: 298 10*3/MM3 (ref 140–450)
PMV BLD AUTO: 8.8 FL (ref 6–12)
POTASSIUM SERPL-SCNC: 4.6 MMOL/L (ref 3.5–5.2)
RBC # BLD AUTO: 2.92 10*6/MM3 (ref 3.77–5.28)
SODIUM SERPL-SCNC: 136 MMOL/L (ref 136–145)
WBC NRBC COR # BLD: 7.66 10*3/MM3 (ref 3.4–10.8)

## 2023-05-13 PROCEDURE — 94760 N-INVAS EAR/PLS OXIMETRY 1: CPT

## 2023-05-13 PROCEDURE — 94799 UNLISTED PULMONARY SVC/PX: CPT

## 2023-05-13 PROCEDURE — 97110 THERAPEUTIC EXERCISES: CPT

## 2023-05-13 PROCEDURE — 97530 THERAPEUTIC ACTIVITIES: CPT

## 2023-05-13 PROCEDURE — 99309 SBSQ NF CARE MODERATE MDM 30: CPT | Performed by: NURSE PRACTITIONER

## 2023-05-13 PROCEDURE — 80048 BASIC METABOLIC PNL TOTAL CA: CPT | Performed by: HOSPITALIST

## 2023-05-13 PROCEDURE — 25010000002 ENOXAPARIN PER 10 MG: Performed by: INTERNAL MEDICINE

## 2023-05-13 PROCEDURE — 94761 N-INVAS EAR/PLS OXIMETRY MLT: CPT

## 2023-05-13 PROCEDURE — 85025 COMPLETE CBC W/AUTO DIFF WBC: CPT | Performed by: HOSPITALIST

## 2023-05-13 RX ADMIN — Medication 10 ML: at 08:21

## 2023-05-13 RX ADMIN — OXYCODONE HYDROCHLORIDE AND ACETAMINOPHEN 500 MG: 500 TABLET ORAL at 08:21

## 2023-05-13 RX ADMIN — GUAIFENESIN 600 MG: 600 TABLET, EXTENDED RELEASE ORAL at 20:54

## 2023-05-13 RX ADMIN — NYSTATIN: 100000 POWDER TOPICAL at 20:54

## 2023-05-13 RX ADMIN — FERROUS SULFATE TAB 325 MG (65 MG ELEMENTAL FE) 325 MG: 325 (65 FE) TAB at 08:21

## 2023-05-13 RX ADMIN — GUAIFENESIN 600 MG: 600 TABLET, EXTENDED RELEASE ORAL at 08:21

## 2023-05-13 RX ADMIN — OXYCODONE HYDROCHLORIDE AND ACETAMINOPHEN 1 TABLET: 10; 325 TABLET ORAL at 05:57

## 2023-05-13 RX ADMIN — OXYCODONE HYDROCHLORIDE AND ACETAMINOPHEN 1 TABLET: 10; 325 TABLET ORAL at 17:26

## 2023-05-13 RX ADMIN — SODIUM CHLORIDE 75 ML/HR: 9 INJECTION, SOLUTION INTRAVENOUS at 15:29

## 2023-05-13 RX ADMIN — LEVOTHYROXINE SODIUM 100 MCG: 100 TABLET ORAL at 05:14

## 2023-05-13 RX ADMIN — LOSARTAN POTASSIUM 50 MG: 50 TABLET, FILM COATED ORAL at 08:21

## 2023-05-13 RX ADMIN — IPRATROPIUM BROMIDE AND ALBUTEROL SULFATE 3 ML: .5; 2.5 SOLUTION RESPIRATORY (INHALATION) at 07:24

## 2023-05-13 RX ADMIN — Medication 10 ML: at 20:55

## 2023-05-13 RX ADMIN — SODIUM CHLORIDE 75 ML/HR: 9 INJECTION, SOLUTION INTRAVENOUS at 01:58

## 2023-05-13 RX ADMIN — IPRATROPIUM BROMIDE AND ALBUTEROL SULFATE 3 ML: .5; 2.5 SOLUTION RESPIRATORY (INHALATION) at 19:27

## 2023-05-13 RX ADMIN — ENOXAPARIN SODIUM 40 MG: 40 INJECTION SUBCUTANEOUS at 08:21

## 2023-05-13 RX ADMIN — NYSTATIN: 100000 POWDER TOPICAL at 08:22

## 2023-05-13 RX ADMIN — BUDESONIDE 0.5 MG: 0.5 INHALANT RESPIRATORY (INHALATION) at 19:25

## 2023-05-13 RX ADMIN — BUDESONIDE 0.5 MG: 0.5 INHALANT RESPIRATORY (INHALATION) at 07:24

## 2023-05-13 NOTE — THERAPY TREATMENT NOTE
Acute Care - Occupational Therapy Treatment Note  JOSE ROBERTO Patel     Patient Name: Orquidea Rosenberg  : 1945  MRN: [id number]  Today's Date: 2023  Onset of Illness/Injury or Date of Surgery: 23 (swing bed admit)     Referring Physician: Mervat    Admit Date: 2023     No diagnosis found.  Patient Active Problem List   Diagnosis   • COVID-19   • Vulvar cancer   • Physical debility     Past Medical History:   Diagnosis Date   • Arthritis    • COPD (chronic obstructive pulmonary disease)    • Elevated cholesterol    • History of transfusion    • Hypertension    • Vulval ca      Past Surgical History:   Procedure Laterality Date   • RADICAL VULVECTOMY  2019   • RADICAL VULVECTOMY Bilateral 2019         OT ASSESSMENT FLOWSHEET (last 12 hours)     OT Evaluation and Treatment     Row Name 23 1139                   OT Time and Intention    Subjective Information complains of;weakness;fatigue;pain  -LM        Document Type therapy note (daily note)  -LM        Mode of Treatment occupational therapy  -LM        Patient Effort good  -LM        Comment Patient seen this date for light bue arom therex.  Patient performed light towel therex with rest breaks in HB.  Patient verbalized and demonstrated understanding of HEP.  -LM           General Information    Existing Precautions/Restrictions fall;oxygen therapy device and L/min  -LM           Cognition    Affect/Mental Status (Cognition) WFL  -LM        Orientation Status (Cognition) oriented x 3  -LM           Wound 23 1414 Left vagina    Wound - Properties Group Placement Date: 23  -LB Placement Time: 1414  -LB Present on Hospital Admission: Y  -LB Side: Left  -LB Location: vagina  -LB    Retired Wound - Properties Group Placement Date: 23  -LB Placement Time: 1414  -LB Present on Hospital Admission: Y  -LB Side: Left  -LB Location: vagina  -LB    Retired Wound - Properties Group Date first assessed: 23  -LB Time first assessed:  1414  -LB Present on Hospital Admission: Y  -LB Side: Left  -LB Location: vagina  -LB       Positioning and Restraints    Pre-Treatment Position in bed  -LM        In Bed call light within reach;encouraged to call for assist  -LM              User Key  (r) = Recorded By, (t) = Taken By, (c) = Cosigned By    Initials Name Effective Dates    Joanne Claros OT 06/16/21 -     LB Veronica Mendez RN 10/20/21 -                        OT Recommendation and Plan           Outcome Measures     Row Name 05/13/23 1100 05/12/23 1641          How much help from another is currently needed...    Putting on and taking off regular lower body clothing? 2  -LM 2  -KR     Bathing (including washing, rinsing, and drying) 2  -LM 2  -KR     Toileting (which includes using toilet bed pan or urinal) 1  -LM 1  -KR     Putting on and taking off regular upper body clothing 3  -LM 3  -KR     Taking care of personal grooming (such as brushing teeth) 3  -LM 3  -KR     Eating meals 3  -LM 3  -KR     AM-PAC 6 Clicks Score (OT) 14  -LM 14  -KR           User Key  (r) = Recorded By, (t) = Taken By, (c) = Cosigned By    Initials Name Provider Type    Justen Gurrola, OT Occupational Therapist    LM Joanne Colon, OT Occupational Therapist                Time Calculation:    Time Calculation- OT     Row Name 05/13/23 1143             Time Calculation- OT    Total Timed Code Minutes- OT 10 minute(s)  -LM            User Key  (r) = Recorded By, (t) = Taken By, (c) = Cosigned By    Initials Name Provider Type    LM Joanne Colon, OT Occupational Therapist              Therapy Charges for Today     Code Description Service Date Service Provider Modifiers Qty    [id number]  OT THER PROC EA 15 MIN 5/13/2023 Joanne Colon OT GO 1               Joanne Colon OT  5/13/2023

## 2023-05-13 NOTE — THERAPY TREATMENT NOTE
Acute Care - Physical Therapy Treatment Note   Jorge     Patient Name: Orquidea Rosenberg  : 1945  MRN: [id number]  Today's Date: 2023   Onset of Illness/Injury or Date of Surgery: 23 (swing bed admit)  Visit Dx:     ICD-10-CM ICD-9-CM   1. Bacteremia  R78.81 790.7     Patient Active Problem List   Diagnosis   • COVID-19   • Vulvar cancer   • Physical debility     Past Medical History:   Diagnosis Date   • Arthritis    • COPD (chronic obstructive pulmonary disease)    • Elevated cholesterol    • History of transfusion    • Hypertension    • Vulval ca      Past Surgical History:   Procedure Laterality Date   • RADICAL VULVECTOMY  2019   • RADICAL VULVECTOMY Bilateral 2019     PT Assessment (last 12 hours)     PT Evaluation and Treatment     Row Name 23 1520          Physical Therapy Time and Intention    Subjective Information complains of;weakness;fatigue  -AG     Document Type therapy note (daily note)  -AG     Mode of Treatment physical therapy  -AG     Patient Effort fair  -AG     Row Name 23 1523          General Information    Patient Profile Reviewed yes  -AG     Patient Observations alert  -AG     Patient/Family/Caregiver Comments/Observations pt. solo's position in bed on nc O2; pt. seems restless in bed and attempting to reposition.  States she slept poorly last night and is tired.  Not agreeable to attempt out of bed activity.  Performed supine LE TE with strong encouragement.  -AG     Existing Precautions/Restrictions fall;oxygen therapy device and L/min  -AG     Benefits Reviewed patient:;improve function;increase independence;increase strength;increase balance;increase knowledge;decrease risk of DVT  -AG     Row Name 23 3326          Cognition    Affect/Mental Status (Cognition) anxious  -AG     Behavioral Issues (Cognition) --  restless, somewhat agitated  -AG     Orientation Status (Cognition) oriented x 3  -AG     Follows Commands (Cognition) WFL  -AG      Personal Safety Interventions supervised activity  -AG     Row Name 05/13/23 1520          Bed Mobility    Bed Mobility rolling left;rolling right;scooting/bridging;supine-sit;sit-supine  -AG     Rolling Right [place] (Bed Mobility) verbal cues;nonverbal cues (demo/gesture);contact guard;standby assist  -AG     Supine-Sit [place] (Bed Mobility) standby assist  -AG     Sit-Supine [place] (Bed Mobility) standby assist  -AG     Assistive Device (Bed Mobility) bed rails  -AG     Comment, (Bed Mobility) PT assisted with repositioning pt. onto R side with pillow for comfort.  -AG     Row Name 05/13/23 1520          Safety Issues, Functional Mobility    Impairments Affecting Function (Mobility) endurance/activity tolerance;shortness of breath;strength  -     Row Name 05/13/23 1520          Balance    Static Sitting Balance standby assist  -AG     Position, Sitting Balance unsupported;sitting edge of bed  -AG     Row Name 05/13/23 1520          Motor Skills    Motor Skills functional endurance;motor control/coordination interventions  -     Functional Endurance F-/P  -AG     Row Name 05/13/23 1520          Hip (Therapeutic Exercise)    Hip (Therapeutic Exercise) strengthening exercise  -     Hip Strengthening (Therapeutic Exercise) bilateral;flexion;extension;aBduction;aDduction;supine  -AG     Row Name 05/13/23 1520          Knee (Therapeutic Exercise)    Knee (Therapeutic Exercise) strengthening exercise  -     Knee Strengthening (Therapeutic Exercise) bilateral;flexion;extension;heel slides;supine;SLR (straight leg raise)  -     Row Name 05/13/23 1520          Ankle (Therapeutic Exercise)    Ankle (Therapeutic Exercise) strengthening exercise  -     Ankle Strengthening (Therapeutic Exercise) bilateral;dorsiflexion;plantarflexion;supine  -     Row Name             Wound 05/01/23 1414 Left vagina    Wound - Properties Group Placement Date: 05/01/23  -LB Placement Time: 1414  -LB Present on  Hospital Admission: Y  -LB Side: Left  -LB Location: vagina  -LB    Retired Wound - Properties Group Placement Date: 05/01/23  -LB Placement Time: 1414  -LB Present on Hospital Admission: Y  -LB Side: Left  -LB Location: vagina  -LB    Retired Wound - Properties Group Date first assessed: 05/01/23  -LB Time first assessed: 1414  -LB Present on Hospital Admission: Y  -LB Side: Left  -LB Location: vagina  -LB    Row Name 05/13/23 1520          Coping    Observed Emotional State anxious  -AG     Verbalized Emotional State acceptance  -AG     Trust Relationship/Rapport care explained;choices provided;thoughts/feelings acknowledged  -AG     Family/Support Persons family  -AG     Involvement in Care not present at bedside  -AG     Row Name 05/13/23 1520          Plan of Care Review    Plan of Care Reviewed With patient  -AG     Progress no change  -AG     Outcome Evaluation PT treatment complete.  Pt. performed B LE supine TE. She is anxious and seems SOA, restless.  Pt. not agreeable to out of bed activity.  PT assisted with repositioning, bed mobility.  -AG     Row Name 05/13/23 1520          Positioning and Restraints    Pre-Treatment Position in bed  -AG     Post Treatment Position bed  -AG     In Bed fowlers;call light within reach;encouraged to call for assist;side rails up x3  -AG     Row Name 05/13/23 1520          Therapy Plan Review/Discharge Plan (PT)    Therapy Plan Review (PT) evaluation/treatment results reviewed;care plan/treatment goals reviewed;risks/benefits reviewed;current/potential barriers reviewed;participants voiced agreement with care plan;participants included;patient  -AG           User Key  (r) = Recorded By, (t) = Taken By, (c) = Cosigned By    Initials Name Provider Type    Jaclyn Jensen, PT Physical Therapist    Veronica Bansal, RN Registered Nurse                Physical Therapy Education     Title: PT OT SLP Therapies (Done)     Topic: Physical Therapy (Done)     Point: Mobility  training (Done)     Learning Progress Summary           Patient Acceptance, E,D, VU,NR by  at 5/13/2023 1519                   Point: Home exercise program (Done)     Learning Progress Summary           Patient Acceptance, E,D, VU,NR by  at 5/13/2023 1519                   Point: Body mechanics (Done)     Learning Progress Summary           Patient Acceptance, E,D, VU,NR by  at 5/13/2023 1519                   Point: Precautions (Done)     Learning Progress Summary           Patient Acceptance, E,D, VU,NR by  at 5/13/2023 1519                               User Key     Initials Effective Dates Name Provider Type [facility] 06/16/21 -  Jaclyn Fernandez, PT Physical Therapist PT              PT Recommendation and Plan     Plan of Care Reviewed With: patient  Progress: no change  Outcome Evaluation: PT treatment complete.  Pt. performed B LE supine TE. She is anxious and seems SOA, restless.  Pt. not agreeable to out of bed activity.  PT assisted with repositioning, bed mobility.   Outcome Measures     Row Name 05/13/23 1100 05/12/23 1641          How much help from another is currently needed...    Putting on and taking off regular lower body clothing? 2  -LM 2  -KR     Bathing (including washing, rinsing, and drying) 2  -LM 2  -KR     Toileting (which includes using toilet bed pan or urinal) 1  -LM 1  -KR     Putting on and taking off regular upper body clothing 3  -LM 3  -KR     Taking care of personal grooming (such as brushing teeth) 3  -LM 3  -KR     Eating meals 3  -LM 3  -KR     AM-PAC 6 Clicks Score (OT) 14  -LM 14  -KR           User Key  (r) = Recorded By, (t) = Taken By, (c) = Cosigned By    Initials Name Provider Type    KR Justen Clark, OT Occupational Therapist    LM Joanne Colon, OT Occupational Therapist                 Time Calculation:    PT Charges     Row Name 05/13/23 1519             Time Calculation    PT Received On 05/13/23  -      PT - Next Appointment 05/15/23  -             User Key  (r) = Recorded By, (t) = Taken By, (c) = Cosigned By    Initials Name Provider Type    Jaclyn Jensen, PT Physical Therapist              Therapy Charges for Today     Code Description Service Date Service Provider Modifiers Qty    [id number]  PT THER PROC EA 15 MIN 5/13/2023 Jaclyn Fernandez, PT GP 1    [id number]  PT THERAPEUTIC ACT EA 15 MIN 5/13/2023 Jaclyn Fernandez, PT GP 1          PT G-Codes  Outcome Measure Options: AM-PAC 6 Clicks Daily Activity (OT)  AM-PAC 6 Clicks Score (PT): 16  AM-PAC 6 Clicks Score (OT): 14    Jcalyn Fernandez PT  5/13/2023

## 2023-05-13 NOTE — PROGRESS NOTES
PROGRESS NOTE         Patient Identification:  Name:  Orquidea Rosenberg  Age:  77 y.o.  Sex:  female  :  1945  MRN:  [id number]  Visit Number:  [id number]  Primary Care Provider:  Jackeline Denson APRN         LOS: 8 days       ----------------------------------------------------------------------------------------------------------------------  Subjective       Chief Complaints:    No chief complaint on file.        Interval History:      Patient sitting up in bed this morning.  No issues or complaints.  Currently on 2 L per nasal cannula with no apparent distress.  Afebrile, denies diarrhea.  Lungs clear to auscultation bilaterally.  Abdomen soft, nontender.    Review of Systems:    Constitutional: no fever, chills and night sweats.  Generalized fatigue.  Eyes: no eye drainage, itching or redness.  HEENT: no mouth sores, dysphagia or nose bleed.  Respiratory: No cough. No production of sputum.    Cardiovascular: no chest pain, no palpitations, no orthopnea.  Gastrointestinal: No vomiting or diarrhea. No abdominal pain, hematemesis, or rectal bleeding.  Occasional nausea with meals.  Genitourinary: no dysuria or polyuria.  Hematologic/lymphatic: no lymph node abnormalities, no easy bruising or easy bleeding.  Musculoskeletal: no muscle or joint pain.  Skin: No rash and no itching.  Neurological: no loss of consciousness, no seizure, no headache.  Behavioral/Psych: no depression or suicidal ideation.  Endocrine: no hot flashes.  Immunologic: negative.    ----------------------------------------------------------------------------------------------------------------------      Objective       Current [facility] Meds:  ascorbic acid, 500 mg, Oral, Daily  budesonide, 0.5 mg, Nebulization, BID - RT  enoxaparin, 40 mg, Subcutaneous, Daily  ferrous sulfate, 325 mg, Oral, Daily With Breakfast  guaiFENesin, 600 mg, Oral, Q12H  ipratropium-albuterol, 3 mL, Nebulization, 4x Daily - RT  levothyroxine, 100 mcg,  Oral, Q AM  losartan, 50 mg, Oral, Q24H  nystatin, , Topical, Q12H  polyethylene glycol, 17 g, Oral, Daily  sodium chloride, 10 mL, Intravenous, Q12H  sodium chloride, 10 mL, Intravenous, Q12H  sodium chloride, 10 mL, Intravenous, Q12H  sodium chloride, 10 mL, Intravenous, Q12H  sodium chloride, 10 mL, Intravenous, Q12H  [START ON 5/19/2023] tuberculin, 5 Units, Intradermal, Once      sodium chloride, 75 mL/hr, Last Rate: 75 mL/hr (05/13/23 0600)      ----------------------------------------------------------------------------------------------------------------------    Vital Signs:  Temp:  [97.8 °F (36.6 °C)-98.6 °F (37 °C)] 98.6 °F (37 °C)  Heart Rate:  [76-96] 81  Resp:  [16-20] 18  BP: (105-114)/(48-56) 114/56  No data found.  SpO2 Percentage    05/13/23 0108 05/13/23 0600 05/13/23 0724   SpO2: 98% 97% 92%     SpO2:  [92 %-98 %] 92 %  on  Flow (L/min):  [2] 2;   Device (Oxygen Therapy): nasal cannula    Body mass index is 29.86 kg/m².  Wt Readings from Last 3 Encounters:   05/05/23 83.9 kg (185 lb)   05/04/23 82.6 kg (182 lb)   03/15/22 81.6 kg (180 lb)        Intake/Output Summary (Last 24 hours) at 5/13/2023 1222  Last data filed at 5/13/2023 0600  Gross per 24 hour   Intake 2403 ml   Output --   Net 2403 ml     Diet: Regular/House Diet; Texture: Regular Texture (IDDSI 7); Fluid Consistency: Thin (IDDSI 0)  ----------------------------------------------------------------------------------------------------------------------      Physical Exam:    Constitutional: Elderly, chronically ill-appearing female is sitting up in bed on 2 L nasal cannula.  Appears comfortable this morning.  HENT:  Head: Normocephalic and atraumatic.  Mouth:  Moist mucous membranes.    Eyes:  Conjunctivae and EOM are normal.  No scleral icterus.  Neck:  Neck supple.  No JVD present.    Cardiovascular:  Normal rate, regular rhythm and normal heart sounds with no murmur. No edema.  Pulmonary/Chest: Lungs clear to auscultation bilaterally.   Currently on 2 L per nasal cannula.  Abdominal:  Soft.  Bowel sounds are normal.  No distension.  Chronic diffuse tenderness.  Musculoskeletal:  No tenderness and no deformity.  No swelling or redness of joints.  2+ edema bilateral lower extremities.  Neurological:  Alert and oriented to person, place, and time.  No facial droop.  No slurred speech.   Skin:  Skin is warm and dry.  No rash noted.  No pallor. Vulvar deformity due to prior surgeries.  Psychiatric:  Normal mood and affect.  Behavior is normal.    ----------------------------------------------------------------------------------------------------------------------          Results from last 7 days   Lab Units 05/07/23 2002   PH, ARTERIAL pH units 7.423   PO2 ART mm Hg 53.8*   PCO2, ARTERIAL mm Hg 38.0   HCO3 ART mmol/L 24.8     Results from last 7 days   Lab Units 05/13/23  0735 05/11/23  0147 05/08/23  0204   CRP mg/dL  --  11.77*  --    WBC 10*3/mm3 7.66 9.26 6.34   HEMOGLOBIN g/dL 7.5* 7.6* 7.0*   HEMATOCRIT % 25.7* 26.1* 24.9*   MCV fL 88.0 87.3 89.6   MCHC g/dL 29.2* 29.1* 28.1*   PLATELETS 10*3/mm3 298 308 290     Results from last 7 days   Lab Units 05/13/23  0735 05/12/23  0145 05/08/23  0204   SODIUM mmol/L 136 136 139   POTASSIUM mmol/L 4.6 4.5 4.8   CHLORIDE mmol/L 101 99 108*   CO2 mmol/L 26.2 26.0 23.3   BUN mg/dL 18 21 22   CREATININE mg/dL 1.12* 1.31* 1.04*   CALCIUM mg/dL 8.8 8.9 8.5*   GLUCOSE mg/dL 100* 116* 93   Estimated Creatinine Clearance: 45.9 mL/min (A) (by C-G formula based on SCr of 1.12 mg/dL (H)).  No results found for: AMMONIA    Glucose   Date/Time Value Ref Range Status   05/11/2023 0617 105 70 - 130 mg/dL Final     Comment:     Meter: [id number] : [id number] PATY HERNANDEZ     No results found for: HGBA1C  Lab Results   Component Value Date    TSH 2.620 03/15/2022       Blood Culture   Date Value Ref Range Status   04/25/2023 Abnormal Stain (C)  Preliminary   04/25/2023 Anaerobe (Organism type) (C)  Preliminary      No results found for: URINECX  No results found for: WOUNDCX  No results found for: STOOLCX  No results found for: RESPCX  Pain Management Panel          View : No data to display.                        ----------------------------------------------------------------------------------------------------------------------  Imaging Results (Last 24 Hours)     ** No results found for the last 24 hours. **          ----------------------------------------------------------------------------------------------------------------------    Pertinent Infectious Disease Results    CT abdomen pelvis on 4/25/2023 showed large complex pelvic mass measuring 8.4 x 9.4 x 10.2 cm and presumably represents the patient's known pelvic malignancy (vulvar cancer).  This could represent secondary involvement of the leiomyomatous uterus or dystrophic calcifications within the malignancy.  Abnormal fistulous communications between the anterior rectum and the posterior aspect of the above-mentioned pelvic mass with at least 1 and possibly 2 fistulous connections as demonstrated on CT.  Central debris within the mass may represent abscess or necrosis.  Apparent fistulous communication between the mass in the bladder.  Left-sided hydronephrosis and hydroureter with left ureteral stent in place.  The distal pigtail difficult to confirm within the bladder and may be within the distal ureter.  Progressive widely disseminated pulmonary metastases.  Blood cultures on 4/25/2023 finalized as Clostridium subterminale and Gemella morbillorum.  Blood cultures on 4/29/2023 finalized as no growth. MRSA screen on 4/26/2023 was negative.  COVID-19 and flu A/B PCR on 4/25/2023 was negative. CT of the chest from 5/2/23 reports small bilateral pleural effusions, tiny pericardial effusion, bilateral parenchymal pulmonary nodules are again noted.  CT of the abdomen pelvis from 5/2/23 reports left double-J ureteral stent and moderate to severe left  hydronephrosis of the left kidney, moderate stool throughout the colon, tiny bilateral pleural effusions, bilateral pulmonary lung base nodules are again noted. Chest x-ray on 5/7/2023 showed multiple bilateral pulmonary nodular densities are similar to the prior study.  No acute infiltrates.  Small left pleural effusion is stable.      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  No issues or complaints.  Currently on 2 L per nasal cannula with no apparent distress.  Afebrile, denies diarrhea.  Lungs clear to auscultation bilaterally.  Abdomen soft, nontender.    Patient completed course of metronidazole on 5/12/2023 for Clostridium bacteremia. Since Gemella was detected in only one of the two blood cultures, it is likely a contaminant. We will closely monitor off of antibiotic coverage. Recommend outpatient ID follow-up.      ANTIMICROBIAL THERAPY    This patient does not have an active medication from one of the medication groupers.     Code Status:   Code Status and Medical Interventions:   Ordered at: 05/05/23 1450     Level Of Support Discussed With:    Patient     Code Status (Patient has no pulse and is not breathing):    CPR (Attempt to Resuscitate)     Medical Interventions (Patient has pulse or is breathing):    Full Support       BERTHA Sanchez  05/13/23  12:22 EDT

## 2023-05-13 NOTE — PLAN OF CARE
Goal Outcome Evaluation:           Progress: improving  Outcome Evaluation: Pt. resting in bed at this time. Pt ambulated from chair to bed at shift change. Pt. tolerated well with no cpmplaints of pain. Pt. has had no complaints of pain this shift. VSS, no acute changes at this time. Wound care completed per orders, Will continue with plan of care.

## 2023-05-13 NOTE — PLAN OF CARE
Goal Outcome Evaluation:  Plan of Care Reviewed With: patient        Progress: no change  Outcome Evaluation: pt has been resting in bed. pt refused to ambulate to the chair. wound care done per orders. no other changes to note at this time; will continue to monitor

## 2023-05-14 PROCEDURE — 25010000002 ENOXAPARIN PER 10 MG: Performed by: INTERNAL MEDICINE

## 2023-05-14 PROCEDURE — 94761 N-INVAS EAR/PLS OXIMETRY MLT: CPT

## 2023-05-14 PROCEDURE — 94799 UNLISTED PULMONARY SVC/PX: CPT

## 2023-05-14 RX ORDER — IPRATROPIUM BROMIDE AND ALBUTEROL SULFATE 2.5; .5 MG/3ML; MG/3ML
3 SOLUTION RESPIRATORY (INHALATION) EVERY 6 HOURS PRN
Status: DISCONTINUED | OUTPATIENT
Start: 2023-05-14 | End: 2023-05-16 | Stop reason: SDUPTHER

## 2023-05-14 RX ADMIN — OXYCODONE HYDROCHLORIDE AND ACETAMINOPHEN 500 MG: 500 TABLET ORAL at 08:03

## 2023-05-14 RX ADMIN — OXYCODONE HYDROCHLORIDE AND ACETAMINOPHEN 1 TABLET: 10; 325 TABLET ORAL at 08:03

## 2023-05-14 RX ADMIN — GUAIFENESIN 600 MG: 600 TABLET, EXTENDED RELEASE ORAL at 20:41

## 2023-05-14 RX ADMIN — ENOXAPARIN SODIUM 40 MG: 40 INJECTION SUBCUTANEOUS at 08:03

## 2023-05-14 RX ADMIN — Medication 10 ML: at 20:42

## 2023-05-14 RX ADMIN — BUDESONIDE 0.5 MG: 0.5 INHALANT RESPIRATORY (INHALATION) at 18:54

## 2023-05-14 RX ADMIN — OXYCODONE HYDROCHLORIDE AND ACETAMINOPHEN 1 TABLET: 10; 325 TABLET ORAL at 17:05

## 2023-05-14 RX ADMIN — LEVOTHYROXINE SODIUM 100 MCG: 100 TABLET ORAL at 05:45

## 2023-05-14 RX ADMIN — Medication 10 ML: at 08:04

## 2023-05-14 RX ADMIN — NYSTATIN: 100000 POWDER TOPICAL at 08:03

## 2023-05-14 RX ADMIN — FERROUS SULFATE TAB 325 MG (65 MG ELEMENTAL FE) 325 MG: 325 (65 FE) TAB at 08:03

## 2023-05-14 RX ADMIN — Medication 10 ML: at 20:41

## 2023-05-14 RX ADMIN — SODIUM CHLORIDE 75 ML/HR: 9 INJECTION, SOLUTION INTRAVENOUS at 17:36

## 2023-05-14 RX ADMIN — NYSTATIN: 100000 POWDER TOPICAL at 20:41

## 2023-05-14 RX ADMIN — GUAIFENESIN 600 MG: 600 TABLET, EXTENDED RELEASE ORAL at 08:03

## 2023-05-14 RX ADMIN — LOSARTAN POTASSIUM 50 MG: 50 TABLET, FILM COATED ORAL at 08:03

## 2023-05-14 RX ADMIN — IPRATROPIUM BROMIDE AND ALBUTEROL SULFATE 3 ML: .5; 2.5 SOLUTION RESPIRATORY (INHALATION) at 18:52

## 2023-05-14 RX ADMIN — IPRATROPIUM BROMIDE AND ALBUTEROL SULFATE 3 ML: .5; 2.5 SOLUTION RESPIRATORY (INHALATION) at 00:38

## 2023-05-14 RX ADMIN — BUDESONIDE 0.5 MG: 0.5 INHALANT RESPIRATORY (INHALATION) at 06:55

## 2023-05-14 RX ADMIN — OXYCODONE HYDROCHLORIDE AND ACETAMINOPHEN 1 TABLET: 10; 325 TABLET ORAL at 02:24

## 2023-05-14 RX ADMIN — IPRATROPIUM BROMIDE AND ALBUTEROL SULFATE 3 ML: .5; 2.5 SOLUTION RESPIRATORY (INHALATION) at 06:55

## 2023-05-14 RX ADMIN — Medication 10 ML: at 08:03

## 2023-05-14 NOTE — PLAN OF CARE
Goal Outcome Evaluation:  Plan of Care Reviewed With: patient                   Pt has been complaining of pain. PRN pain meds given. VSS. Will continue with plan of care.

## 2023-05-14 NOTE — PROGRESS NOTES
[facility]     Patient Identification:  Name:  Orquidea Rosenberg  Age:  77 y.o.  Sex:  female  :  1945  MRN:  [id number]  Visit Number:  [id number]  Primary Care Provider:  Jackeline Denson APRN    Length of stay:  9    Subjective: Patient seen and examined, she is eating well, she participates physical therapy, pain suprapubic area is minimal, no difficulty breathing today, awake and alert, afebrile    Chief Complaint: Skull deconditioning  ----------------------------------------------------------------------------------------------------------------------  Current Hospital Meds:  ascorbic acid, 500 mg, Oral, Daily  budesonide, 0.5 mg, Nebulization, BID - RT  enoxaparin, 40 mg, Subcutaneous, Daily  ferrous sulfate, 325 mg, Oral, Daily With Breakfast  guaiFENesin, 600 mg, Oral, Q12H  levothyroxine, 100 mcg, Oral, Q AM  losartan, 50 mg, Oral, Q24H  nystatin, , Topical, Q12H  polyethylene glycol, 17 g, Oral, Daily  sodium chloride, 10 mL, Intravenous, Q12H  sodium chloride, 10 mL, Intravenous, Q12H  sodium chloride, 10 mL, Intravenous, Q12H  sodium chloride, 10 mL, Intravenous, Q12H  sodium chloride, 10 mL, Intravenous, Q12H  [START ON 2023] tuberculin, 5 Units, Intradermal, Once      sodium chloride, 75 mL/hr, Last Rate: 75 mL/hr (23 1529)      ----------------------------------------------------------------------------------------------------------------------  Vital Signs:  Temp:  [97.8 °F (36.6 °C)-98.8 °F (37.1 °C)] 97.8 °F (36.6 °C)  Heart Rate:  [] 97  Resp:  [16-18] 16  BP: (110-128)/(54-64) 128/64       Tele:       23  1502   Weight: 83.9 kg (185 lb)     Body mass index is 29.86 kg/m².    Intake/Output Summary (Last 24 hours) at 2023 1328  Last data filed at 2023 1000  Gross per 24 hour   Intake 2094.91 ml   Output --   Net 2094.91 ml     Diet: Regular/House Diet; Texture: Regular Texture (IDDSI 7); Fluid Consistency: Thin (IDDSI  0)  ----------------------------------------------------------------------------------------------------------------------  Physical exam:  General: Comfortable,awake, alert, oriented to self, place, and time, well-developed and well-nourished.  No respiratory distress.    Skin:  Skin is warm and dry. No rash noted. No pallor.    HENT:  Head:  Normocephalic and atraumatic.  Mouth:  Moist mucous membranes.    Eyes:  Conjunctivae and EOM are normal.  Pupils are equal, round, and reactive to light.  No scleral icterus.    Neck:  Neck supple.  No JVD present.    Pulmonary/Chest: She has no wheezing today, good air movement equal chest expansion no respiratory distress, no crackles, with normal breath sounds and good air movement.  Cardiovascular:  Normal rate, regular rhythm and normal heart sounds with no murmur.  Abdominal: Mild suprapubic tenderness on deep palpation soft.  Bowel sounds are normal.  No distension  Extremities:  No edema, no tenderness, and no deformity.  No red or swollen joints anywhere.  Strong pulses in all 4 extremities with no clubbing, no cyanosis, no edema.  Neurological:  Motor strength equal no obvious deficit, sensory grossly intact.   No cranial nerve deficit.  No tongue deviation.  No facial droop.  No slurred speech.    Genitourinary: No Germain catheter  Back:  ----------------------------------------------------------------------------------------------------------------------  ----------------------------------------------------------------------------------------------------------------------      Results from last 7 days   Lab Units 05/13/23  0735 05/11/23  0147 05/08/23  0204   CRP mg/dL  --  11.77*  --    WBC 10*3/mm3 7.66 9.26 6.34   HEMOGLOBIN g/dL 7.5* 7.6* 7.0*   HEMATOCRIT % 25.7* 26.1* 24.9*   MCV fL 88.0 87.3 89.6   MCHC g/dL 29.2* 29.1* 28.1*   PLATELETS 10*3/mm3 298 308 290     Results from last 7 days   Lab Units 05/07/23 2002   PH, ARTERIAL pH units 7.423   PO2 ART mm Hg  53.8*   PCO2, ARTERIAL mm Hg 38.0   HCO3 ART mmol/L 24.8     Results from last 7 days   Lab Units 05/13/23  0735 05/12/23  0145 05/08/23  0204   SODIUM mmol/L 136 136 139   POTASSIUM mmol/L 4.6 4.5 4.8   CHLORIDE mmol/L 101 99 108*   CO2 mmol/L 26.2 26.0 23.3   BUN mg/dL 18 21 22   CREATININE mg/dL 1.12* 1.31* 1.04*   CALCIUM mg/dL 8.8 8.9 8.5*   GLUCOSE mg/dL 100* 116* 93   Estimated Creatinine Clearance: 45.9 mL/min (A) (by C-G formula based on SCr of 1.12 mg/dL (H)).    No results found for: AMMONIA      No results found for: BLOODCX  No results found for: URINECX  No results found for: WOUNDCX  No results found for: STOOLCX    I have personally looked at the labs and they are summarized above.  ----------------------------------------------------------------------------------------------------------------------  Imaging Results (Last 24 Hours)     ** No results found for the last 24 hours. 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    Disposition would benefit placed      Dalila Mcdonald MD  05/14/23  13:28 EDT

## 2023-05-14 NOTE — PLAN OF CARE
Goal Outcome Evaluation:  Plan of Care Reviewed With: patient        Progress: no change  Outcome Evaluation: pt resting in bed. pain meds given per order for pain. wound care done per orders. no other changes to note at this time; will continue to monitor          Problem: Physical Therapy Goal  Goal: Physical Therapy Goal  Goals to be met by: 19    Patient will increase functional independence with mobility by performin. Supine to sit with Moderate Assistance -not met  2. Sit to stand transfer with Minimal Assistance -not met  3. Gait  x 30 feet with Contact Guard Assistance using Rolling Walker. -not met  4. Lower extremity exercise program x15 reps , with assistance as needed-not met     Goals remain appropriate. Cathy Taylor, PT 2019

## 2023-05-15 PROCEDURE — 99308 SBSQ NF CARE LOW MDM 20: CPT | Performed by: NURSE PRACTITIONER

## 2023-05-15 PROCEDURE — 25010000002 ENOXAPARIN PER 10 MG: Performed by: INTERNAL MEDICINE

## 2023-05-15 PROCEDURE — 97530 THERAPEUTIC ACTIVITIES: CPT

## 2023-05-15 PROCEDURE — 97110 THERAPEUTIC EXERCISES: CPT

## 2023-05-15 PROCEDURE — 97535 SELF CARE MNGMENT TRAINING: CPT

## 2023-05-15 PROCEDURE — 99308 SBSQ NF CARE LOW MDM 20: CPT | Performed by: INTERNAL MEDICINE

## 2023-05-15 PROCEDURE — 94761 N-INVAS EAR/PLS OXIMETRY MLT: CPT

## 2023-05-15 PROCEDURE — 94799 UNLISTED PULMONARY SVC/PX: CPT

## 2023-05-15 RX ORDER — IPRATROPIUM BROMIDE AND ALBUTEROL SULFATE 2.5; .5 MG/3ML; MG/3ML
3 SOLUTION RESPIRATORY (INHALATION)
Status: DISCONTINUED | OUTPATIENT
Start: 2023-05-15 | End: 2023-05-22 | Stop reason: HOSPADM

## 2023-05-15 RX ADMIN — HYDROXYZINE HYDROCHLORIDE 25 MG: 25 TABLET, FILM COATED ORAL at 22:17

## 2023-05-15 RX ADMIN — ENOXAPARIN SODIUM 40 MG: 40 INJECTION SUBCUTANEOUS at 08:10

## 2023-05-15 RX ADMIN — OXYCODONE HYDROCHLORIDE AND ACETAMINOPHEN 1 TABLET: 10; 325 TABLET ORAL at 08:10

## 2023-05-15 RX ADMIN — GUAIFENESIN 600 MG: 600 TABLET, EXTENDED RELEASE ORAL at 08:10

## 2023-05-15 RX ADMIN — LEVOTHYROXINE SODIUM 100 MCG: 100 TABLET ORAL at 05:57

## 2023-05-15 RX ADMIN — Medication 10 ML: at 21:25

## 2023-05-15 RX ADMIN — Medication 10 ML: at 21:26

## 2023-05-15 RX ADMIN — Medication 10 ML: at 08:11

## 2023-05-15 RX ADMIN — OXYCODONE HYDROCHLORIDE AND ACETAMINOPHEN 1 TABLET: 10; 325 TABLET ORAL at 22:17

## 2023-05-15 RX ADMIN — GUAIFENESIN 600 MG: 600 TABLET, EXTENDED RELEASE ORAL at 21:25

## 2023-05-15 RX ADMIN — OXYCODONE HYDROCHLORIDE AND ACETAMINOPHEN 500 MG: 500 TABLET ORAL at 08:10

## 2023-05-15 RX ADMIN — NYSTATIN: 100000 POWDER TOPICAL at 21:25

## 2023-05-15 RX ADMIN — BUDESONIDE 0.5 MG: 0.5 INHALANT RESPIRATORY (INHALATION) at 07:05

## 2023-05-15 RX ADMIN — NYSTATIN: 100000 POWDER TOPICAL at 08:10

## 2023-05-15 RX ADMIN — IPRATROPIUM BROMIDE AND ALBUTEROL SULFATE 3 ML: .5; 2.5 SOLUTION RESPIRATORY (INHALATION) at 18:23

## 2023-05-15 RX ADMIN — IPRATROPIUM BROMIDE AND ALBUTEROL SULFATE 3 ML: .5; 2.5 SOLUTION RESPIRATORY (INHALATION) at 07:05

## 2023-05-15 RX ADMIN — OXYCODONE HYDROCHLORIDE AND ACETAMINOPHEN 1 TABLET: 10; 325 TABLET ORAL at 11:48

## 2023-05-15 RX ADMIN — OXYCODONE HYDROCHLORIDE AND ACETAMINOPHEN 1 TABLET: 10; 325 TABLET ORAL at 16:19

## 2023-05-15 RX ADMIN — BUDESONIDE 0.5 MG: 0.5 INHALANT RESPIRATORY (INHALATION) at 18:23

## 2023-05-15 RX ADMIN — LOSARTAN POTASSIUM 50 MG: 50 TABLET, FILM COATED ORAL at 08:10

## 2023-05-15 RX ADMIN — FERROUS SULFATE TAB 325 MG (65 MG ELEMENTAL FE) 325 MG: 325 (65 FE) TAB at 08:10

## 2023-05-15 NOTE — PAYOR COMM NOTE
"Andrew Clinton RN  Swing Bed Nurse  [phone] Ex [phone] FAX: [phone]  [email or web address]  [facility]  NPI [id number]  Reference Number [id number]    Patient admitted to swing bed for PT/OT strength and mobility training. IV antibiotics complete.          Orquidea Mcclain (77 y.o. Female)    YOB: 1945  Social Security Number:   Address: [address] [place] [address]  Home Phone: [phone]  MRN: [id number]  Adventism: Rastafarian  Marital Status:         Admission Date: 5/5/23  Admission Type: Urgent  Admitting Provider: Taisha Crowell DO  Attending Provider: Dalila Mcdonald MD  Department, Room/Bed: [place] [address]/  Discharge Date:   Discharge Disposition:   Discharge Destination:               Attending Provider: Dalila Mcdonald MD    Allergies: Penicillins    Isolation: None   Infection: None   Code Status: CPR    Ht: 167.6 cm (66\")   Wt: 83.9 kg (185 lb)    Admission Cmt: None   Principal Problem: Physical debility [R53.81]                 Active Insurance as of 5/5/2023     Primary Coverage     Payor Plan Insurance Group Employer/Plan Group    [facility] MEDICARE REPLACEMENT [facility] MEDICARE REPLACEMENT [id number]     Payor Plan Address Payor Plan Phone Number Payor Plan Fax Number Effective Dates    [address] [address]   1/1/2021 - None Entered    [facility] [id number]       Subscriber Name Subscriber Birth Date Member ID       ORQUIDEA MCCLAIN 1945 [id number]                 Emergency Contacts      (Rel.) Home Phone Work Phone Mobile Phone    Gavin Mcclain (Son) [phone] None None    AnabelLilli (Daughter) [phone] None [phone]              Current Facility-Administered Medications   Medication Dose Route Frequency Provider Last Rate Last Admin   • ascorbic acid (VITAMIN C) tablet 500 mg  500 mg Oral Daily Taisha Crowell DO   500 mg at 05/14/23 0803   • budesonide " (PULMICORT) nebulizer solution 0.5 mg  0.5 mg Nebulization BID - RT Taisha Crowell DO   0.5 mg at 05/14/23 1854   • Enoxaparin Sodium (LOVENOX) syringe 40 mg  40 mg Subcutaneous Daily Taisha Crowell DO   40 mg at 05/14/23 0803   • ferrous sulfate tablet 325 mg  325 mg Oral Daily With Breakfast Taisha Crowell DO   325 mg at 05/14/23 0803   • guaiFENesin (MUCINEX) 12 hr tablet 600 mg  600 mg Oral Q12H Taisha Crowell DO   600 mg at 05/14/23 2041   • hydrOXYzine (ATARAX) tablet 25 mg  25 mg Oral TID PRN Shell Segura PA-C   25 mg at 05/07/23 2055   • ipratropium-albuterol (DUO-NEB) nebulizer solution 3 mL  3 mL Nebulization Q6H PRN Dalila Mcdonald MD   3 mL at 05/14/23 1852   • levothyroxine (SYNTHROID, LEVOTHROID) tablet 100 mcg  100 mcg Oral Q AM Taisha Crowell DO   100 mcg at 05/15/23 0557   • losartan (COZAAR) tablet 50 mg  50 mg Oral Q24H Taisha Crowell DO   50 mg at 05/14/23 0803   • nitroglycerin (NITROSTAT) SL tablet 0.4 mg  0.4 mg Sublingual Q5 Min PRN Taisha Crowell DO       • nystatin (MYCOSTATIN) powder   Topical Q12H Taisha Crowell DO   Given at 05/14/23 2041   • ondansetron (ZOFRAN) tablet 4 mg  4 mg Oral Q8H PRN Taisha Crowell DO   4 mg at 05/12/23 0039   • oxyCODONE-acetaminophen (PERCOCET)  MG per tablet 1 tablet  1 tablet Oral Q4H PRN Taisha Crowell DO   1 tablet at 05/14/23 1705   • polyethylene glycol (MIRALAX) packet 17 g  17 g Oral Daily Taisha Crowell DO   17 g at 05/09/23 0827   • sodium chloride 0.9 % flush 10 mL  10 mL Intravenous PRN Mervat, Taisha Ryan, DO       • sodium chloride 0.9 % flush 10 mL  10 mL Intravenous Q12H Taisha Crowell, DO   10 mL at 05/14/23 0804   • sodium chloride 0.9 % flush 10 mL  10 mL Intravenous PRN Taisha Crowell, DO       • sodium chloride 0.9 % flush 10 mL  10 mL Intravenous Q12H Taisha Crowell, DO   10 mL at 05/14/23 0804   • sodium chloride 0.9 %  flush 10 mL  10 mL Intravenous PRN Taisha Crowell, DO       • sodium chloride 0.9 % flush 10 mL  10 mL Intravenous Q12H Taisha Crowell, DO   10 mL at 23   • sodium chloride 0.9 % flush 10 mL  10 mL Intravenous Q12H Mervat, Taisha Davis, DO   10 mL at 23   • sodium chloride 0.9 % flush 10 mL  10 mL Intravenous Q12H Mervat, Taisha Davis, DO   10 mL at 23   • sodium chloride 0.9 % flush 10 mL  10 mL Intravenous PRN Mervat, Taisha Davis, DO       • sodium chloride 0.9 % flush 20 mL  20 mL Intravenous PRN Mervat, Taisha Davis, DO       • sodium chloride 0.9 % infusion 40 mL  40 mL Intravenous PRN Taisha Crowell, DO       • sodium chloride 0.9 % infusion 40 mL  40 mL Intravenous PRN Taisha Crowell, DO       • sodium chloride 0.9 % infusion 40 mL  40 mL Intravenous PRN Taisha Crowell, DO       • sodium chloride 0.9 % infusion  75 mL/hr Intravenous Continuous Dalila Mcdonald MD 75 mL/hr at 23 1736 75 mL/hr at 23 1736   • [START ON 2023] tuberculin injection 5 Units  5 Units Intradermal Once Taisha Crowell DO            Physician Progress Notes (last 48 hours)      Dalila Mcdonald MD at 23 1328              [facility]     Patient Identification:  Name:  Orquidea Rosenberg  Age:  77 y.o.  Sex:  female  :  1945  MRN:  [id number]  Visit Number:  [id number]  Primary Care Provider:  Jackeline Denson APRN    Length of stay:  9    Subjective: Patient seen and examined, she is eating well, she participates physical therapy, pain suprapubic area is minimal, no difficulty breathing today, awake and alert, afebrile    Chief Complaint: Skull deconditioning  ----------------------------------------------------------------------------------------------------------------------  Current Hospital Meds:  ascorbic acid, 500 mg, Oral, Daily  budesonide, 0.5 mg, Nebulization, BID - RT  enoxaparin, 40 mg,  Subcutaneous, Daily  ferrous sulfate, 325 mg, Oral, Daily With Breakfast  guaiFENesin, 600 mg, Oral, Q12H  levothyroxine, 100 mcg, Oral, Q AM  losartan, 50 mg, Oral, Q24H  nystatin, , Topical, Q12H  polyethylene glycol, 17 g, Oral, Daily  sodium chloride, 10 mL, Intravenous, Q12H  sodium chloride, 10 mL, Intravenous, Q12H  sodium chloride, 10 mL, Intravenous, Q12H  sodium chloride, 10 mL, Intravenous, Q12H  sodium chloride, 10 mL, Intravenous, Q12H  [START ON 5/19/2023] tuberculin, 5 Units, Intradermal, Once      sodium chloride, 75 mL/hr, Last Rate: 75 mL/hr (05/13/23 1529)      ----------------------------------------------------------------------------------------------------------------------  Vital Signs:  Temp:  [97.8 °F (36.6 °C)-98.8 °F (37.1 °C)] 97.8 °F (36.6 °C)  Heart Rate:  [] 97  Resp:  [16-18] 16  BP: (110-128)/(54-64) 128/64       Tele:       05/05/23  1502   Weight: 83.9 kg (185 lb)     Body mass index is 29.86 kg/m².    Intake/Output Summary (Last 24 hours) at 5/14/2023 1328  Last data filed at 5/14/2023 1000  Gross per 24 hour   Intake 2094.91 ml   Output --   Net 2094.91 ml     Diet: Regular/House Diet; Texture: Regular Texture (IDDSI 7); Fluid Consistency: Thin (IDDSI 0)  ----------------------------------------------------------------------------------------------------------------------  Physical exam:  General: Comfortable,awake, alert, oriented to self, place, and time, well-developed and well-nourished.  No respiratory distress.    Skin:  Skin is warm and dry. No rash noted. No pallor.    HENT:  Head:  Normocephalic and atraumatic.  Mouth:  Moist mucous membranes.    Eyes:  Conjunctivae and EOM are normal.  Pupils are equal, round, and reactive to light.  No scleral icterus.    Neck:  Neck supple.  No JVD present.    Pulmonary/Chest: She has no wheezing today, good air movement equal chest expansion no respiratory distress, no crackles, with normal breath sounds and good air  movement.  Cardiovascular:  Normal rate, regular rhythm and normal heart sounds with no murmur.  Abdominal: Mild suprapubic tenderness on deep palpation soft.  Bowel sounds are normal.  No distension  Extremities:  No edema, no tenderness, and no deformity.  No red or swollen joints anywhere.  Strong pulses in all 4 extremities with no clubbing, no cyanosis, no edema.  Neurological:  Motor strength equal no obvious deficit, sensory grossly intact.   No cranial nerve deficit.  No tongue deviation.  No facial droop.  No slurred speech.    Genitourinary: No Germain catheter  Back:  ----------------------------------------------------------------------------------------------------------------------  ----------------------------------------------------------------------------------------------------------------------      Results from last 7 days   Lab Units 05/13/23  0735 05/11/23  0147 05/08/23  0204   CRP mg/dL  --  11.77*  --    WBC 10*3/mm3 7.66 9.26 6.34   HEMOGLOBIN g/dL 7.5* 7.6* 7.0*   HEMATOCRIT % 25.7* 26.1* 24.9*   MCV fL 88.0 87.3 89.6   MCHC g/dL 29.2* 29.1* 28.1*   PLATELETS 10*3/mm3 298 308 290     Results from last 7 days   Lab Units 05/07/23 2002   PH, ARTERIAL pH units 7.423   PO2 ART mm Hg 53.8*   PCO2, ARTERIAL mm Hg 38.0   HCO3 ART mmol/L 24.8     Results from last 7 days   Lab Units 05/13/23  0735 05/12/23  0145 05/08/23  0204   SODIUM mmol/L 136 136 139   POTASSIUM mmol/L 4.6 4.5 4.8   CHLORIDE mmol/L 101 99 108*   CO2 mmol/L 26.2 26.0 23.3   BUN mg/dL 18 21 22   CREATININE mg/dL 1.12* 1.31* 1.04*   CALCIUM mg/dL 8.8 8.9 8.5*   GLUCOSE mg/dL 100* 116* 93   Estimated Creatinine Clearance: 45.9 mL/min (A) (by C-G formula based on SCr of 1.12 mg/dL (H)).    No results found for: AMMONIA      No results found for: BLOODCX  No results found for: URINECX  No results found for: WOUNDCX  No results found for: STOOLCX    I have personally looked at the labs and they are summarized  above.  ----------------------------------------------------------------------------------------------------------------------  Imaging Results (Last 24 Hours)     ** No results found for the last 24 hours. 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    Disposition would benefit placed      Dalila Mcdonald MD  23  13:28 EDT    Electronically signed by Dalila Mcdonald MD at 23 1331     Corina Connolly APRN at 23 1222                     PROGRESS NOTE         Patient Identification:  Name:  Orquidea Rosenberg  Age:  77 y.o.  Sex:  female  :  1945  MRN:  [id number]  Visit Number:  [id number]  Primary Care Provider:  Jackeline Denson APRN         LOS: 8 days       ----------------------------------------------------------------------------------------------------------------------  Subjective       Chief Complaints:    No chief complaint on file.        Interval History:      Patient sitting up in bed this morning.  No issues or complaints.  Currently on 2 L per nasal cannula with no apparent distress.  Afebrile, denies diarrhea.  Lungs clear to auscultation bilaterally.  Abdomen soft, nontender.    Review of Systems:    Constitutional: no fever, chills and night sweats.  Generalized fatigue.  Eyes: no eye drainage, itching or redness.  HEENT: no mouth sores, dysphagia or nose bleed.  Respiratory: No  cough. No production of sputum.    Cardiovascular: no chest pain, no palpitations, no orthopnea.  Gastrointestinal: No vomiting or diarrhea. No abdominal pain, hematemesis, or rectal bleeding.  Occasional nausea with meals.  Genitourinary: no dysuria or polyuria.  Hematologic/lymphatic: no lymph node abnormalities, no easy bruising or easy bleeding.  Musculoskeletal: no muscle or joint pain.  Skin: No rash and no itching.  Neurological: no loss of consciousness, no seizure, no headache.  Behavioral/Psych: no depression or suicidal ideation.  Endocrine: no hot flashes.  Immunologic: negative.    ----------------------------------------------------------------------------------------------------------------------      Objective       [facility] Meds:  ascorbic acid, 500 mg, Oral, Daily  budesonide, 0.5 mg, Nebulization, BID - RT  enoxaparin, 40 mg, Subcutaneous, Daily  ferrous sulfate, 325 mg, Oral, Daily With Breakfast  guaiFENesin, 600 mg, Oral, Q12H  ipratropium-albuterol, 3 mL, Nebulization, 4x Daily - RT  levothyroxine, 100 mcg, Oral, Q AM  losartan, 50 mg, Oral, Q24H  nystatin, , Topical, Q12H  polyethylene glycol, 17 g, Oral, Daily  sodium chloride, 10 mL, Intravenous, Q12H  sodium chloride, 10 mL, Intravenous, Q12H  sodium chloride, 10 mL, Intravenous, Q12H  sodium chloride, 10 mL, Intravenous, Q12H  sodium chloride, 10 mL, Intravenous, Q12H  [START ON 5/19/2023] tuberculin, 5 Units, Intradermal, Once      sodium chloride, 75 mL/hr, Last Rate: 75 mL/hr (05/13/23 0600)      ----------------------------------------------------------------------------------------------------------------------    Vital Signs:  Temp:  [97.8 °F (36.6 °C)-98.6 °F (37 °C)] 98.6 °F (37 °C)  Heart Rate:  [76-96] 81  Resp:  [16-20] 18  BP: (105-114)/(48-56) 114/56  No data found.  SpO2 Percentage    05/13/23 0108 05/13/23 0600 05/13/23 0724   SpO2: 98% 97% 92%     SpO2:  [92 %-98 %] 92 %  on  Flow (L/min):  [2] 2;   Device (Oxygen  Therapy): nasal cannula    Body mass index is 29.86 kg/m².  Wt Readings from Last 3 Encounters:   05/05/23 83.9 kg (185 lb)   05/04/23 82.6 kg (182 lb)   03/15/22 81.6 kg (180 lb)        Intake/Output Summary (Last 24 hours) at 5/13/2023 1222  Last data filed at 5/13/2023 0600  Gross per 24 hour   Intake 2403 ml   Output --   Net 2403 ml     Diet: Regular/House Diet; Texture: Regular Texture (IDDSI 7); Fluid Consistency: Thin (IDDSI 0)  ----------------------------------------------------------------------------------------------------------------------      Physical Exam:    Constitutional: Elderly, chronically ill-appearing female is sitting up in bed on 2 L nasal cannula.  Appears comfortable this morning.  HENT:  Head: Normocephalic and atraumatic.  Mouth:  Moist mucous membranes.    Eyes:  Conjunctivae and EOM are normal.  No scleral icterus.  Neck:  Neck supple.  No JVD present.    Cardiovascular:  Normal rate, regular rhythm and normal heart sounds with no murmur. No edema.  Pulmonary/Chest: Lungs clear to auscultation bilaterally.  Currently on 2 L per nasal cannula.  Abdominal:  Soft.  Bowel sounds are normal.  No distension.  Chronic diffuse tenderness.  Musculoskeletal:  No tenderness and no deformity.  No swelling or redness of joints.  2+ edema bilateral lower extremities.  Neurological:  Alert and oriented to person, place, and time.  No facial droop.  No slurred speech.   Skin:  Skin is warm and dry.  No rash noted.  No pallor. Vulvar deformity due to prior surgeries.  Psychiatric:  Normal mood and affect.  Behavior is normal.    ----------------------------------------------------------------------------------------------------------------------          Results from last 7 days   Lab Units 05/07/23 2002   PH, ARTERIAL pH units 7.423   PO2 ART mm Hg 53.8*   PCO2, ARTERIAL mm Hg 38.0   HCO3 ART mmol/L 24.8     Results from last 7 days   Lab Units 05/13/23  0735 05/11/23  0147 05/08/23  0204   CRP  mg/dL  --  11.77*  --    WBC 10*3/mm3 7.66 9.26 6.34   HEMOGLOBIN g/dL 7.5* 7.6* 7.0*   HEMATOCRIT % 25.7* 26.1* 24.9*   MCV fL 88.0 87.3 89.6   MCHC g/dL 29.2* 29.1* 28.1*   PLATELETS 10*3/mm3 298 308 290     Results from last 7 days   Lab Units 05/13/23  0735 05/12/23  0145 05/08/23  0204   SODIUM mmol/L 136 136 139   POTASSIUM mmol/L 4.6 4.5 4.8   CHLORIDE mmol/L 101 99 108*   CO2 mmol/L 26.2 26.0 23.3   BUN mg/dL 18 21 22   CREATININE mg/dL 1.12* 1.31* 1.04*   CALCIUM mg/dL 8.8 8.9 8.5*   GLUCOSE mg/dL 100* 116* 93   Estimated Creatinine Clearance: 45.9 mL/min (A) (by C-G formula based on SCr of 1.12 mg/dL (H)).  No results found for: AMMONIA    Glucose   Date/Time Value Ref Range Status   05/11/2023 0617 105 70 - 130 mg/dL Final     Comment:     Meter: [id number] : [id number] PATY [place]     No results found for: HGBA1C  Lab Results   Component Value Date    TSH 2.620 03/15/2022       Blood Culture   Date Value Ref Range Status   04/25/2023 Abnormal Stain (C)  Preliminary   04/25/2023 Anaerobe (Organism type) (C)  Preliminary     No results found for: URINECX  No results found for: WOUNDCX  No results found for: STOOLCX  No results found for: RESPCX  Pain Management Panel          View : No data to display.                        ----------------------------------------------------------------------------------------------------------------------  Imaging Results (Last 24 Hours)     ** No results found for the last 24 hours. **          ----------------------------------------------------------------------------------------------------------------------    Pertinent Infectious Disease Results    CT abdomen pelvis on 4/25/2023 showed large complex pelvic mass measuring 8.4 x 9.4 x 10.2 cm and presumably represents the patient's known pelvic malignancy (vulvar cancer).  This could represent secondary involvement of the leiomyomatous uterus or dystrophic calcifications within the malignancy.  Abnormal  fistulous communications between the anterior rectum and the posterior aspect of the above-mentioned pelvic mass with at least 1 and possibly 2 fistulous connections as demonstrated on CT.  Central debris within the mass may represent abscess or necrosis.  Apparent fistulous communication between the mass in the bladder.  Left-sided hydronephrosis and hydroureter with left ureteral stent in place.  The distal pigtail difficult to confirm within the bladder and may be within the distal ureter.  Progressive widely disseminated pulmonary metastases.  Blood cultures on 4/25/2023 finalized as Clostridium subterminale and Gemella morbillorum.  Blood cultures on 4/29/2023 finalized as no growth. MRSA screen on 4/26/2023 was negative.  COVID-19 and flu A/B PCR on 4/25/2023 was negative. CT of the chest from 5/2/23 reports small bilateral pleural effusions, tiny pericardial effusion, bilateral parenchymal pulmonary nodules are again noted.  CT of the abdomen pelvis from 5/2/23 reports left double-J ureteral stent and moderate to severe left hydronephrosis of the left kidney, moderate stool throughout the colon, tiny bilateral pleural effusions, bilateral pulmonary lung base nodules are again noted. Chest x-ray on 5/7/2023 showed multiple bilateral pulmonary nodular densities are similar to the prior study.  No acute infiltrates.  Small left pleural effusion is stable.      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  No issues or complaints.  Currently on 2 L per nasal cannula with no apparent distress.  Afebrile, denies diarrhea.  Lungs clear to auscultation bilaterally.  Abdomen soft, nontender.    Patient completed course of metronidazole on 5/12/2023 for Clostridium  bacteremia. Since Gemella was detected in only one of the two blood cultures, it is likely a contaminant. We will closely monitor off of antibiotic coverage. Recommend outpatient ID follow-up.      ANTIMICROBIAL THERAPY    This patient does not have an active medication from one of the medication groupers.     Code Status:   Code Status and Medical Interventions:   Ordered at: 23 1450     Level Of Support Discussed With:    Patient     Code Status (Patient has no pulse and is not breathing):    CPR (Attempt to Resuscitate)     Medical Interventions (Patient has pulse or is breathing):    Full Support       BERTHA Sanchez  23  12:22 EDT         Electronically signed by Corina Connolly APRN at 23 1225       Consult Notes (most recent note)    No notes of this type exist for this encounter.            Nutrition Notes (most recent note)      Cindy Prince RD at 23 1248        Nutrition Services    Patient Name:  Orquidea Rosenberg  YOB: 1945  MRN: [id number]  Admit Date:  2023    Swing Bed Note  BMI: 29.86  Diet: Regular  Intake: 55% x 11 meals  Fluid:  Not meeting needs  Encourage intake.      Electronically signed by:  Cindy Prince RD  23 12:48 EDT     Electronically signed by Cindy Prince RD at 23 1249          Physical Therapy Notes (last 72 hours)      Ander Shirley, PT at 23 1501  Version 1 of 1         Acute Care - Physical Therapy Treatment Note  JOSE ROBERTO Patel     Patient Name: Orquidea Rosenberg  : 1945  MRN: [id number]  Today's Date: 2023   Onset of Illness/Injury or Date of Surgery: 23 (swing bed admit)  Visit Dx:   No diagnosis found.  Patient Active Problem List   Diagnosis   • COVID-19   • Vulvar cancer   • Physical debility     Past Medical History:   Diagnosis Date   • Arthritis    • COPD (chronic obstructive pulmonary disease)    • Elevated cholesterol    • History of transfusion    • Hypertension    • Vulval ca      Past  Surgical History:   Procedure Laterality Date   • RADICAL VULVECTOMY  09/06/2019   • RADICAL VULVECTOMY Bilateral 06/2019     PT Assessment (last 12 hours)     PT Evaluation and Treatment     Row Name 05/12/23 1458          Physical Therapy Time and Intention    Subjective Information complains of;weakness;dyspnea  -KM     Document Type therapy note (daily note)  -KM     Mode of Treatment individual therapy;physical therapy  -KM     Patient Effort good  -KM     Symptoms Noted During/After Treatment fatigue;shortness of breath  -KM     Row Name 05/12/23 1458          General Information    Patient Profile Reviewed yes  -KM     Patient Observations alert;cooperative;agree to therapy  -KM     Existing Precautions/Restrictions fall;oxygen therapy device and L/min  -KM     Row Name 05/12/23 1458          Cognition    Affect/Mental Status (Cognition) WFL  -KM     Follows Commands (Cognition) WFL  -KM     Row Name 05/12/23 1458          Bed Mobility    Bed Mobility supine-sit;sit-supine  -KM     Supine-Sit [place] (Bed Mobility) modified independence  -KM     Sit-Supine [place] (Bed Mobility) supervision  -KM     Bed Mobility, Safety Issues decreased use of arms for pushing/pulling;decreased use of legs for bridging/pushing  -KM     Assistive Device (Bed Mobility) bed rails;head of bed elevated  -KM     Row Name 05/12/23 1458          Transfers    Transfers sit-stand transfer;stand-sit transfer  -KM     Row Name 05/12/23 1458          Sit-Stand Transfer    Sit-Stand [place] (Transfers) contact guard;verbal cues  -     Assistive Device (Sit-Stand Transfers) --  hand held assist  -KM     Row Name 05/12/23 1458          Stand-Sit Transfer    Stand-Sit [place] (Transfers) contact guard;verbal cues  -     Assistive Device (Stand-Sit Transfers) --  hand held assist  -KM     Row Name 05/12/23 1458          Gait/Stairs (Locomotion)    Gait/Stairs Locomotion gait/ambulation independence;distance ambulated   -KM     [place] (Gait) verbal cues;contact guard  -KM     Assistive Device (Gait) --  hand held assist  -KM     Distance in Feet (Gait) 10'  -KM     Pattern (Gait) step-through  -KM     Deviations/Abnormal Patterns (Gait) ataxic;gait speed decreased;weight shifting decreased  -KM     Comment, (Gait/Stairs) pt. ambulates limited distance d/t shortness of breath  -KM     Row Name 05/12/23 1458          Safety Issues, Functional Mobility    Impairments Affecting Function (Mobility) balance;endurance/activity tolerance;shortness of breath;pain;strength  -KM     Row Name 05/12/23 1458          Motor Skills    Comments, Therapeutic Exercise EOB ther-ex  -KM     Row Name             Wound 05/01/23 1414 Left vagina    Wound - Properties Group Placement Date: 05/01/23  -LB Placement Time: 1414  -LB Present on Hospital Admission: Y  -LB Side: Left  -LB Location: vagina  -LB    Retired Wound - Properties Group Placement Date: 05/01/23  -LB Placement Time: 1414  -LB Present on Hospital Admission: Y  -LB Side: Left  -LB Location: vagina  -LB    Retired Wound - Properties Group Date first assessed: 05/01/23  -LB Time first assessed: 1414  -LB Present on Hospital Admission: Y  -LB Side: Left  -LB Location: vagina  -LB    Row Name 05/12/23 1458          Progress Summary (PT)    Daily Progress Summary (PT) Pt. demonstrates functional mobility skills w/ improved ability during PT session. She was able to ambulate short distance prior to becoming short of breath. She continues to be limited w/ activity d/t shortness of breath. Pt. would continue to benefit from skilled PT services.  -KM           User Key  (r) = Recorded By, (t) = Taken By, (c) = Cosigned By    Initials Name Provider Type    Ander Garcia, PT Physical Therapist    Veronica Bansal, RN Registered Nurse                  PT Recommendation and Plan     Progress Summary (PT)  Daily Progress Summary (PT): Pt. demonstrates functional mobility skills w/ improved  ability during PT session. She was able to ambulate short distance prior to becoming short of breath. She continues to be limited w/ activity d/t shortness of breath. Pt. would continue to benefit from skilled PT services.       Time Calculation:    PT Charges     Row Name 23 1458             Time Calculation    PT Received On 23  -KM         Time Calculation- PT    Total Timed Code Minutes- PT 23 minute(s)  -KM            User Key  (r) = Recorded By, (t) = Taken By, (c) = Cosigned By    Initials Name Provider Type    Ander Garcia, PT Physical Therapist              Therapy Charges for Today     Code Description Service Date Service Provider Modifiers Qty    94153236917 HC GAIT TRAINING EA 15 MIN 2023 Ander Shirley, PT GP 1    42676552813 HC PT THER PROC EA 15 MIN 2023 Ander Shirley, PT GP 1    62355754625 HC PT THERAPEUTIC ACT EA 15 MIN 2023 Ander Shirley, PT GP 1    80052086501 HC PT THER PROC EA 15 MIN 2023 Ander Shirley, PT GP 1    57327651393 HC GAIT TRAINING EA 15 MIN 2023 Ander Shirley, PT GP 1          PT G-Codes  Outcome Measure Options: AM-PAC 6 Clicks Daily Activity (OT)  AM-PAC 6 Clicks Score (PT): 16  AM-PAC 6 Clicks Score (OT): 14    Ander Shirley PT  2023      Electronically signed by Ander Shirley, PT at 23 1501     Jaclyn Fernandez, PT at 23 1532  Version 1 of 1         Acute Care - Physical Therapy Treatment Note  JOSE ROBERTO Patel     Patient Name: Orquidea Rosenberg  : 1945  MRN: [id number]  Today's Date: 2023   Onset of Illness/Injury or Date of Surgery: 23 (swing bed admit)  Visit Dx:     ICD-10-CM ICD-9-CM   1. Bacteremia  R78.81 790.7     Patient Active Problem List   Diagnosis   • COVID-19   • Vulvar cancer   • Physical debility     Past Medical History:   Diagnosis Date   • Arthritis    • COPD (chronic obstructive pulmonary disease)    • Elevated cholesterol    • History of transfusion    • Hypertension    • Vulval ca      Past  Surgical History:   Procedure Laterality Date   • RADICAL VULVECTOMY  09/06/2019   • RADICAL VULVECTOMY Bilateral 06/2019     PT Assessment (last 12 hours)     PT Evaluation and Treatment     Row Name 05/13/23 1520          Physical Therapy Time and Intention    Subjective Information complains of;weakness;fatigue  -AG     Document Type therapy note (daily note)  -AG     Mode of Treatment physical therapy  -AG     Patient Effort fair  -AG     Row Name 05/13/23 1520          General Information    Patient Profile Reviewed yes  -AG     Patient Observations alert  -AG     Patient/Family/Caregiver Comments/Observations pt. solo's position in bed on nc O2; pt. seems restless in bed and attempting to reposition.  States she slept poorly last night and is tired.  Not agreeable to attempt out of bed activity.  Performed supine LE TE with strong encouragement.  -AG     Existing Precautions/Restrictions fall;oxygen therapy device and L/min  -AG     Benefits Reviewed patient:;improve function;increase independence;increase strength;increase balance;increase knowledge;decrease risk of DVT  -AG     Row Name 05/13/23 1520          Cognition    Affect/Mental Status (Cognition) anxious  -AG     Behavioral Issues (Cognition) --  restless, somewhat agitated  -AG     Orientation Status (Cognition) oriented x 3  -AG     Follows Commands (Cognition) WFL  -AG     Personal Safety Interventions supervised activity  -AG     Row Name 05/13/23 1520          Bed Mobility    Bed Mobility rolling left;rolling right;scooting/bridging;supine-sit;sit-supine  -AG     Rolling Right [place] (Bed Mobility) verbal cues;nonverbal cues (demo/gesture);contact guard;standby assist  -AG     Supine-Sit [place] (Bed Mobility) standby assist  -AG     Sit-Supine [place] (Bed Mobility) standby assist  -AG     Assistive Device (Bed Mobility) bed rails  -AG     Comment, (Bed Mobility) PT assisted with repositioning pt. onto R side with pillow for  comfort.  -AG     Row Name 05/13/23 1520          Safety Issues, Functional Mobility    Impairments Affecting Function (Mobility) endurance/activity tolerance;shortness of breath;strength  -AG     Row Name 05/13/23 1520          Balance    Static Sitting Balance standby assist  -AG     Position, Sitting Balance unsupported;sitting edge of bed  -AG     Row Name 05/13/23 1520          Motor Skills    Motor Skills functional endurance;motor control/coordination interventions  -     Functional Endurance F-/P  -AG     Row Name 05/13/23 1520          Hip (Therapeutic Exercise)    Hip (Therapeutic Exercise) strengthening exercise  -AG     Hip Strengthening (Therapeutic Exercise) bilateral;flexion;extension;aBduction;aDduction;supine  -AG     Row Name 05/13/23 1520          Knee (Therapeutic Exercise)    Knee (Therapeutic Exercise) strengthening exercise  -AG     Knee Strengthening (Therapeutic Exercise) bilateral;flexion;extension;heel slides;supine;SLR (straight leg raise)  -     Row Name 05/13/23 1520          Ankle (Therapeutic Exercise)    Ankle (Therapeutic Exercise) strengthening exercise  -AG     Ankle Strengthening (Therapeutic Exercise) bilateral;dorsiflexion;plantarflexion;supine  -     Row Name             Wound 05/01/23 1414 Left vagina    Wound - Properties Group Placement Date: 05/01/23  -LB Placement Time: 1414  -LB Present on Hospital Admission: Y  -LB Side: Left  -LB Location: vagina  -LB    Retired Wound - Properties Group Placement Date: 05/01/23  -LB Placement Time: 1414  -LB Present on Hospital Admission: Y  -LB Side: Left  -LB Location: vagina  -LB    Retired Wound - Properties Group Date first assessed: 05/01/23  -LB Time first assessed: 1414  -LB Present on Hospital Admission: Y  -LB Side: Left  -LB Location: vagina  -LB    Row Name 05/13/23 1520          Coping    Observed Emotional State anxious  -AG     Verbalized Emotional State acceptance  -AG     Trust Relationship/Rapport care  explained;choices provided;thoughts/feelings acknowledged  -     Family/Support Persons family  -AG     Involvement in Care not present at bedside  -AG     Row Name 05/13/23 1520          Plan of Care Review    Plan of Care Reviewed With patient  -AG     Progress no change  -AG     Outcome Evaluation PT treatment complete.  Pt. performed B LE supine TE. She is anxious and seems SOA, restless.  Pt. not agreeable to out of bed activity.  PT assisted with repositioning, bed mobility.  -AG     Row Name 05/13/23 1520          Positioning and Restraints    Pre-Treatment Position in bed  -AG     Post Treatment Position bed  -AG     In Bed fowlers;call light within reach;encouraged to call for assist;side rails up x3  -AG     Row Name 05/13/23 1520          Therapy Plan Review/Discharge Plan (PT)    Therapy Plan Review (PT) evaluation/treatment results reviewed;care plan/treatment goals reviewed;risks/benefits reviewed;current/potential barriers reviewed;participants voiced agreement with care plan;participants included;patient  -AG           User Key  (r) = Recorded By, (t) = Taken By, (c) = Cosigned By    Initials Name Provider Type    Jaclyn Jensen, PT Physical Therapist    Veronica Bansal, RN Registered Nurse                Physical Therapy Education     Title: PT OT SLP Therapies (Done)     Topic: Physical Therapy (Done)     Point: Mobility training (Done)     Learning Progress Summary           Patient Acceptance, E,D, VU,NR by  at 5/13/2023 1519                   Point: Home exercise program (Done)     Learning Progress Summary           Patient Acceptance, E,D, VU,NR by  at 5/13/2023 1519                   Point: Body mechanics (Done)     Learning Progress Summary           Patient Acceptance, E,D, VU,NR by  at 5/13/2023 1519                   Point: Precautions (Done)     Learning Progress Summary           Patient Acceptance, E,D, VU,NR by  at 5/13/2023 1519                               User Key      Initials Effective Dates Name Provider Type Discipline    AG 06/16/21 -  Jaclyn Fernandez, PT Physical Therapist PT              PT Recommendation and Plan     Plan of Care Reviewed With: patient  Progress: no change  Outcome Evaluation: PT treatment complete.  Pt. performed B LE supine TE. She is anxious and seems SOA, restless.  Pt. not agreeable to out of bed activity.  PT assisted with repositioning, bed mobility.   Outcome Measures     Row Name 05/13/23 1100 05/12/23 1641          How much help from another is currently needed...    Putting on and taking off regular lower body clothing? 2  -LM 2  -KR     Bathing (including washing, rinsing, and drying) 2  -LM 2  -KR     Toileting (which includes using toilet bed pan or urinal) 1  -LM 1  -KR     Putting on and taking off regular upper body clothing 3  -LM 3  -KR     Taking care of personal grooming (such as brushing teeth) 3  -LM 3  -KR     Eating meals 3  -LM 3  -KR     AM-PAC 6 Clicks Score (OT) 14  -LM 14  -KR           User Key  (r) = Recorded By, (t) = Taken By, (c) = Cosigned By    Initials Name Provider Type    KR Justen Clark, OT Occupational Therapist    LM Joanne Colon, OT Occupational Therapist                 Time Calculation:    PT Charges     Row Name 05/13/23 1519             Time Calculation    PT Received On 05/13/23  -      PT - Next Appointment 05/15/23  -            User Key  (r) = Recorded By, (t) = Taken By, (c) = Cosigned By    Initials Name Provider Type     Jaclyn Fernandez, PT Physical Therapist              Therapy Charges for Today     Code Description Service Date Service Provider Modifiers Qty    19806461710 HC PT THER PROC EA 15 MIN 5/13/2023 Jaclyn Fernandez, PT GP 1    37933600061 HC PT THERAPEUTIC ACT EA 15 MIN 5/13/2023 Jaclyn Fernandez, PT GP 1          PT G-Codes  Outcome Measure Options: AM-PAC 6 Clicks Daily Activity (OT)  AM-PAC 6 Clicks Score (PT): 16  AM-PAC 6 Clicks Score (OT): 14    Jaclyn Fernandez  PT  2023      Electronically signed by Jaclyn Fernandez, PT at 23 153          Occupational Therapy Notes (most recent note)      Joanne Colon, OT at 23 1143          Acute Care - Occupational Therapy Treatment Note  JOSE ROBERTO Patel     Patient Name: Orquidea Rosenberg  : 1945  MRN: [id number]  Today's Date: 2023  Onset of Illness/Injury or Date of Surgery: 23 (swing bed admit)     Referring Physician: Mervat    Admit Date: 2023     No diagnosis found.  Patient Active Problem List   Diagnosis   • COVID-19   • Vulvar cancer   • Physical debility     Past Medical History:   Diagnosis Date   • Arthritis    • COPD (chronic obstructive pulmonary disease)    • Elevated cholesterol    • History of transfusion    • Hypertension    • Vulval ca      Past Surgical History:   Procedure Laterality Date   • RADICAL VULVECTOMY  2019   • RADICAL VULVECTOMY Bilateral 2019         OT ASSESSMENT FLOWSHEET (last 12 hours)     OT Evaluation and Treatment     Row Name 23 1139                   OT Time and Intention    Subjective Information complains of;weakness;fatigue;pain  -LM        Document Type therapy note (daily note)  -LM        Mode of Treatment occupational therapy  -LM        Patient Effort good  -LM        Comment Patient seen this date for light bue arom therex.  Patient performed light towel therex with rest breaks in HB.  Patient verbalized and demonstrated understanding of HEP.  -LM           General Information    Existing Precautions/Restrictions fall;oxygen therapy device and L/min  -LM           Cognition    Affect/Mental Status (Cognition) WFL  -LM        Orientation Status (Cognition) oriented x 3  -LM           Wound 23 1414 Left vagina    Wound - Properties Group Placement Date: 23  -LB Placement Time: 1414  -LB Present on Hospital Admission: Y  -LB Side: Left  -LB Location: vagina  -LB    Retired Wound - Properties Group Placement Date: 23  -LB  Placement Time: 1414  -LB Present on Hospital Admission: Y  -LB Side: Left  -LB Location: vagina  -LB    Retired Wound - Properties Group Date first assessed: 05/01/23  -LB Time first assessed: 1414  -LB Present on Hospital Admission: Y  -LB Side: Left  -LB Location: vagina  -LB       Positioning and Restraints    Pre-Treatment Position in bed  -LM        In Bed call light within reach;encouraged to call for assist  -LM              User Key  (r) = Recorded By, (t) = Taken By, (c) = Cosigned By    Initials Name Effective Dates    Joanne Claros OT 06/16/21 -     LB Veronica Mendez RN 10/20/21 -                        OT Recommendation and Plan           Outcome Measures     Row Name 05/13/23 1100 05/12/23 1641          How much help from another is currently needed...    Putting on and taking off regular lower body clothing? 2  -LM 2  -KR     Bathing (including washing, rinsing, and drying) 2  -LM 2  -KR     Toileting (which includes using toilet bed pan or urinal) 1  -LM 1  -KR     Putting on and taking off regular upper body clothing 3  -LM 3  -KR     Taking care of personal grooming (such as brushing teeth) 3  -LM 3  -KR     Eating meals 3  -LM 3  -KR     AM-PAC 6 Clicks Score (OT) 14  -LM 14  -KR           User Key  (r) = Recorded By, (t) = Taken By, (c) = Cosigned By    Initials Name Provider Type    Justen Gurrola, OT Occupational Therapist    LM Joanne Colon, OT Occupational Therapist                Time Calculation:    Time Calculation- OT     Row Name 05/13/23 1143             Time Calculation- OT    Total Timed Code Minutes- OT 10 minute(s)  -LM            User Key  (r) = Recorded By, (t) = Taken By, (c) = Cosigned By    Initials Name Provider Type    LM Joanne Colon, OT Occupational Therapist              Therapy Charges for Today     Code Description Service Date Service Provider Modifiers Qty    45663068610  OT THER PROC EA 15 MIN 5/13/2023 Joanne Colon, OT GO 1               Joanne Colon,  OT  5/13/2023    Electronically signed by Joanne Colon, OT at 05/13/23 1140                                                                                                               Discharge Planning Assessment  JOSE ROBERTO Jorge     Patient Name: Orquidea Rosenberg                   MRN: [id number]  Today's Date: 5/12/2023                Admit Date: 5/5/2023       Discharge Plan         Row Name 05/12/23 1137           Plan     Plan Pt was admitted to swing bed on 56/5/23. Pt has been approved for swing bed through 5/15/23.  Pt lives at home and daughter Lilli has been staying with pt for the last two years. Pt does not utilize home health services at this time. Pt has oxgyen at 2 liters at night via [facility]. Pt daughter states pt has POA (not on file) and does not a living will. SS to follow                RYDER Chahal

## 2023-05-15 NOTE — CASE MANAGEMENT/SOCIAL WORK
Discharge Planning Assessment   [place]     Patient Name: Orquidea Rosenberg  MRN: [id number]  Today's Date: 5/15/2023    Admit Date: 5/5/2023       Discharge Plan     Row Name 05/15/23 1009       Plan    Plan SS noted Dr. Mcdonald disposition stated would benefit placed. SS contacted Pt daughter Lilli who states she is out of town and would be back around 5pm this evening and would like to discuss NH placement tomorrow 5/16/23.Swing Bed RN faxed updated clinical to insurance. SS to follow.                ANASTASIA ThurstonW

## 2023-05-15 NOTE — CASE MANAGEMENT/SOCIAL WORK
Patient approved for 3 additional days with clinical updates due 5/18 per insurance CM patient will likely get a notice of non coverage at next review. Provider and SS notified via secure chat.

## 2023-05-15 NOTE — THERAPY TREATMENT NOTE
Acute Care - Occupational Therapy Treatment Note   Jorge     Patient Name: Orquidea Rosenberg  : 1945  MRN: [id number]  Today's Date: 5/15/2023  Onset of Illness/Injury or Date of Surgery: 23 (swing bed admit)     Referring Physician: Mervat    Admit Date: 2023       ICD-10-CM ICD-9-CM   1. Bacteremia  R78.81 790.7     Patient Active Problem List   Diagnosis   • COVID-19   • Vulvar cancer   • Physical debility     Past Medical History:   Diagnosis Date   • Arthritis    • COPD (chronic obstructive pulmonary disease)    • Elevated cholesterol    • History of transfusion    • Hypertension    • Vulval ca      Past Surgical History:   Procedure Laterality Date   • RADICAL VULVECTOMY  2019   • RADICAL VULVECTOMY Bilateral 2019         OT ASSESSMENT FLOWSHEET (last 12 hours)     OT Evaluation and Treatment     Row Name 05/15/23 0952                   OT Time and Intention    Subjective Information complains of;weakness;fatigue;pain  -LM        Document Type therapy note (daily note)  -LM        Mode of Treatment occupational therapy  -LM        Patient Effort fair  -LM        Comment Patient seen this date bedside for light bue therex/arom.  Patient participated in light towel therex with frequent rest breaks.  Decreased activity tolerance for BADL.  -LM           General Information    Existing Precautions/Restrictions fall;oxygen therapy device and L/min  -LM           Cognition    Orientation Status (Cognition) oriented x 3  -LM           Wound 23 1414 Left vagina    Wound - Properties Group Placement Date: 23  -LB Placement Time: 1414  -LB Present on Hospital Admission: Y  -LB Side: Left  -LB Location: vagina  -LB    Retired Wound - Properties Group Placement Date: 23  -LB Placement Time: 141  -LB Present on Hospital Admission: Y  -LB Side: Left  -LB Location: vagina  -LB    Retired Wound - Properties Group Date first assessed: 23  -LB Time first assessed:   -LB  Present on Hospital Admission: Y  -LB Side: Left  -LB Location: vagina  -LB       Positioning and Restraints    Post Treatment Position bed  -LM        In Bed call light within reach;encouraged to call for assist;exit alarm on  -LM              User Key  (r) = Recorded By, (t) = Taken By, (c) = Cosigned By    Initials Name Effective Dates    Joanne Claros, OT 06/16/21 -     LB Veronica Mendez RN 10/20/21 -                        OT Recommendation and Plan           Outcome Measures     Row Name 05/15/23 0900 05/13/23 1100 05/12/23 1641       How much help from another is currently needed...    Putting on and taking off regular lower body clothing? 2  -LM 2  -LM 2  -KR    Bathing (including washing, rinsing, and drying) 2  -LM 2  -LM 2  -KR    Toileting (which includes using toilet bed pan or urinal) 1  -LM 1  -LM 1  -KR    Putting on and taking off regular upper body clothing 3  -LM 3  -LM 3  -KR    Taking care of personal grooming (such as brushing teeth) 3  -LM 3  -LM 3  -KR    Eating meals 3  -LM 3  -LM 3  -KR    AM-PAC 6 Clicks Score (OT) 14  -LM 14  -LM 14  -KR          User Key  (r) = Recorded By, (t) = Taken By, (c) = Cosigned By    Initials Name Provider Type    Justen Gurrola, OT Occupational Therapist    LM Joanne Colon, OT Occupational Therapist                Time Calculation:    Time Calculation- OT     Row Name 05/15/23 0954             Time Calculation- OT    Total Timed Code Minutes- OT 10 minute(s)  -LM            User Key  (r) = Recorded By, (t) = Taken By, (c) = Cosigned By    Initials Name Provider Type    Joanne Claros, OT Occupational Therapist              Therapy Charges for Today     Code Description Service Date Service Provider Modifiers Qty    24267476807 HC OT SELF CARE/MGMT/TRAIN EA 15 MIN 5/15/2023 Joanne Colon, OT GO 1               Joanne Colon OT  5/15/2023

## 2023-05-15 NOTE — PLAN OF CARE
Goal Outcome Evaluation:  Plan of Care Reviewed With: patient        Progress: no change  Outcome Evaluation: pt resting in bed. wound care done per orders. prn meds given for pain. no other changes to note at this time; will continue to monitor. Pt worked with PT/OT

## 2023-05-15 NOTE — PLAN OF CARE
Goal Outcome Evaluation:           Progress: no change  Outcome Evaluation: Pt resting in bed through shift. Wound care done per orders. Pt ambulated to chair and back to bed. No acute changes or concerns at this time.

## 2023-05-15 NOTE — THERAPY TREATMENT NOTE
Acute Care - Physical Therapy Treatment Note   Jorge     Patient Name: Orquidea Rosenberg  : 1945  MRN: [id number]  Today's Date: 5/15/2023   Onset of Illness/Injury or Date of Surgery: 23 (swing bed admit)  Visit Dx:     ICD-10-CM ICD-9-CM   1. Bacteremia  R78.81 790.7     Patient Active Problem List   Diagnosis   • COVID-19   • Vulvar cancer   • Physical debility     Past Medical History:   Diagnosis Date   • Arthritis    • COPD (chronic obstructive pulmonary disease)    • Elevated cholesterol    • History of transfusion    • Hypertension    • Vulval ca      Past Surgical History:   Procedure Laterality Date   • RADICAL VULVECTOMY  2019   • RADICAL VULVECTOMY Bilateral 2019     PT Assessment (last 12 hours)     PT Evaluation and Treatment     Row Name 05/15/23 1400          Physical Therapy Time and Intention    Subjective Information complains of;dyspnea  -KM     Document Type therapy note (daily note)  -KM     Mode of Treatment individual therapy;physical therapy  -KM     Patient Effort adequate  -KM     Symptoms Noted During/After Treatment fatigue;shortness of breath  -KM     Row Name 05/15/23 1400          General Information    Patient Profile Reviewed yes  -KM     Patient Observations alert;cooperative;agree to therapy  -KM     Existing Precautions/Restrictions fall;oxygen therapy device and L/min  -KM     Row Name 05/15/23 1400          Cognition    Affect/Mental Status (Cognition) WFL  -KM     Follows Commands (Cognition) WFL  -KM     Row Name 05/15/23 1400          Bed Mobility    Bed Mobility rolling left;supine-sit  -KM     Rolling Left [place] (Bed Mobility) modified independence  -KM     Supine-Sit [place] (Bed Mobility) modified independence  -KM     Assistive Device (Bed Mobility) bed rails  -KM     Row Name 05/15/23 1400          Transfers    Transfers sit-stand transfer;stand-sit transfer;bed-chair transfer  -KM     Row Name 05/15/23 1400          Bed-Chair Transfer     Bed-Chair [place] (Transfers) standby assist  -KM     Row Name 05/15/23 1400          Sit-Stand Transfer    Sit-Stand [place] (Transfers) standby assist  -KM     Row Name 05/15/23 1400          Stand-Sit Transfer    Stand-Sit [place] (Transfers) standby assist  -KM     Row Name 05/15/23 1400          Gait/Stairs (Locomotion)    Gait/Stairs Locomotion gait/ambulation independence;distance ambulated  -KM     [place] Level (Gait) contact guard  -KM     Distance in Feet (Gait) 5'  -KM     Pattern (Gait) step-through  -KM     Deviations/Abnormal Patterns (Gait) ataxic;gait speed decreased;weight shifting decreased  -KM     Row Name 05/15/23 1400          Safety Issues, Functional Mobility    Impairments Affecting Function (Mobility) endurance/activity tolerance;shortness of breath;strength  -KM     Row Name 05/15/23 1400          Motor Skills    Comments, Therapeutic Exercise seated ther-ex  -KM     Row Name             Wound 05/01/23 1414 Left vagina    Wound - Properties Group Placement Date: 05/01/23  -LB Placement Time: 1414  -LB Present on Hospital Admission: Y  -LB Side: Left  -LB Location: vagina  -LB    Retired Wound - Properties Group Placement Date: 05/01/23  -LB Placement Time: 1414  -LB Present on Hospital Admission: Y  -LB Side: Left  -LB Location: vagina  -LB    Retired Wound - Properties Group Date first assessed: 05/01/23  -LB Time first assessed: 1414  -LB Present on Hospital Admission: Y  -LB Side: Left  -LB Location: vagina  -LB    Row Name 05/15/23 1400          Progress Summary (PT)    Daily Progress Summary (PT) Pt. was able to demonstrate functional mobility skills at higher functional levels than previous session. She was able to ambulate minimal distance from bed-chair. Pt. c/o shortness of breath upon minimal ambulation. She took increased time to recover from shortness of breath. Pt. O2 sat was 97% at end of session.  -KM           User Key  (r) = Recorded By, (t) = Taken  By, (c) = Cosigned By    Initials Name Provider Type    Ander Garcia, PT Physical Therapist    LB Veronica Mendez, RN Registered Nurse                Physical Therapy Education     Title: PT OT SLP Therapies (Done)     Topic: Physical Therapy (Done)     Point: Mobility training (Done)     Learning Progress Summary           Patient Acceptance, TB,E, VU,DU by  at 5/15/2023 0902    Acceptance, E,TB, DU,VU by  at 5/14/2023 0836    Acceptance, E,D, VU,NR by  at 5/13/2023 1519                   Point: Home exercise program (Done)     Learning Progress Summary           Patient Acceptance, TB,E, VU,DU by  at 5/15/2023 0902    Acceptance, E,TB, DU,VU by  at 5/14/2023 0836    Acceptance, E,D, VU,NR by  at 5/13/2023 1519                   Point: Body mechanics (Done)     Learning Progress Summary           Patient Acceptance, TB,E, VU,DU by  at 5/15/2023 0902    Acceptance, E,TB, DU,VU by  at 5/14/2023 0836    Acceptance, E,D, VU,NR by  at 5/13/2023 1519                   Point: Precautions (Done)     Learning Progress Summary           Patient Acceptance, TB,E, VU,DU by  at 5/15/2023 0902    Acceptance, E,TB, DU,VU by  at 5/14/2023 0836    Acceptance, E,D, VU,NR by  at 5/13/2023 1519                               User Key     Initials Effective Dates Name Provider Type Discipline     06/16/21 -  Jaclyn Fernandez, PT Physical Therapist PT     08/05/21 -  Felicia Galeano, RN Registered Nurse Nurse              PT Recommendation and Plan     Progress Summary (PT)  Daily Progress Summary (PT): Pt. was able to demonstrate functional mobility skills at higher functional levels than previous session. She was able to ambulate minimal distance from bed-chair. Pt. c/o shortness of breath upon minimal ambulation. She took increased time to recover from shortness of breath. Pt. O2 sat was 97% at end of session.   Outcome Measures     Row Name 05/15/23 0900 05/13/23 1100 05/12/23 1641       How much help from  another is currently needed...    Putting on and taking off regular lower body clothing? 2  -LM 2  -LM 2  -KR    Bathing (including washing, rinsing, and drying) 2  -LM 2  -LM 2  -KR    Toileting (which includes using toilet bed pan or urinal) 1  -LM 1  -LM 1  -KR    Putting on and taking off regular upper body clothing 3  -LM 3  -LM 3  -KR    Taking care of personal grooming (such as brushing teeth) 3  -LM 3  -LM 3  -KR    Eating meals 3  -LM 3  -LM 3  -KR    AM-PAC 6 Clicks Score (OT) 14  -LM 14  -LM 14  -KR          User Key  (r) = Recorded By, (t) = Taken By, (c) = Cosigned By    Initials Name Provider Type    KR Justen Clark, OT Occupational Therapist    LM Joanne Colon, OT Occupational Therapist                 Time Calculation:    PT Charges     Row Name 05/15/23 1433             Time Calculation    PT Received On 05/15/23  -KM         Time Calculation- PT    Total Timed Code Minutes- PT 38 minute(s)  -KM            User Key  (r) = Recorded By, (t) = Taken By, (c) = Cosigned By    Initials Name Provider Type    KM Ander Shirley, PT Physical Therapist              Therapy Charges for Today     Code Description Service Date Service Provider Modifiers Qty    14855931091  PT THERAPEUTIC ACT EA 15 MIN 5/15/2023 Ander Shirley, PT GP 2    [id number]  PT THER PROC EA 15 MIN 5/15/2023 Ander Shirley, PT GP 1          PT G-Codes  Outcome Measure Options: AM-PAC 6 Clicks Daily Activity (OT)  AM-PAC 6 Clicks Score (PT): 16  AM-PAC 6 Clicks Score (OT): 14    Ander Shirley, PT  5/15/2023

## 2023-05-15 NOTE — PROGRESS NOTES
[facility] HOSPITALIST PROGRESS NOTE     Patient Identification:  Name:  Orquidea Rosenberg  Age:  77 y.o.  Sex:  female  :  1945  MRN:  [id number]  Visit Number:  [id number]  ROOM: [address] [place] [address]     Primary Care Provider:  Jackeline Denson APRN     Date of Admission: 2023    Length of stay in inpatient status:  10    Subjective     Chief Compliant:  In swing bed due to debility    History of Presenting Illness:   The patient is doing okay; she thinks that her breathing is at baseline but admits that she is wheezing and coughing.  She otherwise denies chest pain, nausea, vomiting, diarrhea.  She is not eating a lot of food but she states she does have her appetite, that she has brought too much food with each meal.     Objective     Current Hospital Meds:  ascorbic acid, 500 mg, Oral, Daily  budesonide, 0.5 mg, Nebulization, BID - RT  enoxaparin, 40 mg, Subcutaneous, Daily  ferrous sulfate, 325 mg, Oral, Daily With Breakfast  guaiFENesin, 600 mg, Oral, Q12H  levothyroxine, 100 mcg, Oral, Q AM  losartan, 50 mg, Oral, Q24H  nystatin, , Topical, Q12H  polyethylene glycol, 17 g, Oral, Daily  sodium chloride, 10 mL, Intravenous, Q12H  sodium chloride, 10 mL, Intravenous, Q12H  sodium chloride, 10 mL, Intravenous, Q12H  sodium chloride, 10 mL, Intravenous, Q12H  sodium chloride, 10 mL, Intravenous, Q12H  [START ON 2023] tuberculin, 5 Units, Intradermal, Once    sodium chloride, 75 mL/hr, Last Rate: 75 mL/hr (23 1736)      Current Antimicrobial Therapy:  Anti-Infectives (From admission, onward)    Ordered     Dose/Rate Route Frequency Start Stop    23 0754  metroNIDAZOLE (FLAGYL) tablet 500 mg        Ordering Provider: Socrates Flaherty MD    500 mg Oral Every 8 Hours Scheduled 23 0845 23 2133        Current Diuretic Therapy:  Diuretics (From admission, onward)    Ordered     Dose/Rate Route Frequency Start Stop    05/10/23 1155  metOLazone (ZAROXOLYN) tablet 2.5 mg         Ordering Provider: Dalila Mcdonald MD    2.5 mg Oral Daily 05/10/23 1245 05/10/23 1308    05/10/23 1155  furosemide (LASIX) injection 20 mg        Ordering Provider: Dalila Mcdonald MD    20 mg Intravenous Once 05/10/23 1215 05/10/23 1243    05/08/23 1019  furosemide (LASIX) injection 20 mg        Ordering Provider: Dalila Mcdonald MD    20 mg Intravenous Once 05/08/23 1115 05/08/23 1130    05/08/23 1018  metOLazone (ZAROXOLYN) tablet 2.5 mg        Ordering Provider: Dalila Mcdonald MD    2.5 mg Oral Daily 05/08/23 1045 05/08/23 1130        ----------------------------------------------------------------------------------------------------------------------  Vital Signs:  Temp:  [97.2 °F (36.2 °C)-98.6 °F (37 °C)] 97.2 °F (36.2 °C)  Heart Rate:  [84-91] 85  Resp:  [18-22] 22  BP: (151-172)/(62-84) 172/84  SpO2:  [74 %-99 %] 99 %  on  Flow (L/min):  [3-4.5] 3;   Device (Oxygen Therapy): nasal cannula  Body mass index is 29.86 kg/m².    Wt Readings from Last 3 Encounters:   05/05/23 83.9 kg (185 lb)   05/04/23 82.6 kg (182 lb)   03/15/22 81.6 kg (180 lb)     Intake & Output (last 3 days)       05/12 0701  05/13 0700 05/13 0701  05/14 0700 05/14 0701  05/15 0700 05/15 0701 05/16 0700    P.O. 600 1000 1080 480    I.V. (mL/kg) 2043 (24.4) 854.9 (10.2) 1728.4 (20.6)     Total Intake(mL/kg) 2643 (31.5) 1854.9 (22.1) 2808.4 (33.5) 480 (5.7)    Net +2643 +1854.9 +2808.4 +480            Urine Unmeasured Occurrence 5 x 2 x 2 x     Stool Unmeasured Occurrence 4 x  1 x         Diet: Regular/House Diet; Texture: Regular Texture (IDDSI 7); Fluid Consistency: Thin (IDDSI 0)  ----------------------------------------------------------------------------------------------------------------------  Physical Exam  Vitals reviewed.   Constitutional:       General: She is in acute distress.      Appearance: She is not toxic-appearing or diaphoretic.   HENT:      Head: Normocephalic and atraumatic.      Right Ear:  External ear normal.      Left Ear: External ear normal.      Nose: Nose normal.   Eyes:      General: No scleral icterus.        Right eye: No discharge.         Left eye: No discharge.      Extraocular Movements: Extraocular movements intact.      Pupils: Pupils are equal, round, and reactive to light.   Cardiovascular:      Rate and Rhythm: Normal rate and regular rhythm.      Pulses: Normal pulses.      Heart sounds: No murmur heard.  Pulmonary:      Effort: Prolonged expiration and respiratory distress present. No accessory muscle usage.      Breath sounds: No decreased air movement. Wheezing present. No rales.   Abdominal:      General: Abdomen is flat. Bowel sounds are normal.      Palpations: Abdomen is soft.   Musculoskeletal:         General: No deformity or signs of injury.   Skin:     Capillary Refill: Capillary refill takes less than 2 seconds.      Coloration: Skin is not jaundiced or pale.      Findings: No bruising.   Neurological:      Mental Status: She is alert and oriented to person, place, and time. Mental status is at baseline.      Cranial Nerves: No cranial nerve deficit.   Psychiatric:         Mood and Affect: Mood normal.         Behavior: Behavior normal.         Thought Content: Thought content normal.         Judgment: Judgment normal.       ----------------------------------------------------------------------------------------------------------------------  Tele:  None  ----------------------------------------------------------------------------------------------------------------------  LABS:    CBC and coagulation:  Results from last 7 days   Lab Units 05/13/23  0735 05/11/23  0147   CRP mg/dL  --  11.77*   WBC 10*3/mm3 7.66 9.26   HEMOGLOBIN g/dL 7.5* 7.6*   HEMATOCRIT % 25.7* 26.1*   MCV fL 88.0 87.3   MCHC g/dL 29.2* 29.1*   PLATELETS 10*3/mm3 298 308     Renal and electrolytes:  Results from last 7 days   Lab Units 05/13/23  0735 05/12/23  0145   SODIUM mmol/L 136 136   POTASSIUM  mmol/L 4.6 4.5   CHLORIDE mmol/L 101 99   CO2 mmol/L 26.2 26.0   BUN mg/dL 18 21   CREATININE mg/dL 1.12* 1.31*   CALCIUM mg/dL 8.8 8.9   GLUCOSE mg/dL 100* 116*     Estimated Creatinine Clearance: 45.9 mL/min (A) (by C-G formula based on SCr of 1.12 mg/dL (H)).    Endocrine function:  Point of care bedside glucose levels:  Results from last 7 days   Lab Units 05/11/23  0617   GLUCOSE mg/dL 105     Glucose levels from the CMP:  Results from last 7 days   Lab Units 05/13/23  0735 05/12/23  0145   GLUCOSE mg/dL 100* 116*     Lab Results   Component Value Date    TSH 2.620 03/15/2022       I have personally looked at the labs and they are summarized above.    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 causing fecaloid urine  -History of chronic left sided hydronephrosis, status post double-J ureteral stent on the left that is still present  -Acute hypoxic respiratory failure, suspect secondary to lung metastasis, although underlying pneumonia not entirely ruled out  -Clostridium subterminale bacteremia that was treated during the last [facility] hospitalization  -Suspect steroid-induced leukocytosis, improved  -Mild hyperkalemia that was present on admission, improved  -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 extensive lung metastasis  -History of chronic anemia with iron deficiency and acute exacerbation, requiring 1 unit PRBCs so far this admission  -Bronchospasm at the beginning of her hospital stay, had improved but now is symptomatic again  -Acute kidney injury that was present on admission, due to sepsis, resolving  -Generalized weakness/physical debility  -Moderate hypoalbuminemia  -History of neoplasm related pain    The patient was on some steroids and scheduled DuoNeb breathing treatments at the beginning of her hospital  stay; I will schedule the DuoNebs again and continue to monitor her wheezing and respiratory status.  I will also order a venous blood gas and the rest of her blood work in the morning.  Please note that her blood pressures are at goal and stable and we will continue to monitor these.  The patient is to continue with physical therapy; today, she stood up and took some steps to sit in the chair in her room.    VTE Prophylaxis:   Mechanical Order History:     None      Pharmalogical Order History:      Ordered     Dose Route Frequency Stop    05/05/23 1450  Enoxaparin Sodium (LOVENOX) syringe 40 mg         40 mg SC Daily --            Disposition:  Family to decide between NH or home with home health after her swing bed days are exhausted    Elda Elliott MD  [facility] Hospitalist  05/15/23  15:25 EDT

## 2023-05-15 NOTE — PROGRESS NOTES
PROGRESS NOTE         Patient Identification:  Name:  Orquidea Rosenberg  Age:  77 y.o.  Sex:  female  :  1945  MRN:  [id number]  Visit Number:  [id number]  Primary Care Provider:  Jackeline Denson APRN         LOS: 10 days       ----------------------------------------------------------------------------------------------------------------------  Subjective       Chief Complaints:    No chief complaint on file.        Interval History:      Patient resting comfortably in bed this morning.  Currently on 3 L per nasal cannula with no apparent distress.  Lungs clear to auscultation bilaterally.  Reports mild nausea when eating this morning.  Abdomen soft, nontender.  Afebrile, denies diarrhea.    Review of Systems:    Constitutional: no fever, chills and night sweats.  Generalized fatigue.  Eyes: no eye drainage, itching or redness.  HEENT: no mouth sores, dysphagia or nose bleed.  Respiratory: No cough. No production of sputum.    Cardiovascular: no chest pain, no palpitations, no orthopnea.  Gastrointestinal: No vomiting or diarrhea. No abdominal pain, hematemesis, or rectal bleeding.  Occasional nausea with meals.  Genitourinary: no dysuria or polyuria.  Hematologic/lymphatic: no lymph node abnormalities, no easy bruising or easy bleeding.  Musculoskeletal: no muscle or joint pain.  Skin: No rash and no itching.  Neurological: no loss of consciousness, no seizure, no headache.  Behavioral/Psych: no depression or suicidal ideation.  Endocrine: no hot flashes.  Immunologic: negative.    ----------------------------------------------------------------------------------------------------------------------      Objective       [facility] Meds:  ascorbic acid, 500 mg, Oral, Daily  budesonide, 0.5 mg, Nebulization, BID - RT  enoxaparin, 40 mg, Subcutaneous, Daily  ferrous sulfate, 325 mg, Oral, Daily With Breakfast  guaiFENesin, 600 mg, Oral, Q12H  levothyroxine, 100 mcg, Oral, Q AM  losartan, 50 mg,  Oral, Q24H  nystatin, , Topical, Q12H  polyethylene glycol, 17 g, Oral, Daily  sodium chloride, 10 mL, Intravenous, Q12H  sodium chloride, 10 mL, Intravenous, Q12H  sodium chloride, 10 mL, Intravenous, Q12H  sodium chloride, 10 mL, Intravenous, Q12H  sodium chloride, 10 mL, Intravenous, Q12H  [START ON 5/19/2023] tuberculin, 5 Units, Intradermal, Once      sodium chloride, 75 mL/hr, Last Rate: 75 mL/hr (05/14/23 1736)      ----------------------------------------------------------------------------------------------------------------------    Vital Signs:  Temp:  [97.2 °F (36.2 °C)-98.6 °F (37 °C)] 97.2 °F (36.2 °C)  Heart Rate:  [84-91] 85  Resp:  [18-22] 22  BP: (151-172)/(62-84) 172/84  No data found.  SpO2 Percentage    05/14/23 1915 05/15/23 0705 05/15/23 0722   SpO2: 98% 98% 99%     SpO2:  [74 %-99 %] 99 %  on  Flow (L/min):  [3-4.5] 3;   Device (Oxygen Therapy): nasal cannula    Body mass index is 29.86 kg/m².  Wt Readings from Last 3 Encounters:   05/05/23 83.9 kg (185 lb)   05/04/23 82.6 kg (182 lb)   03/15/22 81.6 kg (180 lb)        Intake/Output Summary (Last 24 hours) at 5/15/2023 1146  Last data filed at 5/15/2023 0810  Gross per 24 hour   Intake 2808.37 ml   Output --   Net 2808.37 ml     Diet: Regular/House Diet; Texture: Regular Texture (IDDSI 7); Fluid Consistency: Thin (IDDSI 0)  ----------------------------------------------------------------------------------------------------------------------      Physical Exam:    Constitutional: Elderly, chronically ill-appearing female is sitting up in bed on  L nasal cannula.  Appears comfortable this morning.  HENT:  Head: Normocephalic and atraumatic.  Mouth:  Moist mucous membranes.    Eyes:  Conjunctivae and EOM are normal.  No scleral icterus.  Neck:  Neck supple.  No JVD present.    Cardiovascular:  Normal rate, regular rhythm and normal heart sounds with no murmur. No edema.  Pulmonary/Chest: Lungs clear to auscultation bilaterally.  Currently on 3 L  per nasal cannula.  Abdominal:  Soft.  Bowel sounds are normal.  No distension.    Musculoskeletal:  No tenderness and no deformity.  No swelling or redness of joints.  2+ edema bilateral lower extremities.  Neurological:  Alert and oriented to person, place, and time.  No facial droop.  No slurred speech.   Skin:  Skin is warm and dry.  No rash noted.  No pallor. Vulvar deformity due to prior surgeries.  Psychiatric:  Normal mood and affect.  Behavior is normal.    ----------------------------------------------------------------------------------------------------------------------              Results from last 7 days   Lab Units 05/13/23  0735 05/11/23  0147   CRP mg/dL  --  11.77*   WBC 10*3/mm3 7.66 9.26   HEMOGLOBIN g/dL 7.5* 7.6*   HEMATOCRIT % 25.7* 26.1*   MCV fL 88.0 87.3   MCHC g/dL 29.2* 29.1*   PLATELETS 10*3/mm3 298 308     Results from last 7 days   Lab Units 05/13/23  0735 05/12/23  0145   SODIUM mmol/L 136 136   POTASSIUM mmol/L 4.6 4.5   CHLORIDE mmol/L 101 99   CO2 mmol/L 26.2 26.0   BUN mg/dL 18 21   CREATININE mg/dL 1.12* 1.31*   CALCIUM mg/dL 8.8 8.9   GLUCOSE mg/dL 100* 116*   Estimated Creatinine Clearance: 45.9 mL/min (A) (by C-G formula based on SCr of 1.12 mg/dL (H)).  No results found for: AMMONIA    No results found for: HGBA1C, POCGLU  No results found for: HGBA1C  Lab Results   Component Value Date    TSH 2.620 03/15/2022       Blood Culture   Date Value Ref Range Status   04/25/2023 Abnormal Stain (C)  Preliminary   04/25/2023 Anaerobe (Organism type) (C)  Preliminary     No results found for: URINECX  No results found for: WOUNDCX  No results found for: STOOLCX  No results found for: RESPCX  Pain Management Panel          View : No data to display.                        ----------------------------------------------------------------------------------------------------------------------  Imaging Results (Last 24 Hours)     ** No results found for the last 24 hours. **           ----------------------------------------------------------------------------------------------------------------------    Pertinent Infectious Disease Results    CT abdomen pelvis on 4/25/2023 showed large complex pelvic mass measuring 8.4 x 9.4 x 10.2 cm and presumably represents the patient's known pelvic malignancy (vulvar cancer).  This could represent secondary involvement of the leiomyomatous uterus or dystrophic calcifications within the malignancy.  Abnormal fistulous communications between the anterior rectum and the posterior aspect of the above-mentioned pelvic mass with at least 1 and possibly 2 fistulous connections as demonstrated on CT.  Central debris within the mass may represent abscess or necrosis.  Apparent fistulous communication between the mass in the bladder.  Left-sided hydronephrosis and hydroureter with left ureteral stent in place.  The distal pigtail difficult to confirm within the bladder and may be within the distal ureter.  Progressive widely disseminated pulmonary metastases.  Blood cultures on 4/25/2023 finalized as Clostridium subterminale and Gemella morbillorum.  Blood cultures on 4/29/2023 finalized as no growth. MRSA screen on 4/26/2023 was negative.  COVID-19 and flu A/B PCR on 4/25/2023 was negative. CT of the chest from 5/2/23 reports small bilateral pleural effusions, tiny pericardial effusion, bilateral parenchymal pulmonary nodules are again noted.  CT of the abdomen pelvis from 5/2/23 reports left double-J ureteral stent and moderate to severe left hydronephrosis of the left kidney, moderate stool throughout the colon, tiny bilateral pleural effusions, bilateral pulmonary lung base nodules are again noted. Chest x-ray on 5/7/2023 showed multiple bilateral pulmonary nodular densities are similar to the prior study.  No acute infiltrates.  Small left pleural effusion is stable.      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  Currently on 3 L per nasal cannula with no apparent distress.  Lungs clear to auscultation bilaterally.  Reports mild nausea when eating this morning.  Abdomen soft, nontender.  Afebrile, denies diarrhea.    Patient completed course of metronidazole on 5/12/2023 for Clostridium bacteremia. Since Gemella was detected in only one of the two blood cultures, it is likely a contaminant.     Patient overall stable from ID standpoint.  ID will sign off for now.  Please contact us if we can further assist in this case. Recommend outpatient ID follow-up.      ANTIMICROBIAL THERAPY    This patient does not have an active medication from one of the medication groupers.     Code Status:   Code Status and Medical Interventions:   Ordered at: 05/05/23 1450     Level Of Support Discussed With:    Patient     Code Status (Patient has no pulse and is not breathing):    CPR (Attempt to Resuscitate)     Medical Interventions (Patient has pulse or is breathing):    Full Support       BERTHA Sanchez  05/15/23  11:46 EDT

## 2023-05-16 PROBLEM — R78.81 BACTEREMIA: Status: ACTIVE | Noted: 2023-05-16

## 2023-05-16 PROBLEM — J44.1 COPD EXACERBATION: Status: ACTIVE | Noted: 2023-05-16

## 2023-05-16 LAB
ALBUMIN SERPL-MCNC: 2.4 G/DL (ref 3.5–5.2)
ALBUMIN/GLOB SERPL: 0.7 G/DL
ALP SERPL-CCNC: 61 U/L (ref 39–117)
ALT SERPL W P-5'-P-CCNC: <5 U/L (ref 1–33)
ANION GAP SERPL CALCULATED.3IONS-SCNC: 7.5 MMOL/L (ref 5–15)
ANISOCYTOSIS BLD QL: NORMAL
AST SERPL-CCNC: 5 U/L (ref 1–32)
ATMOSPHERIC PRESS: 726 MMHG
BASE EXCESS BLDV CALC-SCNC: 1.4 MMOL/L (ref 0–2)
BASOPHILS # BLD AUTO: 0.08 10*3/MM3 (ref 0–0.2)
BASOPHILS NFR BLD AUTO: 0.6 % (ref 0–1.5)
BDY SITE: ABNORMAL
BH BB BLOOD EXPIRATION DATE: NORMAL
BH BB BLOOD EXPIRATION DATE: NORMAL
BH BB BLOOD TYPE BARCODE: 6200
BH BB BLOOD TYPE BARCODE: 6200
BH BB DISPENSE STATUS: NORMAL
BH BB DISPENSE STATUS: NORMAL
BH BB PRODUCT CODE: NORMAL
BH BB PRODUCT CODE: NORMAL
BH BB UNIT NUMBER: NORMAL
BH BB UNIT NUMBER: NORMAL
BILIRUB SERPL-MCNC: <0.2 MG/DL (ref 0–1.2)
BUN SERPL-MCNC: 13 MG/DL (ref 8–23)
BUN/CREAT SERPL: 12.5 (ref 7–25)
CALCIUM SPEC-SCNC: 8.6 MG/DL (ref 8.6–10.5)
CHLORIDE SERPL-SCNC: 102 MMOL/L (ref 98–107)
CO2 BLDA-SCNC: 28.5 MMOL/L (ref 22–33)
CO2 SERPL-SCNC: 23.5 MMOL/L (ref 22–29)
COHGB MFR BLD: 1.9 % (ref 0–5)
CREAT SERPL-MCNC: 1.04 MG/DL (ref 0.57–1)
CROSSMATCH INTERPRETATION: NORMAL
CROSSMATCH INTERPRETATION: NORMAL
DACRYOCYTES BLD QL SMEAR: NORMAL
DEPRECATED RDW RBC AUTO: 65.6 FL (ref 37–54)
EGFRCR SERPLBLD CKD-EPI 2021: 55.5 ML/MIN/1.73
EOSINOPHIL # BLD AUTO: 0.23 10*3/MM3 (ref 0–0.4)
EOSINOPHIL NFR BLD AUTO: 1.8 % (ref 0.3–6.2)
ERYTHROCYTE [DISTWIDTH] IN BLOOD BY AUTOMATED COUNT: 20.3 % (ref 12.3–15.4)
GAS FLOW AIRWAY: 3 LPM
GLOBULIN UR ELPH-MCNC: 3.4 GM/DL
GLUCOSE SERPL-MCNC: 127 MG/DL (ref 65–99)
HCO3 BLDV-SCNC: 27 MMOL/L (ref 22–28)
HCT VFR BLD AUTO: 26.8 % (ref 34–46.6)
HGB BLD-MCNC: 7.7 G/DL (ref 12–15.9)
HGB BLDA-MCNC: 7.8 G/DL (ref 13.5–17.5)
HYPOCHROMIA BLD QL: NORMAL
IMM GRANULOCYTES # BLD AUTO: 0.14 10*3/MM3 (ref 0–0.05)
IMM GRANULOCYTES NFR BLD AUTO: 1.1 % (ref 0–0.5)
INHALED O2 CONCENTRATION: 32 %
LYMPHOCYTES # BLD AUTO: 0.6 10*3/MM3 (ref 0.7–3.1)
LYMPHOCYTES NFR BLD AUTO: 4.6 % (ref 19.6–45.3)
Lab: ABNORMAL
MAGNESIUM SERPL-MCNC: 1.9 MG/DL (ref 1.6–2.4)
MCH RBC QN AUTO: 26 PG (ref 26.6–33)
MCHC RBC AUTO-ENTMCNC: 28.7 G/DL (ref 31.5–35.7)
MCV RBC AUTO: 90.5 FL (ref 79–97)
METHGB BLD QL: 0.1 % (ref 0–3)
MODALITY: ABNORMAL
MONOCYTES # BLD AUTO: 0.82 10*3/MM3 (ref 0.1–0.9)
MONOCYTES NFR BLD AUTO: 6.3 % (ref 5–12)
NEUTROPHILS NFR BLD AUTO: 11.08 10*3/MM3 (ref 1.7–7)
NEUTROPHILS NFR BLD AUTO: 85.6 % (ref 42.7–76)
NRBC BLD AUTO-RTO: 0 /100 WBC (ref 0–0.2)
OXYHGB MFR BLDV: 92.2 % (ref 45–75)
PCO2 BLDV: 47.3 MM HG (ref 41–51)
PH BLDV: 7.37 PH UNITS (ref 7.32–7.42)
PHOSPHATE SERPL-MCNC: 3.5 MG/DL (ref 2.5–4.5)
PLAT MORPH BLD: NORMAL
PLATELET # BLD AUTO: 316 10*3/MM3 (ref 140–450)
PMV BLD AUTO: 8.8 FL (ref 6–12)
PO2 BLDV: 70 MM HG (ref 27–53)
POTASSIUM SERPL-SCNC: 4.4 MMOL/L (ref 3.5–5.2)
PROT SERPL-MCNC: 5.8 G/DL (ref 6–8.5)
RBC # BLD AUTO: 2.96 10*6/MM3 (ref 3.77–5.28)
SAO2 % BLDCOV: 94.1 % (ref 45–75)
SODIUM SERPL-SCNC: 133 MMOL/L (ref 136–145)
UNIT  ABO: NORMAL
UNIT  ABO: NORMAL
UNIT  RH: NORMAL
UNIT  RH: NORMAL
VENTILATOR MODE: ABNORMAL
WBC NRBC COR # BLD: 12.95 10*3/MM3 (ref 3.4–10.8)

## 2023-05-16 PROCEDURE — 94799 UNLISTED PULMONARY SVC/PX: CPT

## 2023-05-16 PROCEDURE — 82820 HEMOGLOBIN-OXYGEN AFFINITY: CPT

## 2023-05-16 PROCEDURE — 85007 BL SMEAR W/DIFF WBC COUNT: CPT | Performed by: INTERNAL MEDICINE

## 2023-05-16 PROCEDURE — 25010000002 ENOXAPARIN PER 10 MG: Performed by: INTERNAL MEDICINE

## 2023-05-16 PROCEDURE — 80053 COMPREHEN METABOLIC PANEL: CPT | Performed by: INTERNAL MEDICINE

## 2023-05-16 PROCEDURE — 83735 ASSAY OF MAGNESIUM: CPT | Performed by: INTERNAL MEDICINE

## 2023-05-16 PROCEDURE — 85025 COMPLETE CBC W/AUTO DIFF WBC: CPT | Performed by: INTERNAL MEDICINE

## 2023-05-16 PROCEDURE — 25010000002 METHYLPREDNISOLONE PER 125 MG: Performed by: INTERNAL MEDICINE

## 2023-05-16 PROCEDURE — 94761 N-INVAS EAR/PLS OXIMETRY MLT: CPT

## 2023-05-16 PROCEDURE — 99308 SBSQ NF CARE LOW MDM 20: CPT | Performed by: INTERNAL MEDICINE

## 2023-05-16 PROCEDURE — 97110 THERAPEUTIC EXERCISES: CPT

## 2023-05-16 PROCEDURE — 82805 BLOOD GASES W/O2 SATURATION: CPT

## 2023-05-16 PROCEDURE — 25010000002 FUROSEMIDE PER 20 MG: Performed by: INTERNAL MEDICINE

## 2023-05-16 PROCEDURE — 84100 ASSAY OF PHOSPHORUS: CPT | Performed by: INTERNAL MEDICINE

## 2023-05-16 RX ORDER — METHYLPREDNISOLONE SODIUM SUCCINATE 125 MG/2ML
62.5 INJECTION, POWDER, LYOPHILIZED, FOR SOLUTION INTRAMUSCULAR; INTRAVENOUS DAILY
Status: DISCONTINUED | OUTPATIENT
Start: 2023-05-16 | End: 2023-05-18

## 2023-05-16 RX ORDER — IPRATROPIUM BROMIDE AND ALBUTEROL SULFATE 2.5; .5 MG/3ML; MG/3ML
3 SOLUTION RESPIRATORY (INHALATION) EVERY 4 HOURS PRN
Status: DISCONTINUED | OUTPATIENT
Start: 2023-05-16 | End: 2023-05-21

## 2023-05-16 RX ORDER — FUROSEMIDE 10 MG/ML
80 INJECTION INTRAMUSCULAR; INTRAVENOUS ONCE
Status: COMPLETED | OUTPATIENT
Start: 2023-05-16 | End: 2023-05-16

## 2023-05-16 RX ADMIN — Medication 10 ML: at 08:43

## 2023-05-16 RX ADMIN — FERROUS SULFATE TAB 325 MG (65 MG ELEMENTAL FE) 325 MG: 325 (65 FE) TAB at 08:30

## 2023-05-16 RX ADMIN — LEVOTHYROXINE SODIUM 100 MCG: 100 TABLET ORAL at 05:23

## 2023-05-16 RX ADMIN — FUROSEMIDE 80 MG: 10 INJECTION, SOLUTION INTRAVENOUS at 11:26

## 2023-05-16 RX ADMIN — BUDESONIDE 0.5 MG: 0.5 INHALANT RESPIRATORY (INHALATION) at 19:05

## 2023-05-16 RX ADMIN — IPRATROPIUM BROMIDE AND ALBUTEROL SULFATE 3 ML: .5; 2.5 SOLUTION RESPIRATORY (INHALATION) at 13:45

## 2023-05-16 RX ADMIN — GUAIFENESIN 600 MG: 600 TABLET, EXTENDED RELEASE ORAL at 20:38

## 2023-05-16 RX ADMIN — NYSTATIN: 100000 POWDER TOPICAL at 20:39

## 2023-05-16 RX ADMIN — BUDESONIDE 0.5 MG: 0.5 INHALANT RESPIRATORY (INHALATION) at 07:33

## 2023-05-16 RX ADMIN — Medication 10 ML: at 20:39

## 2023-05-16 RX ADMIN — LOSARTAN POTASSIUM 50 MG: 50 TABLET, FILM COATED ORAL at 08:30

## 2023-05-16 RX ADMIN — NYSTATIN: 100000 POWDER TOPICAL at 08:33

## 2023-05-16 RX ADMIN — GUAIFENESIN 600 MG: 600 TABLET, EXTENDED RELEASE ORAL at 08:30

## 2023-05-16 RX ADMIN — OXYCODONE HYDROCHLORIDE AND ACETAMINOPHEN 500 MG: 500 TABLET ORAL at 08:30

## 2023-05-16 RX ADMIN — METHYLPREDNISOLONE SODIUM SUCCINATE 62.5 MG: 125 INJECTION, POWDER, FOR SOLUTION INTRAMUSCULAR; INTRAVENOUS at 12:21

## 2023-05-16 RX ADMIN — IPRATROPIUM BROMIDE AND ALBUTEROL SULFATE 3 ML: .5; 2.5 SOLUTION RESPIRATORY (INHALATION) at 07:32

## 2023-05-16 RX ADMIN — ENOXAPARIN SODIUM 40 MG: 40 INJECTION SUBCUTANEOUS at 08:30

## 2023-05-16 RX ADMIN — OXYCODONE HYDROCHLORIDE AND ACETAMINOPHEN 1 TABLET: 10; 325 TABLET ORAL at 18:39

## 2023-05-16 RX ADMIN — OXYCODONE HYDROCHLORIDE AND ACETAMINOPHEN 1 TABLET: 10; 325 TABLET ORAL at 10:55

## 2023-05-16 RX ADMIN — IPRATROPIUM BROMIDE AND ALBUTEROL SULFATE 3 ML: .5; 2.5 SOLUTION RESPIRATORY (INHALATION) at 19:05

## 2023-05-16 NOTE — PLAN OF CARE
Goal Outcome Evaluation:           Progress: no change  Outcome Evaluation: pt resting in bed, wound care done per orders, no acute changes in pt, will continue plan of care.

## 2023-05-16 NOTE — CASE MANAGEMENT/SOCIAL WORK
Discharge Planning Assessment   [place]     Patient Name: Orquidea Rosenberg  MRN: [id number]  Today's Date: 5/16/2023    Admit Date: 5/5/2023    Plan: SS contacted Pt daughter Lilli who states she would be at hospital around 10am to discuss possible NH placement. SS to follow.     Discharge Plan     Row Name 05/16/23 0907       Plan    Plan SS contacted Pt daughter Lilli who states she would be at hospital around 10am to discuss possible NH placement. SS to follow.                HARPAL Thurston

## 2023-05-16 NOTE — CASE MANAGEMENT/SOCIAL WORK
Discharge Planning Assessment   [place]     Patient Name: Orquidea Rosenberg  MRN: [id number]  Today's Date: 5/16/2023    Admit Date: 5/5/2023     Discharge Plan       Row Name 05/16/23 1107       Plan    Plan SS spoke to pt and daughterLilli at bedside about disposition. SS provided daughter with list of local nursing home's per request. SS educated pt and daughter about nursing home placement, insurance benefits and hospice services per request. SS notified Dr. Elliott that daughter was inquiring about hospice services and Palliative Care consult has been re-ordered. Dr. Elliott and Palliative Care visiting pt at this time. SS will follow up with daughter about disposition later today. SS to follow.    13:36: [facility]. Pt's code has been updated after Dr. Elliott and Palliative Care spoke to pt and daughter. SS to follow.     Provided Post Acute Provider List? Yes                  RYDER Chahal

## 2023-05-16 NOTE — CONSULTS
Palliative Care Initial Consult     Attending Physician: Elda Elliott MD  Referring Provider: Dr. Elliott      Code Status:   Code Status and Medical Interventions:   Ordered at: 05/16/23 1238     Medical Intervention Limits:    NO cardioversion    NO intubation (DNI)     Level Of Support Discussed With:    Patient     Code Status (Patient has no pulse and is not breathing):    No CPR (Do Not Attempt to Resuscitate)     Medical Interventions (Patient has pulse or is breathing):    Limited Support     Release to patient:    Routine Release      Advanced Directives: Advance Directive Status: Patient has advance directive, copy requested   Healthcare surrogate: children      HPI:  77-year-old female with cancer of the vulva, who has had surgical resection, radiation, and chemotherapy but despite treatment has had progression of disease.  She presented to the hospital with increasing weakness and some respiratory distress.  Workup showed that she had a large pelvic mass and widely disseminated pulmonary metastasis.  Patient has had a prolonged hospital course and today had sudden onset of respiratory distress with dropping O2 sats.  The patient presently is a full code we were asked to have a conversation with the patient regarding her goals of care and reviewing of code status.  The patient is having significant dyspnea at rest, has pursed lip breathing and is in moderate distress.  After discussion with the patient and daughter at bedside decision was made to change her code status to no CPR.  We reviewed further treatment and the patient wishes to continue all other treatments but does not want to be intubated or placed on any life-sustaining measures including ventilator.      ROS: Negative except as above in HPI.     Past Medical History:   Diagnosis Date   • Arthritis    • COPD (chronic obstructive pulmonary disease)    • Elevated cholesterol    • History of transfusion    • Hypertension    • Vulval ca       Past Surgical History:   Procedure Laterality Date   • RADICAL VULVECTOMY  09/06/2019   • RADICAL VULVECTOMY Bilateral 06/2019     Social History     Socioeconomic History   • Marital status:    Tobacco Use   • Smoking status: Former   • Smokeless tobacco: Never   Vaping Use   • Vaping Use: Never used   Substance and Sexual Activity   • Alcohol use: No   • Drug use: No   • Sexual activity: Defer     Family History   Problem Relation Age of Onset   • Alzheimer's disease Mother    • Cancer Father    • Cancer Brother    • Diabetes Maternal Grandmother        Allergies   Allergen Reactions   • Penicillins Swelling     Has tolerated cefepime, ceftriaxone and Merrem       Current Facility-Administered Medications   Medication Dose Route Frequency Provider Last Rate Last Admin   • budesonide (PULMICORT) nebulizer solution 0.5 mg  0.5 mg Nebulization BID - RT Taisha Crowell DO   0.5 mg at 05/16/23 0733   • Enoxaparin Sodium (LOVENOX) syringe 40 mg  40 mg Subcutaneous Daily Taisha Crowell DO   40 mg at 05/16/23 0830   • ferrous sulfate tablet 325 mg  325 mg Oral Daily With Breakfast Taisha Crowell DO   325 mg at 05/16/23 0830   • guaiFENesin (MUCINEX) 12 hr tablet 600 mg  600 mg Oral Q12H Taisha Crowell DO   600 mg at 05/16/23 0830   • hydrOXYzine (ATARAX) tablet 25 mg  25 mg Oral TID PRN Shell Segura PA-C   25 mg at 05/15/23 2217   • ipratropium-albuterol (DUO-NEB) nebulizer solution 3 mL  3 mL Nebulization Q6H While Awake - RT Elda Elliott MD   3 mL at 05/16/23 1345   • ipratropium-albuterol (DUO-NEB) nebulizer solution 3 mL  3 mL Nebulization Q4H PRN Elda Elliott MD       • levothyroxine (SYNTHROID, LEVOTHROID) tablet 100 mcg  100 mcg Oral Q AM Taisha Crowell DO   100 mcg at 05/16/23 0523   • losartan (COZAAR) tablet 50 mg  50 mg Oral Q24H Taisha Crowell DO   50 mg at 05/16/23 0830   • methylPREDNISolone sodium succinate (SOLU-Medrol) injection 62.5  mg  62.5 mg Intravenous Daily Elda Elliott MD   62.5 mg at 05/16/23 1221   • nitroglycerin (NITROSTAT) SL tablet 0.4 mg  0.4 mg Sublingual Q5 Min PRN Taisha Crowell, DO       • nystatin (MYCOSTATIN) powder   Topical Q12H MervatTaisha solano, DO   Given at 05/16/23 0833   • ondansetron (ZOFRAN) tablet 4 mg  4 mg Oral Q8H PRN Taisha Crowell, DO   4 mg at 05/12/23 0039   • oxyCODONE-acetaminophen (PERCOCET)  MG per tablet 1 tablet  1 tablet Oral Q4H PRN Taisha Crowell, DO   1 tablet at 05/16/23 1055   • polyethylene glycol (MIRALAX) packet 17 g  17 g Oral Daily Taisha Crowell, DO   17 g at 05/09/23 0827   • sodium chloride 0.9 % flush 10 mL  10 mL Intravenous PRN Taisha Crowell, DO       • sodium chloride 0.9 % flush 10 mL  10 mL Intravenous Q12H Taisha Crowell, DO   10 mL at 05/16/23 0843   • sodium chloride 0.9 % flush 10 mL  10 mL Intravenous PRN Taisha Crowell, DO       • sodium chloride 0.9 % flush 10 mL  10 mL Intravenous Q12H MervatTaisha solano, DO   10 mL at 05/16/23 0843   • sodium chloride 0.9 % flush 10 mL  10 mL Intravenous PRN Taisha Crowell, DO       • sodium chloride 0.9 % flush 10 mL  10 mL Intravenous Q12H Taisha Crowell, DO   10 mL at 05/16/23 0843   • sodium chloride 0.9 % flush 10 mL  10 mL Intravenous Q12H Taisha Crowell, DO   10 mL at 05/16/23 0843   • sodium chloride 0.9 % flush 10 mL  10 mL Intravenous Q12H MervatTaisha solano, DO   10 mL at 05/16/23 0843   • sodium chloride 0.9 % flush 10 mL  10 mL Intravenous PRN Taisha Crowell, DO       • sodium chloride 0.9 % flush 20 mL  20 mL Intravenous PRN Taisha Crowell, DO       • sodium chloride 0.9 % infusion 40 mL  40 mL Intravenous PRN Taisha Crowell, DO       • sodium chloride 0.9 % infusion 40 mL  40 mL Intravenous PRN Taisha Crowell, DO       • sodium chloride 0.9 % infusion 40 mL  40 mL Intravenous PRN Mervat, Taisha Davis, DO       • [START ON  "5/19/2023] tuberculin injection 5 Units  5 Units Intradermal Once Taisha Crowell, DO            •  hydrOXYzine  •  ipratropium-albuterol  •  nitroglycerin  •  ondansetron  •  oxyCODONE-acetaminophen  •  sodium chloride  •  sodium chloride  •  sodium chloride  •  sodium chloride  •  sodium chloride  •  sodium chloride  •  sodium chloride  •  sodium chloride    Current medication reviewed for route, type, dose and frequency and are current per MAR.    Palliative Performance Scale Score:     /77   Pulse 99   Temp 98.5 °F (36.9 °C) (Axillary)   Resp 20   Ht 167.6 cm (66\")   Wt 83.9 kg (185 lb)   SpO2 98%   BMI 29.86 kg/m²     Intake/Output Summary (Last 24 hours) at 5/16/2023 1808  Last data filed at 5/16/2023 1700  Gross per 24 hour   Intake 560 ml   Output --   Net 560 ml       PE:  General Appearance:    Chronically ill appearing, alert, cooperative, NAD   HEENT:    NC/AT, without obvious abnormality, EOMI, anicteric    Neck:   supple   Lungs:       Labored breathiing with bilateral rhonchi    Heart:    tachycardic   Abdomen:     Soft, NT, ND, NABS    Extremities:   Moves all extremities, 3+ edema   Pulses:   Pulses palpable and equal bilaterally   Skin:  excoriated skin to vulvar area   Neurologic:   A/Ox3, anxious, speech/mentation are intact   Psych:   Calm, appropriate       Labs:   Results from last 7 days   Lab Units 05/16/23  0129   WBC 10*3/mm3 12.95*   HEMOGLOBIN g/dL 7.7*   HEMATOCRIT % 26.8*   PLATELETS 10*3/mm3 316     Results from last 7 days   Lab Units 05/16/23  0129   SODIUM mmol/L 133*   POTASSIUM mmol/L 4.4   CHLORIDE mmol/L 102   CO2 mmol/L 23.5   BUN mg/dL 13   CREATININE mg/dL 1.04*   GLUCOSE mg/dL 127*   CALCIUM mg/dL 8.6     Results from last 7 days   Lab Units 05/16/23  0129   SODIUM mmol/L 133*   POTASSIUM mmol/L 4.4   CHLORIDE mmol/L 102   CO2 mmol/L 23.5   BUN mg/dL 13   CREATININE mg/dL 1.04*   CALCIUM mg/dL 8.6   BILIRUBIN mg/dL <0.2   ALK PHOS U/L 61   ALT (SGPT) U/L " <5   AST (SGOT) U/L 5   GLUCOSE mg/dL 127*     Imaging Results (Last 72 Hours)     Procedure Component Value Units Date/Time    XR Chest 1 View [[id number]] Collected: 05/15/23 2315     Updated: 05/15/23 2318    Narrative:      INDICATION: Cough.     TECHNIQUE: Frontal radiograph of the chest.     COMPARISON: 5/7/2023.       Impression:      FINDINGS/IMPRESSION: Multifocal interstitial opacities, similar to  prior. Heart size is similar. Small pleural effusions. No pneumothorax.  No acute osseous abnormality.     This report was finalized on 5/15/2023 11:16 PM by Alex Pallas, DO.             Diagnostics: Reviewed    A: 77 year old female with advanced vulvar cancer, with widespread metastases to lungs, and multiple fistulas now with respiratory distress. Pt wishes to change her code status to NO CPR.       P: iv lasix, SVN treatments      Change code status to NO CPR.      Ongoing support to pt and family      Will continue to monitor and assist with symptom management and decision making, as well as dc planning.         We appreciate the consult and the opportunity to participate in Orquidea Rosenberg's care. We will continue to follow along. Please do not hesitate to contact us regarding further symptom management or goals of care needs, including after hours or on weekends via our on call provider at [phone].     Time: 30 minutes spent reviewing medical and medication records, assessing and examining patient, discussing with family, answering questions, providing some guidance about a plan and documentation of care, and coordinating care with other healthcare members, with > 50% time spent face to face.     Iron Fabian MD    5/16/2023

## 2023-05-16 NOTE — PROGRESS NOTES
[facility] HOSPITALIST PROGRESS NOTE     Patient Identification:  Name:  Orquidea Rosenberg  Age:  77 y.o.  Sex:  female  :  1945  MRN:  [id number]  Visit Number:  [id number]  ROOM: [address] [place] [address]     Primary Care Provider:  Jackeline Denson APRN     Date of Admission: 2023    Length of stay in inpatient status:  11    Subjective     Chief Compliant:  In swing bed due to debility    History of Presenting Illness: Present in the room when I saw the patient today was her daughter, nurse Magy and Dr. Perera with palliative care team and bedside nurse Rita.  The patient states that she is having trouble breathing today; when I asked her about this yesterday, she denied this and told me that her lungs were at baseline.  Patient also told me yesterday that the swelling in her legs was at baseline, which the daughter tells me is not true, that the current amount of swelling is new for the patient.  The patient denies chest pain, nausea, vomiting, diarrhea.    Objective     Current Hospital Meds:  budesonide, 0.5 mg, Nebulization, BID - RT  enoxaparin, 40 mg, Subcutaneous, Daily  ferrous sulfate, 325 mg, Oral, Daily With Breakfast  guaiFENesin, 600 mg, Oral, Q12H  ipratropium-albuterol, 3 mL, Nebulization, Q6H While Awake - RT  levothyroxine, 100 mcg, Oral, Q AM  losartan, 50 mg, Oral, Q24H  nystatin, , Topical, Q12H  polyethylene glycol, 17 g, Oral, Daily  sodium chloride, 10 mL, Intravenous, Q12H  sodium chloride, 10 mL, Intravenous, Q12H  sodium chloride, 10 mL, Intravenous, Q12H  sodium chloride, 10 mL, Intravenous, Q12H  sodium chloride, 10 mL, Intravenous, Q12H  [START ON 2023] tuberculin, 5 Units, Intradermal, Once       Current Antimicrobial Therapy:  Anti-Infectives (From admission, onward)    Ordered     Dose/Rate Route Frequency Start Stop    23 0754  metroNIDAZOLE (FLAGYL) tablet 500 mg        Ordering Provider: Socrates Flaherty MD    500 mg Oral Every 8 Hours Scheduled  05/08/23 0845 05/12/23 2133        Current Diuretic Therapy:  Diuretics (From admission, onward)    Ordered     Dose/Rate Route Frequency Start Stop    05/10/23 1155  metOLazone (ZAROXOLYN) tablet 2.5 mg        Ordering Provider: Dalila Mcdonald MD    2.5 mg Oral Daily 05/10/23 1245 05/10/23 1308    05/10/23 1155  furosemide (LASIX) injection 20 mg        Ordering Provider: Dalila Mcdonald MD    20 mg Intravenous Once 05/10/23 1215 05/10/23 1243    05/08/23 1019  furosemide (LASIX) injection 20 mg        Ordering Provider: Dalila Mcdonald MD    20 mg Intravenous Once 05/08/23 1115 05/08/23 1130    05/08/23 1018  metOLazone (ZAROXOLYN) tablet 2.5 mg        Ordering Provider: Dalila Mcdonald MD    2.5 mg Oral Daily 05/08/23 1045 05/08/23 1130        ----------------------------------------------------------------------------------------------------------------------  Vital Signs:  Temp:  [98.3 °F (36.8 °C)-98.5 °F (36.9 °C)] 98.5 °F (36.9 °C)  Heart Rate:  [] 110  Resp:  [18-20] 18  BP: (132-158)/(59-83) 158/83  SpO2:  [82 %-99 %] 91 %  on  Flow (L/min):  [3] 3;   Device (Oxygen Therapy): nasal cannula  Body mass index is 29.86 kg/m².    Wt Readings from Last 3 Encounters:   05/05/23 83.9 kg (185 lb)   05/04/23 82.6 kg (182 lb)   03/15/22 81.6 kg (180 lb)     Intake & Output (last 3 days)       05/13 0701  05/14 0700 05/14 0701  05/15 0700 05/15 0701  05/16 0700 05/16 0701 05/17 0700    P.O. 1000 1080 960     I.V. (mL/kg) 854.9 (10.2) 1728.4 (20.6) 1738 (20.7)     Total Intake(mL/kg) 1854.9 (22.1) 2808.4 (33.5) 2698 (32.2)     Net +1854.9 +2808.4 +2698             Urine Unmeasured Occurrence 2 x 2 x 7 x     Stool Unmeasured Occurrence  1 x 6 x         Diet: Regular/House Diet; Texture: Regular Texture (IDDSI 7); Fluid Consistency: Thin (IDDSI 0)  ----------------------------------------------------------------------------------------------------------------------  Physical Exam    Constitutional:       General: She is in acute distress.      Appearance: She is not toxic-appearing or diaphoretic.   Cardiovascular:      Rate and Rhythm: Normal rate and regular rhythm.      Pulses: Normal pulses.      Heart sounds: No murmur heard.  Pulmonary:      Effort: Prolonged expiration and respiratory distress present. No accessory muscle usage.      Breath sounds: No decreased air movement. Wheezing present. No rales.  I think her lungs may sound a little worse compared to yesterday.  Musculoskeletal:         General: No deformity or signs of injury. Severe edema per her daughter; yesterday, the patient told me that she had lymphedema and that her current swelling was normal for her.  ----------------------------------------------------------------------------------------------------------------------  Tele: None   ----------------------------------------------------------------------------------------------------------------------  LABS:    CBC and coagulation:  Results from last 7 days   Lab Units 05/16/23  0129 05/13/23  0735 05/11/23  0147   CRP mg/dL  --   --  11.77*   WBC 10*3/mm3 12.95* 7.66 9.26   HEMOGLOBIN g/dL 7.7* 7.5* 7.6*   HEMATOCRIT % 26.8* 25.7* 26.1*   MCV fL 90.5 88.0 87.3   MCHC g/dL 28.7* 29.2* 29.1*   PLATELETS 10*3/mm3 316 298 308     Renal and electrolytes:  Results from last 7 days   Lab Units 05/16/23  0129 05/13/23  0735 05/12/23  0145   SODIUM mmol/L 133* 136 136   POTASSIUM mmol/L 4.4 4.6 4.5   MAGNESIUM mg/dL 1.9  --   --    CHLORIDE mmol/L 102 101 99   CO2 mmol/L 23.5 26.2 26.0   BUN mg/dL 13 18 21   CREATININE mg/dL 1.04* 1.12* 1.31*   CALCIUM mg/dL 8.6 8.8 8.9   PHOSPHORUS mg/dL 3.5  --   --    GLUCOSE mg/dL 127* 100* 116*     Estimated Creatinine Clearance: 49.4 mL/min (A) (by C-G formula based on SCr of 1.04 mg/dL (H)).    Liver and pancreatic function:  Results from last 7 days   Lab Units 05/16/23  0129   ALBUMIN g/dL 2.4*   BILIRUBIN mg/dL <0.2   ALK PHOS U/L 61    AST (SGOT) U/L 5   ALT (SGPT) U/L <5       I have personally looked at the labs and they are summarized above.    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 causing fecaloid urine  -History of chronic left sided hydronephrosis, status post double-J ureteral stent on the left that is still present  -Acute hypoxic respiratory failure that was present on admission, suspect secondary to lung metastasis, although underlying pneumonia was not entirely ruled out and thus she was treated for this during her [facility] hospitalization  -Clostridium subterminale bacteremia that was treated during the last [facility] hospitalization  -Suspect steroid-induced leukocytosis, improved  -Mild hyperkalemia that was present on admission, improved  -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  In order to help with her breathing, I am giving her a one-time dose IV Lasix at 80 mg.  I have ordered an external urinary catheter in order to better monitor the output amount.  I will also treat her for an acute COPD exacerbation with scheduled DuoNeb breathing treatments and scheduled Solu-Medrol per our COPD exacerbation protocol.  I will continue  to monitor her respiratory status closely.  We had a long discussion with the patient and her daughter; the patient has made herself DO NOT INTUBATE and DO NOT RESUSCITATE.  The patient wants to continue with medical treatment up until the point of needing intubation.  We will repeat the patient's blood work in the morning.  Please note that her blood pressures are not at desired levels but the patient is having so much respiratory distress that this may be affecting the numbers.    VTE Prophylaxis:   Mechanical Order History:     None      Pharmalogical Order History:      Ordered     Dose Route Frequency Stop    05/05/23 1450  Enoxaparin Sodium (LOVENOX) syringe 40 mg         40 mg SC Daily --              Disposition:  Family to decide between NH or home with home health after her swing bed days are exhausted    Elda Elliott MD  [facility]  05/16/23  11:00 EDT

## 2023-05-16 NOTE — THERAPY TREATMENT NOTE
Acute Care - Physical Therapy Treatment Note   Jorge     Patient Name: Orquidea Rosenberg  : 1945  MRN: [id number]  Today's Date: 2023   Onset of Illness/Injury or Date of Surgery: 23 (swing bed admit)  Visit Dx:     ICD-10-CM ICD-9-CM   1. Bacteremia  R78.81 790.7     Patient Active Problem List   Diagnosis   • COVID-19   • Vulvar cancer   • Physical debility   • Bacteremia   • COPD exacerbation     Past Medical History:   Diagnosis Date   • Arthritis    • COPD (chronic obstructive pulmonary disease)    • Elevated cholesterol    • History of transfusion    • Hypertension    • Vulval ca      Past Surgical History:   Procedure Laterality Date   • RADICAL VULVECTOMY  2019   • RADICAL VULVECTOMY Bilateral 2019     PT Assessment (last 12 hours)     PT Evaluation and Treatment     Row Name 23 1331          Physical Therapy Time and Intention    Subjective Information complains of;dyspnea  -KM     Document Type therapy note (daily note)  -KM     Mode of Treatment individual therapy;physical therapy  -KM     Patient Effort fair  -KM     Symptoms Noted During/After Treatment shortness of breath  -KM     Row Name 23 1331          General Information    Patient Profile Reviewed yes  -KM     Patient Observations alert;cooperative;agree to therapy  -KM     Existing Precautions/Restrictions fall;oxygen therapy device and L/min  -KM     Row Name 23 1331          Cognition    Affect/Mental Status (Cognition) WFL  -KM     Follows Commands (Cognition) WFL  -KM     Row Name 23 1331          Bed Mobility    Comment, (Bed Mobility) Pt. deferred mobility d/t shortness of breath  -KM     Row Name 23 1331          Safety Issues, Functional Mobility    Impairments Affecting Function (Mobility) endurance/activity tolerance;shortness of breath;strength  -KM     Row Name 23 1331          Motor Skills    Comments, Therapeutic Exercise supine ther-ex until cessation of session d/t  decreased O2 sat  -KM     Row Name             Wound 05/01/23 1414 Left vagina    Wound - Properties Group Placement Date: 05/01/23  -LB Placement Time: 1414  -LB Present on Hospital Admission: Y  -LB Side: Left  -LB Location: vagina  -LB    Retired Wound - Properties Group Placement Date: 05/01/23  -LB Placement Time: 1414  -LB Present on Hospital Admission: Y  -LB Side: Left  -LB Location: vagina  -LB    Retired Wound - Properties Group Date first assessed: 05/01/23  -LB Time first assessed: 1414  -LB Present on Hospital Admission: Y  -LB Side: Left  -LB Location: vagina  -LB    Row Name 05/16/23 1331          Progress Summary (PT)    Daily Progress Summary (PT) Pt. had increased difficulty breathing during session. She deferred functional mobility skills d/t shorntess of breath. She attempted supine ther-ex for short duration until she had increased difficulty breathing. O2 sats decreased to 83% in supine. Nursing staff made aware of situation.  -KM           User Key  (r) = Recorded By, (t) = Taken By, (c) = Cosigned By    Initials Name Provider Type    Ander Garcia, PT Physical Therapist    Veronica Bansal, RN Registered Nurse                Physical Therapy Education     Title: PT OT SLP Therapies (Done)     Topic: Physical Therapy (Done)     Point: Mobility training (Done)     Learning Progress Summary           Patient Acceptance, TB,E, VU,DU by BD at 5/15/2023 0902    Acceptance, E,TB, DU,VU by BD at 5/14/2023 0836    Acceptance, E,D, VU,NR by  at 5/13/2023 1519                   Point: Home exercise program (Done)     Learning Progress Summary           Patient Acceptance, TB,E, VU,DU by BD at 5/15/2023 0902    Acceptance, E,TB, DU,VU by BD at 5/14/2023 0836    Acceptance, E,D, VU,NR by  at 5/13/2023 1519                   Point: Body mechanics (Done)     Learning Progress Summary           Patient Acceptance, TB,E, VU,DU by BD at 5/15/2023 0902    Acceptance, E,TB, DU,VU by  at 5/14/2023 0836     Acceptance, E,D, VU,NR by  at 5/13/2023 1519                   Point: Precautions (Done)     Learning Progress Summary           Patient Acceptance, TB,E, VU,DU by  at 5/15/2023 0902    Acceptance, E,TB, DU,VU by  at 5/14/2023 0836    Acceptance, E,D, VU,NR by  at 5/13/2023 1519                               User Key     Initials Effective Dates Name Provider Type Discipline     06/16/21 -  Jaclyn Fernandez, PT Physical Therapist PT     08/05/21 -  Felicia Galeano, RN Registered Nurse Nurse              PT Recommendation and Plan     Progress Summary (PT)  Daily Progress Summary (PT): Pt. had increased difficulty breathing during session. She deferred functional mobility skills d/t shorntess of breath. She attempted supine ther-ex for short duration until she had increased difficulty breathing. O2 sats decreased to 83% in supine. Nursing staff made aware of situation.   Outcome Measures     Row Name 05/15/23 0900             How much help from another is currently needed...    Putting on and taking off regular lower body clothing? 2  -LM      Bathing (including washing, rinsing, and drying) 2  -LM      Toileting (which includes using toilet bed pan or urinal) 1  -LM      Putting on and taking off regular upper body clothing 3  -LM      Taking care of personal grooming (such as brushing teeth) 3  -LM      Eating meals 3  -LM      AM-PAC 6 Clicks Score (OT) 14  -LM            User Key  (r) = Recorded By, (t) = Taken By, (c) = Cosigned By    Initials Name Provider Type    LM Joanne Colon, OT Occupational Therapist                 Time Calculation:    PT Charges     Row Name 05/16/23 1324             Time Calculation    PT Received On 05/16/23  -KM         Time Calculation- PT    Total Timed Code Minutes- PT 15 minute(s)  -KM            User Key  (r) = Recorded By, (t) = Taken By, (c) = Cosigned By    Initials Name Provider Type    Ander Garcia, PT Physical Therapist              Therapy Charges for  Today     Code Description Service Date Service Provider Modifiers Qty    53104372260 HC PT THERAPEUTIC ACT EA 15 MIN 5/15/2023 Ander Shirley, PT GP 2    04380611733 HC PT THER PROC EA 15 MIN 5/15/2023 Ander Shirley, PT GP 1    99549823769 HC PT THER PROC EA 15 MIN 5/16/2023 Ander Shirley, PT GP 1          PT G-Codes  Outcome Measure Options: AM-PAC 6 Clicks Daily Activity (OT)  AM-PAC 6 Clicks Score (PT): 16  AM-PAC 6 Clicks Score (OT): 14    Ander Shirley PT  5/16/2023

## 2023-05-16 NOTE — PLAN OF CARE
Goal Outcome Evaluation:           Progress: no change  Outcome Evaluation: Pt resting in bed through shift. Wound care done per orders. PRN pain medications given per orders. No acute changes or concerns at this time.

## 2023-05-16 NOTE — NURSING NOTE
Pt refused bath tonight and stated she would rather have the bath in the morning. Educated on the importance of bathing, pt still refused.

## 2023-05-17 PROBLEM — R53.81 DEBILITY: Status: ACTIVE | Noted: 2023-05-17

## 2023-05-17 LAB
ALBUMIN SERPL-MCNC: 2.4 G/DL (ref 3.5–5.2)
ALBUMIN/GLOB SERPL: 0.7 G/DL
ALP SERPL-CCNC: 57 U/L (ref 39–117)
ALT SERPL W P-5'-P-CCNC: <5 U/L (ref 1–33)
ANION GAP SERPL CALCULATED.3IONS-SCNC: 11.5 MMOL/L (ref 5–15)
AST SERPL-CCNC: 6 U/L (ref 1–32)
BASOPHILS # BLD AUTO: 0.01 10*3/MM3 (ref 0–0.2)
BASOPHILS NFR BLD AUTO: 0.1 % (ref 0–1.5)
BILIRUB SERPL-MCNC: <0.2 MG/DL (ref 0–1.2)
BUN SERPL-MCNC: 16 MG/DL (ref 8–23)
BUN/CREAT SERPL: 13 (ref 7–25)
CALCIUM SPEC-SCNC: 8.8 MG/DL (ref 8.6–10.5)
CHLORIDE SERPL-SCNC: 103 MMOL/L (ref 98–107)
CO2 SERPL-SCNC: 23.5 MMOL/L (ref 22–29)
CREAT SERPL-MCNC: 1.23 MG/DL (ref 0.57–1)
DEPRECATED RDW RBC AUTO: 68.1 FL (ref 37–54)
EGFRCR SERPLBLD CKD-EPI 2021: 45.4 ML/MIN/1.73
EOSINOPHIL # BLD AUTO: 0 10*3/MM3 (ref 0–0.4)
EOSINOPHIL NFR BLD AUTO: 0 % (ref 0.3–6.2)
ERYTHROCYTE [DISTWIDTH] IN BLOOD BY AUTOMATED COUNT: 20.9 % (ref 12.3–15.4)
GEN 5 2HR TROPONIN T REFLEX: 127 NG/L
GLOBULIN UR ELPH-MCNC: 3.4 GM/DL
GLUCOSE SERPL-MCNC: 168 MG/DL (ref 65–99)
HCT VFR BLD AUTO: 24.9 % (ref 34–46.6)
HGB BLD-MCNC: 7.3 G/DL (ref 12–15.9)
IMM GRANULOCYTES # BLD AUTO: 0.08 10*3/MM3 (ref 0–0.05)
IMM GRANULOCYTES NFR BLD AUTO: 1 % (ref 0–0.5)
LYMPHOCYTES # BLD AUTO: 0.31 10*3/MM3 (ref 0.7–3.1)
LYMPHOCYTES NFR BLD AUTO: 4.1 % (ref 19.6–45.3)
MAGNESIUM SERPL-MCNC: 2 MG/DL (ref 1.6–2.4)
MCH RBC QN AUTO: 26.8 PG (ref 26.6–33)
MCHC RBC AUTO-ENTMCNC: 29.3 G/DL (ref 31.5–35.7)
MCV RBC AUTO: 91.5 FL (ref 79–97)
MONOCYTES # BLD AUTO: 0.08 10*3/MM3 (ref 0.1–0.9)
MONOCYTES NFR BLD AUTO: 1 % (ref 5–12)
NEUTROPHILS NFR BLD AUTO: 7.16 10*3/MM3 (ref 1.7–7)
NEUTROPHILS NFR BLD AUTO: 93.8 % (ref 42.7–76)
NRBC BLD AUTO-RTO: 0 /100 WBC (ref 0–0.2)
PHOSPHATE SERPL-MCNC: 4.1 MG/DL (ref 2.5–4.5)
PLATELET # BLD AUTO: 311 10*3/MM3 (ref 140–450)
PMV BLD AUTO: 9.2 FL (ref 6–12)
POTASSIUM SERPL-SCNC: 4.5 MMOL/L (ref 3.5–5.2)
PROT SERPL-MCNC: 5.8 G/DL (ref 6–8.5)
RBC # BLD AUTO: 2.72 10*6/MM3 (ref 3.77–5.28)
SODIUM SERPL-SCNC: 138 MMOL/L (ref 136–145)
TROPONIN T DELTA: -5 NG/L
TROPONIN T SERPL HS-MCNC: 132 NG/L
WBC NRBC COR # BLD: 7.64 10*3/MM3 (ref 3.4–10.8)

## 2023-05-17 PROCEDURE — 85025 COMPLETE CBC W/AUTO DIFF WBC: CPT | Performed by: INTERNAL MEDICINE

## 2023-05-17 PROCEDURE — 94761 N-INVAS EAR/PLS OXIMETRY MLT: CPT

## 2023-05-17 PROCEDURE — 25010000002 METHYLPREDNISOLONE PER 125 MG: Performed by: INTERNAL MEDICINE

## 2023-05-17 PROCEDURE — 94664 DEMO&/EVAL PT USE INHALER: CPT

## 2023-05-17 PROCEDURE — 94799 UNLISTED PULMONARY SVC/PX: CPT

## 2023-05-17 PROCEDURE — 84484 ASSAY OF TROPONIN QUANT: CPT | Performed by: INTERNAL MEDICINE

## 2023-05-17 PROCEDURE — 83735 ASSAY OF MAGNESIUM: CPT | Performed by: INTERNAL MEDICINE

## 2023-05-17 PROCEDURE — 25010000002 ENOXAPARIN PER 10 MG: Performed by: INTERNAL MEDICINE

## 2023-05-17 PROCEDURE — 80053 COMPREHEN METABOLIC PANEL: CPT | Performed by: INTERNAL MEDICINE

## 2023-05-17 PROCEDURE — 97110 THERAPEUTIC EXERCISES: CPT

## 2023-05-17 PROCEDURE — 84100 ASSAY OF PHOSPHORUS: CPT | Performed by: INTERNAL MEDICINE

## 2023-05-17 PROCEDURE — 97116 GAIT TRAINING THERAPY: CPT

## 2023-05-17 RX ORDER — LOSARTAN POTASSIUM 25 MG/1
25 TABLET ORAL
Status: DISCONTINUED | OUTPATIENT
Start: 2023-05-18 | End: 2023-05-22 | Stop reason: HOSPADM

## 2023-05-17 RX ADMIN — Medication 10 ML: at 08:33

## 2023-05-17 RX ADMIN — BUDESONIDE 0.5 MG: 0.5 INHALANT RESPIRATORY (INHALATION) at 06:50

## 2023-05-17 RX ADMIN — IPRATROPIUM BROMIDE AND ALBUTEROL SULFATE 3 ML: .5; 2.5 SOLUTION RESPIRATORY (INHALATION) at 18:51

## 2023-05-17 RX ADMIN — FERROUS SULFATE TAB 325 MG (65 MG ELEMENTAL FE) 325 MG: 325 (65 FE) TAB at 08:27

## 2023-05-17 RX ADMIN — ENOXAPARIN SODIUM 40 MG: 40 INJECTION SUBCUTANEOUS at 08:27

## 2023-05-17 RX ADMIN — Medication 10 ML: at 20:25

## 2023-05-17 RX ADMIN — NYSTATIN: 100000 POWDER TOPICAL at 20:25

## 2023-05-17 RX ADMIN — BUDESONIDE 0.5 MG: 0.5 INHALANT RESPIRATORY (INHALATION) at 18:51

## 2023-05-17 RX ADMIN — Medication 10 ML: at 08:35

## 2023-05-17 RX ADMIN — LOSARTAN POTASSIUM 50 MG: 50 TABLET, FILM COATED ORAL at 08:27

## 2023-05-17 RX ADMIN — OXYCODONE HYDROCHLORIDE AND ACETAMINOPHEN 1 TABLET: 10; 325 TABLET ORAL at 12:46

## 2023-05-17 RX ADMIN — LEVOTHYROXINE SODIUM 100 MCG: 100 TABLET ORAL at 05:20

## 2023-05-17 RX ADMIN — IPRATROPIUM BROMIDE AND ALBUTEROL SULFATE 3 ML: .5; 2.5 SOLUTION RESPIRATORY (INHALATION) at 13:33

## 2023-05-17 RX ADMIN — IPRATROPIUM BROMIDE AND ALBUTEROL SULFATE 3 ML: .5; 2.5 SOLUTION RESPIRATORY (INHALATION) at 06:50

## 2023-05-17 RX ADMIN — NYSTATIN: 100000 POWDER TOPICAL at 08:33

## 2023-05-17 RX ADMIN — POLYETHYLENE GLYCOL 3350 17 G: 17 POWDER, FOR SOLUTION ORAL at 08:27

## 2023-05-17 RX ADMIN — GUAIFENESIN 600 MG: 600 TABLET, EXTENDED RELEASE ORAL at 08:27

## 2023-05-17 RX ADMIN — GUAIFENESIN 600 MG: 600 TABLET, EXTENDED RELEASE ORAL at 20:25

## 2023-05-17 RX ADMIN — METHYLPREDNISOLONE SODIUM SUCCINATE 62.5 MG: 125 INJECTION, POWDER, FOR SOLUTION INTRAMUSCULAR; INTRAVENOUS at 08:27

## 2023-05-17 NOTE — PROGRESS NOTES
[facility]    Patient Identification:  Name:  Orquidea Rosenberg  Age:  77 y.o.  Sex:  female  :  1945  MRN:  [id number]  Visit number:  [id number]  Primary Care Provider:  Jackeline Denson APRN    Length of stay in inpatient status:  12    Brief Update     The patient was started on acute COPD exacerbation meds (Solumedrol and Duonebs) yesterday and we also gave her an 80 mg IV push of Lasix.  The patient states that she feels much better today.  The edema in her legs has decreased.  She was sitting up in a chair talking to her sister and brother-in-law when I entered the room.  Patient denies chest pain, nausea, vomiting, shortness of air.  She ate some today but it was more than what she had eaten.  She is still working with physical therapy and was able to take a few steps to the chair.  Her creatinine went up a little bit with the Lasix, but since I do not plan on diuresing her anymore, I will hold off on obtaining labs tomorrow.  I will continue with the same COPD treatment as her lungs sounded improved today, with less prolonged expiratory phase, less wheezing, and better air movement.  Please note that she still has pitting edema of her legs but it is now 2-3+ and not 4+ edema.    LABS:    CBC and coagulation:  Results from last 7 days   Lab Units 23  0735 23  0147   CRP mg/dL  --   --   --  11.77*   WBC 10*3/mm3 7.64 12.95* 7.66 9.26   HEMOGLOBIN g/dL 7.3* 7.7* 7.5* 7.6*   HEMATOCRIT % 24.9* 26.8* 25.7* 26.1*   MCV fL 91.5 90.5 88.0 87.3   MCHC g/dL 29.3* 28.7* 29.2* 29.1*   PLATELETS 10*3/mm3 311 316 298 308     Renal and electrolytes:  Results from last 7 days   Lab Units 23  0735   SODIUM mmol/L 138 133* 136   POTASSIUM mmol/L 4.5 4.4 4.6   MAGNESIUM mg/dL 2.0 1.9  --    CHLORIDE mmol/L 103 102 101   CO2 mmol/L 23.5 23.5 26.2   BUN mg/dL 16 13 18   CREATININE mg/dL 1.23* 1.04*  1.12*   CALCIUM mg/dL 8.8 8.6 8.8   PHOSPHORUS mg/dL 4.1 3.5  --    GLUCOSE mg/dL 168* 127* 100*     Estimated Creatinine Clearance: 41.8 mL/min (A) (by C-G formula based on SCr of 1.23 mg/dL (H)).    Liver and pancreatic function:  Results from last 7 days   Lab Units 05/17/23  0143 05/16/23  0129   ALBUMIN g/dL 2.4* 2.4*   BILIRUBIN mg/dL <0.2 <0.2   ALK PHOS U/L 57 61   AST (SGOT) U/L 6 5   ALT (SGPT) U/L <5 <5     Cardiac:  Results from last 7 days   Lab Units 05/17/23  0350 05/17/23  0143   HSTROP T ng/L 127* 132*          I have personally looked at the labs and they are stated above.      Elda Elliott MD  [facility]  05/17/23  11:43 EDT

## 2023-05-17 NOTE — THERAPY TREATMENT NOTE
Acute Care - Physical Therapy Treatment Note   [place]     Patient Name: Orquidea Rosenberg  : 1945  MRN: [id number]  Today's Date: 2023   Onset of Illness/Injury or Date of Surgery: 23 (swing bed admit)  Visit Dx:     ICD-10-CM ICD-9-CM   1. Bacteremia  R78.81 790.7     Patient Active Problem List   Diagnosis   • COVID-19   • Vulvar cancer   • Physical debility   • Bacteremia   • COPD exacerbation   • Debility     Past Medical History:   Diagnosis Date   • Arthritis    • COPD (chronic obstructive pulmonary disease)    • Elevated cholesterol    • History of transfusion    • Hypertension    • Vulval ca      Past Surgical History:   Procedure Laterality Date   • RADICAL VULVECTOMY  2019   • RADICAL VULVECTOMY Bilateral 2019     PT Assessment (last 12 hours)     PT Evaluation and Treatment     Row Name 23 1349          Physical Therapy Time and Intention    Document Type therapy note (daily note)  -CS     Mode of Treatment individual therapy;physical therapy  -CS     Patient Effort fair  -CS     Symptoms Noted During/After Treatment shortness of breath  -CS     Comment Pt seen bedside this PM.  Pt agreed to PT.  -CS     Row Name 23 1349          General Information    Patient Profile Reviewed yes  -CS     Equipment Currently Used at Home rollator;wheelchair  -CS     Existing Precautions/Restrictions fall;oxygen therapy device and L/min  -CS     Row Name 23 1349          Living Environment    Primary Care Provided by child(jolly);self  -CS     Row Name 23 1349          Home Use of Assistive/Adaptive Equipment    Equipment Currently Used at Home oxygen  O2@ 2L via [employer].  -CS     Row Name 23 1349          Pain    Pretreatment Pain Rating 2/10  -CS     Posttreatment Pain Rating 2/10  -CS     Row Name 23 1349          Cognition    Affect/Mental Status (Cognition) WFL  -CS     Behavioral Issues (Cognition) --  restless, somewhat agitated  -CS     Orientation Status  (Cognition) oriented x 3  -CS     Follows Commands (Cognition) WFL  -CS     Row Name 05/17/23 1349          Range of Motion Comprehensive    General Range of Motion bilateral upper extremity ROM WFL  -CS     Row Name 05/17/23 1349          Vision Assessment/Intervention    Visual Impairment/Limitations WFL;corrective lenses for reading  -CS     Row Name 05/17/23 1349          Bed Mobility    Bed Mobility rolling left;supine-sit  -CS     Rolling Left [place] (Bed Mobility) modified independence  -CS     Rolling Right [place] (Bed Mobility) verbal cues;nonverbal cues (demo/gesture);contact guard;standby assist  -CS     Supine-Sit [place] (Bed Mobility) modified independence  -CS     Sit-Supine [place] (Bed Mobility) standby assist  -CS     Bed Mobility, Safety Issues decreased use of arms for pushing/pulling;decreased use of legs for bridging/pushing  -     Assistive Device (Bed Mobility) bed rails  -     Row Name 05/17/23 1349          Transfers    Transfers sit-stand transfer;stand-sit transfer;bed-chair transfer  -     Row Name 05/17/23 1349          Bed-Chair Transfer    Bed-Chair [place] (Transfers) standby assist  -     Assistive Device (Bed-Chair Transfers) --  hand held assist  -CS     Row Name 05/17/23 1349          Sit-Stand Transfer    Sit-Stand [place] (Transfers) standby assist  -CS     Assistive Device (Sit-Stand Transfers) --  hand held assist  -CS     Row Name 05/17/23 1349          Stand-Sit Transfer    Stand-Sit [place] (Transfers) standby assist  -CS     Assistive Device (Stand-Sit Transfers) --  hand held assist  -     Row Name 05/17/23 1349          Gait/Stairs (Locomotion)    Gait/Stairs Locomotion gait/ambulation independence;distance ambulated  -     [place] Level (Gait) contact guard  -     Assistive Device (Gait) --  hand held assist  -CS     Distance in Feet (Gait) 25'  -     Pattern (Gait) step-through  -CS     Deviations/Abnormal  Patterns (Gait) ataxic;gait speed decreased;weight shifting decreased  -CS     Comment, (Gait/Stairs) Pt distance limited 2/2 SOA despite supplemental O2  -CS     Row Name 05/17/23 1349          Safety Issues, Functional Mobility    Impairments Affecting Function (Mobility) endurance/activity tolerance;shortness of breath;strength  -CS     Row Name 05/17/23 1349          Respiratory WDL    Respiratory WDL X;breath sounds  -CS     Rhythm/Pattern, Respiratory shortness of breath;shallow  -CS     Expansion/Accessory Muscles/Retractions no use of accessory muscles;expansion symmetric;no retractions  -CS     Nailbeds pale  -CS     Mucous Membranes pink;intact;moist  -CS     Cough Frequency frequent  -CS     Cough Type congested;nonproductive  -CS     Row Name 05/17/23 1349          Breath Sounds    Breath Sounds All Fields  -CS     All Lung Fields Breath Sounds equal bilaterally;diminished  -CS     SAMANTHA Breath Sounds diminished;Anterior:  -CS     RUL Breath Sounds clear;Anterior:  -CS     Row Name 05/17/23 1349          Skin WDL    Skin WDL X;all  -CS     Skin Color/Characteristics redness blanchable  -CS     Skin Temperature cool  -CS     Skin Moisture dry  -CS     Skin Elasticity quick return to original state  -CS     Skin Integrity bruised (ecchymotic)  -CS     Row Name 05/17/23 1349          Wound 05/01/23 1414 Left vagina    Wound - Properties Group Placement Date: 05/01/23  -LB Placement Time: 1414  -LB Present on Hospital Admission: Y  -LB Side: Left  -LB Location: vagina  -LB    Closure Open to air  -CS     Base moist;yellow  -CS     Periwound redness;moist  -CS     [place] Temperature warm  -CS     [place] Skin Turgor soft  -CS     Edges irregular  -CS     Drainage Characteristics/Odor creamy  -CS     Drainage Amount scant  -CS     Retired Wound - Properties Group Placement Date: 05/01/23  -LB Placement Time: 1414  -LB Present on Hospital Admission: Y  -LB Side: Left  -LB Location: vagina  -LB    Retired  Wound - Properties Group Date first assessed: 05/01/23  -LB Time first assessed: 1414  -LB Present on Hospital Admission: Y  -LB Side: Left  -LB Location: vagina  -LB    Row Name 05/17/23 1349          Coping    Observed Emotional State calm;cooperative  -CS     Verbalized Emotional State acceptance  -CS     Family/Support Persons daughter  -CS     Involvement in Care not present at bedside  -CS     Row Name 05/17/23 134          Plan of Care Review    Plan of Care Reviewed With patient  -CS     Progress no change  -CS     Outcome Evaluation Pt w/ improved ambulation limited by SOA.  Pt would benefit from continued skilled PT.  -CS     Row Name 05/17/23 1341          Positioning and Restraints    Pre-Treatment Position sitting in chair/recliner  -CS     Post Treatment Position chair  -CS     In Chair reclined;call light within reach;encouraged to call for assist  -CS     Row Name 05/17/23 1341          Therapy Assessment/Plan (PT)    Rehab Potential (PT) fair, will monitor progress closely  -CS     Criteria for Skilled Interventions Met (PT) yes  -CS     Therapy Frequency (PT) 5 times/wk  -CS     Row Name 05/17/23 1348          Physical Therapy Goals    Transfer Goal Selection (PT) transfer, PT goal 1  -CS     Gait Training Goal Selection (PT) gait training, PT goal 1  -CS     Row Name 05/17/23 1345          Transfer Goal 1 (PT)    Activity/Assistive Device (Transfer Goal 1, PT) sit-to-stand/stand-to-sit;bed-to-chair/chair-to-bed  -CS     [place] Level/Cues Needed (Transfer Goal 1, PT) modified independence  -CS     Time Frame (Transfer Goal 1, PT) by discharge  -CS     Row Name 05/17/23 1342          Gait Training Goal 1 (PT)    Activity/Assistive Device (Gait Training Goal 1, PT) gait (walking locomotion);assistive device use;increase endurance/gait distance  -CS     [place] Level (Gait Training Goal 1, PT) modified independence  -CS     Distance (Gait Training Goal 1, PT) 15  -CS     Time Frame (Gait  Training Goal 1, PT) by discharge  -           User Key  (r) = Recorded By, (t) = Taken By, (c) = Cosigned By    Initials Name Provider Type    CS Oli Zarate, PT Physical Therapist    LB Veronica Mendez, RN Registered Nurse                Physical Therapy Education     Title: PT OT SLP Therapies (Done)     Topic: Physical Therapy (Done)     Point: Mobility training (Done)     Learning Progress Summary           Patient Acceptance, TB,E, VU,DU by  at 5/15/2023 0902    Acceptance, E,TB, DU,VU by  at 5/14/2023 0836    Acceptance, E,D, VU,NR by  at 5/13/2023 1519                   Point: Home exercise program (Done)     Learning Progress Summary           Patient Acceptance, TB,E, VU,DU by  at 5/15/2023 0902    Acceptance, E,TB, DU,VU by  at 5/14/2023 0836    Acceptance, E,D, VU,NR by  at 5/13/2023 1519                   Point: Body mechanics (Done)     Learning Progress Summary           Patient Acceptance, TB,E, VU,DU by  at 5/15/2023 0902    Acceptance, E,TB, DU,VU by  at 5/14/2023 0836    Acceptance, E,D, VU,NR by  at 5/13/2023 1519                   Point: Precautions (Done)     Learning Progress Summary           Patient Acceptance, TB,E, VU,DU by  at 5/15/2023 0902    Acceptance, E,TB, DU,VU by  at 5/14/2023 0836    Acceptance, E,D, VU,NR by  at 5/13/2023 1519                               User Key     Initials Effective Dates Name Provider Type Discipline     06/16/21 -  Jaclyn Fernandez, PT Physical Therapist PT     08/05/21 -  Felicia Galeano, RN Registered Nurse Nurse              PT Recommendation and Plan  Therapy Frequency (PT): 5 times/wk  Plan of Care Reviewed With: patient  Progress: no change  Outcome Evaluation: Pt w/ improved ambulation limited by SOA.  Pt would benefit from continued skilled PT.   Outcome Measures     Row Name 05/15/23 0900             How much help from another is currently needed...    Putting on and taking off regular lower body clothing? 2  -LM       Bathing (including washing, rinsing, and drying) 2  -LM      Toileting (which includes using toilet bed pan or urinal) 1  -LM      Putting on and taking off regular upper body clothing 3  -LM      Taking care of personal grooming (such as brushing teeth) 3  -LM      Eating meals 3  -LM      AM-PAC 6 Clicks Score (OT) 14  -LM            User Key  (r) = Recorded By, (t) = Taken By, (c) = Cosigned By    Initials Name Provider Type    LM Joanen Colon, OT Occupational Therapist                 Time Calculation:    PT Charges     Row Name 05/17/23 1355             Time Calculation    Start Time 1330  -CS      PT Received On 05/17/23  -CS      PT Goal Re-Cert Due Date 05/19/23  -CS         Timed Charges    67433 - Gait Training Minutes  23  -CS         Total Minutes    Timed Charges Total Minutes 23  -CS       Total Minutes 23  -CS            User Key  (r) = Recorded By, (t) = Taken By, (c) = Cosigned By    Initials Name Provider Type    CS Oli Zarate, PT Physical Therapist              Therapy Charges for Today     Code Description Service Date Service Provider Modifiers Qty    25958965518 HC GAIT TRAINING EA 15 MIN 5/17/2023 Oli Zarate, PT GP 2          PT G-Codes  Outcome Measure Options: AM-PAC 6 Clicks Daily Activity (OT)  AM-PAC 6 Clicks Score (PT): 16  AM-PAC 6 Clicks Score (OT): 14    Oli Zarate PT  5/17/2023

## 2023-05-17 NOTE — PROGRESS NOTES
Contacted per ORLY Parks about potentially going home with hospice. Into speak with pt and daughter along with [facility] Provider Liason- Brenda Herndon. Information provided about home and LTC hospice services. The daughters biggest concern is care at home because she is not sure that she can manage keeping her clean/dry/cared for appropriately. She requested information about [facility] and [facility]. These companies were contacted per ORLY, they are planned to contact the daughter Lilli with information. Detailed information provided to daughter and pt about hospice services, benefits and the use of equipment. Pt refuses to go to LTC facility, she wants to go home. Pt and daughter to discuss hospice care and will let us know tomorrow what they decide.

## 2023-05-17 NOTE — THERAPY TREATMENT NOTE
Acute Care - Occupational Therapy Treatment Note   [place]     Patient Name: Orquidea Rosenberg  : 1945  MRN: [id number]  Today's Date: 2023  Onset of Illness/Injury or Date of Surgery: 23 (swing bed admit)     Referring Physician: Mervat    Admit Date: 2023       ICD-10-CM ICD-9-CM   1. Bacteremia  R78.81 790.7     Patient Active Problem List   Diagnosis   • COVID-19   • Vulvar cancer   • Physical debility   • Bacteremia   • COPD exacerbation   • Debility     Past Medical History:   Diagnosis Date   • Arthritis    • COPD (chronic obstructive pulmonary disease)    • Elevated cholesterol    • History of transfusion    • Hypertension    • Vulval ca      Past Surgical History:   Procedure Laterality Date   • RADICAL VULVECTOMY  2019   • RADICAL VULVECTOMY Bilateral 2019         OT ASSESSMENT FLOWSHEET (last 12 hours)     OT Evaluation and Treatment     Row Name 23 1416                   OT Time and Intention    Subjective Information complains of;fatigue  -LA        Document Type therapy note (daily note)  -LA        Mode of Treatment occupational therapy;individual therapy  -LA        Patient Effort fair  -LA        Symptoms Noted During/After Treatment shortness of breath  -LA           General Information    Patient Profile Reviewed yes  -LA        General Observations of Patient Patient agreeable to therapy this date. Patient engaged in simple towel AROM exercises in all planes. Exercises completed 10 x2 sets. Patient O2 dropped to 83-84 so exercises stopped.  -LA        Existing Precautions/Restrictions fall;oxygen therapy device and L/min  -LA           Cognition    Affect/Mental Status (Cognition) WFL  -LA        Orientation Status (Cognition) oriented x 3  -LA        Follows Commands (Cognition) WFL  -LA           Motor Skills    Motor Skills coordination;functional endurance  -LA        Functional Endurance poor  -LA        Motor Control/Coordination Interventions gross motor  coordination activities;therapeutic exercise/ROM  -LA        Therapeutic Exercise shoulder;elbow/forearm;wrist;hand  -LA           Wound 05/01/23 1414 Left vagina    Wound - Properties Group Placement Date: 05/01/23  -LB Placement Time: 1414  -LB Present on Hospital Admission: Y  -LB Side: Left  -LB Location: vagina  -LB    Retired Wound - Properties Group Placement Date: 05/01/23  -LB Placement Time: 1414  -LB Present on Hospital Admission: Y  -LB Side: Left  -LB Location: vagina  -LB    Retired Wound - Properties Group Date first assessed: 05/01/23  -LB Time first assessed: 1414  -LB Present on Hospital Admission: Y  -LB Side: Left  -LB Location: vagina  -LB       Plan of Care Review    Plan of Care Reviewed With patient;daughter  -LA        Progress no change  -LA           Positioning and Restraints    Pre-Treatment Position sitting in chair/recliner  -LA        Post Treatment Position chair  -LA        In Chair sitting;call light within reach;encouraged to call for assist;with family/caregiver  -LA              User Key  (r) = Recorded By, (t) = Taken By, (c) = Cosigned By    Initials Name Effective Dates    LB Veronica Mendez RN 10/20/21 -     Dior Christensen, VERONICA 02/14/22 -                        OT Recommendation and Plan     Plan of Care Review  Plan of Care Reviewed With: patient, daughter  Progress: no change  Plan of Care Reviewed With: patient, daughter     Outcome Measures     Row Name 05/17/23 1400 05/15/23 0900          How much help from another is currently needed...    Putting on and taking off regular lower body clothing? 2  -LA 2  -LM     Bathing (including washing, rinsing, and drying) 2  -LA 2  -LM     Toileting (which includes using toilet bed pan or urinal) 1  -LA 1  -LM     Putting on and taking off regular upper body clothing 3  -LA 3  -LM     Taking care of personal grooming (such as brushing teeth) 3  -LA 3  -LM     Eating meals 3  -LA 3  -LM     AM-PAC 6 Clicks Score (OT) 14  -LA 14   -LM        Functional Assessment    Outcome Measure Options AM-PAC 6 Clicks Daily Activity (OT)  -LA --           User Key  (r) = Recorded By, (t) = Taken By, (c) = Cosigned By    Initials Name Provider Type    LM Joanne Colon OT Occupational Therapist    Dior Christensen OT Occupational Therapist                Time Calculation:    Time Calculation- OT     Row Name 05/17/23 1423 05/17/23 1355          Time Calculation- OT    OT Start Time 1400  -LA --     Total Timed Code Minutes- OT 18 minute(s)  -LA --        Timed Charges    32135 - Gait Training Minutes  -- 23  -CS        Total Minutes    Timed Charges Total Minutes -- 23  -CS      Total Minutes -- 23  -CS           User Key  (r) = Recorded By, (t) = Taken By, (c) = Cosigned By    Initials Name Provider Type    CS Oli Zarate, PT Physical Therapist    Dior Christensen OT Occupational Therapist              Therapy Charges for Today     Code Description Service Date Service Provider Modifiers Qty    41372327068 HC OT THER PROC EA 15 MIN 5/17/2023 Dior Newell OT GO 1               Dior Newell OT  5/17/2023

## 2023-05-17 NOTE — NURSING NOTE
Pt refused bath, stated she would rather have one in the morning when she is getting up. Pt educated on the importance of having a bath. Pt verbalized understanding but still wants to wait until the morning to take a bath.

## 2023-05-17 NOTE — PROGRESS NOTES
Nutrition Services    Patient Name:  Orqiudea Rosenberg  YOB: 1945  MRN: [id number]  Admit Date:  5/5/2023    Swing bed note : BMI 29.8 Diet - regular 1500ml FR - PO improved - average for 10 meals 60-65% - continue to encourage fluid intake - patient refuses supplements - continue to follow     Electronically signed by:  Sofia Rubi RD  05/17/23 14:53 EDT

## 2023-05-17 NOTE — PAYOR COMM NOTE
"Andrew Clinton RN  Swing Bed Nurse  [phone] Ex [phone] FAX: [phone]  [email or web address]  [facility]  NPI [id number]  Reference Number [id number]    Patient admitted to swing bed for therapy. Patient has had a decline in health and a new diagnosis of acute bronchitis. Oxygen requirements have increased and her endurance has decreased related to increased SOA. She was able to participate with therapy and ambulated 25FT contact guard.         Chet Orquidea (77 y.o. Female)    YOB: 1945  Social Security Number:   Address: [address] [place] [address]  Home Phone: [phone]  MRN: [id number]  Episcopalian: Mu-ism  Marital Status:         Admission Date: 5/5/23  Admission Type: Urgent  Admitting Provider: Elda Elliott MD  Attending Provider: Elda Elliott MD  Department, Room/Bed: [address]  Discharge Date:   Discharge Disposition:   Discharge Destination:               Attending Provider: Elda Elliott MD    Allergies: Penicillins    Isolation: None   Infection: None   Code Status: No CPR    Ht: 167.6 cm (66\")   Wt: 83.9 kg (185 lb)    Admission Cmt: None   Principal Problem: Physical debility [R53.81]                 Active Insurance as of 5/5/2023     Primary Coverage     Payor Plan Insurance Group Employer/Plan Group    [facility] MEDICARE REPLACEMENT [facility] MEDICARE REPLACEMENT [id number]     Payor Plan Address Payor Plan Phone Number Payor Plan Fax Number Effective Dates    [address] [address]   1/1/2021 - None Entered    [facility] [id number]       Subscriber Name Subscriber Birth Date Member ID       ORQUIDEA MCCLAIN 1945 [id number]                 Emergency Contacts      (Rel.) Home Phone Work Phone Mobile Phone    Gavin Mcclain (Son) [phone] None None    AnabelLilli (Daughter) [phone] None [phone]              Current Facility-Administered Medications "   Medication Dose Route Frequency Provider Last Rate Last Admin   • budesonide (PULMICORT) nebulizer solution 0.5 mg  0.5 mg Nebulization BID - RT Taisha Crowell DO   0.5 mg at 05/17/23 0650   • Enoxaparin Sodium (LOVENOX) syringe 40 mg  40 mg Subcutaneous Daily Taisha Crowell DO   40 mg at 05/17/23 0827   • ferrous sulfate tablet 325 mg  325 mg Oral Daily With Breakfast Taisha Crowell DO   325 mg at 05/17/23 0827   • guaiFENesin (MUCINEX) 12 hr tablet 600 mg  600 mg Oral Q12H Taisha Crowell DO   600 mg at 05/17/23 0827   • hydrOXYzine (ATARAX) tablet 25 mg  25 mg Oral TID PRN Shell Segura PA-C   25 mg at 05/15/23 2217   • ipratropium-albuterol (DUO-NEB) nebulizer solution 3 mL  3 mL Nebulization Q6H While Awake - RT Elda Elliott MD   3 mL at 05/17/23 1333   • ipratropium-albuterol (DUO-NEB) nebulizer solution 3 mL  3 mL Nebulization Q4H PRN Elda Elliott MD       • levothyroxine (SYNTHROID, LEVOTHROID) tablet 100 mcg  100 mcg Oral Q AM Taisha Crowell DO   100 mcg at 05/17/23 0520   • [START ON 5/18/2023] losartan (COZAAR) tablet 25 mg  25 mg Oral Q24H Elda Elliott MD       • Magnesium Standard Dose Replacement - Follow Nurse / BPA Driven Protocol   Does not apply PRN Elda Elliott MD       • methylPREDNISolone sodium succinate (SOLU-Medrol) injection 62.5 mg  62.5 mg Intravenous Daily Elda Elliott MD   62.5 mg at 05/17/23 0827   • nitroglycerin (NITROSTAT) SL tablet 0.4 mg  0.4 mg Sublingual Q5 Min PRN Taisha Crowell DO       • nystatin (MYCOSTATIN) powder   Topical Q12H Taisha Crowell DO   Given at 05/17/23 0833   • ondansetron (ZOFRAN) tablet 4 mg  4 mg Oral Q8H PRN Taisha Crowell, DO   4 mg at 05/12/23 0039   • oxyCODONE-acetaminophen (PERCOCET)  MG per tablet 1 tablet  1 tablet Oral Q4H PRN Taisha Crowell, DO   1 tablet at 05/17/23 1246   • Pharmacy Consult   Does not apply Continuous PRN Elda Elliott  MD       • polyethylene glycol (MIRALAX) packet 17 g  17 g Oral Daily Mervat, Taisha Ryan, DO   17 g at 05/17/23 0827   • sodium chloride 0.9 % flush 10 mL  10 mL Intravenous PRN Mervat, Taisha Ryan, DO       • sodium chloride 0.9 % flush 10 mL  10 mL Intravenous Q12H Mervat, Taisha Ryan, DO   10 mL at 05/17/23 0835   • sodium chloride 0.9 % flush 10 mL  10 mL Intravenous PRN Mervat, Taisha Ryan, DO       • sodium chloride 0.9 % flush 10 mL  10 mL Intravenous Q12H Mervat, Taisha Ryan, DO   10 mL at 05/17/23 0833   • sodium chloride 0.9 % flush 10 mL  10 mL Intravenous PRN Mervat, Taisha Ryan, DO       • sodium chloride 0.9 % flush 10 mL  10 mL Intravenous Q12H Mervat, Taisha Ryan, DO   10 mL at 05/17/23 0833   • sodium chloride 0.9 % flush 10 mL  10 mL Intravenous Q12H Mervat, Taisha Ryan, DO   10 mL at 05/17/23 0833   • sodium chloride 0.9 % flush 10 mL  10 mL Intravenous Q12H Mervat, Taisha Ryan, DO   10 mL at 05/17/23 0833   • sodium chloride 0.9 % flush 10 mL  10 mL Intravenous PRN Mervat, Taisha Ryan, DO       • sodium chloride 0.9 % flush 20 mL  20 mL Intravenous PRN Mervat, Taisha Ryan, DO       • sodium chloride 0.9 % infusion 40 mL  40 mL Intravenous PRN Mervat, Taisha Ryan, DO       • sodium chloride 0.9 % infusion 40 mL  40 mL Intravenous PRN Mervat, Taisha Ryan, DO       • sodium chloride 0.9 % infusion 40 mL  40 mL Intravenous PRN Mervat, Taisha Ryan, DO       • [START ON 5/19/2023] tuberculin injection 5 Units  5 Units Intradermal Once Taisha Crowell, DO         Activity Orders (active) (From admission, onward)     Start     Ordered    05/05/23 1451  Activity - Ad Veronique  Until Discontinued         05/05/23 1450                   Physician Progress Notes (most recent note)  Elda Elliott MD   Physician  Hospitalist  Progress Notes     Addendum  Date of Service:  05/16/23 1059  Creation Time:  05/16/23 1059            [facility]     Patient  Identification:  Name:  Orquidea Rosenberg  Age:  77 y.o.  Sex:  female  :  1945  MRN:  [id number]  Visit Number:  [id number]  ROOM: [address] [place] [address]      Primary Care Provider:  Jackeline Denson APRN     Date of Admission: 2023     Length of stay in inpatient status:  11     Subjective      Chief Compliant:  In swing bed due to debility     History of Presenting Illness: Present in the room when I saw the patient today was her daughter, nurse Magy and Dr. Perera with palliative care team and bedside nurse Rita.  The patient states that she is having trouble breathing today; when I asked her about this yesterday, she denied this and told me that her lungs were at baseline.  Patient also told me yesterday that the swelling in her legs was at baseline, which the daughter tells me is not true, that the current amount of swelling is new for the patient.  The patient denies chest pain, nausea, vomiting, diarrhea.     Objective      Current Hospital Meds:  budesonide, 0.5 mg, Nebulization, BID - RT  enoxaparin, 40 mg, Subcutaneous, Daily  ferrous sulfate, 325 mg, Oral, Daily With Breakfast  guaiFENesin, 600 mg, Oral, Q12H  ipratropium-albuterol, 3 mL, Nebulization, Q6H While Awake - RT  levothyroxine, 100 mcg, Oral, Q AM  losartan, 50 mg, Oral, Q24H  nystatin, , Topical, Q12H  polyethylene glycol, 17 g, Oral, Daily  sodium chloride, 10 mL, Intravenous, Q12H  sodium chloride, 10 mL, Intravenous, Q12H  sodium chloride, 10 mL, Intravenous, Q12H  sodium chloride, 10 mL, Intravenous, Q12H  sodium chloride, 10 mL, Intravenous, Q12H  [START ON 2023] tuberculin, 5 Units, Intradermal, Once     Current Antimicrobial Therapy:             Anti-Infectives (From admission, onward)     Ordered     Dose/Rate Route Frequency Start Stop     23 0752   metroNIDAZOLE (FLAGYL) tablet 500 mg        Ordering Provider: Socrates Flaherty MD    500 mg Oral Every 8 Hours Scheduled 23 0845 23 0196          Current  Diuretic Therapy:  Diuretics (From admission, onward)     Ordered     Dose/Rate Route Frequency Start Stop     05/10/23 1155   metOLazone (ZAROXOLYN) tablet 2.5 mg        Ordering Provider: Dalila Mcdonald MD    2.5 mg Oral Daily 05/10/23 1245 05/10/23 1308     05/10/23 1155   furosemide (LASIX) injection 20 mg        Ordering Provider: Dalila Mcdonald MD    20 mg Intravenous Once 05/10/23 1215 05/10/23 1243     05/08/23 1019   furosemide (LASIX) injection 20 mg        Ordering Provider: Dalila Mcdonald MD    20 mg Intravenous Once 05/08/23 1115 05/08/23 1130     05/08/23 1018   metOLazone (ZAROXOLYN) tablet 2.5 mg        Ordering Provider: Dalila Mcdonald MD    2.5 mg Oral Daily 05/08/23 1045 05/08/23 1130          ----------------------------------------------------------------------------------------------------------------------  Vital Signs:  Temp:  [98.3 °F (36.8 °C)-98.5 °F (36.9 °C)] 98.5 °F (36.9 °C)  Heart Rate:  [] 110  Resp:  [18-20] 18  BP: (132-158)/(59-83) 158/83  SpO2:  [82 %-99 %] 91 %  on  Flow (L/min):  [3] 3;   Device (Oxygen Therapy): nasal cannula  Body mass index is 29.86 kg/m².         Wt Readings from Last 3 Encounters:   05/05/23 83.9 kg (185 lb)   05/04/23 82.6 kg (182 lb)   03/15/22 81.6 kg (180 lb)              Intake & Output (last 3 days)        05/13 0701 05/14 0700 05/14 0701  05/15 0700 05/15 0701 05/16 0700 05/16 0701  05/17 0700     P.O. 1000 2118 960       I.V. (mL/kg) 854.9 (10.2) 1728.4 (20.6) 1738 (20.7)       Total Intake(mL/kg) 1854.9 (22.1) 2808.4 (33.5) 2698 (32.2)       Net +1854.9 +2808.4 +2698                     Urine Unmeasured Occurrence 2 x 2 x 7 x       Stool Unmeasured Occurrence   1 x 6 x            Diet: Regular/House Diet; Texture: Regular Texture (IDDSI 7); Fluid Consistency: Thin (IDDSI 0)  ----------------------------------------------------------------------------------------------------------------------  Physical Exam    Constitutional:       General: She is in acute distress.      Appearance: She is not toxic-appearing or diaphoretic.   Cardiovascular:      Rate and Rhythm: Normal rate and regular rhythm.      Pulses: Normal pulses.      Heart sounds: No murmur heard.  Pulmonary:      Effort: Prolonged expiration and respiratory distress present. No accessory muscle usage.      Breath sounds: No decreased air movement. Wheezing present. No rales.  I think her lungs may sound a little worse compared to yesterday.  Musculoskeletal:         General: No deformity or signs of injury. Severe edema per her daughter; yesterday, the patient told me that she had lymphedema and that her current swelling was normal for her.  ----------------------------------------------------------------------------------------------------------------------  Tele: None   ----------------------------------------------------------------------------------------------------------------------  LABS:     CBC and coagulation:         Results from last 7 days   Lab Units 05/16/23  0129 05/13/23  0735 05/11/23  0147   CRP mg/dL  --   --  11.77*   WBC 10*3/mm3 12.95* 7.66 9.26   HEMOGLOBIN g/dL 7.7* 7.5* 7.6*   HEMATOCRIT % 26.8* 25.7* 26.1*   MCV fL 90.5 88.0 87.3   MCHC g/dL 28.7* 29.2* 29.1*   PLATELETS 10*3/mm3 316 298 308      Renal and electrolytes:         Results from last 7 days   Lab Units 05/16/23  0129 05/13/23  0735 05/12/23  0145   SODIUM mmol/L 133* 136 136   POTASSIUM mmol/L 4.4 4.6 4.5   MAGNESIUM mg/dL 1.9  --   --    CHLORIDE mmol/L 102 101 99   CO2 mmol/L 23.5 26.2 26.0   BUN mg/dL 13 18 21   CREATININE mg/dL 1.04* 1.12* 1.31*   CALCIUM mg/dL 8.6 8.8 8.9   PHOSPHORUS mg/dL 3.5  --   --    GLUCOSE mg/dL 127* 100* 116*      Estimated Creatinine Clearance: 49.4 mL/min (A) (by C-G formula based on SCr of 1.04 mg/dL (H)).     Liver and pancreatic function:       Results from last 7 days   Lab Units 05/16/23  0129   ALBUMIN g/dL 2.4*   BILIRUBIN mg/dL  <0.2   ALK PHOS U/L 61   AST (SGOT) U/L 5   ALT (SGPT) U/L <5         I have personally looked at the labs and they are summarized above.     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 causing fecaloid urine  -History of chronic left sided hydronephrosis, status post double-J ureteral stent on the left that is still present  -Acute hypoxic respiratory failure that was present on admission, suspect secondary to lung metastasis, although underlying pneumonia was not entirely ruled out and thus she was treated for this during her [facility] hospitalization  -Clostridium subterminale bacteremia that was treated during the last [facility] hospitalization  -Suspect steroid-induced leukocytosis, improved  -Mild hyperkalemia that was present on admission, improved  -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  In order to help with her breathing, I am giving her a one-time dose IV Lasix at 80 mg.  I have ordered an external urinary catheter in order to better monitor the output amount.  I will also treat her for an acute COPD exacerbation with scheduled DuoNeb breathing treatments and scheduled Solu-Medrol per our COPD  "exacerbation protocol.  I will continue to monitor her respiratory status closely.  We had a long discussion with the patient and her daughter; the patient has made herself DO NOT INTUBATE and DO NOT RESUSCITATE.  The patient wants to continue with medical treatment up until the point of needing intubation.  We will repeat the patient's blood work in the morning.  Please note that her blood pressures are not at desired levels but the patient is having so much respiratory distress that this may be affecting the numbers.     VTE Prophylaxis:       Mechanical Order History:      None               Pharmalogical Order History:        Ordered     Dose Route Frequency Stop     05/05/23 1450   Enoxaparin Sodium (LOVENOX) syringe 40 mg         40 mg SC Daily --                 Disposition:  Family to decide between NH or home with home health after her swing bed days are exhausted     Elda Elliott MD  [facility]ist  05/16/23  11:00 EDT             Revision History    Date/Time User Provider Type Action   05/16/23 1819 Elda Elliott MD Physician Addend   05/16/23 1816 Elda Elliott MD Physician Sign    View Details Report              Adriana Miller, APRN at 05/17/23 0945          Palliative Care Daily Progress Note     S: Medical record reviewed, upon entering the room pt lying in bed. Pt's daughter Lilli at bedside. Pt does c/o SOA however she reports that it has improved his yesterday. Pt denies any pain at this time, no n/v, chronic diarrhea.       O:   Palliative Performance Scale Score:     /55   Pulse 89   Temp 98.4 °F (36.9 °C) (Oral)   Resp 18   Ht 167.6 cm (66\")   Wt 83.9 kg (185 lb)   SpO2 96%   BMI 29.86 kg/m²     Intake/Output Summary (Last 24 hours) at 5/17/2023 1303  Last data filed at 5/16/2023 2300  Gross per 24 hour   Intake 540 ml   Output --   Net 540 ml       PE:    General Appearance:    Chronically ill appearing, alert, cooperative, NAD   HEENT:    " NC/AT, without obvious abnormality, EOMI, anicteric    Neck:   supple, trachea midline, no JVD   Lungs:    tachypnea, slight rales in bases     Heart:    RRR, normal S1 and S2, no M/R/G   Abdomen:     Soft, NT, ND, hyperactive ABS    Extremities:   Moves all extremities, 2+ edema   Pulses:   Pulses palpable and equal bilaterally   Skin:   Warm, dry. Vulvar abnormalities r/t previous surgeries    Neurologic:   A/Ox3, cooperative   Psych:   Calm, appropriate         Meds: Reviewed and no changes    Labs:   Results from last 7 days   Lab Units 05/17/23  0143   WBC 10*3/mm3 7.64   HEMOGLOBIN g/dL 7.3*   HEMATOCRIT % 24.9*   PLATELETS 10*3/mm3 311     Results from last 7 days   Lab Units 05/17/23  0143   SODIUM mmol/L 138   POTASSIUM mmol/L 4.5   CHLORIDE mmol/L 103   CO2 mmol/L 23.5   BUN mg/dL 16   CREATININE mg/dL 1.23*   GLUCOSE mg/dL 168*   CALCIUM mg/dL 8.8     Results from last 7 days   Lab Units 05/17/23  0143   SODIUM mmol/L 138   POTASSIUM mmol/L 4.5   CHLORIDE mmol/L 103   CO2 mmol/L 23.5   BUN mg/dL 16   CREATININE mg/dL 1.23*   CALCIUM mg/dL 8.8   BILIRUBIN mg/dL <0.2   ALK PHOS U/L 57   ALT (SGPT) U/L <5   AST (SGOT) U/L 6   GLUCOSE mg/dL 168*     Imaging Results (Last 72 Hours)     Procedure Component Value Units Date/Time    XR Chest 1 View [[id number]] Collected: 05/15/23 2315     Updated: 05/15/23 2318    Narrative:      INDICATION: Cough.     TECHNIQUE: Frontal radiograph of the chest.     COMPARISON: 5/7/2023.       Impression:      FINDINGS/IMPRESSION: Multifocal interstitial opacities, similar to  prior. Heart size is similar. Small pleural effusions. No pneumothorax.  No acute osseous abnormality.     This report was finalized on 5/15/2023 11:16 PM by Alex Pallas, DO.               Diagnostics: Reviewed    A: Clinically, pt's appearance is much better than yesterday as nurse reported. She is still having some tachypnea at rest however much improvement. Her edema has decreased to 2+ today, still  pitting. Her SOA is worse with any type of exertion. Lungs are diminished and slight rales noted. Pt is wearing oxygen, does have to catch breathing between sentences at times. Condition is guarded.       P: Continue with current regimen, has been working with therapy some. Will continue with symptomatic and supportive palliative care. Will assist with dispo as needed, pt is currently a swingbed pt.       We will continue to follow along. Please do not hesitate to contact us regarding further sx mgmt or GOC needs, including after hours or on weekends via our on call provider at [phone].     BERTHA Reyez    5/17/2023    Electronically signed by Adriana Miller APRN at 05/17/23 130          Consult Notes (most recent note)      Iron Fabian MD at 05/16/23 1808      Consult Orders    1. Inpatient Palliative Care MD Consult [[id number]] ordered by Elda Elliott MD at 05/16/23 1058               Palliative Care Initial Consult     Attending Physician: Elda Elliott MD  Referring Provider: Dr. Elliott      Code Status:   Code Status and Medical Interventions:   Ordered at: 05/16/23 1238     Medical Intervention Limits:    NO cardioversion    NO intubation (DNI)     Level Of Support Discussed With:    Patient     Code Status (Patient has no pulse and is not breathing):    No CPR (Do Not Attempt to Resuscitate)     Medical Interventions (Patient has pulse or is breathing):    Limited Support     Release to patient:    Routine Release      Advanced Directives: Advance Directive Status: Patient has advance directive, copy requested   Healthcare surrogate: children      HPI:  77-year-old female with cancer of the vulva, who has had surgical resection, radiation, and chemotherapy but despite treatment has had progression of disease.  She presented to the hospital with increasing weakness and some respiratory distress.  Workup showed that she had a large pelvic mass and widely disseminated pulmonary  "metastasis.  Patient has had a prolonged hospital course and today had sudden onset of respiratory distress with dropping O2 sats.  The patient presently is a full code we were asked to have a conversation with the patient regarding her goals of care and reviewing of code status.  The patient is having significant dyspnea at rest, has pursed lip breathing and is in moderate distress.  After discussion with the patient and daughter at bedside decision was made to change her code status to no CPR.  We reviewed further treatment and the patient wishes to continue all other treatments but does not want to be intubated or placed on any life-sustaining measures including ventilator.      ROS: Negative except as above in HPI.     Past Medical History:   Diagnosis Date   • Arthritis    • COPD (chronic obstructive pulmonary disease)    • Elevated cholesterol    • History of transfusion    • Hypertension    • Vulval ca      Past Surgical History:   Procedure Laterality Date   • RADICAL VULVECTOMY  09/06/2019   • RADICAL VULVECTOMY Bilateral 06/2019     Social History     Socioeconomic History   • Marital status:    Tobacco Use   • Smoking status: Former   • Smokeless tobacco: Never   Vaping Use   • Vaping Use: Never used   Substance and Sexual Activity   • Alcohol use: No   • Drug use: No   • Sexual activity: Defer     Family History   Problem Relation Age of Onset   • Alzheimer's disease Mother    • Cancer Father    • Cancer Brother    • Diabetes Maternal Grandmother        Allergies   Allergen Reactions   • Penicillins Swelling     Has tolerated cefepime, ceftriaxone and Merrem       Current Facility-Administered Medications   Current medication reviewed for route, type, dose and frequency and are current per MAR.    Palliative Performance Scale Score:     /77   Pulse 99   Temp 98.5 °F (36.9 °C) (Axillary)   Resp 20   Ht 167.6 cm (66\")   Wt 83.9 kg (185 lb)   SpO2 98%   BMI 29.86 kg/m²     Intake/Output " Summary (Last 24 hours) at 5/16/2023 1808  Last data filed at 5/16/2023 1700  Gross per 24 hour   Intake 560 ml   Output --   Net 560 ml       PE:  General Appearance:    Chronically ill appearing, alert, cooperative, NAD   HEENT:    NC/AT, without obvious abnormality, EOMI, anicteric    Neck:   supple   Lungs:       Labored breathiing with bilateral rhonchi    Heart:    tachycardic   Abdomen:     Soft, NT, ND, NABS    Extremities:   Moves all extremities, 3+ edema   Pulses:   Pulses palpable and equal bilaterally   Skin:  excoriated skin to vulvar area   Neurologic:   A/Ox3, anxious, speech/mentation are intact   Psych:   Calm, appropriate       L  Imaging Results (Last 72 Hours)     Procedure Component Value Units Date/Time    XR Chest 1 View [[id number]] Collected: 05/15/23 2315     Updated: 05/15/23 2318    Narrative:      INDICATION: Cough.     TECHNIQUE: Frontal radiograph of the chest.     COMPARISON: 5/7/2023.       Impression:      FINDINGS/IMPRESSION: Multifocal interstitial opacities, similar to  prior. Heart size is similar. Small pleural effusions. No pneumothorax.  No acute osseous abnormality.     This report was finalized on 5/15/2023 11:16 PM by Alex Pallas, DO.             Diagnostics: Reviewed    A: 77 year old female with advanced vulvar cancer, with widespread metastases to lungs, and multiple fistulas now with respiratory distress. Pt wishes to change her code status to NO CPR.       P: iv lasix, SVN treatments      Change code status to NO CPR.      Ongoing support to pt and family      Will continue to monitor and assist with symptom management and decision making, as well as dc planning.         We appreciate the consult and the opportunity to participate in Orquidea Rosenberg's care. We will continue to follow along. Please do not hesitate to contact us regarding further symptom management or goals of care needs, including after hours or on weekends via our on call provider at [phone].      Time: 30 minutes spent reviewing medical and medication records, assessing and examining patient, discussing with family, answering questions, providing some guidance about a plan and documentation of care, and coordinating care with other healthcare members, with > 50% time spent face to face.     Iron Fabian MD    2023    Electronically signed by Iron Fabian MD at 23 1832          Nutrition Notes (most recent note)      Cindy Prince RD at 23 1248        Nutrition Services    Patient Name:  Orquidea Rosenberg  YOB: 1945  MRN: [id number]  Admit Date:  2023    Swing Bed Note  BMI: 29.86  Diet: Regular  Intake: 55% x 11 meals  Fluid:  Not meeting needs  Encourage intake.      Electronically signed by:  Cindy Prince RD  23 12:48 EDT     Electronically signed by Cindy Prince RD at 23 1249          Physical Therapy Notes (most recent note)      Oli Zarate, PT at 23 1356  Version 1 of 1         Acute Care - Physical Therapy Treatment Note  JOSE ROBERTO Patel     Patient Name: Orquidea Rosenberg  : 1945  MRN: [id number]  Today's Date: 2023   Onset of Illness/Injury or Date of Surgery: 23 (swing bed admit)  Visit Dx:     ICD-10-CM ICD-9-CM   1. Bacteremia  R78.81 790.7         PT Assessment (last 12 hours)     PT Evaluation and Treatment     Row Name 23 8780          Physical Therapy Time and Intention    Document Type therapy note (daily note)  -CS     Mode of Treatment individual therapy;physical therapy  -CS     Patient Effort fair  -CS     Symptoms Noted During/After Treatment shortness of breath  -CS     Comment Pt seen bedside this PM.  Pt agreed to PT.  -CS     Row Name 23 2883          General Information    Patient Profile Reviewed yes  -CS     Equipment Currently Used at Home rollator;wheelchair  -CS     Existing Precautions/Restrictions fall;oxygen therapy device and L/min  -CS     Row Name 23 7309          Living  Environment    Primary Care Provided by child(jolly);self  -     Row Name 05/17/23 1349          Home Use of Assistive/Adaptive Equipment    Equipment Currently Used at Home oxygen  O2@ 2L via [employer].  -CS     Row Name 05/17/23 1349          Pain    Pretreatment Pain Rating 2/10  -CS     Posttreatment Pain Rating 2/10  -CS     Row Name 05/17/23 1349          Cognition    Affect/Mental Status (Cognition) WFL  -CS     Behavioral Issues (Cognition) --  restless, somewhat agitated  -CS     Orientation Status (Cognition) oriented x 3  -CS     Follows Commands (Cognition) WFL  -CS     Row Name 05/17/23 1349          Range of Motion Comprehensive    General Range of Motion bilateral upper extremity ROM WFL  -CS     Row Name 05/17/23 1349          Vision Assessment/Intervention    Visual Impairment/Limitations WFL;corrective lenses for reading  -CS     Row Name 05/17/23 1349          Bed Mobility    Bed Mobility rolling left;supine-sit  -CS     Rolling Left [place] (Bed Mobility) modified independence  -CS     Rolling Right [place] (Bed Mobility) verbal cues;nonverbal cues (demo/gesture);contact guard;standby assist  -CS     Supine-Sit [place] (Bed Mobility) modified independence  -CS     Sit-Supine [place] (Bed Mobility) standby assist  -     Bed Mobility, Safety Issues decreased use of arms for pushing/pulling;decreased use of legs for bridging/pushing  -     Assistive Device (Bed Mobility) bed rails  -     Row Name 05/17/23 1349          Transfers    Transfers sit-stand transfer;stand-sit transfer;bed-chair transfer  -     Row Name 05/17/23 1349          Bed-Chair Transfer    Bed-Chair [place] (Transfers) standby assist  -CS     Assistive Device (Bed-Chair Transfers) --  hand held assist  -CS     Row Name 05/17/23 1349          Sit-Stand Transfer    Sit-Stand [place] (Transfers) standby assist  -     Assistive Device (Sit-Stand Transfers) --  hand held assist  -     Row Name  05/17/23 1349          Stand-Sit Transfer    Stand-Sit [place] (Transfers) standby assist  -CS     Assistive Device (Stand-Sit Transfers) --  hand held assist  -CS     Row Name 05/17/23 1349          Gait/Stairs (Locomotion)    Gait/Stairs Locomotion gait/ambulation independence;distance ambulated  -CS     [place] Level (Gait) contact guard  -CS     Assistive Device (Gait) --  hand held assist  -CS     Distance in Feet (Gait) 25'  -CS     Pattern (Gait) step-through  -CS     Deviations/Abnormal Patterns (Gait) ataxic;gait speed decreased;weight shifting decreased  -CS     Comment, (Gait/Stairs) Pt distance limited 2/2 SOA despite supplemental O2  -CS     Row Name 05/17/23 1349          Safety Issues, Functional Mobility    Impairments Affecting Function (Mobility) endurance/activity tolerance;shortness of breath;strength  -CS     Row Name 05/17/23 1349          Respiratory WDL    Respiratory WDL X;breath sounds  -CS     Rhythm/Pattern, Respiratory shortness of breath;shallow  -CS     Expansion/Accessory Muscles/Retractions no use of accessory muscles;expansion symmetric;no retractions  -CS     Nailbeds pale  -CS     Mucous Membranes pink;intact;moist  -CS     Cough Frequency frequent  -CS     Cough Type congested;nonproductive  -CS     Row Name 05/17/23 1349          Breath Sounds    Breath Sounds All Fields  -CS     All Lung Fields Breath Sounds equal bilaterally;diminished  -CS     SAMANTHA Breath Sounds diminished;Anterior:  -CS     RUL Breath Sounds clear;Anterior:  -CS     Row Name 05/17/23 1349          Skin WDL    Skin WDL X;all  -CS     Skin Color/Characteristics redness blanchable  -CS     Skin Temperature cool  -CS     Skin Moisture dry  -CS     Skin Elasticity quick return to original state  -CS     Skin Integrity bruised (ecchymotic)  -CS     Row Name 05/17/23 1349          Wound 05/01/23 1414 Left vagina    Wound - Properties Group Placement Date: 05/01/23  -LB Placement Time: 1414  -LB Present on  Hospital Admission: Y  -LB Side: Left  -LB Location: vagina  -LB    Closure Open to air  -CS     Base moist;yellow  -CS     Periwound redness;moist  -CS     Periwound Temperature warm  -CS     Periwound Skin Turgor soft  -CS     Edges irregular  -CS     Drainage Characteristics/Odor creamy  -CS     Drainage Amount scant  -CS     Retired Wound - Properties Group Placement Date: 05/01/23  -LB Placement Time: 1414  -LB Present on Hospital Admission: Y  -LB Side: Left  -LB Location: vagina  -LB    Retired Wound - Properties Group Date first assessed: 05/01/23  -LB Time first assessed: 1414  -LB Present on Hospital Admission: Y  -LB Side: Left  -LB Location: vagina  -LB    Row Name 05/17/23 1349          Coping    Observed Emotional State calm;cooperative  -CS     Verbalized Emotional State acceptance  -CS     Family/Support Persons daughter  -CS     Involvement in Care not present at bedside  -CS     Row Name 05/17/23 1341          Plan of Care Review    Plan of Care Reviewed With patient  -CS     Progress no change  -CS     Outcome Evaluation Pt w/ improved ambulation limited by SOA.  Pt would benefit from continued skilled PT.  -CS     Row Name 05/17/23 1347          Positioning and Restraints    Pre-Treatment Position sitting in chair/recliner  -CS     Post Treatment Position chair  -CS     In Chair reclined;call light within reach;encouraged to call for assist  -CS     Row Name 05/17/23 0476          Therapy Assessment/Plan (PT)    Rehab Potential (PT) fair, will monitor progress closely  -CS     Criteria for Skilled Interventions Met (PT) yes  -CS     Therapy Frequency (PT) 5 times/wk  -CS     Row Name 05/17/23 1340          Physical Therapy Goals    Transfer Goal Selection (PT) transfer, PT goal 1  -CS     Gait Training Goal Selection (PT) gait training, PT goal 1  -CS     Row Name 05/17/23 1341          Transfer Goal 1 (PT)    Activity/Assistive Device (Transfer Goal 1, PT)  sit-to-stand/stand-to-sit;bed-to-chair/chair-to-bed  -     [place] Level/Cues Needed (Transfer Goal 1, PT) modified independence  -CS     Time Frame (Transfer Goal 1, PT) by discharge  -     Row Name 05/17/23 1349          Gait Training Goal 1 (PT)    Activity/Assistive Device (Gait Training Goal 1, PT) gait (walking locomotion);assistive device use;increase endurance/gait distance  -CS     [place] Level (Gait Training Goal 1, PT) modified independence  -CS     Distance (Gait Training Goal 1, PT) 15  -CS     Time Frame (Gait Training Goal 1, PT) by discharge  -           User Key  (r) = Recorded By, (t) = Taken By, (c) = Cosigned By    Initials Name Provider Type    CS Oli Zarate, PT Physical Therapist    LB Veronica Mendez, RN Registered Nurse                Physical Therapy Education               User Key     Initials Effective Dates Name Provider Type Discipline    AG 06/16/21 -  Jaclyn Fernandez, PT Physical Therapist PT    BD 08/05/21 -  Felicia Galeano, RN Registered Nurse Nurse              PT Recommendation and Plan  Therapy Frequency (PT): 5 times/wk  Plan of Care Reviewed With: patient  Progress: no change  Outcome Evaluation: Pt w/ improved ambulation limited by SOA.  Pt would benefit from continued skilled PT.   Outcome Measures     Row Name 05/15/23 0900             How much help from another is currently needed...    Putting on and taking off regular lower body clothing? 2  -LM      Bathing (including washing, rinsing, and drying) 2  -LM      Toileting (which includes using toilet bed pan or urinal) 1  -LM      Putting on and taking off regular upper body clothing 3  -LM      Taking care of personal grooming (such as brushing teeth) 3  -LM      Eating meals 3  -LM      AM-PAC 6 Clicks Score (OT) 14  -LM            User Key  (r) = Recorded By, (t) = Taken By, (c) = Cosigned By    Initials Name Provider Type    LM Joanne Colon, OT Occupational Therapist                 Time Calculation:     PT Charges     Row Name 23 1355             Time Calculation    Start Time 1330  -CS      PT Received On 23  -CS      PT Goal Re-Cert Due Date 23  -CS         Timed Charges    12510 - Gait Training Minutes  23  -CS         Total Minutes    Timed Charges Total Minutes 23  -CS       Total Minutes 23  -CS            User Key  (r) = Recorded By, (t) = Taken By, (c) = Cosigned By    Initials Name Provider Type    CS Oli Zarate, PT Physical Therapist              Therapy Charges for Today     Code Description Service Date Service Provider Modifiers Qty    56234254294 HC GAIT TRAINING EA 15 MIN 2023 Oli Zarate, PT GP 2          PT G-Codes  Outcome Measure Options: AM-PAC 6 Clicks Daily Activity (OT)  AM-PAC 6 Clicks Score (PT): 16  AM-PAC 6 Clicks Score (OT): 14    Oli Zarate PT  2023      Electronically signed by Oli Zarate, PT at 23 1356          Occupational Therapy Notes (most recent note)      Joanne Colon, OT at 05/15/23 0955          Acute Care - Occupational Therapy Treatment Note   Jorge     Patient Name: Orquidea Rosenberg  : 1945  MRN: [id number]  Today's Date: 5/15/2023  Onset of Illness/Injury or Date of Surgery: 23 (swing bed admit)     Referring Physician: Mervat    Admit Date: 2023       ICD-10-CM ICD-9-CM   1. Bacteremia  R78.81 790.7             OT ASSESSMENT FLOWSHEET (last 12 hours)     OT Evaluation and Treatment     Row Name 05/15/23 0952                   OT Time and Intention    Subjective Information complains of;weakness;fatigue;pain  -LM        Document Type therapy note (daily note)  -LM        Mode of Treatment occupational therapy  -LM        Patient Effort fair  -LM        Comment Patient seen this date bedside for light bue therex/arom.  Patient participated in light towel therex with frequent rest breaks.  Decreased activity tolerance for BADL.  -LM           General Information    Existing Precautions/Restrictions fall;oxygen  therapy device and L/min  -LM           Cognition    Orientation Status (Cognition) oriented x 3  -LM           Wound 05/01/23 1414 Left vagina    Wound - Properties Group Placement Date: 05/01/23  -LB Placement Time: 1414  -LB Present on Hospital Admission: Y  -LB Side: Left  -LB Location: vagina  -LB    Retired Wound - Properties Group Placement Date: 05/01/23  -LB Placement Time: 1414  -LB Present on Hospital Admission: Y  -LB Side: Left  -LB Location: vagina  -LB    Retired Wound - Properties Group Date first assessed: 05/01/23  -LB Time first assessed: 1414  -LB Present on Hospital Admission: Y  -LB Side: Left  -LB Location: vagina  -LB       Positioning and Restraints    Post Treatment Position bed  -LM        In Bed call light within reach;encouraged to call for assist;exit alarm on  -LM              User Key  (r) = Recorded By, (t) = Taken By, (c) = Cosigned By    Initials Name Effective Dates    LM Joanne Colon, OT 06/16/21 -     LB Veronica Mendez RN 10/20/21 -                        OT Recommendation and Plan           Outcome Measures     Row Name 05/15/23 0900 05/13/23 1100 05/12/23 1641       How much help from another is currently needed...    Putting on and taking off regular lower body clothing? 2  -LM 2  -LM 2  -KR    Bathing (including washing, rinsing, and drying) 2  -LM 2  -LM 2  -KR    Toileting (which includes using toilet bed pan or urinal) 1  -LM 1  -LM 1  -KR    Putting on and taking off regular upper body clothing 3  -LM 3  -LM 3  -KR    Taking care of personal grooming (such as brushing teeth) 3  -LM 3  -LM 3  -KR    Eating meals 3  -LM 3  -LM 3  -KR    AM-PAC 6 Clicks Score (OT) 14  -LM 14  -LM 14  -KR          User Key  (r) = Recorded By, (t) = Taken By, (c) = Cosigned By    Initials Name Provider Type    Justen Gurrola, OT Occupational Therapist    LM Joanne Colon, OT Occupational Therapist                Time Calculation:    Time Calculation- OT     Row Name 05/15/23 0931              Time Calculation- OT    Total Timed Code Minutes- OT 10 minute(s)  -LM            User Key  (r) = Recorded By, (t) = Taken By, (c) = Cosigned By    Initials Name Provider Type     Joanne Colon OT Occupational Therapist              Therapy Charges for Today     Code Description Service Date Service Provider Modifiers Qty    31735241925  OT SELF CARE/MGMT/TRAIN EA 15 MIN 5/15/2023 Joanne Colon OT GO 1               Joanne Colon OT  5/15/2023    Electronically signed by Joanne Colon OT at 05/15/23 0955               Patient Name: Orquidea Rosenberg             : 1945                      MRN: [id number]  Today's Date: 2023                   Onset of Illness/Injury or Date of Surgery: 23 (swing bed admit)                          Referring Physician: Mervat               Admit Date: 2023         ICD-10-CM ICD-9-CM   1. Bacteremia  R78.81 790.7      OT ASSESSMENT FLOWSHEET (last 12 hours)               OT Evaluation and Treatment      Row Name 23 1416                                   OT Time and Intention     Subjective Information complains of;fatigue  -LA             Document Type therapy note (daily note)  -LA             Mode of Treatment occupational therapy;individual therapy  -LA             Patient Effort fair  -LA             Symptoms Noted During/After Treatment shortness of breath  -LA                       General Information     Patient Profile Reviewed yes  -LA             General Observations of Patient Patient agreeable to therapy this date. Patient engaged in simple towel AROM exercises in all planes. Exercises completed 10 x2 sets. Patient O2 dropped to 83-84 so exercises stopped.  -LA             Existing Precautions/Restrictions fall;oxygen therapy device and L/min  -LA                       Cognition     Affect/Mental Status (Cognition) WFL  -LA             Orientation Status (Cognition) oriented x 3  -LA             Follows Commands (Cognition) WFL  -LA                        Motor Skills     Motor Skills coordination;functional endurance  -LA             Functional Endurance poor  -LA             Motor Control/Coordination Interventions gross motor coordination activities;therapeutic exercise/ROM  -LA             Therapeutic Exercise shoulder;elbow/forearm;wrist;hand  -LA                       Wound 05/01/23 1414 Left vagina     Wound - Properties Group Placement Date: 05/01/23  -LB Placement Time: 1414  -LB Present on Hospital Admission: Y  -LB Side: Left  -LB Location: vagina  -LB     Retired Wound - Properties Group Placement Date: 05/01/23  -LB Placement Time: 1414  -LB Present on Hospital Admission: Y  -LB Side: Left  -LB Location: vagina  -LB     Retired Wound - Properties Group Date first assessed: 05/01/23  -LB Time first assessed: 1414  -LB Present on Hospital Admission: Y  -LB Side: Left  -LB Location: vagina  -LB               Plan of Care Review     Plan of Care Reviewed With patient;daughter  -LA             Progress no change  -LA                       Positioning and Restraints     Pre-Treatment Position sitting in chair/recliner  -LA             Post Treatment Position chair  -LA             In Chair sitting;call light within reach;encouraged to call for assist;with family/caregiver  -LA                           User Key  (r) = Recorded By, (t) = Taken By, (c) = Cosigned By     Initials Name Effective Dates     LB Veronica Mendez RN 10/20/21 -      Dior Christensen OT 02/14/22 -                           OT Recommendation and Plan  Plan of Care Review  Plan of Care Reviewed With: patient, daughter  Progress: no change  Plan of Care Reviewed With: patient, daughter              Outcome Measures      Row Name 05/17/23 1400 05/15/23 0900                  How much help from another is currently needed...     Putting on and taking off regular lower body clothing? 2  -LA 2  -LM       Bathing (including washing, rinsing, and drying) 2  -LA 2  -LM        Toileting (which includes using toilet bed pan or urinal) 1  -LA 1  -LM       Putting on and taking off regular upper body clothing 3  -LA 3  -LM       Taking care of personal grooming (such as brushing teeth) 3  -LA 3  -LM       Eating meals 3  -LA 3  -LM       AM-PAC 6 Clicks Score (OT) 14  -LA 14  -LM               Functional Assessment     Outcome Measure Options AM-PAC 6 Clicks Daily Activity (OT)  -LA --                     User Key  (r) = Recorded By, (t) = Taken By, (c) = Cosigned By     Initials Name Provider Type     LM Joanne Colon OT Occupational Therapist     Dior Christensen OT Occupational Therapist                    Time Calculation:            Time Calculation- OT      Row Name 05/17/23 1423 05/17/23 1355                  Time Calculation- OT     OT Start Time 1400  -LA --       Total Timed Code Minutes- OT 18 minute(s)  -LA --               Timed Charges     23066 - Gait Training Minutes  -- 23  -CS               Total Minutes     Timed Charges Total Minutes -- 23  -CS        Total Minutes -- 23  -CS                     User Key  (r) = Recorded By, (t) = Taken By, (c) = Cosigned By     Initials Name Provider Type     CS Oli Zarate, PT Physical Therapist     Dior Christensen, OT Occupational Therapist                          Therapy Charges for Today      Code Description Service Date Service Provider Modifiers Qty     49099237908 HC OT THER PROC EA 15 MIN 5/17/2023 Dior Newell OT GO 1                Dior Newell OT                   5/17/2023                        Respiratory Therapy Notes (most recent note)      Willy Stallworth Dakotah, RRT at 05/17/23 1212      Consult Orders    1. Inpatient COPD Nurse Navigator Consult [[id number]] ordered by Elda Elliott MD at 05/16/23 1122             Inhaler evaluation and education      I spoke with Dr. Elliott yesterday and she stated she would like me to educate Ms. Rosenberg on inhaler use and assess her for COPD Maintenance inhalers for  home use. I discussed inhaler use with Ms. Rosenberg and explained in detail the difference between rescue and maintenance inhalers and why both may benefit her by reducing her symptoms of shortness of breath.  Ms. Rosenberg stated she currently does not have inhalers or nebulizer for home use. Proper Inhaler technique was illustrated with and without the given Aero Chamber spacer I provided to the her. Instruction on rescue and maintenance medications, the function of each and the importance of using them as prescribed. She was receptive to inhaler education and showed interest in trying inhalers for home use.      I consulted pharmacy to evaluate her for COPD maintenance inhalers using GOLD guidelines.     REMEDIOS Camarena, RRT  COPD Navigator     Electronically signed by Willy Stallworth, RRT at 05/17/23 1218     Kasey Baca MSW     Case Management  Case Management/Social Work     Addendum  Date of Service:  05/16/23 1112  Creation Time:  05/16/23 1112          Revision History    Kasey Baca MSW     Case Management  Case Management/Social Work     Signed  Date of Service:  05/17/23 1440  Creation Time:  05/17/23 1440     Signed                                                                                                                  Discharge Planning Assessment   Jorge     Patient Name: Orquidea Rosenberg                   MRN: [id number]  Today's Date: 5/17/2023                Admit Date: 5/5/2023       Discharge Plan         Row Name 05/17/23 1430           Plan     Plan SS meet with daughterLilli earlier this date. Daughter was educated about discharge options including, nursing home placement with and without hospice services, home health services, and home hospice services. SS spoke to pt and daughter after daughter discussed discharge options with pt. SS educated pt further about home hospice services per request. Pt declines nursing home placement. Pt  agreeable for Palliative Care NP/hospice to provide additional information about home hospice services. SS notified Palliative Care NP, Adriana and hospice per Brenda. Palliative Care NP and hospice per Brenda to speak to pt and daughter today. SS made swing bed RN aware. Swing bed RN has been in contact with [facility] and will send in updated clinicals requesting additional swing bed days today. SS to follow.                RYDER Chahal

## 2023-05-17 NOTE — PROGRESS NOTES
"Palliative Care Daily Progress Note     S: Medical record reviewed, upon entering the room pt lying in bed. Pt's daughter Lilli at bedside. Pt does c/o SOA however she reports that it has improved his yesterday. Pt denies any pain at this time, no n/v, chronic diarrhea.       O:   Palliative Performance Scale Score:     /55   Pulse 89   Temp 98.4 °F (36.9 °C) (Oral)   Resp 18   Ht 167.6 cm (66\")   Wt 83.9 kg (185 lb)   SpO2 96%   BMI 29.86 kg/m²     Intake/Output Summary (Last 24 hours) at 5/17/2023 1303  Last data filed at 5/16/2023 2300  Gross per 24 hour   Intake 540 ml   Output --   Net 540 ml       PE:    General Appearance:    Chronically ill appearing, alert, cooperative, NAD   HEENT:    NC/AT, without obvious abnormality, EOMI, anicteric    Neck:   supple, trachea midline, no JVD   Lungs:    tachypnea, slight rales in bases     Heart:    RRR, normal S1 and S2, no M/R/G   Abdomen:     Soft, NT, ND, hyperactive ABS    Extremities:   Moves all extremities, 2+ edema   Pulses:   Pulses palpable and equal bilaterally   Skin:   Warm, dry. Vulvar abnormalities r/t previous surgeries    Neurologic:   A/Ox3, cooperative   Psych:   Calm, appropriate         Meds: Reviewed and no changes    Labs:   Results from last 7 days   Lab Units 05/17/23  0143   WBC 10*3/mm3 7.64   HEMOGLOBIN g/dL 7.3*   HEMATOCRIT % 24.9*   PLATELETS 10*3/mm3 311     Results from last 7 days   Lab Units 05/17/23  0143   SODIUM mmol/L 138   POTASSIUM mmol/L 4.5   CHLORIDE mmol/L 103   CO2 mmol/L 23.5   BUN mg/dL 16   CREATININE mg/dL 1.23*   GLUCOSE mg/dL 168*   CALCIUM mg/dL 8.8     Results from last 7 days   Lab Units 05/17/23  0143   SODIUM mmol/L 138   POTASSIUM mmol/L 4.5   CHLORIDE mmol/L 103   CO2 mmol/L 23.5   BUN mg/dL 16   CREATININE mg/dL 1.23*   CALCIUM mg/dL 8.8   BILIRUBIN mg/dL <0.2   ALK PHOS U/L 57   ALT (SGPT) U/L <5   AST (SGOT) U/L 6   GLUCOSE mg/dL 168*     Imaging Results (Last 72 Hours)     Procedure Component " Value Units Date/Time    XR Chest 1 View [[id number]] Collected: 05/15/23 2315     Updated: 05/15/23 2318    Narrative:      INDICATION: Cough.     TECHNIQUE: Frontal radiograph of the chest.     COMPARISON: 5/7/2023.       Impression:      FINDINGS/IMPRESSION: Multifocal interstitial opacities, similar to  prior. Heart size is similar. Small pleural effusions. No pneumothorax.  No acute osseous abnormality.     This report was finalized on 5/15/2023 11:16 PM by Alex Pallas, DO.               Diagnostics: Reviewed    A: Clinically, pt's appearance is much better than yesterday as nurse reported. She is still having some tachypnea at rest however much improvement. Her edema has decreased to 2+ today, still pitting. Her SOA is worse with any type of exertion. Lungs are diminished and slight rales noted. Pt is wearing oxygen, does have to catch breathing between sentences at times. Condition is guarded.       P: Continue with current regimen, has been working with therapy some. Will continue with symptomatic and supportive palliative care. Will assist with dispo as needed, pt is currently a swingbed pt.       We will continue to follow along. Please do not hesitate to contact us regarding further sx mgmt or GOC needs, including after hours or on weekends via our on call provider at [phone].     Adriana Miller, APRN    5/17/2023

## 2023-05-17 NOTE — CONSULTS
Inhaler evaluation and education      I spoke with Dr. Elliott yesterday and she stated she would like me to educate Ms. Rosenberg on inhaler use and assess her for COPD Maintenance inhalers for home use. I discussed inhaler use with Ms. Rosenberg and explained in detail the difference between rescue and maintenance inhalers and why both may benefit her by reducing her symptoms of shortness of breath.  Ms. Rosenberg stated she currently does not have inhalers or nebulizer for home use. Proper Inhaler technique was illustrated with and without the given Aero Chamber spacer I provided to the her. Instruction on rescue and maintenance medications, the function of each and the importance of using them as prescribed. She was receptive to inhaler education and showed interest in trying inhalers for home use.      I consulted pharmacy to evaluate her for COPD maintenance inhalers using GOLD guidelines.     REMEDIOS Camarena, RRT  COPD Navigator

## 2023-05-17 NOTE — PLAN OF CARE
Goal Outcome Evaluation:              Outcome Evaluation: Patient has rested in bed this shift, SOA with exertion, no complaints or request at this time, VSS, Will continue plan of care.

## 2023-05-17 NOTE — CASE MANAGEMENT/SOCIAL WORK
Discharge Planning Assessment   [place]     Patient Name: Orquidea Rosenberg  MRN: [id number]  Today's Date: 5/17/2023    Admit Date: 5/5/2023     Discharge Plan       Row Name 05/17/23 1430       Plan    Plan SS meet with daughterLilli earlier this date. Daughter was educated about discharge options including, nursing home placement with and without hospice services, home health services, and home hospice services. SS spoke to pt and daughter after daughter discussed discharge options with pt. SS educated pt further about home hospice services per request. Pt declines nursing home placement. Pt agreeable for Palliative Care NP/hospice to provide additional information about home hospice services. SS notified Palliative Care NP, Adriana and hospice per [place]. Palliative Care NP and hospice per [place] to speak to pt and daughter today. SS made swing bed RN aware. Swing bed RN has been in contact with [facility] and will send in updated clinicals requesting additional swing bed days today. SS to follow.    16:21: SS received call from [facility]osp[facility] per [place]. Palliative Care and [facility] has spoke to pt and daughter. Daughter is requesting addtional information from [facility] and [facility]. SS notified [facility] per [place] and [facility] per [place]. SS spoke to Palliative Care RN and they will follow up with pt tomorrow regarding disposition. SS to follow.              RYDER Chahal

## 2023-05-18 PROCEDURE — 94799 UNLISTED PULMONARY SVC/PX: CPT

## 2023-05-18 PROCEDURE — 25010000002 METHYLPREDNISOLONE PER 125 MG: Performed by: INTERNAL MEDICINE

## 2023-05-18 PROCEDURE — 97110 THERAPEUTIC EXERCISES: CPT

## 2023-05-18 PROCEDURE — 94761 N-INVAS EAR/PLS OXIMETRY MLT: CPT

## 2023-05-18 PROCEDURE — 25010000002 ENOXAPARIN PER 10 MG: Performed by: INTERNAL MEDICINE

## 2023-05-18 PROCEDURE — 97116 GAIT TRAINING THERAPY: CPT

## 2023-05-18 RX ORDER — PREDNISONE 20 MG/1
40 TABLET ORAL
Status: DISCONTINUED | OUTPATIENT
Start: 2023-05-19 | End: 2023-05-21

## 2023-05-18 RX ORDER — FUROSEMIDE 20 MG/1
20 TABLET ORAL DAILY
Status: DISCONTINUED | OUTPATIENT
Start: 2023-05-18 | End: 2023-05-20

## 2023-05-18 RX ADMIN — METHYLPREDNISOLONE SODIUM SUCCINATE 62.5 MG: 125 INJECTION, POWDER, FOR SOLUTION INTRAMUSCULAR; INTRAVENOUS at 08:21

## 2023-05-18 RX ADMIN — Medication 10 ML: at 20:24

## 2023-05-18 RX ADMIN — NYSTATIN: 100000 POWDER TOPICAL at 08:22

## 2023-05-18 RX ADMIN — BUDESONIDE 0.5 MG: 0.5 INHALANT RESPIRATORY (INHALATION) at 18:17

## 2023-05-18 RX ADMIN — Medication 10 ML: at 08:22

## 2023-05-18 RX ADMIN — IPRATROPIUM BROMIDE AND ALBUTEROL SULFATE 3 ML: .5; 2.5 SOLUTION RESPIRATORY (INHALATION) at 03:27

## 2023-05-18 RX ADMIN — OXYCODONE HYDROCHLORIDE AND ACETAMINOPHEN 1 TABLET: 10; 325 TABLET ORAL at 11:04

## 2023-05-18 RX ADMIN — VITAMINS A AND D OINTMENT 1 APPLICATION: 15.5; 53.4 OINTMENT TOPICAL at 14:28

## 2023-05-18 RX ADMIN — IPRATROPIUM BROMIDE AND ALBUTEROL SULFATE 3 ML: .5; 2.5 SOLUTION RESPIRATORY (INHALATION) at 06:32

## 2023-05-18 RX ADMIN — Medication 10 ML: at 09:00

## 2023-05-18 RX ADMIN — LOSARTAN POTASSIUM 25 MG: 25 TABLET, FILM COATED ORAL at 08:21

## 2023-05-18 RX ADMIN — BUDESONIDE 0.5 MG: 0.5 INHALANT RESPIRATORY (INHALATION) at 06:32

## 2023-05-18 RX ADMIN — FERROUS SULFATE TAB 325 MG (65 MG ELEMENTAL FE) 325 MG: 325 (65 FE) TAB at 08:21

## 2023-05-18 RX ADMIN — IPRATROPIUM BROMIDE AND ALBUTEROL SULFATE 3 ML: .5; 2.5 SOLUTION RESPIRATORY (INHALATION) at 18:17

## 2023-05-18 RX ADMIN — OXYCODONE HYDROCHLORIDE AND ACETAMINOPHEN 1 TABLET: 10; 325 TABLET ORAL at 06:20

## 2023-05-18 RX ADMIN — NYSTATIN: 100000 POWDER TOPICAL at 20:24

## 2023-05-18 RX ADMIN — OXYCODONE HYDROCHLORIDE AND ACETAMINOPHEN 1 TABLET: 10; 325 TABLET ORAL at 18:24

## 2023-05-18 RX ADMIN — GUAIFENESIN 600 MG: 600 TABLET, EXTENDED RELEASE ORAL at 08:21

## 2023-05-18 RX ADMIN — ENOXAPARIN SODIUM 40 MG: 40 INJECTION SUBCUTANEOUS at 08:21

## 2023-05-18 RX ADMIN — LEVOTHYROXINE SODIUM 100 MCG: 100 TABLET ORAL at 06:17

## 2023-05-18 RX ADMIN — VITAMINS A AND D OINTMENT 1 APPLICATION: 15.5; 53.4 OINTMENT TOPICAL at 20:24

## 2023-05-18 RX ADMIN — Medication 10 ML: at 20:25

## 2023-05-18 RX ADMIN — GUAIFENESIN 600 MG: 600 TABLET, EXTENDED RELEASE ORAL at 20:24

## 2023-05-18 RX ADMIN — FUROSEMIDE 20 MG: 20 TABLET ORAL at 14:27

## 2023-05-18 RX ADMIN — IPRATROPIUM BROMIDE AND ALBUTEROL SULFATE 3 ML: .5; 2.5 SOLUTION RESPIRATORY (INHALATION) at 12:20

## 2023-05-18 RX ADMIN — OXYCODONE HYDROCHLORIDE AND ACETAMINOPHEN 1 TABLET: 10; 325 TABLET ORAL at 22:48

## 2023-05-18 NOTE — PLAN OF CARE
Goal Outcome Evaluation:  Plan of Care Reviewed With: patient        Progress: improving  Outcome Evaluation: Pt has been up in a chair this shift, ambulated in the room. Episodes of SOA sat dropping with activity to low 80's with quick recovery, Pain controlled per MAR. No requests or compalints at this time. will continue POC.

## 2023-05-18 NOTE — THERAPY TREATMENT NOTE
Acute Care - Occupational Therapy Treatment Note   [place]     Patient Name: Orquidea Rosenberg  : 1945  MRN: [id number]  Today's Date: 2023  Onset of Illness/Injury or Date of Surgery: 23 (swing bed admit)     Referring Physician: Mervat    Admit Date: 2023       ICD-10-CM ICD-9-CM   1. Bacteremia  R78.81 790.7     Patient Active Problem List   Diagnosis   • COVID-19   • Vulvar cancer   • Physical debility   • Bacteremia   • COPD exacerbation   • Debility     Past Medical History:   Diagnosis Date   • Arthritis    • COPD (chronic obstructive pulmonary disease)    • Elevated cholesterol    • History of transfusion    • Hypertension    • Vulval ca      Past Surgical History:   Procedure Laterality Date   • RADICAL VULVECTOMY  2019   • RADICAL VULVECTOMY Bilateral 2019         OT ASSESSMENT FLOWSHEET (last 12 hours)     OT Evaluation and Treatment     Row Name 23 1442                   OT Time and Intention    Subjective Information complains of;weakness;fatigue  -LM        Document Type therapy note (daily note)  -LM        Mode of Treatment occupational therapy  -LM        Patient Effort fair  -LM        Comment Patient performing light bue arom/therex while seated in besdide chair.  Patient performed light towel therex with frequent rest breaks.  Patient verbalized and demonstrated understanding.  Patient's daughter present and supportive.  -LM           General Information    Existing Precautions/Restrictions fall;oxygen therapy device and L/min  -LM           Cognition    Affect/Mental Status (Cognition) WFL  -LM        Orientation Status (Cognition) oriented x 3  -LM           Wound 23 1414 Left vagina    Wound - Properties Group Placement Date: 23  -LB Placement Time: 1414  -LB Present on Hospital Admission: Y  -LB Side: Left  -LB Location: vagina  -LB    Retired Wound - Properties Group Placement Date: 23  -LB Placement Time: 1414  -LB Present on Hospital  Admission: Y  -LB Side: Left  -LB Location: vagina  -LB    Retired Wound - Properties Group Date first assessed: 05/01/23  -LB Time first assessed: 1414  -LB Present on Hospital Admission: Y  -LB Side: Left  -LB Location: vagina  -LB       Positioning and Restraints    Post Treatment Position chair  -LM        In Chair call light within reach  -LM              User Key  (r) = Recorded By, (t) = Taken By, (c) = Cosigned By    Initials Name Effective Dates    LM Joanne Colon, OT 06/16/21 -     LB Veronica Mendez RN 10/20/21 -                        OT Recommendation and Plan           Outcome Measures     Row Name 05/18/23 1400 05/17/23 1400          How much help from another is currently needed...    Putting on and taking off regular lower body clothing? 2  -LM 2  -LA     Bathing (including washing, rinsing, and drying) 2  -LM 2  -LA     Toileting (which includes using toilet bed pan or urinal) 1  -LM 1  -LA     Putting on and taking off regular upper body clothing 3  -LM 3  -LA     Taking care of personal grooming (such as brushing teeth) 3  -LM 3  -LA     Eating meals 3  -LM 3  -LA     AM-PAC 6 Clicks Score (OT) 14  -LM 14  -LA        Functional Assessment    Outcome Measure Options AM-PAC 6 Clicks Daily Activity (OT)  -LM AM-PAC 6 Clicks Daily Activity (OT)  -LA           User Key  (r) = Recorded By, (t) = Taken By, (c) = Cosigned By    Initials Name Provider Type    Joanne Claros, OT Occupational Therapist    Dior Christensen OT Occupational Therapist                Time Calculation:    Time Calculation- OT     Row Name 05/18/23 1449             Time Calculation- OT    Total Timed Code Minutes- OT 15 minute(s)  -LM            User Key  (r) = Recorded By, (t) = Taken By, (c) = Cosigned By    Initials Name Provider Type    Joanne Claros, OT Occupational Therapist              Therapy Charges for Today     Code Description Service Date Service Provider Modifiers Qty    [id number]  OT THER PROC EA 15  MIN 5/18/2023 Joanne Colon, OT GO 1               Joanne Colon, OT  5/18/2023

## 2023-05-18 NOTE — PROGRESS NOTES
"Palliative Care Daily Progress Note     S: Medical record reviewed, upon entering the room pt found to be lying in bed asleep. She awakens easily, resp even and nonlabored. No distress noted. Pt denies any symptoms at this time. She reports having no pain, no n/v/constipation, she does have chronic diarrhea.     O:   Palliative Performance Scale Score:     /72 (BP Location: Right arm, Patient Position: Lying)   Pulse 78   Temp 97.6 °F (36.4 °C) (Oral)   Resp 20   Ht 167.6 cm (66\")   Wt 83.9 kg (185 lb)   SpO2 98%   BMI 29.86 kg/m²     Intake/Output Summary (Last 24 hours) at 5/18/2023 1139  Last data filed at 5/18/2023 0500  Gross per 24 hour   Intake 720 ml   Output --   Net 720 ml       PE:    General Appearance:    Chronically ill appearing, alert, cooperative, NAD   HEENT:    NC/AT, without obvious abnormality, EOMI, anicteric    Neck:   supple, trachea midline, no JVD   Lungs:     CTAB without w/r/r    Heart:    RRR, normal S1 and S2, no M/R/G   Abdomen:     Soft, NT, ND, NABS    Extremities:   Moves all extremities, generalized BLE edema   Pulses:   Pulses palpable and equal bilaterally   Skin:   Warm, dry, vulvar abnormalities r/t surgeries    Neurologic:   A/Ox3, cooperative   Psych:   Calm, appropriate         Meds: Reviewed and no changes     Labs:   Results from last 7 days   Lab Units 05/17/23  0143   WBC 10*3/mm3 7.64   HEMOGLOBIN g/dL 7.3*   HEMATOCRIT % 24.9*   PLATELETS 10*3/mm3 311     Results from last 7 days   Lab Units 05/17/23  0143   SODIUM mmol/L 138   POTASSIUM mmol/L 4.5   CHLORIDE mmol/L 103   CO2 mmol/L 23.5   BUN mg/dL 16   CREATININE mg/dL 1.23*   GLUCOSE mg/dL 168*   CALCIUM mg/dL 8.8     Results from last 7 days   Lab Units 05/17/23  0143   SODIUM mmol/L 138   POTASSIUM mmol/L 4.5   CHLORIDE mmol/L 103   CO2 mmol/L 23.5   BUN mg/dL 16   CREATININE mg/dL 1.23*   CALCIUM mg/dL 8.8   BILIRUBIN mg/dL <0.2   ALK PHOS U/L 57   ALT (SGPT) U/L <5   AST (SGOT) U/L 6   GLUCOSE mg/dL " 168*     Imaging Results (Last 72 Hours)     Procedure Component Value Units Date/Time    XR Chest 1 View [[id number]] Collected: 05/15/23 2315     Updated: 05/15/23 2318    Narrative:      INDICATION: Cough.     TECHNIQUE: Frontal radiograph of the chest.     COMPARISON: 5/7/2023.       Impression:      FINDINGS/IMPRESSION: Multifocal interstitial opacities, similar to  prior. Heart size is similar. Small pleural effusions. No pneumothorax.  No acute osseous abnormality.     This report was finalized on 5/15/2023 11:16 PM by Alex Pallas, DO.               Diagnostics: Reviewed    A: Clinically, pt remains chronically ill but stable at this time. She remains a swingbed pt, + wax/wane, does work with therapy some, able to get up to bedside chair for most of the day yesterday.       P: Spoke with pt and daughter in great deal yesterday about hospice services vs. LTC. Will follow up with daughter later in the evening about their decisions. Was able to receive some contact information for personal healthcare sitters at home per the request of the daughter. Will continue to provide symptomatic and supportive palliative care for the pt and daughter. She was also approved today for 5 additional swing bed days.       We will continue to follow along. Please do not hesitate to contact us regarding further sx mgmt or GOC needs, including after hours or on weekends via our on call provider at [phone].     Adriana Miller, APRN    5/18/2023

## 2023-05-18 NOTE — CASE MANAGEMENT/SOCIAL WORK
Discharge Planning Assessment   [place]     Patient Name: Orquidea Rosenberg  MRN: [id number]  Today's Date: 5/18/2023    Admit Date: 5/5/2023    Plan: SS received a call from Swing Bed nAdrew ESPITIA who states pt has been approved for 5 additional swing bed days with clinical updates due on 5/23/23. SS to follow up with pt regarding pt's disposition. SS to follow.     Discharge Plan     Row Name 05/18/23 0956       Plan    Plan SS received a call from Swing Bed Andrew ESPITIA who states pt has been approved for 5 additional swing bed days with clinical updates due on 5/23/23. SS to follow up with pt regarding pt's disposition. SS to follow.    1615- SS spoke with pt's daughter, Lilli at bedside to follow up on disposition. Pt's daughter states she has not made a decision about disposition at this time. SS to follow.                 HARPAL Stanley

## 2023-05-18 NOTE — PROGRESS NOTES
[facility] HOSPITALISTS CROSS COVER NOTE    Patient Identification:  Name:  Orquidea Rosenberg  Age:  77 y.o.  Sex:  female  :  1945  MRN:  [id number]  Visit number:  [id number]  Primary Care Provider:  Jackeline Denson APRN    Length of stay in inpatient status:  13    Brief Update     The patient was working with physical therapy when I saw her today.  She had just walked around her bed and was sitting in a bedside chair.  She was very short of air after doing this.  She denies chest pain.  She thinks that the swelling in her legs has improved.  She is eating some.  She denies nausea, vomiting, and diarrhea.  On exam, even though the patient was short of air from moving from her bed to the bedside chair, her lungs did sound better.  Will restart the home Lasix and continue monitoring her input and output closely.  I will obtain labs in the morning to monitor his electrolytes and renal function while we restart her home diuresis.  Palliative care is attempting to contact the family to ask about hospice versus long-term care.     Elda Elliott MD  [facility] Hospitalist  23  13:06 EDT

## 2023-05-18 NOTE — PLAN OF CARE
Goal Outcome Evaluation:  Plan of Care Reviewed With: patient        Progress: improving  Outcome Evaluation: Pt. resting in bed at this time. Pt has had no complaints or concerns this shift. Wound care completed per order. VSS, no acute changes this shift. Will continue with plan of care.

## 2023-05-18 NOTE — THERAPY RE-EVALUATION
Acute Care - Physical Therapy Re-Evaluation   [place]     Patient Name: Orquidea Rosenberg  : 1945  MRN: [id number]  Today's Date: 2023   Onset of Illness/Injury or Date of Surgery: 23 (swing bed admit)  Visit Dx:     ICD-10-CM ICD-9-CM   1. Bacteremia  R78.81 790.7     Patient Active Problem List   Diagnosis   • COVID-19   • Vulvar cancer   • Physical debility   • Bacteremia   • COPD exacerbation   • Debility     Past Medical History:   Diagnosis Date   • Arthritis    • COPD (chronic obstructive pulmonary disease)    • Elevated cholesterol    • History of transfusion    • Hypertension    • Vulval ca      Past Surgical History:   Procedure Laterality Date   • RADICAL VULVECTOMY  2019   • RADICAL VULVECTOMY Bilateral 2019     PT Assessment (last 12 hours)     PT Evaluation and Treatment     Row Name 23 1451          Physical Therapy Time and Intention    Subjective Information complains of;dyspnea  -KM     Document Type therapy note (daily note)  -KM     Mode of Treatment individual therapy;physical therapy  -KM     Patient Effort adequate  -KM     Symptoms Noted During/After Treatment shortness of breath  -KM     Row Name 23 1451          General Information    Patient Profile Reviewed yes  -KM     Patient Observations alert;cooperative;agree to therapy  -KM     Existing Precautions/Restrictions fall;oxygen therapy device and L/min  -KM     Row Name 23 1451          Cognition    Affect/Mental Status (Cognition) WFL  -KM     Orientation Status (Cognition) oriented x 3  -KM     Follows Commands (Cognition) WFL  -KM     Row Name 23 1451          Range of Motion (ROM)    Range of Motion bilateral lower extremities;ROM is WFL  -KM     Row Name 23 1451          Bed Mobility    Bed Mobility rolling right;supine-sit  -KM     Rolling Right [place] (Bed Mobility) modified independence  -KM     Supine-Sit [place] (Bed Mobility) modified independence  -KM     Bed  Mobility, Safety Issues decreased use of arms for pushing/pulling;decreased use of legs for bridging/pushing  -KM     Assistive Device (Bed Mobility) bed rails;head of bed elevated  -KM     Row Name 05/18/23 1451          Transfers    Transfers sit-stand transfer;stand-sit transfer;bed-chair transfer  -KM     Row Name 05/18/23 1451          Bed-Chair Transfer    Bed-Chair [place] (Transfers) contact guard  -KM     Assistive Device (Bed-Chair Transfers) --  hand held assist  -KM     Row Name 05/18/23 1451          Sit-Stand Transfer    Sit-Stand [place] (Transfers) contact guard  -KM     Assistive Device (Sit-Stand Transfers) --  hand held assist  -KM     Row Name 05/18/23 1451          Stand-Sit Transfer    Stand-Sit [place] (Transfers) contact guard  -KM     Assistive Device (Stand-Sit Transfers) --  hand held assist  -KM     Row Name 05/18/23 1451          Gait/Stairs (Locomotion)    Gait/Stairs Locomotion gait/ambulation independence;distance ambulated  -KM     [place] Level (Gait) contact guard  -KM     Assistive Device (Gait) --  hand held assist  -KM     Distance in Feet (Gait) 15'  -KM     Pattern (Gait) step-through  -KM     Deviations/Abnormal Patterns (Gait) ataxic;gait speed decreased;weight shifting decreased  -KM     Row Name 05/18/23 1451          Safety Issues, Functional Mobility    Impairments Affecting Function (Mobility) endurance/activity tolerance;shortness of breath;strength  -KM     Row Name 05/18/23 1451          Motor Skills    Comments, Therapeutic Exercise seated ther-ex  -KM     Row Name             Wound 05/01/23 1414 Left vagina    Wound - Properties Group Placement Date: 05/01/23  -LB Placement Time: 1414  -LB Present on Hospital Admission: Y  -LB Side: Left  -LB Location: vagina  -LB    Retired Wound - Properties Group Placement Date: 05/01/23  -LB Placement Time: 1414  -LB Present on Hospital Admission: Y  -LB Side: Left  -LB Location: vagina  -LB    Retired Wound  - Properties Group Date first assessed: 05/01/23  -LB Time first assessed: 1414  -LB Present on Hospital Admission: Y  -LB Side: Left  -LB Location: vagina  -LB    Row Name 05/18/23 1451          Progress Summary (PT)    Daily Progress Summary (PT) Pt. re-evaluation completed during PT session. She was able to perform functional mobility skills at high level. She ambulated short distance around bed to chair. She tolerated ther-ex well. Pt. c/o shortness of breath throughout session, which has been typical. Pt. would continue to benefit from skilled PT services.  -KM           User Key  (r) = Recorded By, (t) = Taken By, (c) = Cosigned By    Initials Name Provider Type    Ander Garcia, PT Physical Therapist    Veronica Bansal, RN Registered Nurse                Physical Therapy Education     Title: PT OT SLP Therapies (Done)     Topic: Physical Therapy (Done)     Point: Mobility training (Done)     Learning Progress Summary           Patient Acceptance, TB,E, VU,DU by BD at 5/15/2023 0902    Acceptance, E,TB, DU,VU by BD at 5/14/2023 0836    Acceptance, E,D, VU,NR by AG at 5/13/2023 1519                   Point: Home exercise program (Done)     Learning Progress Summary           Patient Acceptance, TB,E, VU,DU by BD at 5/15/2023 0902    Acceptance, E,TB, DU,VU by BD at 5/14/2023 0836    Acceptance, E,D, VU,NR by AG at 5/13/2023 1519                   Point: Body mechanics (Done)     Learning Progress Summary           Patient Acceptance, TB,E, VU,DU by BD at 5/15/2023 0902    Acceptance, E,TB, DU,VU by BD at 5/14/2023 0836    Acceptance, E,D, VU,NR by AG at 5/13/2023 1519                   Point: Precautions (Done)     Learning Progress Summary           Patient Acceptance, TB,E, VU,DU by BD at 5/15/2023 0902    Acceptance, E,TB, DU,VU by BD at 5/14/2023 0836    Acceptance, E,D, VU,NR by AG at 5/13/2023 1519                               User Key     Initials Effective Dates Name Provider Type [facility]    AG  06/16/21 -  Jaclyn Fernandez, PT Physical Therapist PT    BD 08/05/21 -  Felicia Galeano, RN Registered Nurse Nurse              PT Recommendation and Plan     Progress Summary (PT)  Daily Progress Summary (PT): Pt. re-evaluation completed during PT session. She was able to perform functional mobility skills at high level. She ambulated short distance around bed to chair. She tolerated ther-ex well. Pt. c/o shortness of breath throughout session, which has been typical. Pt. would continue to benefit from skilled PT services.   Outcome Measures     Row Name 05/18/23 1400 05/17/23 1400          How much help from another is currently needed...    Putting on and taking off regular lower body clothing? 2  -LM 2  -LA     Bathing (including washing, rinsing, and drying) 2  -LM 2  -LA     Toileting (which includes using toilet bed pan or urinal) 1  -LM 1  -LA     Putting on and taking off regular upper body clothing 3  -LM 3  -LA     Taking care of personal grooming (such as brushing teeth) 3  -LM 3  -LA     Eating meals 3  -LM 3  -LA     AM-PAC 6 Clicks Score (OT) 14  -LM 14  -LA        Functional Assessment    Outcome Measure Options AM-PAC 6 Clicks Daily Activity (OT)  -LM AM-PAC 6 Clicks Daily Activity (OT)  -LA           User Key  (r) = Recorded By, (t) = Taken By, (c) = Cosigned By    Initials Name Provider Type    Joanne Claros OT Occupational Therapist    Dior Christensen OT Occupational Therapist                 Time Calculation:    PT Charges     Row Name 05/18/23 1450             Time Calculation    PT Received On 05/18/23  -KM      PT Goal Re-Cert Due Date 06/01/23  -KM         Time Calculation- PT    Total Timed Code Minutes- PT 23 minute(s)  -KM            User Key  (r) = Recorded By, (t) = Taken By, (c) = Cosigned By    Initials Name Provider Type    Ander Garcia, PT Physical Therapist              Therapy Charges for Today     Code Description Service Date Service Provider Modifiers Qty     [id number]  PT THER PROC EA 15 MIN 5/18/2023 Ander Shirley, PT GP 1    [id number]  GAIT TRAINING EA 15 MIN 5/18/2023 Ander Shirley, PT GP 1          PT G-Codes  Outcome Measure Options: AM-PAC 6 Clicks Daily Activity (OT)  AM-PAC 6 Clicks Score (PT): 16  AM-PAC 6 Clicks Score (OT): 14    Ander Shirley PT  5/18/2023

## 2023-05-18 NOTE — PHARMACY PATIENT ASSISTANCE
Pharmacy COPD Maintenance Inhaler Assessment     Pharmacy was consulted for dosing and coverage assessment of COPD maintenance medications for discharge. The patient is currently hospitalized and being treated for copd.     The patient was not on any inhaler or neb treatments prior to admission.         Based on the most recent GOLD literature, the patient should be using an inhaled medication(s) containing the following drug classes: LABA/LAMA.  Will order stiolto based on literature and insurance compatibility to the correct pharmacy once discharge decisions are made (hospice vs NH vs home).  The patient will have a $40 copay for the inhaler.  The patient will need counseling on the use of the inhaler once a determination on her discharge is made.  She would benefit from an albuterol inhaler or neb treatments at discharge as well.    Thank you,   Fiona Villatoro, PharmD  05/18/23  16:27 EDT

## 2023-05-18 NOTE — PROGRESS NOTES
Attempted to contact daughter Lilli today with no answer about Hospice decisions and to provide additional information as requested.

## 2023-05-19 LAB
ABSOLUTE LUNG FLUID CONTENT: 37 % (ref 20–35)
ANION GAP SERPL CALCULATED.3IONS-SCNC: 10.9 MMOL/L (ref 5–15)
BUN SERPL-MCNC: 25 MG/DL (ref 8–23)
BUN/CREAT SERPL: 23.1 (ref 7–25)
CALCIUM SPEC-SCNC: 8.7 MG/DL (ref 8.6–10.5)
CHLORIDE SERPL-SCNC: 103 MMOL/L (ref 98–107)
CO2 SERPL-SCNC: 23.1 MMOL/L (ref 22–29)
CREAT SERPL-MCNC: 1.08 MG/DL (ref 0.57–1)
EGFRCR SERPLBLD CKD-EPI 2021: 53 ML/MIN/1.73
GLUCOSE SERPL-MCNC: 133 MG/DL (ref 65–99)
MAGNESIUM SERPL-MCNC: 2.1 MG/DL (ref 1.6–2.4)
PHOSPHATE SERPL-MCNC: 3.9 MG/DL (ref 2.5–4.5)
POTASSIUM SERPL-SCNC: 5 MMOL/L (ref 3.5–5.2)
SODIUM SERPL-SCNC: 137 MMOL/L (ref 136–145)

## 2023-05-19 PROCEDURE — 94799 UNLISTED PULMONARY SVC/PX: CPT

## 2023-05-19 PROCEDURE — 94761 N-INVAS EAR/PLS OXIMETRY MLT: CPT

## 2023-05-19 PROCEDURE — 63710000001 PREDNISONE PER 1 MG: Performed by: INTERNAL MEDICINE

## 2023-05-19 PROCEDURE — 97110 THERAPEUTIC EXERCISES: CPT

## 2023-05-19 PROCEDURE — 94726 PLETHYSMOGRAPHY LUNG VOLUMES: CPT

## 2023-05-19 PROCEDURE — 84100 ASSAY OF PHOSPHORUS: CPT | Performed by: INTERNAL MEDICINE

## 2023-05-19 PROCEDURE — 83735 ASSAY OF MAGNESIUM: CPT | Performed by: INTERNAL MEDICINE

## 2023-05-19 PROCEDURE — 25010000002 ENOXAPARIN PER 10 MG: Performed by: INTERNAL MEDICINE

## 2023-05-19 PROCEDURE — 80048 BASIC METABOLIC PNL TOTAL CA: CPT | Performed by: INTERNAL MEDICINE

## 2023-05-19 RX ADMIN — Medication 10 ML: at 20:38

## 2023-05-19 RX ADMIN — OXYCODONE HYDROCHLORIDE AND ACETAMINOPHEN 1 TABLET: 10; 325 TABLET ORAL at 08:38

## 2023-05-19 RX ADMIN — VITAMINS A AND D OINTMENT 1 APPLICATION: 15.5; 53.4 OINTMENT TOPICAL at 20:38

## 2023-05-19 RX ADMIN — IPRATROPIUM BROMIDE AND ALBUTEROL SULFATE 3 ML: .5; 2.5 SOLUTION RESPIRATORY (INHALATION) at 18:31

## 2023-05-19 RX ADMIN — FERROUS SULFATE TAB 325 MG (65 MG ELEMENTAL FE) 325 MG: 325 (65 FE) TAB at 08:32

## 2023-05-19 RX ADMIN — FUROSEMIDE 20 MG: 20 TABLET ORAL at 08:32

## 2023-05-19 RX ADMIN — NYSTATIN: 100000 POWDER TOPICAL at 20:38

## 2023-05-19 RX ADMIN — VITAMINS A AND D OINTMENT 1 APPLICATION: 15.5; 53.4 OINTMENT TOPICAL at 08:33

## 2023-05-19 RX ADMIN — Medication 10 ML: at 08:34

## 2023-05-19 RX ADMIN — IPRATROPIUM BROMIDE AND ALBUTEROL SULFATE 3 ML: .5; 2.5 SOLUTION RESPIRATORY (INHALATION) at 07:05

## 2023-05-19 RX ADMIN — LEVOTHYROXINE SODIUM 100 MCG: 100 TABLET ORAL at 05:15

## 2023-05-19 RX ADMIN — TUBERCULIN PURIFIED PROTEIN DERIVATIVE 5 UNITS: 5 INJECTION, SOLUTION INTRADERMAL at 11:54

## 2023-05-19 RX ADMIN — PREDNISONE 40 MG: 20 TABLET ORAL at 08:32

## 2023-05-19 RX ADMIN — OXYCODONE HYDROCHLORIDE AND ACETAMINOPHEN 1 TABLET: 10; 325 TABLET ORAL at 19:36

## 2023-05-19 RX ADMIN — NYSTATIN: 100000 POWDER TOPICAL at 08:33

## 2023-05-19 RX ADMIN — POLYETHYLENE GLYCOL 3350 17 G: 17 POWDER, FOR SOLUTION ORAL at 08:32

## 2023-05-19 RX ADMIN — BUDESONIDE 0.5 MG: 0.5 INHALANT RESPIRATORY (INHALATION) at 07:05

## 2023-05-19 RX ADMIN — GUAIFENESIN 600 MG: 600 TABLET, EXTENDED RELEASE ORAL at 08:32

## 2023-05-19 RX ADMIN — LOSARTAN POTASSIUM 25 MG: 25 TABLET, FILM COATED ORAL at 08:32

## 2023-05-19 RX ADMIN — IPRATROPIUM BROMIDE AND ALBUTEROL SULFATE 3 ML: .5; 2.5 SOLUTION RESPIRATORY (INHALATION) at 12:04

## 2023-05-19 RX ADMIN — BUDESONIDE 0.5 MG: 0.5 INHALANT RESPIRATORY (INHALATION) at 18:31

## 2023-05-19 RX ADMIN — Medication 10 ML: at 08:33

## 2023-05-19 RX ADMIN — GUAIFENESIN 600 MG: 600 TABLET, EXTENDED RELEASE ORAL at 20:38

## 2023-05-19 RX ADMIN — ENOXAPARIN SODIUM 40 MG: 40 INJECTION SUBCUTANEOUS at 08:33

## 2023-05-19 NOTE — PROGRESS NOTES
[facility]    Patient Identification:  Name:  Orquidea Rosenberg  Age:  77 y.o.  Sex:  female  :  1945  MRN:  [id number]  Visit number:  [id number]  Primary Care Provider:  Jackeline Denson APRN    Length of stay in inpatient status:  14    Brief Update     I have visited with the patient today; she was sitting up in a bedside chair upon my evaluation.  The patient states that she still has dyspnea on exertion from walking from one side of her bed to the other side and sitting down in a bedside chair.  She denies chest pain, nausea, vomiting, and diarrhea.  Her cough has improved and she does not produce sputum.  She is unsure if her leg edema has improved but, upon my visual inspection of her legs, I feel that the edema has improved.  Her lungs sound the same as yesterday; she still has poor to fair air movement but I do not hear any wheezing and I do not hear any crackles.  She does have a slight prolonged expiratory phase.  She remains on her home 3 L/min of nasal cannula oxygen.  The patient was started back on her home Lasix yesterday and her home losartan was cut in half as her creatinine had increased from the IV Lasix that was given 3 days ago.  I have reviewed the patient's labs for today and they are stable.  Please note that her creatinine has improved and her blood pressures appear to be the same as they were a few days ago.  Thus, we will continue to monitor the patient as she appears to be at her baseline respiratory status.  I suspect that the dyspnea on exertion is due to her baseline chronic lung disease.    LABS:    Renal and electrolytes:  Results from last 7 days   Lab Units 23  0146 23  0143 23  0129   SODIUM mmol/L 137 138 133*   POTASSIUM mmol/L 5.0 4.5 4.4   MAGNESIUM mg/dL 2.1 2.0 1.9   CHLORIDE mmol/L 103 103 102   CO2 mmol/L 23.1 23.5 23.5   BUN mg/dL 25* 16 13   CREATININE mg/dL 1.08* 1.23* 1.04*   CALCIUM mg/dL 8.7 8.8 8.6    PHOSPHORUS mg/dL 3.9 4.1 3.5   GLUCOSE mg/dL 133* 168* 127*     Estimated Creatinine Clearance: 47.6 mL/min (A) (by C-G formula based on SCr of 1.08 mg/dL (H)).    I have personally looked at the labs and they are stated above.      Elda Elliott MD  [facility]  05/19/23  10:00 EDT

## 2023-05-19 NOTE — THERAPY TREATMENT NOTE
Acute Care - Occupational Therapy Treatment Note   Jorge     Patient Name: Orquidea Rosenberg  : 1945  MRN: [id number]  Today's Date: 2023  Onset of Illness/Injury or Date of Surgery: 23 (swing bed admit)     Referring Physician: Mervat    Admit Date: 2023       ICD-10-CM ICD-9-CM   1. Bacteremia  R78.81 790.7     Patient Active Problem List   Diagnosis   • COVID-19   • Vulvar cancer   • Physical debility   • Bacteremia   • COPD exacerbation   • Debility     Past Medical History:   Diagnosis Date   • Arthritis    • COPD (chronic obstructive pulmonary disease)    • Elevated cholesterol    • History of transfusion    • Hypertension    • Vulval ca      Past Surgical History:   Procedure Laterality Date   • RADICAL VULVECTOMY  2019   • RADICAL VULVECTOMY Bilateral 2019         OT ASSESSMENT FLOWSHEET (last 12 hours)     OT Evaluation and Treatment     Row Name 23 0951                   OT Time and Intention    Subjective Information complains of;weakness;fatigue  -LM        Document Type therapy note (daily note)  -LM        Mode of Treatment occupational therapy  -LM        Patient Effort adequate  -LM        Comment Patient seen this am for light bue arom/therex.  Patient performed light towel therex with frequent rest breaks.  Patient performed all therex while seated in bedside chair.  patient requires setup with feeding and light grooming.  min assist with fxl transfers.  Max assist with bathing, toileting, LE dressing.  -LM           General Information    Existing Precautions/Restrictions fall;oxygen therapy device and L/min  -LM           Cognition    Affect/Mental Status (Cognition) WFL  -LM        Orientation Status (Cognition) oriented x 3  -LM           Wound 23 1414 Left vagina    Wound - Properties Group Placement Date: 23  -LB Placement Time: 1414  -LB Present on Hospital Admission: Y  -LB Side: Left  -LB Location: vagina  -LB    Retired Wound - Properties  Group Placement Date: 05/01/23  -LB Placement Time: 1414  -LB Present on Hospital Admission: Y  -LB Side: Left  -LB Location: vagina  -LB    Retired Wound - Properties Group Date first assessed: 05/01/23  -LB Time first assessed: 1414  -LB Present on Hospital Admission: Y  -LB Side: Left  -LB Location: vagina  -LB       Positioning and Restraints    Post Treatment Position chair  -LM        In Chair call light within reach;encouraged to call for assist  -LM              User Key  (r) = Recorded By, (t) = Taken By, (c) = Cosigned By    Initials Name Effective Dates    Joanne Claros, OT 06/16/21 -     LB Veronica Mendez RN 10/20/21 -                        OT Recommendation and Plan           Outcome Measures     Row Name 05/19/23 0900 05/18/23 1400 05/17/23 1400       How much help from another is currently needed...    Putting on and taking off regular lower body clothing? 2  -LM 2  -LM 2  -LA    Bathing (including washing, rinsing, and drying) 2  -LM 2  -LM 2  -LA    Toileting (which includes using toilet bed pan or urinal) 2  -LM 1  -LM 1  -LA    Putting on and taking off regular upper body clothing 3  -LM 3  -LM 3  -LA    Taking care of personal grooming (such as brushing teeth) 3  -LM 3  -LM 3  -LA    Eating meals 3  -LM 3  -LM 3  -LA    AM-PAC 6 Clicks Score (OT) 15  -LM 14  -LM 14  -LA       Functional Assessment    Outcome Measure Options -- AM-PAC 6 Clicks Daily Activity (OT)  -LM AM-PAC 6 Clicks Daily Activity (OT)  -LA          User Key  (r) = Recorded By, (t) = Taken By, (c) = Cosigned By    Initials Name Provider Type    Joanne Claros, OT Occupational Therapist    Dior Christensen OT Occupational Therapist                Time Calculation:    Time Calculation- OT     Row Name 05/19/23 0955             Time Calculation- OT    Total Timed Code Minutes- OT 15 minute(s)  -LM            User Key  (r) = Recorded By, (t) = Taken By, (c) = Cosigned By    Initials Name Provider Type    Joanne Claros,  OT Occupational Therapist              Therapy Charges for Today     Code Description Service Date Service Provider Modifiers Qty    35458553104 HC OT THER PROC EA 15 MIN 5/18/2023 Joanne Colon, OT GO 1    74933126530 HC OT THER PROC EA 15 MIN 5/19/2023 Joanne Cloon, OT GO 1               Joanne Colon OT  5/19/2023

## 2023-05-19 NOTE — CASE MANAGEMENT/SOCIAL WORK
Discharge Planning Assessment   Jorge     Patient Name: Orquidea Rosenberg  MRN: [id number]  Today's Date: 5/19/2023    Admit Date: 5/5/2023     Discharge Plan       Row Name 05/19/23 1005       Plan    Plan Pt was admitted to swing bed on 5/5/23. Pt has been approved for swing bed through 5/23/23. Pt lived at home with daughter, Lilli. Pt is considering returning home with hospice services at discharge. Palliative Care is following. Pt did not utilize home health services prior to admission. Pt has O2 @ 2 liters via [facility]. SS to follow and assist as needed with discharge planning.             RYDER Chahal

## 2023-05-19 NOTE — THERAPY TREATMENT NOTE
Acute Care - Physical Therapy Treatment Note   Jorge     Patient Name: Orquidea Rosenberg  : 1945  MRN: [id number]  Today's Date: 2023   Onset of Illness/Injury or Date of Surgery: 23 (swing bed admit)  Visit Dx:     ICD-10-CM ICD-9-CM   1. Bacteremia  R78.81 790.7     Patient Active Problem List   Diagnosis   • COVID-19   • Vulvar cancer   • Physical debility   • Bacteremia   • COPD exacerbation   • Debility     Past Medical History:   Diagnosis Date   • Arthritis    • COPD (chronic obstructive pulmonary disease)    • Elevated cholesterol    • History of transfusion    • Hypertension    • Vulval ca      Past Surgical History:   Procedure Laterality Date   • RADICAL VULVECTOMY  2019   • RADICAL VULVECTOMY Bilateral 2019     PT Assessment (last 12 hours)     PT Evaluation and Treatment     Row Name 23 1545          Physical Therapy Time and Intention    Subjective Information complains of;weakness  -KM     Document Type therapy note (daily note)  -KM     Mode of Treatment individual therapy;physical therapy  -KM     Patient Effort adequate  -KM     Symptoms Noted During/After Treatment fatigue  -KM     Row Name 23 1545          General Information    Patient Profile Reviewed yes  -KM     Patient Observations alert;cooperative;agree to therapy  -KM     Existing Precautions/Restrictions fall;oxygen therapy device and L/min  -KM     Row Name 23 1545          Cognition    Affect/Mental Status (Cognition) WFL  -KM     Orientation Status (Cognition) oriented x 3  -KM     Follows Commands (Cognition) WFL  -KM     Row Name 23 1545          Transfers    Comment, (Transfers) Pt. transferred from bed-chair w/ nursing staff just prior to PT session w/ [facility].  -KM     Row Name 23 1545          Safety Issues, Functional Mobility    Impairments Affecting Function (Mobility) endurance/activity tolerance;shortness of breath;strength  -KM     Row Name 23 1545          Motor  Skills    Comments, Therapeutic Exercise seated ther-ex until fatigued  -KM     Row Name             Wound 05/01/23 1414 Left vagina    Wound - Properties Group Placement Date: 05/01/23  -LB Placement Time: 1414  -LB Present on Hospital Admission: Y  -LB Side: Left  -LB Location: vagina  -LB    Retired Wound - Properties Group Placement Date: 05/01/23  -LB Placement Time: 1414  -LB Present on Hospital Admission: Y  -LB Side: Left  -LB Location: vagina  -LB    Retired Wound - Properties Group Date first assessed: 05/01/23  -LB Time first assessed: 1414  -LB Present on Hospital Admission: Y  -LB Side: Left  -LB Location: vagina  -LB    Row Name 05/19/23 1545          Progress Summary (PT)    Daily Progress Summary (PT) Pt. evaluation completed during PT session. She was up in chair upon arrival. She had recently transferred from bed-chair w/ nursing staff shortly before PT session began. Pt. was able to tolerate increased time w/ seated ther-ex. Pt. would continue to benefit from skilled PT services.  -KM           User Key  (r) = Recorded By, (t) = Taken By, (c) = Cosigned By    Initials Name Provider Type    Ander Garcia, PT Physical Therapist    Veronica Bansal, RN Registered Nurse                Physical Therapy Education     Title: PT OT SLP Therapies (Done)     Topic: Physical Therapy (Done)     Point: Mobility training (Done)     Learning Progress Summary           Patient Acceptance, TB,E, VU,DU by BD at 5/15/2023 0902    Acceptance, E,TB, DU,VU by  at 5/14/2023 0836    Acceptance, E,D, VU,NR by  at 5/13/2023 1519                   Point: Home exercise program (Done)     Learning Progress Summary           Patient Acceptance, TB,E, VU,DU by BD at 5/15/2023 0902    Acceptance, E,TB, DU,VU by BD at 5/14/2023 0836    Acceptance, E,D, VU,NR by  at 5/13/2023 1519                   Point: Body mechanics (Done)     Learning Progress Summary           Patient Acceptance, TB,E, VU,DU by BD at 5/15/2023 0902     Acceptance, E,TB, DU,VU by  at 5/14/2023 0836    Acceptance, E,D, VU,NR by  at 5/13/2023 1519                   Point: Precautions (Done)     Learning Progress Summary           Patient Acceptance, TB,E, VU,DU by  at 5/15/2023 0902    Acceptance, E,TB, DU,VU by  at 5/14/2023 0836    Acceptance, E,D, VU,NR by  at 5/13/2023 1519                               User Key     Initials Effective Dates Name Provider Type Discipline     06/16/21 -  Jaclyn Fernandez, PT Physical Therapist PT     08/05/21 -  Felicia Galeano, RN Registered Nurse Nurse              PT Recommendation and Plan     Progress Summary (PT)  Daily Progress Summary (PT): Pt. evaluation completed during PT session. She was up in chair upon arrival. She had recently transferred from bed-chair w/ nursing staff shortly before PT session began. Pt. was able to tolerate increased time w/ seated ther-ex. Pt. would continue to benefit from skilled PT services.   Outcome Measures     Row Name 05/19/23 0900 05/18/23 1400 05/17/23 1400       How much help from another is currently needed...    Putting on and taking off regular lower body clothing? 2  -LM 2  -LM 2  -LA    Bathing (including washing, rinsing, and drying) 2  -LM 2  -LM 2  -LA    Toileting (which includes using toilet bed pan or urinal) 2  -LM 1  -LM 1  -LA    Putting on and taking off regular upper body clothing 3  -LM 3  -LM 3  -LA    Taking care of personal grooming (such as brushing teeth) 3  -LM 3  -LM 3  -LA    Eating meals 3  -LM 3  -LM 3  -LA    AM-PAC 6 Clicks Score (OT) 15  -LM 14  -LM 14  -LA       Functional Assessment    Outcome Measure Options -- AM-PAC 6 Clicks Daily Activity (OT)  -LM AM-PAC 6 Clicks Daily Activity (OT)  -LA          User Key  (r) = Recorded By, (t) = Taken By, (c) = Cosigned By    Initials Name Provider Type    LM Joanne Colon, OT Occupational Therapist    Dior Christensen, VERONICA Occupational Therapist                 Time Calculation:    PT Charges      Row Name 05/19/23 1544             Time Calculation    PT Received On 05/19/23  -KM         Time Calculation- PT    Total Timed Code Minutes- PT 23 minute(s)  -KM            User Key  (r) = Recorded By, (t) = Taken By, (c) = Cosigned By    Initials Name Provider Type    Ander Garcia, PT Physical Therapist              Therapy Charges for Today     Code Description Service Date Service Provider Modifiers Qty    15925114221 HC PT THER PROC EA 15 MIN 5/18/2023 Ander Shirley, PT GP 1    05730896013 HC GAIT TRAINING EA 15 MIN 5/18/2023 Ander Shirley, PT GP 1          PT G-Codes  Outcome Measure Options: AM-PAC 6 Clicks Daily Activity (OT)  AM-PAC 6 Clicks Score (PT): 14  AM-PAC 6 Clicks Score (OT): 15    Ander Shirley PT  5/19/2023

## 2023-05-19 NOTE — PROGRESS NOTES
"Palliative Care Daily Progress Note     S: Medical record reviewed, upon entering the room pt was found lying in bed comfortably. Pt still c/o soa , resp even but slightly labored. Pt denies any n/v/constipation, reports several loose BM daily which is normal for her. She reports that she tries to work with PT as much as she can but hasn't been able to give her all because of weakness. Pt reports that she has been getting up to bedside chair some. Pt does report increased fatigue today.       O:   Palliative Performance Scale Score:     /77 (BP Location: Right arm, Patient Position: Lying)   Pulse 68   Temp 97.8 °F (36.6 °C) (Axillary)   Resp 20   Ht 167.6 cm (66\")   Wt 83.9 kg (185 lb)   SpO2 99%   BMI 29.86 kg/m²     Intake/Output Summary (Last 24 hours) at 5/19/2023 1029  Last data filed at 5/19/2023 0300  Gross per 24 hour   Intake 840 ml   Output --   Net 840 ml       PE:    General Appearance:    Chronically ill appearing, alert, cooperative, NAD   HEENT:    NC/AT, without obvious abnormality, EOMI, anicteric    Neck:   supple, trachea midline, no JVD   Lungs:     CTAB without w/r/r, diminished, dyspnea, +AGUAYO    Heart:    RRR, normal S1 and S2, no M/R/G   Abdomen:     Soft, NT, ND, NABS    Extremities:   Moves all extremities, no edema   Pulses:   Pulses palpable and equal bilaterally   Skin:   Warm, dry, vulvar abnormalities r/t post surgical procedures    Neurologic:   A/Ox3, cooperative   Psych:   Calm, appropriate         Meds: Reviewed and no changes     Labs:   Results from last 7 days   Lab Units 05/17/23 0143   WBC 10*3/mm3 7.64   HEMOGLOBIN g/dL 7.3*   HEMATOCRIT % 24.9*   PLATELETS 10*3/mm3 311     Results from last 7 days   Lab Units 05/19/23  0146   SODIUM mmol/L 137   POTASSIUM mmol/L 5.0   CHLORIDE mmol/L 103   CO2 mmol/L 23.1   BUN mg/dL 25*   CREATININE mg/dL 1.08*   GLUCOSE mg/dL 133*   CALCIUM mg/dL 8.7     Results from last 7 days   Lab Units 05/19/23  0146 05/17/23  0143 "   SODIUM mmol/L 137 138   POTASSIUM mmol/L 5.0 4.5   CHLORIDE mmol/L 103 103   CO2 mmol/L 23.1 23.5   BUN mg/dL 25* 16   CREATININE mg/dL 1.08* 1.23*   CALCIUM mg/dL 8.7 8.8   BILIRUBIN mg/dL  --  <0.2   ALK PHOS U/L  --  57   ALT (SGPT) U/L  --  <5   AST (SGOT) U/L  --  6   GLUCOSE mg/dL 133* 168*     Imaging Results (Last 72 Hours)     ** No results found for the last 72 hours. **            Diagnostics: Reviewed    A: Clinically, pt remains chronically ill however labs are stable. /77 hr 68 rr 20 sat 91% on 3lpm n/c, increased to 3.5lpm n/c, sat increased to 97%. Pt does have dyspnea while sitting in bed, tachypnea with any type of exertion.      P: Pt has been approved for more swingbed days, attempted to contact daughter Lilli again about their decision on hospice and their long term/short term goals however was unable to reach her this morning. Voicemail was left. The last conversation we had , she possibly wanted to go home with hospice however daughter was hesitant about being able to physically care for her. Will continue to reach out. Would continue with current regimen as well, will continue to provide symptomatic and supportive palliative care for pt and family.       We will continue to follow along. Please do not hesitate to contact us regarding further sx mgmt or GOC needs, including after hours or on weekends via our on call provider at [phone].     Adriana Miller, APRN    5/19/2023

## 2023-05-19 NOTE — PLAN OF CARE
Goal Outcome Evaluation:              Outcome Evaluation: Patient resting in bed at this time. PRN pain medication given per request. No other complaints or acute changes at this time. Will continue plan of care.

## 2023-05-19 NOTE — PLAN OF CARE
Goal Outcome Evaluation:           Progress: improving  Outcome Evaluation: Patient has done well today.  O2 sats drop when patient exerts herself.  PRN medications given per order.  Continue curretn plan of care.

## 2023-05-20 ENCOUNTER — APPOINTMENT (OUTPATIENT)
Dept: GENERAL RADIOLOGY | Facility: HOSPITAL | Age: 78
DRG: 871 | End: 2023-05-20
Payer: MEDICARE

## 2023-05-20 PROBLEM — R09.02 HYPOXIA: Status: ACTIVE | Noted: 2023-05-20

## 2023-05-20 PROCEDURE — 71045 X-RAY EXAM CHEST 1 VIEW: CPT

## 2023-05-20 PROCEDURE — 94761 N-INVAS EAR/PLS OXIMETRY MLT: CPT

## 2023-05-20 PROCEDURE — 94799 UNLISTED PULMONARY SVC/PX: CPT

## 2023-05-20 PROCEDURE — 93005 ELECTROCARDIOGRAM TRACING: CPT | Performed by: STUDENT IN AN ORGANIZED HEALTH CARE EDUCATION/TRAINING PROGRAM

## 2023-05-20 PROCEDURE — 25010000002 ENOXAPARIN PER 10 MG: Performed by: INTERNAL MEDICINE

## 2023-05-20 PROCEDURE — 97530 THERAPEUTIC ACTIVITIES: CPT

## 2023-05-20 PROCEDURE — 25010000002 FUROSEMIDE PER 20 MG: Performed by: STUDENT IN AN ORGANIZED HEALTH CARE EDUCATION/TRAINING PROGRAM

## 2023-05-20 PROCEDURE — 71045 X-RAY EXAM CHEST 1 VIEW: CPT | Performed by: RADIOLOGY

## 2023-05-20 PROCEDURE — 63710000001 PREDNISONE PER 1 MG: Performed by: INTERNAL MEDICINE

## 2023-05-20 PROCEDURE — 99223 1ST HOSP IP/OBS HIGH 75: CPT | Performed by: STUDENT IN AN ORGANIZED HEALTH CARE EDUCATION/TRAINING PROGRAM

## 2023-05-20 RX ORDER — SODIUM CHLORIDE 0.9 % (FLUSH) 0.9 %
20 SYRINGE (ML) INJECTION AS NEEDED
Status: CANCELLED | OUTPATIENT
Start: 2023-05-20

## 2023-05-20 RX ORDER — SODIUM CHLORIDE 0.9 % (FLUSH) 0.9 %
10 SYRINGE (ML) INJECTION AS NEEDED
Status: CANCELLED | OUTPATIENT
Start: 2023-05-20

## 2023-05-20 RX ORDER — PREDNISONE 20 MG/1
40 TABLET ORAL
Status: CANCELLED | OUTPATIENT
Start: 2023-05-21 | End: 2023-05-22

## 2023-05-20 RX ORDER — SODIUM CHLORIDE 0.9 % (FLUSH) 0.9 %
10 SYRINGE (ML) INJECTION EVERY 12 HOURS SCHEDULED
Status: CANCELLED | OUTPATIENT
Start: 2023-05-20

## 2023-05-20 RX ORDER — FERROUS SULFATE 325(65) MG
325 TABLET ORAL
Status: CANCELLED | OUTPATIENT
Start: 2023-05-21

## 2023-05-20 RX ORDER — LOSARTAN POTASSIUM 25 MG/1
25 TABLET ORAL
Status: CANCELLED | OUTPATIENT
Start: 2023-05-21

## 2023-05-20 RX ORDER — LEVOTHYROXINE SODIUM 0.1 MG/1
100 TABLET ORAL
Status: CANCELLED | OUTPATIENT
Start: 2023-05-21

## 2023-05-20 RX ORDER — FUROSEMIDE 10 MG/ML
40 INJECTION INTRAMUSCULAR; INTRAVENOUS ONCE
Status: COMPLETED | OUTPATIENT
Start: 2023-05-20 | End: 2023-05-20

## 2023-05-20 RX ORDER — OXYCODONE AND ACETAMINOPHEN 10; 325 MG/1; MG/1
1 TABLET ORAL EVERY 4 HOURS PRN
Status: CANCELLED | OUTPATIENT
Start: 2023-05-20

## 2023-05-20 RX ORDER — SODIUM CHLORIDE 9 MG/ML
40 INJECTION, SOLUTION INTRAVENOUS AS NEEDED
Status: CANCELLED | OUTPATIENT
Start: 2023-05-20

## 2023-05-20 RX ORDER — ONDANSETRON 4 MG/1
4 TABLET, FILM COATED ORAL EVERY 8 HOURS PRN
Status: CANCELLED | OUTPATIENT
Start: 2023-05-20

## 2023-05-20 RX ORDER — BUDESONIDE 0.5 MG/2ML
0.5 INHALANT ORAL
Status: CANCELLED | OUTPATIENT
Start: 2023-05-20

## 2023-05-20 RX ORDER — GUAIFENESIN 600 MG/1
600 TABLET, EXTENDED RELEASE ORAL EVERY 12 HOURS SCHEDULED
Status: CANCELLED | OUTPATIENT
Start: 2023-05-20

## 2023-05-20 RX ORDER — IPRATROPIUM BROMIDE AND ALBUTEROL SULFATE 2.5; .5 MG/3ML; MG/3ML
3 SOLUTION RESPIRATORY (INHALATION) EVERY 4 HOURS PRN
Status: CANCELLED | OUTPATIENT
Start: 2023-05-20

## 2023-05-20 RX ORDER — ENOXAPARIN SODIUM 100 MG/ML
40 INJECTION SUBCUTANEOUS DAILY
Status: CANCELLED | OUTPATIENT
Start: 2023-05-21

## 2023-05-20 RX ORDER — NYSTATIN 100000 [USP'U]/G
POWDER TOPICAL EVERY 12 HOURS SCHEDULED
Status: CANCELLED | OUTPATIENT
Start: 2023-05-20

## 2023-05-20 RX ORDER — NITROGLYCERIN 0.4 MG/1
0.4 TABLET SUBLINGUAL
Status: CANCELLED | OUTPATIENT
Start: 2023-05-20

## 2023-05-20 RX ORDER — IPRATROPIUM BROMIDE AND ALBUTEROL SULFATE 2.5; .5 MG/3ML; MG/3ML
3 SOLUTION RESPIRATORY (INHALATION)
Status: CANCELLED | OUTPATIENT
Start: 2023-05-20

## 2023-05-20 RX ORDER — POLYETHYLENE GLYCOL 3350 17 G/17G
17 POWDER, FOR SOLUTION ORAL DAILY
Status: CANCELLED | OUTPATIENT
Start: 2023-05-21

## 2023-05-20 RX ORDER — FUROSEMIDE 20 MG/1
20 TABLET ORAL DAILY
Status: CANCELLED | OUTPATIENT
Start: 2023-05-21

## 2023-05-20 RX ORDER — HYDROXYZINE HYDROCHLORIDE 25 MG/1
25 TABLET, FILM COATED ORAL 3 TIMES DAILY PRN
Status: CANCELLED | OUTPATIENT
Start: 2023-05-20

## 2023-05-20 RX ADMIN — LEVOTHYROXINE SODIUM 100 MCG: 100 TABLET ORAL at 05:10

## 2023-05-20 RX ADMIN — NYSTATIN: 100000 POWDER TOPICAL at 08:10

## 2023-05-20 RX ADMIN — Medication 10 ML: at 21:04

## 2023-05-20 RX ADMIN — OXYCODONE HYDROCHLORIDE AND ACETAMINOPHEN 1 TABLET: 10; 325 TABLET ORAL at 17:47

## 2023-05-20 RX ADMIN — ENOXAPARIN SODIUM 40 MG: 40 INJECTION SUBCUTANEOUS at 08:08

## 2023-05-20 RX ADMIN — OXYCODONE HYDROCHLORIDE AND ACETAMINOPHEN 1 TABLET: 10; 325 TABLET ORAL at 05:26

## 2023-05-20 RX ADMIN — FUROSEMIDE 20 MG: 20 TABLET ORAL at 08:08

## 2023-05-20 RX ADMIN — Medication 10 ML: at 08:12

## 2023-05-20 RX ADMIN — BUDESONIDE 0.5 MG: 0.5 INHALANT RESPIRATORY (INHALATION) at 06:48

## 2023-05-20 RX ADMIN — IPRATROPIUM BROMIDE AND ALBUTEROL SULFATE 3 ML: .5; 2.5 SOLUTION RESPIRATORY (INHALATION) at 06:48

## 2023-05-20 RX ADMIN — PREDNISONE 40 MG: 20 TABLET ORAL at 08:08

## 2023-05-20 RX ADMIN — BUDESONIDE 0.5 MG: 0.5 INHALANT RESPIRATORY (INHALATION) at 18:31

## 2023-05-20 RX ADMIN — LOSARTAN POTASSIUM 25 MG: 25 TABLET, FILM COATED ORAL at 08:08

## 2023-05-20 RX ADMIN — VITAMINS A AND D OINTMENT 1 APPLICATION: 15.5; 53.4 OINTMENT TOPICAL at 08:10

## 2023-05-20 RX ADMIN — NYSTATIN: 100000 POWDER TOPICAL at 21:04

## 2023-05-20 RX ADMIN — GUAIFENESIN 600 MG: 600 TABLET, EXTENDED RELEASE ORAL at 21:04

## 2023-05-20 RX ADMIN — IPRATROPIUM BROMIDE AND ALBUTEROL SULFATE 3 ML: .5; 2.5 SOLUTION RESPIRATORY (INHALATION) at 13:13

## 2023-05-20 RX ADMIN — IPRATROPIUM BROMIDE AND ALBUTEROL SULFATE 3 ML: .5; 2.5 SOLUTION RESPIRATORY (INHALATION) at 18:31

## 2023-05-20 RX ADMIN — Medication 10 ML: at 08:11

## 2023-05-20 RX ADMIN — VITAMINS A AND D OINTMENT 1 APPLICATION: 15.5; 53.4 OINTMENT TOPICAL at 21:04

## 2023-05-20 RX ADMIN — FUROSEMIDE 40 MG: 10 INJECTION, SOLUTION INTRAMUSCULAR; INTRAVENOUS at 17:30

## 2023-05-20 RX ADMIN — GUAIFENESIN 600 MG: 600 TABLET, EXTENDED RELEASE ORAL at 08:08

## 2023-05-20 RX ADMIN — FERROUS SULFATE TAB 325 MG (65 MG ELEMENTAL FE) 325 MG: 325 (65 FE) TAB at 08:08

## 2023-05-20 NOTE — PLAN OF CARE
Goal Outcome Evaluation:  Plan of Care Reviewed With: patient        Progress: improving  Outcome Evaluation: Resting comfortably in bed.  Worked with PT.  Up in chair couple hours.  Severe shortness of air when transferring back to bed.  4 liters nasal cannula.  Continue to monitor.

## 2023-05-20 NOTE — THERAPY TREATMENT NOTE
Acute Care - Occupational Therapy Treatment Note   Jorge     Patient Name: Orquidea Rosenberg  : 1945  MRN: [id number]  Today's Date: 2023  Onset of Illness/Injury or Date of Surgery: 23 (swing bed admit)     Referring Physician: Mervat    Admit Date: 2023       ICD-10-CM ICD-9-CM   1. Bacteremia  R78.81 790.7     Patient Active Problem List   Diagnosis   • COVID-19   • Vulvar cancer   • Physical debility   • Bacteremia   • COPD exacerbation   • Debility     Past Medical History:   Diagnosis Date   • Arthritis    • COPD (chronic obstructive pulmonary disease)    • Elevated cholesterol    • History of transfusion    • Hypertension    • Vulval ca      Past Surgical History:   Procedure Laterality Date   • RADICAL VULVECTOMY  2019   • RADICAL VULVECTOMY Bilateral 2019         OT ASSESSMENT FLOWSHEET (last 12 hours)     OT Evaluation and Treatment     Row Name 23 1104                   OT Time and Intention    Subjective Information complains of;fatigue  -HB        Document Type therapy note (daily note)  -HB        Mode of Treatment individual therapy;occupational therapy  -HB        Total Minutes, Occupational Therapy 15  -HB        Patient Effort adequate  -HB           General Information    Patient Profile Reviewed yes  -HB        Patient/Family/Caregiver Comments/Observations Patient agreeable to OT this date.  -HB        General Observations of Patient Pt tolerated OT well with no adverse reactions.  -HB        Existing Precautions/Restrictions fall;oxygen therapy device and L/min  -HB           Pain Assessment    Pretreatment Pain Rating 0/10 - no pain  -HB        Posttreatment Pain Rating 0/10 - no pain  -HB           Cognition    Cognitive Status WFL  -HB        Affect/Mental Status (Cognition) WFL  -HB        Orientation Status (Cognition) oriented x 3  -HB        Follows Commands (Cognition) WFL  -HB           Motor Skills    Motor Skills functional endurance;motor  control/coordination interventions  -HB        Motor Control/Coordination Interventions therapeutic exercise/ROM  -HB        Therapeutic Exercise shoulder;elbow/forearm;hand;wrist  -HB        Comments, Therapeutic Exercise --  flex bar 3x10 reps; rest between  -HB           Wound 05/01/23 1414 Left vagina    Wound - Properties Group Placement Date: 05/01/23  -LB Placement Time: 1414  -LB Present on Hospital Admission: Y  -LB Side: Left  -LB Location: vagina  -LB    Retired Wound - Properties Group Placement Date: 05/01/23  -LB Placement Time: 1414  -LB Present on Hospital Admission: Y  -LB Side: Left  -LB Location: vagina  -LB    Retired Wound - Properties Group Date first assessed: 05/01/23  -LB Time first assessed: 1414  -LB Present on Hospital Admission: Y  -LB Side: Left  -LB Location: vagina  -LB       Plan of Care Review    Plan of Care Reviewed With patient  -HB        Progress --  pt educated on energy conservation with ADLs secondary to pts report of  getting SOA. Good understanding verbalized.  -HB           Positioning and Restraints    Pre-Treatment Position sitting in chair/recliner  -HB        Post Treatment Position chair  -HB        In Bed encouraged to call for assist;call light within reach  -HB              User Key  (r) = Recorded By, (t) = Taken By, (c) = Cosigned By    Initials Name Effective Dates    HB Yulisa Rosenberg, OT 05/25/21 -     Veronica Bansal RN 10/20/21 -                        OT Recommendation and Plan     Plan of Care Review  Plan of Care Reviewed With: patient  Progress:  (pt educated on energy conservation with ADLs secondary to pts report of  getting SOA. Good understanding verbalized.)  Plan of Care Reviewed With: patient     Outcome Measures     Row Name 05/20/23 1100 05/19/23 0900 05/18/23 1400       How much help from another is currently needed...    Putting on and taking off regular lower body clothing? 2  -HB 2  -LM 2  -LM    Bathing (including washing, rinsing, and  drying) 2  -HB 2  -LM 2  -LM    Toileting (which includes using toilet bed pan or urinal) 2  -HB 2  -LM 1  -LM    Putting on and taking off regular upper body clothing 3  -HB 3  -LM 3  -LM    Taking care of personal grooming (such as brushing teeth) 3  -HB 3  -LM 3  -LM    Eating meals 4  -HB 3  -LM 3  -LM    AM-PAC 6 Clicks Score (OT) 16  -HB 15  -LM 14  -LM       Functional Assessment    Outcome Measure Options -- -- AM-PAC 6 Clicks Daily Activity (OT)  -LM    Row Name 05/17/23 1400             How much help from another is currently needed...    Putting on and taking off regular lower body clothing? 2  -LA      Bathing (including washing, rinsing, and drying) 2  -LA      Toileting (which includes using toilet bed pan or urinal) 1  -LA      Putting on and taking off regular upper body clothing 3  -LA      Taking care of personal grooming (such as brushing teeth) 3  -LA      Eating meals 3  -LA      AM-PAC 6 Clicks Score (OT) 14  -LA         Functional Assessment    Outcome Measure Options AM-PAC 6 Clicks Daily Activity (OT)  -LA            User Key  (r) = Recorded By, (t) = Taken By, (c) = Cosigned By    Initials Name Provider Type     Joanne Colon, OT Occupational Therapist    Yulisa Brothers OT Occupational Therapist    Dior Christensen OT Occupational Therapist                Time Calculation:    Time Calculation- OT     Row Name 05/20/23 1108             Time Calculation- OT    OT Start Time 1030  -HB      OT Stop Time 1045  -HB      OT Time Calculation (min) 15 min  -HB      Total Timed Code Minutes- OT 15 minute(s)  -HB            User Key  (r) = Recorded By, (t) = Taken By, (c) = Cosigned By    Initials Name Provider Type     Yulisa Rosenberg OT Occupational Therapist              Therapy Charges for Today     Code Description Service Date Service Provider Modifiers Qty    04621670663  OT THERAPEUTIC ACT EA 15 MIN 5/20/2023 Yulisa Rosenberg OT GO 1               Yulisa Rosenberg OT  5/20/2023

## 2023-05-20 NOTE — H&P
[facility]    Patient Identification:  Name:  Orquidea Rosenberg  Age:  77 y.o.  Sex:  female  :  1945  MRN:  [id number]   Visit Number:  [id number]  Admit Date: 2023   Room number:  3327/1P  Primary Care Physician:  Jackeline Denson APRN    Date of Admission: 2023     Subjective     Chief complaint:  No chief complaint on file.    History of presenting illness:  77 y.o. female who was admitted on 2023 from swing bed for progressive respiratory distress. For complete history please see HPI from prior admission before swing bed transfer, interval hx as below.    Patient this am with ongoing dyspnea acutely worsened with ambulation and transition to bedside chair requiring increase in O2 to 5L with desat into 80s slow to respond to increased O2. Discussed with patient her progression and reviewed CT scans from march and early may 2023 with noted progression concerning for new lung mets. Patient voiced understanding, daughter also in room and wish to cont current care but discussed with them my concern for progression without clear possibility of any treatment. They wish to cont current full spectrum of care but acknowledge severity of her progression and should she worsen would be open to transitioning GOC.     Prior to admission I discussed patient's presentation and management with attending ER physician and verbal handoff received.  ---------------------------------------------------------------------------------------------------------------------   A thorough systems based relevant ROS was asked and was negative except as noted above.  ---------------------------------------------------------------------------------------------------------------------   Past Medical History:   Diagnosis Date   • Arthritis    • COPD (chronic obstructive pulmonary disease)    • Elevated cholesterol    • History of transfusion    • Hypertension    • Vulval ca      Past  Surgical History:   Procedure Laterality Date   • RADICAL VULVECTOMY  09/06/2019   • RADICAL VULVECTOMY Bilateral 06/2019     Family History   Problem Relation Age of Onset   • Alzheimer's disease Mother    • Cancer Father    • Cancer Brother    • Diabetes Maternal Grandmother      Social History     Socioeconomic History   • Marital status:    Tobacco Use   • Smoking status: Former   • Smokeless tobacco: Never   Vaping Use   • Vaping Use: Never used   Substance and Sexual Activity   • Alcohol use: No   • Drug use: No   • Sexual activity: Defer     ---------------------------------------------------------------------------------------------------------------------   Allergies:  Penicillins  ---------------------------------------------------------------------------------------------------------------------   Medications below are reported home medications pulling from within the system; at this time, these medications have not been reconciled unless otherwise specified and are in the verification process for further verifcation as current home medications.      Prior to Admission Medications     Prescriptions Last Dose Informant Patient Reported? Taking?    furosemide (LASIX) 20 MG tablet Unknown Pharmacy Yes No    Take 1 tablet by mouth Daily.    levothyroxine (SYNTHROID, LEVOTHROID) 100 MCG tablet Unknown Pharmacy Yes No    Take 1 tablet by mouth Daily.    losartan (COZAAR) 50 MG tablet Unknown Pharmacy Yes No    Take 1 tablet by mouth Daily.    ondansetron (ZOFRAN) 4 MG tablet Unknown Pharmacy Yes No    Take 1 tablet by mouth Every 8 (Eight) Hours As Needed for Nausea or Vomiting.        Objective     Vital Signs:  Temp:  [97.7 °F (36.5 °C)-98.1 °F (36.7 °C)] 97.7 °F (36.5 °C)  Heart Rate:  [] 89  Resp:  [16-18] 18  BP: (128-162)/(59-68) 128/59    No data found.  SpO2:  [92 %-99 %] 98 %  on  Flow (L/min):  [3-4] 4;   Device (Oxygen Therapy): humidified;nasal cannula  Body mass index is 29.86  kg/m².    Wt Readings from Last 3 Encounters:   05/05/23 83.9 kg (185 lb)   05/04/23 82.6 kg (182 lb)   03/15/22 81.6 kg (180 lb)      ----------------------------------------------------------------------------------------------------------------------  Physical Exam  Vitals and nursing note reviewed.   Constitutional:       Appearance: She is ill-appearing.   Eyes:      General: No scleral icterus.     Extraocular Movements: Extraocular movements intact.   Cardiovascular:      Rate and Rhythm: Normal rate.      Pulses: Normal pulses.      Heart sounds: No murmur heard.  Pulmonary:      Effort: Respiratory distress present.      Breath sounds: No wheezing or rales.   Abdominal:      General: Abdomen is flat.      Palpations: Abdomen is soft.   Musculoskeletal:      Right lower leg: Edema present.      Left lower leg: Edema present.   Skin:     General: Skin is warm and dry.      Capillary Refill: Capillary refill takes less than 2 seconds.   Neurological:      General: No focal deficit present.      Mental Status: She is alert and oriented to person, place, and time.   Psychiatric:         Mood and Affect: Mood normal.         Behavior: Behavior normal.       --------------------------------------------------------------------------------------------------------------------  LABS:    CBC and coagulation:  Results from last 7 days   Lab Units 05/17/23  0143 05/16/23  0129   WBC 10*3/mm3 7.64 12.95*   HEMOGLOBIN g/dL 7.3* 7.7*   HEMATOCRIT % 24.9* 26.8*   MCV fL 91.5 90.5   MCHC g/dL 29.3* 28.7*   PLATELETS 10*3/mm3 311 316     Acid/base balance:      Renal and electrolytes:  Results from last 7 days   Lab Units 05/19/23  0146 05/17/23  0143 05/16/23  0129   SODIUM mmol/L 137 138 133*   POTASSIUM mmol/L 5.0 4.5 4.4   MAGNESIUM mg/dL 2.1 2.0 1.9   CHLORIDE mmol/L 103 103 102   CO2 mmol/L 23.1 23.5 23.5   BUN mg/dL 25* 16 13   CREATININE mg/dL 1.08* 1.23* 1.04*   CALCIUM mg/dL 8.7 8.8 8.6   PHOSPHORUS mg/dL 3.9 4.1 3.5    GLUCOSE mg/dL 133* 168* 127*     Estimated Creatinine Clearance: 47.6 mL/min (A) (by C-G formula based on SCr of 1.08 mg/dL (H)).    Liver and pancreatic function:  Results from last 7 days   Lab Units 05/17/23  0143 05/16/23  0129   ALBUMIN g/dL 2.4* 2.4*   BILIRUBIN mg/dL <0.2 <0.2   ALK PHOS U/L 57 61   AST (SGOT) U/L 6 5   ALT (SGPT) U/L <5 <5     Endocrine function:  No results found for: HGBA1C  Point of care bedside glucose levels:      Lab Results   Component Value Date    TSH 2.620 03/15/2022     Cardiac:  Results from last 7 days   Lab Units 05/17/23  0350 05/17/23 0143   HSTROP T ng/L 127* 132*       Cultures:  Lab Results   Component Value Date    COLORU Dark Yellow (A) 03/15/2022    CLARITYU Turbid (A) 03/15/2022    PHUR <=5.0 03/15/2022    GLUCOSEU Negative 03/15/2022    KETONESU Trace (A) 03/15/2022    BLOODU Large (3+) (A) 03/15/2022    NITRITEU Negative 03/15/2022    LEUKOCYTESUR Moderate (2+) (A) 03/15/2022    BILIRUBINUR Negative 03/15/2022    UROBILINOGEN 1.0 E.U./dL 03/15/2022    RBCUA 31-50 (A) 03/15/2022    WBCUA Too Numerous to Count (A) 03/15/2022    BACTERIA 4+ (A) 03/15/2022     Microbiology Results (last 10 days)     ** No results found for the last 240 hours. **          No results found for: PREGTESTUR, PREGSERUM, HCG, HCGQUANT  Pain Management Panel          View : No data to display.                      I have personally looked at the labs and they are summarized above.  ----------------------------------------------------------------------------------------------------------------------  Detailed radiology reports for the last 24 hours:    Imaging Results (Last 24 Hours)     Procedure Component Value Units Date/Time    XR Chest 1 View [[id number]] Resulted: 05/20/23 1533     Updated: 05/20/23 1533        Final impressions for the last 30 days of radiology reports:    CT Abdomen Pelvis With & Without Contrast    Addendum Date: 5/3/2023    Addendum: Again noted is a somewhat  necrotic appearing 7.7 cm mass in the pelvis that appear somewhat contiguous with the distal sigmoid colon. Air is again noted within the urinary bladder. Impression.  This report was finalized on 5/3/2023 8:41 AM by Dr. Philip Lopez MD.      Result Date: 5/3/2023  1.  Again there is a left double-J ureteral stent and moderate to severe hydronephrosis of the left kidney. 2.  Moderate stool throughout the colon. 3.  Tiny bilateral pleural effusions. 4.  Bilateral pulmonary lung base nodules are again noted.  This report was finalized on 5/2/2023 12:23 PM by Dr. Philip Lopez MD.      CT Abdomen Pelvis Without Contrast    Result Date: 4/25/2023  Large complex pelvic mass measuring 8.4 x 9.4 x 10.2 cm and presumably represents the patient's known pelvic malignancy (vulvar cancer). This could represent secondary involvement of a leiomyomatous uterus or dystrophic calcifications within a malignancy. Abnormal fistulous communication between the anterior rectum and the posterior aspect of the above-mentioned pelvic mass with at least one and possibly 2 fistulous connections as demonstrated on CT. Central debris within the mass may represent abscess or necrosis. Apparent fistulous communication between the mass and the bladder. Left sided hydronephrosis and hydroureter with left ureteral stent in place. The distal pigtail difficult to confirm within the bladder and may be within the distal ureter. Progressive widely disseminated pulmonary metastases. Signer Name: MINNIE Cerna MD  Signed: 4/25/2023 5:27 PM  Workstation Name: [facility]    CT Chest With Contrast Diagnostic    Result Date: 5/2/2023  1.  Now small bilateral pleural effusions. 2.  Tiny pericardial effusion again noted. 3.  Bilateral parenchymal pulmonary nodules are again noted. A posterior left upper lobe pulmonary nodule measures 2.2 cm and was about 2.2 cm. Other nodules appear stable also.  This report was  finalized on 5/2/2023 12:38 PM by Dr. Philip Lopez MD.      XR Chest 1 View    Result Date: 5/15/2023  FINDINGS/IMPRESSION: Multifocal interstitial opacities, similar to prior. Heart size is similar. Small pleural effusions. No pneumothorax. No acute osseous abnormality.  This report was finalized on 5/15/2023 11:16 PM by Alex Pallas, DO.      XR Chest 1 View    Result Date: 5/7/2023  Impression:  1. Multiple bilateral pulmonary nodular densities are similar to the prior study. 2.  No acute infiltrates 3.  Small left pleural effusion is stable  This report was finalized on 5/7/2023 9:01 PM by Gustavo Silverman MD.      XR Chest 1 View    Result Date: 4/26/2023  1.  No change in distribution of bilateral lung opacities which may represent changes of fibrosis and diffuse pulmonary nodules. 2.  Right IJ deep line noted with tip at the cavoatrial junction. No pneumothorax.  This report was finalized on 4/26/2023 11:31 AM by Dr. Justen Matias MD.      XR Chest AP    Result Date: 4/27/2023  1.  No interval change in the appearance of the chest. Bilateral lung opacities appear stable. 2.  Support device positioning is stable. 3.  Trace left pleural effusion is noted.  This report was finalized on 4/27/2023 9:48 AM by Dr. Justen Matias MD.        I have personally looked at the radiology images, my personal interpretation is as follows:    CXR performed and pend     CT March 2023 compared to May 2023 with rapid progression of number and size of pulmonary nodules.    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 Radiology review of imaging from March 2023 compared to 5/2/2023 notes no significant increase in nodule size but  on personal review her suspected metastatic disease appears to have progressed in both size and number with associated increasing O2 requirement and poorer respiratory reserve.  -Patient unable to tolerate repeat CT scan and CXR currently pending however if hypoxia secondary to rapid mets, limited possible interventions  -Will consult oncology Monday for case review for possible palliative options  -Increased diuretic dose to lasix 40mg x1 dose today and cont on prn basis given mild pulm edema  -PE on differential as well but no tachycardia and less likely given noted lung opacities, attempted CT today but unable to lay flat  -OK to cont current steroid course but wheezing resolved and acute progression unlikely COPD related  -Severe PAH noted on Echo likely acute on chronic right heart strain/failure secondary to lung disease  -Discussed GOC and patient to remain DNR/DNI and will reevaluate should her condition worsen, palliative following and previously not interested in hospice services but given significance of progression discussed possibility of revisiting this     #Q2AVSYLT  -Troponin elevated with negative delta and t-wave inversions in inferior leads likely secondary to progressive hypoxia  -Repeat ecg and troponin for any acute cp or HDS changes, current changes less likely ischemia and more secondary to right heart strain. Echo reviewed without regional wall motion abnormalities              - - Chronic - -    #COPD hx  #Chronic iron def anemia  #Debility secondary to chronic illness: Holding further PT/OT until condition improved    VTE Prophylaxis:   Mechanical Order History:     None      Pharmalogical Order History:      Ordered     Dose Route Frequency Stop    05/05/23 1450  Enoxaparin Sodium (LOVENOX) syringe 40 mg         40 mg SC Daily --    Signed and Held  Enoxaparin Sodium (LOVENOX) syringe 40 mg         40 mg SC Daily --              The patient is high risk due to the following  diagnoses/reasons:  Metastatic ca with new O2 requirements    Admission Status:  I certify that this patient requires inpatient hospitalization for greater than 2 midnights in INPATIENT status.  I anticipate there to be the need for care which can only be reasonably provided in a hospital setting such as possible need for aggressive/expedited ancillary services and/or consultation services, IV medications, close physician monitoring, and/or procedures. In such, I feel patient’s risk for adverse outcomes and need for care warrant INPATIENT evaluation and predict the patient’s care encounter to likely last beyond 2 midnights.    Code Status and Medical Interventions:   Ordered at: 05/16/23 1238     Medical Intervention Limits:    NO cardioversion    NO intubation (DNI)     Level Of Support Discussed With:    Patient     Code Status (Patient has no pulse and is not breathing):    No CPR (Do Not Attempt to Resuscitate)     Medical Interventions (Patient has pulse or is breathing):    Limited Support     Release to patient:    Routine Release        Disposition: Admit to inpatient unit with cont pulse ox    Nelson Mejia MD  [facility] Hospitalist  05/20/23  17:20 EDT

## 2023-05-20 NOTE — PLAN OF CARE
Goal Outcome Evaluation:  Plan of Care Reviewed With: patient        Progress: improving  Outcome Evaluation: Pt. resting in bed at this time. Pt. more talkative and less complaints of pain today. PRN meds given when pain was present, see MAR. Wound care completed per order. Pt. O2 drops at times when coughing or exerting herself. VSS, no acute changes at this time. Will continue with plan of care.

## 2023-05-20 NOTE — THERAPY TREATMENT NOTE
Acute Care - Physical Therapy Treatment Note   [facility]     Patient Name: Orquidea Rosenberg  : 1945  MRN: [id number]  Today's Date: 2023   Onset of Illness/Injury or Date of Surgery: 23 (swing bed admit)  Visit Dx:     ICD-10-CM ICD-9-CM   1. Bacteremia  R78.81 790.7     Patient Active Problem List   Diagnosis   • COVID-19   • Vulvar cancer   • Physical debility   • Bacteremia   • COPD exacerbation   • Debility     Past Medical History:   Diagnosis Date   • Arthritis    • COPD (chronic obstructive pulmonary disease)    • Elevated cholesterol    • History of transfusion    • Hypertension    • Vulval ca      Past Surgical History:   Procedure Laterality Date   • RADICAL VULVECTOMY  2019   • RADICAL VULVECTOMY Bilateral 2019     PT Assessment (last 12 hours)     PT Evaluation and Treatment     Row Name 23 0815          Physical Therapy Time and Intention    Document Type therapy note (daily note)  -     Mode of Treatment physical therapy  -     Patient Effort adequate  -     Comment Pt worked on transfer with PT this date, given HEP of ankle pumps and quad sets to assist with LE edema  -     Row Name 23 0815          Bed Mobility    Bed Mobility supine-sit  -     Supine-Sit [place] (Bed Mobility) modified independence  -     Row Name 23 0815          Transfers    Transfers bed-chair transfer  -     Comment, (Transfers) STS attempted with PT without success, pt asked to perform transfer using chair with successful transfer  -     Row Name 23 0815          Bed-Chair Transfer    Bed-Chair [place] (Transfers) contact guard;standby assist  -     Assistive Device (Bed-Chair Transfers) other (see comments)  chair, stand/pivot transfer  -     Row Name 23 0815          Motor Skills    Therapeutic Exercise knee;ankle  ankle pumps performed in room, quad sets performed for demonstration/HEP purposes; HEP of quad sets x15 with meals, and ankle pumps  x10 each hour  -KH     Row Name             Wound 05/01/23 1414 Left vagina    Wound - Properties Group Placement Date: 05/01/23  -LB Placement Time: 1414  -LB Present on Hospital Admission: Y  -LB Side: Left  -LB Location: vagina  -LB    Retired Wound - Properties Group Placement Date: 05/01/23  -LB Placement Time: 1414  -LB Present on Hospital Admission: Y  -LB Side: Left  -LB Location: vagina  -LB    Retired Wound - Properties Group Date first assessed: 05/01/23  -LB Time first assessed: 1414  -LB Present on Hospital Admission: Y  -LB Side: Left  -LB Location: vagina  -LB    Row Name 05/20/23 0815          Positioning and Restraints    Pre-Treatment Position in bed  -     Post Treatment Position chair  -     In Chair reclined;call light within reach;heels elevated  2 pillows  -           User Key  (r) = Recorded By, (t) = Taken By, (c) = Cosigned By    Initials Name Provider Type    Charlotte Hogue, SHEILA Physical Therapist    Veronica Bansal RN Registered Nurse                Physical Therapy Education     Title: PT OT SLP Therapies (Done)     Topic: Physical Therapy (Done)     Point: Mobility training (Done)     Learning Progress Summary           Patient Acceptance, TB,E, VU,DU by BD at 5/15/2023 0902    Acceptance, E,TB, DU,VU by BD at 5/14/2023 0836    Acceptance, E,D, VU,NR by AG at 5/13/2023 1519                   Point: Home exercise program (Done)     Learning Progress Summary           Patient Acceptance, TB,E, VU,DU by BD at 5/15/2023 0902    Acceptance, E,TB, DU,VU by BD at 5/14/2023 0836    Acceptance, E,D, VU,NR by AG at 5/13/2023 1519                   Point: Body mechanics (Done)     Learning Progress Summary           Patient Acceptance, TB,E, VU,DU by BD at 5/15/2023 0902    Acceptance, E,TB, DU,VU by BD at 5/14/2023 0836    Acceptance, E,D, VU,NR by AG at 5/13/2023 1519                   Point: Precautions (Done)     Learning Progress Summary           Patient Acceptance, TB,E, VU,DU  by  at 5/15/2023 0902    Acceptance, E,TB, DU,VU by  at 5/14/2023 0836    Acceptance, E,D, VU,NR by  at 5/13/2023 1519                               User Key     Initials Effective Dates Name Provider Type Discipline     06/16/21 -  Jaclyn Fernandez, PT Physical Therapist PT     08/05/21 -  Felicia Galeano, RN Registered Nurse Nurse              PT Recommendation and Plan         Outcome Measures     Row Name 05/19/23 0900 05/18/23 1400 05/17/23 1400       How much help from another is currently needed...    Putting on and taking off regular lower body clothing? 2  -LM 2  -LM 2  -LA    Bathing (including washing, rinsing, and drying) 2  -LM 2  -LM 2  -LA    Toileting (which includes using toilet bed pan or urinal) 2  -LM 1  -LM 1  -LA    Putting on and taking off regular upper body clothing 3  -LM 3  -LM 3  -LA    Taking care of personal grooming (such as brushing teeth) 3  -LM 3  -LM 3  -LA    Eating meals 3  -LM 3  -LM 3  -LA    AM-PAC 6 Clicks Score (OT) 15  -LM 14  -LM 14  -LA       Functional Assessment    Outcome Measure Options -- AM-PAC 6 Clicks Daily Activity (OT)  -LM AM-PAC 6 Clicks Daily Activity (OT)  -LA          User Key  (r) = Recorded By, (t) = Taken By, (c) = Cosigned By    Initials Name Provider Type    Joanne Claros, OT Occupational Therapist    Dior Christensen OT Occupational Therapist                 Time Calculation:    PT Charges     Row Name 05/20/23 0855             Time Calculation    Start Time 0815  -KH      PT Received On 05/20/23  -         Time Calculation- PT    Total Timed Code Minutes- PT 15 minute(s)  -            User Key  (r) = Recorded By, (t) = Taken By, (c) = Cosigned By    Initials Name Provider Type    Charlotte Hogue, PT Physical Therapist              Therapy Charges for Today     Code Description Service Date Service Provider Modifiers Qty    [id number] HC PT THERAPEUTIC ACT EA 15 MIN 5/20/2023 Charlotte Machado, PT GP 1          PT  G-Codes  Outcome Measure Options: AM-PAC 6 Clicks Daily Activity (OT)  AM-PAC 6 Clicks Score (PT): 14  AM-PAC 6 Clicks Score (OT): 15    Charlotte Machado, PT  5/20/2023

## 2023-05-21 ENCOUNTER — APPOINTMENT (OUTPATIENT)
Dept: CT IMAGING | Facility: HOSPITAL | Age: 78
DRG: 871 | End: 2023-05-21
Payer: MEDICARE

## 2023-05-21 LAB
ANION GAP SERPL CALCULATED.3IONS-SCNC: 11.8 MMOL/L (ref 5–15)
BUN SERPL-MCNC: 29 MG/DL (ref 8–23)
BUN/CREAT SERPL: 26.9 (ref 7–25)
CALCIUM SPEC-SCNC: 8.8 MG/DL (ref 8.6–10.5)
CHLORIDE SERPL-SCNC: 101 MMOL/L (ref 98–107)
CO2 SERPL-SCNC: 26.2 MMOL/L (ref 22–29)
CREAT SERPL-MCNC: 1.08 MG/DL (ref 0.57–1)
D DIMER PPP FEU-MCNC: 1.22 MCGFEU/ML (ref 0–0.77)
DEPRECATED RDW RBC AUTO: 73.3 FL (ref 37–54)
EGFRCR SERPLBLD CKD-EPI 2021: 53 ML/MIN/1.73
ERYTHROCYTE [DISTWIDTH] IN BLOOD BY AUTOMATED COUNT: 21.4 % (ref 12.3–15.4)
GEN 5 2HR TROPONIN T REFLEX: 139 NG/L
GLUCOSE SERPL-MCNC: 79 MG/DL (ref 65–99)
HCT VFR BLD AUTO: 30.9 % (ref 34–46.6)
HGB BLD-MCNC: 8.9 G/DL (ref 12–15.9)
MCH RBC QN AUTO: 26.9 PG (ref 26.6–33)
MCHC RBC AUTO-ENTMCNC: 28.8 G/DL (ref 31.5–35.7)
MCV RBC AUTO: 93.4 FL (ref 79–97)
NT-PROBNP SERPL-MCNC: ABNORMAL PG/ML (ref 0–1800)
PLATELET # BLD AUTO: 372 10*3/MM3 (ref 140–450)
PMV BLD AUTO: 8.9 FL (ref 6–12)
POTASSIUM SERPL-SCNC: 3.6 MMOL/L (ref 3.5–5.2)
QT INTERVAL: 378 MS
QTC INTERVAL: 435 MS
RBC # BLD AUTO: 3.31 10*6/MM3 (ref 3.77–5.28)
SODIUM SERPL-SCNC: 139 MMOL/L (ref 136–145)
TROPONIN T DELTA: -11 NG/L
TROPONIN T SERPL HS-MCNC: 150 NG/L
WBC NRBC COR # BLD: 10.6 10*3/MM3 (ref 3.4–10.8)

## 2023-05-21 PROCEDURE — 94761 N-INVAS EAR/PLS OXIMETRY MLT: CPT

## 2023-05-21 PROCEDURE — 85379 FIBRIN DEGRADATION QUANT: CPT | Performed by: STUDENT IN AN ORGANIZED HEALTH CARE EDUCATION/TRAINING PROGRAM

## 2023-05-21 PROCEDURE — 83880 ASSAY OF NATRIURETIC PEPTIDE: CPT | Performed by: STUDENT IN AN ORGANIZED HEALTH CARE EDUCATION/TRAINING PROGRAM

## 2023-05-21 PROCEDURE — 71260 CT THORAX DX C+: CPT

## 2023-05-21 PROCEDURE — 99232 SBSQ HOSP IP/OBS MODERATE 35: CPT | Performed by: STUDENT IN AN ORGANIZED HEALTH CARE EDUCATION/TRAINING PROGRAM

## 2023-05-21 PROCEDURE — 25010000002 FUROSEMIDE PER 20 MG: Performed by: STUDENT IN AN ORGANIZED HEALTH CARE EDUCATION/TRAINING PROGRAM

## 2023-05-21 PROCEDURE — 94799 UNLISTED PULMONARY SVC/PX: CPT

## 2023-05-21 PROCEDURE — 85027 COMPLETE CBC AUTOMATED: CPT | Performed by: STUDENT IN AN ORGANIZED HEALTH CARE EDUCATION/TRAINING PROGRAM

## 2023-05-21 PROCEDURE — 84484 ASSAY OF TROPONIN QUANT: CPT | Performed by: STUDENT IN AN ORGANIZED HEALTH CARE EDUCATION/TRAINING PROGRAM

## 2023-05-21 PROCEDURE — 25510000001 IOPAMIDOL 61 % SOLUTION: Performed by: STUDENT IN AN ORGANIZED HEALTH CARE EDUCATION/TRAINING PROGRAM

## 2023-05-21 PROCEDURE — 63710000001 PREDNISONE PER 1 MG: Performed by: INTERNAL MEDICINE

## 2023-05-21 PROCEDURE — 80048 BASIC METABOLIC PNL TOTAL CA: CPT | Performed by: STUDENT IN AN ORGANIZED HEALTH CARE EDUCATION/TRAINING PROGRAM

## 2023-05-21 PROCEDURE — 25010000002 ENOXAPARIN PER 10 MG: Performed by: STUDENT IN AN ORGANIZED HEALTH CARE EDUCATION/TRAINING PROGRAM

## 2023-05-21 RX ORDER — PHENOBARBITAL, HYOSCYAMINE SULFATE, ATROPINE SULFATE AND SCOPOLAMINE HYDROBROMIDE .0194; .1037; 16.2; .0065 MG/1; MG/1; MG/1; MG/1
2 TABLET ORAL ONCE
Status: COMPLETED | OUTPATIENT
Start: 2023-05-21 | End: 2023-05-21

## 2023-05-21 RX ORDER — FUROSEMIDE 10 MG/ML
40 INJECTION INTRAMUSCULAR; INTRAVENOUS ONCE
Status: COMPLETED | OUTPATIENT
Start: 2023-05-21 | End: 2023-05-21

## 2023-05-21 RX ORDER — LIDOCAINE HYDROCHLORIDE 20 MG/ML
15 SOLUTION OROPHARYNGEAL ONCE
Status: COMPLETED | OUTPATIENT
Start: 2023-05-21 | End: 2023-05-21

## 2023-05-21 RX ORDER — ALUMINA, MAGNESIA, AND SIMETHICONE 2400; 2400; 240 MG/30ML; MG/30ML; MG/30ML
15 SUSPENSION ORAL ONCE
Status: COMPLETED | OUTPATIENT
Start: 2023-05-21 | End: 2023-05-21

## 2023-05-21 RX ADMIN — IPRATROPIUM BROMIDE AND ALBUTEROL SULFATE 3 ML: .5; 2.5 SOLUTION RESPIRATORY (INHALATION) at 07:09

## 2023-05-21 RX ADMIN — LEVOTHYROXINE SODIUM 100 MCG: 100 TABLET ORAL at 05:31

## 2023-05-21 RX ADMIN — VITAMINS A AND D OINTMENT 1 APPLICATION: 15.5; 53.4 OINTMENT TOPICAL at 20:28

## 2023-05-21 RX ADMIN — VITAMINS A AND D OINTMENT 1 APPLICATION: 15.5; 53.4 OINTMENT TOPICAL at 08:45

## 2023-05-21 RX ADMIN — PHENOBARBITAL, HYOSCYAMINE SULFATE, ATROPINE SULFATE, SCOPOLAMINE HYDROBROMIDE 32.4 MG: 16.2; .1037; .0194; .0065 TABLET ORAL at 11:53

## 2023-05-21 RX ADMIN — NYSTATIN: 100000 POWDER TOPICAL at 20:28

## 2023-05-21 RX ADMIN — IPRATROPIUM BROMIDE AND ALBUTEROL SULFATE 3 ML: .5; 2.5 SOLUTION RESPIRATORY (INHALATION) at 13:27

## 2023-05-21 RX ADMIN — Medication 10 ML: at 08:47

## 2023-05-21 RX ADMIN — GUAIFENESIN 600 MG: 600 TABLET, EXTENDED RELEASE ORAL at 08:47

## 2023-05-21 RX ADMIN — ALUMINUM HYDROXIDE, MAGNESIUM HYDROXIDE, AND DIMETHICONE 15 ML: 400; 400; 40 SUSPENSION ORAL at 11:53

## 2023-05-21 RX ADMIN — POLYETHYLENE GLYCOL 3350 17 G: 17 POWDER, FOR SOLUTION ORAL at 08:46

## 2023-05-21 RX ADMIN — FUROSEMIDE 40 MG: 10 INJECTION, SOLUTION INTRAMUSCULAR; INTRAVENOUS at 11:53

## 2023-05-21 RX ADMIN — BUDESONIDE 0.5 MG: 0.5 INHALANT RESPIRATORY (INHALATION) at 18:28

## 2023-05-21 RX ADMIN — NYSTATIN: 100000 POWDER TOPICAL at 08:45

## 2023-05-21 RX ADMIN — OXYCODONE HYDROCHLORIDE AND ACETAMINOPHEN 1 TABLET: 10; 325 TABLET ORAL at 05:38

## 2023-05-21 RX ADMIN — PREDNISONE 40 MG: 20 TABLET ORAL at 08:47

## 2023-05-21 RX ADMIN — Medication 10 ML: at 20:28

## 2023-05-21 RX ADMIN — FERROUS SULFATE TAB 325 MG (65 MG ELEMENTAL FE) 325 MG: 325 (65 FE) TAB at 08:48

## 2023-05-21 RX ADMIN — BUDESONIDE 0.5 MG: 0.5 INHALANT RESPIRATORY (INHALATION) at 07:08

## 2023-05-21 RX ADMIN — Medication 10 ML: at 08:46

## 2023-05-21 RX ADMIN — OXYCODONE HYDROCHLORIDE AND ACETAMINOPHEN 1 TABLET: 10; 325 TABLET ORAL at 17:49

## 2023-05-21 RX ADMIN — IOPAMIDOL 70 ML: 612 INJECTION, SOLUTION INTRAVENOUS at 01:30

## 2023-05-21 RX ADMIN — ENOXAPARIN SODIUM 40 MG: 40 INJECTION SUBCUTANEOUS at 08:45

## 2023-05-21 RX ADMIN — IPRATROPIUM BROMIDE AND ALBUTEROL SULFATE 3 ML: .5; 2.5 SOLUTION RESPIRATORY (INHALATION) at 18:28

## 2023-05-21 RX ADMIN — LIDOCAINE HYDROCHLORIDE 15 ML: 20 SOLUTION ORAL at 11:53

## 2023-05-21 RX ADMIN — LOSARTAN POTASSIUM 25 MG: 25 TABLET, FILM COATED ORAL at 08:47

## 2023-05-21 RX ADMIN — GUAIFENESIN 600 MG: 600 TABLET, EXTENDED RELEASE ORAL at 20:28

## 2023-05-21 NOTE — PLAN OF CARE
Goal Outcome Evaluation:                      PT has rested in bed this shift. Brief period being short of breath, quickly recovered and maintained 3.5L NC. Wound care completed per orders. No c/o or acute changes noted at this time. CT of chest ordered 5/20 completed. Will continue POC

## 2023-05-21 NOTE — PROGRESS NOTES
"    [facility] HOSPITALIST PROGRESS NOTE     Patient Identification:  Name:  Orquidea Rosenberg  Age:  77 y.o.  Sex:  female  :  1945  MRN:  [id number]  Visit Number:  [id number]  ROOM: [address] [place] [address]     Primary Care Provider:  Jackeline Denson APRN     Date of Admission: 2023    Length of stay in inpatient status:  16    Subjective     Chief Compliant:  Progressive dyspnea    Patient did have O2 improvement with significant urination reported after IV diuretics given yesterday but no output recorded on chart. Patient remains on NC O2 this morning and again having desats with movement and remains very week and fatigued.     Patient this morning shared that after CT overnight and labs this am as well as PT thorough the week she is \"just worn out and needs a break\". I discussed with her GOC again and she states she wants to continue diuresis and current plan but she was agreeable to me again discussing GOC with her daughter. Had long phone conversation with daughter today whom is reasonable with goals and understanding of prognosis, is open to hospice transition sooner rather than later but concerned her mom cannot be cared for at home given how debilitated she is and knows she does not want to discharge to SNF.           Objective     Current Hospital Meds:  budesonide, 0.5 mg, Nebulization, BID - RT  enoxaparin, 40 mg, Subcutaneous, Daily  ferrous sulfate, 325 mg, Oral, Daily With Breakfast  guaiFENesin, 600 mg, Oral, Q12H  ipratropium-albuterol, 3 mL, Nebulization, Q6H While Awake - RT  levothyroxine, 100 mcg, Oral, Q AM  losartan, 25 mg, Oral, Q24H  magic barrier cream, 1 application, Topical, BID  nystatin, , Topical, Q12H  polyethylene glycol, 17 g, Oral, Daily  sodium chloride, 10 mL, Intravenous, Q12H  sodium chloride, 10 mL, Intravenous, Q12H  sodium chloride, 10 mL, Intravenous, Q12H  sodium chloride, 10 mL, Intravenous, Q12H  sodium chloride, 10 mL, Intravenous, Q12H    Pharmacy Consult, "       Current Antimicrobial Therapy:  Anti-Infectives (From admission, onward)    Ordered     Dose/Rate Route Frequency Start Stop    05/08/23 0754  metroNIDAZOLE (FLAGYL) tablet 500 mg        Ordering Provider: Socrates Flaherty MD    500 mg Oral Every 8 Hours Scheduled 05/08/23 0845 05/12/23 2133        Current Diuretic Therapy:  Diuretics (From admission, onward)    Ordered     Dose/Rate Route Frequency Start Stop    05/21/23 1014  furosemide (LASIX) injection 40 mg        Ordering Provider: Nelson Mejia MD    40 mg Intravenous Once 05/21/23 1100 05/21/23 1153    05/20/23 1616  furosemide (LASIX) injection 40 mg        Ordering Provider: Nelson Mejia MD    40 mg Intravenous Once 05/20/23 1715 05/20/23 1730    05/16/23 1101  furosemide (LASIX) injection 80 mg        Ordering Provider: Elda Elliott MD    80 mg Intravenous Once 05/16/23 1200 05/16/23 1126    05/10/23 1155  metOLazone (ZAROXOLYN) tablet 2.5 mg        Ordering Provider: Dalila Mcdonald MD    2.5 mg Oral Daily 05/10/23 1245 05/10/23 1308    05/10/23 1155  furosemide (LASIX) injection 20 mg        Ordering Provider: Dalila Mcdonald MD    20 mg Intravenous Once 05/10/23 1215 05/10/23 1243    05/08/23 1019  furosemide (LASIX) injection 20 mg        Ordering Provider: Dalila Mcdonald MD    20 mg Intravenous Once 05/08/23 1115 05/08/23 1130    05/08/23 1018  metOLazone (ZAROXOLYN) tablet 2.5 mg        Ordering Provider: Dalila Mcdonald MD    2.5 mg Oral Daily 05/08/23 1045 05/08/23 1130        ----------------------------------------------------------------------------------------------------------------------  Vital Signs:  Temp:  [97.6 °F (36.4 °C)-98 °F (36.7 °C)] 97.6 °F (36.4 °C)  Heart Rate:  [74-93] 81  Resp:  [18-21] 20  BP: (160-174)/(57-79) 169/66  SpO2:  [98 %-99 %] 99 %  on  Flow (L/min):  [3-4] 3;   Device (Oxygen Therapy): nasal cannula  Body mass index is 29.86 kg/m².    Wt Readings from Last 3  Encounters:   05/05/23 83.9 kg (185 lb)   05/04/23 82.6 kg (182 lb)   03/15/22 81.6 kg (180 lb)     Intake & Output (last 3 days)       05/18 0701 05/19 0700 05/19 0701 05/20 0700 05/20 0701 05/21 0700 05/21 0701 05/22 0700    P.O. 960 600 180 240    Total Intake(mL/kg) 960 (11.4) 600 (7.2) 180 (2.1) 240 (2.9)    Net +960 +600 +180 +240            Urine Unmeasured Occurrence 6 x 6 x 5 x     Stool Unmeasured Occurrence 6 x 6 x 2 x         Diet: Regular/House Diet, Fluid Restriction (240 mL/tray) Diets; 1500 mL/day; Texture: Regular Texture (IDDSI 7); Fluid Consistency: Thin (IDDSI 0)  ----------------------------------------------------------------------------------------------------------------------  Physical Exam  Vitals and nursing note reviewed.   Constitutional:       Appearance: She is ill-appearing.   Eyes:      General: No scleral icterus.     Extraocular Movements: Extraocular movements intact.   Cardiovascular:      Rate and Rhythm: Normal rate.      Pulses: Normal pulses.      Heart sounds: No murmur heard.  Pulmonary:      Effort: Respiratory distress present.      Breath sounds: No wheezing or rales.   Abdominal:      General: Abdomen is flat.      Palpations: Abdomen is soft.   Musculoskeletal:      Right lower leg: Edema present.      Left lower leg: Edema present.   Skin:     General: Skin is warm and dry.      Capillary Refill: Capillary refill takes less than 2 seconds.   Neurological:      General: No focal deficit present.      Mental Status: She is alert and oriented to person, place, and time.   Psychiatric:         Mood and Affect: Mood normal.         Behavior: Behavior normal.   ----------------------------------------------------------------------------------------------------------------------    ----------------------------------------------------------------------------------------------------------------------  LABS:    CBC and coagulation:  Results from last 7 days   Lab Units  05/21/23  0838 05/21/23 0335 05/17/23 0143 05/16/23 0129   WBC 10*3/mm3  --  10.60 7.64 12.95*   HEMOGLOBIN g/dL  --  8.9* 7.3* 7.7*   HEMATOCRIT %  --  30.9* 24.9* 26.8*   MCV fL  --  93.4 91.5 90.5   MCHC g/dL  --  28.8* 29.3* 28.7*   PLATELETS 10*3/mm3  --  372 311 316   D DIMER QUANT MCGFEU/mL 1.22*  --   --   --      Acid/base balance:      Renal and electrolytes:  Results from last 7 days   Lab Units 05/21/23 0335 05/19/23 0146 05/17/23 0143 05/16/23 0129   SODIUM mmol/L 139 137 138 133*   POTASSIUM mmol/L 3.6 5.0 4.5 4.4   MAGNESIUM mg/dL  --  2.1 2.0 1.9   CHLORIDE mmol/L 101 103 103 102   CO2 mmol/L 26.2 23.1 23.5 23.5   BUN mg/dL 29* 25* 16 13   CREATININE mg/dL 1.08* 1.08* 1.23* 1.04*   CALCIUM mg/dL 8.8 8.7 8.8 8.6   PHOSPHORUS mg/dL  --  3.9 4.1 3.5   GLUCOSE mg/dL 79 133* 168* 127*     Estimated Creatinine Clearance: 47.6 mL/min (A) (by C-G formula based on SCr of 1.08 mg/dL (H)).    Liver and pancreatic function:  Results from last 7 days   Lab Units 05/17/23 0143 05/16/23 0129   ALBUMIN g/dL 2.4* 2.4*   BILIRUBIN mg/dL <0.2 <0.2   ALK PHOS U/L 57 61   AST (SGOT) U/L 6 5   ALT (SGPT) U/L <5 <5     Endocrine function:  No results found for: HGBA1C  Point of care bedside glucose levels:      Glucose levels from the [facility]:  Results from last 7 days   Lab Units 05/21/23 0335 05/19/23 0146 05/17/23 0143 05/16/23 0129   GLUCOSE mg/dL 79 133* 168* 127*     Lab Results   Component Value Date    TSH 2.620 03/15/2022     Cardiac:  Results from last 7 days   Lab Units 05/21/23  1038 05/21/23  0838 05/21/23  0335 05/17/23  0350 05/17/23  0143   HSTROP T ng/L 139* 150*  --  127* 132*   PROBNP pg/mL  --   --  15,228.0*  --   --        Cultures:  Lab Results   Component Value Date    COLORU Dark Yellow (A) 03/15/2022    CLARITYU Turbid (A) 03/15/2022    PHUR <=5.0 03/15/2022    GLUCOSEU Negative 03/15/2022    KETONESU Trace (A) 03/15/2022    BLOODU Large (3+) (A) 03/15/2022    NITRITEU Negative 03/15/2022     LEUKOCYTESUR Moderate (2+) (A) 03/15/2022    BILIRUBINUR Negative 03/15/2022    UROBILINOGEN 1.0 E.U./dL 03/15/2022    RBCUA 31-50 (A) 03/15/2022    WBCUA Too Numerous to Count (A) 03/15/2022    BACTERIA 4+ (A) 03/15/2022     Microbiology Results (last 10 days)     ** No results found for the last 240 hours. **          No results found for: PREGTESTUR, PREGSERUM, HCG, HCGQUANT  Pain Management Panel          View : No data to display.                      I have personally looked at the labs and they are summarized above.  ----------------------------------------------------------------------------------------------------------------------  Detailed radiology reports for the last 24 hours:    Imaging Results (Last 24 Hours)     Procedure Component Value Units Date/Time    CT Chest With Contrast Diagnostic [[id number]] Resulted: 05/21/23 0128     Updated: 05/21/23 0130    XR Chest 1 View [[id number]] Collected: 05/20/23 2030     Updated: 05/20/23 2033    Narrative:      Procedure: Portable chest x-ray examination performed on 05/20/2023.  Single view. Upright position.     HISTORY: Lung infiltrates.     FINDINGS:     Normal heart size.  Small right and left pleural effusion, left greater than right.  Patchy airspace opacities in the upper and lower lobes consistent with  multifocal pneumonia.  Mild osteoarthritis at the glenohumeral joint.  No free air in the upper abdomen.  No fracture or foreign body.       Impression:         1.  Multifocal pneumonia.  2.  Mild hypoinflated lungs.  3.  Small right and left pleural effusion, left greater than right.     This report was finalized on 5/20/2023 8:31 PM by Graeme Bobby MD.               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 Radiology review of imaging from March 2023 compared to 5/2/2023 notes no significant increase in nodule size but on personal review her suspected metastatic disease appears to have progressed in both size and number with associated increasing O2 requirement and poorer respiratory reserve. Repeat CT scan 5/21 with effusion improvement and only mild increase in met number and size  -Will consult oncology Monday for case review for possible palliative option  -Completed 5 day steroid course but wheezing resolved and acute progression unlikely COPD related  -Severe PAH noted on Echo likely acute on chronic right heart strain/failure secondary to lung disease. Troponin elevated compared to prior but with negative downtrend. BNP significantly elevated at 15K from 1K prior, will cont prn diuretic with additional 40mg lasix IV given this am, discussed strict I&Os as currently none recorded.   -Discussed GOC and patient to remain DNR/DNI and will reevaluate should her condition worsen, palliative following and previously not interested in hospice services but given significance of progression and patient reporting more sx and fatigue with w/u, is considering moving toward symptom focused care and consideration of comfort measures at SNF vs discharge with home hospice. Palliative care consulted for tomorrow am    -Suspect subacute hypoxia secondary to progressive pulmonary mets/lymphatic spread of squamous carcinoma with acute worsening secondary to pulm edema now improving with diuretics. Cardiology consulted given below. Patient has evidence of progressive right heart strain but no tachycardia, d-dimer was elevated and CT chest w/contrast but not PE protocol. Read remains pending but on personal review no large saddle  emboli     #[id number]  -Troponin elevated with negative delta and t-wave inversions in inferior leads likely secondary to progressive hypoxia, BNP elevated with mild pulm edema but likely secondary to heart strain  -Repeat ecg and troponin for any acute cp or HDS changes, current changes less likely ischemia and more secondary to right heart strain. Echo reviewed without regional wall motion abnormalities  -Cardiology consulted for case review but patient unlikely to tolerate stress test or LHC currently. If further concern for PE could performed CTPE to r/o but less likely given contrasted CT result noted above.               - - Chronic - -     #COPD hx  #Chronic iron def anemia  #Debility secondary to chronic illness: Holding further PT/OT until condition improved    VTE Prophylaxis:   Mechanical Order History:     None      Pharmalogical Order History:      Ordered     Dose Route Frequency Stop    05/05/23 1450  Enoxaparin Sodium (LOVENOX) syringe 40 mg         40 mg SC Daily --    Signed and Held  Enoxaparin Sodium (LOVENOX) syringe 40 mg         40 mg SC Daily --                Code Status and Medical Interventions:   Ordered at: 05/16/23 1238     Medical Intervention Limits:    NO cardioversion    NO intubation (DNI)     Level Of Support Discussed With:    Patient     Code Status (Patient has no pulse and is not breathing):    No CPR (Do Not Attempt to Resuscitate)     Medical Interventions (Patient has pulse or is breathing):    Limited Support     Release to patient:    Routine Release         Disposition: Cont inpatient management    I have reviewed any copied/forwarded text or data, verified its accuracy, and updated as necessary above.    Nelson Mejia MD  [facility] Hospitalist  05/21/23  14:37 EDT

## 2023-05-21 NOTE — CONSULTS
Inpatient Cardiology Consult  Consult performed by: Liana Saenz APRN  Consult ordered by: Nelson Mejia MD        Date of Admit: 5/5/2023  Date of Consult: 05/21/23  Provider, No Known  Orquidea Rosenberg  1945    Consulting Physician: Chandra Sands MD    Cardiology consultation    Reason for consultation: Inferior T wave inversions, elevated BNP and progressive hypoxia      History of Present Illness    Subjective     No chief complaint on file.      Orquidea Rosenberg is a 77 y.o. female with past medical history significant for hypertension, dyslipidemia, vulval cancer with lung mets status post radical vulvectomy in 2019, COPD, and arthritis.  Patient was admitted on 5/20/2023 from a swing bed.  She was experiencing progressive respiratory distress, requiring O2 to 5 L with desaturations into the 80s.  CT scan from March and early May 2023 noted progression concerning for new lung mets.  Cardiology was consulted for inferior T wave inversions, elevated BNP PE and progressive hypoxia.    Ms. Rosenberg was seen and examined.  She states she is breathing better.  She denies any chest pain.    Last Echo: 4/27/2023    Interpretation Summary       •  Left ventricular systolic function is normal. Left ventricular ejection fraction appears to be 66 - 70%.  •  Left ventricular wall thickness is consistent with mild concentric hypertrophy.  •  Left ventricular diastolic function is consistent with (grade I) impaired relaxation.  •  The right atrial cavity is mildly  dilated.  •  Mild calcific aortic valve stenosis and mild  aortic valve regurgitation are present.  •  Moderate tricuspid valve regurgitation is present.  •  Estimated right ventricular systolic pressure from tricuspid regurgitation is markedly elevated (>55 mmHg).  •  Severe pulmonary hypertension is present.  •  There is no evidence of pericardial effusion.    Last Stress: N/A    Last Cath: N/A      Past Medical History:   Diagnosis Date   • Arthritis    •  COPD (chronic obstructive pulmonary disease)    • Elevated cholesterol    • History of transfusion    • Hypertension    • Vulval ca      Past Surgical History:   Procedure Laterality Date   • RADICAL VULVECTOMY  09/06/2019   • RADICAL VULVECTOMY Bilateral 06/2019     Family History   Problem Relation Age of Onset   • Alzheimer's disease Mother    • Cancer Father    • Cancer Brother    • Diabetes Maternal Grandmother      Social History     Tobacco Use   • Smoking status: Former   • Smokeless tobacco: Never   Vaping Use   • Vaping Use: Never used   Substance Use Topics   • Alcohol use: No   • Drug use: No     Medications Prior to Admission   Medication Sig Dispense Refill Last Dose   • furosemide (LASIX) 20 MG tablet Take 1 tablet by mouth Daily.   Unknown   • levothyroxine (SYNTHROID, LEVOTHROID) 100 MCG tablet Take 1 tablet by mouth Daily.   Unknown   • losartan (COZAAR) 50 MG tablet Take 1 tablet by mouth Daily.   Unknown   • ondansetron (ZOFRAN) 4 MG tablet Take 1 tablet by mouth Every 8 (Eight) Hours As Needed for Nausea or Vomiting.   Unknown     Allergies:  Penicillins    Review of Systems   Constitutional: Negative for fatigue.   Respiratory: Positive for shortness of breath.    Cardiovascular: Negative for chest pain, palpitations and leg swelling.   Gastrointestinal: Negative for blood in stool.   Neurological: Negative for dizziness, syncope, weakness and light-headedness.   Hematological: Does not bruise/bleed easily.   All other systems reviewed and are negative.        Objective      Vital Signs  Temp:  [97.6 °F (36.4 °C)-98 °F (36.7 °C)] 97.6 °F (36.4 °C)  Heart Rate:  [74-93] 75  Resp:  [18-20] 18  BP: (160-174)/(57-79) 169/66  Body mass index is 29.86 kg/m².    Intake/Output Summary (Last 24 hours) at 5/21/2023 0911  Last data filed at 5/21/2023 0233  Gross per 24 hour   Intake 180 ml   Output --   Net 180 ml       Physical Exam  Vitals reviewed.   Constitutional:       Appearance: Normal appearance.  She is well-developed.   HENT:      Head: Normocephalic and atraumatic.   Eyes:      Pupils: Pupils are equal, round, and reactive to light.   Neck:      Vascular: No JVD.   Cardiovascular:      Rate and Rhythm: Normal rate and regular rhythm.      Heart sounds: No murmur heard.    No friction rub. No gallop.   Pulmonary:      Effort: Pulmonary effort is normal. No respiratory distress.      Breath sounds: Normal breath sounds. No wheezing or rales.   Abdominal:      Palpations: Abdomen is soft. There is no mass.      Tenderness: There is no abdominal tenderness.      Hernia: No hernia is present.   Skin:     General: Skin is warm and dry.   Neurological:      Mental Status: She is alert and oriented to person, place, and time.   Psychiatric:         Mood and Affect: Mood normal.         Behavior: Behavior normal.         Telemetry: Patient is not on telemetry    Results Review:   I reviewed the patient's new clinical results.  Results from last 7 days   Lab Units 05/17/23  0350 05/17/23 0143   HSTROP T ng/L 127* 132*     Results from last 7 days   Lab Units 05/21/23  0335 05/17/23  0143 05/16/23  0129   WBC 10*3/mm3 10.60 7.64 12.95*   HEMOGLOBIN g/dL 8.9* 7.3* 7.7*   PLATELETS 10*3/mm3 372 311 316     Results from last 7 days   Lab Units 05/21/23  0335 05/19/23  0146 05/17/23  0143 05/16/23  0129   SODIUM mmol/L 139 137 138 133*   POTASSIUM mmol/L 3.6 5.0 4.5 4.4   CHLORIDE mmol/L 101 103 103 102   CO2 mmol/L 26.2 23.1 23.5 23.5   BUN mg/dL 29* 25* 16 13   CREATININE mg/dL 1.08* 1.08* 1.23* 1.04*   CALCIUM mg/dL 8.8 8.7 8.8 8.6   GLUCOSE mg/dL 79 133* 168* 127*   ALT (SGPT) U/L  --   --  <5 <5   AST (SGOT) U/L  --   --  6 5     Lab Results   Component Value Date    INR 1.3 (H) 03/16/2022    INR 1.2 (H) 03/15/2022     Lab Results   Component Value Date    MG 2.1 05/19/2023    MG 2.0 05/17/2023    MG 1.9 05/16/2023     Lab Results   Component Value Date    TSH 2.620 03/15/2022      No results found for: BNP      EKG:       Imaging Results (Last 72 Hours)     Procedure Component Value Units Date/Time    CT Chest With Contrast Diagnostic [[id number]] Resulted: 05/21/23 0128     Updated: 05/21/23 0130    XR Chest 1 View [[id number]] Collected: 05/20/23 2030     Updated: 05/20/23 2033    Narrative:      Procedure: Portable chest x-ray examination performed on 05/20/2023.  Single view. Upright position.     HISTORY: Lung infiltrates.     FINDINGS:     Normal heart size.  Small right and left pleural effusion, left greater than right.  Patchy airspace opacities in the upper and lower lobes consistent with  multifocal pneumonia.  Mild osteoarthritis at the glenohumeral joint.  No free air in the upper abdomen.  No fracture or foreign body.       Impression:         1.  Multifocal pneumonia.  2.  Mild hypoinflated lungs.  3.  Small right and left pleural effusion, left greater than right.     This report was finalized on 5/20/2023 8:31 PM by Graeme Bobby MD.              Assessment:  1. HFpEF  2. Pulmonary hypertension      Recommendations:  1.     Thank you very much for asking us to be involved in this patient's care.  We will follow along with you.    Electronically signed by BERTHA Francisco, 05/21/23, 1:58 PM EDT.    .

## 2023-05-21 NOTE — PLAN OF CARE
Goal Outcome Evaluation:  Pt resting in bed. Pt had a critical troponin lab of 150 MD aware. Redraw was 139 MD aware. Wound care completed. No acute changes this shift. VSS. Will continue plan of care.

## 2023-05-22 ENCOUNTER — HOSPITAL ENCOUNTER (INPATIENT)
Facility: HOSPITAL | Age: 78
LOS: 10 days | Discharge: HOSPICE | DRG: 180 | End: 2023-06-01
Attending: STUDENT IN AN ORGANIZED HEALTH CARE EDUCATION/TRAINING PROGRAM | Admitting: STUDENT IN AN ORGANIZED HEALTH CARE EDUCATION/TRAINING PROGRAM
Payer: MEDICARE

## 2023-05-22 ENCOUNTER — APPOINTMENT (OUTPATIENT)
Dept: GENERAL RADIOLOGY | Facility: HOSPITAL | Age: 78
DRG: 180 | End: 2023-05-22
Payer: MEDICARE

## 2023-05-22 VITALS
RESPIRATION RATE: 16 BRPM | BODY MASS INDEX: 29.73 KG/M2 | HEIGHT: 66 IN | WEIGHT: 185 LBS | DIASTOLIC BLOOD PRESSURE: 67 MMHG | TEMPERATURE: 98 F | HEART RATE: 68 BPM | OXYGEN SATURATION: 98 % | SYSTOLIC BLOOD PRESSURE: 160 MMHG

## 2023-05-22 DIAGNOSIS — J96.01 ACUTE RESPIRATORY FAILURE WITH HYPOXIA: ICD-10-CM

## 2023-05-22 DIAGNOSIS — C51.9 VULVAR CANCER: Primary | ICD-10-CM

## 2023-05-22 LAB
ANION GAP SERPL CALCULATED.3IONS-SCNC: 10 MMOL/L (ref 5–15)
BUN SERPL-MCNC: 29 MG/DL (ref 8–23)
BUN/CREAT SERPL: 27.4 (ref 7–25)
CALCIUM SPEC-SCNC: 8.8 MG/DL (ref 8.6–10.5)
CHLORIDE SERPL-SCNC: 101 MMOL/L (ref 98–107)
CO2 SERPL-SCNC: 29 MMOL/L (ref 22–29)
CREAT SERPL-MCNC: 1.06 MG/DL (ref 0.57–1)
DEPRECATED RDW RBC AUTO: 70.1 FL (ref 37–54)
EGFRCR SERPLBLD CKD-EPI 2021: 54.2 ML/MIN/1.73
ERYTHROCYTE [DISTWIDTH] IN BLOOD BY AUTOMATED COUNT: 21.2 % (ref 12.3–15.4)
GLUCOSE SERPL-MCNC: 101 MG/DL (ref 65–99)
HCT VFR BLD AUTO: 29 % (ref 34–46.6)
HGB BLD-MCNC: 8.5 G/DL (ref 12–15.9)
MCH RBC QN AUTO: 26.6 PG (ref 26.6–33)
MCHC RBC AUTO-ENTMCNC: 29.3 G/DL (ref 31.5–35.7)
MCV RBC AUTO: 90.6 FL (ref 79–97)
PLATELET # BLD AUTO: 341 10*3/MM3 (ref 140–450)
PMV BLD AUTO: 9 FL (ref 6–12)
POTASSIUM SERPL-SCNC: 3.4 MMOL/L (ref 3.5–5.2)
RBC # BLD AUTO: 3.2 10*6/MM3 (ref 3.77–5.28)
SODIUM SERPL-SCNC: 140 MMOL/L (ref 136–145)
WBC NRBC COR # BLD: 11.44 10*3/MM3 (ref 3.4–10.8)

## 2023-05-22 PROCEDURE — 94799 UNLISTED PULMONARY SVC/PX: CPT

## 2023-05-22 PROCEDURE — 94640 AIRWAY INHALATION TREATMENT: CPT

## 2023-05-22 PROCEDURE — 63710000001 PREDNISONE PER 1 MG: Performed by: STUDENT IN AN ORGANIZED HEALTH CARE EDUCATION/TRAINING PROGRAM

## 2023-05-22 PROCEDURE — 80048 BASIC METABOLIC PNL TOTAL CA: CPT | Performed by: STUDENT IN AN ORGANIZED HEALTH CARE EDUCATION/TRAINING PROGRAM

## 2023-05-22 PROCEDURE — 99223 1ST HOSP IP/OBS HIGH 75: CPT | Performed by: STUDENT IN AN ORGANIZED HEALTH CARE EDUCATION/TRAINING PROGRAM

## 2023-05-22 PROCEDURE — 25010000002 ENOXAPARIN PER 10 MG: Performed by: STUDENT IN AN ORGANIZED HEALTH CARE EDUCATION/TRAINING PROGRAM

## 2023-05-22 PROCEDURE — 97162 PT EVAL MOD COMPLEX 30 MIN: CPT

## 2023-05-22 PROCEDURE — 63710000001 ONDANSETRON PER 8 MG: Performed by: STUDENT IN AN ORGANIZED HEALTH CARE EDUCATION/TRAINING PROGRAM

## 2023-05-22 PROCEDURE — 85027 COMPLETE CBC AUTOMATED: CPT | Performed by: STUDENT IN AN ORGANIZED HEALTH CARE EDUCATION/TRAINING PROGRAM

## 2023-05-22 RX ORDER — GUAIFENESIN 600 MG/1
600 TABLET, EXTENDED RELEASE ORAL EVERY 12 HOURS SCHEDULED
Status: DISCONTINUED | OUTPATIENT
Start: 2023-05-22 | End: 2023-06-01 | Stop reason: HOSPADM

## 2023-05-22 RX ORDER — SODIUM CHLORIDE 0.9 % (FLUSH) 0.9 %
10 SYRINGE (ML) INJECTION AS NEEDED
Status: DISCONTINUED | OUTPATIENT
Start: 2023-05-22 | End: 2023-06-01 | Stop reason: HOSPADM

## 2023-05-22 RX ORDER — NITROGLYCERIN 0.4 MG/1
0.4 TABLET SUBLINGUAL
Status: DISCONTINUED | OUTPATIENT
Start: 2023-05-22 | End: 2023-06-01 | Stop reason: HOSPADM

## 2023-05-22 RX ORDER — OXYCODONE AND ACETAMINOPHEN 7.5; 325 MG/1; MG/1
1 TABLET ORAL
Status: DISCONTINUED | OUTPATIENT
Start: 2023-05-22 | End: 2023-05-25

## 2023-05-22 RX ORDER — METOPROLOL SUCCINATE 25 MG/1
25 TABLET, EXTENDED RELEASE ORAL
Status: DISCONTINUED | OUTPATIENT
Start: 2023-05-22 | End: 2023-06-01 | Stop reason: HOSPADM

## 2023-05-22 RX ORDER — SODIUM CHLORIDE 9 MG/ML
40 INJECTION, SOLUTION INTRAVENOUS AS NEEDED
Status: DISCONTINUED | OUTPATIENT
Start: 2023-05-22 | End: 2023-06-01 | Stop reason: HOSPADM

## 2023-05-22 RX ORDER — ONDANSETRON 4 MG/1
4 TABLET, FILM COATED ORAL EVERY 8 HOURS PRN
Status: DISCONTINUED | OUTPATIENT
Start: 2023-05-22 | End: 2023-06-01 | Stop reason: HOSPADM

## 2023-05-22 RX ORDER — FERROUS SULFATE 325(65) MG
325 TABLET ORAL
Status: DISCONTINUED | OUTPATIENT
Start: 2023-05-23 | End: 2023-06-01 | Stop reason: HOSPADM

## 2023-05-22 RX ORDER — SODIUM CHLORIDE 0.9 % (FLUSH) 0.9 %
10 SYRINGE (ML) INJECTION EVERY 12 HOURS SCHEDULED
Status: DISCONTINUED | OUTPATIENT
Start: 2023-05-22 | End: 2023-06-01 | Stop reason: HOSPADM

## 2023-05-22 RX ORDER — FUROSEMIDE 40 MG/1
40 TABLET ORAL DAILY
Status: DISCONTINUED | OUTPATIENT
Start: 2023-05-23 | End: 2023-06-01 | Stop reason: HOSPADM

## 2023-05-22 RX ORDER — LOSARTAN POTASSIUM 50 MG/1
50 TABLET ORAL
Status: DISCONTINUED | OUTPATIENT
Start: 2023-05-23 | End: 2023-06-01 | Stop reason: HOSPADM

## 2023-05-22 RX ORDER — IPRATROPIUM BROMIDE AND ALBUTEROL SULFATE 2.5; .5 MG/3ML; MG/3ML
3 SOLUTION RESPIRATORY (INHALATION)
Status: DISCONTINUED | OUTPATIENT
Start: 2023-05-22 | End: 2023-06-01 | Stop reason: HOSPADM

## 2023-05-22 RX ORDER — METOPROLOL SUCCINATE 25 MG/1
25 TABLET, EXTENDED RELEASE ORAL
Status: DISCONTINUED | OUTPATIENT
Start: 2023-05-22 | End: 2023-05-22 | Stop reason: HOSPADM

## 2023-05-22 RX ORDER — PREDNISONE 20 MG/1
40 TABLET ORAL
Status: COMPLETED | OUTPATIENT
Start: 2023-05-22 | End: 2023-05-22

## 2023-05-22 RX ORDER — SODIUM CHLORIDE 0.9 % (FLUSH) 0.9 %
20 SYRINGE (ML) INJECTION AS NEEDED
Status: DISCONTINUED | OUTPATIENT
Start: 2023-05-22 | End: 2023-06-01 | Stop reason: HOSPADM

## 2023-05-22 RX ORDER — BUDESONIDE 0.5 MG/2ML
0.5 INHALANT ORAL
Status: DISCONTINUED | OUTPATIENT
Start: 2023-05-22 | End: 2023-06-01 | Stop reason: HOSPADM

## 2023-05-22 RX ORDER — ATORVASTATIN CALCIUM 20 MG/1
20 TABLET, FILM COATED ORAL NIGHTLY
Status: DISCONTINUED | OUTPATIENT
Start: 2023-05-22 | End: 2023-06-01 | Stop reason: HOSPADM

## 2023-05-22 RX ORDER — HYDROXYZINE HYDROCHLORIDE 25 MG/1
25 TABLET, FILM COATED ORAL 3 TIMES DAILY PRN
Status: DISCONTINUED | OUTPATIENT
Start: 2023-05-22 | End: 2023-06-01 | Stop reason: HOSPADM

## 2023-05-22 RX ORDER — LOSARTAN POTASSIUM 25 MG/1
25 TABLET ORAL
Status: DISCONTINUED | OUTPATIENT
Start: 2023-05-23 | End: 2023-05-22

## 2023-05-22 RX ORDER — ENOXAPARIN SODIUM 100 MG/ML
40 INJECTION SUBCUTANEOUS DAILY
Status: DISCONTINUED | OUTPATIENT
Start: 2023-05-23 | End: 2023-06-01 | Stop reason: HOSPADM

## 2023-05-22 RX ORDER — POLYETHYLENE GLYCOL 3350 17 G/17G
17 POWDER, FOR SOLUTION ORAL DAILY
Status: DISCONTINUED | OUTPATIENT
Start: 2023-05-23 | End: 2023-05-23

## 2023-05-22 RX ORDER — NYSTATIN 100000 [USP'U]/G
POWDER TOPICAL EVERY 12 HOURS SCHEDULED
Status: DISCONTINUED | OUTPATIENT
Start: 2023-05-22 | End: 2023-06-01 | Stop reason: HOSPADM

## 2023-05-22 RX ORDER — LEVOTHYROXINE SODIUM 0.1 MG/1
100 TABLET ORAL
Status: DISCONTINUED | OUTPATIENT
Start: 2023-05-23 | End: 2023-06-01 | Stop reason: HOSPADM

## 2023-05-22 RX ORDER — FUROSEMIDE 40 MG/1
40 TABLET ORAL DAILY
Status: DISCONTINUED | OUTPATIENT
Start: 2023-05-22 | End: 2023-05-22 | Stop reason: HOSPADM

## 2023-05-22 RX ORDER — ATORVASTATIN CALCIUM 20 MG/1
20 TABLET, FILM COATED ORAL NIGHTLY
Status: DISCONTINUED | OUTPATIENT
Start: 2023-05-22 | End: 2023-05-22 | Stop reason: HOSPADM

## 2023-05-22 RX ADMIN — Medication 10 ML: at 20:58

## 2023-05-22 RX ADMIN — Medication 10 ML: at 08:53

## 2023-05-22 RX ADMIN — IPRATROPIUM BROMIDE AND ALBUTEROL SULFATE 3 ML: .5; 2.5 SOLUTION RESPIRATORY (INHALATION) at 18:25

## 2023-05-22 RX ADMIN — ONDANSETRON HYDROCHLORIDE 4 MG: 4 TABLET, FILM COATED ORAL at 11:57

## 2023-05-22 RX ADMIN — IPRATROPIUM BROMIDE AND ALBUTEROL SULFATE 3 ML: .5; 2.5 SOLUTION RESPIRATORY (INHALATION) at 06:34

## 2023-05-22 RX ADMIN — PREDNISONE 40 MG: 20 TABLET ORAL at 11:57

## 2023-05-22 RX ADMIN — FERROUS SULFATE TAB 325 MG (65 MG ELEMENTAL FE) 325 MG: 325 (65 FE) TAB at 08:53

## 2023-05-22 RX ADMIN — OXYCODONE HYDROCHLORIDE AND ACETAMINOPHEN 1 TABLET: 10; 325 TABLET ORAL at 06:37

## 2023-05-22 RX ADMIN — NYSTATIN: 100000 POWDER TOPICAL at 20:58

## 2023-05-22 RX ADMIN — VITAMINS A AND D OINTMENT 1 APPLICATION: 15.5; 53.4 OINTMENT TOPICAL at 08:54

## 2023-05-22 RX ADMIN — NYSTATIN: 100000 POWDER TOPICAL at 08:54

## 2023-05-22 RX ADMIN — OXYCODONE HYDROCHLORIDE AND ACETAMINOPHEN 1 TABLET: 10; 325 TABLET ORAL at 10:40

## 2023-05-22 RX ADMIN — IPRATROPIUM BROMIDE AND ALBUTEROL SULFATE 3 ML: .5; 2.5 SOLUTION RESPIRATORY (INHALATION) at 12:12

## 2023-05-22 RX ADMIN — POLYETHYLENE GLYCOL 3350 17 G: 17 POWDER, FOR SOLUTION ORAL at 08:54

## 2023-05-22 RX ADMIN — LOSARTAN POTASSIUM 25 MG: 25 TABLET, FILM COATED ORAL at 08:54

## 2023-05-22 RX ADMIN — OXYCODONE HYDROCHLORIDE AND ACETAMINOPHEN 1 TABLET: 7.5; 325 TABLET ORAL at 21:07

## 2023-05-22 RX ADMIN — GUAIFENESIN 600 MG: 600 TABLET, EXTENDED RELEASE ORAL at 20:58

## 2023-05-22 RX ADMIN — GUAIFENESIN 600 MG: 600 TABLET, EXTENDED RELEASE ORAL at 08:54

## 2023-05-22 RX ADMIN — ASPIRIN 81 MG: 81 TABLET, COATED ORAL at 13:31

## 2023-05-22 RX ADMIN — ATORVASTATIN CALCIUM 20 MG: 20 TABLET, FILM COATED ORAL at 20:58

## 2023-05-22 RX ADMIN — VITAMINS A AND D OINTMENT 1 APPLICATION: 15.5; 53.4 OINTMENT TOPICAL at 20:58

## 2023-05-22 RX ADMIN — BUDESONIDE 0.5 MG: 0.5 INHALANT RESPIRATORY (INHALATION) at 06:34

## 2023-05-22 RX ADMIN — BUDESONIDE 0.5 MG: 0.5 SUSPENSION RESPIRATORY (INHALATION) at 18:25

## 2023-05-22 RX ADMIN — LEVOTHYROXINE SODIUM 100 MCG: 100 TABLET ORAL at 05:11

## 2023-05-22 RX ADMIN — ENOXAPARIN SODIUM 40 MG: 40 INJECTION SUBCUTANEOUS at 08:54

## 2023-05-22 RX ADMIN — METOPROLOL SUCCINATE 25 MG: 25 TABLET, EXTENDED RELEASE ORAL at 13:30

## 2023-05-22 NOTE — CONSULTS
"Palliative Care Daily Progress Note     S: Medical record reviewed, followed up with Primary RN Ed and Dr Mejia regarding patient's condition. When palliative care entered the room Orquidea was sitting up in bed, awake and alert, her daughter Lilli at bedside. Orquidea stated that she was having low abdominal pain, she stated that she had pain medicine around 6:30 and that it works for around three hours, she denied nausea at this time, as well as denied anxiety or shortness of breath at this time. Dr. Mejia entered room at this time and discussed Orquidea's condition, prognosis, and options moving forward.      O:   Palliative Performance Scale Score:     /58 (BP Location: Right arm, Patient Position: Lying)   Pulse 81   Temp 98.1 °F (36.7 °C) (Oral)   Resp 20   Ht 167.6 cm (66\")   Wt 79 kg (174 lb 3.2 oz)   SpO2 100%   BMI 28.12 kg/m²     Intake/Output Summary (Last 24 hours) at 5/22/2023 1702  Last data filed at 5/22/2023 1300  Gross per 24 hour   Intake 480 ml   Output --   Net 480 ml       PE:  General Appearance:    Chronically ill appearing, alert, cooperative, NAD   HEENT:    NC/AT, without obvious abnormality, EOMI, anicteric    Neck:   supple, trachea midline, no JVD   Lungs:     Regular unlabored respirations, no wheezes rales or rhonchi noted.    Heart:    RRR, normal S1 and S2, no M/R/G   Abdomen:     Soft, NT, ND, NABS    Extremities:   Moves all extremities, BLE edema   Pulses:   Pulses palpable and equal bilaterally   Skin:   Warm, dry, pale   Neurologic:   A/Ox3, cooperative   Psych:   Calm, appropriate         Meds: Reviewed and changes noted.    Labs:   Results from last 7 days   Lab Units 05/22/23  0202   WBC 10*3/mm3 11.44*   HEMOGLOBIN g/dL 8.5*   HEMATOCRIT % 29.0*   PLATELETS 10*3/mm3 341     Results from last 7 days   Lab Units 05/22/23  0202   SODIUM mmol/L 140   POTASSIUM mmol/L 3.4*   CHLORIDE mmol/L 101   CO2 mmol/L 29.0   BUN mg/dL 29*   CREATININE mg/dL 1.06*   GLUCOSE mg/dL 101* "   CALCIUM mg/dL 8.8     Results from last 7 days   Lab Units 05/22/23  0202 05/19/23  0146 05/17/23  0143   SODIUM mmol/L 140   < > 138   POTASSIUM mmol/L 3.4*   < > 4.5   CHLORIDE mmol/L 101   < > 103   CO2 mmol/L 29.0   < > 23.5   BUN mg/dL 29*   < > 16   CREATININE mg/dL 1.06*   < > 1.23*   CALCIUM mg/dL 8.8   < > 8.8   BILIRUBIN mg/dL  --   --  <0.2   ALK PHOS U/L  --   --  57   ALT (SGPT) U/L  --   --  <5   AST (SGOT) U/L  --   --  6   GLUCOSE mg/dL 101*   < > 168*    < > = values in this interval not displayed.     Imaging Results (Last 72 Hours)     Procedure Component Value Units Date/Time    XR Chest 1 View [[id number]] Resulted: 05/22/23 1302     Updated: 05/22/23 1345            Diagnostics: Reviewed    A: Orquidea Rosenberg is a 77 y.o. female admitted on 4/25/2023 with diarrhea, nausea, vomiting, and abdominal pain. 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 Orquidea had partial chemotherapy and radiation, however since that time has refused further treatment as she had difficulty tolerating treatments. Orquidea also refused colostomy and surgery at  for pneumoperitoneum with suspect bowel perforation. Orquidea was offered palliative or hospice care but declined at that time. Per pts daughter Lilli, her mother had been getting weaker over the few days before admission to [facility].  Lilli states she was having poor appetite, nausea, vomiting, and diarrhea as well as abdominal and pubic pain,She states she was fine one morning and was weaker in the evening and by the next morning could not get up at all, which led to her being brought to the ER.              P:  I was able to be present while Dr Mejia discussed patients condition, prognosis and options moving forward. I was able to also have a lengthy discussion to discuss comfort measures beginning here in the hospital as well as hospice at home after discharge. I  explained what comfort measures entailed in detail as well as discussed hospice at home. Daughter Lilli discussed that she was working with comfort keepers and home helpers to get caregivers set up before discharging home. I will revisit pt and daughter tomorrow am to see if it is okay to go ahead with hospice referral. I discussed this pt and conversation with Dr Mejia.      We will continue to follow along. Please do not hesitate to contact us regarding further sx mgmt or GOC needs, including after hours or on weekends via our on call provider at [phone].     Aysha Saucedo, APRN    5/22/2023

## 2023-05-22 NOTE — NURSING NOTE
Wound consult for denuded areas to bilateral buttock. Area presents with two raised areas with blacnable erick wound skin and a small break to the epidermis. Area is not pressure related. New order for Magic Barrier BID and use Z-guard in between.

## 2023-05-22 NOTE — PROGRESS NOTES
"  Patient Identification:  Name:  Orquidea Rosenberg++  Age:  77 y.o.  Sex:  female  :  1945  MRN:  [id number]  Visit Number:  [id number]  Primary care provider:  Jackeline Denson APRN    Reason for follow-up:  Acute on chronic heart failure with preserved EF and NSTEMI type II    Subjective     Resume cardiac care today.  Reviewed the chart.  Patient was evaluated.  Patient has been admitted for progressive dyspnea on exertion.  She has acute on chronic hypoxic respiratory failure.  BNP is elevated.  Noted CT-angio chest findings.  Shortness of breath is better according to the patient.  She has bilateral persistent pedal edema.  Noted troponin levels-flat with no significant delta.  ECG showed T wave abnormalities in inferior leads.  She has coronary artery calcification in the CT exam.      Medication Review:   budesonide, 0.5 mg, Nebulization, BID - RT  enoxaparin, 40 mg, Subcutaneous, Daily  ferrous sulfate, 325 mg, Oral, Daily With Breakfast  guaiFENesin, 600 mg, Oral, Q12H  ipratropium-albuterol, 3 mL, Nebulization, Q6H While Awake - RT  levothyroxine, 100 mcg, Oral, Q AM  losartan, 25 mg, Oral, Q24H  magic barrier cream, 1 application, Topical, BID  nystatin, , Topical, Q12H  polyethylene glycol, 17 g, Oral, Daily  sodium chloride, 10 mL, Intravenous, Q12H  sodium chloride, 10 mL, Intravenous, Q12H  sodium chloride, 10 mL, Intravenous, Q12H  sodium chloride, 10 mL, Intravenous, Q12H  sodium chloride, 10 mL, Intravenous, Q12H          Vital Sign Min/Max for last 24 hours  Temp  Min: 98 °F (36.7 °C)  Max: 98.8 °F (37.1 °C)   BP  Min: 116/72  Max: 160/67   Pulse  Min: 68  Max: 101   Resp  Min: 16  Max: 28   SpO2  Min: 96 %  Max: 99 %   No data recorded   No data recorded     Flowsheet Rows    Flowsheet Row First Filed Value   Admission Height 167.6 cm (66\") Documented at 2023 1502   Admission Weight 83.9 kg (185 lb) Documented at 2023 1502          Objective       Physical Exam:   "   General-alert.  Oriented.  No distress.  HEENT- normal  Neck- no JVD  Cardiac- normal heart sounds are heard.  Regular rhythm.  No murmurs.  Lungs- clear to auscultation  Abdomen-normal  Extremity-2+ bilateral pedal edema  Neuro-no deficit     Echocardiogram done 4/27/2023         Interpretation Summary       •  Left ventricular systolic function is normal. Left ventricular ejection fraction appears to be 66 - 70%.  •  Left ventricular wall thickness is consistent with mild concentric hypertrophy.  •  Left ventricular diastolic function is consistent with (grade I) impaired relaxation.  •  The right atrial cavity is mildly  dilated.  •  Mild calcific aortic valve stenosis and mild  aortic valve regurgitation are present.  •  Moderate tricuspid valve regurgitation is present.  •  Estimated right ventricular systolic pressure from tricuspid regurgitation is markedly elevated (>55 mmHg).  •  Severe pulmonary hypertension is present.  •  There is no evidence of pericardial effusion.         Labs  Results from last 7 days   Lab Units 05/22/23 0202 05/21/23 0335 05/17/23 0143 05/16/23  0129   WBC 10*3/mm3 11.44* 10.60 7.64 12.95*   HEMOGLOBIN g/dL 8.5* 8.9* 7.3* 7.7*   HEMATOCRIT % 29.0* 30.9* 24.9* 26.8*   PLATELETS 10*3/mm3 341 372 311 316     Results from last 7 days   Lab Units 05/22/23 0202 05/21/23 0335 05/19/23 0146 05/17/23 0143 05/16/23  0129   SODIUM mmol/L 140 139 137 138 133*   POTASSIUM mmol/L 3.4* 3.6 5.0 4.5 4.4   CHLORIDE mmol/L 101 101 103 103 102   CO2 mmol/L 29.0 26.2 23.1 23.5 23.5   BUN mg/dL 29* 29* 25* 16 13   CREATININE mg/dL 1.06* 1.08* 1.08* 1.23* 1.04*   CALCIUM mg/dL 8.8 8.8 8.7 8.8 8.6   GLUCOSE mg/dL 101* 79 133* 168* 127*     Results from last 7 days   Lab Units 05/17/23 0143 05/16/23  0129   BILIRUBIN mg/dL <0.2 <0.2   ALK PHOS U/L 57 61   AST (SGOT) U/L 6 5   ALT (SGPT) U/L <5 <5     Results from last 7 days   Lab Units 05/19/23  0146 05/17/23  0143 05/16/23  0129   MAGNESIUM  mg/dL 2.1 2.0 1.9             Results from last 7 days   Lab Units 05/21/23  1038 05/21/23  0838 05/17/23  0350   HSTROP T ng/L 139* 150* 127*           Assessment      1.  Acute on chronic HFpEF.  Stable.  2.  Acute on chronic hypoxic respiratory failure due to lung disease  3.  NSTEMI type II.  Due to hypoxic respiratory failure  4.  Evidence of inferior ischemia in ECG.  Coronary artery calcification on CT exam.  Suspecting ASCVD  5.  Hypertension   6.  Severe pulmonary hypertension.  Group 2 and group 3.  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  Prognosis- poor    Plan     1.  HFpEF-   patient was taking Lasix 20 mg daily and increased to 40 mg daily.    2.  CAD- resumed aspirin and atorvastatin.  Started low-dose metoprolol.  Increased losartan to 50 mg daily due to elevated BP.  Suggested medical therapy with no intervention due to comorbid conditions and poor prognosis.  I discussed with the patient and also with her daughter over the telephone.      Jeanette Bo MD [facility]  05/22/23  09:21 EDT

## 2023-05-22 NOTE — THERAPY EVALUATION
Acute Care - Physical Therapy Initial Evaluation   [facility]     Patient Name: Orquidea Rosenberg  : 1945  MRN: [id number]  Today's Date: 2023   Onset of Illness/Injury or Date of Surgery: 23 (pt. admitted ; familiar to PT from previous swing bed status; pt. now readmitted to inpatient status d/t respiratory decline.)  Visit Dx:   No diagnosis found.  Patient Active Problem List   Diagnosis   • COVID-19   • Vulvar cancer   • Physical debility   • Bacteremia   • COPD exacerbation   • Debility   • Hypoxia   • Acute respiratory failure with hypoxia     Past Medical History:   Diagnosis Date   • Arthritis    • COPD (chronic obstructive pulmonary disease)    • Elevated cholesterol    • History of transfusion    • Hypertension    • Vulval ca      Past Surgical History:   Procedure Laterality Date   • RADICAL VULVECTOMY  2019   • RADICAL VULVECTOMY Bilateral 2019     PT Assessment (last 12 hours)     PT Evaluation and Treatment     Row Name 23 1530          Physical Therapy Time and Intention    Subjective Information no complaints  -AG     Document Type evaluation  -AG     Mode of Treatment physical therapy  -AG     Patient Effort good  -AG     Symptoms Noted During/After Treatment fatigue;shortness of breath  -AG     Row Name 23 153          General Information    Patient Profile Reviewed yes  -AG     Onset of Illness/Injury or Date of Surgery 23  pt. admitted ; familiar to PT from previous swing bed status; pt. now readmitted to inpatient status d/t respiratory decline.  -AG     Referring Physician Dr. Mejia  -     Patient Observations agree to therapy;cooperative;alert  -AG     Patient/Family/Caregiver Comments/Observations pt. supine in bed on 2.5L O2 nc; pt. alert, cooperative for evaluation.  States she currently does not have much pain.  -AG     Prior Level of Function independent:;all household mobility  pt. states she previously ambulated without device but became  SOA after walking from one end of her home to the other.  Previously independent with BADL.  Daughter assisted with cooking and household chores.  Pt. owns rollator but did not use.  -AG     Equipment Currently Used at Home --  owns rollator but does not use.  Has a w/c that belonged to her late spouse.  -AG     Pertinent History of Current Functional Problem pt. admitted with physical debility; bacteremia; hypoxia  -AG     Existing Precautions/Restrictions fall;oxygen therapy device and L/min  -AG     Limitations/Impairments --  medical complexity; vulvar CA  -AG     Equipment Issued to Patient gait belt  -AG     Risks Reviewed patient:;increased discomfort;dizziness  -AG     Benefits Reviewed patient:;improve function;increase independence;increase strength;increase balance;increase knowledge;decrease risk of DVT  -AG     Barriers to Rehab medically complex  -AG     Row Name 05/22/23 1530          Previous Level of Function/Home Environm    BADLs, Premorbid Functional Level independent  -AG     IADLs, Premorbid Functional Level partial assistance  -AG     Bed Mobility, Premorbid Functional Level independent  -AG     Transfers, Premorbid Functional Level independent  -AG     Household Ambulation, Premorbid Functional Level independent  -AG     Stairs, Premorbid Functional Level independent  -AG     Previous Level of Function, Premorbid patient reports sedentary lifestyle at home, states she seldom left home and became SOA and fatigued ambulating inside home.  -AG     Row Name 05/22/23 1530          Living Environment    Current Living Arrangements home  -AG     People in Home child(jolly), adult  daughter  -AG     Primary Care Provided by self  -AG     Row Name 05/22/23 1530          Home Use of Assistive/Adaptive Equipment    Equipment Currently Used at Home --  owns rollator, w/c  -AG     Row Name 05/22/23 1530          Pain    Additional Documentation --  pt. reports pain as well tolerated at time of PT encounter.   -[facility] Name 05/22/23 1530          Cognition    Affect/Mental Status (Cognition) [facility]  -     Orientation Status (Cognition) oriented x 3  -AG     Follows Commands (Cognition) [facility]  -     Personal Safety Interventions fall prevention program maintained;gait belt;nonskid shoes/slippers when out of bed;supervised activity  -[facility] Name 05/22/23 1530          Range of Motion (ROM)    Range of Motion ROM is WFL;bilateral lower extremities;bilateral upper extremities  -AG     Row Name 05/22/23 1530          Strength (Manual Muscle Testing)    Strength (Manual Muscle Testing) --  B LE 4/5  -[facility] Name 05/22/23 1530          Sensory    Hearing Status WFL  -[facility] Name 05/22/23 1530          Vision Assessment/Intervention    Visual Impairment/Limitations WFL  -[facility] Name 05/22/23 1530          Sensory Assessment (Somatosensory)    Sensory Assessment (Somatosensory) LE sensation intact  -AG     Row Name 05/22/23 1530          Mobility    Extremity Weight-bearing Status --  no restrictions  -AG     Row Name 05/22/23 1530          Bed Mobility    Bed Mobility rolling left;rolling right;scooting/bridging;supine-sit;sit-supine  -AG     Rolling Left [place] (Bed Mobility) standby assist  -AG     Rolling Right [place] (Bed Mobility) standby assist  -     Supine-Sit [place] (Bed Mobility) standby assist  -     Assistive Device (Bed Mobility) bed rails  -AG     Row Name 05/22/23 1530          Transfers    Transfers sit-stand transfer;stand-sit transfer;stand pivot/stand step transfer  -AG     Row Name 05/22/23 1530          Sit-Stand Transfer    Sit-Stand [place] (Transfers) standby assist;verbal cues;nonverbal cues (demo/gesture);contact guard  -AG     Assistive Device (Sit-Stand Transfers) walker, front-wheeled  -[facility] Name 05/22/23 1530          Stand-Sit Transfer    Stand-Sit [place] (Transfers) standby assist;verbal cues;nonverbal cues (demo/gesture);contact guard  -AG      Assistive Device (Stand-Sit Transfers) walker, front-wheeled  -AG     Row Name 05/22/23 1530          Stand Pivot/Stand Step Transfer    Stand Pivot/Stand Step [place] (Transfers) standby assist;verbal cues;nonverbal cues (demo/gesture);contact guard  -AG     Assistive Device (Stand Pivot Stand Step Transfer) walker, front-wheeled  -AG     Row Name 05/22/23 1530          Gait/Stairs (Locomotion)    [place] Level (Gait) contact guard;nonverbal cues (demo/gesture);verbal cues  -AG     Assistive Device (Gait) walker, front-wheeled  -AG     Distance in Feet (Gait) 5  -AG     Pattern (Gait) step-through  -AG     Deviations/Abnormal Patterns (Gait) stride length decreased;antalgic  -AG     Row Name 05/22/23 1530          Safety Issues, Functional Mobility    Impairments Affecting Function (Mobility) balance;endurance/activity tolerance;strength;pain  -AG     Row Name 05/22/23 1530          Balance    Balance Assessment sitting static balance;sitting dynamic balance;sit to stand dynamic balance;standing static balance;standing dynamic balance  -AG     Static Sitting Balance modified independence  -AG     Position, Sitting Balance unsupported;sitting edge of bed  -AG     Static Standing Balance standby assist  -AG     Dynamic Standing Balance standby assist;verbal cues;non-verbal cues (demo/gesture);contact guard  -AG     Position/Device Used, Standing Balance walker, front-wheeled  -AG     Row Name 05/22/23 1530          Motor Skills    Motor Skills coordination;functional endurance;motor control/coordination interventions  -AG     Row Name             Wound 05/01/23 1414 Left vagina    Wound - Properties Group Placement Date: 05/01/23  -LB Placement Time: 1414  -LB Present on Hospital Admission: Y  -LB Side: Left  -LB Location: vagina  -LB    Retired Wound - Properties Group Placement Date: 05/01/23  -LB Placement Time: 1414  -LB Present on Hospital Admission: Y  -LB Side: Left  -LB Location: vagina  -LB     Retired Wound - Properties Group Date first assessed: 05/01/23  -LB Time first assessed: 1414  -LB Present on Hospital Admission: Y  -LB Side: Left  -LB Location: vagina  -LB    Row Name             Wound 05/22/23 1425 coccyx Pressure Injury    Wound - Properties Group Placement Date: 05/22/23  -EB Placement Time: 1425  -EB Present on Hospital Admission: N  -EB Location: coccyx  -EB Primary Wound Type: Pressure inj  -EB    Retired Wound - Properties Group Placement Date: 05/22/23  -EB Placement Time: 1425  -EB Present on Hospital Admission: N  -EB Location: coccyx  -EB Primary Wound Type: Pressure inj  -EB    Retired Wound - Properties Group Date first assessed: 05/22/23  -EB Time first assessed: 1425  -EB Present on Hospital Admission: N  -EB Location: coccyx  -EB Primary Wound Type: Pressure inj  -EB    Row Name 05/22/23 1530          Coping    Observed Emotional State calm;cooperative  -AG     Verbalized Emotional State acceptance  -AG     Trust Relationship/Rapport care explained;choices provided;thoughts/feelings acknowledged  -AG     Family/Support Persons daughter  -AG     Involvement in Care not present at bedside  -AG     Family/Support System Care self-care encouraged;support provided  -AG     Row Name 05/22/23 1530          Plan of Care Review    Plan of Care Reviewed With patient  -AG     Outcome Evaluation PT evaluation complete.  Pt. CGA/ SBA for bed mobility, STS and to ambulate short distance to bedside chair.  Fatigued following this distance.  PT will continue to follow and progress as pt. tolerates.  -AG     Row Name 05/22/23 1530          Positioning and Restraints    Pre-Treatment Position in bed  -AG     Post Treatment Position chair  -AG     In Chair sitting;notified nsg;call light within reach;encouraged to call for assist  -AG     Row Name 05/22/23 1530          Therapy Assessment/Plan (PT)    Patient/Family Therapy Goals Statement (PT) return home with daughter  -AG     Functional Level at  Time of Evaluation (PT) SBA/ CGA  -AG     PT Diagnosis (PT) impaired functional mobility and endurance  -AG     Rehab Potential (PT) good, to achieve stated therapy goals  -AG     Criteria for Skilled Interventions Met (PT) yes;meets criteria  -AG     Therapy Frequency (PT) 2 times/wk  2-5 times/ week per priority  -AG     Predicted Duration of Therapy Intervention (PT) LOS  -AG     Problem List (PT) problems related to;balance;mobility;strength;pain  -AG     Activity Limitations Related to Problem List (PT) unable to ambulate safely;unable to transfer safely;BADLs not performed adequately or safely  -AG     Row Name 05/22/23 1530          Therapy Plan Review/Discharge Plan (PT)    Therapy Plan Review (PT) evaluation/treatment results reviewed;care plan/treatment goals reviewed;risks/benefits reviewed;current/potential barriers reviewed;participants voiced agreement with care plan;participants included;patient  -AG     [facility] Name 05/22/23 1530          Physical Therapy Goals    Bed Mobility Goal Selection (PT) bed mobility, PT goal 1  -AG     Transfer Goal Selection (PT) transfer, PT goal 1  -AG     Gait Training Goal Selection (PT) gait training, PT goal 1  -AG     [facility] Name 05/22/23 1530          Bed Mobility Goal 1 (PT)    Activity/Assistive Device (Bed Mobility Goal 1, PT) rolling to left;rolling to right;scooting;sit to supine;supine to sit  -AG     [place] Level/Cues Needed (Bed Mobility Goal 1, PT) modified independence  -AG     Time Frame (Bed Mobility Goal 1, PT) by discharge  -[facility] Name 05/22/23 1530          Transfer Goal 1 (PT)    Activity/Assistive Device (Transfer Goal 1, PT) sit-to-stand/stand-to-sit;bed-to-chair/chair-to-bed;toilet  -AG     [place] Level/Cues Needed (Transfer Goal 1, PT) supervision required  -AG     Time Frame (Transfer Goal 1, PT) by discharge  -     Row Name 05/22/23 1530          Gait Training Goal 1 (PT)    Activity/Assistive Device (Gait Training Goal 1, PT) gait  (walking locomotion);assistive device use;decrease fall risk;diminish gait deviation;improve balance and speed;increase endurance/gait distance  appropriate a.d.  -AG     [place] Level (Gait Training Goal 1, PT) standby assist  -AG     Distance (Gait Training Goal 1, PT) 40  -AG     Time Frame (Gait Training Goal 1, PT) by discharge  -AG           User Key  (r) = Recorded By, (t) = Taken By, (c) = Cosigned By    Initials Name Provider Type    AG Jaclyn Fernandez, PT Physical Therapist    Ady Serrano, RN Registered Nurse    Veronica Bansal RN Registered Nurse                Physical Therapy Education     Title: PT OT SLP Therapies (Done)     Topic: Physical Therapy (Done)     Point: Mobility training (Done)     Learning Progress Summary           Patient Acceptance, E, VU by  at 5/22/2023 1536                   Point: Home exercise program (Done)     Learning Progress Summary           Patient Acceptance, E, VU by  at 5/22/2023 1536                   Point: Body mechanics (Done)     Learning Progress Summary           Patient Acceptance, E, VU by  at 5/22/2023 1536                   Point: Precautions (Done)     Learning Progress Summary           Patient Acceptance, E, VU by  at 5/22/2023 1536                               User Key     Initials Effective Dates Name Provider Type Discipline     09/22/22 -  Ady John, RN Registered Nurse Nurse              PT Recommendation and Plan  Planned Therapy Interventions (PT): balance training, bed mobility training, gait training, home exercise program, transfer training, strengthening, postural re-education, patient/family education  Therapy Frequency (PT): 2 times/wk (2-5 times/ week per priority)  Plan of Care Reviewed With: patient  Outcome Evaluation: PT evaluation complete.  Pt. CGA/ SBA for bed mobility, STS and to ambulate short distance to bedside chair.  Fatigued following this distance.  PT will continue to follow and progress as pt.  tolerates.       Time Calculation:    PT Charges     Row Name 05/22/23 1529             Time Calculation    PT Received On 05/22/23  -      PT Goal Re-Cert Due Date 06/05/23  -            User Key  (r) = Recorded By, (t) = Taken By, (c) = Cosigned By    Initials Name Provider Type    Jaclyn Jensen, PT Physical Therapist              Therapy Charges for Today     Code Description Service Date Service Provider Modifiers Qty    93529590163 HC PT EVAL MOD COMPLEXITY 4 5/22/2023 Jaclyn Fernandez, PT GP 1          PT G-Codes  AM-PAC 6 Clicks Score (PT): 11    Jaclyn Fernandez, PT  5/22/2023

## 2023-05-22 NOTE — NURSING NOTE
Stage 2 pressure ulcer found on patient. Wound care consulted. Wound care orders put in place. Pt limited in position or movement due to oxygen desaturation issues.

## 2023-05-22 NOTE — CASE MANAGEMENT/SOCIAL WORK
Discharge Planning Assessment  [facility]     Patient Name: Orquidea Rosenberg  MRN: [id number]  Today's Date: 5/22/2023    Admit Date: 5/22/2023    Plan: SS received a consult for discharge. Pt discharged and re-admitted to inpatient. SS spoke with pt at bedside on this date. Pt states she lives with daughterLilli at [address] [place] [address] in [facility]. PCP is Jackeline Denson. Pt does not utilize home health services at this time. Pt states she has wheelchair, Rollator, and oxygen @2L via [employer]. PT states her daughter is POA (not on file). Pt does not have a Living will. Pt states she plans to return home at discharge. Pt states there has been mention about Hospice services. Pt states she would like to return home at discharge. SS to follow and assist with discharge planning.   Discharge Needs Assessment     Row Name 05/22/23 3431       Discharge Needs Assessment    Equipment Currently Used at Home wheelchair;oxygen;rollator    Row Name 05/22/23 144       Living Environment    People in Home child(jolly), adult    Name(s) of People in Home Lilli Puckett    Current Living Arrangements home    Potentially Unsafe Housing Conditions none    Primary Care Provided by self    Provides Primary Care For no one, unable/limited ability to care for self    Family Caregiver if Needed none    Quality of Family Relationships helpful;involved;supportive    Able to Return to Prior Arrangements yes       Resource/Environmental Concerns    Resource/Environmental Concerns none    Transportation Concerns none       Transition Planning    Patient/Family Anticipates Transition to home with family    Transportation Anticipated family or friend will provide       Discharge Needs Assessment    Equipment Currently Used at Home wheelchair;walker, rolling;oxygen  via [employer]    Anticipated Changes Related to Illness none    Equipment Needed After Discharge none               Discharge Plan     Row Name 05/22/23 5874        Plan    Plan SS received a consult for discharge. Pt discharged and re-admitted to inpatient. SS spoke with pt at bedside on this date. Pt states she lives with daughter, Lilli at [address] [place] [address] in [facility]. PCP is Jackeline Denson. Pt does not utilize home health services at this time. Pt states she has wheelchair, Rollator, and oxygen @2L via [employer]. PT states her daughter is POA (not on file). Pt does not have a Living will. Pt states she plans to return home at discharge. Pt states there has been mention about Hospice services. Pt states she would like to return home at discharge. SS to follow and assist with discharge planning.    Patient/Family in Agreement with Plan yes              Continued Care and Services - Admitted Since 5/22/2023    Coordination has not been started for this encounter.          Demographic Summary     Row Name 05/22/23 1447       General Information    Admission Type inpatient    Referral Source nursing    Reason for Consult discharge planning  SS received a consult for discharge.                  HARPAL Stanley

## 2023-05-22 NOTE — PLAN OF CARE
Goal Outcome Evaluation:  Pt resting in bed. Pt sat up in chair with PT this shift. Prn nausea medication given per mar. No complaints or requests at this time. VSS. Will continue plan of care.

## 2023-05-22 NOTE — H&P
[facility]    Patient Identification:  Name:  Orquidea Rosenberg  Age:  77 y.o.  Sex:  female  :  1945  MRN:  [id number]   Visit Number:  [id number]  Admit Date: 2023   Room number:  3327/1P  Primary Care Physician:  Jackeline Denson APRN    Date of Admission: 2023     Subjective     Chief complaint:  No chief complaint on file.    History of presenting illness:      Orquidea Rosenberg is a 77 y.o. female who was admitted back to inpatient status on 2023 from swing bed for progressive respiratory distress. For complete history please see HPI from prior admission before swing bed transfer, interval hx as below.     Patient this with ongoing dyspnea  acutely worsened with ambulation and transition to bedside chair requiring increase in O2 to 5L with desat into 80s slow to respond to increased O2. Discussed with patient her progression and reviewed CT scans from march and early may 2023 with noted progression concerning for new lung mets. Patient voiced understanding, daughter also in room and wish to cont current care but discussed with them my concern for progression without clear possibility of any treatment. They wish to cont current full spectrum of care but acknowledge severity of her progression and should she worsen would be open to transitioning GOC.      Prior to admission I discussed patient's presentation and management with attending ER physician and verbal handoff received.  ---------------------------------------------------------------------------------------------------------------------   A thorough systems based relevant ROS was asked and was negative except as noted above.  ---------------------------------------------------------------------------------------------------------------------   Past Medical History:   Diagnosis Date   • Arthritis    • COPD (chronic obstructive pulmonary disease)    • Elevated cholesterol    • History of  transfusion    • Hypertension    • Vulval ca      Past Surgical History:   Procedure Laterality Date   • RADICAL VULVECTOMY  09/06/2019   • RADICAL VULVECTOMY Bilateral 06/2019     Family History   Problem Relation Age of Onset   • Alzheimer's disease Mother    • Cancer Father    • Cancer Brother    • Diabetes Maternal Grandmother      Social History     Socioeconomic History   • Marital status:    Tobacco Use   • Smoking status: Former   • Smokeless tobacco: Never   Vaping Use   • Vaping Use: Never used   Substance and Sexual Activity   • Alcohol use: No   • Drug use: No   • Sexual activity: Defer     ---------------------------------------------------------------------------------------------------------------------   Allergies:  Penicillins  ---------------------------------------------------------------------------------------------------------------------   Medications below are reported home medications pulling from within the system; at this time, these medications have not been reconciled unless otherwise specified and are in the verification process for further verifcation as current home medications.      Prior to Admission Medications     Prescriptions Last Dose Informant Patient Reported? Taking?    furosemide (LASIX) 20 MG tablet  Pharmacy Yes No    Take 1 tablet by mouth Daily.    levothyroxine (SYNTHROID, LEVOTHROID) 100 MCG tablet  Pharmacy Yes No    Take 1 tablet by mouth Daily.    losartan (COZAAR) 50 MG tablet  Pharmacy Yes No    Take 1 tablet by mouth Daily.    ondansetron (ZOFRAN) 4 MG tablet  Pharmacy Yes No    Take 1 tablet by mouth Every 8 (Eight) Hours As Needed for Nausea or Vomiting.        Objective     Vital Signs:  Temp:  [98.1 °F (36.7 °C)] 98.1 °F (36.7 °C)  Heart Rate:  [77-85] 85  Resp:  [18-24] 24  BP: (139)/(58) 139/58    No data found.  SpO2:  [94 %] 94 %  on  Flow (L/min):  [3] 3;   Device (Oxygen Therapy): nasal cannula;humidified  Body mass index is 28.12 kg/m².    Wt  Readings from Last 3 Encounters:   05/22/23 79 kg (174 lb 3.2 oz)   05/05/23 83.9 kg (185 lb)   05/04/23 82.6 kg (182 lb)      ----------------------------------------------------------------------------------------------------------------------  Physical Exam  Vitals and nursing note reviewed.   Constitutional:       Appearance: She is ill-appearing.   Eyes:      General: No scleral icterus.     Extraocular Movements: Extraocular movements intact.   Cardiovascular:      Rate and Rhythm: Normal rate.      Pulses: Normal pulses.      Heart sounds: No murmur heard.  Pulmonary:      Effort: Respiratory distress present.      Breath sounds: No wheezing or rales.   Abdominal:      General: Abdomen is flat.      Palpations: Abdomen is soft.   Musculoskeletal:      Right lower leg: Edema present.      Left lower leg: Edema present.   Skin:     General: Skin is warm and dry.      Capillary Refill: Capillary refill takes less than 2 seconds.   Neurological:      General: No focal deficit present.      Mental Status: She is alert and oriented to person, place, and time.   Psychiatric:         Mood and Affect: Mood normal.         Behavior: Behavior normal.   --------------------------------------------------------------------------------------------------------------------  LABS:    CBC and coagulation:  Results from last 7 days   Lab Units 05/22/23  0202 05/21/23  0838 05/21/23  0335 05/17/23  0143   WBC 10*3/mm3 11.44*  --  10.60 7.64   HEMOGLOBIN g/dL 8.5*  --  8.9* 7.3*   HEMATOCRIT % 29.0*  --  30.9* 24.9*   MCV fL 90.6  --  93.4 91.5   MCHC g/dL 29.3*  --  28.8* 29.3*   PLATELETS 10*3/mm3 341  --  372 311   D DIMER QUANT MCGFEU/mL  --  1.22*  --   --      Acid/base balance:      Renal and electrolytes:  Results from last 7 days   Lab Units 05/22/23  0202 05/21/23  0335 05/19/23  0146 05/17/23  0143 05/16/23  0129   SODIUM mmol/L 140 139 137 138 133*   POTASSIUM mmol/L 3.4* 3.6 5.0 4.5 4.4   MAGNESIUM mg/dL  --   --  2.1  2.0 1.9   CHLORIDE mmol/L 101 101 103 103 102   CO2 mmol/L 29.0 26.2 23.1 23.5 23.5   BUN mg/dL 29* 29* 25* 16 13   CREATININE mg/dL 1.06* 1.08* 1.08* 1.23* 1.04*   CALCIUM mg/dL 8.8 8.8 8.7 8.8 8.6   PHOSPHORUS mg/dL  --   --  3.9 4.1 3.5   GLUCOSE mg/dL 101* 79 133* 168* 127*     Estimated Creatinine Clearance: 47.2 mL/min (A) (by C-G formula based on SCr of 1.06 mg/dL (H)).    Liver and pancreatic function:  Results from last 7 days   Lab Units 05/17/23  0143 05/16/23  0129   ALBUMIN g/dL 2.4* 2.4*   BILIRUBIN mg/dL <0.2 <0.2   ALK PHOS U/L 57 61   AST (SGOT) U/L 6 5   ALT (SGPT) U/L <5 <5     Endocrine function:  No results found for: HGBA1C  Point of care bedside glucose levels:      Lab Results   Component Value Date    TSH 2.620 03/15/2022     Cardiac:  Results from last 7 days   Lab Units 05/21/23  1038 05/21/23  0838 05/21/23  0335 05/17/23  0350 05/17/23  0143   HSTROP T ng/L 139* 150*  --  127* 132*   PROBNP pg/mL  --   --  15,228.0*  --   --        Cultures:  Lab Results   Component Value Date    COLORU Dark Yellow (A) 03/15/2022    CLARITYU Turbid (A) 03/15/2022    PHUR <=5.0 03/15/2022    GLUCOSEU Negative 03/15/2022    KETONESU Trace (A) 03/15/2022    BLOODU Large (3+) (A) 03/15/2022    NITRITEU Negative 03/15/2022    LEUKOCYTESUR Moderate (2+) (A) 03/15/2022    BILIRUBINUR Negative 03/15/2022    UROBILINOGEN 1.0 E.U./dL 03/15/2022    RBCUA 31-50 (A) 03/15/2022    WBCUA Too Numerous to Count (A) 03/15/2022    BACTERIA 4+ (A) 03/15/2022     Microbiology Results (last 10 days)     ** No results found for the last 240 hours. **          No results found for: PREGTESTUR, PREGSERUM, HCG, HCGQUANT  Pain Management Panel          View : No data to display.                      I have personally looked at the labs and they are summarized above.  ----------------------------------------------------------------------------------------------------------------------  Detailed radiology reports for the last 24  hours:    Imaging Results (Last 24 Hours)     ** No results found for the last 24 hours. **        Final impressions for the last 30 days of radiology reports:    CT Abdomen Pelvis With & Without Contrast    Addendum Date: 5/3/2023    Addendum: Again noted is a somewhat necrotic appearing 7.7 cm mass in the pelvis that appear somewhat contiguous with the distal sigmoid colon. Air is again noted within the urinary bladder. Impression.  This report was finalized on 5/3/2023 8:41 AM by Dr. Philip Lopez MD.      Result Date: 5/3/2023  1.  Again there is a left double-J ureteral stent and moderate to severe hydronephrosis of the left kidney. 2.  Moderate stool throughout the colon. 3.  Tiny bilateral pleural effusions. 4.  Bilateral pulmonary lung base nodules are again noted.  This report was finalized on 5/2/2023 12:23 PM by Dr. Philip Lopez MD.      CT Abdomen Pelvis Without Contrast    Result Date: 4/25/2023  Large complex pelvic mass measuring 8.4 x 9.4 x 10.2 cm and presumably represents the patient's known pelvic malignancy (vulvar cancer). This could represent secondary involvement of a leiomyomatous uterus or dystrophic calcifications within a malignancy. Abnormal fistulous communication between the anterior rectum and the posterior aspect of the above-mentioned pelvic mass with at least one and possibly 2 fistulous connections as demonstrated on CT. Central debris within the mass may represent abscess or necrosis. Apparent fistulous communication between the mass and the bladder. Left sided hydronephrosis and hydroureter with left ureteral stent in place. The distal pigtail difficult to confirm within the bladder and may be within the distal ureter. Progressive widely disseminated pulmonary metastases. Signer Name: MINNIE Cerna MD  Signed: 4/25/2023 5:27 PM  Workstation Name: [id number]  [facility]    CT Chest With Contrast Diagnostic    Result Date: 5/22/2023  1.  Again there  are small right greater than left pleural effusions. 2.  Innumerable pulmonary nodules again noted throughout both lungs with a large spiculated nodule at the right lung apex again seen to measure about 3.3 cm. 3.  Incidentally noted left kidney shows moderate hydronephrosis with indwelling catheter.  This report was finalized on 5/22/2023 8:54 AM by Dr. Philip Lopez MD.      CT Chest With Contrast Diagnostic    Result Date: 5/2/2023  1.  Now small bilateral pleural effusions. 2.  Tiny pericardial effusion again noted. 3.  Bilateral parenchymal pulmonary nodules are again noted. A posterior left upper lobe pulmonary nodule measures 2.2 cm and was about 2.2 cm. Other nodules appear stable also.  This report was finalized on 5/2/2023 12:38 PM by Dr. Philip Lopez MD.      XR Chest 1 View    Result Date: 5/20/2023   1.  Multifocal pneumonia. 2.  Mild hypoinflated lungs. 3.  Small right and left pleural effusion, left greater than right.  This report was finalized on 5/20/2023 8:31 PM by Graeme Bobby MD.      XR Chest 1 View    Result Date: 5/15/2023  FINDINGS/IMPRESSION: Multifocal interstitial opacities, similar to prior. Heart size is similar. Small pleural effusions. No pneumothorax. No acute osseous abnormality.  This report was finalized on 5/15/2023 11:16 PM by Alex Pallas, DO.      XR Chest 1 View    Result Date: 5/7/2023  Impression:  1. Multiple bilateral pulmonary nodular densities are similar to the prior study. 2.  No acute infiltrates 3.  Small left pleural effusion is stable  This report was finalized on 5/7/2023 9:01 PM by Gustavo Silverman MD.      XR Chest 1 View    Result Date: 4/26/2023  1.  No change in distribution of bilateral lung opacities which may represent changes of fibrosis and diffuse pulmonary nodules. 2.  Right IJ deep line noted with tip at the cavoatrial junction. No pneumothorax.  This report was finalized on 4/26/2023 11:31 AM by Dr. Justen Matias MD.      XR Chest AP    Result  Date: 4/27/2023  1.  No interval change in the appearance of the chest. Bilateral lung opacities appear stable. 2.  Support device positioning is stable. 3.  Trace left pleural effusion is noted.  This report was finalized on 4/27/2023 9:48 AM by Dr. Justen Matias MD.        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 Radiology review of imaging from March 2023 compared to 5/2/2023 notes no significant increase in nodule size but on personal review her suspected metastatic disease appears to have progressed in both size and number with associated increasing O2 requirement and poorer respiratory reserve.  -Patient unable to tolerate repeat CT scan and CXR currently pending however if hypoxia secondary to rapid mets, limited possible interventions  -Will consult oncology Monday for case review for possible palliative options  -Increased diuretic dose to lasix 40mg x1 dose today and cont on prn basis given mild pulm edema  -PE on differential as well but no tachycardia and less likely given noted lung opacities, attempted CT today but unable to lay flat  -OK to cont current steroid course but wheezing resolved and acute progression unlikely COPD related  -Severe PAH noted on Echo likely acute on chronic right heart strain/failure secondary to lung disease  -Discussed GOC and patient to remain DNR/DNI and will reevaluate should her condition worsen, palliative following and previously not interested  in hospice services but given significance of progression discussed possibility of revisiting this      #[id number]  -Troponin elevated with negative delta and t-wave inversions in inferior leads likely secondary to progressive hypoxia  -Repeat ecg and troponin for any acute cp or HDS changes, current changes less likely ischemia and more secondary to right heart strain. Echo reviewed without regional wall motion abnormalities              - - Chronic - -     #COPD hx  #Chronic iron def anemia  #Debility secondary to chronic illness: Holding further PT/OT until condition improved    Interim update 5/22/23:  -Patient's dyspnea improved with diuresis but CT scan shows progression of metastatic disease. Concerned for right heart failure given elevated troponin, bnp, and inferior t-wave inversions. D-dimer elevated as well but dyspnea explained by pulm infiltrates with risk of decompensation during CTPE > benefit given current discussion regarding GOC. Cardiology consulted but I fear that a stress test or LHC may cause enough stress to tip patient into flash pulmonary edema or refractory hypoxia from her PAH and thus likely only benefited by medical management.   -Will f/u cards recs and palliative care discussion this am after my lengthy discussion with patient's daughter yesterday afternoon.    #Left hyonephrosis s/o uretal stent  -Patient asymptomatic but noted on imaging, good UOP and cr stable      VTE Prophylaxis:   Mechanical Order History:     None      Pharmalogical Order History:      Ordered     Dose Route Frequency Stop    05/22/23 1049  Enoxaparin Sodium (LOVENOX) syringe 40 mg         40 mg SC Daily --              The patient is high risk due to the following diagnoses/reasons:  Acute resp distress with progressive mets and pulm edema    Admission Status:  I certify that this patient requires inpatient hospitalization for greater than 2 midnights in INPATIENT status.  I anticipate there to be the need for  care which can only be reasonably provided in a hospital setting such as possible need for aggressive/expedited ancillary services and/or consultation services, IV medications, close physician monitoring, and/or procedures. In such, I feel patient’s risk for adverse outcomes and need for care warrant INPATIENT evaluation and predict the patient’s care encounter to likely last beyond 2 midnights.    Code Status and Medical Interventions:   Ordered at: 05/22/23 1055     Medical Intervention Limits:    NO intubation (DNI)     Code Status (Patient has no pulse and is not breathing):    No CPR (Do Not Attempt to Resuscitate)     Medical Interventions (Patient has pulse or is breathing):    Limited Support     Release to patient:    Routine Release        Disposition: Admit to inpatient     Nelson Mejia MD  [facility] Hospitalist  05/22/23  12:20 EDT    Note: This HPI has been forwarded from prior encounter

## 2023-05-22 NOTE — PLAN OF CARE
Goal Outcome Evaluation:                      PT has rested in bed this shift. She refused ambulation and was educated on the importance of ambulating. Wound care was completed per order, no c/o or acute changes noted. Will continue POC

## 2023-05-22 NOTE — CASE MANAGEMENT/SOCIAL WORK
Case Management Discharge Note      Final Note: Patient discharged and admitted to Inpatient services on 5/20/22.    Provided Post Acute Provider List?: Yes    Selected Continued Care - Discharged on 5/20/2023 Admission date: 5/5/2023 - Discharge disposition: Swing Bed w/Planned Readmission    Destination Coordination complete.    Service Provider Selected Services Address Phone Fax Patient Preferred    [facility] [id number] -- -- --                    Final Discharge Disposition Code: 02 - short term hospital for [facility]

## 2023-05-23 PROCEDURE — 94799 UNLISTED PULMONARY SVC/PX: CPT

## 2023-05-23 PROCEDURE — 99232 SBSQ HOSP IP/OBS MODERATE 35: CPT | Performed by: STUDENT IN AN ORGANIZED HEALTH CARE EDUCATION/TRAINING PROGRAM

## 2023-05-23 PROCEDURE — 97166 OT EVAL MOD COMPLEX 45 MIN: CPT

## 2023-05-23 PROCEDURE — 25010000002 ENOXAPARIN PER 10 MG: Performed by: STUDENT IN AN ORGANIZED HEALTH CARE EDUCATION/TRAINING PROGRAM

## 2023-05-23 RX ADMIN — VITAMINS A AND D OINTMENT 1 APPLICATION: 15.5; 53.4 OINTMENT TOPICAL at 20:18

## 2023-05-23 RX ADMIN — Medication 10 ML: at 08:46

## 2023-05-23 RX ADMIN — ENOXAPARIN SODIUM 40 MG: 40 INJECTION SUBCUTANEOUS at 08:45

## 2023-05-23 RX ADMIN — VITAMINS A AND D OINTMENT 1 APPLICATION: 15.5; 53.4 OINTMENT TOPICAL at 08:46

## 2023-05-23 RX ADMIN — METOPROLOL SUCCINATE 25 MG: 25 TABLET, EXTENDED RELEASE ORAL at 08:45

## 2023-05-23 RX ADMIN — LEVOTHYROXINE SODIUM 100 MCG: 100 TABLET ORAL at 05:45

## 2023-05-23 RX ADMIN — NYSTATIN: 100000 POWDER TOPICAL at 08:46

## 2023-05-23 RX ADMIN — IPRATROPIUM BROMIDE AND ALBUTEROL SULFATE 3 ML: .5; 2.5 SOLUTION RESPIRATORY (INHALATION) at 19:59

## 2023-05-23 RX ADMIN — IPRATROPIUM BROMIDE AND ALBUTEROL SULFATE 3 ML: .5; 2.5 SOLUTION RESPIRATORY (INHALATION) at 12:10

## 2023-05-23 RX ADMIN — GUAIFENESIN 600 MG: 600 TABLET, EXTENDED RELEASE ORAL at 20:18

## 2023-05-23 RX ADMIN — GUAIFENESIN 600 MG: 600 TABLET, EXTENDED RELEASE ORAL at 08:45

## 2023-05-23 RX ADMIN — ASPIRIN 81 MG: 81 TABLET, COATED ORAL at 08:45

## 2023-05-23 RX ADMIN — IPRATROPIUM BROMIDE AND ALBUTEROL SULFATE 3 ML: .5; 2.5 SOLUTION RESPIRATORY (INHALATION) at 06:25

## 2023-05-23 RX ADMIN — Medication 10 ML: at 08:45

## 2023-05-23 RX ADMIN — BUDESONIDE 0.5 MG: 0.5 SUSPENSION RESPIRATORY (INHALATION) at 19:59

## 2023-05-23 RX ADMIN — OXYCODONE HYDROCHLORIDE AND ACETAMINOPHEN 1 TABLET: 7.5; 325 TABLET ORAL at 20:18

## 2023-05-23 RX ADMIN — Medication 10 ML: at 20:18

## 2023-05-23 RX ADMIN — ATORVASTATIN CALCIUM 20 MG: 20 TABLET, FILM COATED ORAL at 20:18

## 2023-05-23 RX ADMIN — FERROUS SULFATE TAB 325 MG (65 MG ELEMENTAL FE) 325 MG: 325 (65 FE) TAB at 08:45

## 2023-05-23 RX ADMIN — BUDESONIDE 0.5 MG: 0.5 SUSPENSION RESPIRATORY (INHALATION) at 06:25

## 2023-05-23 RX ADMIN — LOSARTAN POTASSIUM 50 MG: 50 TABLET, FILM COATED ORAL at 10:57

## 2023-05-23 RX ADMIN — FUROSEMIDE 40 MG: 40 TABLET ORAL at 08:45

## 2023-05-23 RX ADMIN — NYSTATIN: 100000 POWDER TOPICAL at 20:18

## 2023-05-23 NOTE — PLAN OF CARE
Goal Outcome Evaluation:              Outcome Evaluation: Pt resting in bed at this time. Complained of pain, PRN medication given per MAR. Wound care completed per orders. Pt has had frequent diarrhea this shift. Will continue POC.

## 2023-05-23 NOTE — CASE MANAGEMENT/SOCIAL WORK
Discharge Planning Assessment  [facility]     Patient Name: Orquidea Rosenberg  MRN: [id number]  Today's Date: 5/23/2023    Admit Date: 5/22/2023    Plan: SS was notified about a Hospice Referral. SS spoke with Palliative CareBERTHA about pt. Preference of [facility]. SS faxed referral to [facility] [phone]. SS spoke with [facility] per Nelly who states they have received referral and she will send referral to be started. SS to follow.     Discharge Plan     Row Name 05/23/23 1020       Plan    Plan SS was notified about a Hospice Referral. SS spoke with Palliative Care[facility]THA about pt. Preference of [facility]. SS faxed referral to [facility] [phone]. SS spoke with [facility] per Nelly who states they have received referral and she will send referral to be started. SS to follow.              Continued Care and Services - Admitted Since 5/22/2023    Coordination has not been started for this encounter.     Selected Continued Care - Prior Encounters Includes continued care and service providers with selected services from prior encounters from 2/21/2023 to 5/23/2023    Discharged on 5/20/2023 Admission date: 5/5/2023 - Discharge disposition: Swing Bed w/Planned Readmission    Destination     Service Provider Selected Services Address Phone Fax Patient Preferred    [facility] [place] [address] -- -- --                    Expected Discharge Date and Time     Expected Discharge Date Expected Discharge Time    May 26, 2023         HARPAL Stanley

## 2023-05-23 NOTE — PLAN OF CARE
Goal Outcome Evaluation:           Progress: no change  Outcome Evaluation: Patient has done well today.  States she feels less short of breath than previous days.  VSS   Continue current plan of care.

## 2023-05-23 NOTE — PROGRESS NOTES
"Palliative Care Daily Progress Note     S: Medical record reviewed, followed up with Primary RN Staci and Dr Mejia regarding patient's condition.Staci stated that Orquidea was feeling better, less short of breath and pain better in controlled. When I entered the room Orquidea was awake alert and oriented and confirmed she was feeling better today, that she was breathing better as well as pain was more controlled. She denied nausea or anxiety at this time as well.      O:   Palliative Performance Scale Score:     /66 (BP Location: Right arm, Patient Position: Lying)   Pulse 70   Temp 98.1 °F (36.7 °C) (Oral)   Resp 16   Ht 167.6 cm (66\")   Wt 79 kg (174 lb 3.2 oz)   SpO2 100%   BMI 28.12 kg/m²     Intake/Output Summary (Last 24 hours) at 5/23/2023 1043  Last data filed at 5/22/2023 1700  Gross per 24 hour   Intake 600 ml   Output --   Net 600 ml       PE:  General Appearance:    Chronically ill appearing, alert, cooperative, NAD   HEENT:    NC/AT, without obvious abnormality, EOMI, anicteric    Neck:   supple, trachea midline, no JVD   Lungs:     Regular unlabored respirations, no wheezes rales or rhonchi noted.    Heart:    RRR, normal S1 and S2, no M/R/G   Abdomen:     Soft, NT, ND, NABS    Extremities:   Moves all extremities, BLE edema   Pulses:   Pulses palpable and equal bilaterally   Skin:   Warm, dry, pale   Neurologic:   A/Ox3, cooperative   Psych:   Calm, appropriate            Meds: Reviewed and changes noted.    Labs:   Results from last 7 days   Lab Units 05/22/23  0202   WBC 10*3/mm3 11.44*   HEMOGLOBIN g/dL 8.5*   HEMATOCRIT % 29.0*   PLATELETS 10*3/mm3 341     Results from last 7 days   Lab Units 05/22/23  0202   SODIUM mmol/L 140   POTASSIUM mmol/L 3.4*   CHLORIDE mmol/L 101   CO2 mmol/L 29.0   BUN mg/dL 29*   CREATININE mg/dL 1.06*   GLUCOSE mg/dL 101*   CALCIUM mg/dL 8.8     Results from last 7 days   Lab Units 05/22/23  0202 05/19/23  0146 05/17/23  0143   SODIUM mmol/L 140   < > 138 "   POTASSIUM mmol/L 3.4*   < > 4.5   CHLORIDE mmol/L 101   < > 103   CO2 mmol/L 29.0   < > 23.5   BUN mg/dL 29*   < > 16   CREATININE mg/dL 1.06*   < > 1.23*   CALCIUM mg/dL 8.8   < > 8.8   BILIRUBIN mg/dL  --   --  <0.2   ALK PHOS U/L  --   --  57   ALT (SGPT) U/L  --   --  <5   AST (SGOT) U/L  --   --  6   GLUCOSE mg/dL 101*   < > 168*    < > = values in this interval not displayed.     Imaging Results (Last 72 Hours)     Procedure Component Value Units Date/Time    XR Chest 1 View [[id number]] Resulted: 05/22/23 1302     Updated: 05/22/23 1345            Diagnostics: Reviewed    A: Orquidea Rosenberg is a 77 y.o. female  admitted on 4/25/2023 with diarrhea, nausea, vomiting, and abdominal pain. 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 Orquidea had partial chemotherapy and radiation, however since that time has refused further treatment as she had difficulty tolerating treatments. Orquidea also refused colostomy and surgery at  for pneumoperitoneum with suspect bowel perforation. Orquidea was offered palliative or hospice care but declined at that time. Per pts daughter Lilli, her mother had been getting weaker over the few days before admission to [facility].  Lilli states she was having poor appetite, nausea, vomiting, and diarrhea as well as abdominal and pubic pain,She states she was fine one morning and was weaker in the evening and by the next morning could not get up at all, which led to her being brought to the ER.      P:  I was able to talk with Orquidea regarding plan of going home with hospice and we discussed going ahead and putting referral in to get equipment delivered and establish contact with Hospice. I let her know that I would discuss this with her daughter Lilli and have left a message for Lilli to discuss putting referral in now as to get equipment in place before time of discharge. I discussed this plan with   Jackie.    We will continue to follow along. Please do not hesitate to contact us regarding further sx mgmt or GOC needs, including after hours or on weekends via our on call provider at [phone].     Aysha Saucedo, APRN    5/23/2023

## 2023-05-23 NOTE — PROGRESS NOTES
[facility] HOSPITALIST PROGRESS NOTE     Patient Identification:  Name:  Orquidea Rosenberg  Age:  77 y.o.  Sex:  female  :  1945  MRN:  [id number]  Visit Number:  [id number]  ROOM: [address] [place] [address]     Primary Care Provider:  Jackeline Denson APRN     Date of Admission: 2023    Length of stay in inpatient status:  1    Subjective     Chief Compliant:  Acute dyspnea    Since adding diuretic patient has had improvement in dyspnea and reports feeling much better today compared to yesterday. Remains on stable NC O2 and spirits more positive this am. After discussion with palliative team, is agreeable to home hospice referral but wants to cont discussion with her daughter.        Objective     Current Hospital Meds:  aspirin, 81 mg, Oral, Daily  atorvastatin, 20 mg, Oral, Nightly  budesonide, 0.5 mg, Nebulization, BID - RT  enoxaparin, 40 mg, Subcutaneous, Daily  ferrous sulfate, 325 mg, Oral, Daily With Breakfast  furosemide, 40 mg, Oral, Daily  guaiFENesin, 600 mg, Oral, Q12H  ipratropium-albuterol, 3 mL, Nebulization, Q6H While Awake - RT  levothyroxine, 100 mcg, Oral, Q AM  losartan, 50 mg, Oral, Q24H  magic barrier cream, 1 application, Topical, BID  metoprolol succinate XL, 25 mg, Oral, Q24H  nystatin, , Topical, Q12H  sodium chloride, 10 mL, Intravenous, Q12H  sodium chloride, 10 mL, Intravenous, Q12H  sodium chloride, 10 mL, Intravenous, Q12H  sodium chloride, 10 mL, Intravenous, Q12H  sodium chloride, 10 mL, Intravenous, Q12H    Pharmacy Consult,       Current Antimicrobial Therapy:  Anti-Infectives (From admission, onward)    None        Current Diuretic Therapy:  Diuretics (From admission, onward)    Ordered     Dose/Rate Route Frequency Start Stop    23 1235  furosemide (LASIX) tablet 40 mg        Ordering Provider: Nelson Mejia MD    40 mg Oral Daily 23 0900           ----------------------------------------------------------------------------------------------------------------------  Vital Signs:  Temp:  [98.1 °F (36.7 °C)] 98.1 °F (36.7 °C)  Heart Rate:  [70-79] 75  Resp:  [16-28] 18  BP: (133-155)/(52-66) 133/52  SpO2:  [93 %-100 %] 98 %  on  Flow (L/min):  [3] 3;   Device (Oxygen Therapy): nasal cannula;humidified  Body mass index is 28.12 kg/m².    Wt Readings from Last 3 Encounters:   05/22/23 79 kg (174 lb 3.2 oz)   05/05/23 83.9 kg (185 lb)   05/04/23 82.6 kg (182 lb)     Intake & Output (last 3 days)       05/20 0701 05/21 0700 05/21 0701 05/22 0700 05/22 0701 05/23 0700 05/23 0701 05/24 0700    P.O.   840     Total Intake(mL/kg)   840 (10.6)     Net   +840             Urine Unmeasured Occurrence   5 x 4 x    Stool Unmeasured Occurrence   5 x 4 x        Diet: Regular/House Diet, Fluid Restriction (240 mL/tray) Diets; 1500 mL/day; Texture: Regular Texture (IDDSI 7); Fluid Consistency: Thin (IDDSI 0)  ----------------------------------------------------------------------------------------------------------------------  Physical Exam  Vitals and nursing note reviewed.   Constitutional:       General: She is not in acute distress.  HENT:      Head: Normocephalic and atraumatic.      Mouth/Throat:      Mouth: Mucous membranes are dry.      Pharynx: Oropharynx is clear.   Eyes:      General: No scleral icterus.     Extraocular Movements: Extraocular movements intact.   Cardiovascular:      Rate and Rhythm: Normal rate.      Pulses: Normal pulses.   Pulmonary:      Effort: Pulmonary effort is normal. No respiratory distress.      Comments: 3L NC  Abdominal:      Palpations: Abdomen is soft.      Tenderness: There is no abdominal tenderness.   Musculoskeletal:      Right lower leg: Edema (Improved from prior) present.      Left lower leg: Edema present.   Skin:     General: Skin is warm and dry.      Capillary Refill: Capillary refill takes less than 2 seconds.    Neurological:      Mental Status: She is alert. Mental status is at baseline.      Motor: Weakness present.   Psychiatric:         Mood and Affect: Mood normal.         Behavior: Behavior normal.       ----------------------------------------------------------------------------------------------------------------------    ----------------------------------------------------------------------------------------------------------------------  LABS:    CBC and coagulation:  Results from last 7 days   Lab Units 05/22/23 0202 05/21/23 0838 05/21/23 0335 05/17/23 0143   WBC 10*3/mm3 11.44*  --  10.60 7.64   HEMOGLOBIN g/dL 8.5*  --  8.9* 7.3*   HEMATOCRIT % 29.0*  --  30.9* 24.9*   MCV fL 90.6  --  93.4 91.5   MCHC g/dL 29.3*  --  28.8* 29.3*   PLATELETS 10*3/mm3 341  --  372 311   D DIMER QUANT MCGFEU/mL  --  1.22*  --   --      Acid/base balance:      Renal and electrolytes:  Results from last 7 days   Lab Units 05/22/23 0202 05/21/23 0335 05/19/23 0146 05/17/23  0143   SODIUM mmol/L 140 139 137 138   POTASSIUM mmol/L 3.4* 3.6 5.0 4.5   MAGNESIUM mg/dL  --   --  2.1 2.0   CHLORIDE mmol/L 101 101 103 103   CO2 mmol/L 29.0 26.2 23.1 23.5   BUN mg/dL 29* 29* 25* 16   CREATININE mg/dL 1.06* 1.08* 1.08* 1.23*   CALCIUM mg/dL 8.8 8.8 8.7 8.8   PHOSPHORUS mg/dL  --   --  3.9 4.1   GLUCOSE mg/dL 101* 79 133* 168*     Estimated Creatinine Clearance: 47.2 mL/min (A) (by C-G formula based on SCr of 1.06 mg/dL (H)).    Liver and pancreatic function:  Results from last 7 days   Lab Units 05/17/23  0143   ALBUMIN g/dL 2.4*   BILIRUBIN mg/dL <0.2   ALK PHOS U/L 57   AST (SGOT) U/L 6   ALT (SGPT) U/L <5     Endocrine function:  No results found for: HGBA1C  Point of care bedside glucose levels:      Glucose levels from the [facility]:  Results from last 7 days   Lab Units 05/22/23  0202 05/21/23  0335 05/19/23  0146 05/17/23  0143   GLUCOSE mg/dL 101* 79 133* 168*     Lab Results   Component Value Date    TSH 2.620 03/15/2022      Cardiac:  Results from last 7 days   Lab Units 05/21/23  1038 05/21/23  0838 05/21/23  0335 05/17/23  0350 05/17/23  0143   HSTROP T ng/L 139* 150*  --  127* 132*   PROBNP pg/mL  --   --  15,228.0*  --   --        Cultures:  Lab Results   Component Value Date    COLORU Dark Yellow (A) 03/15/2022    CLARITYU Turbid (A) 03/15/2022    PHUR <=5.0 03/15/2022    GLUCOSEU Negative 03/15/2022    KETONESU Trace (A) 03/15/2022    BLOODU Large (3+) (A) 03/15/2022    NITRITEU Negative 03/15/2022    LEUKOCYTESUR Moderate (2+) (A) 03/15/2022    BILIRUBINUR Negative 03/15/2022    UROBILINOGEN 1.0 E.U./dL 03/15/2022    RBCUA 31-50 (A) 03/15/2022    WBCUA Too Numerous to Count (A) 03/15/2022    BACTERIA 4+ (A) 03/15/2022     Microbiology Results (last 10 days)     ** No results found for the last 240 hours. **          No results found for: PREGTESTUR, PREGSERUM, HCG, HCGQUANT  Pain Management Panel          View : No data to display.                      I have personally looked at the labs and they are summarized above.  ----------------------------------------------------------------------------------------------------------------------  Detailed radiology reports for the last 24 hours:    Imaging Results (Last 24 Hours)     ** No results found for the last 24 hours. 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 Had pleural effusions that now improved with diuresis and this is likely why her dyspnea has improved from initial decline on 5/20. Echo reviewed and has severe PAH with repeat cardiac w/u showing inferior t-wave inversions, elevated BNP, troponin, and d-dimer but eval by cardiology and after  discussion agree medical management is best as LHC or stress would likely precipitate worsened pulm edema or other more acute complication. Discussed CTPE as well to r/o PE but given difficulty with initial CT over weekend, patient hesitant to consider additional scan.   -Palliative care following and while patient holds out optimism that her condition may improve, after discussions over multiple days she agrees home hospice is best in line with her goals and hospice referral placed 5/23. Cont comfort care in interim  -Patient will need home caretakers for her ADLs and home resources, will remain inpatient while these are being arranged  -Will cont Losartan and lasix given clinical improvement and BP tolerating well    - - Chronic - -     #COPD hx  #Chronic iron def anemia  #Debility secondary to chronic illness  #Left hyonephrosis s/p uretal stent    VTE Prophylaxis:   Mechanical Order History:     None      Pharmalogical Order History:      Ordered     Dose Route Frequency Stop    05/22/23 1049  Enoxaparin Sodium (LOVENOX) syringe 40 mg         40 mg SC Daily --                Code Status and Medical Interventions:   Ordered at: 05/23/23 1056     Level Of Support Discussed With:    Patient    Next of Kin (If No Surrogate)     Code Status (Patient has no pulse and is not breathing):    No CPR (Do Not Attempt to Resuscitate)     Medical Interventions (Patient has pulse or is breathing):    Comfort Measures     Release to patient:    Routine Release         Disposition: Home hospice referral, discharge pend home set up. Hopefully within next 48hrs    I have reviewed any copied/forwarded text or data, verified its accuracy, and updated as necessary above.    Nelson Mejia MD  [facility]  05/23/23  15:03 EDT

## 2023-05-23 NOTE — THERAPY EVALUATION
Acute Care - Occupational Therapy Treatment Note  JOSE ROBERTO Patel     Patient Name: Orquidea Rosenberg  : 1945  MRN: [id number]  Today's Date: 2023  Onset of Illness/Injury or Date of Surgery: 23 (pt. admitted ; familiar to PT from previous swing bed status; pt. now readmitted to inpatient status d/t respiratory decline.)     Referring Physician: Dr. Mejia    Admit Date: 2023     No diagnosis found.  Patient Active Problem List   Diagnosis   • COVID-19   • Vulvar cancer   • Physical debility   • Bacteremia   • COPD exacerbation   • Debility   • Hypoxia   • Acute respiratory failure with hypoxia     Past Medical History:   Diagnosis Date   • Arthritis    • COPD (chronic obstructive pulmonary disease)    • Elevated cholesterol    • History of transfusion    • Hypertension    • Vulval ca      Past Surgical History:   Procedure Laterality Date   • RADICAL VULVECTOMY  2019   • RADICAL VULVECTOMY Bilateral 2019         OT ASSESSMENT FLOWSHEET (last 12 hours)     OT Evaluation and Treatment     Row Name 23 1227                   OT Time and Intention    Document Type evaluation  -KR        Mode of Treatment occupational therapy  -KR        Patient Effort good  -KR           General Information    General Observations of Patient alert/cooperative  -KR           Living Environment    Current Living Arrangements home  -KR        People in Home child(jolly), adult  -KR        Primary Care Provided by self  -KR           Cognition    Affect/Mental Status (Cognition) WFL  -KR        Orientation Status (Cognition) oriented x 3  -KR        Follows Commands (Cognition) WFL  -KR           Range of Motion Comprehensive    Comment, General Range of Motion BUE WFL  -KR           Strength Comprehensive (MMT)    Comment, General Manual Muscle Testing (MMT) Assessment BUE 3/5  -KR           Activities of Daily Living    BADL Assessment/Intervention bathing;upper body dressing;lower body  dressing;grooming;feeding;toileting  -KR           Bathing Assessment/Intervention    [place] Level (Bathing) bathing skills;minimum assist (75% patient effort)  -KR           Upper Body Dressing Assessment/Training    [place] Level (Upper Body Dressing) upper body dressing skills;minimum assist (75% patient effort)  -KR           Lower Body Dressing Assessment/Training    [place] Level (Lower Body Dressing) lower body dressing skills;minimum assist (75% patient effort)  -KR           Grooming Assessment/Training    [place] Level (Grooming) grooming skills;standby assist  -KR           Self-Feeding Assessment/Training    [place] Level (Feeding) feeding skills;set up  -KR           Toileting Assessment/Training    [place] Level (Toileting) toileting skills;not tested  -KR           Wound 05/01/23 1414 Left vagina    Wound - Properties Group Placement Date: 05/01/23  -LB Placement Time: 1414  -LB Present on Hospital Admission: Y  -LB Side: Left  -LB Location: vagina  -LB    Retired Wound - Properties Group Placement Date: 05/01/23  -LB Placement Time: 1414  -LB Present on Hospital Admission: Y  -LB Side: Left  -LB Location: vagina  -LB    Retired Wound - Properties Group Date first assessed: 05/01/23  -LB Time first assessed: 1414  -LB Present on Hospital Admission: Y  -LB Side: Left  -LB Location: vagina  -LB       Wound 05/22/23 1425 coccyx Pressure Injury    Wound - Properties Group Placement Date: 05/22/23  -EB Placement Time: 1425  -EB Present on Hospital Admission: N  -EB Location: coccyx  -EB Primary Wound Type: Pressure inj  -EB    Retired Wound - Properties Group Placement Date: 05/22/23  -EB Placement Time: 1425  -EB Present on Hospital Admission: N  -EB Location: coccyx  -EB Primary Wound Type: Pressure inj  -EB    Retired Wound - Properties Group Date first assessed: 05/22/23  -EB Time first assessed: 1425  -EB Present on Hospital Admission: N  -EB Location: coccyx  -EB  Primary Wound Type: Pressure inj  -EB       Plan of Care Review    Plan of Care Reviewed With patient  -KR           Therapy Assessment/Plan (OT)    Planned Therapy Interventions (OT) activity tolerance training;BADL retraining;adaptive equipment training  -KR           Therapy Plan Review/Discharge Plan (OT)    Anticipated Discharge Disposition (OT) home with assist  -KR           OT Goals    Activity Tolerance Goal Selection (OT) activity tolerance, OT goal 1  -KR            Activity Tolerance Goal 1 (OT)    Activity Tolerance Goal 1 (OT) Increase to enhance self care/mobility performance  -KR        Activity Level (Endurance Goal 1, OT) 15 min activity  -KR        Time Frame (Activity Tolerance Goal 1, OT) by discharge  -KR           Patient Education Goal (OT)    Activity (Patient Education Goal, OT) AE/DME training as needed to enhance self care/mobility skills  -KR        [place]/Cues/Accuracy (Memory Goal 2, OT) verbalizes understanding  -KR        Time Frame (Patient Education Goal, OT) by discharge  -KR              User Key  (r) = Recorded By, (t) = Taken By, (c) = Cosigned By    Initials Name Effective Dates    KR Justen Clark OT 06/16/21 -     EB Ady John, NUPUR 09/22/22 -     LB Veronica Mendez RN 10/20/21 -                        OT Recommendation and Plan  Planned Therapy Interventions (OT): activity tolerance training, BADL retraining, adaptive equipment training  Plan of Care Review  Plan of Care Reviewed With: patient  Plan of Care Reviewed With: patient        Time Calculation:     Therapy Charges for Today     Code Description Service Date Service Provider Modifiers Qty    [id number] HC OT EVAL MOD COMPLEXITY 4 5/23/2023 Justen Clark OT GO 1               Justen Clark OT  5/23/2023

## 2023-05-23 NOTE — DISCHARGE PLACEMENT REQUEST
[address] [address]  Phone:  [phone]  Fax:  [phone]        Patient: ROOM: [address] [place] [address]   Orquidea Rosenberg MRN:  [id number]   [address] [facility] [address] :  1945  SSN:    Phone: [phone] Sex:  F   PCP: Jackeline Denson                Emergency Contact Information      Name Relation Home Work Mobile     Gavin Rosenberg Son [phone]         Lilli Little Daughter [phone]          INSURANCE PAYOR PLAN GROUP # SUBSCRIBER ID   Primary:    [employer] MEDICARE REPLACEMENT [id number]   Admitting Diagnosis: Hypoxia [R09.02]  Order Date:  May 23, 2023        Case Management  Consult       (Order ID: [id number])     Diagnosis:         Priority:  Routine Expected Date:   Expiration Date:        Interval:   Count:    Reason for Consult: Hospice at home referral Dx Vulvar cancer with C/o lung mets will need hospital bed with gel matress, oxygen, wheelchair, lift, nebulizer, chucks and pads.  possible other needs, Daughter Lilli is contact        Authorizing Provider:Aysha Saucedo APRN  Authorizing Provider's NPI: [id number]  Order Entered By: Aysha Saucedo APRN 2023 10:42 AM     Electronically signed by: Aysha Saucedo APRN 2023 10:42 AM       Orquidea Rosenberg (77 y.o. Female)     Date of Birth   1945    Social Security Number       Address   [address] [facility] [address]    Home Phone   [phone]    MRN   [id number]       Restorationist   Rastafari    Marital Status                               Admission Date   23    Admission Type   Urgent    Admitting Provider   Nelson Mejia MD    Attending Provider   Nelson Mejia MD    Department, Room/Bed   [address], [address]/       Discharge Date       Discharge Disposition       Discharge Destination                               Attending Provider: Nelson Mejia MD   "  Allergies: Penicillins    Isolation: None   Infection: None   Code Status: No CPR    Ht: 167.6 cm (66\")   Wt: 79 kg (174 lb 3.2 oz)    Admission Cmt: None   Principal Problem: Hypoxia [R09.02]                 Active Insurance as of 2023     Primary Coverage     Payor Plan Insurance Group Employer/Plan Group    UNITED HEALTH[employer] MEDICARE REPLACEMENT UNITED [employer] MEDICARE REPLACEMENT [id number]     Payor Plan Address Payor Plan Phone Number Payor Plan Fax Number Effective Dates    [address] [address]   2021 - None Entered    [facility] [id number]       Subscriber Name Subscriber Birth Date Member ID       ORQUIDEA MCCLAIN 1945 [id number]                 Emergency Contacts      (Rel.) Home Phone Work Phone Mobile Phone    Gavin Mcclain (Son) [phone] -- --    Lilli Little (Daughter) [phone] -- [phone]            Insurance Information                [employer] MEDICARE REPLACEMENT/UNITED [employer] MEDICARE REPLACEMENT Phone: --    Subscriber: ChetPilar kearneyyce Subscriber#: [id number]    Group#: [id number] Precert#: [id number]             History & Physical      Nelson Mejia MD at 23 1220              [facility]    Patient Identification:  Name:  Orquidea Mcclain  Age:  77 y.o.  Sex:  female  :  1945  MRN:  [id number]   Visit Number:  [id number]  Admit Date: 2023   Room number:  3327/1P  Primary Care Physician:  Jackeline Denson, APRN    Date of Admission: 2023     Subjective     Chief complaint:  No chief complaint on file.    History of presenting illness:      Orquidea Mcclain is a 77 y.o. female who was admitted back to inpatient status on 2023 from swing bed for progressive respiratory distress. For complete history please see HPI from prior admission before swing bed transfer, interval hx as below.     Patient this with ongoing dyspnea  acutely worsened with ambulation and transition to bedside chair requiring " increase in O2 to 5L with desat into 80s slow to respond to increased O2. Discussed with patient her progression and reviewed CT scans from march and early may 2023 with noted progression concerning for new lung mets. Patient voiced understanding, daughter also in room and wish to cont current care but discussed with them my concern for progression without clear possibility of any treatment. They wish to cont current full spectrum of care but acknowledge severity of her progression and should she worsen would be open to transitioning GOC.      Prior to admission I discussed patient's presentation and management with attending ER physician and verbal handoff received.  ---------------------------------------------------------------------------------------------------------------------   A thorough systems based relevant ROS was asked and was negative except as noted above.  ---------------------------------------------------------------------------------------------------------------------   Past Medical History:   Diagnosis Date   • Arthritis    • COPD (chronic obstructive pulmonary disease)    • Elevated cholesterol    • History of transfusion    • Hypertension    • Vulval ca      Past Surgical History:   Procedure Laterality Date   • RADICAL VULVECTOMY  09/06/2019   • RADICAL VULVECTOMY Bilateral 06/2019     Family History   Problem Relation Age of Onset   • Alzheimer's disease Mother    • Cancer Father    • Cancer Brother    • Diabetes Maternal Grandmother      Social History     Socioeconomic History   • Marital status:    Tobacco Use   • Smoking status: Former   • Smokeless tobacco: Never   Vaping Use   • Vaping Use: Never used   Substance and Sexual Activity   • Alcohol use: No   • Drug use: No   • Sexual activity: Defer     ---------------------------------------------------------------------------------------------------------------------   Allergies:   Penicillins  ---------------------------------------------------------------------------------------------------------------------   Medications below are reported home medications pulling from within the system; at this time, these medications have not been reconciled unless otherwise specified and are in the verification process for further verifcation as current home medications.      Prior to Admission Medications     Prescriptions Last Dose Informant Patient Reported? Taking?    furosemide (LASIX) 20 MG tablet  Pharmacy Yes No    Take 1 tablet by mouth Daily.    levothyroxine (SYNTHROID, LEVOTHROID) 100 MCG tablet  Pharmacy Yes No    Take 1 tablet by mouth Daily.    losartan (COZAAR) 50 MG tablet  Pharmacy Yes No    Take 1 tablet by mouth Daily.    ondansetron (ZOFRAN) 4 MG tablet  Pharmacy Yes No    Take 1 tablet by mouth Every 8 (Eight) Hours As Needed for Nausea or Vomiting.        Objective     Vital Signs:  Temp:  [98.1 °F (36.7 °C)] 98.1 °F (36.7 °C)  Heart Rate:  [77-85] 85  Resp:  [18-24] 24  BP: (139)/(58) 139/58    No data found.  SpO2:  [94 %] 94 %  on  Flow (L/min):  [3] 3;   Device (Oxygen Therapy): nasal cannula;humidified  Body mass index is 28.12 kg/m².    Wt Readings from Last 3 Encounters:   05/22/23 79 kg (174 lb 3.2 oz)   05/05/23 83.9 kg (185 lb)   05/04/23 82.6 kg (182 lb)      ----------------------------------------------------------------------------------------------------------------------  Physical Exam  Vitals and nursing note reviewed.   Constitutional:       Appearance: She is ill-appearing.   Eyes:      General: No scleral icterus.     Extraocular Movements: Extraocular movements intact.   Cardiovascular:      Rate and Rhythm: Normal rate.      Pulses: Normal pulses.      Heart sounds: No murmur heard.  Pulmonary:      Effort: Respiratory distress present.      Breath sounds: No wheezing or rales.   Abdominal:      General: Abdomen is flat.      Palpations: Abdomen is soft.    Musculoskeletal:      Right lower leg: Edema present.      Left lower leg: Edema present.   Skin:     General: Skin is warm and dry.      Capillary Refill: Capillary refill takes less than 2 seconds.   Neurological:      General: No focal deficit present.      Mental Status: She is alert and oriented to person, place, and time.   Psychiatric:         Mood and Affect: Mood normal.         Behavior: Behavior normal.   --------------------------------------------------------------------------------------------------------------------  LABS:    CBC and coagulation:  Results from last 7 days   Lab Units 05/22/23 0202 05/21/23  0838 05/21/23  0335 05/17/23 0143   WBC 10*3/mm3 11.44*  --  10.60 7.64   HEMOGLOBIN g/dL 8.5*  --  8.9* 7.3*   HEMATOCRIT % 29.0*  --  30.9* 24.9*   MCV fL 90.6  --  93.4 91.5   MCHC g/dL 29.3*  --  28.8* 29.3*   PLATELETS 10*3/mm3 341  --  372 311   D DIMER QUANT MCGFEU/mL  --  1.22*  --   --      Acid/base balance:      Renal and electrolytes:  Results from last 7 days   Lab Units 05/22/23 0202 05/21/23  0335 05/19/23  0146 05/17/23  0143 05/16/23  0129   SODIUM mmol/L 140 139 137 138 133*   POTASSIUM mmol/L 3.4* 3.6 5.0 4.5 4.4   MAGNESIUM mg/dL  --   --  2.1 2.0 1.9   CHLORIDE mmol/L 101 101 103 103 102   CO2 mmol/L 29.0 26.2 23.1 23.5 23.5   BUN mg/dL 29* 29* 25* 16 13   CREATININE mg/dL 1.06* 1.08* 1.08* 1.23* 1.04*   CALCIUM mg/dL 8.8 8.8 8.7 8.8 8.6   PHOSPHORUS mg/dL  --   --  3.9 4.1 3.5   GLUCOSE mg/dL 101* 79 133* 168* 127*     Estimated Creatinine Clearance: 47.2 mL/min (A) (by C-G formula based on SCr of 1.06 mg/dL (H)).    Liver and pancreatic function:  Results from last 7 days   Lab Units 05/17/23  0143 05/16/23  0129   ALBUMIN g/dL 2.4* 2.4*   BILIRUBIN mg/dL <0.2 <0.2   ALK PHOS U/L 57 61   AST (SGOT) U/L 6 5   ALT (SGPT) U/L <5 <5     Endocrine function:  No results found for: HGBA1C  Point of care bedside glucose levels:      Lab Results   Component Value Date    TSH  2.620 03/15/2022     Cardiac:  Results from last 7 days   Lab Units 05/21/23  1038 05/21/23  0838 05/21/23  0335 05/17/23  0350 05/17/23  0143   HSTROP T ng/L 139* 150*  --  127* 132*   PROBNP pg/mL  --   --  15,228.0*  --   --        Cultures:  Lab Results   Component Value Date    COLORU Dark Yellow (A) 03/15/2022    CLARITYU Turbid (A) 03/15/2022    PHUR <=5.0 03/15/2022    GLUCOSEU Negative 03/15/2022    KETONESU Trace (A) 03/15/2022    BLOODU Large (3+) (A) 03/15/2022    NITRITEU Negative 03/15/2022    LEUKOCYTESUR Moderate (2+) (A) 03/15/2022    BILIRUBINUR Negative 03/15/2022    UROBILINOGEN 1.0 E.U./dL 03/15/2022    RBCUA 31-50 (A) 03/15/2022    WBCUA Too Numerous to Count (A) 03/15/2022    BACTERIA 4+ (A) 03/15/2022     Microbiology Results (last 10 days)     ** No results found for the last 240 hours. **          No results found for: PREGTESTUR, PREGSERUM, HCG, HCGQUANT  Pain Management Panel          View : No data to display.                      I have personally looked at the labs and they are summarized above.  ----------------------------------------------------------------------------------------------------------------------  Detailed radiology reports for the last 24 hours:    Imaging Results (Last 24 Hours)     ** No results found for the last 24 hours. **        Final impressions for the last 30 days of radiology reports:    CT Abdomen Pelvis With & Without Contrast    Addendum Date: 5/3/2023    Addendum: Again noted is a somewhat necrotic appearing 7.7 cm mass in the pelvis that appear somewhat contiguous with the distal sigmoid colon. Air is again noted within the urinary bladder. Impression.  This report was finalized on 5/3/2023 8:41 AM by Dr. Philip Lopez MD.      Result Date: 5/3/2023  1.  Again there is a left double-J ureteral stent and moderate to severe hydronephrosis of the left kidney. 2.  Moderate stool throughout the colon. 3.  Tiny bilateral pleural effusions. 4.  Bilateral  pulmonary lung base nodules are again noted.  This report was finalized on 5/2/2023 12:23 PM by Dr. Philip Lopez MD.      CT Abdomen Pelvis Without Contrast    Result Date: 4/25/2023  Large complex pelvic mass measuring 8.4 x 9.4 x 10.2 cm and presumably represents the patient's known pelvic malignancy (vulvar cancer). This could represent secondary involvement of a leiomyomatous uterus or dystrophic calcifications within a malignancy. Abnormal fistulous communication between the anterior rectum and the posterior aspect of the above-mentioned pelvic mass with at least one and possibly 2 fistulous connections as demonstrated on CT. Central debris within the mass may represent abscess or necrosis. Apparent fistulous communication between the mass and the bladder. Left sided hydronephrosis and hydroureter with left ureteral stent in place. The distal pigtail difficult to confirm within the bladder and may be within the distal ureter. Progressive widely disseminated pulmonary metastases. Signer Name: MINNIE Cerna MD  Signed: 4/25/2023 5:27 PM  Workstation Name: [id number]  [facility]    CT Chest With Contrast Diagnostic    Result Date: 5/22/2023  1.  Again there are small right greater than left pleural effusions. 2.  Innumerable pulmonary nodules again noted throughout both lungs with a large spiculated nodule at the right lung apex again seen to measure about 3.3 cm. 3.  Incidentally noted left kidney shows moderate hydronephrosis with indwelling catheter.  This report was finalized on 5/22/2023 8:54 AM by Dr. Philip Lopez MD.      CT Chest With Contrast Diagnostic    Result Date: 5/2/2023  1.  Now small bilateral pleural effusions. 2.  Tiny pericardial effusion again noted. 3.  Bilateral parenchymal pulmonary nodules are again noted. A posterior left upper lobe pulmonary nodule measures 2.2 cm and was about 2.2 cm. Other nodules appear stable also.  This report was finalized on  5/2/2023 12:38 PM by Dr. Philip Lopez MD.      XR Chest 1 View    Result Date: 5/20/2023   1.  Multifocal pneumonia. 2.  Mild hypoinflated lungs. 3.  Small right and left pleural effusion, left greater than right.  This report was finalized on 5/20/2023 8:31 PM by Graeme Bobby MD.      XR Chest 1 View    Result Date: 5/15/2023  FINDINGS/IMPRESSION: Multifocal interstitial opacities, similar to prior. Heart size is similar. Small pleural effusions. No pneumothorax. No acute osseous abnormality.  This report was finalized on 5/15/2023 11:16 PM by Alex Pallas, DO.      XR Chest 1 View    Result Date: 5/7/2023  Impression:  1. Multiple bilateral pulmonary nodular densities are similar to the prior study. 2.  No acute infiltrates 3.  Small left pleural effusion is stable  This report was finalized on 5/7/2023 9:01 PM by Gustavo Silverman MD.      XR Chest 1 View    Result Date: 4/26/2023  1.  No change in distribution of bilateral lung opacities which may represent changes of fibrosis and diffuse pulmonary nodules. 2.  Right IJ deep line noted with tip at the cavoatrial junction. No pneumothorax.  This report was finalized on 4/26/2023 11:31 AM by Dr. Justen Matias MD.      XR Chest AP    Result Date: 4/27/2023  1.  No interval change in the appearance of the chest. Bilateral lung opacities appear stable. 2.  Support device positioning is stable. 3.  Trace left pleural effusion is noted.  This report was finalized on 4/27/2023 9:48 AM by Dr. Justen Matias MD.        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 Radiology review of imaging from March 2023 compared to 5/2/2023 notes no significant increase in nodule size but on personal review her suspected metastatic disease appears to have progressed in both size and number with associated increasing O2 requirement and poorer respiratory reserve.  -Patient unable to tolerate repeat CT scan and CXR currently pending however if hypoxia secondary to rapid mets, limited possible interventions  -Will consult oncology Monday for case review for possible palliative options  -Increased diuretic dose to lasix 40mg x1 dose today and cont on prn basis given mild pulm edema  -PE on differential as well but no tachycardia and less likely given noted lung opacities, attempted CT today but unable to lay flat  -OK to cont current steroid course but wheezing resolved and acute progression unlikely COPD related  -Severe PAH noted on Echo likely acute on chronic right heart strain/failure secondary to lung disease  -Discussed GOC and patient to remain DNR/DNI and will reevaluate should her condition worsen, palliative following and previously not interested in hospice services but given significance of progression discussed possibility of revisiting this      #[id number]  -Troponin elevated with negative delta and t-wave inversions in inferior leads likely secondary to progressive hypoxia  -Repeat ecg and troponin for any acute cp or HDS changes, current changes less likely ischemia and more secondary to right heart strain. Echo reviewed without regional wall motion abnormalities              - - Chronic - -     #COPD hx  #Chronic iron def anemia  #Debility secondary to chronic illness: Holding further PT/OT until condition improved    Interim update 5/22/23:  -Patient's dyspnea improved with diuresis but CT scan shows progression of metastatic disease. Concerned for right heart  failure given elevated troponin, bnp, and inferior t-wave inversions. D-dimer elevated as well but dyspnea explained by pulm infiltrates with risk of decompensation during CTPE > benefit given current discussion regarding GOC. Cardiology consulted but I fear that a stress test or LHC may cause enough stress to tip patient into flash pulmonary edema or refractory hypoxia from her PAH and thus likely only benefited by medical management.   -Will f/u cards recs and palliative care discussion this am after my lengthy discussion with patient's daughter yesterday afternoon.    #Left hyonephrosis s/o uretal stent  -Patient asymptomatic but noted on imaging, good UOP and cr stable      VTE Prophylaxis:   Mechanical Order History:     None      Pharmalogical Order History:      Ordered     Dose Route Frequency Stop    05/22/23 1049  Enoxaparin Sodium (LOVENOX) syringe 40 mg         40 mg SC Daily --              The patient is high risk due to the following diagnoses/reasons:  Acute resp distress with progressive mets and pulm edema    Admission Status:  I certify that this patient requires inpatient hospitalization for greater than 2 midnights in INPATIENT status.  I anticipate there to be the need for care which can only be reasonably provided in a hospital setting such as possible need for aggressive/expedited ancillary services and/or consultation services, IV medications, close physician monitoring, and/or procedures. In such, I feel patient’s risk for adverse outcomes and need for care warrant INPATIENT evaluation and predict the patient’s care encounter to likely last beyond 2 midnights.    Code Status and Medical Interventions:   Ordered at: 05/22/23 1055     Medical Intervention Limits:    NO intubation (DNI)     Code Status (Patient has no pulse and is not breathing):    No CPR (Do Not Attempt to Resuscitate)     Medical Interventions (Patient has pulse or is breathing):    Limited Support     Release to patient:     Routine Release        Disposition: Admit to inpatient     Nelson Mejia MD  [facility]  05/22/23  12:20 EDT    Note: This HPI has been forwarded from prior encounter    Electronically signed by Nelson Mejia MD at 05/22/23 1233       Lines, Drains & Airways     Active LDAs     Name Placement date Placement time Site Days    Peripheral IV 05/12/23 1722 Posterior;Right Forearm 05/12/23  1722  Forearm  10    Peripheral IV   Right;Anterior Wrist --  unknown  --  unknown  Wrist  --                  Current Facility-Administered Medications   Medication Dose Route Frequency Provider Last Rate Last Admin   • aspirin EC tablet 81 mg  81 mg Oral Daily Nelson Mejia MD   81 mg at 05/23/23 0845   • atorvastatin (LIPITOR) tablet 20 mg  20 mg Oral Nightly Nelson Mejia MD   20 mg at 05/22/23 2058   • budesonide (PULMICORT) nebulizer solution 0.5 mg  0.5 mg Nebulization BID - RT Nelson Mejia MD   0.5 mg at 05/23/23 0625   • Enoxaparin Sodium (LOVENOX) syringe 40 mg  40 mg Subcutaneous Daily Nelson Mejia MD   40 mg at 05/23/23 0845   • ferrous sulfate tablet 325 mg  325 mg Oral Daily With Breakfast Nelson Mejia MD   325 mg at 05/23/23 0845   • furosemide (LASIX) tablet 40 mg  40 mg Oral Daily Nelson Mejia MD   40 mg at 05/23/23 0845   • guaiFENesin (MUCINEX) 12 hr tablet 600 mg  600 mg Oral Q12H Nelson Mejia MD   600 mg at 05/23/23 0845   • hydrOXYzine (ATARAX) tablet 25 mg  25 mg Oral TID PRN Nelson Mejia MD       • ipratropium-albuterol (DUO-NEB) nebulizer solution 3 mL  3 mL Nebulization Q6H While Awake - RT Nelson Mejia MD   3 mL at 05/23/23 1210   • levothyroxine (SYNTHROID, LEVOTHROID) tablet 100 mcg  100 mcg Oral Q AM Nelson Mejia MD   100 mcg at 05/23/23 0545   • losartan (COZAAR) tablet 50 mg  50 mg Oral Q24H Nelson Mejia MD   50 mg at 05/23/23 1057   • magic barrier cream 1 application  1 application Topical BID Nelson Mejia MD    1 application at 05/23/23 0846   • Magnesium Standard Dose Replacement - Follow Nurse / BPA Driven Protocol   Does not apply PRN Nelson Mejia MD       • metoprolol succinate XL (TOPROL-XL) 24 hr tablet 25 mg  25 mg Oral Q24H Nelson Mejia MD   25 mg at 05/23/23 0845   • nitroglycerin (NITROSTAT) SL tablet 0.4 mg  0.4 mg Sublingual Q5 Min PRN Nelson Mejia MD       • nystatin (MYCOSTATIN) powder   Topical Q12H Nelson Mejia MD   Given at 05/23/23 0846   • ondansetron (ZOFRAN) tablet 4 mg  4 mg Oral Q8H PRN Nelson Mejia MD   4 mg at 05/22/23 1157   • oxyCODONE-acetaminophen (PERCOCET) 7.5-325 MG per tablet 1 tablet  1 tablet Oral Q3H PRN Aysha Saucedo APRN   1 tablet at 05/22/23 2107   • Pharmacy Consult   Does not apply Continuous PRN Nelson Mejia MD       • sodium chloride 0.9 % flush 10 mL  10 mL Intravenous PRN Nelson Mejia MD       • sodium chloride 0.9 % flush 10 mL  10 mL Intravenous Q12H Nelson Mejia MD   10 mL at 05/23/23 0846   • sodium chloride 0.9 % flush 10 mL  10 mL Intravenous PRN Nelson Mejia MD       • sodium chloride 0.9 % flush 10 mL  10 mL Intravenous Q12H Nelson Mejia MD   10 mL at 05/23/23 0846   • sodium chloride 0.9 % flush 10 mL  10 mL Intravenous PRN Nelson Mejia MD       • sodium chloride 0.9 % flush 10 mL  10 mL Intravenous Q12H Nelson Mejia MD   10 mL at 05/23/23 0846   • sodium chloride 0.9 % flush 10 mL  10 mL Intravenous Q12H Nelson Mejia MD   10 mL at 05/23/23 0845   • sodium chloride 0.9 % flush 10 mL  10 mL Intravenous Q12H Nelson Mejia MD   10 mL at 05/23/23 0845   • sodium chloride 0.9 % flush 10 mL  10 mL Intravenous PRN Nelson Mejia MD       • sodium chloride 0.9 % flush 20 mL  20 mL Intravenous PRN Nelson Mejia MD       • sodium chloride 0.9 % infusion 40 mL  40 mL Intravenous PRN Nelson Mejia MD       • sodium chloride 0.9 % infusion 40 mL  40 mL Intravenous PRN Nelson Mejia MD       •  "sodium chloride 0.9 % infusion 40 mL  40 mL Intravenous PRN Nelson Mejia MD            Physician Progress Notes (most recent note)      Aysha Saucedo APRN at 05/23/23 1015          Palliative Care Daily Progress Note     S: Medical record reviewed, followed up with Primary RN Staci and Dr Mejia regarding patient's condition.Staci stated that Orquidea was feeling better, less short of breath and pain better in controlled. When I entered the room Orquidea was awake alert and oriented and confirmed she was feeling better today, that she was breathing better as well as pain was more controlled. She denied nausea or anxiety at this time as well.      O:   Palliative Performance Scale Score:     /66 (BP Location: Right arm, Patient Position: Lying)   Pulse 70   Temp 98.1 °F (36.7 °C) (Oral)   Resp 16   Ht 167.6 cm (66\")   Wt 79 kg (174 lb 3.2 oz)   SpO2 100%   BMI 28.12 kg/m²     Intake/Output Summary (Last 24 hours) at 5/23/2023 1043  Last data filed at 5/22/2023 1700  Gross per 24 hour   Intake 600 ml   Output --   Net 600 ml       PE:  General Appearance:    Chronically ill appearing, alert, cooperative, NAD   HEENT:    NC/AT, without obvious abnormality, EOMI, anicteric    Neck:   supple, trachea midline, no JVD   Lungs:     Regular unlabored respirations, no wheezes rales or rhonchi noted.    Heart:    RRR, normal S1 and S2, no M/R/G   Abdomen:     Soft, NT, ND, NABS    Extremities:   Moves all extremities, BLE edema   Pulses:   Pulses palpable and equal bilaterally   Skin:   Warm, dry, pale   Neurologic:   A/Ox3, cooperative   Psych:   Calm, appropriate            Meds: Reviewed and changes noted.    Labs:   Results from last 7 days   Lab Units 05/22/23  0202   WBC 10*3/mm3 11.44*   HEMOGLOBIN g/dL 8.5*   HEMATOCRIT % 29.0*   PLATELETS 10*3/mm3 341     Results from last 7 days   Lab Units 05/22/23  0202   SODIUM mmol/L 140   POTASSIUM mmol/L 3.4*   CHLORIDE mmol/L 101   CO2 mmol/L 29.0   BUN " mg/dL 29*   CREATININE mg/dL 1.06*   GLUCOSE mg/dL 101*   CALCIUM mg/dL 8.8     Results from last 7 days   Lab Units 05/22/23  0202 05/19/23  0146 05/17/23  0143   SODIUM mmol/L 140   < > 138   POTASSIUM mmol/L 3.4*   < > 4.5   CHLORIDE mmol/L 101   < > 103   CO2 mmol/L 29.0   < > 23.5   BUN mg/dL 29*   < > 16   CREATININE mg/dL 1.06*   < > 1.23*   CALCIUM mg/dL 8.8   < > 8.8   BILIRUBIN mg/dL  --   --  <0.2   ALK PHOS U/L  --   --  57   ALT (SGPT) U/L  --   --  <5   AST (SGOT) U/L  --   --  6   GLUCOSE mg/dL 101*   < > 168*    < > = values in this interval not displayed.     Imaging Results (Last 72 Hours)     Procedure Component Value Units Date/Time    XR Chest 1 View [[id number]] Resulted: 05/22/23 1302     Updated: 05/22/23 1345            Diagnostics: Reviewed    A: Orquidea Rosenberg is a 77 y.o. female  admitted on 4/25/2023 with diarrhea, nausea, vomiting, and abdominal pain. 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 Orquidea had partial chemotherapy and radiation, however since that time has refused further treatment as she had difficulty tolerating treatments. Orquidea also refused colostomy and surgery at  for pneumoperitoneum with suspect bowel perforation. Orquidea was offered palliative or hospice care but declined at that time. Per pts daughter Lilli, her mother had been getting weaker over the few days before admission to [facility].  Lilli states she was having poor appetite, nausea, vomiting, and diarrhea as well as abdominal and pubic pain,She states she was fine one morning and was weaker in the evening and by the next morning could not get up at all, which led to her being brought to the ER.      P:  I was able to talk with Orquidea regarding plan of going home with hospice and we discussed going ahead and putting referral in to get equipment delivered and establish contact with Hospice. I let her know that I would  discuss this with her daughter Lilli and have left a message for Lilli to discuss putting referral in now as to get equipment in place before time of discharge. I discussed this plan with Dr Mejia.    We will continue to follow along. Please do not hesitate to contact us regarding further sx mgmt or GOC needs, including after hours or on weekends via our on call provider at [phone].     BERTHA Onofre    2023    Electronically signed by Aysha Saucedo APRN at 23 1054          Physical Therapy Notes (most recent note)      Jaclyn Fernandez, PT at 23 1547  Version 1 of 1         Acute Care - Physical Therapy Initial Evaluation  JOSE ROBERTO Jorge     Patient Name: Orquidea Rosenberg  : 1945  MRN: [id number]  Today's Date: 2023   Onset of Illness/Injury or Date of Surgery: 23 (pt. admitted ; familiar to PT from previous swing bed status; pt. now readmitted to inpatient status d/t respiratory decline.)  Visit Dx:   No diagnosis found.  Patient Active Problem List   Diagnosis   • COVID-19   • Vulvar cancer   • Physical debility   • Bacteremia   • COPD exacerbation   • Debility   • Hypoxia   • Acute respiratory failure with hypoxia     Past Medical History:   Diagnosis Date   • Arthritis    • COPD (chronic obstructive pulmonary disease)    • Elevated cholesterol    • History of transfusion    • Hypertension    • Vulval ca      Past Surgical History:   Procedure Laterality Date   • RADICAL VULVECTOMY  2019   • RADICAL VULVECTOMY Bilateral 2019     PT Assessment (last 12 hours)     PT Evaluation and Treatment     Row Name 23 2735          Physical Therapy Time and Intention    Subjective Information no complaints  -AG     Document Type evaluation  -AG     Mode of Treatment physical therapy  -AG     Patient Effort good  -AG     Symptoms Noted During/After Treatment fatigue;shortness of breath  -AG     Row Name 23 9249          General Information    Patient Profile  Reviewed yes  -AG     Onset of Illness/Injury or Date of Surgery 05/22/23  pt. admitted 5/5; familiar to PT from previous swing bed status; pt. now readmitted to inpatient status d/t respiratory decline.  -AG     Referring Physician Dr. Mejia  -AG     Patient Observations agree to therapy;cooperative;alert  -AG     Patient/Family/Caregiver Comments/Observations pt. supine in bed on 2.5L O2 nc; pt. alert, cooperative for evaluation.  States she currently does not have much pain.  -AG     Prior Level of Function independent:;all household mobility  pt. states she previously ambulated without device but became SOA after walking from one end of her home to the other.  Previously independent with BADL.  Daughter assisted with cooking and household chores.  Pt. owns rollator but did not use.  -AG     Equipment Currently Used at Home --  owns rollator but does not use.  Has a w/c that belonged to her late spouse.  -AG     Pertinent History of Current Functional Problem pt. admitted with physical debility; bacteremia; hypoxia  -AG     Existing Precautions/Restrictions fall;oxygen therapy device and L/min  -AG     Limitations/Impairments --  medical complexity; vulvar CA  -AG     Equipment Issued to Patient gait belt  -AG     Risks Reviewed patient:;increased discomfort;dizziness  -AG     Benefits Reviewed patient:;improve function;increase independence;increase strength;increase balance;increase knowledge;decrease risk of DVT  -AG     Barriers to Rehab medically complex  -AG     Row Name 05/22/23 1530          Previous Level of Function/Home Environm    BADLs, Premorbid Functional Level independent  -AG     IADLs, Premorbid Functional Level partial assistance  -AG     Bed Mobility, Premorbid Functional Level independent  -AG     Transfers, Premorbid Functional Level independent  -AG     Household Ambulation, Premorbid Functional Level independent  -AG     Stairs, Premorbid Functional Level independent  -AG     Previous  Level of Function, Premorbid patient reports sedentary lifestyle at home, states she seldom left home and became SOA and fatigued ambulating inside home.  -     Row Name 05/22/23 1530          Living Environment    Current Living Arrangements home  -     People in Home child(jolly), adult  daughter  -AG     Primary Care Provided by self  -     Row Name 05/22/23 1530          Home Use of Assistive/Adaptive Equipment    Equipment Currently Used at Home --  owns rollator, w/c  -AG     Row Name 05/22/23 1530          Pain    Additional Documentation --  pt. reports pain as well tolerated at time of PT encounter.  -     Row Name 05/22/23 1530          Cognition    Affect/Mental Status (Cognition) WFL  -     Orientation Status (Cognition) oriented x 3  -AG     Follows Commands (Cognition) [facility]  -     Personal Safety Interventions fall prevention program maintained;gait belt;nonskid shoes/slippers when out of bed;supervised activity  -     Row Name 05/22/23 1530          Range of Motion (ROM)    Range of Motion ROM is WFL;bilateral lower extremities;bilateral upper extremities  -     Row Name 05/22/23 1530          Strength (Manual Muscle Testing)    Strength (Manual Muscle Testing) --  B LE 4/5  -[facility] Name 05/22/23 1530          Sensory    Hearing Status WFL  -     Row Name 05/22/23 1530          Vision Assessment/Intervention    Visual Impairment/Limitations WFL  -     Row Name 05/22/23 1530          Sensory Assessment (Somatosensory)    Sensory Assessment (Somatosensory) LE sensation intact  -     Row Name 05/22/23 1530          Mobility    Extremity Weight-bearing Status --  no restrictions  -     Row Name 05/22/23 1530          Bed Mobility    Bed Mobility rolling left;rolling right;scooting/bridging;supine-sit;sit-supine  -AG     Rolling Left [place] (Bed Mobility) standby assist  -AG     Rolling Right [place] (Bed Mobility) standby assist  -AG     Supine-Sit [place] (Bed  Mobility) standby assist  -AG     Assistive Device (Bed Mobility) bed rails  -AG     Row Name 05/22/23 1530          Transfers    Transfers sit-stand transfer;stand-sit transfer;stand pivot/stand step transfer  -AG     Row Name 05/22/23 1530          Sit-Stand Transfer    Sit-Stand [place] (Transfers) standby assist;verbal cues;nonverbal cues (demo/gesture);contact guard  -AG     Assistive Device (Sit-Stand Transfers) walker, front-wheeled  -AG     Row Name 05/22/23 1530          Stand-Sit Transfer    Stand-Sit [place] (Transfers) standby assist;verbal cues;nonverbal cues (demo/gesture);contact guard  -AG     Assistive Device (Stand-Sit Transfers) walker, front-wheeled  -AG     Row Name 05/22/23 1530          Stand Pivot/Stand Step Transfer    Stand Pivot/Stand Step [place] (Transfers) standby assist;verbal cues;nonverbal cues (demo/gesture);contact guard  -AG     Assistive Device (Stand Pivot Stand Step Transfer) walker, front-wheeled  -AG     Row Name 05/22/23 1530          Gait/Stairs (Locomotion)    [place] Level (Gait) contact guard;nonverbal cues (demo/gesture);verbal cues  -AG     Assistive Device (Gait) walker, front-wheeled  -AG     Distance in Feet (Gait) 5  -AG     Pattern (Gait) step-through  -AG     Deviations/Abnormal Patterns (Gait) stride length decreased;antalgic  -AG     Row Name 05/22/23 1530          Safety Issues, Functional Mobility    Impairments Affecting Function (Mobility) balance;endurance/activity tolerance;strength;pain  -AG     Row Name 05/22/23 1530          Balance    Balance Assessment sitting static balance;sitting dynamic balance;sit to stand dynamic balance;standing static balance;standing dynamic balance  -AG     Static Sitting Balance modified independence  -AG     Position, Sitting Balance unsupported;sitting edge of bed  -AG     Static Standing Balance standby assist  -AG     Dynamic Standing Balance standby assist;verbal cues;non-verbal cues  (demo/gesture);contact guard  -AG     Position/Device Used, Standing Balance walker, front-wheeled  -AG     Row Name 05/22/23 1530          Motor Skills    Motor Skills coordination;functional endurance;motor control/coordination interventions  -AG     Row Name             Wound 05/01/23 1414 Left vagina    Wound - Properties Group Placement Date: 05/01/23  -LB Placement Time: 1414  -LB Present on Hospital Admission: Y  -LB Side: Left  -LB Location: vagina  -LB    Retired Wound - Properties Group Placement Date: 05/01/23  -LB Placement Time: 1414  -LB Present on Hospital Admission: Y  -LB Side: Left  -LB Location: vagina  -LB    Retired Wound - Properties Group Date first assessed: 05/01/23  -LB Time first assessed: 1414  -LB Present on Hospital Admission: Y  -LB Side: Left  -LB Location: vagina  -LB    Row Name             Wound 05/22/23 1425 coccyx Pressure Injury    Wound - Properties Group Placement Date: 05/22/23  -EB Placement Time: 1425  -EB Present on Hospital Admission: N  -EB Location: coccyx  -EB Primary Wound Type: Pressure inj  -EB    Retired Wound - Properties Group Placement Date: 05/22/23  -EB Placement Time: 1425  -EB Present on Hospital Admission: N  -EB Location: coccyx  -EB Primary Wound Type: Pressure inj  -EB    Retired Wound - Properties Group Date first assessed: 05/22/23  -EB Time first assessed: 1425  -EB Present on Hospital Admission: N  -EB Location: coccyx  -EB Primary Wound Type: Pressure inj  -EB    Row Name 05/22/23 1530          Coping    Observed Emotional State calm;cooperative  -     Verbalized Emotional State acceptance  -AG     Trust Relationship/Rapport care explained;choices provided;thoughts/feelings acknowledged  -     Family/Support Persons daughter  -AG     Involvement in Care not present at bedside  -     Family/Support System Care self-care encouraged;support provided  -     Row Name 05/22/23 1530          Plan of Care Review    Plan of Care Reviewed With patient   -AG     Outcome Evaluation PT evaluation complete.  Pt. CGA/ SBA for bed mobility, STS and to ambulate short distance to bedside chair.  Fatigued following this distance.  PT will continue to follow and progress as pt. tolerates.  -AG     Row Name 05/22/23 1530          Positioning and Restraints    Pre-Treatment Position in bed  -AG     Post Treatment Position chair  -AG     In Chair sitting;notified nsg;call light within reach;encouraged to call for assist  -AG     Row Name 05/22/23 1530          Therapy Assessment/Plan (PT)    Patient/Family Therapy Goals Statement (PT) return home with daughter  -AG     Functional Level at Time of Evaluation (PT) SBA/ CGA  -AG     PT Diagnosis (PT) impaired functional mobility and endurance  -AG     Rehab Potential (PT) good, to achieve stated therapy goals  -AG     Criteria for Skilled Interventions Met (PT) yes;meets criteria  -AG     Therapy Frequency (PT) 2 times/wk  2-5 times/ week per priority  -AG     Predicted Duration of Therapy Intervention (PT) LOS  -AG     Problem List (PT) problems related to;balance;mobility;strength;pain  -AG     Activity Limitations Related to Problem List (PT) unable to ambulate safely;unable to transfer safely;BADLs not performed adequately or safely  -AG     Row Name 05/22/23 1530          Therapy Plan Review/Discharge Plan (PT)    Therapy Plan Review (PT) evaluation/treatment results reviewed;care plan/treatment goals reviewed;risks/benefits reviewed;current/potential barriers reviewed;participants voiced agreement with care plan;participants included;patient  -AG     Row Name 05/22/23 1530          Physical Therapy Goals    Bed Mobility Goal Selection (PT) bed mobility, PT goal 1  -AG     Transfer Goal Selection (PT) transfer, PT goal 1  -AG     Gait Training Goal Selection (PT) gait training, PT goal 1  -AG     Row Name 05/22/23 1530          Bed Mobility Goal 1 (PT)    Activity/Assistive Device (Bed Mobility Goal 1, PT) rolling to  left;rolling to right;scooting;sit to supine;supine to sit  -AG     [place] Level/Cues Needed (Bed Mobility Goal 1, PT) modified independence  -AG     Time Frame (Bed Mobility Goal 1, PT) by discharge  -AG     Row Name 05/22/23 1530          Transfer Goal 1 (PT)    Activity/Assistive Device (Transfer Goal 1, PT) sit-to-stand/stand-to-sit;bed-to-chair/chair-to-bed;toilet  -AG     [place] Level/Cues Needed (Transfer Goal 1, PT) supervision required  -AG     Time Frame (Transfer Goal 1, PT) by discharge  -AG     Row Name 05/22/23 1530          Gait Training Goal 1 (PT)    Activity/Assistive Device (Gait Training Goal 1, PT) gait (walking locomotion);assistive device use;decrease fall risk;diminish gait deviation;improve balance and speed;increase endurance/gait distance  appropriate a.d.  -AG     [place] Level (Gait Training Goal 1, PT) standby assist  -AG     Distance (Gait Training Goal 1, PT) 40  -AG     Time Frame (Gait Training Goal 1, PT) by discharge  -AG           User Key  (r) = Recorded By, (t) = Taken By, (c) = Cosigned By    Initials Name Provider Type    AG Jaclyn Fernandez, PT Physical Therapist    Ady Serrano, RN Registered Nurse    Veronica Bansal, RN Registered Nurse                Physical Therapy Education     Title: PT OT SLP Therapies (Done)     Topic: Physical Therapy (Done)     Point: Mobility training (Done)     Learning Progress Summary           Patient Acceptance, E, VU by EB at 5/22/2023 1536                   Point: Home exercise program (Done)     Learning Progress Summary           Patient Acceptance, E, VU by EB at 5/22/2023 1536                   Point: Body mechanics (Done)     Learning Progress Summary           Patient Acceptance, E, VU by EB at 5/22/2023 1536                   Point: Precautions (Done)     Learning Progress Summary           Patient Acceptance, E, VU by EB at 5/22/2023 1536                               User Key     Initials Effective Dates Name  Provider Type Discipline     22 -  Ady John, RN Registered Nurse Nurse              PT Recommendation and Plan  Planned Therapy Interventions (PT): balance training, bed mobility training, gait training, home exercise program, transfer training, strengthening, postural re-education, patient/family education  Therapy Frequency (PT): 2 times/wk (2-5 times/ week per priority)  Plan of Care Reviewed With: patient  Outcome Evaluation: PT evaluation complete.  Pt. CGA/ SBA for bed mobility, STS and to ambulate short distance to bedside chair.  Fatigued following this distance.  PT will continue to follow and progress as pt. tolerates.       Time Calculation:    PT Charges     Row Name 23 1529             Time Calculation    PT Received On 23  -      PT Goal Re-Cert Due Date 23  -            User Key  (r) = Recorded By, (t) = Taken By, (c) = Cosigned By    Initials Name Provider Type    AG Jaclyn Fernandez, PT Physical Therapist              Therapy Charges for Today     Code Description Service Date Service Provider Modifiers Qty    91416144841 HC PT EVAL MOD COMPLEXITY 4 2023 Jaclyn Fernandez, PT GP 1          PT G-Codes  AM-PAC 6 Clicks Score (PT): 11    Jaclyn Fernandez PT  2023      Electronically signed by Jaclyn Fernandez, PT at 23 1547          Occupational Therapy Notes (most recent note)      Justen Clark, OT at 23 1245          Acute Care - Occupational Therapy Treatment Note  JOSE ROBERTO Patel     Patient Name: Orquidea Rosenberg  : 1945  MRN: [id number]  Today's Date: 2023  Onset of Illness/Injury or Date of Surgery: 23 (pt. admitted ; familiar to PT from previous swing bed status; pt. now readmitted to inpatient status d/t respiratory decline.)     Referring Physician: Dr. Mejia    Admit Date: 2023     No diagnosis found.  Patient Active Problem List   Diagnosis   • COVID-19   • Vulvar cancer   • Physical debility   • Bacteremia   • COPD  exacerbation   • Debility   • Hypoxia   • Acute respiratory failure with hypoxia     Past Medical History:   Diagnosis Date   • Arthritis    • COPD (chronic obstructive pulmonary disease)    • Elevated cholesterol    • History of transfusion    • Hypertension    • Vulval ca      Past Surgical History:   Procedure Laterality Date   • RADICAL VULVECTOMY  09/06/2019   • RADICAL VULVECTOMY Bilateral 06/2019         OT ASSESSMENT FLOWSHEET (last 12 hours)     OT Evaluation and Treatment     Row Name 05/23/23 1227                   OT Time and Intention    Document Type evaluation  -KR        Mode of Treatment occupational therapy  -KR        Patient Effort good  -KR           General Information    General Observations of Patient alert/cooperative  -KR           Living Environment    Current Living Arrangements home  -KR        People in Home child(jolly), adult  -KR        Primary Care Provided by self  -KR           Cognition    Affect/Mental Status (Cognition) WFL  -KR        Orientation Status (Cognition) oriented x 3  -KR        Follows Commands (Cognition) WFL  -KR           Range of Motion Comprehensive    Comment, General Range of Motion BUE WFL  -KR           Strength Comprehensive (MMT)    Comment, General Manual Muscle Testing (MMT) Assessment BUE 3/5  -KR           Activities of Daily Living    BADL Assessment/Intervention bathing;upper body dressing;lower body dressing;grooming;feeding;toileting  -KR           Bathing Assessment/Intervention    [place] Level (Bathing) bathing skills;minimum assist (75% patient effort)  -KR           Upper Body Dressing Assessment/Training    [place] Level (Upper Body Dressing) upper body dressing skills;minimum assist (75% patient effort)  -KR           Lower Body Dressing Assessment/Training    [place] Level (Lower Body Dressing) lower body dressing skills;minimum assist (75% patient effort)  -KR           Grooming Assessment/Training    [place] Level  (Grooming) grooming skills;standby assist  -KR           Self-Feeding Assessment/Training    [place] Level (Feeding) feeding skills;set up  -KR           Toileting Assessment/Training    [place] Level (Toileting) toileting skills;not tested  -KR           Wound 05/01/23 1414 Left vagina    Wound - Properties Group Placement Date: 05/01/23  -LB Placement Time: 1414  -LB Present on Hospital Admission: Y  -LB Side: Left  -LB Location: vagina  -LB    Retired Wound - Properties Group Placement Date: 05/01/23  -LB Placement Time: 1414  -LB Present on Hospital Admission: Y  -LB Side: Left  -LB Location: vagina  -LB    Retired Wound - Properties Group Date first assessed: 05/01/23  -LB Time first assessed: 1414  -LB Present on Hospital Admission: Y  -LB Side: Left  -LB Location: vagina  -LB       Wound 05/22/23 1425 coccyx Pressure Injury    Wound - Properties Group Placement Date: 05/22/23  -EB Placement Time: 1425  -EB Present on Hospital Admission: N  -EB Location: coccyx  -EB Primary Wound Type: Pressure inj  -EB    Retired Wound - Properties Group Placement Date: 05/22/23  -EB Placement Time: 1425  -EB Present on Hospital Admission: N  -EB Location: coccyx  -EB Primary Wound Type: Pressure inj  -EB    Retired Wound - Properties Group Date first assessed: 05/22/23  -EB Time first assessed: 1425  -EB Present on Hospital Admission: N  -EB Location: coccyx  -EB Primary Wound Type: Pressure inj  -EB       Plan of Care Review    Plan of Care Reviewed With patient  -KR           Therapy Assessment/Plan (OT)    Planned Therapy Interventions (OT) activity tolerance training;BADL retraining;adaptive equipment training  -KR           Therapy Plan Review/Discharge Plan (OT)    Anticipated Discharge Disposition (OT) home with assist  -KR           OT Goals    Activity Tolerance Goal Selection (OT) activity tolerance, OT goal 1  -KR            Activity Tolerance Goal 1 (OT)    Activity Tolerance Goal 1 (OT) Increase to  enhance self care/mobility performance  -KR        Activity Level (Endurance Goal 1, OT) 15 min activity  -KR        Time Frame (Activity Tolerance Goal 1, OT) by discharge  -KR           Patient Education Goal (OT)    Activity (Patient Education Goal, OT) AE/DME training as needed to enhance self care/mobility skills  -KR        [place]/Cues/Accuracy (Memory Goal 2, OT) verbalizes understanding  -KR        Time Frame (Patient Education Goal, OT) by discharge  -KR              User Key  (r) = Recorded By, (t) = Taken By, (c) = Cosigned By    Initials Name Effective Dates    Justen Gurrola OT 06/16/21 -     Ady Serrano RN 09/22/22 -     Veronica Bansal RN 10/20/21 -                        OT Recommendation and Plan  Planned Therapy Interventions (OT): activity tolerance training, BADL retraining, adaptive equipment training  Plan of Care Review  Plan of Care Reviewed With: patient  Plan of Care Reviewed With: patient        Time Calculation:     Therapy Charges for Today     Code Description Service Date Service Provider Modifiers Qty    [id number]  OT EVAL MOD COMPLEXITY 4 5/23/2023 Justen Clark OT GO 1               Justen Clark OT  5/23/2023    Electronically signed by Justen Clark OT at 05/23/23 1245       Speech Language Pathology Notes (most recent note)    No notes exist for this encounter.         ADL Documentation (most recent)    Flowsheet Row Most Recent Value   Transferring 3 - assistive equipment and person   Toileting 3 - assistive equipment and person   Bathing 3 - assistive equipment and person   Dressing 2 - assistive person   Eating 0 - independent   Communication 0 - understands/communicates without difficulty   Swallowing 0 - swallows foods/liquids without difficulty   Equipment Currently Used at Home --  [owns rollator, w/c]          Discharge Summary    No notes of this type exist for this encounter.         Discharge Order (From admission, onward)    None

## 2023-05-24 ENCOUNTER — APPOINTMENT (OUTPATIENT)
Dept: GENERAL RADIOLOGY | Facility: HOSPITAL | Age: 78
DRG: 180 | End: 2023-05-24
Payer: MEDICARE

## 2023-05-24 PROCEDURE — 71045 X-RAY EXAM CHEST 1 VIEW: CPT | Performed by: RADIOLOGY

## 2023-05-24 PROCEDURE — 99231 SBSQ HOSP IP/OBS SF/LOW 25: CPT | Performed by: STUDENT IN AN ORGANIZED HEALTH CARE EDUCATION/TRAINING PROGRAM

## 2023-05-24 PROCEDURE — 94799 UNLISTED PULMONARY SVC/PX: CPT

## 2023-05-24 PROCEDURE — 71045 X-RAY EXAM CHEST 1 VIEW: CPT

## 2023-05-24 PROCEDURE — 25010000002 ENOXAPARIN PER 10 MG: Performed by: STUDENT IN AN ORGANIZED HEALTH CARE EDUCATION/TRAINING PROGRAM

## 2023-05-24 RX ADMIN — METOPROLOL SUCCINATE 25 MG: 25 TABLET, EXTENDED RELEASE ORAL at 09:08

## 2023-05-24 RX ADMIN — NYSTATIN: 100000 POWDER TOPICAL at 09:09

## 2023-05-24 RX ADMIN — OXYCODONE HYDROCHLORIDE AND ACETAMINOPHEN 1 TABLET: 7.5; 325 TABLET ORAL at 18:48

## 2023-05-24 RX ADMIN — OXYCODONE HYDROCHLORIDE AND ACETAMINOPHEN 1 TABLET: 7.5; 325 TABLET ORAL at 02:20

## 2023-05-24 RX ADMIN — ENOXAPARIN SODIUM 40 MG: 40 INJECTION SUBCUTANEOUS at 09:09

## 2023-05-24 RX ADMIN — LOSARTAN POTASSIUM 50 MG: 50 TABLET, FILM COATED ORAL at 09:09

## 2023-05-24 RX ADMIN — Medication 10 ML: at 20:33

## 2023-05-24 RX ADMIN — BUDESONIDE 0.5 MG: 0.5 SUSPENSION RESPIRATORY (INHALATION) at 06:58

## 2023-05-24 RX ADMIN — GUAIFENESIN 600 MG: 600 TABLET, EXTENDED RELEASE ORAL at 09:09

## 2023-05-24 RX ADMIN — Medication 10 ML: at 09:09

## 2023-05-24 RX ADMIN — VITAMINS A AND D OINTMENT 1 APPLICATION: 15.5; 53.4 OINTMENT TOPICAL at 20:32

## 2023-05-24 RX ADMIN — Medication 10 ML: at 20:32

## 2023-05-24 RX ADMIN — FUROSEMIDE 40 MG: 40 TABLET ORAL at 09:08

## 2023-05-24 RX ADMIN — LEVOTHYROXINE SODIUM 100 MCG: 100 TABLET ORAL at 05:46

## 2023-05-24 RX ADMIN — OXYCODONE HYDROCHLORIDE AND ACETAMINOPHEN 1 TABLET: 7.5; 325 TABLET ORAL at 07:18

## 2023-05-24 RX ADMIN — GUAIFENESIN 600 MG: 600 TABLET, EXTENDED RELEASE ORAL at 20:32

## 2023-05-24 RX ADMIN — NYSTATIN: 100000 POWDER TOPICAL at 20:32

## 2023-05-24 RX ADMIN — ATORVASTATIN CALCIUM 20 MG: 20 TABLET, FILM COATED ORAL at 20:32

## 2023-05-24 RX ADMIN — FERROUS SULFATE TAB 325 MG (65 MG ELEMENTAL FE) 325 MG: 325 (65 FE) TAB at 09:00

## 2023-05-24 RX ADMIN — ASPIRIN 81 MG: 81 TABLET, COATED ORAL at 09:09

## 2023-05-24 RX ADMIN — VITAMINS A AND D OINTMENT 1 APPLICATION: 15.5; 53.4 OINTMENT TOPICAL at 09:09

## 2023-05-24 RX ADMIN — IPRATROPIUM BROMIDE AND ALBUTEROL SULFATE 3 ML: .5; 2.5 SOLUTION RESPIRATORY (INHALATION) at 06:58

## 2023-05-24 RX ADMIN — IPRATROPIUM BROMIDE AND ALBUTEROL SULFATE 3 ML: .5; 2.5 SOLUTION RESPIRATORY (INHALATION) at 13:10

## 2023-05-24 NOTE — PROGRESS NOTES
"Palliative Care Daily Progress Note     S: Medical record reviewed, Upon entering the room pt was found asleep. She was easy to awaken at this time. She denies any pain, no n/v, reports chronic diarrhea which is normal for her. She reports that her SOA has improved since giving her diuretics. She does report some increased fatigue today.       O:   Palliative Performance Scale Score:     /75 (BP Location: Right arm, Patient Position: Lying)   Pulse 68   Temp 98.4 °F (36.9 °C) (Oral)   Resp 18   Ht 167.6 cm (66\")   Wt 79 kg (174 lb 3.2 oz)   SpO2 100%   BMI 28.12 kg/m²     Intake/Output Summary (Last 24 hours) at 5/24/2023 1055  Last data filed at 5/23/2023 1300  Gross per 24 hour   Intake 240 ml   Output --   Net 240 ml       PE:    General Appearance:    Chronically ill appearing, alert, cooperative, NAD   HEENT:    NC/AT, without obvious abnormality, EOMI, anicteric, dry mucous membranes    Neck:   supple, trachea midline, no JVD   Lungs:     CTAB without w/r/r, 3lpm n/c     Heart:    RRR, normal S1 and S2, no M/R/G   Abdomen:     Soft, NT, ND, NABS    Extremities:   Moves all extremities, 1-2+ edema BLE, muscle wasting   Pulses:   Pulses palpable and equal bilaterally   Skin:   Warm, dry, vulvar deformity r/t surgery    Neurologic:   A/Ox3, cooperative, weakness    Psych:   Calm, appropriate         Meds: Reviewed and no changes     Labs:   Results from last 7 days   Lab Units 05/22/23  0202   WBC 10*3/mm3 11.44*   HEMOGLOBIN g/dL 8.5*   HEMATOCRIT % 29.0*   PLATELETS 10*3/mm3 341     Results from last 7 days   Lab Units 05/22/23  0202   SODIUM mmol/L 140   POTASSIUM mmol/L 3.4*   CHLORIDE mmol/L 101   CO2 mmol/L 29.0   BUN mg/dL 29*   CREATININE mg/dL 1.06*   GLUCOSE mg/dL 101*   CALCIUM mg/dL 8.8     Results from last 7 days   Lab Units 05/22/23  0202   SODIUM mmol/L 140   POTASSIUM mmol/L 3.4*   CHLORIDE mmol/L 101   CO2 mmol/L 29.0   BUN mg/dL 29*   CREATININE mg/dL 1.06*   CALCIUM mg/dL 8.8 "   GLUCOSE mg/dL 101*     Imaging Results (Last 72 Hours)     Procedure Component Value Units Date/Time    XR Chest 1 View [[id number]] Resulted: 05/22/23 1302     Updated: 05/22/23 1345            Diagnostics: Reviewed    A: Clinically, overall prognosis is poor related to metastatic disease. Pt is currently comfort measures, all symptoms are currently managed. Pt appears to be more week than in previous visits, more of a flat affect than before. Labs are stable, vs 163/75 hr 68 rr 18 sat 100%      P: Continue with current regimen, will continue with symptomatic and supportive palliative care for pt and family (daughter). Attempted to contact daughter Lilli this morning about hospice preparation but no answer, will attempt again later.       We will continue to follow along. Please do not hesitate to contact us regarding further sx mgmt or GOC needs, including after hours or on weekends via our on call provider at [phone].     Adriana Miller, APRN    5/24/2023

## 2023-05-24 NOTE — PLAN OF CARE
Goal Outcome Evaluation:              Outcome Evaluation: Pt resting in bed at this time. Complained of pain, PRN medication given per MAR. Wound care completed per orders. Encouraged turns. Will continue POC.

## 2023-05-24 NOTE — CASE MANAGEMENT/SOCIAL WORK
Discharge Planning Assessment  [facility]     Patient Name: Orquidea Rosenberg  MRN: [id number]  Today's Date: 5/24/2023    Admit Date: 5/22/2023    Plan: SS contacted [facility] per Dc who states they have equiptment needed for pt. Dc states they contacted pt's daughterLilli on this date and daughter states equipment can't be delivered until the house is arranged for all the equipment. SS to follow.     Discharge Plan     Row Name 05/24/23 1332       Plan    Plan SS contacted [facility] per Dc who states they have equiptment needed for pt. Dc states they contacted pt's daughterLilli on this date and daughter states equipment can't be delivered until the house is arranged for all the equipment. SS to follow.                HARPAL Stanley

## 2023-05-24 NOTE — PLAN OF CARE
Goal Outcome Evaluation:  Plan of Care Reviewed With: patient        Progress: no change  Outcome Evaluation: Pt rested in bed today, 02 dropped while assisted in bathing but quickly came back to 99%. No requests or complaints no acute distress noted will continue to POC.

## 2023-05-25 PROCEDURE — 97162 PT EVAL MOD COMPLEX 30 MIN: CPT

## 2023-05-25 PROCEDURE — 94799 UNLISTED PULMONARY SVC/PX: CPT

## 2023-05-25 PROCEDURE — 94761 N-INVAS EAR/PLS OXIMETRY MLT: CPT

## 2023-05-25 PROCEDURE — 99231 SBSQ HOSP IP/OBS SF/LOW 25: CPT | Performed by: STUDENT IN AN ORGANIZED HEALTH CARE EDUCATION/TRAINING PROGRAM

## 2023-05-25 PROCEDURE — 25010000002 ENOXAPARIN PER 10 MG: Performed by: STUDENT IN AN ORGANIZED HEALTH CARE EDUCATION/TRAINING PROGRAM

## 2023-05-25 RX ORDER — OXYCODONE AND ACETAMINOPHEN 7.5; 325 MG/1; MG/1
1 TABLET ORAL EVERY 8 HOURS
Status: DISCONTINUED | OUTPATIENT
Start: 2023-05-25 | End: 2023-06-01 | Stop reason: HOSPADM

## 2023-05-25 RX ADMIN — NYSTATIN: 100000 POWDER TOPICAL at 21:05

## 2023-05-25 RX ADMIN — LOSARTAN POTASSIUM 50 MG: 50 TABLET, FILM COATED ORAL at 08:41

## 2023-05-25 RX ADMIN — IPRATROPIUM BROMIDE AND ALBUTEROL SULFATE 3 ML: .5; 2.5 SOLUTION RESPIRATORY (INHALATION) at 13:27

## 2023-05-25 RX ADMIN — IPRATROPIUM BROMIDE AND ALBUTEROL SULFATE 3 ML: .5; 2.5 SOLUTION RESPIRATORY (INHALATION) at 06:48

## 2023-05-25 RX ADMIN — VITAMINS A AND D OINTMENT 1 APPLICATION: 15.5; 53.4 OINTMENT TOPICAL at 21:05

## 2023-05-25 RX ADMIN — OXYCODONE HYDROCHLORIDE AND ACETAMINOPHEN 1 TABLET: 7.5; 325 TABLET ORAL at 17:37

## 2023-05-25 RX ADMIN — ASPIRIN 81 MG: 81 TABLET, COATED ORAL at 08:41

## 2023-05-25 RX ADMIN — IPRATROPIUM BROMIDE AND ALBUTEROL SULFATE 3 ML: .5; 2.5 SOLUTION RESPIRATORY (INHALATION) at 18:32

## 2023-05-25 RX ADMIN — Medication 10 ML: at 08:41

## 2023-05-25 RX ADMIN — OXYCODONE HYDROCHLORIDE AND ACETAMINOPHEN 1 TABLET: 7.5; 325 TABLET ORAL at 08:40

## 2023-05-25 RX ADMIN — ATORVASTATIN CALCIUM 20 MG: 20 TABLET, FILM COATED ORAL at 21:05

## 2023-05-25 RX ADMIN — Medication 10 ML: at 21:05

## 2023-05-25 RX ADMIN — GUAIFENESIN 600 MG: 600 TABLET, EXTENDED RELEASE ORAL at 08:41

## 2023-05-25 RX ADMIN — FERROUS SULFATE TAB 325 MG (65 MG ELEMENTAL FE) 325 MG: 325 (65 FE) TAB at 08:41

## 2023-05-25 RX ADMIN — OXYCODONE HYDROCHLORIDE AND ACETAMINOPHEN 1 TABLET: 7.5; 325 TABLET ORAL at 01:51

## 2023-05-25 RX ADMIN — BUDESONIDE 0.5 MG: 0.5 SUSPENSION RESPIRATORY (INHALATION) at 06:48

## 2023-05-25 RX ADMIN — LEVOTHYROXINE SODIUM 100 MCG: 100 TABLET ORAL at 05:25

## 2023-05-25 RX ADMIN — ENOXAPARIN SODIUM 40 MG: 40 INJECTION SUBCUTANEOUS at 08:41

## 2023-05-25 RX ADMIN — OXYCODONE HYDROCHLORIDE AND ACETAMINOPHEN 1 TABLET: 7.5; 325 TABLET ORAL at 05:25

## 2023-05-25 RX ADMIN — GUAIFENESIN 600 MG: 600 TABLET, EXTENDED RELEASE ORAL at 21:05

## 2023-05-25 RX ADMIN — FUROSEMIDE 40 MG: 40 TABLET ORAL at 08:41

## 2023-05-25 RX ADMIN — VITAMINS A AND D OINTMENT 1 APPLICATION: 15.5; 53.4 OINTMENT TOPICAL at 08:41

## 2023-05-25 RX ADMIN — NYSTATIN: 100000 POWDER TOPICAL at 08:41

## 2023-05-25 RX ADMIN — METOPROLOL SUCCINATE 25 MG: 25 TABLET, EXTENDED RELEASE ORAL at 08:41

## 2023-05-25 RX ADMIN — BUDESONIDE 0.5 MG: 0.5 SUSPENSION RESPIRATORY (INHALATION) at 18:32

## 2023-05-25 NOTE — CASE MANAGEMENT/SOCIAL WORK
Discharge Planning Assessment  [facility]     Patient Name: Orquidea Rosenberg  MRN: [id number]  Today's Date: 5/25/2023    Admit Date: 5/22/2023    Plan: SS received a message from Palliative Care Adriana STONE who states pt's daughter has some concerns about caring for pt adequatly. Pt and daughter, Lilli to discuss NHP options. SS notified [facility] per Dc and provided an update. SS to follow up with pt and daughter in the am about disposition. SS to follow.         Discharge Plan     Row Name 05/25/23 1630       Plan    Plan SS received a message from Palliative Care Adriana STONE who states pt's daughter has some concerns about caring for pt adequatly. Pt and daughter, Lilli to discuss NHP options. SS notified [facility] per Dc and provided an update. SS to follow up with pt and daughter in the am about disposition. SS to follow.               Continued Care and Services - Admitted Since 5/22/2023    Coordination has not been started for this encounter.     Selected Continued Care - Prior Encounters Includes continued care and service providers with selected services from prior encounters from 2/21/2023 to 5/25/2023    Discharged on 5/20/2023 Admission date: 5/5/2023 - Discharge disposition: Swing Bed w/Planned Readmission    Destination     Service Provider Selected Services Address Phone Fax Patient Preferred    [facility] [id number] -- -- --                    Expected Discharge Date and Time     Expected Discharge Date Expected Discharge Time    May 29, 2023         HARPAL Stanley

## 2023-05-25 NOTE — PLAN OF CARE
Goal Outcome Evaluation:  Plan of Care Reviewed With: patient           Outcome Evaluation: Patient is resting in bed, VSS on 3L NC. Patient's O2 decreases with activity throughout shift but returns to normal quickly. Patient reports pain is controlled. Wound  care completed per orders. No acute changes, will continue POC.

## 2023-05-25 NOTE — THERAPY EVALUATION
Acute Care - Physical Therapy Initial Evaluation   [facility]     Patient Name: Orquidea Rosenberg  : 1945  MRN: [id number]  Today's Date: 2023   Onset of Illness/Injury or Date of Surgery: 23 (pt. admitted ; familiar to PT from previous swing bed status; pt. now readmitted to inpatient status d/t respiratory decline.)  Visit Dx:   No diagnosis found.  Patient Active Problem List   Diagnosis   • COVID-19   • Vulvar cancer   • Physical debility   • Bacteremia   • COPD exacerbation   • Debility   • Hypoxia   • Acute respiratory failure with hypoxia     Past Medical History:   Diagnosis Date   • Arthritis    • COPD (chronic obstructive pulmonary disease)    • Elevated cholesterol    • History of transfusion    • Hypertension    • Vulval ca      Past Surgical History:   Procedure Laterality Date   • RADICAL VULVECTOMY  2019   • RADICAL VULVECTOMY Bilateral 2019     PT Assessment (last 12 hours)     PT Evaluation and Treatment     Row Name 23 1510          Physical Therapy Time and Intention    Subjective Information complains of;dyspnea  -KM     Document Type evaluation  -KM     Mode of Treatment physical therapy  -KM     Patient Effort good  -KM     Symptoms Noted During/After Treatment shortness of breath  -KM     Row Name 23 1510          General Information    Patient Profile Reviewed yes  -KM     Patient Observations alert;cooperative;agree to therapy  -KM     Existing Precautions/Restrictions fall;oxygen therapy device and L/min  -KM     Risks Reviewed patient:;LOB;nausea/vomiting;dizziness;increased discomfort  -KM     Benefits Reviewed patient:;improve function;increase independence;increase strength;increase balance  -KM     Barriers to Rehab medically complex  -KM     Row Name 23 1510          Living Environment    Current Living Arrangements home  -KM     Primary Care Provided by self  -KM     Row Name 23 1510          Home Use of Assistive/Adaptive Equipment     Equipment Currently Used at Home --  owns rollator, w/c  -KM     Row Name 05/25/23 1510          Cognition    Affect/Mental Status (Cognition) WFL  -KM     Orientation Status (Cognition) oriented x 3  -KM     Follows Commands (Cognition) WFL  -KM     Row Name 05/25/23 1510          Range of Motion (ROM)    Range of Motion bilateral lower extremities;ROM is WFL  -KM     Row Name 05/25/23 1510          Strength (Manual Muscle Testing)    Strength (Manual Muscle Testing) --  BLE ROM at least 3+/5  -KM     Row Name 05/25/23 1510          Mobility    Extremity Weight-bearing Status --  no restrictions  -KM     Row Name 05/25/23 1510          Bed Mobility    Bed Mobility rolling left;rolling right  -KM     Rolling Left Isabela (Bed Mobility) standby assist  -KM     Rolling Right Isabela (Bed Mobility) standby assist  -KM     Row Name 05/25/23 1510          Transfers    Comment, (Transfers) Pt. deferred d/t shortness of breath  -KM     Row Name 05/25/23 1510          Gait/Stairs (Locomotion)    Comment, (Gait/Stairs) unable to assess  -KM     Row Name 05/25/23 1510          Safety Issues, Functional Mobility    Impairments Affecting Function (Mobility) balance;endurance/activity tolerance;strength;pain  -KM     Row Name             Wound 05/01/23 1414 Left vagina    Wound - Properties Group Placement Date: 05/01/23  -LB Placement Time: 1414  -LB Present on Hospital Admission: Y  -LB Side: Left  -LB Location: vagina  -LB    Retired Wound - Properties Group Placement Date: 05/01/23  -LB Placement Time: 1414  -LB Present on Hospital Admission: Y  -LB Side: Left  -LB Location: vagina  -LB    Retired Wound - Properties Group Date first assessed: 05/01/23  -LB Time first assessed: 1414  -LB Present on Hospital Admission: Y  -LB Side: Left  -LB Location: vagina  -LB    Row Name             Wound 05/22/23 1425 coccyx Pressure Injury    Wound - Properties Group Placement Date: 05/22/23  -EB Placement Time: 1425  -EB  Present on Hospital Admission: N  -EB Location: coccyx  -EB Primary Wound Type: Pressure inj  -EB    Retired Wound - Properties Group Placement Date: 05/22/23  -EB Placement Time: 1425 -EB Present on Hospital Admission: N  -EB Location: coccyx  -EB Primary Wound Type: Pressure inj  -EB    Retired Wound - Properties Group Date first assessed: 05/22/23  -EB Time first assessed: 1425  -EB Present on Hospital Admission: N  -EB Location: coccyx  -EB Primary Wound Type: Pressure inj  -EB    Row Name 05/25/23 1510          Plan of Care Review    Plan of Care Reviewed With patient  -KM     Outcome Evaluation Pt. evaluation completed during PT session. She demonstrated some bed mobility skills w/ SBA. She deferred other functional mobility skills d/t shortness of breath at time of evaluation. Pt. would benefit from continued skilled PT services.  -KM     Row Name 05/25/23 1510          Therapy Assessment/Plan (PT)    Patient/Family Therapy Goals Statement (PT) return home and take care of self  -KM     Functional Level at Time of Evaluation (PT) SBA-CGA  -KM     PT Diagnosis (PT) decreased mobility  -KM     Rehab Potential (PT) good, to achieve stated therapy goals  -KM     Criteria for Skilled Interventions Met (PT) yes;meets criteria  -KM     Therapy Frequency (PT) 2 times/wk  2-5x/wk  -KM     Problem List (PT) problems related to;balance;mobility;strength;pain  -KM     Activity Limitations Related to Problem List (PT) unable to ambulate safely;unable to transfer safely;BADLs not performed adequately or safely  -KM     Row Name 05/25/23 1510          Physical Therapy Goals    Bed Mobility Goal Selection (PT) bed mobility, PT goal 1  -KM     Transfer Goal Selection (PT) transfer, PT goal 1  -KM     Gait Training Goal Selection (PT) gait training, PT goal 1  -KM     Row Name 05/25/23 1510          Bed Mobility Goal 1 (PT)    Activity/Assistive Device (Bed Mobility Goal 1, PT) rolling to left;rolling to right;scooting;sit to  supine;supine to sit  -KM     Warren Level/Cues Needed (Bed Mobility Goal 1, PT) modified independence  -KM     Time Frame (Bed Mobility Goal 1, PT) by discharge  -     Row Name 05/25/23 1510          Transfer Goal 1 (PT)    Activity/Assistive Device (Transfer Goal 1, PT) sit-to-stand/stand-to-sit;bed-to-chair/chair-to-bed;toilet  -KM     Warren Level/Cues Needed (Transfer Goal 1, PT) supervision required  -KM     Time Frame (Transfer Goal 1, PT) by discharge  -KM     Row Name 05/25/23 1510          Gait Training Goal 1 (PT)    Activity/Assistive Device (Gait Training Goal 1, PT) gait (walking locomotion);assistive device use;decrease fall risk;diminish gait deviation;improve balance and speed;increase endurance/gait distance  appropriate a.d.  -KM     Warren Level (Gait Training Goal 1, PT) standby assist  -KM     Distance (Gait Training Goal 1, PT) 40  -KM     Time Frame (Gait Training Goal 1, PT) by discharge  -           User Key  (r) = Recorded By, (t) = Taken By, (c) = Cosigned By    Initials Name Provider Type    Ander Garcia, PT Physical Therapist    Ady Serrano, RN Registered Nurse    Veronica Bansal, RN Registered Nurse                Physical Therapy Education     Title: PT OT SLP Therapies (Done)     Topic: Physical Therapy (Done)     Point: Mobility training (Done)     Learning Progress Summary           Patient Acceptance, E,TB, VU by KM at 5/25/2023 1514    Acceptance, E, VU by EB at 5/22/2023 1536                   Point: Home exercise program (Done)     Learning Progress Summary           Patient Acceptance, E,TB, VU by KM at 5/25/2023 1514    Acceptance, E, VU by EB at 5/22/2023 1536                   Point: Body mechanics (Done)     Learning Progress Summary           Patient Acceptance, E,TB, VU by KM at 5/25/2023 1514    Acceptance, E, VU by EB at 5/22/2023 1536                   Point: Precautions (Done)     Learning Progress Summary           Patient  Acceptance, E,TB, VU by  at 5/25/2023 1514    Acceptance, E, VU by EB at 5/22/2023 1536                               User Key     Initials Effective Dates Name Provider Type Discipline     05/24/22 -  Ander Shirley, PT Physical Therapist PT    EB 09/22/22 -  Ady John, RN Registered Nurse Nurse              PT Recommendation and Plan  Therapy Frequency (PT): 2 times/wk (2-5x/wk)  Plan of Care Reviewed With: patient  Outcome Evaluation: Pt. evaluation completed during PT session. She demonstrated some bed mobility skills w/ SBA. She deferred other functional mobility skills d/t shortness of breath at time of evaluation. Pt. would benefit from continued skilled PT services.       Time Calculation:    PT Charges     Row Name 05/25/23 1509             Time Calculation    PT Received On 05/25/23  -      PT Goal Re-Cert Due Date 06/08/23  -            User Key  (r) = Recorded By, (t) = Taken By, (c) = Cosigned By    Initials Name Provider Type     Ander Shirley, PT Physical Therapist              Therapy Charges for Today     Code Description Service Date Service Provider Modifiers Qty    13276788654 HC PT EVAL MOD COMPLEXITY 4 5/25/2023 Ander Shirley, PT GP 1          PT G-Codes  AM-PAC 6 Clicks Score (PT): 13    Ander Shirley PT  5/25/2023

## 2023-05-25 NOTE — PROGRESS NOTES
[facility] HOSPITALIST PROGRESS NOTE     Patient Identification:  Name:  Orquidea Rosenberg  Age:  77 y.o.  Sex:  female  :  1945  MRN:  [id number]  Visit Number:  [id number]  ROOM: [address] [place] [address]     Primary Care Provider:  Jackeline Denson APRN     Date of Admission: 2023    Length of stay in inpatient status:  2    Subjective     Chief Compliant:  Acute dyspnea    Down to 2L NC and dyspnea improved. No new complaints this am. Family working with palliative care to arrange hospice resources        Objective     Current Hospital Meds:  aspirin, 81 mg, Oral, Daily  atorvastatin, 20 mg, Oral, Nightly  budesonide, 0.5 mg, Nebulization, BID - RT  enoxaparin, 40 mg, Subcutaneous, Daily  ferrous sulfate, 325 mg, Oral, Daily With Breakfast  furosemide, 40 mg, Oral, Daily  guaiFENesin, 600 mg, Oral, Q12H  ipratropium-albuterol, 3 mL, Nebulization, Q6H While Awake - RT  levothyroxine, 100 mcg, Oral, Q AM  losartan, 50 mg, Oral, Q24H  magic barrier cream, 1 application, Topical, BID  metoprolol succinate XL, 25 mg, Oral, Q24H  nystatin, , Topical, Q12H  sodium chloride, 10 mL, Intravenous, Q12H  sodium chloride, 10 mL, Intravenous, Q12H  sodium chloride, 10 mL, Intravenous, Q12H  sodium chloride, 10 mL, Intravenous, Q12H  sodium chloride, 10 mL, Intravenous, Q12H    Pharmacy Consult,       Current Antimicrobial Therapy:  Anti-Infectives (From admission, onward)    None        Current Diuretic Therapy:  Diuretics (From admission, onward)    Ordered     Dose/Rate Route Frequency Start Stop    23 1235  furosemide (LASIX) tablet 40 mg        Ordering Provider: Nelson Mejia MD    40 mg Oral Daily 23 0900          ----------------------------------------------------------------------------------------------------------------------  Vital Signs:  Temp:  [97.8 °F (36.6 °C)-98.4 °F (36.9 °C)] 97.8 °F (36.6 °C)  Heart Rate:  [68-79] 71  Resp:  [18-20] 18  BP: (146-163)/(57-75) 146/57  SpO2:  [91 %-100  %] 99 %  on  Flow (L/min):  [2-3] 2;   Device (Oxygen Therapy): nasal cannula  Body mass index is 28.12 kg/m².    Wt Readings from Last 3 Encounters:   05/22/23 79 kg (174 lb 3.2 oz)   05/05/23 83.9 kg (185 lb)   05/04/23 82.6 kg (182 lb)     Intake & Output (last 3 days)       05/22 0701 05/23 0700 05/23 0701 05/24 0700 05/24 0701  05/25 0700    P.O. 840 480 600    Total Intake(mL/kg) 840 (10.6) 480 (6.1) 600 (7.6)    Net +840 +480 +600           Urine Unmeasured Occurrence 5 x 9 x 4 x    Stool Unmeasured Occurrence 5 x 9 x 3 x        Diet: Regular/House Diet, Fluid Restriction (240 mL/tray) Diets; 1500 mL/day; Texture: Regular Texture (IDDSI 7); Fluid Consistency: Thin (IDDSI 0)  ----------------------------------------------------------------------------------------------------------------------  Physical Exam  Vitals and nursing note reviewed.   Constitutional:       General: She is not in acute distress.  HENT:      Head: Normocephalic and atraumatic.      Mouth/Throat:      Mouth: Mucous membranes are dry.      Pharynx: Oropharynx is clear.   Eyes:      General: No scleral icterus.     Extraocular Movements: Extraocular movements intact.   Cardiovascular:      Rate and Rhythm: Normal rate.      Pulses: Normal pulses.   Pulmonary:      Effort: Pulmonary effort is normal. No respiratory distress.      Comments: 3L NC  Abdominal:      Palpations: Abdomen is soft.      Tenderness: There is no abdominal tenderness.   Musculoskeletal:      Right lower leg: Edema (Improved from prior) present.      Left lower leg: Edema present.   Skin:     General: Skin is warm.      Capillary Refill: Capillary refill takes less than 2 seconds.      Comments: LE skin wrinkled, volume status improved   Neurological:      Mental Status: She is alert. Mental status is at baseline.      Motor: Weakness present.   Psychiatric:         Mood and Affect: Mood normal.         Behavior: Behavior normal.        ----------------------------------------------------------------------------------------------------------------------    ----------------------------------------------------------------------------------------------------------------------  LABS:    CBC and coagulation:  Results from last 7 days   Lab Units 05/22/23 0202 05/21/23 0838 05/21/23  0335   WBC 10*3/mm3 11.44*  --  10.60   HEMOGLOBIN g/dL 8.5*  --  8.9*   HEMATOCRIT % 29.0*  --  30.9*   MCV fL 90.6  --  93.4   MCHC g/dL 29.3*  --  28.8*   PLATELETS 10*3/mm3 341  --  372   D DIMER QUANT MCGFEU/mL  --  1.22*  --      Acid/base balance:      Renal and electrolytes:  Results from last 7 days   Lab Units 05/22/23 0202 05/21/23 0335 05/19/23  0146   SODIUM mmol/L 140 139 137   POTASSIUM mmol/L 3.4* 3.6 5.0   MAGNESIUM mg/dL  --   --  2.1   CHLORIDE mmol/L 101 101 103   CO2 mmol/L 29.0 26.2 23.1   BUN mg/dL 29* 29* 25*   CREATININE mg/dL 1.06* 1.08* 1.08*   CALCIUM mg/dL 8.8 8.8 8.7   PHOSPHORUS mg/dL  --   --  3.9   GLUCOSE mg/dL 101* 79 133*     Estimated Creatinine Clearance: 47.2 mL/min (A) (by C-G formula based on SCr of 1.06 mg/dL (H)).    Liver and pancreatic function:        Invalid input(s): PROT  Endocrine function:  No results found for: HGBA1C  Point of care bedside glucose levels:      Glucose levels from the CMP:  Results from last 7 days   Lab Units 05/22/23  0202 05/21/23  0335 05/19/23  0146   GLUCOSE mg/dL 101* 79 133*     Lab Results   Component Value Date    TSH 2.620 03/15/2022     Cardiac:  Results from last 7 days   Lab Units 05/21/23  1038 05/21/23  0838 05/21/23  0335   HSTROP T ng/L 139* 150*  --    PROBNP pg/mL  --   --  15,228.0*       Cultures:  Lab Results   Component Value Date    COLORU Dark Yellow (A) 03/15/2022    CLARITYU Turbid (A) 03/15/2022    PHUR <=5.0 03/15/2022    GLUCOSEU Negative 03/15/2022    KETONESU Trace (A) 03/15/2022    BLOODU Large (3+) (A) 03/15/2022    NITRITEU Negative 03/15/2022    LEUKOCYTESUR  Moderate (2+) (A) 03/15/2022    BILIRUBINUR Negative 03/15/2022    UROBILINOGEN 1.0 E.U./dL 03/15/2022    RBCUA 31-50 (A) 03/15/2022    WBCUA Too Numerous to Count (A) 03/15/2022    BACTERIA 4+ (A) 03/15/2022     Microbiology Results (last 10 days)     ** No results found for the last 240 hours. **          No results found for: PREGTESTUR, PREGSERUM, HCG, HCGQUANT  Pain Management Panel          View : No data to display.                      I have personally looked at the labs and they are summarized above.  ----------------------------------------------------------------------------------------------------------------------  Detailed radiology reports for the last 24 hours:    Imaging Results (Last 24 Hours)     Procedure Component Value Units Date/Time    XR Chest 1 View [[id number]] Collected: 05/24/23 1139     Updated: 05/24/23 1142    Narrative:      EXAM:    XR Chest, 1 View     EXAM DATE:    5/24/2023 10:31 AM     CLINICAL HISTORY:    infiltrates     TECHNIQUE:    Frontal view of the chest.     COMPARISON:    05/20/2023     FINDINGS:    LUNGS AND PLEURAL SPACES:  Patchy bilateral airspace disease again  noted.  Tiny left pleural effusion again noted.  No pneumothorax.    HEART:  Heart size stable.    MEDIASTINUM:  Unremarkable.    BONES/JOINTS:  Unremarkable.       Impression:      1.  Patchy bilateral airspace disease again noted.  2.  Tiny left pleural effusion again noted.     This report was finalized on 5/24/2023 11:40 AM by Dr. Philip Lopez MD.               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 Had pleural effusions that now improved with diuresis and this is likely why her dyspnea has  improved from initial decline on 5/20. Echo reviewed and has severe PAH with repeat cardiac w/u showing inferior t-wave inversions, elevated BNP, troponin, and d-dimer but eval by cardiology and after discussion agree medical management is best as LHC or stress would likely precipitate worsened pulm edema or other more acute complication. Discussed CTPE as well to r/o PE but given difficulty with initial CT over weekend, patient hesitant to consider additional scan.   -Palliative care following and while patient holds out optimism that her condition may improve, after discussions over multiple days she agrees home hospice is best in line with her goals and hospice referral placed 5/23. Cont comfort care in interim  -Patient will need home caretakers for her ADLs and home resources, will remain inpatient while these are being arranged  -Will cont Losartan and lasix given clinical improvement and BP tolerating well    - - Chronic - -     #COPD hx  #Chronic iron def anemia  #Debility secondary to chronic illness  #Left hyonephrosis s/p uretal stent    VTE Prophylaxis:   Mechanical Order History:     None      Pharmalogical Order History:      Ordered     Dose Route Frequency Stop    05/22/23 1049  Enoxaparin Sodium (LOVENOX) syringe 40 mg         40 mg SC Daily --                Code Status and Medical Interventions:   Ordered at: 05/23/23 1056     Level Of Support Discussed With:    Patient    Next of Kin (If No Surrogate)     Code Status (Patient has no pulse and is not breathing):    No CPR (Do Not Attempt to Resuscitate)     Medical Interventions (Patient has pulse or is breathing):    Comfort Measures     Release to patient:    Routine Release         Disposition: Home hospice referral, discharge pend home set up    I have reviewed any copied/forwarded text or data, verified its accuracy, and updated as necessary above.    Nelson Mejia MD  [facility] Hospitalist  05/24/23  20:03 EDT

## 2023-05-25 NOTE — PLAN OF CARE
Goal Outcome Evaluation:              Outcome Evaluation: Pt resting in bed at this time. 3L NC in place. Complained of pain, PRN medication given per MAR. Pt has been educated on importance of turns. Will continue POC.

## 2023-05-25 NOTE — PROGRESS NOTES
"Palliative Care Daily Progress Note     S: Medical record reviewed, upon entering the room pt was lying in bed asleep. She is much more drowsy today than previously. She has + wax/wane, she will have some good days and some bad days daughter reports. Daughter is at bedside this morning. Pt has not had any complaints today, no n/v- does have chronic diarrhea, no soa or increased dyspnea.       O:   Palliative Performance Scale Score:     /82 (BP Location: Right arm, Patient Position: Lying)   Pulse 69   Temp 98.2 °F (36.8 °C) (Oral)   Resp 18   Ht 167.6 cm (66\")   Wt 79 kg (174 lb 3.2 oz)   SpO2 99%   BMI 28.12 kg/m²     Intake/Output Summary (Last 24 hours) at 5/25/2023 1100  Last data filed at 5/24/2023 1754  Gross per 24 hour   Intake 600 ml   Output --   Net 600 ml       PE:    Pt is comfort measures, resp even and nonlabored, shallow at times. A&ox4, appears weak/fatigued today. Decub ulceration noted to coccyx area with dressing dry and intact.                                                   Meds: Reviewed and no changes     Labs:   Results from last 7 days   Lab Units 05/22/23  0202   WBC 10*3/mm3 11.44*   HEMOGLOBIN g/dL 8.5*   HEMATOCRIT % 29.0*   PLATELETS 10*3/mm3 341     Results from last 7 days   Lab Units 05/22/23  0202   SODIUM mmol/L 140   POTASSIUM mmol/L 3.4*   CHLORIDE mmol/L 101   CO2 mmol/L 29.0   BUN mg/dL 29*   CREATININE mg/dL 1.06*   GLUCOSE mg/dL 101*   CALCIUM mg/dL 8.8     Results from last 7 days   Lab Units 05/22/23  0202   SODIUM mmol/L 140   POTASSIUM mmol/L 3.4*   CHLORIDE mmol/L 101   CO2 mmol/L 29.0   BUN mg/dL 29*   CREATININE mg/dL 1.06*   CALCIUM mg/dL 8.8   GLUCOSE mg/dL 101*     Imaging Results (Last 72 Hours)     Procedure Component Value Units Date/Time    XR Chest 1 View [[id number]] Collected: 05/24/23 1139     Updated: 05/24/23 1142    Narrative:      EXAM:    XR Chest, 1 View     EXAM DATE:    5/24/2023 10:31 AM     CLINICAL HISTORY:    infiltrates   "   TECHNIQUE:    Frontal view of the chest.     COMPARISON:    05/20/2023     FINDINGS:    LUNGS AND PLEURAL SPACES:  Patchy bilateral airspace disease again  noted.  Tiny left pleural effusion again noted.  No pneumothorax.    HEART:  Heart size stable.    MEDIASTINUM:  Unremarkable.    BONES/JOINTS:  Unremarkable.       Impression:      1.  Patchy bilateral airspace disease again noted.  2.  Tiny left pleural effusion again noted.     This report was finalized on 5/24/2023 11:40 AM by Dr. Philip Lopez MD.               Diagnostics: Reviewed    A: Clinically, pt is stable however chronically ill. Prognosis is poor as she is on comfort measures and has decided to go with hospice r/t metastatic disease process. All symptoms are currently managed, VS are stable, has been using percocet prn for pain with good results.       P: Spoke with Dr. Mejia this morning as well as daughter at bedside. Daughter is still having caregiver issues. She reports that she has found one [facility] to provide care during the week however she is unable to find weekend coverage at this time. She also reports that during the night, she likely would not be able to change her mother as frequently as she would need to be. Explained the risks of worsening of condition, pain, discomfort, infection, death and neglect. I have suggested/recommended LTC placement with Hospice, she reports that mother doesn't want to go to LTC and is a&ox4/refuses and wants to only go home. Pt is aware that this causes a lot of stress on daughter because she has to work remotely/travel with her job. SW also aware of this issue. Daughter is being very reasonable with goals and fully understands her limitations. She just wants adequate and continuous care for the pt and she knows that she can't physically provide that for her. Dr. Mejia has also recommend SNF/LTC for the pt as well. Daughter/pt to discuss further amongst themselves. Expressed sympathy with daughter who is  having a very difficult time and has been working diligently to find care. Will follow up routinely, recommended that SW attempt to find resources for the daughter as well with local agencies/contact information for caregivers.       We will continue to follow along. Please do not hesitate to contact us regarding further sx mgmt or GOC needs, including after hours or on weekends via our on call provider at [phone].     Adriana Miller, APRN    5/25/2023

## 2023-05-25 NOTE — PROGRESS NOTES
[facility] HOSPITALIST PROGRESS NOTE     Patient Identification:  Name:  Orquidea Rosenberg  Age:  77 y.o.  Sex:  female  :  1945  MRN:  [id number]  Visit Number:  [id number]  ROOM: [address] [place] [address]     Primary Care Provider:  Jackeline Denson APRN     Date of Admission: 2023    Length of stay in inpatient status:  3    Subjective     Chief Compliant:  Acute dyspnea    Subjective dyspnea overnight increased from 2L to 3L NC and reports baseline this am. LE remained wrinkled and is urinating frequently without issue but continues to require assistance from hospital staff for nearly all ADLs.    Discussed extensively with patient and daughter this am regarding dispo plan. All agree she needs around the clock care and would prefer home hospice with  caregivers but this logistically is proving difficult to arrange and thus her daughter has been delaying delivery of hospice equipment to the home until we can confirm she has safe care plan and will discharge home. Discussed that snf w/comfort measures is likely serves her needs best but patient remains adamant about prior negative SNF experiences during covid with her  and will not consider this option, also remains A&Ox3 and has her own decision making capacity. Daughter is encouraging her mom to consider this option and they will discuss further today and with palliative care team.      Objective     Current Hospital Meds:  aspirin, 81 mg, Oral, Daily  atorvastatin, 20 mg, Oral, Nightly  budesonide, 0.5 mg, Nebulization, BID - RT  enoxaparin, 40 mg, Subcutaneous, Daily  ferrous sulfate, 325 mg, Oral, Daily With Breakfast  furosemide, 40 mg, Oral, Daily  guaiFENesin, 600 mg, Oral, Q12H  ipratropium-albuterol, 3 mL, Nebulization, Q6H While Awake - RT  levothyroxine, 100 mcg, Oral, Q AM  losartan, 50 mg, Oral, Q24H  magic barrier cream, 1 application, Topical, BID  metoprolol succinate XL, 25 mg, Oral, Q24H  nystatin, , Topical,  Q12H  oxyCODONE-acetaminophen, 1 tablet, Oral, Q8H  sodium chloride, 10 mL, Intravenous, Q12H  sodium chloride, 10 mL, Intravenous, Q12H  sodium chloride, 10 mL, Intravenous, Q12H  sodium chloride, 10 mL, Intravenous, Q12H  sodium chloride, 10 mL, Intravenous, Q12H    Pharmacy Consult,       Current Antimicrobial Therapy:  Anti-Infectives (From admission, onward)    None        Current Diuretic Therapy:  Diuretics (From admission, onward)    Ordered     Dose/Rate Route Frequency Start Stop    05/22/23 1235  furosemide (LASIX) tablet 40 mg        Ordering Provider: Nelson Mejia MD    40 mg Oral Daily 05/23/23 0900          ----------------------------------------------------------------------------------------------------------------------  Vital Signs:  Temp:  [97.8 °F (36.6 °C)-98.2 °F (36.8 °C)] 98.2 °F (36.8 °C)  Heart Rate:  [67-82] 67  Resp:  [16-18] 18  BP: (146-171)/(57-82) 171/82  SpO2:  [93 %-99 %] 99 %  on  Flow (L/min):  [2-3] 2;   Device (Oxygen Therapy): nasal cannula  Body mass index is 28.12 kg/m².    Wt Readings from Last 3 Encounters:   05/22/23 79 kg (174 lb 3.2 oz)   05/05/23 83.9 kg (185 lb)   05/04/23 82.6 kg (182 lb)     Intake & Output (last 3 days)       05/22 0701 05/23 0700 05/23 0701 05/24 0700 05/24 0701 05/25 0700 05/25 0701 05/26 0700    P.O. 840 480 600     Total Intake(mL/kg) 840 (10.6) 480 (6.1) 600 (7.6)     Net +840 +480 +600             Urine Unmeasured Occurrence 5 x 9 x 7 x 2 x    Stool Unmeasured Occurrence 5 x 9 x 5 x         Diet: Regular/House Diet, Fluid Restriction (240 mL/tray) Diets; 1500 mL/day; Texture: Regular Texture (IDDSI 7); Fluid Consistency: Thin (IDDSI 0)  ----------------------------------------------------------------------------------------------------------------------  Physical Exam  Vitals and nursing note reviewed.   Constitutional:       General: She is not in acute distress.  HENT:      Head: Normocephalic and atraumatic.      Mouth/Throat:       Mouth: Mucous membranes are dry.      Pharynx: Oropharynx is clear.   Eyes:      General: No scleral icterus.     Extraocular Movements: Extraocular movements intact.   Cardiovascular:      Rate and Rhythm: Normal rate.      Pulses: Normal pulses.   Pulmonary:      Effort: Pulmonary effort is normal. No respiratory distress.      Comments: 3L NC  Abdominal:      Palpations: Abdomen is soft.      Tenderness: There is no abdominal tenderness.   Musculoskeletal:      Right lower leg: Edema (Improved from prior) present.      Left lower leg: Edema present.   Skin:     General: Skin is warm.      Capillary Refill: Capillary refill takes less than 2 seconds.      Comments: LE skin wrinkled, volume status improved   Neurological:      Mental Status: She is alert. Mental status is at baseline.      Motor: Weakness present.   Psychiatric:         Mood and Affect: Mood normal.         Behavior: Behavior normal.       ----------------------------------------------------------------------------------------------------------------------    ----------------------------------------------------------------------------------------------------------------------  LABS:    CBC and coagulation:  Results from last 7 days   Lab Units 05/22/23  0202 05/21/23  0838 05/21/23  0335   WBC 10*3/mm3 11.44*  --  10.60   HEMOGLOBIN g/dL 8.5*  --  8.9*   HEMATOCRIT % 29.0*  --  30.9*   MCV fL 90.6  --  93.4   MCHC g/dL 29.3*  --  28.8*   PLATELETS 10*3/mm3 341  --  372   D DIMER QUANT MCGFEU/mL  --  1.22*  --      Acid/base balance:      Renal and electrolytes:  Results from last 7 days   Lab Units 05/22/23  0202 05/21/23  0335 05/19/23  0146   SODIUM mmol/L 140 139 137   POTASSIUM mmol/L 3.4* 3.6 5.0   MAGNESIUM mg/dL  --   --  2.1   CHLORIDE mmol/L 101 101 103   CO2 mmol/L 29.0 26.2 23.1   BUN mg/dL 29* 29* 25*   CREATININE mg/dL 1.06* 1.08* 1.08*   CALCIUM mg/dL 8.8 8.8 8.7   PHOSPHORUS mg/dL  --   --  3.9   GLUCOSE mg/dL 101* 79 133*      Estimated Creatinine Clearance: 47.2 mL/min (A) (by C-G formula based on SCr of 1.06 mg/dL (H)).    Liver and pancreatic function:        Invalid input(s): PROT  Endocrine function:  No results found for: HGBA1C  Point of care bedside glucose levels:      Glucose levels from the CMP:  Results from last 7 days   Lab Units 05/22/23  0202 05/21/23  0335 05/19/23  0146   GLUCOSE mg/dL 101* 79 133*     Lab Results   Component Value Date    TSH 2.620 03/15/2022     Cardiac:  Results from last 7 days   Lab Units 05/21/23  1038 05/21/23  0838 05/21/23  0335   HSTROP T ng/L 139* 150*  --    PROBNP pg/mL  --   --  15,228.0*       Cultures:  Lab Results   Component Value Date    COLORU Dark Yellow (A) 03/15/2022    CLARITYU Turbid (A) 03/15/2022    PHUR <=5.0 03/15/2022    GLUCOSEU Negative 03/15/2022    KETONESU Trace (A) 03/15/2022    BLOODU Large (3+) (A) 03/15/2022    NITRITEU Negative 03/15/2022    LEUKOCYTESUR Moderate (2+) (A) 03/15/2022    BILIRUBINUR Negative 03/15/2022    UROBILINOGEN 1.0 E.U./dL 03/15/2022    RBCUA 31-50 (A) 03/15/2022    WBCUA Too Numerous to Count (A) 03/15/2022    BACTERIA 4+ (A) 03/15/2022     Microbiology Results (last 10 days)     ** No results found for the last 240 hours. **          No results found for: PREGTESTUR, PREGSERUM, HCG, HCGQUANT  Pain Management Panel          View : No data to display.                      I have personally looked at the labs and they are summarized above.  ----------------------------------------------------------------------------------------------------------------------  Detailed radiology reports for the last 24 hours:    Imaging Results (Last 24 Hours)     ** No results found for the last 24 hours. 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 Had pleural effusions that now improved with diuresis and this is likely why her dyspnea has improved from initial decline on 5/20. Echo reviewed and has severe PAH with repeat cardiac w/u showing inferior t-wave inversions, elevated BNP, troponin, and d-dimer but eval by cardiology and after discussion agree medical management is best as LHC or stress would likely precipitate worsened pulm edema or other more acute complication. Discussed CTPE as well to r/o PE but given difficulty with initial CT over weekend, patient hesitant to consider additional scan.   -Palliative care following and while patient holds out optimism that her condition may improve, after discussions over multiple days she agrees home hospice is best in line with her goals and hospice referral placed 5/23. Cont comfort care in interim  -Patient will need home caretakers for her ADLs and home resources, will remain inpatient while these are being arranged  -Will cont Losartan and lasix given clinical improvement and BP tolerating well. Dispo planning as noted above, SNF w/comfort measures vs home hospice with home caretakers pend    - - Chronic - -     #COPD hx  #Chronic iron def anemia  #Debility secondary to chronic illness  #Left hyonephrosis s/p uretal stent    VTE Prophylaxis:   Mechanical Order History:     None      Pharmalogical Order History:      Ordered     Dose Route Frequency Stop    05/22/23 1049  Enoxaparin Sodium (LOVENOX) syringe 40 mg         40 mg SC Daily --                Code Status and Medical Interventions:   Ordered at: 05/23/23 1056     Level Of Support Discussed With:    Patient    Next of Kin (If No Surrogate)     Code Status (Patient has no pulse and is not breathing):    No CPR (Do Not Attempt to Resuscitate)     Medical Interventions (Patient has pulse or is breathing):    Comfort  Measures     Release to patient:    Routine Release         Disposition: Pend snf vs home hospice    I have reviewed any copied/forwarded text or data, verified its accuracy, and updated as necessary above.    Nelson Mejia MD  [facility]  05/25/23  15:30 EDT

## 2023-05-25 NOTE — NURSING NOTE
Patient is wearing brief for incontinence. Fistula present, pt educated on the risks of skin breakdowns due to brief usage. Patient states she would like to continue usage.

## 2023-05-26 PROCEDURE — 94799 UNLISTED PULMONARY SVC/PX: CPT

## 2023-05-26 PROCEDURE — 94761 N-INVAS EAR/PLS OXIMETRY MLT: CPT

## 2023-05-26 PROCEDURE — 99231 SBSQ HOSP IP/OBS SF/LOW 25: CPT | Performed by: STUDENT IN AN ORGANIZED HEALTH CARE EDUCATION/TRAINING PROGRAM

## 2023-05-26 PROCEDURE — 97110 THERAPEUTIC EXERCISES: CPT

## 2023-05-26 PROCEDURE — 25010000002 ENOXAPARIN PER 10 MG: Performed by: STUDENT IN AN ORGANIZED HEALTH CARE EDUCATION/TRAINING PROGRAM

## 2023-05-26 RX ADMIN — ATORVASTATIN CALCIUM 20 MG: 20 TABLET, FILM COATED ORAL at 20:38

## 2023-05-26 RX ADMIN — NYSTATIN: 100000 POWDER TOPICAL at 20:39

## 2023-05-26 RX ADMIN — BUDESONIDE 0.5 MG: 0.5 SUSPENSION RESPIRATORY (INHALATION) at 06:55

## 2023-05-26 RX ADMIN — IPRATROPIUM BROMIDE AND ALBUTEROL SULFATE 3 ML: .5; 2.5 SOLUTION RESPIRATORY (INHALATION) at 13:24

## 2023-05-26 RX ADMIN — METOPROLOL SUCCINATE 25 MG: 25 TABLET, EXTENDED RELEASE ORAL at 08:33

## 2023-05-26 RX ADMIN — BUDESONIDE 0.5 MG: 0.5 SUSPENSION RESPIRATORY (INHALATION) at 18:40

## 2023-05-26 RX ADMIN — IPRATROPIUM BROMIDE AND ALBUTEROL SULFATE 3 ML: .5; 2.5 SOLUTION RESPIRATORY (INHALATION) at 18:40

## 2023-05-26 RX ADMIN — ASPIRIN 81 MG: 81 TABLET, COATED ORAL at 08:33

## 2023-05-26 RX ADMIN — FERROUS SULFATE TAB 325 MG (65 MG ELEMENTAL FE) 325 MG: 325 (65 FE) TAB at 08:33

## 2023-05-26 RX ADMIN — OXYCODONE HYDROCHLORIDE AND ACETAMINOPHEN 1 TABLET: 7.5; 325 TABLET ORAL at 17:16

## 2023-05-26 RX ADMIN — IPRATROPIUM BROMIDE AND ALBUTEROL SULFATE 3 ML: .5; 2.5 SOLUTION RESPIRATORY (INHALATION) at 06:55

## 2023-05-26 RX ADMIN — OXYCODONE HYDROCHLORIDE AND ACETAMINOPHEN 1 TABLET: 7.5; 325 TABLET ORAL at 01:45

## 2023-05-26 RX ADMIN — OXYCODONE HYDROCHLORIDE AND ACETAMINOPHEN 1 TABLET: 7.5; 325 TABLET ORAL at 08:32

## 2023-05-26 RX ADMIN — ENOXAPARIN SODIUM 40 MG: 40 INJECTION SUBCUTANEOUS at 08:33

## 2023-05-26 RX ADMIN — NYSTATIN: 100000 POWDER TOPICAL at 08:33

## 2023-05-26 RX ADMIN — LEVOTHYROXINE SODIUM 100 MCG: 100 TABLET ORAL at 05:53

## 2023-05-26 RX ADMIN — GUAIFENESIN 600 MG: 600 TABLET, EXTENDED RELEASE ORAL at 08:33

## 2023-05-26 RX ADMIN — Medication 10 ML: at 08:32

## 2023-05-26 RX ADMIN — LOSARTAN POTASSIUM 50 MG: 50 TABLET, FILM COATED ORAL at 08:33

## 2023-05-26 RX ADMIN — GUAIFENESIN 600 MG: 600 TABLET, EXTENDED RELEASE ORAL at 20:38

## 2023-05-26 RX ADMIN — VITAMINS A AND D OINTMENT 1 APPLICATION: 15.5; 53.4 OINTMENT TOPICAL at 08:33

## 2023-05-26 RX ADMIN — VITAMINS A AND D OINTMENT 1 APPLICATION: 15.5; 53.4 OINTMENT TOPICAL at 20:39

## 2023-05-26 RX ADMIN — FUROSEMIDE 40 MG: 40 TABLET ORAL at 08:33

## 2023-05-26 RX ADMIN — Medication 10 ML: at 20:39

## 2023-05-26 NOTE — PROGRESS NOTES
Enter Query Response Below      Query Response: - No               If applicable, please update the problem list.       Patient: Orquidea Rosenberg        : 1945  Account: [id number]           Admit Date: 2023        How to Respond to this query:       a. Click New Note     b. Answer query within the yellow box.                c. Update the Problem List, if applicable.      If you have any questions about this query contact me at: [email or web address]     Dr. Mejia,    UTI is documented in progress notes  - .  Patient was treated with:  IV meropenem  -   PO metronidazole  -   No urinalysis results are noted during this encounter,  - .    After study, was UTI clinically supported during this admission?    - Yes, please include additional clinical indicators:____________  - No  - Other- specify________  - Unable to determine    By submitting this query, we are merely seeking further clarification of documentation to accurately reflect all conditions that you are monitoring, evaluating, treating or that extend the hospitalization or utilize additional resources of care. Please utilize your independent clinical judgment when addressing the question(s) above.     This query and your response, once completed, will be entered into the legal medical record.    Sincerely,  Siri Bach RN CCDS [facility]  Clinical Documentation Integrity Program

## 2023-05-26 NOTE — PROGRESS NOTES
[facility] HOSPITALIST PROGRESS NOTE     Patient Identification:  Name:  Orquidea Rosenberg  Age:  77 y.o.  Sex:  female  :  1945  MRN:  [id number]  Visit Number:  [id number]  ROOM: [address] [place] [address]     Primary Care Provider:  Jackeline Denson APRN     Date of Admission: 2023    Length of stay in inpatient status:  4    Subjective     Chief Compliant:  Acute dyspnea    Overnight did have desat to high 80s with activity requiring increase flow to 4L with some cough and worsened wheezing this am. Deferred CXR but discussed additional nebulizer for sx noted.       Objective     Current Hospital Meds:  aspirin, 81 mg, Oral, Daily  atorvastatin, 20 mg, Oral, Nightly  budesonide, 0.5 mg, Nebulization, BID - RT  enoxaparin, 40 mg, Subcutaneous, Daily  ferrous sulfate, 325 mg, Oral, Daily With Breakfast  furosemide, 40 mg, Oral, Daily  guaiFENesin, 600 mg, Oral, Q12H  ipratropium-albuterol, 3 mL, Nebulization, Q6H While Awake - RT  levothyroxine, 100 mcg, Oral, Q AM  losartan, 50 mg, Oral, Q24H  magic barrier cream, 1 application, Topical, BID  metoprolol succinate XL, 25 mg, Oral, Q24H  nystatin, , Topical, Q12H  oxyCODONE-acetaminophen, 1 tablet, Oral, Q8H  sodium chloride, 10 mL, Intravenous, Q12H  sodium chloride, 10 mL, Intravenous, Q12H  sodium chloride, 10 mL, Intravenous, Q12H  sodium chloride, 10 mL, Intravenous, Q12H  sodium chloride, 10 mL, Intravenous, Q12H    Pharmacy Consult,       Current Antimicrobial Therapy:  Anti-Infectives (From admission, onward)    None        Current Diuretic Therapy:  Diuretics (From admission, onward)    Ordered     Dose/Rate Route Frequency Start Stop    23 1235  furosemide (LASIX) tablet 40 mg        Ordering Provider: Nelson Mejia MD    40 mg Oral Daily 23 0900          ----------------------------------------------------------------------------------------------------------------------  Vital Signs:  Temp:  [97.2 °F (36.2 °C)-98.2 °F (36.8 °C)]  97.2 °F (36.2 °C)  Heart Rate:  [67-76] 76  Resp:  [18-20] 18  BP: (140-150)/(47-54) 140/54  SpO2:  [92 %-99 %] 92 %  on  Flow (L/min):  [2-4] 4;   Device (Oxygen Therapy): nasal cannula  Body mass index is 28.12 kg/m².    Wt Readings from Last 3 Encounters:   05/22/23 79 kg (174 lb 3.2 oz)   05/05/23 83.9 kg (185 lb)   05/04/23 82.6 kg (182 lb)     Intake & Output (last 3 days)       05/23 0701 05/24 0700 05/24 0701 05/25 0700 05/25 0701 05/26 0700 05/26 0701 05/27 0700    P.O. 480 600 360     Total Intake(mL/kg) 480 (6.1) 600 (7.6) 360 (4.6)     Net +480 +600 +360             Urine Unmeasured Occurrence 9 x 7 x 8 x 2 x    Stool Unmeasured Occurrence 9 x 5 x 5 x         Diet: Regular/House Diet, Fluid Restriction (240 mL/tray) Diets; 1500 mL/day; Texture: Regular Texture (IDDSI 7); Fluid Consistency: Thin (IDDSI 0)  ----------------------------------------------------------------------------------------------------------------------  Physical Exam  Vitals and nursing note reviewed.   Constitutional:       General: She is not in acute distress.  HENT:      Head: Normocephalic and atraumatic.      Mouth/Throat:      Mouth: Mucous membranes are dry.      Pharynx: Oropharynx is clear.   Eyes:      General: No scleral icterus.     Extraocular Movements: Extraocular movements intact.   Cardiovascular:      Rate and Rhythm: Normal rate.      Pulses: Normal pulses.   Pulmonary:      Effort: Pulmonary effort is normal. No respiratory distress.      Comments: 3L NC  Abdominal:      Palpations: Abdomen is soft.      Tenderness: There is no abdominal tenderness.   Musculoskeletal:      Right lower leg: Edema (Improved from prior) present.      Left lower leg: Edema present.   Skin:     General: Skin is warm.      Capillary Refill: Capillary refill takes less than 2 seconds.      Comments: LE skin wrinkled, volume status improved   Neurological:      Mental Status: She is alert. Mental status is at baseline.      Motor:  Weakness present.   Psychiatric:         Mood and Affect: Mood normal.         Behavior: Behavior normal.       ----------------------------------------------------------------------------------------------------------------------    ----------------------------------------------------------------------------------------------------------------------  LABS:    CBC and coagulation:  Results from last 7 days   Lab Units 05/22/23  0202 05/21/23  0838 05/21/23  0335   WBC 10*3/mm3 11.44*  --  10.60   HEMOGLOBIN g/dL 8.5*  --  8.9*   HEMATOCRIT % 29.0*  --  30.9*   MCV fL 90.6  --  93.4   MCHC g/dL 29.3*  --  28.8*   PLATELETS 10*3/mm3 341  --  372   D DIMER QUANT MCGFEU/mL  --  1.22*  --      Acid/base balance:      Renal and electrolytes:  Results from last 7 days   Lab Units 05/22/23  0202 05/21/23  0335   SODIUM mmol/L 140 139   POTASSIUM mmol/L 3.4* 3.6   CHLORIDE mmol/L 101 101   CO2 mmol/L 29.0 26.2   BUN mg/dL 29* 29*   CREATININE mg/dL 1.06* 1.08*   CALCIUM mg/dL 8.8 8.8   GLUCOSE mg/dL 101* 79     Estimated Creatinine Clearance: 47.2 mL/min (A) (by C-G formula based on SCr of 1.06 mg/dL (H)).    Liver and pancreatic function:        Invalid input(s): PROT  Endocrine function:  No results found for: HGBA1C  Point of care bedside glucose levels:      Glucose levels from the CMP:  Results from last 7 days   Lab Units 05/22/23  0202 05/21/23  0335   GLUCOSE mg/dL 101* 79     Lab Results   Component Value Date    TSH 2.620 03/15/2022     Cardiac:  Results from last 7 days   Lab Units 05/21/23  1038 05/21/23  0838 05/21/23  0335   HSTROP T ng/L 139* 150*  --    PROBNP pg/mL  --   --  15,228.0*       Cultures:  Lab Results   Component Value Date    COLORU Dark Yellow (A) 03/15/2022    CLARITYU Turbid (A) 03/15/2022    PHUR <=5.0 03/15/2022    GLUCOSEU Negative 03/15/2022    KETONESU Trace (A) 03/15/2022    BLOODU Large (3+) (A) 03/15/2022    NITRITEU Negative 03/15/2022    LEUKOCYTESUR Moderate (2+) (A) 03/15/2022     BILIRUBINUR Negative 03/15/2022    UROBILINOGEN 1.0 E.U./dL 03/15/2022    RBCUA 31-50 (A) 03/15/2022    WBCUA Too Numerous to Count (A) 03/15/2022    BACTERIA 4+ (A) 03/15/2022     Microbiology Results (last 10 days)     ** No results found for the last 240 hours. **          No results found for: PREGTESTUR, PREGSERUM, HCG, HCGQUANT  Pain Management Panel          View : No data to display.                      I have personally looked at the labs and they are summarized above.  ----------------------------------------------------------------------------------------------------------------------  Detailed radiology reports for the last 24 hours:    Imaging Results (Last 24 Hours)     ** No results found for the last 24 hours. 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 Had pleural effusions that now improved with diuresis and this is likely why her dyspnea has improved from initial decline on 5/20. Echo reviewed and has severe PAH with repeat cardiac w/u showing inferior t-wave inversions, elevated BNP, troponin, and d-dimer but eval by cardiology and after discussion agree medical management is best as LHC or stress would likely precipitate worsened pulm edema or other more acute complication. Discussed CTPE as well to r/o PE but given difficulty with initial CT over weekend, patient hesitant to consider additional scan.   -Palliative care following and while patient holds out optimism that her condition may improve, after discussions over multiple days she agrees home hospice is best in line with her goals and hospice referral placed 5/23. Cont comfort care in interim  -Patient will need home  caretakers for her ADLs and home resources, will remain inpatient while these are being arranged  -Will cont Losartan and lasix given clinical improvement and BP tolerating well. Dispo planning as noted above, SNF w/comfort measures vs home hospice with home caretakers pend. NO update on dispo plan today so anticipate patient to remain inpatient through holiday weekend.     - - Chronic - -     #COPD hx  #Chronic iron def anemia  #Debility secondary to chronic illness  #Left hyonephrosis s/p uretal stent    VTE Prophylaxis:   Mechanical Order History:     None      Pharmalogical Order History:      Ordered     Dose Route Frequency Stop    05/22/23 1049  Enoxaparin Sodium (LOVENOX) syringe 40 mg         40 mg SC Daily --                Code Status and Medical Interventions:   Ordered at: 05/23/23 1056     Level Of Support Discussed With:    Patient    Next of Kin (If No Surrogate)     Code Status (Patient has no pulse and is not breathing):    No CPR (Do Not Attempt to Resuscitate)     Medical Interventions (Patient has pulse or is breathing):    Comfort Measures     Release to patient:    Routine Release         Disposition: Pend snf vs home hospice    I have reviewed any copied/forwarded text or data, verified its accuracy, and updated as necessary above.    Nelson Mejia MD  [facility] Hospitalist  05/26/23  12:26 EDT

## 2023-05-26 NOTE — PLAN OF CARE
Goal Outcome Evaluation:  Plan of Care Reviewed With: patient        Progress: no change  Outcome Evaluation: Pt has rested in bed overnight. VSS on 3L NC. Pt with little tolerance to activity; oxygen saturation will drop to 85-87% upon turns and quickly return to >90% once activity has ceased. Pain well controlled on current regimen. Wound care completed per orders. Pt is wearing a brief at her request, education received. Will continue to follow plan of care.

## 2023-05-26 NOTE — CASE MANAGEMENT/SOCIAL WORK
Discharge Planning Assessment  [facility]     Patient Name: Orquidea Rosenberg  MRN: [id number]  Today's Date: 5/26/2023    Admit Date: 5/22/2023    Plan: Pt remains alert and oriented x4 per RN. SS followed up with pt on this date pt states pt states she still plans to return home. SS attempted to contact pt's daughter, Lilli [phone]. SS spoke with [facility] per Dc states they have all pt's equipment and plan to keep it until decision about disposition is made. SS to follow.   Discharge Plan     Row Name 05/26/23 1524       Plan    Plan Pt remains alert and oriented x4 per RN. SS followed up with pt on this date pt states pt states she still plans to return home. SS attempted to contact pt's daughter, Lilli [phone]. SS spoke with [facility] per Dc states they have all pt's equipment and plan to keep it until decision about disposition is made. SS to follow.                HARPAL Stanley

## 2023-05-26 NOTE — PROGRESS NOTES
"Enter Query Response Below      Query Response: - Yes, please include additional clinical indicators:  per HPI             If applicable, please update the problem list.       Patient: Orquidea Rosenberg        : 1945  Account: [id number]           Admit Date: 2023        How to Respond to this query:       a. Click New Note     b. Answer query within the yellow box.                c. Update the Problem List, if applicable.      If you have any questions about this query contact me at: [email or web address]     Dr. 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  Progress note dated  includes patient's laboratory results from  which include WBCs 11.90.  Patient was treated with:  IV meropenem  -   PO metronidazole  -     After study, was sepsis clinically supported during this admission?    - Yes, please include additional clinical indicators:____________  - No  - Other- specify________  - Unable to determine    By submitting this query, we are merely seeking further clarification of documentation to accurately reflect all conditions that you are monitoring, evaluating, treating or that extend the hospitalization or utilize additional resources of care. Please utilize your independent clinical judgment when addressing the question(s) above.     This query and your response, once completed, will be entered into the legal medical record.    Sincerely,  Siri Bach RN CCDS [facility]  Clinical Documentation Integrity Program     "

## 2023-05-26 NOTE — PROGRESS NOTES
Enter Query Response Below      Query Response: - No             If applicable, please update the problem list.       Patient: Orquidea Rosenberg        : 1945  Account: [id number]           Admit Date: 2023        How to Respond to this query:       a. Click New Note     b. Answer query within the yellow box.                c. Update the Problem List, if applicable.      If you have any questions about this query contact me at: [email or web address]     Dr. Mejia,    H&P dated  and progress notes dated 5/15 and  include diagnosis of acute kidney injury.  Patient's creatinine levels during this encounter include:     - 1.04   - 1.31   - 1.12   - 1.04   - 1.23   - 1.08    After study, was acute kidney injury clinically supported during this admission?    - Yes, please include additional clinical indicators:____________  - No  - Other- specify________  - Unable to determine    By submitting this query, we are merely seeking further clarification of documentation to accurately reflect all conditions that you are monitoring, evaluating, treating or that extend the hospitalization or utilize additional resources of care. Please utilize your independent clinical judgment when addressing the question(s) above.     This query and your response, once completed, will be entered into the legal medical record.    Sincerely,  Siri Bach RN CCDS [facility]  Clinical Documentation Integrity Program

## 2023-05-26 NOTE — THERAPY TREATMENT NOTE
Acute Care - Physical Therapy Treatment Note   [place]     Patient Name: Orquidea Rosenberg  : 1945  MRN: [id number]  Today's Date: 2023   Onset of Illness/Injury or Date of Surgery: 23 (pt. admitted ; familiar to PT from previous swing bed status; pt. now readmitted to inpatient status d/t respiratory decline.)  Visit Dx:   No diagnosis found.  Patient Active Problem List   Diagnosis   • COVID-19   • Vulvar cancer   • Physical debility   • Bacteremia   • COPD exacerbation   • Debility   • Hypoxia   • Acute respiratory failure with hypoxia     Past Medical History:   Diagnosis Date   • Arthritis    • COPD (chronic obstructive pulmonary disease)    • Elevated cholesterol    • History of transfusion    • Hypertension    • Vulval ca      Past Surgical History:   Procedure Laterality Date   • RADICAL VULVECTOMY  2019   • RADICAL VULVECTOMY Bilateral 2019     PT Assessment (last 12 hours)     PT Evaluation and Treatment     Row Name 23 1451          Physical Therapy Time and Intention    Subjective Information complains of;dyspnea  -HC     Document Type therapy note (daily note)  -HC     Mode of Treatment physical therapy  -HC     Patient Effort good  -HC     Comment Pt and RN Lesley in agreement for PT. Supine exercises completed but Pts O2 did drop into low 80's with heavy movment, Pt was on O2. No sitting on EOB secondary to O2 being low and Pt reported he stomach was hurting.  -     Row Name 23 1451          General Information    Patient Profile Reviewed yes  -HC     Existing Precautions/Restrictions fall;oxygen therapy device and L/min  -HC     Row Name 23 1451          Living Environment    Primary Care Provided by self  -     Row Name 23 1451          Home Use of Assistive/Adaptive Equipment    Equipment Currently Used at Home --  owns rollator, w/c  -HC     Row Name 23 1451          Cognition    Affect/Mental Status (Cognition) WFL  -     Orientation  Status (Cognition) oriented x 3  -HC     Follows Commands (Cognition) WFL  -HC     Personal Safety Interventions fall prevention program maintained;gait belt;nonskid shoes/slippers when out of bed;supervised activity  -     Row Name 05/26/23 1451          Mobility    Extremity Weight-bearing Status --  no restrictions  -     Row Name 05/26/23 1451          Safety Issues, Functional Mobility    Impairments Affecting Function (Mobility) balance;endurance/activity tolerance;strength;pain  -     Row Name 05/26/23 1451          Motor Skills    Therapeutic Exercise other (see comments)  Supine: AP, inversion/eversion, SLR, hip abd, heel slide.  -     Row Name             Wound 05/01/23 1414 Left vagina    Wound - Properties Group Placement Date: 05/01/23  -LB Placement Time: 1414  -LB Present on Hospital Admission: Y  -LB Side: Left  -LB Location: vagina  -LB    Retired Wound - Properties Group Placement Date: 05/01/23  -LB Placement Time: 1414  -LB Present on Hospital Admission: Y  -LB Side: Left  -LB Location: vagina  -LB    Retired Wound - Properties Group Date first assessed: 05/01/23  -LB Time first assessed: 1414  -LB Present on Hospital Admission: Y  -LB Side: Left  -LB Location: vagina  -LB    Row Name             Wound 05/22/23 1425 coccyx Pressure Injury    Wound - Properties Group Placement Date: 05/22/23  -EB Placement Time: 1425  -EB Present on Hospital Admission: N  -EB Location: coccyx  -EB Primary Wound Type: Pressure inj  -EB    Retired Wound - Properties Group Placement Date: 05/22/23  -EB Placement Time: 1425  -EB Present on Hospital Admission: N  -EB Location: coccyx  -EB Primary Wound Type: Pressure inj  -EB    Retired Wound - Properties Group Date first assessed: 05/22/23  -EB Time first assessed: 1425  -EB Present on Hospital Admission: N  -EB Location: coccyx  -EB Primary Wound Type: Pressure inj  -EB    Row Name 05/26/23 1451          Positioning and Restraints    Pre-Treatment Position in  bed  -HC     Post Treatment Position bed  -HC     In Bed fowlers;call light within reach;encouraged to call for assist;exit alarm on;side rails up x2  -     Row Name 05/26/23 1451          Therapy Assessment/Plan (PT)    Rehab Potential (PT) good, to achieve stated therapy goals  -     Criteria for Skilled Interventions Met (PT) yes;meets criteria  -     Therapy Frequency (PT) 2 times/wk  2-5x/wk  -     Problem List (PT) problems related to;balance;mobility;strength;pain  -     Activity Limitations Related to Problem List (PT) unable to ambulate safely;unable to transfer safely;BADLs not performed adequately or safely  -     Row Name 05/26/23 1451          Physical Therapy Goals    Bed Mobility Goal Selection (PT) bed mobility, PT goal 1  -     Transfer Goal Selection (PT) transfer, PT goal 1  -     Gait Training Goal Selection (PT) gait training, PT goal 1  -     Row Name 05/26/23 1451          Bed Mobility Goal 1 (PT)    Activity/Assistive Device (Bed Mobility Goal 1, PT) rolling to left;rolling to right;scooting;sit to supine;supine to sit  -     [place] Level/Cues Needed (Bed Mobility Goal 1, PT) modified independence  -HC     Time Frame (Bed Mobility Goal 1, PT) by discharge  -     Row Name 05/26/23 1451          Transfer Goal 1 (PT)    Activity/Assistive Device (Transfer Goal 1, PT) sit-to-stand/stand-to-sit;bed-to-chair/chair-to-bed;toilet  -     [place] Level/Cues Needed (Transfer Goal 1, PT) supervision required  -HC     Time Frame (Transfer Goal 1, PT) by discharge  -     Row Name 05/26/23 1451          Gait Training Goal 1 (PT)    Activity/Assistive Device (Gait Training Goal 1, PT) gait (walking locomotion);assistive device use;decrease fall risk;diminish gait deviation;improve balance and speed;increase endurance/gait distance  appropriate a.d.  -     [place] Level (Gait Training Goal 1, PT) standby assist  -HC     Distance (Gait Training Goal 1, PT) 40  -HC      Time Frame (Gait Training Goal 1, PT) by discharge  -           User Key  (r) = Recorded By, (t) = Taken By, (c) = Cosigned By    Initials Name Provider Type    EB Ady John, RN Registered Nurse    Veronica Bansal RN Registered Nurse    Gayle Moreno PTA Physical Therapist Assistant                Physical Therapy Education     Title: PT OT SLP Therapies (Done)     Topic: Physical Therapy (Done)     Point: Mobility training (Done)     Learning Progress Summary           Patient Acceptance, E,TB, VU by  at 5/25/2023 1514    Acceptance, E, VU by EB at 5/22/2023 1536                   Point: Home exercise program (Done)     Learning Progress Summary           Patient Acceptance, E,TB, VU by  at 5/25/2023 1514    Acceptance, E, VU by EB at 5/22/2023 1536                   Point: Body mechanics (Done)     Learning Progress Summary           Patient Acceptance, E,TB, VU by  at 5/25/2023 1514    Acceptance, E, VU by  at 5/22/2023 1536                   Point: Precautions (Done)     Learning Progress Summary           Patient Acceptance, E,TB, VU by  at 5/25/2023 1514    Acceptance, E, VU by  at 5/22/2023 1536                               User Key     Initials Effective Dates Name Provider Type Discipline     05/24/22 -  Ander Shirley, SHEILA Physical Therapist PT     09/22/22 -  Ady John RN Registered Nurse Nurse              PT Recommendation and Plan  Therapy Frequency (PT): 2 times/wk (2-5x/wk)          Time Calculation:    PT Charges     Row Name 05/26/23 1456             Time Calculation    PT Received On 05/26/23  -HC         Time Calculation- PT    Total Timed Code Minutes- PT 15 minute(s)  -            User Key  (r) = Recorded By, (t) = Taken By, (c) = Cosigned By    Initials Name Provider Type     Gayle Peralta PTA Physical Therapist Assistant              Therapy Charges for Today     Code Description Service Date Service Provider Modifiers Qty     [id number]  PT THER PROC EA 15 MIN 5/26/2023 Gayle Peralta, DEIDRA GP, CQ 1          PT G-Codes  AM-PAC 6 Clicks Score (PT): 12    Gayle Peralta PTA  5/26/2023

## 2023-05-27 PROCEDURE — 94799 UNLISTED PULMONARY SVC/PX: CPT

## 2023-05-27 PROCEDURE — 25010000002 ENOXAPARIN PER 10 MG: Performed by: STUDENT IN AN ORGANIZED HEALTH CARE EDUCATION/TRAINING PROGRAM

## 2023-05-27 PROCEDURE — 94761 N-INVAS EAR/PLS OXIMETRY MLT: CPT

## 2023-05-27 PROCEDURE — 99231 SBSQ HOSP IP/OBS SF/LOW 25: CPT | Performed by: STUDENT IN AN ORGANIZED HEALTH CARE EDUCATION/TRAINING PROGRAM

## 2023-05-27 RX ADMIN — BUDESONIDE 0.5 MG: 0.5 SUSPENSION RESPIRATORY (INHALATION) at 06:56

## 2023-05-27 RX ADMIN — IPRATROPIUM BROMIDE AND ALBUTEROL SULFATE 3 ML: .5; 2.5 SOLUTION RESPIRATORY (INHALATION) at 06:56

## 2023-05-27 RX ADMIN — NYSTATIN: 100000 POWDER TOPICAL at 20:45

## 2023-05-27 RX ADMIN — Medication 10 ML: at 08:41

## 2023-05-27 RX ADMIN — VITAMINS A AND D OINTMENT 1 APPLICATION: 15.5; 53.4 OINTMENT TOPICAL at 08:39

## 2023-05-27 RX ADMIN — LEVOTHYROXINE SODIUM 100 MCG: 100 TABLET ORAL at 06:03

## 2023-05-27 RX ADMIN — ASPIRIN 81 MG: 81 TABLET, COATED ORAL at 08:40

## 2023-05-27 RX ADMIN — BUDESONIDE 0.5 MG: 0.5 SUSPENSION RESPIRATORY (INHALATION) at 19:16

## 2023-05-27 RX ADMIN — METOPROLOL SUCCINATE 25 MG: 25 TABLET, EXTENDED RELEASE ORAL at 08:40

## 2023-05-27 RX ADMIN — IPRATROPIUM BROMIDE AND ALBUTEROL SULFATE 3 ML: .5; 2.5 SOLUTION RESPIRATORY (INHALATION) at 19:16

## 2023-05-27 RX ADMIN — Medication 10 ML: at 20:45

## 2023-05-27 RX ADMIN — ENOXAPARIN SODIUM 40 MG: 40 INJECTION SUBCUTANEOUS at 08:39

## 2023-05-27 RX ADMIN — GUAIFENESIN 600 MG: 600 TABLET, EXTENDED RELEASE ORAL at 20:45

## 2023-05-27 RX ADMIN — GUAIFENESIN 600 MG: 600 TABLET, EXTENDED RELEASE ORAL at 08:40

## 2023-05-27 RX ADMIN — OXYCODONE HYDROCHLORIDE AND ACETAMINOPHEN 1 TABLET: 7.5; 325 TABLET ORAL at 00:57

## 2023-05-27 RX ADMIN — Medication 10 ML: at 08:42

## 2023-05-27 RX ADMIN — FERROUS SULFATE TAB 325 MG (65 MG ELEMENTAL FE) 325 MG: 325 (65 FE) TAB at 08:39

## 2023-05-27 RX ADMIN — OXYCODONE HYDROCHLORIDE AND ACETAMINOPHEN 1 TABLET: 7.5; 325 TABLET ORAL at 08:39

## 2023-05-27 RX ADMIN — FUROSEMIDE 40 MG: 40 TABLET ORAL at 08:40

## 2023-05-27 RX ADMIN — VITAMINS A AND D OINTMENT 1 APPLICATION: 15.5; 53.4 OINTMENT TOPICAL at 20:45

## 2023-05-27 RX ADMIN — NYSTATIN: 100000 POWDER TOPICAL at 08:39

## 2023-05-27 RX ADMIN — IPRATROPIUM BROMIDE AND ALBUTEROL SULFATE 3 ML: .5; 2.5 SOLUTION RESPIRATORY (INHALATION) at 12:40

## 2023-05-27 RX ADMIN — ATORVASTATIN CALCIUM 20 MG: 20 TABLET, FILM COATED ORAL at 20:45

## 2023-05-27 RX ADMIN — OXYCODONE HYDROCHLORIDE AND ACETAMINOPHEN 1 TABLET: 7.5; 325 TABLET ORAL at 17:22

## 2023-05-27 RX ADMIN — LOSARTAN POTASSIUM 50 MG: 50 TABLET, FILM COATED ORAL at 08:40

## 2023-05-27 NOTE — PLAN OF CARE
Goal Outcome Evaluation:  Plan of Care Reviewed With: patient           Outcome Evaluation: Patient is resting in bed, VSS on 3L NC. Patient sat at EOB with RN this AM, RN increased O2 prior to activity, dyspnea on excertion present. Wound care completed per orders. Patient reports pain is well controlled. No acute changes or complaints, will continue POC.

## 2023-05-27 NOTE — PROGRESS NOTES
[facility] HOSPITALIST PROGRESS NOTE     Patient Identification:  Name:  Orquidea Rosenberg  Age:  77 y.o.  Sex:  female  :  1945  MRN:  [id number]  Visit Number:  [id number]  ROOM: [address] [place] [address]     Primary Care Provider:  Jackeline Denson APRN     Date of Admission: 2023    Length of stay in inpatient status:  5    Subjective     Chief Compliant:  Acute dyspnea    Desats with movement but quickly recovers and no new cough or worsening of LE edema. Requested to cont PT and would like wheelchair to roll around unit.      Objective     Current Hospital Meds:  aspirin, 81 mg, Oral, Daily  atorvastatin, 20 mg, Oral, Nightly  budesonide, 0.5 mg, Nebulization, BID - RT  enoxaparin, 40 mg, Subcutaneous, Daily  ferrous sulfate, 325 mg, Oral, Daily With Breakfast  furosemide, 40 mg, Oral, Daily  guaiFENesin, 600 mg, Oral, Q12H  ipratropium-albuterol, 3 mL, Nebulization, Q6H While Awake - RT  levothyroxine, 100 mcg, Oral, Q AM  losartan, 50 mg, Oral, Q24H  magic barrier cream, 1 application, Topical, BID  metoprolol succinate XL, 25 mg, Oral, Q24H  nystatin, , Topical, Q12H  oxyCODONE-acetaminophen, 1 tablet, Oral, Q8H  sodium chloride, 10 mL, Intravenous, Q12H  sodium chloride, 10 mL, Intravenous, Q12H  sodium chloride, 10 mL, Intravenous, Q12H  sodium chloride, 10 mL, Intravenous, Q12H  sodium chloride, 10 mL, Intravenous, Q12H    Pharmacy Consult,       Current Antimicrobial Therapy:  Anti-Infectives (From admission, onward)    None        Current Diuretic Therapy:  Diuretics (From admission, onward)    Ordered     Dose/Rate Route Frequency Start Stop    23 1235  furosemide (LASIX) tablet 40 mg        Ordering Provider: Nelson Mejia MD    40 mg Oral Daily 23 0900          ----------------------------------------------------------------------------------------------------------------------  Vital Signs:  Temp:  [97.6 °F (36.4 °C)] 97.6 °F (36.4 °C)  Heart Rate:  [] 73  Resp:   [18-20] 20  BP: (144-152)/(63-64) 144/63  SpO2:  [90 %-99 %] 90 %  on  Flow (L/min):  [3-4] 3;   Device (Oxygen Therapy): nasal cannula  Body mass index is 28.12 kg/m².    Wt Readings from Last 3 Encounters:   05/22/23 79 kg (174 lb 3.2 oz)   05/05/23 83.9 kg (185 lb)   05/04/23 82.6 kg (182 lb)     Intake & Output (last 3 days)       05/24 0701 05/25 0700 05/25 0701 05/26 0700 05/26 0701 05/27 0700 05/27 0701 05/28 0700    P.O.  200    Total Intake(mL/kg) 600 (7.6) 360 (4.6) 1080 (13.7) 200 (2.5)    Net +600 +360 +1080 +200            Urine Unmeasured Occurrence 7 x 8 x 8 x     Stool Unmeasured Occurrence 5 x 5 x 3 x         Diet: Regular/House Diet, Fluid Restriction (240 mL/tray) Diets; 1500 mL/day; Texture: Regular Texture (IDDSI 7); Fluid Consistency: Thin (IDDSI 0)  ----------------------------------------------------------------------------------------------------------------------  Physical Exam  Vitals and nursing note reviewed.   Constitutional:       General: She is not in acute distress.  HENT:      Head: Normocephalic and atraumatic.      Mouth/Throat:      Mouth: Mucous membranes are dry.      Pharynx: Oropharynx is clear.   Eyes:      General: No scleral icterus.     Extraocular Movements: Extraocular movements intact.   Cardiovascular:      Rate and Rhythm: Normal rate.      Pulses: Normal pulses.   Pulmonary:      Effort: Pulmonary effort is normal. No respiratory distress.      Comments: 3L NC  Abdominal:      Palpations: Abdomen is soft.      Tenderness: There is no abdominal tenderness.   Musculoskeletal:      Right lower leg: Edema (Improved from prior) present.      Left lower leg: Edema present.   Skin:     General: Skin is warm.      Capillary Refill: Capillary refill takes less than 2 seconds.      Comments: LE skin wrinkled, volume status improved   Neurological:      Mental Status: She is alert. Mental status is at baseline.      Motor: Weakness present.   Psychiatric:          Mood and Affect: Mood normal.         Behavior: Behavior normal.       ----------------------------------------------------------------------------------------------------------------------    ----------------------------------------------------------------------------------------------------------------------  LABS:    CBC and coagulation:  Results from last 7 days   Lab Units 05/22/23  0202 05/21/23  0838 05/21/23  0335   WBC 10*3/mm3 11.44*  --  10.60   HEMOGLOBIN g/dL 8.5*  --  8.9*   HEMATOCRIT % 29.0*  --  30.9*   MCV fL 90.6  --  93.4   MCHC g/dL 29.3*  --  28.8*   PLATELETS 10*3/mm3 341  --  372   D DIMER QUANT MCGFEU/mL  --  1.22*  --      Acid/base balance:      Renal and electrolytes:  Results from last 7 days   Lab Units 05/22/23  0202 05/21/23  0335   SODIUM mmol/L 140 139   POTASSIUM mmol/L 3.4* 3.6   CHLORIDE mmol/L 101 101   CO2 mmol/L 29.0 26.2   BUN mg/dL 29* 29*   CREATININE mg/dL 1.06* 1.08*   CALCIUM mg/dL 8.8 8.8   GLUCOSE mg/dL 101* 79     Estimated Creatinine Clearance: 47.2 mL/min (A) (by C-G formula based on SCr of 1.06 mg/dL (H)).    Liver and pancreatic function:        Invalid input(s): PROT  Endocrine function:  No results found for: HGBA1C  Point of care bedside glucose levels:      Glucose levels from the CMP:  Results from last 7 days   Lab Units 05/22/23  0202 05/21/23  0335   GLUCOSE mg/dL 101* 79     Lab Results   Component Value Date    TSH 2.620 03/15/2022     Cardiac:  Results from last 7 days   Lab Units 05/21/23  1038 05/21/23  0838 05/21/23  0335   HSTROP T ng/L 139* 150*  --    PROBNP pg/mL  --   --  15,228.0*       Cultures:  Lab Results   Component Value Date    COLORU Dark Yellow (A) 03/15/2022    CLARITYU Turbid (A) 03/15/2022    PHUR <=5.0 03/15/2022    GLUCOSEU Negative 03/15/2022    KETONESU Trace (A) 03/15/2022    BLOODU Large (3+) (A) 03/15/2022    NITRITEU Negative 03/15/2022    LEUKOCYTESUR Moderate (2+) (A) 03/15/2022    BILIRUBINUR Negative 03/15/2022     UROBILINOGEN 1.0 E.U./dL 03/15/2022    RBCUA 31-50 (A) 03/15/2022    WBCUA Too Numerous to Count (A) 03/15/2022    BACTERIA 4+ (A) 03/15/2022     Microbiology Results (last 10 days)     ** No results found for the last 240 hours. **          No results found for: PREGTESTUR, PREGSERUM, HCG, HCGQUANT  Pain Management Panel          View : No data to display.                      I have personally looked at the labs and they are summarized above.  ----------------------------------------------------------------------------------------------------------------------  Detailed radiology reports for the last 24 hours:    Imaging Results (Last 24 Hours)     ** No results found for the last 24 hours. 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 Had pleural effusions that now improved with diuresis and this is likely why her dyspnea has improved from initial decline on 5/20. Echo reviewed and has severe PAH with repeat cardiac w/u showing inferior t-wave inversions, elevated BNP, troponin, and d-dimer but eval by cardiology and after discussion agree medical management is best as LHC or stress would likely precipitate worsened pulm edema or other more acute complication. Discussed CTPE as well to r/o PE but given difficulty with initial CT over weekend, patient hesitant to consider additional scan.   -Palliative care following and while patient holds out optimism that her condition may improve, after discussions over multiple days she agrees home hospice is best in line with her goals and hospice referral placed 5/23. Cont comfort care in interim  -Patient will need home caretakers for her ADLs and home  resources, will remain inpatient while these are being arranged  -Will cont Losartan and lasix given clinical improvement and BP tolerating well. Dispo planning as noted above, SNF w/comfort measures vs home hospice with home caretakers pend.  -Given weekend patient unfortunately likely unable to orrange the needed help at home to discharge with hospice and unable to be accepted to SNF for comfort care (which she has been consistently refusing to consider). Likely to remain inpatient until dispo arranged early next week.    - - Chronic - -     #COPD hx  #Chronic iron def anemia  #Debility secondary to chronic illness  #Left hyonephrosis s/p uretal stent    VTE Prophylaxis:   Mechanical Order History:     None      Pharmalogical Order History:      Ordered     Dose Route Frequency Stop    05/22/23 1049  Enoxaparin Sodium (LOVENOX) syringe 40 mg         40 mg SC Daily --                Code Status and Medical Interventions:   Ordered at: 05/23/23 1056     Level Of Support Discussed With:    Patient    Next of Kin (If No Surrogate)     Code Status (Patient has no pulse and is not breathing):    No CPR (Do Not Attempt to Resuscitate)     Medical Interventions (Patient has pulse or is breathing):    Comfort Measures     Release to patient:    Routine Release         Disposition: Pend snf vs home hospice    I have reviewed any copied/forwarded text or data, verified its accuracy, and updated as necessary above.    Nelson Mejia MD  [facility]  05/27/23  16:30 EDT

## 2023-05-27 NOTE — PLAN OF CARE
Goal Outcome Evaluation:              Outcome Evaluation: Pt. resting in bed at this time. Pt taking scheduled pain meds, tolerating well. Wound care completed per orders. O2 drops into 70s-80s when being turned, pt educated on the importance of deep breathing once sitting upright in bed, pt demonstrated understanding, Pt. has no complaints or concerns at this time. No acute changes at this time. Will continue with plan of care.

## 2023-05-28 PROCEDURE — 94799 UNLISTED PULMONARY SVC/PX: CPT

## 2023-05-28 PROCEDURE — 99231 SBSQ HOSP IP/OBS SF/LOW 25: CPT | Performed by: STUDENT IN AN ORGANIZED HEALTH CARE EDUCATION/TRAINING PROGRAM

## 2023-05-28 PROCEDURE — 25010000002 ENOXAPARIN PER 10 MG: Performed by: STUDENT IN AN ORGANIZED HEALTH CARE EDUCATION/TRAINING PROGRAM

## 2023-05-28 PROCEDURE — 94761 N-INVAS EAR/PLS OXIMETRY MLT: CPT

## 2023-05-28 RX ADMIN — BUDESONIDE 0.5 MG: 0.5 SUSPENSION RESPIRATORY (INHALATION) at 18:54

## 2023-05-28 RX ADMIN — ASPIRIN 81 MG: 81 TABLET, COATED ORAL at 08:43

## 2023-05-28 RX ADMIN — BUDESONIDE 0.5 MG: 0.5 SUSPENSION RESPIRATORY (INHALATION) at 06:32

## 2023-05-28 RX ADMIN — Medication 10 ML: at 23:07

## 2023-05-28 RX ADMIN — NYSTATIN: 100000 POWDER TOPICAL at 08:43

## 2023-05-28 RX ADMIN — OXYCODONE HYDROCHLORIDE AND ACETAMINOPHEN 1 TABLET: 7.5; 325 TABLET ORAL at 01:01

## 2023-05-28 RX ADMIN — LOSARTAN POTASSIUM 50 MG: 50 TABLET, FILM COATED ORAL at 08:43

## 2023-05-28 RX ADMIN — GUAIFENESIN 600 MG: 600 TABLET, EXTENDED RELEASE ORAL at 08:43

## 2023-05-28 RX ADMIN — Medication 10 ML: at 23:06

## 2023-05-28 RX ADMIN — OXYCODONE HYDROCHLORIDE AND ACETAMINOPHEN 1 TABLET: 7.5; 325 TABLET ORAL at 17:10

## 2023-05-28 RX ADMIN — IPRATROPIUM BROMIDE AND ALBUTEROL SULFATE 3 ML: .5; 2.5 SOLUTION RESPIRATORY (INHALATION) at 18:55

## 2023-05-28 RX ADMIN — VITAMINS A AND D OINTMENT 1 APPLICATION: 15.5; 53.4 OINTMENT TOPICAL at 23:06

## 2023-05-28 RX ADMIN — GUAIFENESIN 600 MG: 600 TABLET, EXTENDED RELEASE ORAL at 23:05

## 2023-05-28 RX ADMIN — VITAMINS A AND D OINTMENT 1 APPLICATION: 15.5; 53.4 OINTMENT TOPICAL at 08:43

## 2023-05-28 RX ADMIN — LEVOTHYROXINE SODIUM 100 MCG: 100 TABLET ORAL at 05:15

## 2023-05-28 RX ADMIN — NYSTATIN: 100000 POWDER TOPICAL at 23:06

## 2023-05-28 RX ADMIN — OXYCODONE HYDROCHLORIDE AND ACETAMINOPHEN 1 TABLET: 7.5; 325 TABLET ORAL at 08:43

## 2023-05-28 RX ADMIN — IPRATROPIUM BROMIDE AND ALBUTEROL SULFATE 3 ML: .5; 2.5 SOLUTION RESPIRATORY (INHALATION) at 13:48

## 2023-05-28 RX ADMIN — FUROSEMIDE 40 MG: 40 TABLET ORAL at 08:43

## 2023-05-28 RX ADMIN — METOPROLOL SUCCINATE 25 MG: 25 TABLET, EXTENDED RELEASE ORAL at 08:43

## 2023-05-28 RX ADMIN — ENOXAPARIN SODIUM 40 MG: 40 INJECTION SUBCUTANEOUS at 08:43

## 2023-05-28 RX ADMIN — FERROUS SULFATE TAB 325 MG (65 MG ELEMENTAL FE) 325 MG: 325 (65 FE) TAB at 08:43

## 2023-05-28 RX ADMIN — IPRATROPIUM BROMIDE AND ALBUTEROL SULFATE 3 ML: .5; 2.5 SOLUTION RESPIRATORY (INHALATION) at 06:32

## 2023-05-28 RX ADMIN — ATORVASTATIN CALCIUM 20 MG: 20 TABLET, FILM COATED ORAL at 23:05

## 2023-05-28 NOTE — PLAN OF CARE
Goal Outcome Evaluation:           Progress: no change  Outcome Evaluation: Pt resting in bed at this time. Wound care completed. Pt tolerating 3L NC well. Pt states that scheduled pain medication is helping. No acute changes or complaints at this time. Will continue with plan of care.

## 2023-05-28 NOTE — PROGRESS NOTES
[facility] HOSPITALIST PROGRESS NOTE     Patient Identification:  Name:  Orquidea Rosenberg  Age:  77 y.o.  Sex:  female  :  1945  MRN:  [id number]  Visit Number:  [id number]  ROOM: [address] [place] [address]     Primary Care Provider:  Jackeline Denson APRN     Date of Admission: 2023    Length of stay in inpatient status:  6    Subjective     Chief Compliant:  Acute dyspnea    Desat improved by increasing O2 rate prior to turns or any nursing interventions. Continues to report good pain control with scheduled opioids.       Objective     Current Hospital Meds:  aspirin, 81 mg, Oral, Daily  atorvastatin, 20 mg, Oral, Nightly  budesonide, 0.5 mg, Nebulization, BID - RT  enoxaparin, 40 mg, Subcutaneous, Daily  ferrous sulfate, 325 mg, Oral, Daily With Breakfast  furosemide, 40 mg, Oral, Daily  guaiFENesin, 600 mg, Oral, Q12H  ipratropium-albuterol, 3 mL, Nebulization, Q6H While Awake - RT  levothyroxine, 100 mcg, Oral, Q AM  losartan, 50 mg, Oral, Q24H  magic barrier cream, 1 application, Topical, BID  metoprolol succinate XL, 25 mg, Oral, Q24H  nystatin, , Topical, Q12H  oxyCODONE-acetaminophen, 1 tablet, Oral, Q8H  sodium chloride, 10 mL, Intravenous, Q12H  sodium chloride, 10 mL, Intravenous, Q12H  sodium chloride, 10 mL, Intravenous, Q12H  sodium chloride, 10 mL, Intravenous, Q12H  sodium chloride, 10 mL, Intravenous, Q12H    Pharmacy Consult,       Current Antimicrobial Therapy:  Anti-Infectives (From admission, onward)    None        Current Diuretic Therapy:  Diuretics (From admission, onward)    Ordered     Dose/Rate Route Frequency Start Stop    23 1235  furosemide (LASIX) tablet 40 mg        Ordering Provider: Nelson Mejia MD    40 mg Oral Daily 23 0900          ----------------------------------------------------------------------------------------------------------------------  Vital Signs:  Temp:  [97.8 °F (36.6 °C)-98 °F (36.7 °C)] 97.8 °F (36.6 °C)  Heart Rate:  [64-80] 64  Resp:   [18-28] 28  BP: (124-131)/(45-51) 131/51  SpO2:  [97 %-100 %] 100 %  on  Flow (L/min):  [3-6] 3;   Device (Oxygen Therapy): nasal cannula;humidified  Body mass index is 28.12 kg/m².    Wt Readings from Last 3 Encounters:   05/22/23 79 kg (174 lb 3.2 oz)   05/05/23 83.9 kg (185 lb)   05/04/23 82.6 kg (182 lb)     Intake & Output (last 3 days)       05/25 0701 05/26 0700 05/26 0701 05/27 0700 05/27 0701 05/28 0700 05/28 0701 05/29 0700    P.O. 360 1080 800     Total Intake(mL/kg) 360 (4.6) 1080 (13.7) 800 (10.1)     Net +360 +1080 +800             Urine Unmeasured Occurrence 8 x 8 x 1 x     Stool Unmeasured Occurrence 5 x 3 x          Diet: Regular/House Diet, Fluid Restriction (240 mL/tray) Diets; 1500 mL/day; Texture: Regular Texture (IDDSI 7); Fluid Consistency: Thin (IDDSI 0)  ----------------------------------------------------------------------------------------------------------------------  Physical Exam  Vitals and nursing note reviewed.   Constitutional:       General: She is not in acute distress.  HENT:      Head: Normocephalic and atraumatic.      Mouth/Throat:      Mouth: Mucous membranes are dry.      Pharynx: Oropharynx is clear.   Eyes:      General: No scleral icterus.     Extraocular Movements: Extraocular movements intact.   Cardiovascular:      Rate and Rhythm: Normal rate.      Pulses: Normal pulses.   Pulmonary:      Effort: Pulmonary effort is normal. No respiratory distress.      Comments: 3L NC  Abdominal:      Palpations: Abdomen is soft.      Tenderness: There is no abdominal tenderness.   Musculoskeletal:      Right lower leg: Edema (Improved from prior) present.      Left lower leg: Edema present.   Skin:     General: Skin is warm.      Capillary Refill: Capillary refill takes less than 2 seconds.      Comments: LE skin wrinkled, volume status improved   Neurological:      Mental Status: She is alert. Mental status is at baseline.      Motor: Weakness present.   Psychiatric:          Mood and Affect: Mood normal.         Behavior: Behavior normal.       ----------------------------------------------------------------------------------------------------------------------    ----------------------------------------------------------------------------------------------------------------------  LABS:    CBC and coagulation:  Results from last 7 days   Lab Units 05/22/23  0202   WBC 10*3/mm3 11.44*   HEMOGLOBIN g/dL 8.5*   HEMATOCRIT % 29.0*   MCV fL 90.6   MCHC g/dL 29.3*   PLATELETS 10*3/mm3 341     Acid/base balance:      Renal and electrolytes:  Results from last 7 days   Lab Units 05/22/23  0202   SODIUM mmol/L 140   POTASSIUM mmol/L 3.4*   CHLORIDE mmol/L 101   CO2 mmol/L 29.0   BUN mg/dL 29*   CREATININE mg/dL 1.06*   CALCIUM mg/dL 8.8   GLUCOSE mg/dL 101*     Estimated Creatinine Clearance: 47.2 mL/min (A) (by C-G formula based on SCr of 1.06 mg/dL (H)).    Liver and pancreatic function:        Invalid input(s): PROT  Endocrine function:  No results found for: HGBA1C  Point of care bedside glucose levels:      Glucose levels from the [facility]:  Results from last 7 days   Lab Units 05/22/23  0202   GLUCOSE mg/dL 101*     Lab Results   Component Value Date    TSH 2.620 03/15/2022     Cardiac:        Cultures:  Lab Results   Component Value Date    COLORU Dark Yellow (A) 03/15/2022    CLARITYU Turbid (A) 03/15/2022    PHUR <=5.0 03/15/2022    GLUCOSEU Negative 03/15/2022    KETONESU Trace (A) 03/15/2022    BLOODU Large (3+) (A) 03/15/2022    NITRITEU Negative 03/15/2022    LEUKOCYTESUR Moderate (2+) (A) 03/15/2022    BILIRUBINUR Negative 03/15/2022    UROBILINOGEN 1.0 E.U./dL 03/15/2022    RBCUA 31-50 (A) 03/15/2022    WBCUA Too Numerous to Count (A) 03/15/2022    BACTERIA 4+ (A) 03/15/2022     Microbiology Results (last 10 days)     ** No results found for the last 240 hours. **          No results found for: PREGTESTUR, PREGSERUM, HCG, HCGQUANT  Pain Management Panel          View : No data to  display.                      I have personally looked at the labs and they are summarized above.  ----------------------------------------------------------------------------------------------------------------------  Detailed radiology reports for the last 24 hours:    Imaging Results (Last 24 Hours)     ** No results found for the last 24 hours. 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 Had pleural effusions that now improved with diuresis and this is likely why her dyspnea has improved from initial decline on 5/20. Echo reviewed and has severe PAH with repeat cardiac w/u showing inferior t-wave inversions, elevated BNP, troponin, and d-dimer but eval by cardiology and after discussion agree medical management is best as LHC or stress would likely precipitate worsened pulm edema or other more acute complication. Discussed CTPE as well to r/o PE but given difficulty with initial CT over weekend, patient hesitant to consider additional scan.   -Palliative care following and while patient holds out optimism that her condition may improve, after discussions over multiple days she agrees home hospice is best in line with her goals and hospice referral placed 5/23. Cont comfort care in interim  -Patient will need home caretakers for her ADLs and home resources, will remain inpatient while these are being arranged  -Will cont Losartan and lasix given clinical improvement and BP tolerating well. Dispo planning as noted above, SNF w/comfort measures vs home hospice with home caretakers pend.  -No updated plan given holiday weekend regarding arrangement of needed help at home to discharge with hospice  and unable to be accepted to SNF for comfort care (which she has been consistently refusing to consider). Likely to remain inpatient until dispo arranged early next week.    - - Chronic - -     #COPD hx  #Chronic iron def anemia  #Debility secondary to chronic illness  #Left hyonephrosis s/p uretal stent    VTE Prophylaxis:   Mechanical Order History:     None      Pharmalogical Order History:      Ordered     Dose Route Frequency Stop    05/22/23 1049  Enoxaparin Sodium (LOVENOX) syringe 40 mg         40 mg SC Daily --                Code Status and Medical Interventions:   Ordered at: 05/23/23 1056     Level Of Support Discussed With:    Patient    Next of Kin (If No Surrogate)     Code Status (Patient has no pulse and is not breathing):    No CPR (Do Not Attempt to Resuscitate)     Medical Interventions (Patient has pulse or is breathing):    Comfort Measures     Release to patient:    Routine Release         Disposition: Pend snf vs home hospice    I have reviewed any copied/forwarded text or data, verified its accuracy, and updated as necessary above.    Nelson Mejia MD  [facility] Hospitalist  05/28/23  14:28 EDT

## 2023-05-28 NOTE — PLAN OF CARE
Goal Outcome Evaluation:               Pt resting in bed at this time. Pt desats when turning so pt  is on 6L/nc at this time. No other complaints at this time.

## 2023-05-29 LAB — GLUCOSE BLDC GLUCOMTR-MCNC: 95 MG/DL (ref 70–130)

## 2023-05-29 PROCEDURE — 25010000002 ENOXAPARIN PER 10 MG: Performed by: STUDENT IN AN ORGANIZED HEALTH CARE EDUCATION/TRAINING PROGRAM

## 2023-05-29 PROCEDURE — 94799 UNLISTED PULMONARY SVC/PX: CPT

## 2023-05-29 PROCEDURE — 97530 THERAPEUTIC ACTIVITIES: CPT

## 2023-05-29 PROCEDURE — 97110 THERAPEUTIC EXERCISES: CPT

## 2023-05-29 PROCEDURE — 94761 N-INVAS EAR/PLS OXIMETRY MLT: CPT

## 2023-05-29 PROCEDURE — 99231 SBSQ HOSP IP/OBS SF/LOW 25: CPT | Performed by: STUDENT IN AN ORGANIZED HEALTH CARE EDUCATION/TRAINING PROGRAM

## 2023-05-29 PROCEDURE — 82948 REAGENT STRIP/BLOOD GLUCOSE: CPT

## 2023-05-29 RX ORDER — POTASSIUM CHLORIDE 20 MEQ/1
40 TABLET, EXTENDED RELEASE ORAL 2 TIMES DAILY WITH MEALS
Status: COMPLETED | OUTPATIENT
Start: 2023-05-29 | End: 2023-05-30

## 2023-05-29 RX ADMIN — Medication 10 ML: at 20:50

## 2023-05-29 RX ADMIN — LOSARTAN POTASSIUM 50 MG: 50 TABLET, FILM COATED ORAL at 08:46

## 2023-05-29 RX ADMIN — IPRATROPIUM BROMIDE AND ALBUTEROL SULFATE 3 ML: .5; 2.5 SOLUTION RESPIRATORY (INHALATION) at 07:22

## 2023-05-29 RX ADMIN — OXYCODONE HYDROCHLORIDE AND ACETAMINOPHEN 1 TABLET: 7.5; 325 TABLET ORAL at 17:20

## 2023-05-29 RX ADMIN — VITAMINS A AND D OINTMENT 1 APPLICATION: 15.5; 53.4 OINTMENT TOPICAL at 08:48

## 2023-05-29 RX ADMIN — GUAIFENESIN 600 MG: 600 TABLET, EXTENDED RELEASE ORAL at 08:46

## 2023-05-29 RX ADMIN — GUAIFENESIN 600 MG: 600 TABLET, EXTENDED RELEASE ORAL at 20:49

## 2023-05-29 RX ADMIN — OXYCODONE HYDROCHLORIDE AND ACETAMINOPHEN 1 TABLET: 7.5; 325 TABLET ORAL at 00:07

## 2023-05-29 RX ADMIN — IPRATROPIUM BROMIDE AND ALBUTEROL SULFATE 3 ML: .5; 2.5 SOLUTION RESPIRATORY (INHALATION) at 13:44

## 2023-05-29 RX ADMIN — ATORVASTATIN CALCIUM 20 MG: 20 TABLET, FILM COATED ORAL at 20:49

## 2023-05-29 RX ADMIN — FUROSEMIDE 40 MG: 40 TABLET ORAL at 08:46

## 2023-05-29 RX ADMIN — OXYCODONE HYDROCHLORIDE AND ACETAMINOPHEN 1 TABLET: 7.5; 325 TABLET ORAL at 08:46

## 2023-05-29 RX ADMIN — BUDESONIDE 0.5 MG: 0.5 SUSPENSION RESPIRATORY (INHALATION) at 07:22

## 2023-05-29 RX ADMIN — NYSTATIN: 100000 POWDER TOPICAL at 08:48

## 2023-05-29 RX ADMIN — IPRATROPIUM BROMIDE AND ALBUTEROL SULFATE 3 ML: .5; 2.5 SOLUTION RESPIRATORY (INHALATION) at 18:57

## 2023-05-29 RX ADMIN — LEVOTHYROXINE SODIUM 100 MCG: 100 TABLET ORAL at 05:53

## 2023-05-29 RX ADMIN — POTASSIUM CHLORIDE 40 MEQ: 20 TABLET, EXTENDED RELEASE ORAL at 17:20

## 2023-05-29 RX ADMIN — VITAMINS A AND D OINTMENT 1 APPLICATION: 15.5; 53.4 OINTMENT TOPICAL at 20:49

## 2023-05-29 RX ADMIN — BUDESONIDE 0.5 MG: 0.5 SUSPENSION RESPIRATORY (INHALATION) at 18:57

## 2023-05-29 RX ADMIN — NYSTATIN: 100000 POWDER TOPICAL at 20:50

## 2023-05-29 RX ADMIN — METOPROLOL SUCCINATE 25 MG: 25 TABLET, EXTENDED RELEASE ORAL at 08:46

## 2023-05-29 RX ADMIN — Medication 10 ML: at 20:49

## 2023-05-29 RX ADMIN — ENOXAPARIN SODIUM 40 MG: 40 INJECTION SUBCUTANEOUS at 08:46

## 2023-05-29 RX ADMIN — Medication 10 ML: at 08:48

## 2023-05-29 RX ADMIN — ASPIRIN 81 MG: 81 TABLET, COATED ORAL at 08:46

## 2023-05-29 RX ADMIN — FERROUS SULFATE TAB 325 MG (65 MG ELEMENTAL FE) 325 MG: 325 (65 FE) TAB at 08:46

## 2023-05-29 NOTE — PROGRESS NOTES
[facility] HOSPITALIST PROGRESS NOTE     Patient Identification:  Name:  Orquidea Rosenberg  Age:  77 y.o.  Sex:  female  :  1945  MRN:  [id number]  Visit Number:  [id number]  ROOM: [address] [place] [address]     Primary Care Provider:  Jackeline Denson APRN    Length of stay in inpatient status:  7    Subjective     Chief Compliant:  No chief complaint on file.      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  Patient currently comfort measures and awaiting family arranging disposition for home with hospice.  Patient would benefit from SNF however she adamantly declines    Objective     Current Hospital Meds:  aspirin, 81 mg, Oral, Daily  atorvastatin, 20 mg, Oral, Nightly  budesonide, 0.5 mg, Nebulization, BID - RT  enoxaparin, 40 mg, Subcutaneous, Daily  ferrous sulfate, 325 mg, Oral, Daily With Breakfast  furosemide, 40 mg, Oral, Daily  guaiFENesin, 600 mg, Oral, Q12H  ipratropium-albuterol, 3 mL, Nebulization, Q6H While Awake - RT  levothyroxine, 100 mcg, Oral, Q AM  losartan, 50 mg, Oral, Q24H  magic barrier cream, 1 application, Topical, BID  metoprolol succinate XL, 25 mg, Oral, Q24H  nystatin, , Topical, Q12H  oxyCODONE-acetaminophen, 1 tablet, Oral, Q8H  potassium chloride, 40 mEq, Oral, BID With Meals  sodium chloride, 10 mL, Intravenous, Q12H  sodium chloride, 10 mL, Intravenous, Q12H  sodium chloride, 10 mL, Intravenous, Q12H  sodium chloride, 10 mL, Intravenous, Q12H  sodium chloride, 10 mL, Intravenous, Q12H      Pharmacy Consult,       ----------------------------------------------------------------------------------------------------------------------  Vital Signs:  Temp:  [97.8 °F (36.6 °C)-99.6 °F (37.6 °C)] 97.8 °F (36.6 °C)  Heart Rate:  [65-86]  65  Resp:  [18-21] 18  BP: (128-161)/(46-69) 161/69  SpO2:  [98 %-99 %] 98 %  on  Flow (L/min):  [3-6] 3;   Device (Oxygen Therapy): humidified;nasal cannula  Body mass index is 28.12 kg/m².      Intake/Output Summary (Last 24 hours) at 5/29/2023 1804  Last data filed at 5/29/2023 1354  Gross per 24 hour   Intake 580 ml   Output --   Net 580 ml      ----------------------------------------------------------------------------------------------------------------------  Physical exam:  Constitutional:  Well-developed and well-nourished.  No acute distress.      HENT:  Head:  Normocephalic and atraumatic.  Mouth:  Moist mucous membranes.    Eyes:  Conjunctivae and EOM are normal. No scleral icterus.    Cardiovascular:  Normal rate, regular rhythm and normal heart sounds with no murmur.  Pulmonary/Chest:  No respiratory distress, no wheezes, no crackles, with normal breath sounds and good air movement.  Abdominal:  Soft.  Bowel sounds are normal.  No distension and no tenderness.   Musculoskeletal:  No tenderness and no deformity.  No red or swollen joints anywhere.  Functional ROM intact.   Neurological:  Alert and oriented to person, place, and time.  No cranial nerve deficit.  No tongue deviation.  No facial droop.  No slurred speech. Intact Sensation throughout  Skin:  Skin is warm and dry. No rash or lesion noted. No pallor.   Peripheral vascular:  Pulses in all 4 extremities with no clubbing, no cyanosis, trace edema.  Psychiatric: Appropriate mood and affect, pleasant.   ----------------------------------------------------------------------------------------------------------------------     No results found for: URINECX  No results found for: BLOODCX    I have personally looked at the labs and they are summarized above.  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  Pleural effusions improved with diuresis and likely the source of her dyspnea that presented on 5/20.      -Severe pulmonary arterial hypertension on echocardiogram    -Patient seen and evaluated cardiology and recommended for medical management given patient's current medical status and prognosis as left heart catheter stress would likely have far more risk than benefit    -Patient not interested in CT PE at this time    -Palliative care consulted and following along and patient is agreeable to home hospice and family working to arrange for home care    -Continue losartan and Lasix given improvement in blood pressure tolerating well    -Will need to discuss tomorrow with patient's family and palliative care to try to find safe disposition    Chronic:    COPD, not in exacerbation  Chronic iron deficiency anemia  Debility secondary to chronic illness  Left hydronephrosis status post ureteral stent    Copied text in portions of the note has been reviewed and is accurate as of .TODAYDATE    VTE Prophylaxis:   Mechanical Order History:     None      Pharmalogical Order History:      Ordered     Dose Route Frequency Stop    05/22/23 1049  Enoxaparin Sodium (LOVENOX) syringe 40 mg         40 mg SC Daily --                Disposition likely home hospice    London Mota DO  [facility] Hospitalist  05/29/23  18:04 EDT

## 2023-05-29 NOTE — THERAPY TREATMENT NOTE
Patient Name: Orquidea Rosenberg  : 1945    MRN: [id number]                              Today's Date: 2023       Admit Date: 2023    Visit Dx: No diagnosis found.  Patient Active Problem List   Diagnosis   • COVID-19   • Vulvar cancer   • Physical debility   • Bacteremia   • COPD exacerbation   • Debility   • Hypoxia   • Acute respiratory failure with hypoxia     Past Medical History:   Diagnosis Date   • Arthritis    • COPD (chronic obstructive pulmonary disease)    • Elevated cholesterol    • History of transfusion    • Hypertension    • Vulval ca      Past Surgical History:   Procedure Laterality Date   • RADICAL VULVECTOMY  2019   • RADICAL VULVECTOMY Bilateral 2019      General Information     Row Name 23 1107          OT Time and Intention    Document Type progress note/recertification  -     Mode of Treatment individual therapy;occupational therapy  -     Row Name 23 1107          General Information    Patient Profile Reviewed yes  -KP     Existing Precautions/Restrictions fall;oxygen therapy device and L/min  -     Barriers to Rehab medically complex  -KP     Row Name 23 1107          Cognition    Orientation Status (Cognition) oriented x 3  -KP     Row Name 23 1107          Safety Issues, Functional Mobility    Safety Issues Affecting Function (Mobility) insight into deficits/self-awareness  -     Impairments Affecting Function (Mobility) balance;endurance/activity tolerance;strength;pain;cognition  -KP     Cognitive Impairments, Mobility Safety/Performance awareness, need for assistance;insight into deficits/self-awareness  -           User Key  (r) = Recorded By, (t) = Taken By, (c) = Cosigned By    Initials Name Provider Type     Samira Baca, OT Occupational Therapist                 Mobility/ADL's     Row Name 23 1108          Transfers    Comment, (Transfers) Pt deferred out of bed treatment. She verbalized she questioned whether she  "would be able to return home with use of WC through house for mobility. Stated she tells people to give her \"one more day\" about getting out of bed, but then does not feel like it the next day.  -     Row Name 05/29/23 1108          Activities of Daily Living    BADL Assessment/Intervention bathing;upper body dressing;lower body dressing;toileting  -     Row Name 05/29/23 1108          Bathing Assessment/Intervention    Catherine Level (Bathing) bathing skills;upper body;minimum assist (75% patient effort);lower body;maximum assist (25% patient effort)  -     Row Name 05/29/23 1108          Upper Body Dressing Assessment/Training    Catherine Level (Upper Body Dressing) upper body dressing skills;minimum assist (75% patient effort)  -[facility] Name 05/29/23 1108          Lower Body Dressing Assessment/Training    Catherine Level (Lower Body Dressing) lower body dressing skills;moderate assist (50% patient effort);maximum assist (25% patient effort)  -[facility] Name 05/29/23 1108          Toileting Assessment/Training    Catherine Level (Toileting) toileting skills;maximum assist (25% patient effort);dependent (less than 25% patient effort)  -           User Key  (r) = Recorded By, (t) = Taken By, (c) = Cosigned By    Initials Name Provider Type    Samira Rueda OT Occupational Therapist               Obj/Interventions     Row Name 05/29/23 1109          Motor Skills    Motor Skills functional endurance  -     Functional Endurance poor plus  -     Therapeutic Exercise other (see comments)  BUE AROM exercises through functional movement patterns X10 reps X2 sets  -           User Key  (r) = Recorded By, (t) = Taken By, (c) = Cosigned By    Initials Name Provider Type    Samira Rueda OT Occupational Therapist               Goals/Plan     Row Name 05/29/23 1112           Activity Tolerance Goal 1 (OT)    Activity Tolerance Goal 1 (OT) Increase to enhance self care/mobility " performance  -     Activity Level (Endurance Goal 1, OT) 15 min activity  -     Time Frame (Activity Tolerance Goal 1, OT) by discharge;other (see comments)  ongoing 5-10 minutes  -     Row Name 05/29/23 1112          Therapy Assessment/Plan (OT)    Planned Therapy Interventions (OT) activity tolerance training;BADL retraining;adaptive equipment training  -           User Key  (r) = Recorded By, (t) = Taken By, (c) = Cosigned By    Initials Name Provider Type    Samira Rueda OT Occupational Therapist               Clinical Impression     Row Name 05/29/23 1110          Pain Assessment    Pretreatment Pain Rating 1/10  -     Posttreatment Pain Rating 1/10  -     Pain Location generalized  -     Row Name 05/29/23 1110          Plan of Care Review    Plan of Care Reviewed With patient  -     Progress no change  -     Outcome Evaluation Patient seen for OT treatment. She deferred out of bed activity/treatment, and acknowledged that she was aware she needed to get out of the bed but continued to decline it when offered daily. She requires completion of ADL from bed level at this time with significant assist due to limited activity tolerance. BUE exercises performed with frequent rest breaks. Continue plan of care.  -     Row Name 05/29/23 1110          Therapy Assessment/Plan (OT)    Criteria for Skilled Therapeutic Interventions Met (OT) yes;meets criteria  -     Therapy Frequency (OT) 3 times/wk  3-5x/week as able and available for functional progress  -     Predicted Duration of Therapy Intervention (OT) discharge  -     Row Name 05/29/23 1110          Positioning and Restraints    Pre-Treatment Position in bed  -     Post Treatment Position bed  -     In Bed call light within reach;with nsg  -           User Key  (r) = Recorded By, (t) = Taken By, (c) = Cosigned By    Initials Name Provider Type    Samira Rueda OT Occupational Therapist               Outcome Measures      Row Name 05/29/23 0846          How much help from another person do you currently need...    Turning from your back to your side while in flat bed without using bedrails? 2  -DH     Moving from lying on back to sitting on the side of a flat bed without bedrails? 2  -DH     Moving to and from a bed to a chair (including a wheelchair)? 2  -DH     Standing up from a chair using your arms (e.g., wheelchair, bedside chair)? 2  -DH     Climbing 3-5 steps with a railing? 1  -DH     To walk in hospital room? 2  -DH     AM-PAC 6 Clicks Score (PT) 11  -DH     Highest level of mobility 4 --> Transferred to chair/commode  -           User Key  (r) = Recorded By, (t) = Taken By, (c) = Cosigned By    Initials Name Provider Type     Cinda Zepeda, RN Registered Nurse                  OT Recommendation and Plan  Planned Therapy Interventions (OT): activity tolerance training, BADL retraining, adaptive equipment training  Therapy Frequency (OT): 3 times/wk (3-5x/week as able and available for functional progress)  Plan of Care Review  Plan of Care Reviewed With: patient  Progress: no change  Outcome Evaluation: Patient seen for OT treatment. She deferred out of bed activity/treatment, and acknowledged that she was aware she needed to get out of the bed but continued to decline it when offered daily. She requires completion of ADL from bed level at this time with significant assist due to limited activity tolerance. BUE exercises performed with frequent rest breaks. Continue plan of care.     Time Calculation:    Time Calculation- OT     Row Name 05/29/23 1113             Time Calculation- OT    OT Received On 05/29/23  -            User Key  (r) = Recorded By, (t) = Taken By, (c) = Cosigned By    Initials Name Provider Type    Samira Rueda OT Occupational Therapist              Therapy Charges for Today     Code Description Service Date Service Provider Modifiers Qty    [id number]  OT THERAPEUTIC ACT EA 15 MIN  5/29/2023 Samira Baca, OT GO 1    [id number]  OT THER PROC EA 15 MIN 5/29/2023 Samira Baca, OT GO 1               Samira Baca OT  5/29/2023

## 2023-05-29 NOTE — PLAN OF CARE
Goal Outcome Evaluation:  Plan of Care Reviewed With: patient        Progress: no change  Outcome Evaluation: Pt rested in bed this shift. Pt had no complaints noted this shift. Pressure injury found on pt's right ear, documented, Dr. Mota made aware. Dr. Mota deferred wound care consult as pt is comfort measures. Wound care for other wounds done per order. Will continue to follow plan of care.

## 2023-05-29 NOTE — PLAN OF CARE
Goal Outcome Evaluation:           Progress: no change          Pt is resting an has had not acute changes at this time. Will continue to follow current plan of care and monitor.

## 2023-05-29 NOTE — THERAPY TREATMENT NOTE
Acute Care - Physical Therapy Treatment Note   [place]     Patient Name: Orquidea Rosenberg  : 1945  MRN: [id number]  Today's Date: 2023   Onset of Illness/Injury or Date of Surgery: 23 (pt. admitted ; familiar to PT from previous swing bed status; pt. now readmitted to inpatient status d/t respiratory decline.)  Visit Dx:   No diagnosis found.  Patient Active Problem List   Diagnosis   • COVID-19   • Vulvar cancer   • Physical debility   • Bacteremia   • COPD exacerbation   • Debility   • Hypoxia   • Acute respiratory failure with hypoxia     Past Medical History:   Diagnosis Date   • Arthritis    • COPD (chronic obstructive pulmonary disease)    • Elevated cholesterol    • History of transfusion    • Hypertension    • Vulval ca      Past Surgical History:   Procedure Laterality Date   • RADICAL VULVECTOMY  2019   • RADICAL VULVECTOMY Bilateral 2019     PT Assessment (last 12 hours)     PT Evaluation and Treatment     Row Name 23 1135          Physical Therapy Time and Intention    Subjective Information complains of;weakness;dyspnea;fatigue  -     Document Type therapy note (daily note)  -HC     Mode of Treatment physical therapy  -HC     Patient Effort good  -     Comment Pt and NUPUR Loo in agreement for PT. Pt complained of fatigue and was SOB throughout Rx. Pt in agreement for supine exercises only on this date.  -     Row Name 23 1135          General Information    Patient Profile Reviewed yes  -HC     Existing Precautions/Restrictions fall;oxygen therapy device and L/min  -HC     Row Name 23 1135          Living Environment    Primary Care Provided by self  -     Row Name 23 1135          Home Use of Assistive/Adaptive Equipment    Equipment Currently Used at Home --  owns rollator, w/c  -HC     Row Name 23 1135          Cognition    Affect/Mental Status (Cognition) WFL  -     Orientation Status (Cognition) oriented x 3  -HC     Follows  Commands (Cognition) WFL  -     Personal Safety Interventions fall prevention program maintained;nonskid shoes/slippers when out of bed;supervised activity  -     Row Name 05/29/23 1135          Mobility    Extremity Weight-bearing Status --  no restrictions  -     Row Name 05/29/23 1135          Bed Mobility    Rolling Left [place] (Bed Mobility) minimum assist (75% patient effort)  -HC     Rolling Right [place] (Bed Mobility) minimum assist (75% patient effort)  -     Scooting/Bridging [place] (Bed Mobility) maximum assist (25% patient effort);2 person assist  -     Comment, (Bed Mobility) L/R rolling for hygiene  -     Row Name 05/29/23 1135          Safety Issues, Functional Mobility    Impairments Affecting Function (Mobility) balance;endurance/activity tolerance;strength;pain  -     Row Name 05/29/23 1135          Motor Skills    Therapeutic Exercise other (see comments)  Supine: AP, Inversion/eversion, SLR, hip abd, heel slide  -     Row Name             Wound 05/01/23 1414 Left vagina    Wound - Properties Group Placement Date: 05/01/23  -LB Placement Time: 1414  -LB Present on Hospital Admission: Y  -LB Side: Left  -LB Location: vagina  -LB    Retired Wound - Properties Group Placement Date: 05/01/23  -LB Placement Time: 1414  -LB Present on Hospital Admission: Y  -LB Side: Left  -LB Location: vagina  -LB    Retired Wound - Properties Group Date first assessed: 05/01/23  -LB Time first assessed: 1414  -LB Present on Hospital Admission: Y  -LB Side: Left  -LB Location: vagina  -LB    Row Name             Wound 05/22/23 1425 coccyx Pressure Injury    Wound - Properties Group Placement Date: 05/22/23  -EB Placement Time: 1425  -EB Present on Hospital Admission: N  -EB Location: coccyx  -EB Primary Wound Type: Pressure inj  -EB    Retired Wound - Properties Group Placement Date: 05/22/23  -EB Placement Time: 1425  -EB Present on Hospital Admission: N  -EB Location: coccyx  -EB  Primary Wound Type: Pressure inj  -EB    Retired Wound - Properties Group Date first assessed: 05/22/23  -EB Time first assessed: 1425  -EB Present on Hospital Admission: N  -EB Location: coccyx  -EB Primary Wound Type: Pressure inj  -EB    Row Name 05/29/23 1135          Positioning and Restraints    Pre-Treatment Position in bed  -     Post Treatment Position bed  -HC     In Bed notified nsg;call light within reach;encouraged to call for assist;exit alarm on;side rails up x3;side lying right  -     Row Name 05/29/23 1135          Therapy Assessment/Plan (PT)    Rehab Potential (PT) good, to achieve stated therapy goals  -     Criteria for Skilled Interventions Met (PT) yes;meets criteria  -     Therapy Frequency (PT) 2 times/wk  2-5x/wk  -     Problem List (PT) problems related to;balance;mobility;strength;pain  -     Activity Limitations Related to Problem List (PT) unable to ambulate safely;unable to transfer safely;BADLs not performed adequately or safely  -     Row Name 05/29/23 1135          Physical Therapy Goals    Bed Mobility Goal Selection (PT) bed mobility, PT goal 1  -     Transfer Goal Selection (PT) transfer, PT goal 1  -     Gait Training Goal Selection (PT) gait training, PT goal 1  -     Row Name 05/29/23 1135          Bed Mobility Goal 1 (PT)    Activity/Assistive Device (Bed Mobility Goal 1, PT) rolling to left;rolling to right;scooting;sit to supine;supine to sit  -     [place] Level/Cues Needed (Bed Mobility Goal 1, PT) modified independence  -     Time Frame (Bed Mobility Goal 1, PT) by discharge  -     Row Name 05/29/23 1135          Transfer Goal 1 (PT)    Activity/Assistive Device (Transfer Goal 1, PT) sit-to-stand/stand-to-sit;bed-to-chair/chair-to-bed;toilet  -     [place] Level/Cues Needed (Transfer Goal 1, PT) supervision required  -     Time Frame (Transfer Goal 1, PT) by discharge  -     Row Name 05/29/23 1135          Gait Training Goal 1  (PT)    Activity/Assistive Device (Gait Training Goal 1, PT) gait (walking locomotion);assistive device use;decrease fall risk;diminish gait deviation;improve balance and speed;increase endurance/gait distance  appropriate a.d.  -HC     [place] Level (Gait Training Goal 1, PT) standby assist  -HC     Distance (Gait Training Goal 1, PT) 40  -HC     Time Frame (Gait Training Goal 1, PT) by discharge  -HC           User Key  (r) = Recorded By, (t) = Taken By, (c) = Cosigned By    Initials Name Provider Type    Ady Serrano, RN Registered Nurse    Veronica Bansal RN Registered Nurse    Gayle Moreno, DEIDRA Physical Therapist Assistant                Physical Therapy Education     Title: PT OT SLP Therapies (Done)     Topic: Physical Therapy (Done)     Point: Mobility training (Done)     Learning Progress Summary           Patient Acceptance, E,TB, DU,VU by  at 5/29/2023 1139    Acceptance, E,TB, VU by KK at 5/27/2023 0957    Acceptance, E,TB, VU by KM at 5/25/2023 1514    Acceptance, E, VU by EB at 5/22/2023 1536                   Point: Home exercise program (Done)     Learning Progress Summary           Patient Acceptance, E,TB, DU,VU by  at 5/29/2023 1139    Acceptance, E,TB, VU by KK at 5/27/2023 0957    Acceptance, E,TB, VU by KM at 5/25/2023 1514    Acceptance, E, VU by EB at 5/22/2023 1536                   Point: Body mechanics (Done)     Learning Progress Summary           Patient Acceptance, E,TB, DU,VU by HC at 5/29/2023 1139    Acceptance, E,TB, VU by KK at 5/27/2023 0957    Acceptance, E,TB, VU by KM at 5/25/2023 1514    Acceptance, E, VU by EB at 5/22/2023 1536                   Point: Precautions (Done)     Learning Progress Summary           Patient Acceptance, E,TB, DU,VU by HC at 5/29/2023 1139    Acceptance, E,TB, VU by KK at 5/27/2023 0957    Acceptance, E,TB, VU by KM at 5/25/2023 1514    Acceptance, E, VU by EB at 5/22/2023 1536                               User Key      Initials Effective Dates Name Provider Type Discipline     05/24/22 -  Ander Shirley, PT Physical Therapist PT    EB 09/22/22 -  Ady John, RN Registered Nurse Nurse     06/27/22 -  Lesley Reyes, RN Registered Nurse Nurse     01/20/23 -  Gayle Peralta PTA Physical Therapist Assistant PT              PT Recommendation and Plan  Therapy Frequency (PT): 2 times/wk (2-5x/wk)          Time Calculation:    PT Charges     Row Name 05/29/23 1140             Time Calculation    PT Received On 05/29/23  -HC         Time Calculation- PT    Total Timed Code Minutes- PT 23 minute(s)  -HC            User Key  (r) = Recorded By, (t) = Taken By, (c) = Cosigned By    Initials Name Provider Type     Gayle Peralta PTA Physical Therapist Assistant              Therapy Charges for Today     Code Description Service Date Service Provider Modifiers Qty    36266527014  PT THER PROC EA 15 MIN 5/29/2023 Gayle Peralta PTA GP, CQ 1    56189060160  PT THERAPEUTIC ACT EA 15 MIN 5/29/2023 Gayle Peralta PTA GP, CQ 1          PT G-Codes  AM-PAC 6 Clicks Score (PT): 11    Gayle Peralta PTA  5/29/2023

## 2023-05-30 PROCEDURE — 94761 N-INVAS EAR/PLS OXIMETRY MLT: CPT

## 2023-05-30 PROCEDURE — 99231 SBSQ HOSP IP/OBS SF/LOW 25: CPT | Performed by: STUDENT IN AN ORGANIZED HEALTH CARE EDUCATION/TRAINING PROGRAM

## 2023-05-30 PROCEDURE — 94799 UNLISTED PULMONARY SVC/PX: CPT

## 2023-05-30 PROCEDURE — 25010000002 ENOXAPARIN PER 10 MG: Performed by: STUDENT IN AN ORGANIZED HEALTH CARE EDUCATION/TRAINING PROGRAM

## 2023-05-30 RX ADMIN — Medication 10 ML: at 08:29

## 2023-05-30 RX ADMIN — ATORVASTATIN CALCIUM 20 MG: 20 TABLET, FILM COATED ORAL at 20:49

## 2023-05-30 RX ADMIN — Medication 10 ML: at 08:28

## 2023-05-30 RX ADMIN — POTASSIUM CHLORIDE 40 MEQ: 20 TABLET, EXTENDED RELEASE ORAL at 08:29

## 2023-05-30 RX ADMIN — Medication 10 ML: at 20:50

## 2023-05-30 RX ADMIN — OXYCODONE HYDROCHLORIDE AND ACETAMINOPHEN 1 TABLET: 7.5; 325 TABLET ORAL at 00:06

## 2023-05-30 RX ADMIN — METOPROLOL SUCCINATE 25 MG: 25 TABLET, EXTENDED RELEASE ORAL at 08:30

## 2023-05-30 RX ADMIN — GUAIFENESIN 600 MG: 600 TABLET, EXTENDED RELEASE ORAL at 08:30

## 2023-05-30 RX ADMIN — NYSTATIN: 100000 POWDER TOPICAL at 08:30

## 2023-05-30 RX ADMIN — FUROSEMIDE 40 MG: 40 TABLET ORAL at 08:30

## 2023-05-30 RX ADMIN — Medication 10 ML: at 20:49

## 2023-05-30 RX ADMIN — BUDESONIDE 0.5 MG: 0.5 SUSPENSION RESPIRATORY (INHALATION) at 07:15

## 2023-05-30 RX ADMIN — LEVOTHYROXINE SODIUM 100 MCG: 100 TABLET ORAL at 05:34

## 2023-05-30 RX ADMIN — GUAIFENESIN 600 MG: 600 TABLET, EXTENDED RELEASE ORAL at 20:49

## 2023-05-30 RX ADMIN — FERROUS SULFATE TAB 325 MG (65 MG ELEMENTAL FE) 325 MG: 325 (65 FE) TAB at 08:30

## 2023-05-30 RX ADMIN — IPRATROPIUM BROMIDE AND ALBUTEROL SULFATE 3 ML: .5; 2.5 SOLUTION RESPIRATORY (INHALATION) at 07:16

## 2023-05-30 RX ADMIN — OXYCODONE HYDROCHLORIDE AND ACETAMINOPHEN 1 TABLET: 7.5; 325 TABLET ORAL at 08:30

## 2023-05-30 RX ADMIN — VITAMINS A AND D OINTMENT 1 APPLICATION: 15.5; 53.4 OINTMENT TOPICAL at 20:49

## 2023-05-30 RX ADMIN — NYSTATIN: 100000 POWDER TOPICAL at 20:49

## 2023-05-30 RX ADMIN — BUDESONIDE 0.5 MG: 0.5 SUSPENSION RESPIRATORY (INHALATION) at 18:39

## 2023-05-30 RX ADMIN — IPRATROPIUM BROMIDE AND ALBUTEROL SULFATE 3 ML: .5; 2.5 SOLUTION RESPIRATORY (INHALATION) at 18:39

## 2023-05-30 RX ADMIN — LOSARTAN POTASSIUM 50 MG: 50 TABLET, FILM COATED ORAL at 08:29

## 2023-05-30 RX ADMIN — ASPIRIN 81 MG: 81 TABLET, COATED ORAL at 08:30

## 2023-05-30 RX ADMIN — OXYCODONE HYDROCHLORIDE AND ACETAMINOPHEN 1 TABLET: 7.5; 325 TABLET ORAL at 16:28

## 2023-05-30 RX ADMIN — IPRATROPIUM BROMIDE AND ALBUTEROL SULFATE 3 ML: .5; 2.5 SOLUTION RESPIRATORY (INHALATION) at 13:25

## 2023-05-30 RX ADMIN — ENOXAPARIN SODIUM 40 MG: 40 INJECTION SUBCUTANEOUS at 08:28

## 2023-05-30 RX ADMIN — VITAMINS A AND D OINTMENT 1 APPLICATION: 15.5; 53.4 OINTMENT TOPICAL at 08:30

## 2023-05-30 NOTE — CASE MANAGEMENT/SOCIAL WORK
Discharge Planning Assessment   [place]     Patient Name: Orquidea Rosenberg  MRN: [id number]  Today's Date: 5/30/2023    Admit Date: 5/22/2023       Discharge Plan     Row Name 05/30/23 1032       Plan    Plan Pt is comfort measures. SS spoke with Pt's daughter Lilli [phone] who states caregivers have been secured, can start on 05/31/23 and [facility] to deliver DME tomorrow. SS left message for [facility] per Dc [phone] to return SS call.  notified Dr. Mota and Aysha Saucedo, Palliative care APRN.  to follow.                Jaclyn Lance, ANASTASIAW

## 2023-05-30 NOTE — PROGRESS NOTES
[facility] HOSPITALIST PROGRESS NOTE     Patient Identification:  Name:  Orquidea Rosenberg  Age:  77 y.o.  Sex:  female  :  1945  MRN:  [id number]  Visit Number:  [id number]  ROOM: [address] [place] [address]     Primary Care Provider:  Jackeline Denson APRN    Length of stay in inpatient status:  8    Subjective     Chief Compliant:  No chief complaint on file.      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  Patient currently comfort measures and awaiting family arranging disposition for home with hospice which has now been arranged after discussion with palliative care awaiting delivery of supplies for hospice at home with anticipation of discharge tomorrow    Objective     Current Hospital Meds:  aspirin, 81 mg, Oral, Daily  atorvastatin, 20 mg, Oral, Nightly  budesonide, 0.5 mg, Nebulization, BID - RT  enoxaparin, 40 mg, Subcutaneous, Daily  ferrous sulfate, 325 mg, Oral, Daily With Breakfast  furosemide, 40 mg, Oral, Daily  guaiFENesin, 600 mg, Oral, Q12H  ipratropium-albuterol, 3 mL, Nebulization, Q6H While Awake - RT  levothyroxine, 100 mcg, Oral, Q AM  losartan, 50 mg, Oral, Q24H  magic barrier cream, 1 application, Topical, BID  metoprolol succinate XL, 25 mg, Oral, Q24H  nystatin, , Topical, Q12H  oxyCODONE-acetaminophen, 1 tablet, Oral, Q8H  sodium chloride, 10 mL, Intravenous, Q12H  sodium chloride, 10 mL, Intravenous, Q12H  sodium chloride, 10 mL, Intravenous, Q12H  sodium chloride, 10 mL, Intravenous, Q12H  sodium chloride, 10 mL, Intravenous, Q12H      Pharmacy Consult,       ----------------------------------------------------------------------------------------------------------------------  Vital Signs:  Temp:  [97.8 °F (36.6 °C)-98 °F (36.7 °C)] 98 °F  (36.7 °C)  Heart Rate:  [71-81] 81  Resp:  [16-18] 18  BP: (149-165)/(55-62) 165/62  SpO2:  [95 %-97 %] 96 %  on  Flow (L/min):  [3] 3;   Device (Oxygen Therapy): nasal cannula  Body mass index is 28.12 kg/m².      Intake/Output Summary (Last 24 hours) at 5/30/2023 1822  Last data filed at 5/30/2023 1700  Gross per 24 hour   Intake 600 ml   Output --   Net 600 ml      ----------------------------------------------------------------------------------------------------------------------  Physical exam:  Constitutional:  Well-developed and well-nourished.  No acute distress.      HENT:  Head:  Normocephalic and atraumatic.  Mouth:  Moist mucous membranes.    Eyes:  Conjunctivae and EOM are normal. No scleral icterus.    Cardiovascular:  Normal rate, regular rhythm and normal heart sounds with no murmur.  Pulmonary/Chest:  No respiratory distress, no wheezes, no crackles, with normal breath sounds and good air movement.  Abdominal:  Soft.  Bowel sounds are normal.  No distension and no tenderness.   Musculoskeletal:  No tenderness and no deformity.  No red or swollen joints anywhere.  Functional ROM intact.   Neurological:  Alert and oriented to person, place, and time.  No cranial nerve deficit.  No tongue deviation.  No facial droop.  No slurred speech. Intact Sensation throughout  Skin:  Skin is warm and dry. No rash or lesion noted. No pallor.   Peripheral vascular:  Pulses in all 4 extremities with no clubbing, no cyanosis, trace edema.  Psychiatric: Appropriate mood and affect, pleasant.   ----------------------------------------------------------------------------------------------------------------------     No results found for: URINECX  No results found for: BLOODCX    I have personally looked at the labs and they are summarized above.  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  Pleural effusions improved with diuresis and likely the source of her dyspnea that presented on 5/20.      -Severe pulmonary arterial hypertension on echocardiogram    -Patient seen and evaluated cardiology and recommended for medical management given patient's current medical status and prognosis as left heart catheter stress would likely have far more risk than benefit    -Patient not interested in CT PE at this time    -Palliative care consulted and following along and patient is agreeable to home hospice and family has arranged for home care and hospice supplies being delivered today    -Continue losartan and Lasix given improvement in blood pressure tolerating well    Chronic:    COPD, not in exacerbation  Chronic iron deficiency anemia  Debility secondary to chronic illness  Left hydronephrosis status post ureteral stent    Copied text in portions of the note has been reviewed and is accurate as of 05/30/23    VTE Prophylaxis:   Mechanical Order History:     None      Pharmalogical Order History:      Ordered     Dose Route Frequency Stop    05/22/23 1739  Enoxaparin Sodium (LOVENOX) syringe 40 mg         40 mg SC Daily --                Disposition Home with hospice tomorrow    London Mota DO  [facility] Hospitalist  05/30/23  18:22 EDT

## 2023-05-30 NOTE — PLAN OF CARE
Goal Outcome Evaluation:  Plan of Care Reviewed With: patient        Progress: no change          No acute changes at this time will continue to follow current plan of care and monitor.

## 2023-05-30 NOTE — PLAN OF CARE
Goal Outcome Evaluation:  Pt resting in bed. No acute changes this shift. VSS. Will continue plan of care.

## 2023-05-30 NOTE — PROGRESS NOTES
I was able to speak with pts daughter Lilli to discuss plan at discharge as we are no longer treating her at this time and that her pain is much better under control. Lilli stated that she felt she was in a place and had caregivers arranged and was ready to have Hospice deliver equipment. I let Jaclyn in  know of this as well as Dr Mota. Plan is to deliver equipment tomorrow morning.

## 2023-05-31 PROCEDURE — 94799 UNLISTED PULMONARY SVC/PX: CPT

## 2023-05-31 PROCEDURE — 99239 HOSP IP/OBS DSCHRG MGMT >30: CPT | Performed by: STUDENT IN AN ORGANIZED HEALTH CARE EDUCATION/TRAINING PROGRAM

## 2023-05-31 PROCEDURE — 94761 N-INVAS EAR/PLS OXIMETRY MLT: CPT

## 2023-05-31 PROCEDURE — 25010000002 ENOXAPARIN PER 10 MG: Performed by: STUDENT IN AN ORGANIZED HEALTH CARE EDUCATION/TRAINING PROGRAM

## 2023-05-31 RX ORDER — ASPIRIN 81 MG/1
81 TABLET ORAL DAILY
Qty: 30 TABLET | Refills: 0 | Status: SHIPPED | OUTPATIENT
Start: 2023-06-01 | End: 2023-07-01

## 2023-05-31 RX ORDER — OXYCODONE AND ACETAMINOPHEN 7.5; 325 MG/1; MG/1
1 TABLET ORAL EVERY 8 HOURS
Qty: 15 TABLET | Refills: 0 | Status: SHIPPED | OUTPATIENT
Start: 2023-05-31

## 2023-05-31 RX ORDER — ACETAMINOPHEN 325 MG/1
650 TABLET ORAL EVERY 6 HOURS PRN
Status: DISCONTINUED | OUTPATIENT
Start: 2023-05-31 | End: 2023-06-01 | Stop reason: HOSPADM

## 2023-05-31 RX ORDER — FERROUS SULFATE 325(65) MG
325 TABLET ORAL
Qty: 30 TABLET | Refills: 0 | Status: SHIPPED | OUTPATIENT
Start: 2023-06-01 | End: 2023-07-01

## 2023-05-31 RX ORDER — ATORVASTATIN CALCIUM 20 MG/1
20 TABLET, FILM COATED ORAL NIGHTLY
Qty: 30 TABLET | Refills: 0 | Status: SHIPPED | OUTPATIENT
Start: 2023-05-31 | End: 2023-07-01

## 2023-05-31 RX ORDER — METOPROLOL SUCCINATE 25 MG/1
25 TABLET, EXTENDED RELEASE ORAL
Qty: 30 TABLET | Refills: 0 | Status: SHIPPED | OUTPATIENT
Start: 2023-06-01 | End: 2023-07-01

## 2023-05-31 RX ORDER — FUROSEMIDE 40 MG/1
40 TABLET ORAL DAILY
Qty: 30 TABLET | Refills: 0 | Status: SHIPPED | OUTPATIENT
Start: 2023-06-01 | End: 2023-07-01

## 2023-05-31 RX ORDER — FLUTICASONE FUROATE 100 UG/1
1 POWDER RESPIRATORY (INHALATION)
Qty: 30 EACH | Refills: 0 | Status: SHIPPED | OUTPATIENT
Start: 2023-05-31 | End: 2023-05-31

## 2023-05-31 RX ADMIN — LEVOTHYROXINE SODIUM 100 MCG: 100 TABLET ORAL at 05:20

## 2023-05-31 RX ADMIN — ENOXAPARIN SODIUM 40 MG: 40 INJECTION SUBCUTANEOUS at 08:36

## 2023-05-31 RX ADMIN — BUDESONIDE 0.5 MG: 0.5 SUSPENSION RESPIRATORY (INHALATION) at 19:01

## 2023-05-31 RX ADMIN — BUDESONIDE 0.5 MG: 0.5 SUSPENSION RESPIRATORY (INHALATION) at 07:05

## 2023-05-31 RX ADMIN — ASPIRIN 81 MG: 81 TABLET, COATED ORAL at 08:36

## 2023-05-31 RX ADMIN — IPRATROPIUM BROMIDE AND ALBUTEROL SULFATE 3 ML: .5; 2.5 SOLUTION RESPIRATORY (INHALATION) at 07:06

## 2023-05-31 RX ADMIN — Medication 10 ML: at 08:34

## 2023-05-31 RX ADMIN — Medication 10 ML: at 08:35

## 2023-05-31 RX ADMIN — IPRATROPIUM BROMIDE AND ALBUTEROL SULFATE 3 ML: .5; 2.5 SOLUTION RESPIRATORY (INHALATION) at 13:35

## 2023-05-31 RX ADMIN — GUAIFENESIN 600 MG: 600 TABLET, EXTENDED RELEASE ORAL at 08:36

## 2023-05-31 RX ADMIN — OXYCODONE HYDROCHLORIDE AND ACETAMINOPHEN 1 TABLET: 7.5; 325 TABLET ORAL at 16:23

## 2023-05-31 RX ADMIN — VITAMINS A AND D OINTMENT 1 APPLICATION: 15.5; 53.4 OINTMENT TOPICAL at 08:35

## 2023-05-31 RX ADMIN — VITAMINS A AND D OINTMENT 1 APPLICATION: 15.5; 53.4 OINTMENT TOPICAL at 20:01

## 2023-05-31 RX ADMIN — IPRATROPIUM BROMIDE AND ALBUTEROL SULFATE 3 ML: .5; 2.5 SOLUTION RESPIRATORY (INHALATION) at 19:01

## 2023-05-31 RX ADMIN — FUROSEMIDE 40 MG: 40 TABLET ORAL at 08:36

## 2023-05-31 RX ADMIN — Medication 10 ML: at 20:01

## 2023-05-31 RX ADMIN — OXYCODONE HYDROCHLORIDE AND ACETAMINOPHEN 1 TABLET: 7.5; 325 TABLET ORAL at 00:51

## 2023-05-31 RX ADMIN — METOPROLOL SUCCINATE 25 MG: 25 TABLET, EXTENDED RELEASE ORAL at 08:35

## 2023-05-31 RX ADMIN — NYSTATIN: 100000 POWDER TOPICAL at 08:35

## 2023-05-31 RX ADMIN — ATORVASTATIN CALCIUM 20 MG: 20 TABLET, FILM COATED ORAL at 20:00

## 2023-05-31 RX ADMIN — OXYCODONE HYDROCHLORIDE AND ACETAMINOPHEN 1 TABLET: 7.5; 325 TABLET ORAL at 08:35

## 2023-05-31 RX ADMIN — Medication 10 ML: at 20:02

## 2023-05-31 RX ADMIN — LOSARTAN POTASSIUM 50 MG: 50 TABLET, FILM COATED ORAL at 08:35

## 2023-05-31 RX ADMIN — FERROUS SULFATE TAB 325 MG (65 MG ELEMENTAL FE) 325 MG: 325 (65 FE) TAB at 08:36

## 2023-05-31 RX ADMIN — GUAIFENESIN 600 MG: 600 TABLET, EXTENDED RELEASE ORAL at 20:00

## 2023-05-31 RX ADMIN — NYSTATIN: 100000 POWDER TOPICAL at 20:01

## 2023-05-31 NOTE — PROGRESS NOTES
Pharmacy COPD Maintenance Inhaler Assessment     Pharmacy was consulted for dosing and coverage assessment of COPD maintenance medications for discharge. The patient is currently hospitalized and being treated for COPD exacerbation.     The patient's prior to admission medication list includes: no inhalers.    Based on the most recent GOLD literature, the patient should be using an inhaled medication(s) containing the following drug classes: LABA/LAMA.  Will order Stiolto based on literature and insurance compatibility.  The patient has been counseled on the use of maintenance inhaler including: dosage, administration, cleaning instructions, and beyond use dating.    Thank you,   Irvin Aguilar, Pharmacy Intern  05/31/23  16:14 EDT

## 2023-05-31 NOTE — PLAN OF CARE
Goal Outcome Evaluation:  Plan of Care Reviewed With: patient        Progress: no change     Pt is resting well no acute changes at this time will continue to monitor

## 2023-05-31 NOTE — PROGRESS NOTES
"Palliative Care Daily Progress Note     S: Medical record reviewed, followed up with Primary Dr Mota regarding patient's condition.  pt was resting quietly and did not appear to be in distress. I was able to speak with Pts daughter Lilli to discuss plan for returning home tomorrow with hospice.      O:   Palliative Performance Scale Score:     /46 (BP Location: Right arm, Patient Position: Lying)   Pulse 68   Temp 98 °F (36.7 °C) (Oral)   Resp 18   Ht 167.6 cm (66\")   Wt 79 kg (174 lb 3.2 oz)   SpO2 99%   BMI 28.12 kg/m²     Intake/Output Summary (Last 24 hours) at 5/31/2023 1651  Last data filed at 5/31/2023 1238  Gross per 24 hour   Intake 480 ml   Output --   Net 480 ml       PE:  General Appearance:    Chronically ill appearing, alert, cooperative, NAD   HEENT:    NC/AT, without obvious abnormality, EOMI, anicteric    Neck:   supple, trachea midline, no JVD   Lungs:     Regular unlabored respirations, no wheezes rales or rhonchi noted.    Heart:    RRR, normal S1 and S2, no M/R/G   Abdomen:     Soft, NT, ND, NABS    Extremities:   Moves all extremities, BLE edema   Pulses:   Pulses palpable and equal bilaterally   Skin:   Warm, dry, pale   Neurologic:   A/Ox3, cooperative   Psych:   Calm, appropriate              Meds: Reviewed and changes noted.    Labs:                 Invalid input(s): LABALBU, PROT  Imaging Results (Last 72 Hours)     ** No results found for the last 72 hours. **            Diagnostics: Reviewed    A: Orquidea Rosenberg is a 77 y.o. female admitted on 4/25/2023 with diarrhea, nausea, vomiting, and abdominal pain. 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 Orquidea had partial chemotherapy and radiation, however since that time has refused further treatment as she had difficulty tolerating treatments. Orquidea also refused colostomy and surgery at  for pneumoperitoneum with suspect bowel " perforation. Orquidea was offered palliative or hospice care but declined at that time. Per pts daughter Lilli, her mother had been getting weaker over the few days before admission to [facility].  Lilli states she was having poor appetite, nausea, vomiting, and diarrhea as well as abdominal and pubic pain,She states she was fine one morning and was weaker in the evening and by the next morning could not get up at all, which led to her being brought to the ER.      P:  I was able to speak with pts daughter Lilli on several occasions. She let me know that equipment would be delivered today and that caregivers are set up to start tomorrow.  I let her know that hospice RN would come out to the house when pt arrives at home to [place] to go over hospice admission papers. I discussed this pt with Dr Mota and let him know the plan.    We will continue to follow along. Please do not hesitate to contact us regarding further sx mgmt or GOC needs, including after hours or on weekends via our on call provider at [phone].     Aysha Saucedo, APRN    5/31/2023

## 2023-05-31 NOTE — DISCHARGE SUMMARY
[facility] DISCHARGE SUMMARY    Patient Identification:  Name:  Orquidea Rosenberg  Age:  77 y.o.  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 female admitted back to inpatient status from swing bed.  Please see the admitting history and physical for further details from prior acute inpatient admission as well as swing bed admission here at this hospital.      Patient long time admission here in the hospital and transferred to swing bed prior to readmission to acute inpatient care.  Patient with a history of metastatic labial squamous cell carcinoma with progressing multiple lung mets with dyspnea.  Patient has been in swing bed receiving physical therapy but had progressive respiratory distress and repeat CT showed increasing size and number of metastatic lesions.  Palliative care was consulted and seen evaluated the patient and after discussion patient was transition to comfort measures.  Patient's discharge was delayed unfortunately due to difficulty of family finding around-the-clock care at home as her daughter does not live locally and patient was unwilling to go to skilled nursing facility.  Patient with severe pulmonary hypertension and previously seen by cardiology who recommended medical management.  By time of discharge family has been able to arrange for around-the-clock care at home and  patient has been accepted to outpatient hospice services at home.  Therefore she was felt to have achieved maximal benefit of continued hospitalization and discharged home with hospice in stable medical condition    VITAL SIGNS:  Temp:  [97 °F (36.1 °C)-97.6 °F (36.4 °C)] 97.6 °F (36.4 °C)  Heart Rate:  [69-84] 69  Resp:  [18-19] 18  BP: (129-165)/(51-55) 129/51  SpO2:  [96 %-99 %] 99 %  on  Flow (L/min):  [3] 3;   Device (Oxygen Therapy): humidified;nasal cannula    Body mass index is 28.12 kg/m².  Wt Readings from Last 3 Encounters:   05/22/23 79 kg (174 lb 3.2 oz)   05/05/23 83.9 kg (185 lb)   05/04/23 82.6 kg (182 lb)       PHYSICAL EXAM:  Constitutional:  Well-developed and well-nourished.  No acute distress.      HENT:  Head:  Normocephalic and atraumatic.  Mouth:  Moist mucous membranes.    Eyes:  Conjunctivae and EOM are normal. No scleral icterus.    Cardiovascular:  Normal rate, regular rhythm and normal heart sounds with no murmur.  Pulmonary/Chest:  No respiratory distress, no wheezes, no crackles, with normal breath sounds and good air movement.  Abdominal:  Soft.  Bowel sounds are normal.  No distension and no tenderness.   Musculoskeletal:  No tenderness and no deformity.  No red or swollen joints anywhere.  Functional ROM intact.   Neurological:  Alert and oriented to person, place, and time.  No cranial nerve deficit.  No tongue deviation.  No facial droop.  No slurred speech. Intact Sensation throughout  Skin:  Skin is warm and dry. No rash or lesion noted. No pallor.   Peripheral vascular:  Pulses in all 4 extremities with no clubbing, no cyanosis, trace edema.  Psychiatric: Appropriate mood and affect, pleasant.    DISCHARGE DISPOSITION   Stable    DISCHARGE MEDICATIONS:     Discharge Medications      New Medications      Instructions Start Date   aspirin 81 MG EC tablet   81 mg, Oral, Daily   Start Date: June 1, 2023     atorvastatin 20 MG tablet  Commonly known as: LIPITOR   20 mg, Oral,  Nightly      budesonide 180 MCG/ACT inhaler  Commonly known as: PULMICORT   1 puff, Inhalation, 2 Times Daily - RT      ferrous sulfate 325 (65 FE) MG tablet   325 mg, Oral, Daily With Breakfast   Start Date: June 1, 2023     metoprolol succinate XL 25 MG 24 hr tablet  Commonly known as: TOPROL-XL   25 mg, Oral, Every 24 Hours Scheduled   Start Date: June 1, 2023     oxyCODONE-acetaminophen 7.5-325 MG per tablet  Commonly known as: PERCOCET   1 tablet, Oral, Every 8 Hours         Changes to Medications      Instructions Start Date   furosemide 40 MG tablet  Commonly known as: LASIX  What changed:   · medication strength  · how much to take   40 mg, Oral, Daily   Start Date: June 1, 2023        Continue These Medications      Instructions Start Date   levothyroxine 100 MCG tablet  Commonly known as: SYNTHROID, LEVOTHROID   100 mcg, Oral, Daily      losartan 50 MG tablet  Commonly known as: COZAAR   50 mg, Oral, Daily      ondansetron 4 MG tablet  Commonly known as: ZOFRAN   4 mg, Oral, Every 8 Hours PRN                Follow-up Information     Jackeline Denson, APRN .    Specialty: Family Medicine  Contact information:  [address] [facility] [id number] [phone]                          TEST  RESULTS PENDING AT DISCHARGE       The ASCVD Risk score (Dewayne DK, et al., 2019) failed to calculate for the following reasons:    The patient has a prior MI or stroke diagnosis     CODE STATUS  Code Status and Medical Interventions:   Ordered at: 05/23/23 1056     Level Of Support Discussed With:    Patient    Next of Kin (If No Surrogate)     Code Status (Patient has no pulse and is not breathing):    No CPR (Do Not Attempt to Resuscitate)     Medical Interventions (Patient has pulse or is breathing):    Comfort Measures     Release to patient:    Routine Release       London Mota DO  [facility]  05/31/23  09:08 EDT    Please note that this discharge summary required more than 30  minutes to complete.

## 2023-05-31 NOTE — CASE MANAGEMENT/SOCIAL WORK
Discharge Planning Assessment   [place]     Patient Name: Orquidea Rosenberg  MRN: [id number]  Today's Date: 5/31/2023    Admit Date: 5/22/2023     Discharge Plan     Row Name 05/31/23 1511       Plan    Plan SS received a call from [employer] RN who states pt's caregivers have been secured however they aren't about to be to pt's home until 6/1/23. SS contacted [facility] per Dc who states DME has been delivered and set up on this date. SS notified Physician and Palliative care team. No other needs identified at this time. SS to follow.                HARPAL Stanley

## 2023-05-31 NOTE — PROGRESS NOTES
[facility] HOSPITALIST PROGRESS NOTE     Patient Identification:  Name:  Orquidea Rosenberg  Age:  77 y.o.  Sex:  female  :  1945  MRN:  [id number]  Visit Number:  [id number]  ROOM: [address] [place] [address]     Primary Care Provider:  Jackeline Denson APRN    Length of stay in inpatient status:  9    Subjective     Chief Compliant:  No chief complaint on file.      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  Patient currently comfort measures and initially planned for discharge today but home health is not available until tomorrow plan for discharge then.    Objective     Current Hospital Meds:  aspirin, 81 mg, Oral, Daily  atorvastatin, 20 mg, Oral, Nightly  budesonide, 0.5 mg, Nebulization, BID - RT  enoxaparin, 40 mg, Subcutaneous, Daily  ferrous sulfate, 325 mg, Oral, Daily With Breakfast  furosemide, 40 mg, Oral, Daily  guaiFENesin, 600 mg, Oral, Q12H  ipratropium-albuterol, 3 mL, Nebulization, Q6H While Awake - RT  levothyroxine, 100 mcg, Oral, Q AM  losartan, 50 mg, Oral, Q24H  magic barrier cream, 1 application, Topical, BID  metoprolol succinate XL, 25 mg, Oral, Q24H  nystatin, , Topical, Q12H  oxyCODONE-acetaminophen, 1 tablet, Oral, Q8H  sodium chloride, 10 mL, Intravenous, Q12H  sodium chloride, 10 mL, Intravenous, Q12H  sodium chloride, 10 mL, Intravenous, Q12H  sodium chloride, 10 mL, Intravenous, Q12H  sodium chloride, 10 mL, Intravenous, Q12H      Pharmacy Consult,       ----------------------------------------------------------------------------------------------------------------------  Vital Signs:  Temp:  [97.6 °F (36.4 °C)-98.3 °F (36.8 °C)] 98.3 °F (36.8 °C)  Heart Rate:  [64-79] 78  Resp:  [18] 18  BP: (111-129)/(42-51) 111/42  SpO2:  [99 %] 99 %   on  Flow (L/min):  [3-4.5] 3;   Device (Oxygen Therapy): nasal cannula  Body mass index is 28.12 kg/m².      Intake/Output Summary (Last 24 hours) at 5/31/2023 1857  Last data filed at 5/31/2023 1238  Gross per 24 hour   Intake 480 ml   Output --   Net 480 ml      ----------------------------------------------------------------------------------------------------------------------  Physical exam:  Constitutional:  Well-developed and well-nourished.  No acute distress.      HENT:  Head:  Normocephalic and atraumatic.  Mouth:  Moist mucous membranes.    Eyes:  Conjunctivae and EOM are normal. No scleral icterus.    Cardiovascular:  Normal rate, regular rhythm and normal heart sounds with no murmur.  Pulmonary/Chest:  No respiratory distress, no wheezes, no crackles, with normal breath sounds and good air movement.  Abdominal:  Soft.  Bowel sounds are normal.  No distension and no tenderness.   Musculoskeletal:  No tenderness and no deformity.  No red or swollen joints anywhere.  Functional ROM intact.   Neurological:  Alert and oriented to person, place, and time.  No cranial nerve deficit.  No tongue deviation.  No facial droop.  No slurred speech. Intact Sensation throughout  Skin:  Skin is warm and dry. No rash or lesion noted. No pallor.   Peripheral vascular:  Pulses in all 4 extremities with no clubbing, no cyanosis, trace edema.  Psychiatric: Appropriate mood and affect, pleasant.   ----------------------------------------------------------------------------------------------------------------------     No results found for: URINECX  No results found for: BLOODCX    I have personally looked at the labs and they are summarized above.  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  Pleural effusions improved with diuresis and likely the source of her dyspnea that presented on 5/20.      -Severe pulmonary arterial hypertension on echocardiogram    -Patient seen and evaluated cardiology and recommended for medical management given patient's current medical status and prognosis as left heart catheter stress would likely have far more risk than benefit    -Patient not interested in CT PE at this time    -Palliative care consulted and following along and patient is agreeable to home hospice and family has arranged for home care and hospice supplies being delivered today    -Continue losartan and Lasix     Chronic:    COPD, not in exacerbation  Chronic iron deficiency anemia  Debility secondary to chronic illness  Left hydronephrosis status post ureteral stent    Copied text in portions of the note has been reviewed and is accurate as of 05/31/23    VTE Prophylaxis:   Mechanical Order History:     None      Pharmalogical Order History:      Ordered     Dose Route Frequency Stop    05/22/23 1049  Enoxaparin Sodium (LOVENOX) syringe 40 mg         40 mg SC Daily --                Disposition Home with hospice tomorrow    London Mota DO  [facility] Hospitalist  05/31/23  18:57 EDT

## 2023-06-01 VITALS
SYSTOLIC BLOOD PRESSURE: 108 MMHG | RESPIRATION RATE: 18 BRPM | HEIGHT: 66 IN | TEMPERATURE: 98.3 F | BODY MASS INDEX: 28 KG/M2 | WEIGHT: 174.2 LBS | OXYGEN SATURATION: 98 % | DIASTOLIC BLOOD PRESSURE: 45 MMHG | HEART RATE: 68 BPM

## 2023-06-01 PROCEDURE — 94799 UNLISTED PULMONARY SVC/PX: CPT

## 2023-06-01 PROCEDURE — 94761 N-INVAS EAR/PLS OXIMETRY MLT: CPT

## 2023-06-01 PROCEDURE — 25010000002 ENOXAPARIN PER 10 MG: Performed by: STUDENT IN AN ORGANIZED HEALTH CARE EDUCATION/TRAINING PROGRAM

## 2023-06-01 RX ADMIN — ASPIRIN 81 MG: 81 TABLET, COATED ORAL at 08:29

## 2023-06-01 RX ADMIN — FUROSEMIDE 40 MG: 40 TABLET ORAL at 08:28

## 2023-06-01 RX ADMIN — Medication 10 ML: at 08:27

## 2023-06-01 RX ADMIN — Medication 10 ML: at 08:26

## 2023-06-01 RX ADMIN — LEVOTHYROXINE SODIUM 100 MCG: 100 TABLET ORAL at 05:49

## 2023-06-01 RX ADMIN — OXYCODONE HYDROCHLORIDE AND ACETAMINOPHEN 1 TABLET: 7.5; 325 TABLET ORAL at 00:39

## 2023-06-01 RX ADMIN — ENOXAPARIN SODIUM 40 MG: 40 INJECTION SUBCUTANEOUS at 08:26

## 2023-06-01 RX ADMIN — VITAMINS A AND D OINTMENT 1 APPLICATION: 15.5; 53.4 OINTMENT TOPICAL at 08:29

## 2023-06-01 RX ADMIN — IPRATROPIUM BROMIDE AND ALBUTEROL SULFATE 3 ML: .5; 2.5 SOLUTION RESPIRATORY (INHALATION) at 06:49

## 2023-06-01 RX ADMIN — OXYCODONE HYDROCHLORIDE AND ACETAMINOPHEN 1 TABLET: 7.5; 325 TABLET ORAL at 08:28

## 2023-06-01 RX ADMIN — NYSTATIN: 100000 POWDER TOPICAL at 08:29

## 2023-06-01 RX ADMIN — FERROUS SULFATE TAB 325 MG (65 MG ELEMENTAL FE) 325 MG: 325 (65 FE) TAB at 08:29

## 2023-06-01 RX ADMIN — GUAIFENESIN 600 MG: 600 TABLET, EXTENDED RELEASE ORAL at 08:29

## 2023-06-01 RX ADMIN — METOPROLOL SUCCINATE 25 MG: 25 TABLET, EXTENDED RELEASE ORAL at 08:28

## 2023-06-01 RX ADMIN — BUDESONIDE 0.5 MG: 0.5 SUSPENSION RESPIRATORY (INHALATION) at 06:49

## 2023-06-01 NOTE — PLAN OF CARE
Goal Outcome Evaluation:  Plan of Care Reviewed With: patient        Progress: no change          Pt is resting in bed. PRN pain med given. Wound care completed. No changes. VSS. Will continue with plan of care.

## 2023-06-01 NOTE — DISCHARGE PLACEMENT REQUEST
"Orquidea Rosenberg (77 y.o. Female)     Date of Birth   1945    Social Security Number       Address   [address] [address]    Home Phone   [phone]    MRN   [id number]       Holiness   Judaism    Marital Status                               Admission Date   5/22/23    Admission Type   Urgent    Admitting Provider   Nelson Mejia MD    Attending Provider   London Mota DO    Department, Room/Bed   [address], 3327/1P       Discharge Date       Discharge Disposition   Hospice/Home    Discharge Destination                               Attending Provider: London Mota DO    Allergies: Penicillins    Isolation: None   Infection: None   Code Status: No CPR    Ht: 167.6 cm (66\")   Wt: 79 kg (174 lb 3.2 oz)    Admission Cmt: None   Principal Problem: Hypoxia [R09.02]                 Active Insurance as of 5/22/2023     Primary Coverage     Payor Plan Insurance Group Employer/Plan Group    [facility] MEDICARE REPLACEMENT [facility] MEDICARE REPLACEMENT [id number]     Payor Plan Address Payor Plan Phone Number Payor Plan Fax Number Effective Dates    [address] [address]   1/1/2021 - None Entered    [facility] [id number]       Subscriber Name Subscriber Birth Date Member ID       ORQUIDEA ROSENBERG 1945 [id number]                 Emergency Contacts      (Rel.) Home Phone Work Phone Mobile Phone    Gavin Rosenberg (Son) [phone] -- --    AnabelLilli (Daughter) [phone] -- [phone]          After Visit Summary  Orquidea Rosenberg  MRN: [id number]    Icon primary diagnosis          Decreased oxygen supply   Icon Date   5/22/2023 - 6/1/2023   Icon Location   [address]   After Visit Summary  Instructions     Icon Orders placed today                  Your Care at Home    Home Nebulizer    Icon medication changes this visit           Your medications have changed    Icon medications to start taking   START taking:   aspirin "    atorvastatin (LIPITOR)    ferrous sulfate    metoprolol succinate XL (TOPROL-XL)    oxyCODONE-acetaminophen (PERCOCET)    tiotropium bromide-olodaterol (STIOLTO RESPIMAT)   Icon medications to change how you take   CHANGE how you take:   furosemide (LASIX) -- medication strength, how much to take  Review your updated medication list below.  Icon Checklist header    Your Next Steps    Icon do   Do     Icon Checkbox     7 medications from [facility]  Icon read   Read     Icon Checkbox    Read 10 attachments  COVID-19 Vaccination Information  Why Get Vaccinated?  Building defenses against COVID-19 is a team effort, and you are a lopez part of that team. Getting the COVID-19 vaccine adds one more layer of protection for you, your coworkers, and family. Here are ways you can build people’s confidence in the COVID-19 vaccines in your community and at home.     • Get vaccinated and enroll in the v-safe text messaging program to help CDC monitor vaccine safety.   • Tell others why you are getting vaccinated and encourage them to get vaccinated. Share your success story.    • Learn how to have conversations about COVID-19 vaccine with coworkers, family, and friends.  • https://www.cdc.gov/coronavirus/2019-ncov/vaccines/index.html     How do I schedule an appointment for a vaccine?  https://www.vaccines.gov/ helps you find locations that carry COVID-19 vaccines and their contact information. Because every location handles appointments differently, you will need to schedule your appointment directly with the location you choose.        If you have any questions about your recovery, please call the [facility] Nurse Call Center at [phone]. A registered nurse is available 24 hours a day 7 days a week to assist you.   If you have any COVID-19 related questions, please call [phone].     Conversations that Matter: Have you thought about who would speak for you if you could not speak  for yourself?     Consider appointing someone to make healthcare decisions for you if you are unable to do so. We can help! Call our Advance Care Planning helpline at [phone], or visit   [employer]/ACP.   Icon activity    Activity instructions    Activity as tolerated.     Wound Locations         Left vaginal fold - every 12 hours  Cleanse             Normal Saline  Intervention     Thera Honey  Dressing:          Bordered Gauze Dressing     Wound Care Instructions          apply magic barrier cream Twice a day and use Z-guard in between treatments/ coccyx area.            Icon diet    Diet instructions    Diet: Regular/House Diet, Fluid Restriction (240 mL/tray) Diets; 1500 mL/day; Texture: Regular Texture ; Fluid Consistency: Thin                  Icon Orders placed today                  Other instructions    Home Nebulizer   What's Next    What's Next          Follow up with Jackeline Denson, APRN [address] [facility] [id number] [phone]       Your Allergies  Date Reviewed: 5/22/2023  Your Allergies   Allergen Reactions   Penicillins Swelling   Has tolerated cefepime, ceftriaxone and Merrem     Patient Belongings Returned    Document Return of Belongings Flowsheet    Were the patient bedside belongings sent home? --   Medication Retrieved from Unit & Sent Home --   Belongings Sent to Safe --   Belongings sent with: --   Belongings Retrieved from Security & Sent Home --   Medication Retrieved from Unit & Sent Home --   Medications Retrieved from Pharmacy & Sent Home --         MyChart Signup    Our records indicate that you have an active [employer] account.     You can view your After Visit Summary by going to [employer] and logging in with your [employer] username and password.  If you don't have a [employer] username and password but a parent or guardian has access to your record, the parent or guardian should login with their own [employer] username and password  and access your record to view the After Visit Summary.     If you have questions, you can email [email or web address] or call [phone] or toll free number [phone] to talk to our MyChart staff.  Remember, OneName is NOT to be used for urgent needs.  For medical emergencies, dial 911.     Medication List  Medication List     Morning Afternoon Evening Bedtime As Needed   Icon medications to start taking   aspirin 81 MG EC tablet  Take 1 tablet by mouth Daily for 30 days.  Last time this was given: 81 mg on June 1, 2023  8:29 AM  Signed by: London Mota DO        Icon medications to start taking   atorvastatin 20 MG tablet  Commonly known as: LIPITOR  Take 1 tablet by mouth Every Night for 30 days.  Last time this was given: 20 mg on May 31, 2023  8:00 PM  Signed by: London Mota DO        Icon medications to start taking   ferrous sulfate 325 (65 FE) MG tablet  Take 1 tablet by mouth Daily With Breakfast for 30 days.  Last time this was given: 325 mg on June 1, 2023  8:29 AM  Signed by: London Mota DO With breakfast       Icon medications to change how you take   furosemide 40 MG tablet  Commonly known as: LASIX  Take 1 tablet by mouth Daily for 30 days.  What changed:   0 medication strength  0 how much to take  Last time this was given: 40 mg on June 1, 2023  8:28 AM  Signed by: London Mota DO         levothyroxine 100 MCG tablet  Commonly known as: SYNTHROID, LEVOTHROID  Take 1 tablet by mouth Daily.  Last time this was given: 100 mcg on June 1, 2023  5:49 AM         losartan 50 MG tablet  Commonly known as: COZAAR  Take 1 tablet by mouth Daily.  Last time this was given: 50 mg on May 31, 2023  8:35 AM        Icon medications to start taking   metoprolol succinate XL 25 MG 24 hr tablet  Commonly known as: TOPROL-XL  Take 1 tablet by mouth Daily for 30 days.  Last time this was given: 25 mg on June 1, 2023  8:28 AM  Signed by: London Mota DO         ondansetron 4 MG tablet  Commonly  known as: ZOFRAN  Take 1 tablet by mouth Every 8 (Eight) Hours As Needed for Nausea or Vomiting.        Icon medications to start taking   oxyCODONE-acetaminophen 7.5-325 MG per tablet  Commonly known as: PERCOCET  Take 1 tablet by mouth Every 8 (Eight) Hours.  For diagnoses: Cancer of vulva  Last time this was given: 1 tablet on 2023  8:28 AM  Signed by: London Mota DO        Icon medications to start taking   tiotropium bromide-olodaterol 2.5-2.5 MCG/ACT aerosol solution inhaler  Commonly known as: STIOLTO RESPIMAT  Inhale 2 puffs by mouth Daily.  Signed by: London Mota DO          Where to  your medications     Icon medication  information                   these medications at [facility]     aspirin • atorvastatin • ferrous sulfate • furosemide • metoprolol succinate XL • oxyCODONE-acetaminophen • tiotropium bromide-olodaterol  Address: [address] [place] [id number]   Hours: 8AM-6PM Mon-Fri   Phone: [phone]            History & Physical      Nelson Mejia MD at 23 1220              [facility] HOSPITALIST HISTORY AND PHYSICAL    Patient Identification:  Name:  Orquidea Rosenberg  Age:  77 y.o.  Sex:  female  :  1945  MRN:  [id number]   Visit Number:  [id number]  Admit Date: 2023   Room number:  3327/1P  Primary Care Physician:  Jackeline Denson, BERTHA    Date of Admission: 2023     Subjective     Chief complaint:  No chief complaint on file.    History of presenting illness:      Orquidea Rosenberg is a 77 y.o. female who was admitted back to inpatient status on 2023 from swing bed for progressive respiratory distress. For complete history please see HPI from prior admission before swing bed transfer, interval hx as below.     Patient this with ongoing dyspnea  acutely worsened with ambulation and transition to bedside chair requiring increase in O2 to 5L with desat into 80s slow to respond to increased O2. Discussed  with patient her progression and reviewed CT scans from march and early may 2023 with noted progression concerning for new lung mets. Patient voiced understanding, daughter also in room and wish to cont current care but discussed with them my concern for progression without clear possibility of any treatment. They wish to cont current full spectrum of care but acknowledge severity of her progression and should she worsen would be open to transitioning GOC.      Prior to admission I discussed patient's presentation and management with attending ER physician and verbal handoff received.  ---------------------------------------------------------------------------------------------------------------------   A thorough systems based relevant ROS was asked and was negative except as noted above.  ---------------------------------------------------------------------------------------------------------------------   Past Medical History:   Diagnosis Date   • Arthritis    • COPD (chronic obstructive pulmonary disease)    • Elevated cholesterol    • History of transfusion    • Hypertension    • Vulval ca      Past Surgical History:   Procedure Laterality Date   • RADICAL VULVECTOMY  09/06/2019   • RADICAL VULVECTOMY Bilateral 06/2019     Family History   Problem Relation Age of Onset   • Alzheimer's disease Mother    • Cancer Father    • Cancer Brother    • Diabetes Maternal Grandmother      Social History     Socioeconomic History   • Marital status:    Tobacco Use   • Smoking status: Former   • Smokeless tobacco: Never   Vaping Use   • Vaping Use: Never used   Substance and Sexual Activity   • Alcohol use: No   • Drug use: No   • Sexual activity: Defer     ---------------------------------------------------------------------------------------------------------------------   Allergies:  Penicillins  ---------------------------------------------------------------------------------------------------------------------    Medications below are reported home medications pulling from within the system; at this time, these medications have not been reconciled unless otherwise specified and are in the verification process for further verifcation as current home medications.      Prior to Admission Medications     Prescriptions Last Dose Informant Patient Reported? Taking?    furosemide (LASIX) 20 MG tablet  Pharmacy Yes No    Take 1 tablet by mouth Daily.    levothyroxine (SYNTHROID, LEVOTHROID) 100 MCG tablet  Pharmacy Yes No    Take 1 tablet by mouth Daily.    losartan (COZAAR) 50 MG tablet  Pharmacy Yes No    Take 1 tablet by mouth Daily.    ondansetron (ZOFRAN) 4 MG tablet  Pharmacy Yes No    Take 1 tablet by mouth Every 8 (Eight) Hours As Needed for Nausea or Vomiting.        Objective     Vital Signs:  Temp:  [98.1 °F (36.7 °C)] 98.1 °F (36.7 °C)  Heart Rate:  [77-85] 85  Resp:  [18-24] 24  BP: (139)/(58) 139/58    No data found.  SpO2:  [94 %] 94 %  on  Flow (L/min):  [3] 3;   Device (Oxygen Therapy): nasal cannula;humidified  Body mass index is 28.12 kg/m².    Wt Readings from Last 3 Encounters:   05/22/23 79 kg (174 lb 3.2 oz)   05/05/23 83.9 kg (185 lb)   05/04/23 82.6 kg (182 lb)      ----------------------------------------------------------------------------------------------------------------------  Physical Exam  Vitals and nursing note reviewed.   Constitutional:       Appearance: She is ill-appearing.   Eyes:      General: No scleral icterus.     Extraocular Movements: Extraocular movements intact.   Cardiovascular:      Rate and Rhythm: Normal rate.      Pulses: Normal pulses.      Heart sounds: No murmur heard.  Pulmonary:      Effort: Respiratory distress present.      Breath sounds: No wheezing or rales.   Abdominal:      General: Abdomen is flat.      Palpations: Abdomen is soft.   Musculoskeletal:      Right lower leg: Edema present.      Left lower leg: Edema present.   Skin:     General: Skin is warm and dry.       Capillary Refill: Capillary refill takes less than 2 seconds.   Neurological:      General: No focal deficit present.      Mental Status: She is alert and oriented to person, place, and time.   Psychiatric:         Mood and Affect: Mood normal.         Behavior: Behavior normal.   --------------------------------------------------------------------------------------------------------------------  LABS:    CBC and coagulation:  Results from last 7 days   Lab Units 05/22/23 0202 05/21/23 0838 05/21/23 0335 05/17/23 0143   WBC 10*3/mm3 11.44*  --  10.60 7.64   HEMOGLOBIN g/dL 8.5*  --  8.9* 7.3*   HEMATOCRIT % 29.0*  --  30.9* 24.9*   MCV fL 90.6  --  93.4 91.5   MCHC g/dL 29.3*  --  28.8* 29.3*   PLATELETS 10*3/mm3 341  --  372 311   D DIMER QUANT MCGFEU/mL  --  1.22*  --   --      Acid/base balance:      Renal and electrolytes:  Results from last 7 days   Lab Units 05/22/23 0202 05/21/23 0335 05/19/23 0146 05/17/23 0143 05/16/23  0129   SODIUM mmol/L 140 139 137 138 133*   POTASSIUM mmol/L 3.4* 3.6 5.0 4.5 4.4   MAGNESIUM mg/dL  --   --  2.1 2.0 1.9   CHLORIDE mmol/L 101 101 103 103 102   CO2 mmol/L 29.0 26.2 23.1 23.5 23.5   BUN mg/dL 29* 29* 25* 16 13   CREATININE mg/dL 1.06* 1.08* 1.08* 1.23* 1.04*   CALCIUM mg/dL 8.8 8.8 8.7 8.8 8.6   PHOSPHORUS mg/dL  --   --  3.9 4.1 3.5   GLUCOSE mg/dL 101* 79 133* 168* 127*     Estimated Creatinine Clearance: 47.2 mL/min (A) (by C-G formula based on SCr of 1.06 mg/dL (H)).    Liver and pancreatic function:  Results from last 7 days   Lab Units 05/17/23  0143 05/16/23  0129   ALBUMIN g/dL 2.4* 2.4*   BILIRUBIN mg/dL <0.2 <0.2   ALK PHOS U/L 57 61   AST (SGOT) U/L 6 5   ALT (SGPT) U/L <5 <5     Endocrine function:  No results found for: HGBA1C  Point of care bedside glucose levels:      Lab Results   Component Value Date    TSH 2.620 03/15/2022     Cardiac:  Results from last 7 days   Lab Units 05/21/23  1038 05/21/23  0838 05/21/23  0335 05/17/23  0350  05/17/23 0143   HSTROP T ng/L 139* 150*  --  127* 132*   PROBNP pg/mL  --   --  15,228.0*  --   --        Cultures:  Lab Results   Component Value Date    COLORU Dark Yellow (A) 03/15/2022    CLARITYU Turbid (A) 03/15/2022    PHUR <=5.0 03/15/2022    GLUCOSEU Negative 03/15/2022    KETONESU Trace (A) 03/15/2022    BLOODU Large (3+) (A) 03/15/2022    NITRITEU Negative 03/15/2022    LEUKOCYTESUR Moderate (2+) (A) 03/15/2022    BILIRUBINUR Negative 03/15/2022    UROBILINOGEN 1.0 E.U./dL 03/15/2022    RBCUA 31-50 (A) 03/15/2022    WBCUA Too Numerous to Count (A) 03/15/2022    BACTERIA 4+ (A) 03/15/2022     Microbiology Results (last 10 days)     ** No results found for the last 240 hours. **          No results found for: PREGTESTUR, PREGSERUM, HCG, HCGQUANT  Pain Management Panel          View : No data to display.                      I have personally looked at the labs and they are summarized above.  ----------------------------------------------------------------------------------------------------------------------  Detailed radiology reports for the last 24 hours:    Imaging Results (Last 24 Hours)     ** No results found for the last 24 hours. **        Final impressions for the last 30 days of radiology reports:    CT Abdomen Pelvis With & Without Contrast    Addendum Date: 5/3/2023    Addendum: Again noted is a somewhat necrotic appearing 7.7 cm mass in the pelvis that appear somewhat contiguous with the distal sigmoid colon. Air is again noted within the urinary bladder. Impression.  This report was finalized on 5/3/2023 8:41 AM by Dr. Philip Lopez MD.      Result Date: 5/3/2023  1.  Again there is a left double-J ureteral stent and moderate to severe hydronephrosis of the left kidney. 2.  Moderate stool throughout the colon. 3.  Tiny bilateral pleural effusions. 4.  Bilateral pulmonary lung base nodules are again noted.  This report was finalized on 5/2/2023 12:23 PM by Dr. Philip Lopez MD.      CT  Abdomen Pelvis Without Contrast    Result Date: 4/25/2023  Large complex pelvic mass measuring 8.4 x 9.4 x 10.2 cm and presumably represents the patient's known pelvic malignancy (vulvar cancer). This could represent secondary involvement of a leiomyomatous uterus or dystrophic calcifications within a malignancy. Abnormal fistulous communication between the anterior rectum and the posterior aspect of the above-mentioned pelvic mass with at least one and possibly 2 fistulous connections as demonstrated on CT. Central debris within the mass may represent abscess or necrosis. Apparent fistulous communication between the mass and the bladder. Left sided hydronephrosis and hydroureter with left ureteral stent in place. The distal pigtail difficult to confirm within the bladder and may be within the distal ureter. Progressive widely disseminated pulmonary metastases. Signer Name: MINNIE Cerna MD  Signed: 4/25/2023 5:27 PM  Workstation Name: [id number]  [facility]    CT Chest With Contrast Diagnostic    Result Date: 5/22/2023  1.  Again there are small right greater than left pleural effusions. 2.  Innumerable pulmonary nodules again noted throughout both lungs with a large spiculated nodule at the right lung apex again seen to measure about 3.3 cm. 3.  Incidentally noted left kidney shows moderate hydronephrosis with indwelling catheter.  This report was finalized on 5/22/2023 8:54 AM by Dr. Philip Lopez MD.      CT Chest With Contrast Diagnostic    Result Date: 5/2/2023  1.  Now small bilateral pleural effusions. 2.  Tiny pericardial effusion again noted. 3.  Bilateral parenchymal pulmonary nodules are again noted. A posterior left upper lobe pulmonary nodule measures 2.2 cm and was about 2.2 cm. Other nodules appear stable also.  This report was finalized on 5/2/2023 12:38 PM by Dr. Philip Lopez MD.      XR Chest 1 View    Result Date: 5/20/2023   1.  Multifocal pneumonia. 2.  Mild  hypoinflated lungs. 3.  Small right and left pleural effusion, left greater than right.  This report was finalized on 5/20/2023 8:31 PM by Graeme Bobby MD.      XR Chest 1 View    Result Date: 5/15/2023  FINDINGS/IMPRESSION: Multifocal interstitial opacities, similar to prior. Heart size is similar. Small pleural effusions. No pneumothorax. No acute osseous abnormality.  This report was finalized on 5/15/2023 11:16 PM by Alex Pallas, DO.      XR Chest 1 View    Result Date: 5/7/2023  Impression:  1. Multiple bilateral pulmonary nodular densities are similar to the prior study. 2.  No acute infiltrates 3.  Small left pleural effusion is stable  This report was finalized on 5/7/2023 9:01 PM by Gustavo Silverman MD.      XR Chest 1 View    Result Date: 4/26/2023  1.  No change in distribution of bilateral lung opacities which may represent changes of fibrosis and diffuse pulmonary nodules. 2.  Right IJ deep line noted with tip at the cavoatrial junction. No pneumothorax.  This report was finalized on 4/26/2023 11:31 AM by Dr. Justen Matias MD.      XR Chest AP    Result Date: 4/27/2023  1.  No interval change in the appearance of the chest. Bilateral lung opacities appear stable. 2.  Support device positioning is stable. 3.  Trace left pleural effusion is noted.  This report was finalized on 4/27/2023 9:48 AM by Dr. Justen Matias MD.        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 Radiology  review of imaging from March 2023 compared to 5/2/2023 notes no significant increase in nodule size but on personal review her suspected metastatic disease appears to have progressed in both size and number with associated increasing O2 requirement and poorer respiratory reserve.  -Patient unable to tolerate repeat CT scan and CXR currently pending however if hypoxia secondary to rapid mets, limited possible interventions  -Will consult oncology Monday for case review for possible palliative options  -Increased diuretic dose to lasix 40mg x1 dose today and cont on prn basis given mild pulm edema  -PE on differential as well but no tachycardia and less likely given noted lung opacities, attempted CT today but unable to lay flat  -OK to cont current steroid course but wheezing resolved and acute progression unlikely COPD related  -Severe PAH noted on Echo likely acute on chronic right heart strain/failure secondary to lung disease  -Discussed GOC and patient to remain DNR/DNI and will reevaluate should her condition worsen, palliative following and previously not interested in hospice services but given significance of progression discussed possibility of revisiting this      #[id number]  -Troponin elevated with negative delta and t-wave inversions in inferior leads likely secondary to progressive hypoxia  -Repeat ecg and troponin for any acute cp or HDS changes, current changes less likely ischemia and more secondary to right heart strain. Echo reviewed without regional wall motion abnormalities              - - Chronic - -     #COPD hx  #Chronic iron def anemia  #Debility secondary to chronic illness: Holding further PT/OT until condition improved    Interim update 5/22/23:  -Patient's dyspnea improved with diuresis but CT scan shows progression of metastatic disease. Concerned for right heart failure given elevated troponin, bnp, and inferior t-wave inversions. D-dimer elevated as well but dyspnea explained by pulm  infiltrates with risk of decompensation during CTPE > benefit given current discussion regarding GOC. Cardiology consulted but I fear that a stress test or LHC may cause enough stress to tip patient into flash pulmonary edema or refractory hypoxia from her PAH and thus likely only benefited by medical management.   -Will f/u cards recs and palliative care discussion this am after my lengthy discussion with patient's daughter yesterday afternoon.    #Left hyonephrosis s/o uretal stent  -Patient asymptomatic but noted on imaging, good UOP and cr stable      VTE Prophylaxis:   Mechanical Order History:     None      Pharmalogical Order History:      Ordered     Dose Route Frequency Stop    05/22/23 1049  Enoxaparin Sodium (LOVENOX) syringe 40 mg         40 mg SC Daily --              The patient is high risk due to the following diagnoses/reasons:  Acute resp distress with progressive mets and pulm edema    Admission Status:  I certify that this patient requires inpatient hospitalization for greater than 2 midnights in INPATIENT status.  I anticipate there to be the need for care which can only be reasonably provided in a hospital setting such as possible need for aggressive/expedited ancillary services and/or consultation services, IV medications, close physician monitoring, and/or procedures. In such, I feel patient’s risk for adverse outcomes and need for care warrant INPATIENT evaluation and predict the patient’s care encounter to likely last beyond 2 midnights.    Code Status and Medical Interventions:   Ordered at: 05/22/23 1055     Medical Intervention Limits:    NO intubation (DNI)     Code Status (Patient has no pulse and is not breathing):    No CPR (Do Not Attempt to Resuscitate)     Medical Interventions (Patient has pulse or is breathing):    Limited Support     Release to patient:    Routine Release        Disposition: Admit to inpatient     Nelson Mejia MD  [facility]  Hospitalist  23  12:20 EDT    Note: This HPI has been forwarded from prior encounter    Electronically signed by Nelson Mejia MD at 23 1506          Physician Progress Notes (most recent note)      London Mota DO at 23 1897              [facility] HOSPITALIST PROGRESS NOTE     Patient Identification:  Name:  Orquidea Rosenberg  Age:  77 y.o.  Sex:  female  :  1945  MRN:  [id number]  Visit Number:  [id number]  ROOM: [address] [place] [address]     Primary Care Provider:  Jackeline Denson APRN    Length of stay in inpatient status:  9    Subjective     Chief Compliant:  No chief complaint on file.      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  Patient currently comfort measures and initially planned for discharge today but home health is not available until tomorrow plan for discharge then.    Objective     Current Hospital Meds:  aspirin, 81 mg, Oral, Daily  atorvastatin, 20 mg, Oral, Nightly  budesonide, 0.5 mg, Nebulization, BID - RT  enoxaparin, 40 mg, Subcutaneous, Daily  ferrous sulfate, 325 mg, Oral, Daily With Breakfast  furosemide, 40 mg, Oral, Daily  guaiFENesin, 600 mg, Oral, Q12H  ipratropium-albuterol, 3 mL, Nebulization, Q6H While Awake - RT  levothyroxine, 100 mcg, Oral, Q AM  losartan, 50 mg, Oral, Q24H  magic barrier cream, 1 application, Topical, BID  metoprolol succinate XL, 25 mg, Oral, Q24H  nystatin, , Topical, Q12H  oxyCODONE-acetaminophen, 1 tablet, Oral, Q8H  sodium chloride, 10 mL, Intravenous, Q12H  sodium chloride, 10 mL, Intravenous, Q12H  sodium chloride, 10 mL, Intravenous, Q12H  sodium chloride, 10 mL, Intravenous, Q12H  sodium chloride, 10 mL, Intravenous, Q12H      Pharmacy Consult,        ----------------------------------------------------------------------------------------------------------------------  Vital Signs:  Temp:  [97.6 °F (36.4 °C)-98.3 °F (36.8 °C)] 98.3 °F (36.8 °C)  Heart Rate:  [64-79] 78  Resp:  [18] 18  BP: (111-129)/(42-51) 111/42  SpO2:  [99 %] 99 %  on  Flow (L/min):  [3-4.5] 3;   Device (Oxygen Therapy): nasal cannula  Body mass index is 28.12 kg/m².      Intake/Output Summary (Last 24 hours) at 5/31/2023 1857  Last data filed at 5/31/2023 1238  Gross per 24 hour   Intake 480 ml   Output --   Net 480 ml      ----------------------------------------------------------------------------------------------------------------------  Physical exam:  Constitutional:  Well-developed and well-nourished.  No acute distress.      HENT:  Head:  Normocephalic and atraumatic.  Mouth:  Moist mucous membranes.    Eyes:  Conjunctivae and EOM are normal. No scleral icterus.    Cardiovascular:  Normal rate, regular rhythm and normal heart sounds with no murmur.  Pulmonary/Chest:  No respiratory distress, no wheezes, no crackles, with normal breath sounds and good air movement.  Abdominal:  Soft.  Bowel sounds are normal.  No distension and no tenderness.   Musculoskeletal:  No tenderness and no deformity.  No red or swollen joints anywhere.  Functional ROM intact.   Neurological:  Alert and oriented to person, place, and time.  No cranial nerve deficit.  No tongue deviation.  No facial droop.  No slurred speech. Intact Sensation throughout  Skin:  Skin is warm and dry. No rash or lesion noted. No pallor.   Peripheral vascular:  Pulses in all 4 extremities with no clubbing, no cyanosis, trace edema.  Psychiatric: Appropriate mood and affect, pleasant.   ----------------------------------------------------------------------------------------------------------------------     No results found for: URINECX  No results found for: BLOODCX    I have personally looked at the labs and they are  summarized above.  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  Pleural effusions improved with diuresis and likely the source of her dyspnea that presented on 5/20.      -Severe pulmonary arterial hypertension on echocardiogram    -Patient seen and evaluated cardiology and recommended for medical management given patient's current medical status and prognosis as left heart catheter stress would likely have far more risk than benefit    -Patient not interested in CT PE at this time    -Palliative care consulted and following along and patient is agreeable to home hospice and family has arranged for home care and hospice supplies being delivered today    -Continue losartan and Lasix     Chronic:    COPD, not in exacerbation  Chronic iron deficiency anemia  Debility secondary to chronic illness  Left hydronephrosis status post ureteral stent    Copied text in portions of the note has been reviewed and is accurate as of 05/31/23    VTE Prophylaxis:   Mechanical Order History:     None      Pharmalogical Order History:      Ordered     Dose Route Frequency Stop    05/22/23 1049  Enoxaparin Sodium (LOVENOX) syringe 40 mg         40 mg SC Daily --                Disposition Home with hospice tomorrow    London Mota DO  [facility]  05/31/23  18:57 EDT      Electronically signed by London Mota DO at 05/31/23 8704          Consult Notes (most recent note)      Aysha Saucedo APRN at  "05/22/23 1030      Consult Orders    1. Inpatient Palliative Care MD Consult [[id number]] ordered by Nelson Mejia MD at 05/22/23 1653               Palliative Care Daily Progress Note     S: Medical record reviewed, followed up with Primary RN Ed and Dr Mejia regarding patient's condition. When palliative care entered the room Orquidea was sitting up in bed, awake and alert, her daughter Lilli at bedside. Orquidea stated that she was having low abdominal pain, she stated that she had pain medicine around 6:30 and that it works for around three hours, she denied nausea at this time, as well as denied anxiety or shortness of breath at this time. Dr. Mejia entered room at this time and discussed Orquidea's condition, prognosis, and options moving forward.      O:   Palliative Performance Scale Score:     /58 (BP Location: Right arm, Patient Position: Lying)   Pulse 81   Temp 98.1 °F (36.7 °C) (Oral)   Resp 20   Ht 167.6 cm (66\")   Wt 79 kg (174 lb 3.2 oz)   SpO2 100%   BMI 28.12 kg/m²     Intake/Output Summary (Last 24 hours) at 5/22/2023 1702  Last data filed at 5/22/2023 1300  Gross per 24 hour   Intake 480 ml   Output --   Net 480 ml       PE:  General Appearance:    Chronically ill appearing, alert, cooperative, NAD   HEENT:    NC/AT, without obvious abnormality, EOMI, anicteric    Neck:   supple, trachea midline, no JVD   Lungs:     Regular unlabored respirations, no wheezes rales or rhonchi noted.    Heart:    RRR, normal S1 and S2, no M/R/G   Abdomen:     Soft, NT, ND, NABS    Extremities:   Moves all extremities, BLE edema   Pulses:   Pulses palpable and equal bilaterally   Skin:   Warm, dry, pale   Neurologic:   A/Ox3, cooperative   Psych:   Calm, appropriate         Meds: Reviewed and changes noted.    Labs:   Results from last 7 days   Lab Units 05/22/23  0202   WBC 10*3/mm3 11.44*   HEMOGLOBIN g/dL 8.5*   HEMATOCRIT % 29.0*   PLATELETS 10*3/mm3 341     Results from last 7 days   Lab Units " 05/22/23  0202   SODIUM mmol/L 140   POTASSIUM mmol/L 3.4*   CHLORIDE mmol/L 101   CO2 mmol/L 29.0   BUN mg/dL 29*   CREATININE mg/dL 1.06*   GLUCOSE mg/dL 101*   CALCIUM mg/dL 8.8     Results from last 7 days   Lab Units 05/22/23  0202 05/19/23  0146 05/17/23  0143   SODIUM mmol/L 140   < > 138   POTASSIUM mmol/L 3.4*   < > 4.5   CHLORIDE mmol/L 101   < > 103   CO2 mmol/L 29.0   < > 23.5   BUN mg/dL 29*   < > 16   CREATININE mg/dL 1.06*   < > 1.23*   CALCIUM mg/dL 8.8   < > 8.8   BILIRUBIN mg/dL  --   --  <0.2   ALK PHOS U/L  --   --  57   ALT (SGPT) U/L  --   --  <5   AST (SGOT) U/L  --   --  6   GLUCOSE mg/dL 101*   < > 168*    < > = values in this interval not displayed.     Imaging Results (Last 72 Hours)     Procedure Component Value Units Date/Time    XR Chest 1 View [[id number]] Resulted: 05/22/23 1302     Updated: 05/22/23 1345            Diagnostics: Reviewed    A: Orquidea Rosenberg is a 77 y.o. female admitted on 4/25/2023 with diarrhea, nausea, vomiting, and abdominal pain. 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 Orquidea had partial chemotherapy and radiation, however since that time has refused further treatment as she had difficulty tolerating treatments. Orquidea also refused colostomy and surgery at  for pneumoperitoneum with suspect bowel perforation. Orquidea was offered palliative or hospice care but declined at that time. Per pts daughter Lilli, her mother had been getting weaker over the few days before admission to [facility].  Lilli states she was having poor appetite, nausea, vomiting, and diarrhea as well as abdominal and pubic pain,She states she was fine one morning and was weaker in the evening and by the next morning could not get up at all, which led to her being brought to the ER.              P:  I was able to be present while Dr Mejia discussed patients condition, prognosis and options moving  forward. I was able to also have a lengthy discussion to discuss comfort measures beginning here in the hospital as well as hospice at home after discharge. I explained what comfort measures entailed in detail as well as discussed hospice at home. Daughter Lilli discussed that she was working with comfort keepers and home helpers to get caregivers set up before discharging home. I will revisit pt and daughter tomorrow am to see if it is okay to go ahead with hospice referral. I discussed this pt and conversation with Dr Mejia.      We will continue to follow along. Please do not hesitate to contact us regarding further sx mgmt or GOC needs, including after hours or on weekends via our on call provider at [phone].     BERTHA Onofre    5/22/2023    Electronically signed by Aysha Saucedo APRN at 05/23/23 0827       Discharge Summary    No notes of this type exist for this encounter.         Discharge Order (From admission, onward)     Start     Ordered    06/01/23 0800  Discharge patient  Once        Expected Discharge Date: 06/01/23    Discharge Disposition: Hospice/Home    Physician of Record for Attribution - Please select from Treatment Team: [place] VERAS [[id number]]    Review needed by CMO to determine Physician of Record: No       Question Answer Comment   Physician of Record for Attribution - Please select from Treatment Team [place] VERAS    Review needed by CMO to determine Physician of Record No        06/01/23 0759    05/31/23 0855  Discharge patient  Once,   Status:  Canceled        Expected Discharge Date: 05/31/23    Discharge Disposition: Hospice/Home    Physician of Record for Attribution - Please select from Treatment Team: [place] VERAS [[id number]]    Review needed by CMO to determine Physician of Record: No       Question Answer Comment   Physician of Record for Attribution - Please select from Treatment Team [place] VERAS    Review needed by CMO to determine Physician of Record No         05/31/23 0907

## 2023-06-01 NOTE — DISCHARGE INSTR - DIET
Diet: Regular/House Diet, Fluid Restriction (240 mL/tray) Diets; 1500 mL/day; Texture: Regular Texture ; Fluid Consistency: Thin

## 2023-06-01 NOTE — DISCHARGE INSTR - ACTIVITY
Activity as tolerated.    Wound Locations Left vaginal fold - every 12 hours  Cleanse Normal Saline  Intervention Thera Honey  Dressing: Bordered Gauze Dressing    Wound Care Instructions apply magic barrier cream Twice a day and use Z-guard in between treatments/ coccyx area.

## 2023-06-01 NOTE — CASE MANAGEMENT/SOCIAL WORK
Discharge Planning Assessment   [place]     Patient Name: Orquidea Rosenberg  MRN: [id number]  Today's Date: 6/1/2023    Admit Date: 5/22/2023          Discharge Plan     Row Name 06/01/23 0940       Plan    Final Discharge Disposition Code 50 - home with hospice    Final Note Pt is being discharged home with hospice on this date.  spoke with daughter, Lilli who confirms family are ready for Pt to come home, hospice has delivered DME. Dtr to come to hospital to  medications.  notfied Lead RN and provided  [facility] number [phone] to lead RN.  to arrange EMS once discharge is ready.    11:02am:  contacted [facility] and scheduled  EMS to transport. SS faxed PCS form to [facility] fax [phone]              Continued Care and Services - Admitted Since 5/22/2023     Home Medical Care     Service Provider Request Status Selected Services Address Phone Fax Patient Preferred    [facility] [address] [place] [id number] [phone] --              HARPAL Luna

## 2023-06-01 NOTE — PAYOR COMM NOTE
"CONTACT: CHYNA ALVARADO RN  UTILIZATION MANAGEMENT DEPT.  [facility]  [address] [id number]  PHONE: [phone]  FAX: [phone]      DISCHARGE NOTIFICATION  DISCHARGED HOME WITH HOSPICE ON 6/1/23  AUTH# [id number]      Orquidea Rosenberg (77 y.o. Female)     Date of Birth   1945    Social Security Number       Address   [address] [address]    Home Phone   [phone]    MRN   [id number]       S[employer]   Ep[employer]    Marital Status                               Admission Date   5/22/23    Admission Type   Urgent    Admitting Provider   Nelson Mejia MD    Attending Provider       Department, Room/Bed   [place] [address]/       Discharge Date   6/1/2023    Discharge Disposition   Hospice/Home    Discharge Destination                               Attending Provider: (none)   Allergies: Penicillins    Isolation: None   Infection: None   Code Status: Prior    Ht: 167.6 cm (66\")   Wt: 79 kg (174 lb 3.2 oz)    Admission Cmt: None   Principal Problem: Hypoxia [R09.02]                 Active Insurance as of 5/22/2023     Primary Coverage     Payor Plan Insurance Group Employer/Plan Group    [facility] MEDICARE REPLACEMENT [facility] MEDICARE REPLACEMENT [id number]     Payor Plan Address Payor Plan Phone Number Payor Plan Fax Number Effective Dates    [address] [address]   1/1/2021 - None Entered    [facility] [id number]       Subscriber Name Subscriber Birth Date Member ID       ORQUIDEA ROSENBERG 1945 [id number]                 Emergency Contacts      (Rel.) Home Phone Work Phone Mobile Phone    Gavin Rosenberg (Son) [phone] -- --    AnabelLilli (Daughter) [phone] -- [phone]            Discharge Order (From admission, onward)     Start     Ordered    06/01/23 0800  Discharge patient  Once        Expected Discharge Date: 06/01/23    Discharge Disposition: Hospice/Home    Physician of Record for Attribution - " Please select from Treatment Team: [place] VERAS [[id number]]    Review needed by CMO to determine Physician of Record: No       Question Answer Comment   Physician of Record for Attribution - Please select from Treatment Team [place] VERAS    Review needed by CMO to determine Physician of Record No        06/01/23 0759    05/31/23 0855  Discharge patient  Once,   Status:  Canceled        Expected Discharge Date: 05/31/23    Discharge Disposition: Hospice/Home    Physician of Record for Attribution - Please select from Treatment Team: [place] VERAS [[id number]]    Review needed by CMO to determine Physician of Record: No       Question Answer Comment   Physician of Record for Attribution - Please select from Treatment Team [place] VERAS    Review needed by CMO to determine Physician of Record No        05/31/23 0907
